# Patient Record
Sex: FEMALE | Race: WHITE | NOT HISPANIC OR LATINO | Employment: OTHER | URBAN - METROPOLITAN AREA
[De-identification: names, ages, dates, MRNs, and addresses within clinical notes are randomized per-mention and may not be internally consistent; named-entity substitution may affect disease eponyms.]

---

## 2017-01-11 ENCOUNTER — OFFICE VISIT (OUTPATIENT)
Dept: FAMILY MEDICINE CLINIC | Facility: CLINIC | Age: 67
End: 2017-01-11

## 2017-01-11 VITALS
BODY MASS INDEX: 27.58 KG/M2 | TEMPERATURE: 98.8 F | HEIGHT: 68 IN | RESPIRATION RATE: 13 BRPM | HEART RATE: 67 BPM | WEIGHT: 182 LBS | DIASTOLIC BLOOD PRESSURE: 76 MMHG | SYSTOLIC BLOOD PRESSURE: 118 MMHG | OXYGEN SATURATION: 97 %

## 2017-01-11 DIAGNOSIS — J01.40 ACUTE NON-RECURRENT PANSINUSITIS: Primary | ICD-10-CM

## 2017-01-11 PROCEDURE — 99213 OFFICE O/P EST LOW 20 MIN: CPT | Performed by: NURSE PRACTITIONER

## 2017-01-11 RX ORDER — AMOXICILLIN AND CLAVULANATE POTASSIUM 875; 125 MG/1; MG/1
1 TABLET, FILM COATED ORAL 2 TIMES DAILY
Qty: 20 TABLET | Refills: 0 | Status: SHIPPED | OUTPATIENT
Start: 2017-01-11 | End: 2017-04-03

## 2017-01-11 NOTE — PROGRESS NOTES
Subjective   Anastasiia Faria is a 67 y.o. female presents with sinus congestion and pain, taking otc sudafed for 5 weeks. Symptoms have been ongoing x 5 weeks. Sinus drainage, occasionally clear, varies to yellow/green. Ethmoidal, maxillary and frontal sinus pain reported. Initial rhinorrhea, now resolved. Improving sore throat, mild currently. Denies ear pain. Cough, improving, with some associated back pain. Dry, nonproductive, due to post nasal drainage. Shortness of breath and wheezing, intermittent, worsening over last few days. No known ill contacts. Increased fatigue. Appetite waxes and wanes.     URI    This is a new problem. The current episode started more than 1 month ago. There has been no fever. Associated symptoms include congestion (green/yellow), coughing, headaches, rhinorrhea (minimal), sinus pain and a sore throat (resolving). Pertinent negatives include no abdominal pain, diarrhea, dysuria, ear pain, nausea, neck pain, plugged ear sensation, rash, sneezing, swollen glands, vomiting or wheezing. She has tried antihistamine and decongestant for the symptoms. The treatment provided mild relief.        The following portions of the patient's history were reviewed and updated as appropriate: allergies, current medications, past family history, past medical history, past social history, past surgical history and problem list.    Review of Systems   Constitutional: Negative.    HENT: Positive for congestion (green/yellow), postnasal drip, rhinorrhea (minimal), sinus pressure and sore throat (resolving). Negative for ear pain and sneezing.    Eyes: Negative.    Respiratory: Positive for cough. Negative for wheezing.    Cardiovascular: Negative.    Gastrointestinal: Negative.  Negative for abdominal pain, diarrhea, nausea and vomiting.   Endocrine: Negative.    Genitourinary: Negative.  Negative for dysuria.   Musculoskeletal: Positive for back pain (mild back pain when coughing). Negative for neck pain.    Skin: Negative.  Negative for rash.   Allergic/Immunologic: Negative.    Neurological: Positive for headaches.   Hematological: Negative.    Psychiatric/Behavioral: Negative.        Objective   Physical Exam   Constitutional: She is oriented to person, place, and time. She appears well-developed and well-nourished.   HENT:   Head: Normocephalic and atraumatic.   Right Ear: Ear canal normal. Tympanic membrane is not retracted and not bulging.   Left Ear: Ear canal normal. Tympanic membrane is not retracted and not bulging.   Nose: Mucosal edema (pale) and sinus tenderness present. No rhinorrhea. Right sinus exhibits maxillary sinus tenderness and frontal sinus tenderness. Left sinus exhibits maxillary sinus tenderness and frontal sinus tenderness.   Mouth/Throat: Uvula is midline and mucous membranes are normal. Posterior oropharyngeal erythema present. No tonsillar exudate.   Eyes: EOM are normal. Pupils are equal, round, and reactive to light. Right eye exhibits no discharge. Left eye exhibits no discharge.   Neck: Normal range of motion. Neck supple.   Cardiovascular: Normal rate, regular rhythm and normal heart sounds.  Exam reveals no gallop and no friction rub.    No murmur heard.  Pulmonary/Chest: Effort normal and breath sounds normal. No respiratory distress. She has no wheezes. She has no rales. She exhibits no tenderness.   Lymphadenopathy:     She has no cervical adenopathy.   Neurological: She is alert and oriented to person, place, and time.   Skin: Skin is warm and dry.   Psychiatric: She has a normal mood and affect. Her behavior is normal. Judgment and thought content normal.   Vitals reviewed.      Assessment/Plan   Anastasiia was seen today for cough.    Diagnoses and all orders for this visit:    Acute non-recurrent pansinusitis    Other orders  -     amoxicillin-clavulanate (AUGMENTIN) 875-125 MG per tablet; Take 1 tablet by mouth 2 (Two) Times a Day.

## 2017-01-11 NOTE — MR AVS SNAPSHOT
Anastasiia Faria   1/11/2017 11:40 AM   Office Visit    Dept Phone:  [phone]   Encounter #:  [id number]    Provider:  ESTHER Hull   Department:  [facility]                Your Full Care Plan              Today's Medication Changes          These changes are accurate as of: 1/11/17 12:01 PM.  If you have any questions, ask your nurse or doctor.               New Medication(s)Ordered:     amoxicillin-clavulanate 875-125 MG per tablet   Commonly known as:  AUGMENTIN   Take 1 tablet by mouth 2 (Two) Times a Day.   Started by:  ESTHER Hull            Where to Get Your Medications      These medications were sent to [facility] [facility] [address] [facility] [phone] [facility] [phone]   [address] [facility], [facility] [address]     Phone:  [phone]     amoxicillin-clavulanate 875-125 MG per tablet                  Your Updated Medication List          This list is accurate as of: 1/11/17 12:01 PM.  Always use your most recent med list.                amoxicillin-clavulanate 875-125 MG per tablet   Commonly known as:  AUGMENTIN   Take 1 tablet by mouth 2 (Two) Times a Day.       atenolol 25 MG tablet   Commonly known as:  TENORMIN   Take 1 tablet by mouth daily.       CLARITIN 10 MG tablet   Generic drug:  loratadine       escitalopram 20 MG tablet   Commonly known as:  LEXAPRO   Take 1 tablet by mouth daily.       levothyroxine 50 MCG tablet   Commonly known as:  SYNTHROID, LEVOTHROID   Take 1 tablet by mouth daily.       NIFEdipine XL 30 MG 24 hr tablet   Commonly known as:  PROCARDIA XL   Take 1 tablet by mouth daily.               You Were Diagnosed With        Codes Comments    Acute non-recurrent pansinusitis    -  Primary ICD-10-CM: J01.40  ICD-9-CM: 461.8       Instructions    Sinusitis, Adult  Sinusitis is redness, soreness, and inflammation of  the paranasal sinuses. Paranasal sinuses are air pockets within the bones of your face. They are located beneath your eyes, in the middle of your forehead, and above your eyes. In healthy paranasal sinuses, mucus is able to drain out, and air is able to circulate through them by way of your nose. However, when your paranasal sinuses are inflamed, mucus and air can become trapped. This can allow bacteria and other germs to grow and cause infection.  Sinusitis can develop quickly and last only a short time (acute) or continue over a long period (chronic). Sinusitis that lasts for more than 12 weeks is considered chronic.  CAUSES  Causes of sinusitis include:  · Allergies.  · Structural abnormalities, such as displacement of the cartilage that separates your nostrils (deviated septum), which can decrease the air flow through your nose and sinuses and affect sinus drainage.  · Functional abnormalities, such as when the small hairs (cilia) that line your sinuses and help remove mucus do not work properly or are not present.  SIGNS AND SYMPTOMS  Symptoms of acute and chronic sinusitis are the same. The primary symptoms are pain and pressure around the affected sinuses. Other symptoms include:  · Upper toothache.  · Earache.  · Headache.  · Bad breath.  · Decreased sense of smell and taste.  · A cough, which worsens when you are lying flat.  · Fatigue.  · Fever.  · Thick drainage from your nose, which often is green and may contain pus (purulent).  · Swelling and warmth over the affected sinuses.  DIAGNOSIS  Your health care provider will perform a physical exam. During your exam, your health care provider may perform any of the following to help determine if you have acute sinusitis or chronic sinusitis:  · Look in your nose for signs of abnormal growths in your nostrils (nasal polyps).  · Tap over the affected sinus to check for signs of infection.  · View the inside of your sinuses using an imaging device that has a  light attached (endoscope).  If your health care provider suspects that you have chronic sinusitis, one or more of the following tests may be recommended:  · Allergy tests.  · Nasal culture. A sample of mucus is taken from your nose, sent to a lab, and screened for bacteria.  · Nasal cytology. A sample of mucus is taken from your nose and examined by your health care provider to determine if your sinusitis is related to an allergy.  TREATMENT  Most cases of acute sinusitis are related to a viral infection and will resolve on their own within 10 days. Sometimes, medicines are prescribed to help relieve symptoms of both acute and chronic sinusitis. These may include pain medicines, decongestants, nasal steroid sprays, or saline sprays.  However, for sinusitis related to a bacterial infection, your health care provider will prescribe antibiotic medicines. These are medicines that will help kill the bacteria causing the infection.  Rarely, sinusitis is caused by a fungal infection. In these cases, your health care provider will prescribe antifungal medicine.  For some cases of chronic sinusitis, surgery is needed. Generally, these are cases in which sinusitis recurs more than 3 times per year, despite other treatments.  HOME CARE INSTRUCTIONS  · Drink plenty of water. Water helps thin the mucus so your sinuses can drain more easily.  · Use a humidifier.  · Inhale steam 3-4 times a day (for example, sit in the bathroom with the shower running).  · Apply a warm, moist washcloth to your face 3-4 times a day, or as directed by your health care provider.  · Use saline nasal sprays to help moisten and clean your sinuses.  · Take medicines only as directed by your health care provider.  · If you were prescribed either an antibiotic or antifungal medicine, finish it all even if you start to feel better.  SEEK IMMEDIATE MEDICAL CARE IF:  · You have increasing pain or severe headaches.  · You have nausea, vomiting, or  "drowsiness.  · You have swelling around your face.  · You have vision problems.  · You have a stiff neck.  · You have difficulty breathing.     This information is not intended to replace advice given to you by your health care provider. Make sure you discuss any questions you have with your health care provider.     Document Released: 12/18/2006 Document Revised: 01/08/2016 Document Reviewed: 01/01/2013  [employer] Interactive Patient Education ©2016 [employer]       Patient Instructions History      Upcoming Appointments     Visit Type Date Time Department    SAME DAY 1/11/2017 11:40 AM YANIRA TAYLOR      [employer] Signup     Our records indicate that you have an active [employer] account.    You can view your After Visit Summary by going to [employer] and logging in with your [employer] username and password.  If you don't have a [employer] username and password but a parent or guardian has access to your record, the parent or guardian should login with their own [employer] username and password and access your record to view the After Visit Summary.    If you have questions, you can email [email or web address] or call [phone] to talk to our [employer] staff.  Remember, [employer] is NOT to be used for urgent needs.  For medical emergencies, dial 911.               Other Info from Your Visit           Allergies     No Known Allergies      Reason for Visit     Cough c/o persistent cough, sinus pressure, nasal drainage, nasal congestion, cough and sore throat are getting better, x 5 weeks, pt has been taking OTC sudafed       Vital Signs     Blood Pressure Pulse Temperature Respirations Height Weight    118/76 (BP Location: Right arm, Patient Position: Sitting, Cuff Size: Adult) 67 98.8 °F (37.1 °C) (Oral) 13 68\" (172.7 cm) 182 lb (82.6 kg)    Oxygen Saturation Body Mass Index Smoking Status             97% 27.67 kg/m2 Never Smoker         Problems and Diagnoses Noted     Acute " non-recurrent pansinusitis    -  Primary

## 2017-01-11 NOTE — PATIENT INSTRUCTIONS

## 2017-03-23 RX ORDER — LEVOTHYROXINE SODIUM 0.05 MG/1
50 TABLET ORAL DAILY
Qty: 90 TABLET | Refills: 0 | Status: SHIPPED | OUTPATIENT
Start: 2017-03-23 | End: 2017-04-03 | Stop reason: SDUPTHER

## 2017-03-23 RX ORDER — ESCITALOPRAM OXALATE 20 MG/1
20 TABLET ORAL DAILY
Qty: 90 TABLET | Refills: 0 | Status: SHIPPED | OUTPATIENT
Start: 2017-03-23 | End: 2017-04-03 | Stop reason: SDUPTHER

## 2017-03-23 RX ORDER — NIFEDIPINE 30 MG/1
30 TABLET, EXTENDED RELEASE ORAL DAILY
Qty: 90 TABLET | Refills: 0 | Status: SHIPPED | OUTPATIENT
Start: 2017-03-23 | End: 2017-04-03 | Stop reason: SDUPTHER

## 2017-03-23 RX ORDER — ATENOLOL 25 MG/1
25 TABLET ORAL DAILY
Qty: 90 TABLET | Refills: 0 | Status: SHIPPED | OUTPATIENT
Start: 2017-03-23 | End: 2017-04-03 | Stop reason: SDUPTHER

## 2017-04-03 ENCOUNTER — OFFICE VISIT (OUTPATIENT)
Dept: FAMILY MEDICINE CLINIC | Facility: CLINIC | Age: 67
End: 2017-04-03

## 2017-04-03 VITALS
BODY MASS INDEX: 27.86 KG/M2 | DIASTOLIC BLOOD PRESSURE: 78 MMHG | SYSTOLIC BLOOD PRESSURE: 125 MMHG | HEIGHT: 68 IN | HEART RATE: 73 BPM | WEIGHT: 183.8 LBS | TEMPERATURE: 98.7 F | OXYGEN SATURATION: 96 %

## 2017-04-03 DIAGNOSIS — E03.9 ACQUIRED HYPOTHYROIDISM: Primary | ICD-10-CM

## 2017-04-03 DIAGNOSIS — E78.01 FAMILIAL HYPERCHOLESTEROLEMIA: ICD-10-CM

## 2017-04-03 DIAGNOSIS — I10 ESSENTIAL HYPERTENSION: ICD-10-CM

## 2017-04-03 PROCEDURE — 90471 IMMUNIZATION ADMIN: CPT | Performed by: FAMILY MEDICINE

## 2017-04-03 PROCEDURE — 90715 TDAP VACCINE 7 YRS/> IM: CPT | Performed by: FAMILY MEDICINE

## 2017-04-03 PROCEDURE — 99213 OFFICE O/P EST LOW 20 MIN: CPT | Performed by: FAMILY MEDICINE

## 2017-04-03 RX ORDER — ESCITALOPRAM OXALATE 20 MG/1
20 TABLET ORAL DAILY
Qty: 90 TABLET | Refills: 0 | Status: SHIPPED | OUTPATIENT
Start: 2017-04-03 | End: 2017-09-20 | Stop reason: SDUPTHER

## 2017-04-03 RX ORDER — ATENOLOL 25 MG/1
25 TABLET ORAL DAILY
Qty: 90 TABLET | Refills: 0 | Status: SHIPPED | OUTPATIENT
Start: 2017-04-03 | End: 2017-09-20 | Stop reason: SDUPTHER

## 2017-04-03 RX ORDER — LEVOTHYROXINE SODIUM 0.05 MG/1
50 TABLET ORAL DAILY
Qty: 90 TABLET | Refills: 0 | Status: SHIPPED | OUTPATIENT
Start: 2017-04-03 | End: 2017-09-20 | Stop reason: SDUPTHER

## 2017-04-03 RX ORDER — NIFEDIPINE 30 MG/1
30 TABLET, EXTENDED RELEASE ORAL DAILY
Qty: 90 TABLET | Refills: 0 | Status: SHIPPED | OUTPATIENT
Start: 2017-04-03 | End: 2017-09-20 | Stop reason: SDUPTHER

## 2017-04-03 NOTE — PROGRESS NOTES
"  Chief Complaint   Patient presents with   • Hypertension     follow up pt is doing well would like to get dtap inmunizations       Subjective.../HPI  Patient present today with htn and hypothyroidism.     I have reviewed the patient's medical history in detail and updated the computerized patient record.    Family History   Problem Relation Age of Onset   • Breast cancer Mother    • Colon cancer Mother    • Stomach cancer Maternal Grandmother      or intestinal   • Cancer Maternal Grandmother      GI TRACT CANCER   • Colon cancer Maternal Aunt      colon ca 40   • Colon cancer Maternal Aunt      64 yo   • Colon cancer Maternal Aunt      81 yo   • Colon cancer Other    • Colon cancer Other    • Heart attack Maternal Grandfather    • Heart disease Paternal Grandmother        Social History     Social History   • Marital status:      Spouse name: N/A   • Number of children: N/A   • Years of education: N/A     Occupational History   • Not on file.     Social History Main Topics   • Smoking status: Never Smoker   • Smokeless tobacco: Never Used   • Alcohol use No   • Drug use: No   • Sexual activity: Not on file     Other Topics Concern   • Not on file     Social History Narrative       Review of Systems:   Review of Systems   HENT: Positive for sneezing.    Eyes: Negative.    Cardiovascular: Positive for leg swelling.   Endocrine: Negative.    Musculoskeletal: Positive for back pain.   Skin: Negative.    Allergic/Immunologic: Positive for environmental allergies.   Neurological: Positive for headaches.   Psychiatric/Behavioral: Positive for sleep disturbance.       Objective:  Vital Signs     Vitals:    04/03/17 1328 04/03/17 1344   BP: 116/62 125/78   BP Location: Left arm Left arm   Patient Position: Sitting Sitting   Cuff Size:  Adult   Pulse: 73    Temp: 98.7 °F (37.1 °C)    TempSrc: Oral    SpO2: 96%    Weight: 183 lb 12.8 oz (83.4 kg)    Height: 68\" (172.7 cm)      Physical Exam   Constitutional: She is " oriented to person, place, and time. She appears well-developed and well-nourished.   HENT:   Head: Normocephalic.   Eyes: Pupils are equal, round, and reactive to light.   Neck: Normal range of motion.   Cardiovascular: Normal rate, regular rhythm and normal heart sounds.    Pulmonary/Chest: Effort normal and breath sounds normal.   Abdominal: Soft.   Musculoskeletal: Normal range of motion.   Neurological: She is alert and oriented to person, place, and time.   Skin: Skin is warm and dry.        Results Review:      REVIEWED AND DISCUSSED LAB RESULTS WITH PATIENT and REVIEWED AND DISCUSSED CLINICAL RESULTS WITH PATIENT                          Current Outpatient Prescriptions:   •  atenolol (TENORMIN) 25 MG tablet, Take 1 tablet by mouth Daily., Disp: 90 tablet, Rfl: 0  •  escitalopram (LEXAPRO) 20 MG tablet, Take 1 tablet by mouth Daily., Disp: 90 tablet, Rfl: 0  •  levothyroxine (SYNTHROID, LEVOTHROID) 50 MCG tablet, Take 1 tablet by mouth Daily., Disp: 90 tablet, Rfl: 0  •  loratadine (CLARITIN) 10 MG tablet, Take 10 mg by mouth daily., Disp: , Rfl:   •  NIFEdipine XL (PROCARDIA XL) 30 MG 24 hr tablet, Take 1 tablet by mouth Daily., Disp: 90 tablet, Rfl: 0  •  Probiotic Product (SOLUBLE FIBER/PROBIOTICS PO), Take 1 capsule by mouth Daily., Disp: , Rfl:     Procedures    Assessment/Plan     Diagnoses and all orders for this visit:    Acquired hypothyroidism    Essential hypertension    Familial hypercholesterolemia  -     Lipid Panel With LDL / HDL Ratio    Other orders  -     Probiotic Product (SOLUBLE FIBER/PROBIOTICS PO); Take 1 capsule by mouth Daily.  -     atenolol (TENORMIN) 25 MG tablet; Take 1 tablet by mouth Daily.  -     escitalopram (LEXAPRO) 20 MG tablet; Take 1 tablet by mouth Daily.  -     levothyroxine (SYNTHROID, LEVOTHROID) 50 MCG tablet; Take 1 tablet by mouth Daily.  -     NIFEdipine XL (PROCARDIA XL) 30 MG 24 hr tablet; Take 1 tablet by mouth Daily.  -     Tdap Vaccine Greater Than or Equal  To 6yo IM    Pt wants to hold on statin meds and try diet exercise and wt loss     Jr Fraser Jr., MD  04/03/17  2:00 PM

## 2017-05-12 ENCOUNTER — PROCEDURE VISIT (OUTPATIENT)
Dept: OBSTETRICS AND GYNECOLOGY | Facility: CLINIC | Age: 67
End: 2017-05-12

## 2017-05-12 ENCOUNTER — OFFICE VISIT (OUTPATIENT)
Dept: OBSTETRICS AND GYNECOLOGY | Facility: CLINIC | Age: 67
End: 2017-05-12

## 2017-05-12 VITALS
SYSTOLIC BLOOD PRESSURE: 138 MMHG | HEIGHT: 67 IN | DIASTOLIC BLOOD PRESSURE: 70 MMHG | BODY MASS INDEX: 28.09 KG/M2 | WEIGHT: 179 LBS

## 2017-05-12 DIAGNOSIS — Z13.820 SCREENING FOR OSTEOPOROSIS: Primary | ICD-10-CM

## 2017-05-12 DIAGNOSIS — Z78.0 POSTMENOPAUSAL: ICD-10-CM

## 2017-05-12 DIAGNOSIS — Z01.419 WELL WOMAN EXAM WITH ROUTINE GYNECOLOGICAL EXAM: Primary | ICD-10-CM

## 2017-05-12 DIAGNOSIS — M85.80 OSTEOPENIA: ICD-10-CM

## 2017-05-12 PROCEDURE — 99397 PER PM REEVAL EST PAT 65+ YR: CPT | Performed by: OBSTETRICS & GYNECOLOGY

## 2017-05-12 PROCEDURE — 77080 DXA BONE DENSITY AXIAL: CPT | Performed by: OBSTETRICS & GYNECOLOGY

## 2017-05-22 ENCOUNTER — HOSPITAL ENCOUNTER (OUTPATIENT)
Dept: MAMMOGRAPHY | Facility: HOSPITAL | Age: 67
Discharge: HOME | End: 2017-05-22
Attending: OBSTETRICS & GYNECOLOGY | Admitting: OBSTETRICS & GYNECOLOGY

## 2017-05-22 ENCOUNTER — HOSPITAL ENCOUNTER (OUTPATIENT)
Dept: ULTRASOUND IMAGING | Facility: HOSPITAL | Age: 67
Discharge: HOME | End: 2017-05-22
Attending: OBSTETRICS & GYNECOLOGY

## 2017-05-22 DIAGNOSIS — N64.89 BREAST ASYMMETRY: ICD-10-CM

## 2017-05-22 PROCEDURE — 76642 ULTRASOUND BREAST LIMITED: CPT

## 2017-05-22 PROCEDURE — G0206 DX MAMMO INCL CAD UNI: HCPCS

## 2017-06-01 ENCOUNTER — CLINICAL SUPPORT (OUTPATIENT)
Dept: OBSTETRICS AND GYNECOLOGY | Facility: CLINIC | Age: 67
End: 2017-06-01

## 2017-06-01 DIAGNOSIS — M81.0 OSTEOPOROSIS: Primary | ICD-10-CM

## 2017-06-01 DIAGNOSIS — Z92.29 HX OF DRUG THERAPY: ICD-10-CM

## 2017-06-01 PROCEDURE — 96372 THER/PROPH/DIAG INJ SC/IM: CPT | Performed by: OBSTETRICS & GYNECOLOGY

## 2017-08-11 LAB
CHOLEST SERPL-MCNC: 228 MG/DL (ref 0–200)
HDLC SERPL-MCNC: 44 MG/DL (ref 40–60)
LDLC SERPL CALC-MCNC: 143 MG/DL (ref 0–100)
LDLC/HDLC SERPL: 3.25 {RATIO}
TRIGL SERPL-MCNC: 206 MG/DL (ref 0–150)
VLDLC SERPL CALC-MCNC: 41.2 MG/DL (ref 5–40)

## 2017-09-21 RX ORDER — NIFEDIPINE 30 MG/1
TABLET, EXTENDED RELEASE ORAL
Qty: 90 TABLET | Refills: 0 | Status: SHIPPED | OUTPATIENT
Start: 2017-09-21 | End: 2017-10-16 | Stop reason: SDUPTHER

## 2017-09-21 RX ORDER — ESCITALOPRAM OXALATE 20 MG/1
TABLET ORAL
Qty: 90 TABLET | Refills: 0 | Status: SHIPPED | OUTPATIENT
Start: 2017-09-21 | End: 2017-10-16 | Stop reason: SDUPTHER

## 2017-09-21 RX ORDER — ATENOLOL 25 MG/1
TABLET ORAL
Qty: 90 TABLET | Refills: 0 | Status: SHIPPED | OUTPATIENT
Start: 2017-09-21 | End: 2017-10-16 | Stop reason: SDUPTHER

## 2017-09-21 RX ORDER — LEVOTHYROXINE SODIUM 0.05 MG/1
TABLET ORAL
Qty: 90 TABLET | Refills: 0 | Status: SHIPPED | OUTPATIENT
Start: 2017-09-21 | End: 2017-10-16 | Stop reason: SDUPTHER

## 2017-10-12 ENCOUNTER — FLU SHOT (OUTPATIENT)
Dept: FAMILY MEDICINE CLINIC | Facility: CLINIC | Age: 67
End: 2017-10-12

## 2017-10-12 ENCOUNTER — LAB (OUTPATIENT)
Dept: FAMILY MEDICINE CLINIC | Facility: CLINIC | Age: 67
End: 2017-10-12

## 2017-10-12 DIAGNOSIS — J30.1 ALLERGIC RHINITIS DUE TO POLLEN, UNSPECIFIED CHRONICITY, UNSPECIFIED SEASONALITY: ICD-10-CM

## 2017-10-12 DIAGNOSIS — K63.5 HYPERPLASTIC COLONIC POLYP, UNSPECIFIED PART OF COLON: ICD-10-CM

## 2017-10-12 DIAGNOSIS — E03.9 ACQUIRED HYPOTHYROIDISM: ICD-10-CM

## 2017-10-12 DIAGNOSIS — Z80.0 FH: COLON CANCER IN RELATIVE <50 YEARS OLD: ICD-10-CM

## 2017-10-12 DIAGNOSIS — Z23 NEED FOR INFLUENZA VACCINATION: Primary | ICD-10-CM

## 2017-10-12 DIAGNOSIS — I10 ESSENTIAL HYPERTENSION: Primary | ICD-10-CM

## 2017-10-12 DIAGNOSIS — N18.9 CHRONIC KIDNEY DISEASE, UNSPECIFIED CKD STAGE: ICD-10-CM

## 2017-10-12 DIAGNOSIS — Z00.00 ROUTINE GENERAL MEDICAL EXAMINATION AT A HEALTH CARE FACILITY: ICD-10-CM

## 2017-10-12 DIAGNOSIS — Z79.899 POLYPHARMACY: ICD-10-CM

## 2017-10-12 DIAGNOSIS — E78.01 FAMILIAL HYPERCHOLESTEROLEMIA: ICD-10-CM

## 2017-10-12 PROCEDURE — G0008 ADMIN INFLUENZA VIRUS VAC: HCPCS | Performed by: FAMILY MEDICINE

## 2017-10-12 PROCEDURE — 90662 IIV NO PRSV INCREASED AG IM: CPT | Performed by: FAMILY MEDICINE

## 2017-10-13 LAB
ALBUMIN SERPL-MCNC: 4.3 G/DL (ref 3.5–5.2)
ALBUMIN/GLOB SERPL: 1.8 G/DL
ALP SERPL-CCNC: 69 U/L (ref 39–117)
ALT SERPL-CCNC: 18 U/L (ref 1–33)
AST SERPL-CCNC: 20 U/L (ref 1–32)
BASOPHILS # BLD AUTO: 0.04 10*3/MM3 (ref 0–0.2)
BASOPHILS NFR BLD AUTO: 0.6 % (ref 0–1.5)
BILIRUB SERPL-MCNC: 0.5 MG/DL (ref 0.1–1.2)
BUN SERPL-MCNC: 12 MG/DL (ref 8–23)
BUN/CREAT SERPL: 14.6 (ref 7–25)
CALCIUM SERPL-MCNC: 8.9 MG/DL (ref 8.6–10.5)
CHLORIDE SERPL-SCNC: 104 MMOL/L (ref 98–107)
CHOLEST SERPL-MCNC: 233 MG/DL (ref 0–200)
CO2 SERPL-SCNC: 24.1 MMOL/L (ref 22–29)
CREAT SERPL-MCNC: 0.82 MG/DL (ref 0.57–1)
EOSINOPHIL # BLD AUTO: 0.33 10*3/MM3 (ref 0–0.7)
EOSINOPHIL NFR BLD AUTO: 5.1 % (ref 0.3–6.2)
ERYTHROCYTE [DISTWIDTH] IN BLOOD BY AUTOMATED COUNT: 14.7 % (ref 11.7–13)
FT4I SERPL CALC-MCNC: 2.1 (ref 1.2–4.9)
GLOBULIN SER CALC-MCNC: 2.4 GM/DL
GLUCOSE SERPL-MCNC: 94 MG/DL (ref 65–99)
HBA1C MFR BLD: 5.6 % (ref 4.8–5.6)
HCT VFR BLD AUTO: 42.5 % (ref 35.6–45.5)
HDLC SERPL-MCNC: 41 MG/DL (ref 40–60)
HGB BLD-MCNC: 14.1 G/DL (ref 11.9–15.5)
IMM GRANULOCYTES # BLD: 0.02 10*3/MM3 (ref 0–0.03)
IMM GRANULOCYTES NFR BLD: 0.3 % (ref 0–0.5)
LDLC SERPL CALC-MCNC: 152 MG/DL (ref 0–100)
LDLC/HDLC SERPL: 3.72 {RATIO}
LYMPHOCYTES # BLD AUTO: 2.85 10*3/MM3 (ref 0.9–4.8)
LYMPHOCYTES NFR BLD AUTO: 43.8 % (ref 19.6–45.3)
MCH RBC QN AUTO: 31.1 PG (ref 26.9–32)
MCHC RBC AUTO-ENTMCNC: 33.2 G/DL (ref 32.4–36.3)
MCV RBC AUTO: 93.8 FL (ref 80.5–98.2)
MONOCYTES # BLD AUTO: 0.41 10*3/MM3 (ref 0.2–1.2)
MONOCYTES NFR BLD AUTO: 6.3 % (ref 5–12)
NEUTROPHILS # BLD AUTO: 2.85 10*3/MM3 (ref 1.9–8.1)
NEUTROPHILS NFR BLD AUTO: 43.9 % (ref 42.7–76)
PLATELET # BLD AUTO: 317 10*3/MM3 (ref 140–500)
POTASSIUM SERPL-SCNC: 4.2 MMOL/L (ref 3.5–5.2)
PROT SERPL-MCNC: 6.7 G/DL (ref 6–8.5)
RBC # BLD AUTO: 4.53 10*6/MM3 (ref 3.9–5.2)
SODIUM SERPL-SCNC: 143 MMOL/L (ref 136–145)
T3RU NFR SERPL: 27 % (ref 24–39)
T4 SERPL-MCNC: 7.7 UG/DL (ref 4.5–12)
TRIGL SERPL-MCNC: 198 MG/DL (ref 0–150)
TSH SERPL DL<=0.005 MIU/L-ACNC: 3.59 UIU/ML (ref 0.45–4.5)
VLDLC SERPL CALC-MCNC: 39.6 MG/DL (ref 5–40)
WBC # BLD AUTO: 6.5 10*3/MM3 (ref 4.5–10.7)

## 2017-10-16 ENCOUNTER — OFFICE VISIT (OUTPATIENT)
Dept: FAMILY MEDICINE CLINIC | Facility: CLINIC | Age: 67
End: 2017-10-16

## 2017-10-16 VITALS
BODY MASS INDEX: 28.25 KG/M2 | HEIGHT: 67 IN | WEIGHT: 180 LBS | OXYGEN SATURATION: 97 % | HEART RATE: 61 BPM | SYSTOLIC BLOOD PRESSURE: 170 MMHG | TEMPERATURE: 98.3 F | DIASTOLIC BLOOD PRESSURE: 85 MMHG

## 2017-10-16 DIAGNOSIS — E78.01 FAMILIAL HYPERCHOLESTEROLEMIA: ICD-10-CM

## 2017-10-16 DIAGNOSIS — I10 ESSENTIAL HYPERTENSION: ICD-10-CM

## 2017-10-16 DIAGNOSIS — Z00.00 PREVENTATIVE HEALTH CARE: Primary | ICD-10-CM

## 2017-10-16 DIAGNOSIS — E03.9 ACQUIRED HYPOTHYROIDISM: ICD-10-CM

## 2017-10-16 PROCEDURE — G0009 ADMIN PNEUMOCOCCAL VACCINE: HCPCS | Performed by: FAMILY MEDICINE

## 2017-10-16 PROCEDURE — 90670 PCV13 VACCINE IM: CPT | Performed by: FAMILY MEDICINE

## 2017-10-16 PROCEDURE — 99213 OFFICE O/P EST LOW 20 MIN: CPT | Performed by: FAMILY MEDICINE

## 2017-10-16 RX ORDER — NIFEDIPINE 30 MG/1
30 TABLET, EXTENDED RELEASE ORAL DAILY
Qty: 90 TABLET | Refills: 1 | Status: SHIPPED | OUTPATIENT
Start: 2017-10-16 | End: 2018-03-26 | Stop reason: SDUPTHER

## 2017-10-16 RX ORDER — ESCITALOPRAM OXALATE 20 MG/1
20 TABLET ORAL DAILY
Qty: 90 TABLET | Refills: 1 | Status: SHIPPED | OUTPATIENT
Start: 2017-10-16 | End: 2018-03-26 | Stop reason: SDUPTHER

## 2017-10-16 RX ORDER — ATENOLOL 25 MG/1
25 TABLET ORAL DAILY
Qty: 90 TABLET | Refills: 1 | Status: SHIPPED | OUTPATIENT
Start: 2017-10-16 | End: 2018-03-26 | Stop reason: SDUPTHER

## 2017-10-16 RX ORDER — LEVOTHYROXINE SODIUM 0.05 MG/1
50 TABLET ORAL DAILY
Qty: 90 TABLET | Refills: 1 | Status: SHIPPED | OUTPATIENT
Start: 2017-10-16 | End: 2018-03-26 | Stop reason: SDUPTHER

## 2017-10-16 NOTE — PROGRESS NOTES
"  Chief Complaint   Patient presents with   • Hypothyroidism     follow up pt is fine ,no complains       Subjective.../HPI  Patient present today withhtn and hypothyroidism. On sudafed x 2 weeks. ckd ans resolved.    I have reviewed the patient's medical history in detail and updated the computerized patient record.    Family History   Problem Relation Age of Onset   • Breast cancer Mother    • Colon cancer Mother    • Stomach cancer Maternal Grandmother      or intestinal   • Cancer Maternal Grandmother      GI TRACT CANCER   • Colon cancer Maternal Aunt      colon ca 40   • Colon cancer Maternal Aunt      64 yo   • Colon cancer Maternal Aunt      79 yo   • Colon cancer Other    • Colon cancer Other    • Heart attack Maternal Grandfather    • Heart disease Paternal Grandmother        Social History     Social History   • Marital status:      Spouse name: N/A   • Number of children: N/A   • Years of education: N/A     Occupational History   • Not on file.     Social History Main Topics   • Smoking status: Never Smoker   • Smokeless tobacco: Never Used   • Alcohol use No   • Drug use: No   • Sexual activity: Not on file     Other Topics Concern   • Not on file     Social History Narrative       Review of Systems:   Review of Systems   Constitutional: Negative.    HENT: Negative.    Eyes: Negative.    Respiratory: Negative.    Cardiovascular: Negative.    Gastrointestinal: Negative.    Endocrine: Negative.    Genitourinary: Negative.    Musculoskeletal: Negative.    Skin: Negative.    Allergic/Immunologic: Negative.    Neurological: Negative.    Hematological: Negative.    Psychiatric/Behavioral: Negative.        Objective:  Vital Signs     Vitals:    10/16/17 1604 10/16/17 1717   BP: 138/86 170/85   BP Location: Left arm Left arm   Patient Position: Sitting Sitting   Cuff Size:  Adult   Pulse: 61    Temp: 98.3 °F (36.8 °C)    TempSrc: Oral    SpO2: 97%    Weight: 180 lb (81.6 kg)    Height: 67\" (170.2 cm)  "     Physical Exam   Constitutional: She is oriented to person, place, and time. She appears well-developed and well-nourished.   HENT:   Head: Normocephalic.   Eyes: Pupils are equal, round, and reactive to light.   Neck: Normal range of motion.   Cardiovascular: Normal rate, regular rhythm and normal heart sounds.    Pulmonary/Chest: Effort normal.   Abdominal: Soft.   Musculoskeletal: Normal range of motion.   Neurological: She is alert and oriented to person, place, and time.   Skin: Skin is dry.        Results Review:      REVIEWED AND DISCUSSED LAB RESULTS WITH PATIENT and REVIEWED AND DISCUSSED CLINICAL RESULTS WITH PATIENT      Results from last 7 days  Lab Units 10/12/17  0936   WBC 10*3/mm3 6.50   HEMOGLOBIN g/dL 14.1   HEMATOCRIT % 42.5   PLATELETS 10*3/mm3 317       Results from last 7 days  Lab Units 10/12/17  0936   SODIUM mmol/L 143   POTASSIUM mmol/L 4.2   CHLORIDE mmol/L 104   TOTAL CO2, ARTERIAL mmol/L 24.1   BUN mg/dL 12   CREATININE mg/dL 0.82   CALCIUM mg/dL 8.9                   Current Outpatient Prescriptions:   •  atenolol (TENORMIN) 25 MG tablet, Take 1 tablet by mouth Daily., Disp: 90 tablet, Rfl: 1  •  Cholecalciferol (VITAMIN D3) 2000 UNITS capsule, , Disp: , Rfl:   •  escitalopram (LEXAPRO) 20 MG tablet, Take 1 tablet by mouth Daily., Disp: 90 tablet, Rfl: 1  •  levothyroxine (SYNTHROID, LEVOTHROID) 50 MCG tablet, Take 1 tablet by mouth Daily., Disp: 90 tablet, Rfl: 1  •  loratadine (CLARITIN) 10 MG tablet, Take 10 mg by mouth daily., Disp: , Rfl:   •  NIFEdipine XL (PROCARDIA XL) 30 MG 24 hr tablet, Take 1 tablet by mouth Daily., Disp: 90 tablet, Rfl: 1  •  Probiotic Product (SOLUBLE FIBER/PROBIOTICS PO), Take 1 capsule by mouth Daily., Disp: , Rfl:     Current Facility-Administered Medications:   •  pneumococcal polysaccharide 23-valent (PNEUMOVAX-23) vaccine 0.5 mL, 0.5 mL, Intramuscular, During Hospitalization, Jr Fraser Jr., MD    Procedures    Assessment/Plan     Diagnoses  and all orders for this visit:    Preventative health care  -     pneumococcal polysaccharide 23-valent (PNEUMOVAX-23) vaccine 0.5 mL; Inject 0.5 mL into the shoulder, thigh, or buttocks During Hospitalization for Immunization.    Acquired hypothyroidism    Familial hypercholesterolemia    Essential hypertension    Other orders  -     atenolol (TENORMIN) 25 MG tablet; Take 1 tablet by mouth Daily.  -     escitalopram (LEXAPRO) 20 MG tablet; Take 1 tablet by mouth Daily.  -     levothyroxine (SYNTHROID, LEVOTHROID) 50 MCG tablet; Take 1 tablet by mouth Daily.  -     NIFEdipine XL (PROCARDIA XL) 30 MG 24 hr tablet; Take 1 tablet by mouth Daily.       pt has been taking sudafed x 2 weeks with bp  Pt wants to hold on statins and try diet exercise ands wt loss    Jr Fraser Jr., MD  10/16/17  5:35 PM

## 2018-02-15 ENCOUNTER — PROCEDURE VISIT (OUTPATIENT)
Dept: OBSTETRICS AND GYNECOLOGY | Facility: CLINIC | Age: 68
End: 2018-02-15

## 2018-02-15 VITALS — DIASTOLIC BLOOD PRESSURE: 60 MMHG | SYSTOLIC BLOOD PRESSURE: 112 MMHG

## 2018-02-15 DIAGNOSIS — Z92.29 HX OF DRUG THERAPY: ICD-10-CM

## 2018-02-15 DIAGNOSIS — M85.88 OSTEOPENIA OF SPINE: Primary | ICD-10-CM

## 2018-02-15 PROCEDURE — 96372 THER/PROPH/DIAG INJ SC/IM: CPT | Performed by: OBSTETRICS & GYNECOLOGY

## 2018-02-15 NOTE — PROGRESS NOTES
Procedure   Procedures   Prolia Injection    60 mg subcutaneous injection of Prolia given in upper outer right arm.  Patient tolerated without difficulty.  She will return in 6 months for next injection and annual checkup.

## 2018-03-14 ENCOUNTER — OFFICE VISIT (OUTPATIENT)
Dept: FAMILY MEDICINE CLINIC | Facility: CLINIC | Age: 68
End: 2018-03-14

## 2018-03-14 ENCOUNTER — HOSPITAL ENCOUNTER (OUTPATIENT)
Dept: GENERAL RADIOLOGY | Facility: HOSPITAL | Age: 68
Discharge: HOME | End: 2018-03-14
Admitting: FAMILY MEDICINE

## 2018-03-14 VITALS
BODY MASS INDEX: 28.41 KG/M2 | WEIGHT: 181 LBS | HEART RATE: 75 BPM | OXYGEN SATURATION: 99 % | SYSTOLIC BLOOD PRESSURE: 128 MMHG | HEIGHT: 67 IN | DIASTOLIC BLOOD PRESSURE: 78 MMHG

## 2018-03-14 DIAGNOSIS — M21.829 SHOULDER HEIGHT DISCREPANCY: ICD-10-CM

## 2018-03-14 DIAGNOSIS — I10 ESSENTIAL HYPERTENSION: ICD-10-CM

## 2018-03-14 DIAGNOSIS — R53.82 CHRONIC FATIGUE: ICD-10-CM

## 2018-03-14 DIAGNOSIS — R06.02 SHORTNESS OF BREATH: Primary | ICD-10-CM

## 2018-03-14 DIAGNOSIS — Q74.0 ABNORMAL PROMINENCE OF SCAPULA: ICD-10-CM

## 2018-03-14 PROCEDURE — 99214 OFFICE O/P EST MOD 30 MIN: CPT | Performed by: FAMILY MEDICINE

## 2018-03-14 PROCEDURE — 72072 X-RAY EXAM THORAC SPINE 3VWS: CPT

## 2018-03-14 RX ORDER — VALACYCLOVIR HYDROCHLORIDE 1 G/1
TABLET, FILM COATED ORAL
COMMUNITY
Start: 2018-02-20 | End: 2018-03-30

## 2018-03-14 NOTE — PROGRESS NOTES
Subjective   Anastasiia Faria is a 68 y.o. female. Previously seen by Dr. Fraser. Pt is new to me, problems are new to me.      Chief Complaint   Patient presents with   • Establish Care   • Fatigue   • Shortness of Breath     going down 1 flight of stairs        History of Present Illness    SOB and fatigue  For a year but the SOB is worse. Happens with little activity like stairs. Goes to curves and SOB after, sometimes has to slow down. Slight cough but not too much has allergies, non-productive, hemoptysis. Not associated with sweating or getting really hot. Is associated with pain between shoulder blades, mostly when she is walking, never at rest. Feels like a tightness, like she needs to relax, but not tense. Not associated with neck pain or headaches. This has been going on for couple of years. Has had EKG but years ago. Never had an echo or stress test. Has a heart murmur. Has HTN but very well controlled. Changed some BP meds because of kidney and seems to be working well. Kidney improved. Some lightheadedness with standing but not passed out. Does not sleep well at all. Get enough hours. Can't get to sleep, reads a lot, goes back to bed. Sleeps a few hours at a time. Never been a smoker.     Hx of scoliosis  Central back pain. Feels like tightness. Has been ongoing for 2-3 years. Right in middle of back, right of center. Has hx of scoliosis, unsure if this is related to problem or not. She has noticeable shoulder height discrepancy with left being higher than right. Also with appearance of left lateral base of the neck swellings. Said this neck swelling has been evaluated with imaging and was normal. Pt notices the height difference of shoulders when looks in the mirror.    Thyroid  Lab abnormalities. Was started 2-3 years ago. Hs not had dose change.    The following portions of the patient's history were reviewed and updated as appropriate: allergies, current medications, past medical history, past social  "history, past surgical history and problem list.    Review of Systems   Constitutional: Negative for activity change, appetite change and unexpected weight change.   Respiratory: Negative for shortness of breath.    Cardiovascular: Negative for chest pain and leg swelling.   Musculoskeletal: Positive for arthralgias and myalgias. Negative for neck pain and neck stiffness.   Allergic/Immunologic: Positive for environmental allergies.   Neurological: Negative for headaches.   Psychiatric/Behavioral: Positive for sleep disturbance.       Objective   Blood pressure 128/78, pulse 75, height 170.2 cm (67\"), weight 82.1 kg (181 lb), SpO2 99 %.  Physical Exam   Constitutional: She is oriented to person, place, and time. She appears well-nourished. No distress.   HENT:   Right Ear: External ear normal.   Left Ear: External ear normal.   Nose: Nose normal.   Mouth/Throat: Oropharynx is clear and moist.   Eyes: Conjunctivae are normal. Right eye exhibits no discharge. Left eye exhibits no discharge. No scleral icterus.   Neck: Neck supple. No thyromegaly present.   Cardiovascular: Normal rate, regular rhythm, normal heart sounds and intact distal pulses.    No murmur heard.  Pulmonary/Chest: Effort normal and breath sounds normal. No respiratory distress. She has no wheezes.   Musculoskeletal: She exhibits no edema.   Visible asymmetry of the right shoulder and scapula. Sloping of right shoulder more dramatically downward than left. More pronounced right scapula. Mild muscle spasm palpated on the right back, trapezius and thoracic level paraspinals.   Lymphadenopathy:     She has no cervical adenopathy.   Neurological: She is alert and oriented to person, place, and time. She exhibits normal muscle tone.   Psychiatric: She has a normal mood and affect. Her behavior is normal.   Vitals reviewed.      Assessment/Plan   Anastasiia was seen today for establish care, fatigue and shortness of breath.    Diagnoses and all orders for this " visit:    Shortness of breath  -     Adult Transthoracic Echo Complete W/ Cont if Necessary Per Protocol; Future    Chronic fatigue  -     Adult Transthoracic Echo Complete W/ Cont if Necessary Per Protocol; Future    Essential hypertension  -     Adult Transthoracic Echo Complete W/ Cont if Necessary Per Protocol; Future    Abnormal prominence of scapula  -     Ambulatory Referral to Orthopedic Surgery  -     Cancel: XR Spine Thoracic 4+ View  -     XR spine thoracic 3 vw  -     XR Spine Thoracic 3 View    Shoulder height discrepancy  -     Ambulatory Referral to Orthopedic Surgery  -     Cancel: XR Spine Thoracic 4+ View  -     XR spine thoracic 3 vw  -     XR Spine Thoracic 3 View    Discussed her scoliosis and back discomfort. It is affecting the degree of activity as she likes to walk for exercise and it can bother the back. Unsure of best therapy whether bracing may help or PT, specific stretches and position/posture training to help vs even surgery? Will refer for further evaluation and discussion of treatment options.

## 2018-03-20 ENCOUNTER — HOSPITAL ENCOUNTER (OUTPATIENT)
Dept: CARDIOLOGY | Facility: HOSPITAL | Age: 68
Discharge: HOME | End: 2018-03-20
Admitting: FAMILY MEDICINE

## 2018-03-20 VITALS
WEIGHT: 191 LBS | BODY MASS INDEX: 29.98 KG/M2 | SYSTOLIC BLOOD PRESSURE: 130 MMHG | HEART RATE: 73 BPM | DIASTOLIC BLOOD PRESSURE: 70 MMHG | HEIGHT: 67 IN

## 2018-03-20 DIAGNOSIS — R06.02 SHORTNESS OF BREATH: ICD-10-CM

## 2018-03-20 DIAGNOSIS — R53.82 CHRONIC FATIGUE: ICD-10-CM

## 2018-03-20 DIAGNOSIS — I10 ESSENTIAL HYPERTENSION: ICD-10-CM

## 2018-03-20 LAB
ASCENDING AORTA: 2.6 CM
BH CV ECHO MEAS - ACS: 2 CM
BH CV ECHO MEAS - AO MAX PG (FULL): 3.7 MMHG
BH CV ECHO MEAS - AO MAX PG: 8 MMHG
BH CV ECHO MEAS - AO MEAN PG (FULL): 2.3 MMHG
BH CV ECHO MEAS - AO MEAN PG: 4.7 MMHG
BH CV ECHO MEAS - AO ROOT AREA (BSA CORRECTED): 1.5
BH CV ECHO MEAS - AO ROOT AREA: 6.6 CM^2
BH CV ECHO MEAS - AO ROOT DIAM: 2.9 CM
BH CV ECHO MEAS - AO V2 MAX: 141.6 CM/SEC
BH CV ECHO MEAS - AO V2 MEAN: 104.2 CM/SEC
BH CV ECHO MEAS - AO V2 VTI: 31.9 CM
BH CV ECHO MEAS - AVA(I,A): 2 CM^2
BH CV ECHO MEAS - AVA(I,D): 2 CM^2
BH CV ECHO MEAS - AVA(V,A): 2.2 CM^2
BH CV ECHO MEAS - AVA(V,D): 2.2 CM^2
BH CV ECHO MEAS - BSA(HAYCOCK): 2 M^2
BH CV ECHO MEAS - BSA: 1.9 M^2
BH CV ECHO MEAS - BZI_BMI: 28.3 KILOGRAMS/M^2
BH CV ECHO MEAS - BZI_METRIC_HEIGHT: 170.2 CM
BH CV ECHO MEAS - BZI_METRIC_WEIGHT: 82.1 KG
BH CV ECHO MEAS - CONTRAST EF (2CH): 62.9 ML/M^2
BH CV ECHO MEAS - CONTRAST EF 4CH: 66.3 ML/M^2
BH CV ECHO MEAS - EDV(MOD-SP2): 105 ML
BH CV ECHO MEAS - EDV(MOD-SP4): 101 ML
BH CV ECHO MEAS - EDV(TEICH): 107.3 ML
BH CV ECHO MEAS - EF(CUBED): 77.1 %
BH CV ECHO MEAS - EF(MOD-SP2): 62.9 %
BH CV ECHO MEAS - EF(MOD-SP4): 65 %
BH CV ECHO MEAS - EF(TEICH): 69.1 %
BH CV ECHO MEAS - ESV(MOD-SP2): 39 ML
BH CV ECHO MEAS - ESV(MOD-SP4): 34 ML
BH CV ECHO MEAS - ESV(TEICH): 33.2 ML
BH CV ECHO MEAS - FS: 38.8 %
BH CV ECHO MEAS - IVS/LVPW: 0.9
BH CV ECHO MEAS - IVSD: 0.68 CM
BH CV ECHO MEAS - LAT PEAK E' VEL: 6 CM/SEC
BH CV ECHO MEAS - LV DIASTOLIC VOL/BSA (35-75): 52.1 ML/M^2
BH CV ECHO MEAS - LV MASS(C)D: 109.8 GRAMS
BH CV ECHO MEAS - LV MASS(C)DI: 56.7 GRAMS/M^2
BH CV ECHO MEAS - LV MAX PG: 4.4 MMHG
BH CV ECHO MEAS - LV MEAN PG: 2.4 MMHG
BH CV ECHO MEAS - LV SYSTOLIC VOL/BSA (12-30): 17.5 ML/M^2
BH CV ECHO MEAS - LV V1 MAX: 104.3 CM/SEC
BH CV ECHO MEAS - LV V1 MEAN: 70.8 CM/SEC
BH CV ECHO MEAS - LV V1 VTI: 21.4 CM
BH CV ECHO MEAS - LVIDD: 4.8 CM
BH CV ECHO MEAS - LVIDS: 2.9 CM
BH CV ECHO MEAS - LVLD AP2: 8.3 CM
BH CV ECHO MEAS - LVLD AP4: 7.8 CM
BH CV ECHO MEAS - LVLS AP2: 7.4 CM
BH CV ECHO MEAS - LVLS AP4: 7.4 CM
BH CV ECHO MEAS - LVOT AREA (M): 3.1 CM^2
BH CV ECHO MEAS - LVOT AREA: 3 CM^2
BH CV ECHO MEAS - LVOT DIAM: 2 CM
BH CV ECHO MEAS - LVPWD: 0.75 CM
BH CV ECHO MEAS - MED PEAK E' VEL: 7 CM/SEC
BH CV ECHO MEAS - MR MAX PG: 40.5 MMHG
BH CV ECHO MEAS - MR MAX VEL: 318 CM/SEC
BH CV ECHO MEAS - MV A DUR: 0.13 SEC
BH CV ECHO MEAS - MV A MAX VEL: 82 CM/SEC
BH CV ECHO MEAS - MV DEC SLOPE: 218.4 CM/SEC^2
BH CV ECHO MEAS - MV DEC TIME: 0.24 SEC
BH CV ECHO MEAS - MV E MAX VEL: 55.8 CM/SEC
BH CV ECHO MEAS - MV E/A: 0.68
BH CV ECHO MEAS - MV MAX PG: 2.7 MMHG
BH CV ECHO MEAS - MV MEAN PG: 1.1 MMHG
BH CV ECHO MEAS - MV P1/2T MAX VEL: 53.8 CM/SEC
BH CV ECHO MEAS - MV P1/2T: 72.2 MSEC
BH CV ECHO MEAS - MV V2 MAX: 82.5 CM/SEC
BH CV ECHO MEAS - MV V2 MEAN: 47.6 CM/SEC
BH CV ECHO MEAS - MV V2 VTI: 22.6 CM
BH CV ECHO MEAS - MVA P1/2T LCG: 4.1 CM^2
BH CV ECHO MEAS - MVA(P1/2T): 3 CM^2
BH CV ECHO MEAS - MVA(VTI): 2.8 CM^2
BH CV ECHO MEAS - PA ACC TIME: 0.15 SEC
BH CV ECHO MEAS - PA MAX PG (FULL): 1.4 MMHG
BH CV ECHO MEAS - PA MAX PG: 3.3 MMHG
BH CV ECHO MEAS - PA PR(ACCEL): 9.3 MMHG
BH CV ECHO MEAS - PA V2 MAX: 90.3 CM/SEC
BH CV ECHO MEAS - PULM A REVS DUR: 0.13 SEC
BH CV ECHO MEAS - PULM A REVS VEL: 34 CM/SEC
BH CV ECHO MEAS - PULM DIAS VEL: 42.2 CM/SEC
BH CV ECHO MEAS - PULM S/D: 1.2
BH CV ECHO MEAS - PULM SYS VEL: 51.4 CM/SEC
BH CV ECHO MEAS - PVA(V,A): 2.3 CM^2
BH CV ECHO MEAS - PVA(V,D): 2.3 CM^2
BH CV ECHO MEAS - QP/QS: 0.88
BH CV ECHO MEAS - RAP SYSTOLE: 3 MMHG
BH CV ECHO MEAS - RV MAX PG: 1.8 MMHG
BH CV ECHO MEAS - RV MEAN PG: 1.2 MMHG
BH CV ECHO MEAS - RV V1 MAX: 67.4 CM/SEC
BH CV ECHO MEAS - RV V1 MEAN: 52.6 CM/SEC
BH CV ECHO MEAS - RV V1 VTI: 18.5 CM
BH CV ECHO MEAS - RVOT AREA: 3.1 CM^2
BH CV ECHO MEAS - RVOT DIAM: 2 CM
BH CV ECHO MEAS - RVSP: 27 MMHG
BH CV ECHO MEAS - SI(AO): 108.8 ML/M^2
BH CV ECHO MEAS - SI(CUBED): 43.9 ML/M^2
BH CV ECHO MEAS - SI(LVOT): 33 ML/M^2
BH CV ECHO MEAS - SI(MOD-SP2): 34.1 ML/M^2
BH CV ECHO MEAS - SI(MOD-SP4): 34.6 ML/M^2
BH CV ECHO MEAS - SI(TEICH): 38.3 ML/M^2
BH CV ECHO MEAS - SUP REN AO DIAM: 1.8 CM
BH CV ECHO MEAS - SV(AO): 210.9 ML
BH CV ECHO MEAS - SV(CUBED): 85.1 ML
BH CV ECHO MEAS - SV(LVOT): 64 ML
BH CV ECHO MEAS - SV(MOD-SP2): 66 ML
BH CV ECHO MEAS - SV(MOD-SP4): 67 ML
BH CV ECHO MEAS - SV(RVOT): 56.6 ML
BH CV ECHO MEAS - SV(TEICH): 74.2 ML
BH CV ECHO MEAS - TAPSE (>1.6): 2 CM2
BH CV ECHO MEAS - TR MAX VEL: 244.3 CM/SEC
BH CV XLRA - RV BASE: 3.3 CM
BH CV XLRA - TDI S': 12 CM/SEC
E/E' RATIO: 7
LEFT ATRIUM VOLUME INDEX: 22 ML/M2
LV EF 2D ECHO EST: 65 %
SINUS: 2.9 CM
STJ: 2.3 CM

## 2018-03-20 PROCEDURE — 93306 TTE W/DOPPLER COMPLETE: CPT | Performed by: INTERNAL MEDICINE

## 2018-03-20 PROCEDURE — 93306 TTE W/DOPPLER COMPLETE: CPT

## 2018-03-23 ENCOUNTER — TELEPHONE (OUTPATIENT)
Dept: FAMILY MEDICINE CLINIC | Facility: CLINIC | Age: 68
End: 2018-03-23

## 2018-03-23 NOTE — TELEPHONE ENCOUNTER
Left message for patient's cell phone voicemail.  Let her know that her x-ray of her back did show the curvature of the spine and she is already scheduled with Dr. Murillo with orthopedics to discuss potential for bracing or any therapies that might help alleviate some of the sequela of the curvature in the pain she is having.  Also she had the heart ultrasound which was reassuring overall normal results she should call if she has any questions.

## 2018-03-26 RX ORDER — NIFEDIPINE 30 MG/1
30 TABLET, EXTENDED RELEASE ORAL DAILY
Qty: 90 TABLET | Refills: 1 | Status: SHIPPED | OUTPATIENT
Start: 2018-03-26 | End: 2018-08-27 | Stop reason: SDUPTHER

## 2018-03-26 RX ORDER — ATENOLOL 25 MG/1
25 TABLET ORAL DAILY
Qty: 90 TABLET | Refills: 1 | Status: SHIPPED | OUTPATIENT
Start: 2018-03-26 | End: 2018-08-27 | Stop reason: SDUPTHER

## 2018-03-26 RX ORDER — ESCITALOPRAM OXALATE 20 MG/1
20 TABLET ORAL DAILY
Qty: 90 TABLET | Refills: 1 | Status: SHIPPED | OUTPATIENT
Start: 2018-03-26 | End: 2018-08-27 | Stop reason: SDUPTHER

## 2018-03-26 RX ORDER — LEVOTHYROXINE SODIUM 0.05 MG/1
50 TABLET ORAL DAILY
Qty: 90 TABLET | Refills: 1 | Status: SHIPPED | OUTPATIENT
Start: 2018-03-26 | End: 2018-08-27 | Stop reason: SDUPTHER

## 2018-03-30 ENCOUNTER — OFFICE VISIT (OUTPATIENT)
Dept: ORTHOPEDIC SURGERY | Facility: CLINIC | Age: 68
End: 2018-03-30

## 2018-03-30 VITALS — HEIGHT: 68 IN | TEMPERATURE: 98.2 F | BODY MASS INDEX: 26.98 KG/M2 | WEIGHT: 178 LBS

## 2018-03-30 DIAGNOSIS — M25.511 CHRONIC RIGHT SHOULDER PAIN: Primary | ICD-10-CM

## 2018-03-30 DIAGNOSIS — G89.29 CHRONIC RIGHT SHOULDER PAIN: Primary | ICD-10-CM

## 2018-03-30 DIAGNOSIS — M89.8X1 PERISCAPULAR PAIN: ICD-10-CM

## 2018-03-30 DIAGNOSIS — M41.24 OTHER IDIOPATHIC SCOLIOSIS, THORACIC REGION: ICD-10-CM

## 2018-03-30 PROCEDURE — 73030 X-RAY EXAM OF SHOULDER: CPT | Performed by: ORTHOPAEDIC SURGERY

## 2018-03-30 PROCEDURE — 99204 OFFICE O/P NEW MOD 45 MIN: CPT | Performed by: ORTHOPAEDIC SURGERY

## 2018-03-30 RX ORDER — PHENOL 1.4 %
1200 AEROSOL, SPRAY (ML) MUCOUS MEMBRANE DAILY
COMMUNITY

## 2018-03-30 NOTE — PROGRESS NOTES
New Right Shoulder      Patient: Anastasiia Faria        YOB: 1950    Medical Record Number: [id number]        Chief Complaints: right shoulder pain  Chief Complaint   Patient presents with   • Right Shoulder - Establish Care           History of Present Illness: This is a  68 y.o. female who presents with Complaints of right shoulder pain she states is been ongoing the last couple of years and it's more periscapular pain she saw her primary care for shortness of air with activity primary care do not feel like that shoulder was related I do agree.  She states if she is been walking for a period time she starts getting some burning in the P scapular area.  No dedicated neck pain she does have a known history of a thoracic scoliosis which is never been looked at.  She does have occasional night pain most of her pain is with activity began in October no history injury change in activity symptoms are moderate intermittent stabbing burning she is a retired teacher past medical history marked for osteopenia high blood pressure depression anxiety and thyroid disease          Allergies: No Known Allergies    Medications:   Home Medications:  Current Outpatient Prescriptions on File Prior to Visit   Medication Sig   • atenolol (TENORMIN) 25 MG tablet Take 1 tablet by mouth Daily.   • Cholecalciferol (VITAMIN D3) 2000 UNITS capsule    • escitalopram (LEXAPRO) 20 MG tablet Take 1 tablet by mouth Daily.   • levothyroxine (SYNTHROID, LEVOTHROID) 50 MCG tablet Take 1 tablet by mouth Daily.   • loratadine (CLARITIN) 10 MG tablet Take 10 mg by mouth daily.   • NIFEdipine XL (PROCARDIA XL) 30 MG 24 hr tablet Take 1 tablet by mouth Daily.   • Probiotic Product (SOLUBLE FIBER/PROBIOTICS PO) Take 1 capsule by mouth Daily.   • PROLIA 60 MG/ML solution syringe    • [DISCONTINUED] valACYclovir (VALTREX) 1000 MG tablet      No current facility-administered medications on file prior to visit.      Current Medications:  Scheduled  Meds:  Continuous Infusions:  No current facility-administered medications for this visit.   PRN Meds:.    Past Medical History:   Diagnosis Date   • Allergic    • Chronic kidney disease    • Encounter for annual health examination 03/07/2014    Annual Health Assessment   • Fibrocystic breast    • Herpes labialis without complication    • History of mammogram 12/20/2010   • Hyperlipidemia    • Hypertension    • Hypothyroidism    • Insomnia    • PONV (postoperative nausea and vomiting)         Past Surgical History:   Procedure Laterality Date   • BONE MARROW BIOPSY     • BREAST BIOPSY     • BREAST CYST ASPIRATION     • BREAST SURGERY Right     BIOPSY   • COLONOSCOPY N/A 10/27/2016    Procedure: COLONOSCOPY INTO CECUM;  Surgeon: Osvaldo Dorado MD;  Location: [facility];  Service:    • COLONOSCOPY  01/26/2011   • COLONOSCOPY  02/04/2005   • KNEE ARTHROSCOPY Left    • PAP SMEAR  12/20/2010        Social History     Occupational History   • Not on file.     Social History Main Topics   • Smoking status: Never Smoker   • Smokeless tobacco: Never Used   • Alcohol use No   • Drug use: No   • Sexual activity: Defer    Social History     Social History Narrative   • No narrative on file        Family History   Problem Relation Age of Onset   • Breast cancer Mother    • Colon cancer Mother    • Stomach cancer Maternal Grandmother      or intestinal   • Cancer Maternal Grandmother      GI TRACT CANCER   • Colon cancer Maternal Aunt      colon ca 40   • Colon cancer Maternal Aunt      66 yo   • Colon cancer Maternal Aunt      81 yo   • Colon cancer Other    • Colon cancer Other    • Heart attack Maternal Grandfather    • Heart disease Paternal Grandmother              Review of Systems: 14 point review of systems are remarkable for the pertinent positives listed in the chart by the patient the remainder are negative    Review of Systems      Physical Exam: 68 y.o. female  General Appearance:    Alert,  "cooperative, in no acute distress                 Vitals:    03/30/18 0914   Temp: 98.2 °F (36.8 °C)   TempSrc: Temporal Artery    Weight: 80.7 kg (178 lb)   Height: 172.7 cm (68\")      Patient is alert and read ×3 no acute distress appears her above-listed at height weight and age.  Affect is normal respiratory rate is normal unlabored. Heart rate regular rate rhythm, sclera, dentition and hearing are normal for the purpose of this exam.    Ortho Exam Physical exam of the right shoulder reveals no overlying skin changes no lymphedema no lymphadenopathy.  The patient can actively flex to 180, abduction is similar external rotation is to 50, internal rotation to the upper lumbar spine.  Rotator cuff strength is 4+ to 5 over 5 with isometric strength testing without symptoms.  The patient has good cervical range of motion full and asymptomatic no radicular symptoms and a normal elbow exam.  Patient has good distal pulses  She does have palpable tenderness in area the medial scapula one area that is point tender all of this seems to be more muscular in origin she does have slight decrease in scapular stabilization with repetitive for flexion activity and obvious scoliosis.  Procedures          Radiology:   AP, Scapular Y and Axillary Lateral of the right shoulder were ordered/reviewed to evauate shoulder pain.  I've no comparative films these show some acromioclavicular arthritis otherwise no acute bony pathology I do appreciate a thoracic scoliosis      Assessment/Plan: Right shoulder pain is periscapular in origin I think this is more related to a periscapular muscles and then probably due to her scoliosis as well.  I'm going to start her into some physical therapy working on those muscles I will give her some Pennsaid cream for local application and I will also have her see Dr. Nunez to evaluate her scoliosis she does have a history of kidney issues in the past and was taken off all anti-inflammatories therefore I " will not give her any oral anti-inflammatories at this time kidney function has returned to normal

## 2018-04-05 ENCOUNTER — HOSPITAL ENCOUNTER (OUTPATIENT)
Dept: PHYSICAL THERAPY | Facility: HOSPITAL | Age: 68
Setting detail: THERAPIES SERIES
Discharge: HOME | End: 2018-04-05
Attending: ORTHOPAEDIC SURGERY

## 2018-04-05 DIAGNOSIS — M25.511 CHRONIC RIGHT SHOULDER PAIN: Primary | ICD-10-CM

## 2018-04-05 DIAGNOSIS — G89.29 CHRONIC RIGHT SHOULDER PAIN: Primary | ICD-10-CM

## 2018-04-05 DIAGNOSIS — M75.41 IMPINGEMENT SYNDROME OF RIGHT SHOULDER: ICD-10-CM

## 2018-04-05 PROCEDURE — 97161 PT EVAL LOW COMPLEX 20 MIN: CPT | Performed by: PHYSICAL THERAPIST

## 2018-04-05 PROCEDURE — G8984 CARRY CURRENT STATUS: HCPCS | Performed by: PHYSICAL THERAPIST

## 2018-04-05 PROCEDURE — G8985 CARRY GOAL STATUS: HCPCS | Performed by: PHYSICAL THERAPIST

## 2018-04-05 PROCEDURE — 97110 THERAPEUTIC EXERCISES: CPT | Performed by: PHYSICAL THERAPIST

## 2018-04-05 NOTE — THERAPY EVALUATION
Outpatient Physical Therapy Ortho Initial Evaluation  [facility]     Patient Name: Anastasiia Faria  : 1950  MRN: [id number]  Today's Date: 2018      Visit Date: 2018    Patient Active Problem List   Diagnosis   • Colon polyp, hyperplastic   • Allergic rhinitis due to pollen   • Acquired hypothyroidism   • Essential hypertension   • FH: colon cancer in relative <50 years old   • Hyperlipidemia   • Chronic right shoulder pain   • Periscapular pain   • Other idiopathic scoliosis, thoracic region        Past Medical History:   Diagnosis Date   • Allergic    • Chronic kidney disease    • Encounter for annual health examination 2014    Annual Health Assessment   • Fibrocystic breast    • Herpes labialis without complication    • History of mammogram 2010   • Hyperlipidemia    • Hypertension    • Hypothyroidism    • Insomnia    • PONV (postoperative nausea and vomiting)         Past Surgical History:   Procedure Laterality Date   • BONE MARROW BIOPSY     • BREAST BIOPSY     • BREAST CYST ASPIRATION     • BREAST SURGERY Right     BIOPSY   • COLONOSCOPY N/A 10/27/2016    Procedure: COLONOSCOPY INTO CECUM;  Surgeon: Osvaldo Dorado MD;  Location: [facility] ENDOSCOPY;  Service:    • COLONOSCOPY  2011   • COLONOSCOPY  2005   • KNEE ARTHROSCOPY Left    • PAP SMEAR  2010       Visit Dx:     ICD-10-CM ICD-9-CM   1. Chronic right shoulder pain M25.511 719.41    G89.29 338.29   2. Impingement syndrome of right shoulder M75.41 726.2             Patient History     Row Name 18 0900             History    Chief Complaint Difficulty with daily activities;Muscle tenderness;Pain  -LH      Type of Pain Shoulder pain  -LH      Date Current Problem(s) Began 17  -      Brief Description of Current Complaint Pt started with R shoulder and scapular pain about a year ago. She first started noticing it when she was walking, having a sharp pain and a burning in the shoulder and  in between the shoulder blades. She was told by her primary care that she had a scoliosis and thought the shoulder was dysfunctional because of that. She had some shortness of breath that initially took her to the MD, she has had it for years  -      Patient/Caregiver Goals Relieve pain;Return to prior level of function;Know what to do to help the symptoms  -      Patient's Rating of General Health Good  -      Occupation/sports/leisure activities retired teacher  -         Pain     Pain Location Shoulder  -      Pain at Present 2  -      Pain at Worst 8  -      Pain Frequency Intermittent  -      Pain Description Aching;Burning;Stabbing;Tightness  -      What Performance Factors Make the Current Problem(s) WORSE? extended walking, house work (vacuum), lifting, carrying, sleeping sometimes  -      What Performance Factors Make the Current Problem(s) BETTER? heat  -         Fall Risk Assessment    Any falls in the past year: No  -         Services    Prior Rehab/Home Health Experiences No  -         Daily Activities    Teaching needs identified Home Exercise Program;Management of Condition  -      Patient is concerned about/has problems with Performing home management (household chores, shopping, care of dependents);Repetitive movements of the hand, arm, shoulder;Walking;Performing job responsibilities/community activities (work, school,  -      Pt Participated in POC and Goals Yes  -         Safety    Are you being hurt, hit, or frightened by anyone at home or in your life? No  -      Are you being neglected by a caregiver No  -        User Key  (r) = Recorded By, (t) = Taken By, (c) = Cosigned By    Initials Name Provider Type     Joi Potts PT Physical Therapist                PT Ortho     Row Name 04/05/18 0900       Posture/Observations    Rounded Shoulders Left:;Mild;Right:;Moderate  -LH    Scapular Elevation Right:;Mild  -LH    Scoliosis Moderate  -LH       Shoulder  Girdle Palpation    Subacromial Space Right:;Tender  -LH    Infraspinatus Right:;Tender  -LH    Upper Trap Right:;Tender;Guarded/taut  -LH    Levator Scapula Right:;Tender;Guarded/taut  -LH    Middle Trap Right:;Tender;Guarded/taut  -LH    Rhomboid Right:;Tender;Guarded/taut;Trigger point  -LH       Shoulder Impingement/Rotator Cuff Special Tests    Neer Impingement Test (RC Lesion vs. Bursitis) Right:;Positive  -LH    Empty Can Test (RC Lesion) Right:;Positive  -LH       Right Upper Ext    Rt Shoulder Abduction AROM 140  -LH    Rt Shoulder Abduction PROM 150  -LH    Rt Shoulder Flexion AROM 150  -LH    Rt Shoulder Flexion PROM 155  -LH    Rt Shoulder External Rotation AROM T3  -LH    Rt Shoulder External Rotation PROM 83  -LH    Rt Shoulder Internal Rotation AROM T11  -LH    Rt Shoulder Internal Rotation PROM 95  -LH       Left Upper Ext    Lt Shoulder Abduction AROM 140  -LH    Lt Shoulder Abduction PROM 160  -LH    Lt Shoulder Flexion AROM 153  -LH    Lt Shoulder Flexion PROM 162  -LH    Lt Shoulder External Rotation AROM T3  -LH    Lt Shoulder External Rotation PROM 80  -LH    Lt Shoulder Internal Rotation AROM T9  -LH    Lt Shoulder Internal Rotation PROM 100  -LH       Left Shoulder (Manual Muscle Testing)    MMT: Flexion, Left Shoulder flexion  -    MMT, Gross Movement: Left Shoulder Flexion other (see comments)   4+/5  -LH    MMT: ABduction, Left Shoulder abduction  -    MMT, Gross Movement: Left Shoulder ABduction other (see comments)   4+/5  -LH    MMT: Internal Rotation, Left Shoulder internal rotation  -    MMT, Gross Movement: Left Shoulder Internal Rotation other (see comments)   4+/5  -LH    MMT: External Rotation, Left Shoulder external rotation  -    MMT, Gross Movement: Left Shoulder External Rotation (4/5) good  -LH       Right Shoulder (Manual Muscle Testing)    MMT: Flexion, Right Shoulder flexion  -    MMT, Gross Movement: Right Shoulder Flexion (4/5) good  -LH    MMT: ABduction,  Right Shoulder abduction  -    MMT, Gross Movement: Right Shoulder ABduction (4/5) good  -    MMT: Internal Rotation, Right Shoulder internal rotation  -    MMT, Gross Movement: Right Shoulder Internal Rotation (4/5) good  -    MMT: External Rotation, Right Shoulder external rotation  -    MMT, Gross Movement: Right Shoulder External Rotation (4-/5) good minus  -       Sensation    Sensation WNL? WNL  -      User Key  (r) = Recorded By, (t) = Taken By, (c) = Cosigned By    Initials Name Provider Type     Joi Potts, ARIC Physical Therapist                      Therapy Education  Given: HEP, Symptoms/condition management, Posture/body mechanics, Pain management  Program: New  How Provided: Verbal, Demonstration, Written  Provided to: Patient  Level of Understanding: Teach back education performed, Verbalized, Demonstrated           PT OP Goals     Row Name 04/05/18 0900          PT Short Term Goals    STG 1 Patient will be independent with education for symptom management, joint protection and strategies to minimize stress on affected tissues  -     STG 1 Progress New  [facility]     STG 2 Pt to have full R shoulder PROM without increased pain  -     STG 2 Progress [facility]        Long Term Goals    LTG 1 Pt to improve shoulder strength to 4 to 4+/5  -     LTG 1 Progress New  [facility]     LTG 2 Pt to improve DASH score by % for overall functional improvement  -     LTG 2 Progress New  [facility]     LTG 3 Patient will be independent and compliant with advanced home exercise program to facilitate self-management of symptoms.  -     LTG 3 Progress [facility]     LTG 4 Pt to improve shoulder AROM in flex=155 deg, abd=145 deg, ER=T4 and IR=T9  -     LTG 4 Progress New  -LH        Time Calculation    PT Goal Re-Cert Due Date 07/05/18  -       User Key  (r) = Recorded By, (t) = Taken By, (c) = Cosigned By    Initials Name Provider Type     Joi Potts PT Physical Therapist                PT  Assessment/Plan     Row Name 04/05/18 1236          PT Assessment    Functional Limitations Limitation in home management;Limitations in community activities;Performance in leisure activities;Limitations in functional capacity and performance  -     Impairments Range of motion;Muscle strength;Pain;Posture;Joint mobility  -     Assessment Comments Pt presents to therapy with complaints of R shoulder pain that has increased over the past year. She has no KELLY. She works out several times a week at [employer] and has for some time. She does have a moderate scoliosis  witha R rib hump. Pt has mild ROM deficits in flex=150/153 seg, abd=140/150 deg, ER=T3/83 deg, and IR=T11/95 deg, decreased strrength with MMT, and tenderness over the subacromial area, infraspinatus tendon, and tightness/tender over the UT, levator and periscapular muscles. Pt is (+) for impingement tests. She scored a 17.5% on the DASH, with 0% being no disabiltiy. Pt would benefit from therapy to address these issues to help her improve function.   -     Please refer to paper survey for additional self-reported information Yes  -     Rehab Potential Good  -     Patient/caregiver participated in establishment of treatment plan and goals Yes  -     Patient would benefit from skilled therapy intervention Yes  -        PT Plan    PT Frequency 2x/week  -     Predicted Duration of Therapy Intervention (OT Eval) 4 weeks  -     Planned CPT's? PT EVAL LOW COMPLEXITY: 22698;PT ULTRASOUND EA 15 MIN: 79677;PT MANUAL THERAPY EA 15 MIN: 75284;PT THER PROC EA 15 MIN: 47704;PT HOT OR COLD PACK TREAT MCARE  -     Physical Therapy Interventions (Optional Details) joint mobilization;postural re-education;ROM (Range of Motion);manual therapy techniques;strengthening;modalities;stretching;home exercise program;patient/family education  -     PT Plan Comments plan to see pt 2x week for ROM, strength, and pain control  -       User Key  (r) = Recorded By,  (t) = Taken By, (c) = Cosigned By    Initials Name Provider Type     Joi Potts PT Physical Therapist                  Exercises     Row Name 04/05/18 1000             Exercise 1    Exercise Name 1 scap ret and shoulder ext  -LH      Sets 1 1  -LH      Reps 1 10  -LH      Additional Comments red  -LH         Exercise 2    Exercise Name 2 SL ER  -LH      Sets 2 1  -LH      Reps 2 10  -LH      Additional Comments 1#  -LH         Exercise 3    Exercise Name 3 supine press ups  -LH      Sets 3 1  -LH      Reps 3 10  -LH      Additional Comments 2#  -LH         Exercise 4    Exercise Name 4 supine horiz abd  -LH      Sets 4 1  -LH      Reps 4 10  -LH      Additional Comments red  -LH         Exercise 5    Exercise Name 5 rhomboid stretch  -LH      Sets 5 1  -LH      Reps 5 3  -LH      Time 5 20 sec  -LH        User Key  (r) = Recorded By, (t) = Taken By, (c) = Cosigned By    Initials Name Provider Type     Joi Potts PT Physical Therapist                        Outcome Measure Options: Disabilities of the Arm, Shoulder, and Hand (DASH)  DASH  Open a tight or new jar.: Mild Difficulty  Write: No Difficulty  Turn a key: No Difficulty  Prepare a meal: No Difficulty  Push open a heavy door: Mild Difficulty  Place an object on a shelf above your head: No Difficulty  Do heavy household chores (e.g., wash walls, wash floors): Mild Difficulty  Garden or do yard work: Mild Difficulty  Make a bed: No Difficulty  Carry a shopping bag or briefcase: Mild Difficulty  Carry a heavy object (over 10 lbs): Mild Difficulty  Change a lightbulb overhead: No Difficulty  Wash or blow dry your hair: No Difficulty  Wash your back: Mild Difficulty  Put on a pullover sweater: No Difficulty  Use a knife to cut food: No Difficulty  Recreational activities in which require little effort (e.g., cardplaying, knitting, etc.): No Difficulty  Recreational activities in which you take some force or impact through your arm, should or hand  (e.g. golf, hammering, tennis, etc.): Mild Difficulty  Recreational Activities in which you move your arm freely (e.g., frisbee, badminton, etc.): No Difficulty  Manage transportation needs (getting from one place to another): No Difficulty  Sexual Activities: No Difficulty  During the past week, to what extent has your arm, shoulder, or hand problem interfered with your normal social activites with family, friends, neighbors or groups?: Slightly  During the past week, were you limited in your work or other regular daily activities as a result of your arm, shoulder or hand problem?: Slightly Limited  Arm, Shoulder, or hand pain: Moderate  Arm, shoulder or hand pain when you performed any specific activity: Moderate  Tingling (pins and needles) in your arm, shoulder, or hand: Moderate  Weakness in your arm, shoulder or hand: None  Stiffness in your arm, shoulder or hand: Moderate  During the past week, how much difficulty have you had sleeping because of the pain in your arm, shoulder or hand?: Mild Difficulty  I feel less capable, less confident or less useful because of my arm, shoulder or hand problem: Neither Agree nor Disagree  DASH Sum : 51  Number of Questions Answered: 30  DASH Score: 17.5         Time Calculation:   Start Time: 0920  Stop Time: 1010  Time Calculation (min): 50 min     Therapy Charges for Today     Code Description Service Date Service Provider Modifiers Qty    11557012356 HC PT CARRY MOV HAND OBJ CURRENT 4/5/2018 Joi Potts, PT GP, CJ 1    04700708247 HC PT CARRY MOV HAND OBJ PROJECTED 4/5/2018 Joi Potts, PT GP, CI 1    89176839256 HC PT EVAL LOW COMPLEXITY 2 4/5/2018 Joi Potts, PT GP 1    97008025575 HC PT THER PROC EA 15 MIN 4/5/2018 Joi Potts, PT GP 1          PT G-Codes  Outcome Measure Options: Disabilities of the Arm, Shoulder, and Hand (DASH)  Functional Limitation: Carrying, moving and handling objects  Carrying, Moving and Handling Objects Current Status  (): At least 20 percent but less than 40 percent impaired, limited or restricted  Carrying, Moving and Handling Objects Goal Status (): At least 1 percent but less than 20 percent impaired, limited or restricted         Joi Potts, PT  4/5/2018

## 2018-04-11 ENCOUNTER — HOSPITAL ENCOUNTER (OUTPATIENT)
Dept: PHYSICAL THERAPY | Facility: HOSPITAL | Age: 68
Setting detail: THERAPIES SERIES
Discharge: HOME | End: 2018-04-11
Attending: ORTHOPAEDIC SURGERY

## 2018-04-11 DIAGNOSIS — M25.511 CHRONIC RIGHT SHOULDER PAIN: Primary | ICD-10-CM

## 2018-04-11 DIAGNOSIS — G89.29 CHRONIC RIGHT SHOULDER PAIN: Primary | ICD-10-CM

## 2018-04-11 DIAGNOSIS — M75.41 IMPINGEMENT SYNDROME OF RIGHT SHOULDER: ICD-10-CM

## 2018-04-11 PROCEDURE — 97140 MANUAL THERAPY 1/> REGIONS: CPT

## 2018-04-11 PROCEDURE — 97110 THERAPEUTIC EXERCISES: CPT

## 2018-04-11 NOTE — THERAPY TREATMENT NOTE
Outpatient Physical Therapy Ortho Treatment Note  [facility]     Patient Name: Anastasiia Faria  : 1950  MRN: [id number]  Today's Date: 2018      Visit Date: 2018    Visit Dx:    ICD-10-CM ICD-9-CM   1. Chronic right shoulder pain M25.511 719.41    G89.29 338.29   2. Impingement syndrome of right shoulder M75.41 726.2       Patient Active Problem List   Diagnosis   • Colon polyp, hyperplastic   • Allergic rhinitis due to pollen   • Acquired hypothyroidism   • Essential hypertension   • FH: colon cancer in relative <50 years old   • Hyperlipidemia   • Chronic right shoulder pain   • Periscapular pain   • Other idiopathic scoliosis, thoracic region        Past Medical History:   Diagnosis Date   • Allergic    • Chronic kidney disease    • Encounter for annual health examination 2014    Annual Health Assessment   • Fibrocystic breast    • Herpes labialis without complication    • History of mammogram 2010   • Hyperlipidemia    • Hypertension    • Hypothyroidism    • Insomnia    • PONV (postoperative nausea and vomiting)         Past Surgical History:   Procedure Laterality Date   • BONE MARROW BIOPSY     • BREAST BIOPSY     • BREAST CYST ASPIRATION     • BREAST SURGERY Right     BIOPSY   • COLONOSCOPY N/A 10/27/2016    Procedure: COLONOSCOPY INTO CECUM;  Surgeon: Osvaldo Dorado MD;  Location: [facility] ENDOSCOPY;  Service:    • COLONOSCOPY  2011   • COLONOSCOPY  2005   • KNEE ARTHROSCOPY Left    • PAP SMEAR  2010             PT Ortho     Row Name 18 1500       Subjective Comments    Subjective Comments Pt states she goes to [employer] 3 times a week and has for years..  Pain central to right Sh blade back pain if carry something heavy in right arm, and with prolonged walking.  -SI       Posture/Observations    Scoliosis Moderate  -SI      User Key  (r) = Recorded By, (t) = Taken By, (c) = Cosigned By    Initials Name Provider Type    ANA Vazquez PTA  "Physical Therapy Assistant                            PT Assessment/Plan     Row Name 04/11/18 2180          PT Assessment    Assessment Comments Ex given first visit all needed re-instruction.  Discussed posture for ex at \"[employer]\" and to bring in brochure with pictures of the machines.  Told her to hold on \"kettle ball\" type exwith impingement type sx.  -SI       User Key  (r) = Recorded By, (t) = Taken By, (c) = Cosigned By    Initials Name Provider Type    ANA Vazquez PTA Physical Therapy Assistant                Modalities     Row Name 04/11/18 1500             Ice    Patient reports will apply ice at home to involved area Yes   discussed use of ice PRN as needed  -SI        User Key  (r) = Recorded By, (t) = Taken By, (c) = Cosigned By    Initials Name Provider Type    ANA Vazquez PTA Physical Therapy Assistant                Exercises     Row Name 04/11/18 1500             Subjective Comments    Subjective Comments Pt states she goes to [employer] 3 times a week and has for years..  Pain central to right Sh blade back pain if carry something heavy in right arm, and with prolonged walking.  -SI         Exercise 1    Exercise Name 1 scap ret and shoulder ext  -SI      Sets 1 2  -SI      Reps 1 10  -SI      Additional Comments red  -SI         Exercise 2    Exercise Name 2 SL ER  -SI      Sets 2 2  -SI      Reps 2 10  -SI      Additional Comments 2 lbs  -SI         Exercise 3    Exercise Name 3 supine press ups  -SI      Sets 3 2  -SI      Reps 3 10  -SI      Additional Comments 2lbs  -SI         Exercise 4    Exercise Name 4 supine horiz abd  -SI      Sets 4 2  -SI      Reps 4 10  -SI      Additional Comments red  -SI         Exercise 5    Exercise Name 5 rhomboid stretch  -SI      Sets 5 1  -SI      Reps 5 3  -SI      Time 5 20 sec  -SI        User Key  (r) = Recorded By, (t) = Taken By, (c) = Cosigned By    Initials Name Provider Type    ANA Vazquez PTA Physical Therapy Assistant    "                     Manual Rx (last 36 hours)      Manual Treatments     Row Name 04/11/18 1500             Manual Rx 1    Manual Rx 1 Location GH joint oscillations, PROM as tolerated  -SI      Manual Rx 1 Duration 15  -SI        User Key  (r) = Recorded By, (t) = Taken By, (c) = Cosigned By    Initials Name Provider Type    ANA Vazquez PTA Physical Therapy Assistant              Therapy Education  Given: HEP, Symptoms/condition management, Posture/body mechanics (Long discussion on posture for sitting, lying, and with ex.  )  Program: Reinforced  How Provided: Verbal, Demonstration, Written  Provided to: Patient  Level of Understanding: Teach back education performed              Time Calculation:   Start Time: 1100  Stop Time: 1145  Time Calculation (min): 45 min    Therapy Charges for Today     Code Description Service Date Service Provider Modifiers Qty    72285065691 HC PT THER PROC EA 15 MIN 4/11/2018 Liliana Vazquez PTA GP 2    11596393815 HC PT MANUAL THERAPY EA 15 MIN 4/11/2018 Liliana Vazquez PTA GP 1                    Liliana Vazquez PTA  4/11/2018

## 2018-04-13 ENCOUNTER — CONSULT (OUTPATIENT)
Dept: ORTHOPEDIC SURGERY | Facility: CLINIC | Age: 68
End: 2018-04-13

## 2018-04-13 ENCOUNTER — HOSPITAL ENCOUNTER (OUTPATIENT)
Dept: PHYSICAL THERAPY | Facility: HOSPITAL | Age: 68
Setting detail: THERAPIES SERIES
Discharge: HOME | End: 2018-04-13
Attending: ORTHOPAEDIC SURGERY

## 2018-04-13 VITALS — TEMPERATURE: 98.9 F | HEIGHT: 68 IN | BODY MASS INDEX: 27.74 KG/M2 | WEIGHT: 183 LBS

## 2018-04-13 DIAGNOSIS — M54.6 CHRONIC THORACIC BACK PAIN, UNSPECIFIED BACK PAIN LATERALITY: Primary | ICD-10-CM

## 2018-04-13 DIAGNOSIS — G89.29 CHRONIC THORACIC BACK PAIN, UNSPECIFIED BACK PAIN LATERALITY: Primary | ICD-10-CM

## 2018-04-13 DIAGNOSIS — M25.511 CHRONIC RIGHT SHOULDER PAIN: Primary | ICD-10-CM

## 2018-04-13 DIAGNOSIS — M75.41 IMPINGEMENT SYNDROME OF RIGHT SHOULDER: ICD-10-CM

## 2018-04-13 DIAGNOSIS — G89.29 CHRONIC RIGHT SHOULDER PAIN: Primary | ICD-10-CM

## 2018-04-13 PROCEDURE — 97110 THERAPEUTIC EXERCISES: CPT

## 2018-04-13 PROCEDURE — 99213 OFFICE O/P EST LOW 20 MIN: CPT | Performed by: ORTHOPAEDIC SURGERY

## 2018-04-13 NOTE — PROGRESS NOTES
New patient or new problem visit    Chief Complaint   Patient presents with   • Thoracic Spine - Pain       HPI: He complains of mid thoracic back pain which is moderate intermittent burning ongoing for a year.  She just started physical therapy.  Anti-inflammatory agents have helped somewhat in the past.  No numbness tingling weakness lower extremity bowel or bladder complaints.    PFSH: See chart- reviewed    Review of Systems   Constitutional: Negative for fever.   HENT: Positive for hearing loss.    Respiratory: Positive for shortness of breath.    Cardiovascular: Negative for chest pain.   Gastrointestinal: Negative for abdominal pain.   Genitourinary: Negative for dysuria and pelvic pain.   Musculoskeletal: Positive for back pain and joint swelling.   Neurological: Positive for headaches. Negative for weakness and numbness.       PE: Constitutional: Vital signs above-noted.  Awake, alert and oriented    Psychiatric: Affect and insight do not appear grossly disturbed.    Pulmonary: Breathing is unlabored, color is good.    Skin: Warm, dry and normal turgor    Cardiac:  pedal pulses intact.  No edema.    Eyesight and hearing appear adequate for examination purposes      Musculoskeletal:  There is mild tenderness to percussion and palpation of the midthoracic into the right thereof. Motion appears undisturbed.  Posture is unremarkable to coronal and sagittal inspection.    The skin about the area is intact.  There is no palpable or visible deformity.  There is no local spasm.       Neurologic:   Reflexes are absent in the patellae and achilles.   Motor function is undisturbed in quadriceps, EHL, and gastrocnemius on the right and perhaps very slight EHL weakness on the left   Sensation appears symmetrically intact to light touch   .  In the bilateral lower extremities there is no evidence of atrophy.   Clonus is absent..  Gait appears undisturbed.  SLR test negative      MEDICAL DECISION MAKING    XRAY: Plain  film x-rays of the thoracic spine show slight well-balanced thoracic scoliosis and mild degenerative changes and otherwise unremarkable sagittal posture.  No comparison views are available.    Other: n/a    Impression: Thoracic spondylosis    Plan: Recommend physical therapy and return to see me if she fails to improve  Answers for HPI/ROS submitted by the patient on 4/6/2018   Back pain  Chronicity: chronic  Onset: more than 1 year ago  Frequency: daily  Progression since onset: gradually worsening  Pain location: thoracic spine  Pain quality: burning, stabbing  Pain - numeric: 4/10  Pain is: worse during the day  Aggravated by: standing  bladder incontinence: No  bowel incontinence: No  leg pain: No  paresis: No  paresthesias: No  perianal numbness: No  tingling: No  weight loss: No  Risk factors: poor posture

## 2018-04-13 NOTE — THERAPY TREATMENT NOTE
Outpatient Physical Therapy Ortho Treatment Note  [facility]     Patient Name: Anastasiia Faria  : 1950  MRN: [id number]  Today's Date: 2018      Visit Date: 2018    Visit Dx:    ICD-10-CM ICD-9-CM   1. Chronic right shoulder pain M25.511 719.41    G89.29 338.29   2. Impingement syndrome of right shoulder M75.41 726.2       Patient Active Problem List   Diagnosis   • Colon polyp, hyperplastic   • Allergic rhinitis due to pollen   • Acquired hypothyroidism   • Essential hypertension   • FH: colon cancer in relative <50 years old   • Hyperlipidemia   • Chronic right shoulder pain   • Periscapular pain   • Other idiopathic scoliosis, thoracic region        Past Medical History:   Diagnosis Date   • Allergic    • Chronic kidney disease    • Encounter for annual health examination 2014    Annual Health Assessment   • Fibrocystic breast    • Herpes labialis without complication    • History of mammogram 2010   • Hyperlipidemia    • Hypertension    • Hypothyroidism    • Insomnia    • PONV (postoperative nausea and vomiting)         Past Surgical History:   Procedure Laterality Date   • BONE MARROW BIOPSY     • BREAST BIOPSY     • BREAST CYST ASPIRATION     • BREAST SURGERY Right     BIOPSY   • COLONOSCOPY N/A 10/27/2016    Procedure: COLONOSCOPY INTO CECUM;  Surgeon: Osvaldo Dorado MD;  Location: [facility] ENDOSCOPY;  Service:    • COLONOSCOPY  2011   • COLONOSCOPY  2005   • KNEE ARTHROSCOPY Left    • PAP SMEAR  2010             PT Ortho     Row Name 18 1500       Subjective Comments    Subjective Comments Pt sattes she wants to start walking but gets the pain between Sh blades going short distances.  Dr. Stanford earlier today and said good that going to PT.  -SI    Row Name 18 1500       Subjective Comments    Subjective Comments Pt states she goes to [employer] 3 times a week and has for years..  Pain central to right Sh blade back pain if carry something  "heavy in right arm, and with prolonged walking.  -SI       Posture/Observations    Scoliosis Moderate  -SI      User Key  (r) = Recorded By, (t) = Taken By, (c) = Cosigned By    Initials Name Provider Type    ANA STALEY SHIV Vazquez Physical Therapy Assistant                            PT Assessment/Plan     Row Name 04/13/18 1547          PT Assessment    Assessment Comments passive IR and ER at shoulder feel hypermobile, and strengthening in progress.  Posture with her ex at curves is critical factor in not straining spot at thoracic spine.  Recomend she hold on couple of the machines once discussed  -SI       User Key  (r) = Recorded By, (t) = Taken By, (c) = Cosigned By    Initials Name Provider Type    ANA STALEY SHIV Vazquez Physical Therapy Assistant                    Exercises     Row Name 04/13/18 1500             Subjective Comments    Subjective Comments Pt sattes she wants to start walking but gets the pain between Sh blades going short distances.  Dr. Stanford earlier today and said good that going to PT.  -SI         Exercise 1    Exercise Name 1 scap ret and shoulder ext  -SI      Sets 1 2  -SI      Reps 1 10  -SI      Additional Comments green  -SI         Exercise 2    Exercise Name 2 SL ER  -SI      Sets 2 2  -SI      Reps 2 10  -SI      Additional Comments 2lbs  -SI         Exercise 3    Exercise Name 3 supine press ups  -SI      Sets 3 2  -SI      Reps 3 10  -SI      Additional Comments 2 lbs and DC on HEP  -SI         Exercise 4    Exercise Name 4 supine horiz abd  -SI      Sets 4 2  -SI      Reps 4 10  -SI      Additional Comments red  -SI         Exercise 5    Exercise Name 5 rhomboid stretch  -SI      Sets 5 1  -SI      Reps 5 3  -SI      Time 5 20 sec  -SI         Exercise 6    Exercise Name 6 Supine static pec stretch with towel at spine, arms at sides  -SI      Time 6 4 minutes  -SI      Additional Comments \"feels great\" per pt  -SI        User Key  (r) = Recorded By, (t) = Taken By, (c) = " Cosigned By    Initials Name Provider Type    SI Liliana Vazquez PTA Physical Therapy Assistant                             Therapy Education  Given: HEP, Symptoms/condition management  Program: Progressed  How Provided: Verbal, Demonstration, Written  Provided to: Patient  Level of Understanding: Teach back education performed              Time Calculation:   Start Time: 1420  Stop Time: 1505  Time Calculation (min): 45 min    Therapy Charges for Today     Code Description Service Date Service Provider Modifiers Qty    55195411262 HC PT THER PROC EA 15 MIN 4/13/2018 Liliana Vazquez PTA GP 3                    Liliana Vazquez PTA  4/13/2018

## 2018-04-18 ENCOUNTER — HOSPITAL ENCOUNTER (OUTPATIENT)
Dept: PHYSICAL THERAPY | Facility: HOSPITAL | Age: 68
Setting detail: THERAPIES SERIES
Discharge: HOME | End: 2018-04-18
Attending: ORTHOPAEDIC SURGERY

## 2018-04-18 DIAGNOSIS — M25.511 CHRONIC RIGHT SHOULDER PAIN: Primary | ICD-10-CM

## 2018-04-18 DIAGNOSIS — M75.41 IMPINGEMENT SYNDROME OF RIGHT SHOULDER: ICD-10-CM

## 2018-04-18 DIAGNOSIS — G89.29 CHRONIC RIGHT SHOULDER PAIN: Primary | ICD-10-CM

## 2018-04-20 ENCOUNTER — HOSPITAL ENCOUNTER (OUTPATIENT)
Dept: PHYSICAL THERAPY | Facility: HOSPITAL | Age: 68
Setting detail: THERAPIES SERIES
Discharge: HOME | End: 2018-04-20
Attending: ORTHOPAEDIC SURGERY

## 2018-04-20 DIAGNOSIS — G89.29 CHRONIC RIGHT SHOULDER PAIN: Primary | ICD-10-CM

## 2018-04-20 DIAGNOSIS — M75.41 IMPINGEMENT SYNDROME OF RIGHT SHOULDER: ICD-10-CM

## 2018-04-20 DIAGNOSIS — M25.511 CHRONIC RIGHT SHOULDER PAIN: Primary | ICD-10-CM

## 2018-04-20 PROCEDURE — 97110 THERAPEUTIC EXERCISES: CPT

## 2018-04-20 PROCEDURE — 97140 MANUAL THERAPY 1/> REGIONS: CPT

## 2018-04-20 NOTE — THERAPY TREATMENT NOTE
Outpatient Physical Therapy Ortho Treatment Note  [facility]     Patient Name: Anastasiia Faria  : 1950  MRN: [id number]  Today's Date: 2018      Visit Date: 2018    Visit Dx:    ICD-10-CM ICD-9-CM   1. Chronic right shoulder pain M25.511 719.41    G89.29 338.29   2. Impingement syndrome of right shoulder M75.41 726.2       Patient Active Problem List   Diagnosis   • Colon polyp, hyperplastic   • Allergic rhinitis due to pollen   • Acquired hypothyroidism   • Essential hypertension   • FH: colon cancer in relative <50 years old   • Hyperlipidemia   • Chronic right shoulder pain   • Periscapular pain   • Other idiopathic scoliosis, thoracic region        Past Medical History:   Diagnosis Date   • Allergic    • Chronic kidney disease    • Encounter for annual health examination 2014    Annual Health Assessment   • Fibrocystic breast    • Herpes labialis without complication    • History of mammogram 2010   • Hyperlipidemia    • Hypertension    • Hypothyroidism    • Insomnia    • PONV (postoperative nausea and vomiting)         Past Surgical History:   Procedure Laterality Date   • BONE MARROW BIOPSY     • BREAST BIOPSY     • BREAST CYST ASPIRATION     • BREAST SURGERY Right     BIOPSY   • COLONOSCOPY N/A 10/27/2016    Procedure: COLONOSCOPY INTO CECUM;  Surgeon: Osvaldo Dorado MD;  Location: [facility] ENDOSCOPY;  Service:    • COLONOSCOPY  2011   • COLONOSCOPY  2005   • KNEE ARTHROSCOPY Left    • PAP SMEAR  2010             PT Ortho     Row Name 18 1300       Subjective Comments    Subjective Comments States she used the vacuum some yesterday and some soreness after.  Hasn't tried distanse walking yet.  -SI       Subjective Pain    Able to rate subjective pain? yes  -SI    Pre-Treatment Pain Level 0  -SI    Post-Treatment Pain Level 0  -SI    Subjective Pain Comment intermittently gets pain isolated to spot at mid right thoraci spine.  -SI       Right  Shoulder (Manual Muscle Testing)    MMT: Flexion, Right Shoulder flexion  -SI    MMT, Gross Movement: Right Shoulder Flexion (4/5) good  -SI    MMT: ABduction, Right Shoulder abduction  -SI    MMT, Gross Movement: Right Shoulder ABduction (4/5) good  -SI    MMT: Internal Rotation, Right Shoulder internal rotation  -SI    MMT, Gross Movement: Right Shoulder Internal Rotation (4+/5) good plus  -SI    MMT: External Rotation, Right Shoulder external rotation  -SI    MMT, Gross Movement: Right Shoulder External Rotation (4/5) good  -SI      User Key  (r) = Recorded By, (t) = Taken By, (c) = Cosigned By    Initials Name Provider Type    ANA STALEY YoditdanielSHIV Physical Therapy Assistant                            PT Assessment/Plan     Row Name 04/20/18 1319          PT Assessment    Assessment Comments Prayer stretch towards left feels good, tennis ball static pressure on painful spot feel good, Pt still needs verbal cueing to do some of the ex correct but HEP complete.  Sx all better not gone.  Pt much more aware of her posture with activity  -SI       User Key  (r) = Recorded By, (t) = Taken By, (c) = Cosigned By    Initials Name Provider Type    ANA STALEY SHIV Vazquez Physical Therapy Assistant                    Exercises     Row Name 04/20/18 1300             Subjective Comments    Subjective Comments States she used the vacuum some yesterday and some soreness after.  Hasn't tried distanse walking yet.  -SI         Subjective Pain    Able to rate subjective pain? yes  -SI      Pre-Treatment Pain Level 0  -SI      Post-Treatment Pain Level 0  -SI      Subjective Pain Comment intermittently gets pain isolated to spot at mid right thoraci spine.  -SI         Exercise 1    Exercise Name 1 scap ret and shoulder ext  -SI      Sets 1 2  -SI      Reps 1 10  -SI      Additional Comments green  -SI         Exercise 2    Exercise Name 2 SL ER  -SI      Sets 2 2  -SI      Reps 2 10  -SI      Additional Comments 2lbs  -SI          "Exercise 3    Exercise Name 3 cat and camel   -SI      Reps 3 5  -SI         Exercise 4    Exercise Name 4 supine horiz abd  -SI      Sets 4 2  -SI      Reps 4 10  -SI      Additional Comments red  -SI         Exercise 5    Exercise Name 5 rhomboid stretch  -SI      Sets 5 1  -SI      Reps 5 3  -SI      Time 5 20 sec  -SI         Exercise 6    Exercise Name 6 Supine static pec stretch with towel at spine, arms at sides  -SI      Time 6 4 minutes  -SI      Additional Comments tennis ball on painful spot today  -SI         Exercise 7    Exercise Name 7 \"prayer\" stretch  -SI      Reps 7 2  -SI        User Key  (r) = Recorded By, (t) = Taken By, (c) = Cosigned By    Initials Name Provider Type    ANA Vazquez PTA Physical Therapy Assistant                        Manual Rx (last 36 hours)      Manual Treatments     Row Name 04/20/18 1300             Manual Rx 1    Manual Rx 1 Location GH joint oscillations, PROM as tolerated  -SI      Manual Rx 1 Duration 15  -SI        User Key  (r) = Recorded By, (t) = Taken By, (c) = Cosigned By    Initials Name Provider Type    ANA Vazquez PTA Physical Therapy Assistant              Therapy Education  Given: HEP, Symptoms/condition management  Program: Reinforced  How Provided: Verbal, Demonstration, Written  Provided to: Patient  Level of Understanding: Teach back education performed              Time Calculation:   Start Time: 1145  Stop Time: 1230  Time Calculation (min): 45 min    Therapy Charges for Today     Code Description Service Date Service Provider Modifiers Qty    70981566945 HC PT THER PROC EA 15 MIN 4/20/2018 Liliana Vazquez PTA GP 2    05617525597 HC PT MANUAL THERAPY EA 15 MIN 4/20/2018 Liliana Vazquez PTA GP 1                    Liliana Vazquez PTA  4/20/2018     "

## 2018-04-24 ENCOUNTER — HOSPITAL ENCOUNTER (OUTPATIENT)
Dept: PHYSICAL THERAPY | Facility: HOSPITAL | Age: 68
Setting detail: THERAPIES SERIES
Discharge: HOME | End: 2018-04-24
Attending: ORTHOPAEDIC SURGERY

## 2018-04-24 DIAGNOSIS — M75.41 IMPINGEMENT SYNDROME OF RIGHT SHOULDER: ICD-10-CM

## 2018-04-24 DIAGNOSIS — M25.511 CHRONIC RIGHT SHOULDER PAIN: Primary | ICD-10-CM

## 2018-04-24 DIAGNOSIS — G89.29 CHRONIC RIGHT SHOULDER PAIN: Primary | ICD-10-CM

## 2018-04-24 PROCEDURE — 97110 THERAPEUTIC EXERCISES: CPT

## 2018-04-24 NOTE — THERAPY TREATMENT NOTE
Outpatient Physical Therapy Ortho Treatment Note  [facility]     Patient Name: Anastasiia Faria  : 1950  MRN: [id number]  Today's Date: 2018      Visit Date: 2018    Visit Dx:    ICD-10-CM ICD-9-CM   1. Chronic right shoulder pain M25.511 719.41    G89.29 338.29   2. Impingement syndrome of right shoulder M75.41 726.2       Patient Active Problem List   Diagnosis   • Colon polyp, hyperplastic   • Allergic rhinitis due to pollen   • Acquired hypothyroidism   • Essential hypertension   • FH: colon cancer in relative <50 years old   • Hyperlipidemia   • Chronic right shoulder pain   • Periscapular pain   • Other idiopathic scoliosis, thoracic region        Past Medical History:   Diagnosis Date   • Allergic    • Chronic kidney disease    • Encounter for annual health examination 2014    Annual Health Assessment   • Fibrocystic breast    • Herpes labialis without complication    • History of mammogram 2010   • Hyperlipidemia    • Hypertension    • Hypothyroidism    • Insomnia    • PONV (postoperative nausea and vomiting)         Past Surgical History:   Procedure Laterality Date   • BONE MARROW BIOPSY     • BREAST BIOPSY     • BREAST CYST ASPIRATION     • BREAST SURGERY Right     BIOPSY   • COLONOSCOPY N/A 10/27/2016    Procedure: COLONOSCOPY INTO CECUM;  Surgeon: Osvaldo Dorado MD;  Location: [facility] ENDOSCOPY;  Service:    • COLONOSCOPY  2011   • COLONOSCOPY  2005   • KNEE ARTHROSCOPY Left    • PAP SMEAR  2010             PT Ortho     Row Name 18 1200       Subjective Comments    Subjective Comments Walk about 6 minutes and spot at right thoracic spine hurts a little and gets little worse as continue to walk  -SI       Subjective Pain    Able to rate subjective pain? yes  -SI    Pre-Treatment Pain Level 0  -SI    Post-Treatment Pain Level 0  -SI    Subjective Pain Comment 2-3 with 6 min wal and ADL's, 4 with long walk  -SI       Right Upper Ext    Rt  Shoulder Abduction AROM 160  -SI    Rt Shoulder Abduction PROM 155  -SI    Rt Shoulder Flexion AROM 155 end range discomfort  -SI    Rt Shoulder Flexion PROM 155  -SI    Rt Shoulder External Rotation AROM T3  -SI    Rt Shoulder External Rotation PROM 88  -SI    Rt Shoulder Internal Rotation AROM T11  -SI    Rt Shoulder Internal Rotation PROM 90  -SI       Right Shoulder (Manual Muscle Testing)    MMT: Flexion, Right Shoulder flexion  -SI    MMT, Gross Movement: Right Shoulder Flexion (4+/5) good plus  -SI    MMT: ABduction, Right Shoulder abduction  -SI    MMT, Gross Movement: Right Shoulder ABduction (4/5) good   4+/4  -SI    MMT: Internal Rotation, Right Shoulder internal rotation  -SI    MMT, Gross Movement: Right Shoulder Internal Rotation (4+/5) good plus  -SI    MMT: External Rotation, Right Shoulder external rotation  -SI    MMT, Gross Movement: Right Shoulder External Rotation (4+/5) good plus  -SI      User Key  (r) = Recorded By, (t) = Taken By, (c) = Cosigned By    Initials Name Provider Type    ANA Vazquez PTA Physical Therapy Assistant                            PT Assessment/Plan     Row Name 04/24/18 1240          PT Assessment    Assessment Comments Sx better not gone.  One more session and will DC on HEP.  -SI       User Key  (r) = Recorded By, (t) = Taken By, (c) = Cosigned By    Initials Name Provider Type    ANA Vazquez PTA Physical Therapy Assistant                    Exercises     Row Name 04/24/18 1200             Subjective Comments    Subjective Comments Walk about 6 minutes and spot at right thoracic spine hurts a little and gets little worse as continue to walk  -SI         Subjective Pain    Able to rate subjective pain? yes  -SI      Pre-Treatment Pain Level 0  -SI      Post-Treatment Pain Level 0  -SI      Subjective Pain Comment 2-3 with 6 min wal and ADL's, 4 with long walk  -SI        User Key  (r) = Recorded By, (t) = Taken By, (c) = Cosigned By    Initials Name  Provider Type    ANA Vazquez PTA Physical Therapy Assistant                               PT OP Goals     Row Name 04/24/18 1200          PT Short Term Goals    STG 1 Patient will be independent with education for symptom management, joint protection and strategies to minimize stress on affected tissues  -SI     STG 1 Progress Met  -SI     STG 2 Pt to have full R shoulder PROM without increased pain  -SI     STG 2 Progress Partially Met   sub-acute end range disomfort with flexion  -SI        Long Term Goals    LTG 1 Pt to improve shoulder strength to 4 to 4+/5  -SI     LTG 1 Progress Met  -SI     LTG 3 Patient will be independent and compliant with advanced home exercise program to facilitate self-management of symptoms.  -SI     LTG 3 Progress Ongoing  -SI     LTG 4 Pt to improve shoulder AROM in flex=155 deg, abd=145 deg, ER=T4 and IR=T9  -SI     LTG 4 Progress Partially Met;Met   equal to her left  -SI       User Key  (r) = Recorded By, (t) = Taken By, (c) = Cosigned By    Initials Name Provider Type    ANA Vazquez PTA Physical Therapy Assistant          Therapy Education  Given: HEP, Symptoms/condition management  Program: Reinforced  How Provided: Verbal, Demonstration, Written  Provided to: Patient  Level of Understanding: Teach back education performed              Time Calculation:   Start Time: 1145  Stop Time: 1230  Time Calculation (min): 45 min    Therapy Charges for Today     Code Description Service Date Service Provider Modifiers Qty    45759688605 HC PT THER PROC EA 15 MIN 4/24/2018 Liliana Vazquez PTA GP 3                    Liliana Vazquez PTA  4/24/2018

## 2018-04-26 ENCOUNTER — HOSPITAL ENCOUNTER (OUTPATIENT)
Dept: PHYSICAL THERAPY | Facility: HOSPITAL | Age: 68
Setting detail: THERAPIES SERIES
Discharge: HOME | End: 2018-04-26
Attending: ORTHOPAEDIC SURGERY

## 2018-04-26 DIAGNOSIS — G89.29 CHRONIC RIGHT SHOULDER PAIN: Primary | ICD-10-CM

## 2018-04-26 DIAGNOSIS — M75.41 IMPINGEMENT SYNDROME OF RIGHT SHOULDER: ICD-10-CM

## 2018-04-26 DIAGNOSIS — M25.511 CHRONIC RIGHT SHOULDER PAIN: Primary | ICD-10-CM

## 2018-04-26 PROCEDURE — G8986 CARRY D/C STATUS: HCPCS | Performed by: PHYSICAL THERAPIST

## 2018-04-26 PROCEDURE — G8985 CARRY GOAL STATUS: HCPCS | Performed by: PHYSICAL THERAPIST

## 2018-04-26 PROCEDURE — 97110 THERAPEUTIC EXERCISES: CPT

## 2018-04-26 PROCEDURE — 97140 MANUAL THERAPY 1/> REGIONS: CPT

## 2018-04-26 NOTE — THERAPY DISCHARGE NOTE
"     Outpatient Physical Therapy Ortho Treatment Note/Discharge Summary   [place]     Patient Name: Anastasiia Faria  : 1950  MRN: [id number]  Today's Date: 2018      Visit Date: 2018    Visit Dx:    ICD-10-CM ICD-9-CM   1. Chronic right shoulder pain M25.511 719.41    G89.29 338.29   2. Impingement syndrome of right shoulder M75.41 726.2       Patient Active Problem List   Diagnosis   • Colon polyp, hyperplastic   • Allergic rhinitis due to pollen   • Acquired hypothyroidism   • Essential hypertension   • FH: colon cancer in relative <50 years old   • Hyperlipidemia   • Chronic right shoulder pain   • Periscapular pain   • Other idiopathic scoliosis, thoracic region        Past Medical History:   Diagnosis Date   • Allergic    • Chronic kidney disease    • Encounter for annual health examination 2014    Annual Health Assessment   • Fibrocystic breast    • Herpes labialis without complication    • History of mammogram 2010   • Hyperlipidemia    • Hypertension    • Hypothyroidism    • Insomnia    • PONV (postoperative nausea and vomiting)         Past Surgical History:   Procedure Laterality Date   • BONE MARROW BIOPSY     • BREAST BIOPSY     • BREAST CYST ASPIRATION     • BREAST SURGERY Right     BIOPSY   • COLONOSCOPY N/A 10/27/2016    Procedure: COLONOSCOPY INTO CECUM;  Surgeon: Osvaldo Dorado MD;  Location: [facility] ENDOSCOPY;  Service:    • COLONOSCOPY  2011   • COLONOSCOPY  2005   • KNEE ARTHROSCOPY Left    • PAP SMEAR  2010             PT Ortho     Row Name 18 1300       Subjective Comments    Subjective Comments States she walked a greater distance before the \"spot\" at right thoracic spine stared hurting.  Walked 11 minutes.  States lying down in position taught helps pain not to linger as it used to do.  -SI       Subjective Pain    Able to rate subjective pain? yes  -SI    Pre-Treatment Pain Level 0  -SI    Post-Treatment Pain Level 0  -SI    " Subjective Pain Comment 2-3 with 11 min walk at right thoracic spine, none at GH joint, slight actoss posterior sh to spine  -SI       Shoulder Impingement/Rotator Cuff Special Tests    Empty Can Test (RC Lesion) Right:;Negative  -SI    Row Name 04/24/18 1200       Subjective Comments    Subjective Comments Walk about 6 minutes and spot at right thoracic spine hurts a little and gets little worse as continue to walk  -SI       Subjective Pain    Able to rate subjective pain? yes  -SI    Pre-Treatment Pain Level 0  -SI    Post-Treatment Pain Level 0  -SI    Subjective Pain Comment 2-3 with 6 min wal and ADL's, 4 with long walk  -SI       Right Upper Ext    Rt Shoulder Abduction AROM 160  -SI    Rt Shoulder Abduction PROM 155  -SI    Rt Shoulder Flexion AROM 155 end range discomfort  -SI    Rt Shoulder Flexion PROM 155  -SI    Rt Shoulder External Rotation AROM T3  -SI    Rt Shoulder External Rotation PROM 88  -SI    Rt Shoulder Internal Rotation AROM T11  -SI    Rt Shoulder Internal Rotation PROM 90  -SI       Right Shoulder (Manual Muscle Testing)    MMT: Flexion, Right Shoulder flexion  -SI    MMT, Gross Movement: Right Shoulder Flexion (4+/5) good plus  -SI    MMT: ABduction, Right Shoulder abduction  -SI    MMT, Gross Movement: Right Shoulder ABduction (4/5) good   4+/4  -SI    MMT: Internal Rotation, Right Shoulder internal rotation  -SI    MMT, Gross Movement: Right Shoulder Internal Rotation (4+/5) good plus  -SI    MMT: External Rotation, Right Shoulder external rotation  -SI    MMT, Gross Movement: Right Shoulder External Rotation (4+/5) good plus  -SI      User Key  (r) = Recorded By, (t) = Taken By, (c) = Cosigned By    Initials Name Provider Type    ANA Vazquez PTA Physical Therapy Assistant                            PT Assessment/Plan     Row Name 04/26/18 1328          PT Assessment    Assessment Comments pt is ind with HEP and much more knowledgeable about posture and body mechanics with ADL's  "and her ex at [employer]".  She shows no signs of GH joint impingement.  She has a \"pinch\" just right of thoracic spine mid to lower scapula region.  That is not gone but more manageable  -SI       User Key  (r) = Recorded By, (t) = Taken By, (c) = Cosigned By    Initials Name Provider Type    ANA VazquezSHIV Physical Therapy Assistant                Modalities     Row Name 04/26/18 1300             Ice    Patient reports will apply ice at home to involved area Yes  -SI        User Key  (r) = Recorded By, (t) = Taken By, (c) = Cosigned By    Initials Name Provider Type    ANA VazquezSHIV Physical Therapy Assistant                Exercises     Row Name 04/26/18 1300             Subjective Comments    Subjective Comments States she walked a greater distance before the \"spot\" at right thoracic spine stared hurting.  Walked 11 minutes.  States lying down in position taught helps pain not to linger as it used to do.  -SI         Subjective Pain    Able to rate subjective pain? yes  -SI      Pre-Treatment Pain Level 0  -SI      Post-Treatment Pain Level 0  -SI      Subjective Pain Comment 2-3 with 11 min walk at right thoracic spine, none at GH joint, slight actoss posterior sh to spine  -SI         Exercise 1    Exercise Name 1 scap ret and shoulder ext  -SI      Sets 1 2  -SI      Reps 1 10  -SI      Additional Comments green  -SI         Exercise 2    Exercise Name 2 SL ER  -SI      Sets 2 2  -SI      Reps 2 10  -SI      Additional Comments 2lbs  -SI         Exercise 3    Exercise Name 3 cat and camel   -SI      Reps 3 5  -SI         Exercise 4    Exercise Name 4 supine horiz abd  -SI      Sets 4 2  -SI      Reps 4 10  -SI      Additional Comments red  -SI         Exercise 5    Exercise Name 5 rhomboid stretch  -SI      Sets 5 1  -SI      Reps 5 3  -SI      Time 5 20 sec  -SI         Exercise 6    Exercise Name 6 Supine static pec stretch with towel at spine, arms at sides  -SI      Time 6 4 minutes  -SI   " "      Exercise 7    Exercise Name 7 \"prayer\" stretch  -SI      Reps 7 2  -SI         Exercise 8    Exercise Name 8 IR and ER with TB  -SI      Sets 8 2  -SI      Reps 8 10  -SI      Additional Comments green  -SI        User Key  (r) = Recorded By, (t) = Taken By, (c) = Cosigned By    Initials Name Provider Type    ANA Liliana LUÍS Vazquez PTA Physical Therapy Assistant                        Manual Rx (last 36 hours)      Manual Treatments     Row Name 04/26/18 1300             Manual Rx 1    Manual Rx 1 Location GH joint oscillations, PROM as tolerated  -SI      Manual Rx 1 Duration 10  -SI        User Key  (r) = Recorded By, (t) = Taken By, (c) = Cosigned By    Initials Name Provider Type    ANA Lilianacheri Vazquez PTA Physical Therapy Assistant                PT OP Goals     Row Name 04/26/18 1300          PT Short Term Goals    STG 1 Patient will be independent with education for symptom management, joint protection and strategies to minimize stress on affected tissues  -SI     STG 1 Progress Met  -SI     STG 2 Pt to have full R shoulder PROM without increased pain  -SI     STG 2 Progress Partially Met   sub-acute end range disomfort with flexion  -SI        Long Term Goals    LTG 1 Pt to improve shoulder strength to 4 to 4+/5  -SI     LTG 1 Progress Met  -SI     LTG 2 Pt to improve DASH score by % for overall functional improvement  -SI     LTG 2 Progress Met   improved ;5.5%  -SI     LTG 3 Patient will be independent and compliant with advanced home exercise program to facilitate self-management of symptoms.  -SI     LTG 3 Progress Ongoing;Met  -SI     LTG 4 Pt to improve shoulder AROM in flex=155 deg, abd=145 deg, ER=T4 and IR=T9  -SI     LTG 4 Progress Partially Met;Met   equal to left  -SI       User Key  (r) = Recorded By, (t) = Taken By, (c) = Cosigned By    Initials Name Provider Type    ANA Vazquez PTA Physical Therapy Assistant          Therapy Education  Given: HEP, Symptoms/condition " "management  Program: Reinforced  How Provided: Verbal, Demonstration, Written  Provided to: Patient  Level of Understanding: Teach back education performed    Outcome Measure Options: Disabilities of the Arm, Shoulder, and Hand (DASH) (11%)         Time Calculation:   Start Time: 1145  Stop Time: 1230  Time Calculation (min): 45 min    Therapy Charges for Today     Code Description Service Date Service Provider Modifiers Qty    39866936779 HC PT THER PROC EA 15 MIN 4/26/2018 Liliana Vazquez PTA GP 2    11305520504 HC PT MANUAL THERAPY EA 15 MIN 4/26/2018 Liliana Vazquez PTA GP 1          PT G-Codes  Outcome Measure Options: Disabilities of the Arm, Shoulder, and Hand (DASH) (11%)     OP PT Discharge Summary  Date of Discharge: 04/26/18  Reason for Discharge: Independent  Outcomes Achieved: Patient able to partially acheive established goals  Discharge Destination: Home with home program  Discharge Instructions/Additional Comments: HEP 3-4 days a week from PT.  Continue ex at [employer] avoiding machines agreed upon, and with good posture.      Liliana Vazquez PTA  4/26/2018       "

## 2018-07-23 ENCOUNTER — TRANSCRIBE ORDERS (OUTPATIENT)
Dept: ADMINISTRATIVE | Facility: HOSPITAL | Age: 68
End: 2018-07-23

## 2018-07-23 DIAGNOSIS — Z12.31 VISIT FOR SCREENING MAMMOGRAM: Primary | ICD-10-CM

## 2018-07-31 ENCOUNTER — HOSPITAL ENCOUNTER (OUTPATIENT)
Dept: MAMMOGRAPHY | Facility: HOSPITAL | Age: 68
Discharge: HOME | End: 2018-07-31
Attending: OBSTETRICS & GYNECOLOGY | Admitting: OBSTETRICS & GYNECOLOGY

## 2018-07-31 DIAGNOSIS — Z12.31 VISIT FOR SCREENING MAMMOGRAM: ICD-10-CM

## 2018-07-31 PROCEDURE — 77063 BREAST TOMOSYNTHESIS BI: CPT

## 2018-07-31 PROCEDURE — 77067 SCR MAMMO BI INCL CAD: CPT

## 2018-08-21 ENCOUNTER — PROCEDURE VISIT (OUTPATIENT)
Dept: OBSTETRICS AND GYNECOLOGY | Facility: CLINIC | Age: 68
End: 2018-08-21

## 2018-08-21 DIAGNOSIS — Z92.29 HISTORY OF DRUG THERAPY: ICD-10-CM

## 2018-08-21 DIAGNOSIS — M81.0 OSTEOPOROSIS, UNSPECIFIED OSTEOPOROSIS TYPE, UNSPECIFIED PATHOLOGICAL FRACTURE PRESENCE: ICD-10-CM

## 2018-08-21 DIAGNOSIS — M85.80 OSTEOPENIA DETERMINED BY X-RAY: Primary | ICD-10-CM

## 2018-08-21 PROCEDURE — 99211 OFF/OP EST MAY X REQ PHY/QHP: CPT | Performed by: OBSTETRICS & GYNECOLOGY

## 2018-08-21 NOTE — PROGRESS NOTES
Procedure   Procedures-Prolia    Prolia injection given in the subcutaneous of the upper outer right arm without difficulty.  Patient tolerated the procedure well.  Patient due for DEXA scan in May.

## 2018-08-21 NOTE — PATIENT INSTRUCTIONS
Next injection due in 6 months and DEXA scan due in May.Tolerated Prolia injection without a reaction, ndc [id number] lot [id number] exp 11/20

## 2018-08-28 RX ORDER — LEVOTHYROXINE SODIUM 0.05 MG/1
TABLET ORAL
Qty: 90 TABLET | Refills: 1 | Status: SHIPPED | OUTPATIENT
Start: 2018-08-28 | End: 2019-02-28 | Stop reason: SDUPTHER

## 2018-08-28 RX ORDER — ESCITALOPRAM OXALATE 20 MG/1
TABLET ORAL
Qty: 90 TABLET | Refills: 1 | Status: SHIPPED | OUTPATIENT
Start: 2018-08-28 | End: 2019-02-28 | Stop reason: SDUPTHER

## 2018-08-28 RX ORDER — ATENOLOL 25 MG/1
TABLET ORAL
Qty: 90 TABLET | Refills: 1 | Status: SHIPPED | OUTPATIENT
Start: 2018-08-28 | End: 2019-02-28 | Stop reason: SDUPTHER

## 2018-08-28 RX ORDER — NIFEDIPINE 30 MG/1
TABLET, EXTENDED RELEASE ORAL
Qty: 90 TABLET | Refills: 1 | Status: SHIPPED | OUTPATIENT
Start: 2018-08-28 | End: 2019-02-28 | Stop reason: SDUPTHER

## 2018-10-15 ENCOUNTER — OFFICE VISIT (OUTPATIENT)
Dept: FAMILY MEDICINE CLINIC | Facility: CLINIC | Age: 68
End: 2018-10-15

## 2018-10-15 VITALS
SYSTOLIC BLOOD PRESSURE: 128 MMHG | WEIGHT: 188 LBS | HEART RATE: 78 BPM | HEIGHT: 68 IN | OXYGEN SATURATION: 99 % | DIASTOLIC BLOOD PRESSURE: 78 MMHG | BODY MASS INDEX: 28.49 KG/M2

## 2018-10-15 DIAGNOSIS — Z00.00 MEDICARE ANNUAL WELLNESS VISIT, SUBSEQUENT: Primary | ICD-10-CM

## 2018-10-15 DIAGNOSIS — R73.03 PREDIABETES: ICD-10-CM

## 2018-10-15 DIAGNOSIS — E78.01 FAMILIAL HYPERCHOLESTEROLEMIA: ICD-10-CM

## 2018-10-15 DIAGNOSIS — Z23 NEED FOR IMMUNIZATION AGAINST INFLUENZA: ICD-10-CM

## 2018-10-15 DIAGNOSIS — I10 ESSENTIAL HYPERTENSION: ICD-10-CM

## 2018-10-15 DIAGNOSIS — E03.9 ACQUIRED HYPOTHYROIDISM: ICD-10-CM

## 2018-10-15 PROCEDURE — G0008 ADMIN INFLUENZA VIRUS VAC: HCPCS | Performed by: FAMILY MEDICINE

## 2018-10-15 PROCEDURE — G0439 PPPS, SUBSEQ VISIT: HCPCS | Performed by: FAMILY MEDICINE

## 2018-10-15 PROCEDURE — 90662 IIV NO PRSV INCREASED AG IM: CPT | Performed by: FAMILY MEDICINE

## 2018-10-15 RX ORDER — VALACYCLOVIR HYDROCHLORIDE 1 G/1
1000 TABLET, FILM COATED ORAL AS NEEDED
COMMUNITY
Start: 2018-10-11

## 2018-10-16 LAB
ALBUMIN SERPL-MCNC: 4.5 G/DL (ref 3.5–5.2)
ALBUMIN/GLOB SERPL: 1.7 G/DL
ALP SERPL-CCNC: 61 U/L (ref 39–117)
ALT SERPL-CCNC: 9 U/L (ref 1–33)
AST SERPL-CCNC: 11 U/L (ref 1–32)
BILIRUB SERPL-MCNC: 0.5 MG/DL (ref 0.1–1.2)
BUN SERPL-MCNC: 18 MG/DL (ref 8–23)
BUN/CREAT SERPL: 18.9 (ref 7–25)
CALCIUM SERPL-MCNC: 9.8 MG/DL (ref 8.6–10.5)
CHLORIDE SERPL-SCNC: 102 MMOL/L (ref 98–107)
CHOLEST SERPL-MCNC: 243 MG/DL (ref 0–200)
CO2 SERPL-SCNC: 23.8 MMOL/L (ref 22–29)
CREAT SERPL-MCNC: 0.95 MG/DL (ref 0.57–1)
GLOBULIN SER CALC-MCNC: 2.7 GM/DL
GLUCOSE SERPL-MCNC: 102 MG/DL (ref 65–99)
HBA1C MFR BLD: 5.9 % (ref 4.8–5.6)
HDLC SERPL-MCNC: 44 MG/DL (ref 40–60)
LDLC SERPL CALC-MCNC: 145 MG/DL (ref 0–100)
POTASSIUM SERPL-SCNC: 4.4 MMOL/L (ref 3.5–5.2)
PROT SERPL-MCNC: 7.2 G/DL (ref 6–8.5)
SODIUM SERPL-SCNC: 139 MMOL/L (ref 136–145)
TRIGL SERPL-MCNC: 270 MG/DL (ref 0–150)
TSH SERPL DL<=0.005 MIU/L-ACNC: 2.72 MIU/ML (ref 0.27–4.2)
VLDLC SERPL CALC-MCNC: 54 MG/DL (ref 5–40)

## 2018-10-17 LAB
HCV AB S/CO SERPL IA: <0.1 S/CO RATIO (ref 0–0.9)
Lab: NORMAL
WRITTEN AUTHORIZATION: NORMAL

## 2018-12-13 DIAGNOSIS — M81.0 AGE-RELATED OSTEOPOROSIS WITHOUT CURRENT PATHOLOGICAL FRACTURE: Primary | ICD-10-CM

## 2018-12-18 ENCOUNTER — RESULTS ENCOUNTER (OUTPATIENT)
Dept: OBSTETRICS AND GYNECOLOGY | Facility: CLINIC | Age: 68
End: 2018-12-18

## 2018-12-18 DIAGNOSIS — M81.0 AGE-RELATED OSTEOPOROSIS WITHOUT CURRENT PATHOLOGICAL FRACTURE: ICD-10-CM

## 2019-01-24 DIAGNOSIS — M81.0 AGE-RELATED OSTEOPOROSIS WITHOUT CURRENT PATHOLOGICAL FRACTURE: Primary | ICD-10-CM

## 2019-01-29 ENCOUNTER — RESULTS ENCOUNTER (OUTPATIENT)
Dept: OBSTETRICS AND GYNECOLOGY | Facility: CLINIC | Age: 69
End: 2019-01-29

## 2019-01-29 DIAGNOSIS — M81.0 AGE-RELATED OSTEOPOROSIS WITHOUT CURRENT PATHOLOGICAL FRACTURE: ICD-10-CM

## 2019-02-05 LAB
25(OH)D3+25(OH)D2 SERPL-MCNC: 49.7 NG/ML (ref 30–100)
ALBUMIN SERPL-MCNC: 4.3 G/DL (ref 3.5–5.2)
ALBUMIN/GLOB SERPL: 2.5 G/DL
ALP SERPL-CCNC: 65 U/L (ref 39–117)
ALT SERPL-CCNC: 11 U/L (ref 1–33)
AST SERPL-CCNC: 12 U/L (ref 1–32)
BILIRUB SERPL-MCNC: 0.6 MG/DL (ref 0.1–1.2)
BUN SERPL-MCNC: 15 MG/DL (ref 8–23)
BUN/CREAT SERPL: 12.2 (ref 7–25)
CALCIUM SERPL-MCNC: 9.9 MG/DL (ref 8.6–10.5)
CHLORIDE SERPL-SCNC: 104 MMOL/L (ref 98–107)
CO2 SERPL-SCNC: 27.4 MMOL/L (ref 22–29)
CREAT SERPL-MCNC: 1.23 MG/DL (ref 0.57–1)
GLOBULIN SER CALC-MCNC: 1.7 GM/DL
GLUCOSE SERPL-MCNC: 141 MG/DL (ref 65–99)
POTASSIUM SERPL-SCNC: 5.2 MMOL/L (ref 3.5–5.2)
PROT SERPL-MCNC: 6 G/DL (ref 6–8.5)
SODIUM SERPL-SCNC: 144 MMOL/L (ref 136–145)

## 2019-02-25 ENCOUNTER — INFUSION (OUTPATIENT)
Dept: ONCOLOGY | Facility: HOSPITAL | Age: 69
End: 2019-02-25

## 2019-02-25 VITALS
DIASTOLIC BLOOD PRESSURE: 75 MMHG | OXYGEN SATURATION: 92 % | TEMPERATURE: 97.9 F | HEART RATE: 59 BPM | SYSTOLIC BLOOD PRESSURE: 136 MMHG | BODY MASS INDEX: 27.83 KG/M2 | WEIGHT: 183 LBS

## 2019-02-25 DIAGNOSIS — M81.0 AGE-RELATED OSTEOPOROSIS WITHOUT CURRENT PATHOLOGICAL FRACTURE: Primary | ICD-10-CM

## 2019-02-25 PROCEDURE — 96372 THER/PROPH/DIAG INJ SC/IM: CPT | Performed by: NURSE PRACTITIONER

## 2019-02-25 PROCEDURE — 25010000002 DENOSUMAB 60 MG/ML SOLUTION: Performed by: NURSE PRACTITIONER

## 2019-02-25 RX ADMIN — DENOSUMAB 60 MG: 60 INJECTION SUBCUTANEOUS at 15:19

## 2019-02-25 NOTE — PROGRESS NOTES
Arrived ambulatory for prolia injection. Indication and side effects reviewed. Denies recent dental work. Labs and medications verified. Prolia administered in right  arm without incidence. Instructed to call prescribing MD for any concerns or questions and instructed on how to schedule future appts.  Pt vu and discharged ambulatory.

## 2019-03-01 RX ORDER — NIFEDIPINE 30 MG/1
TABLET, EXTENDED RELEASE ORAL
Qty: 90 TABLET | Refills: 1 | Status: SHIPPED | OUTPATIENT
Start: 2019-03-01 | End: 2019-05-29 | Stop reason: SDUPTHER

## 2019-03-01 RX ORDER — LEVOTHYROXINE SODIUM 0.05 MG/1
TABLET ORAL
Qty: 90 TABLET | Refills: 1 | Status: SHIPPED | OUTPATIENT
Start: 2019-03-01 | End: 2019-05-29 | Stop reason: SDUPTHER

## 2019-03-01 RX ORDER — ATENOLOL 25 MG/1
TABLET ORAL
Qty: 90 TABLET | Refills: 1 | Status: SHIPPED | OUTPATIENT
Start: 2019-03-01 | End: 2019-05-29 | Stop reason: SDUPTHER

## 2019-03-01 RX ORDER — ESCITALOPRAM OXALATE 20 MG/1
TABLET ORAL
Qty: 90 TABLET | Refills: 1 | Status: SHIPPED | OUTPATIENT
Start: 2019-03-01 | End: 2019-05-29 | Stop reason: SDUPTHER

## 2019-05-29 ENCOUNTER — OFFICE VISIT (OUTPATIENT)
Dept: FAMILY MEDICINE CLINIC | Facility: CLINIC | Age: 69
End: 2019-05-29

## 2019-05-29 VITALS
HEIGHT: 68 IN | BODY MASS INDEX: 27.16 KG/M2 | OXYGEN SATURATION: 95 % | SYSTOLIC BLOOD PRESSURE: 128 MMHG | DIASTOLIC BLOOD PRESSURE: 80 MMHG | RESPIRATION RATE: 18 BRPM | TEMPERATURE: 98.5 F | HEART RATE: 62 BPM | WEIGHT: 179.2 LBS

## 2019-05-29 DIAGNOSIS — R73.03 PREDIABETES: ICD-10-CM

## 2019-05-29 DIAGNOSIS — I10 ESSENTIAL HYPERTENSION: Primary | ICD-10-CM

## 2019-05-29 DIAGNOSIS — M79.89 SWELLING OF LIMB, LEFT: ICD-10-CM

## 2019-05-29 PROCEDURE — 99214 OFFICE O/P EST MOD 30 MIN: CPT | Performed by: FAMILY MEDICINE

## 2019-05-29 RX ORDER — LEVOTHYROXINE SODIUM 0.05 MG/1
50 TABLET ORAL DAILY
Qty: 90 TABLET | Refills: 1 | Status: SHIPPED | OUTPATIENT
Start: 2019-05-29 | End: 2019-11-05 | Stop reason: SDUPTHER

## 2019-05-29 RX ORDER — NIFEDIPINE 30 MG/1
30 TABLET, EXTENDED RELEASE ORAL DAILY
Qty: 90 TABLET | Refills: 1 | Status: SHIPPED | OUTPATIENT
Start: 2019-05-29 | End: 2019-11-05 | Stop reason: SDUPTHER

## 2019-05-29 RX ORDER — ESCITALOPRAM OXALATE 20 MG/1
20 TABLET ORAL DAILY
Qty: 90 TABLET | Refills: 1 | Status: SHIPPED | OUTPATIENT
Start: 2019-05-29 | End: 2019-11-05 | Stop reason: SDUPTHER

## 2019-05-29 RX ORDER — ATENOLOL 25 MG/1
25 TABLET ORAL DAILY
Qty: 90 TABLET | Refills: 1 | Status: SHIPPED | OUTPATIENT
Start: 2019-05-29 | End: 2019-11-05 | Stop reason: SDUPTHER

## 2019-05-29 NOTE — PROGRESS NOTES
Chief Complaint   Patient presents with   • Hypertension     6 mth follow    • Med Refill       Subjective   Anastasiia Faria is a 69 y.o. female.     History of Present Illness   HTN  Had elevated Cr on last CMP in 2/2019 which was new for her. This was with her gynecologist Dr. Suazo. He retired. She got a prolia treatment at that time. She will continue her care with Dr. Munoz there.   History of prediabetes with A1c of 5.9% in 10/2018. She is working on weight loss. Reducing sugar intake, watching her portion, doing exercise. Doing [employer] on line through insurance. Online classes. Makes her more conscious of what she is eating and tracking food.     Talking today about the swelling in her neck from about 15 years ago. Really had thorough evaluation at that time. Sometimes the swelling goes down and sometimes it is larger. When it swells she has swelling in her legs.  New with this is some pain and burning sensation of the neck from the swelling over the clavicle up into the neck. This has been occurring daily but different degrees of intensity, never excruciating. The episodes of pain last 1-2 hours, more constant when it hurts. May be worse from her activity but does not keep her from her activity.   She says it does not feel muscular. The burning pain is happening over the last 2-3 weeks.     The following portions of the patient's history were reviewed and updated as appropriate: allergies, current medications, past medical history, past social history and problem list.    Review of Systems   Respiratory: Positive for shortness of breath.    Cardiovascular: Negative for chest pain and palpitations.   Musculoskeletal: Positive for neck pain.   Neurological: Positive for headaches.       Vitals:    05/29/19 1339   BP: 128/80   BP Location: Left arm   Patient Position: Sitting   Cuff Size: Adult   Pulse: 62   Resp: 18   Temp: 98.5 °F (36.9 °C)   TempSrc: Oral   SpO2: 95%   Weight: 81.3 kg (179 lb 3.2 oz)  "  Height: 172.7 cm (68\")       Objective   Physical Exam   Constitutional: She is oriented to person, place, and time. She appears well-nourished. No distress.   Eyes: Conjunctivae are normal. Right eye exhibits no discharge. Left eye exhibits no discharge. No scleral icterus.   Neck: Neck supple. No thyromegaly present.   Cardiovascular: Normal rate, regular rhythm, normal heart sounds and intact distal pulses. Exam reveals no gallop and no friction rub.   No murmur heard.  Pulmonary/Chest: Effort normal and breath sounds normal. No respiratory distress. She has no wheezes.   Musculoskeletal: She exhibits edema. She exhibits no tenderness or deformity.   Lymphadenopathy:     She has no cervical adenopathy.   Neurological: She is alert and oriented to person, place, and time.   Psychiatric: She has a normal mood and affect. Her behavior is normal.   Vitals reviewed.      Assessment/Plan   Anastasiia was seen today for hypertension and med refill.    Diagnoses and all orders for this visit:    Essential hypertension  -     Basic Metabolic Panel  -     Hemoglobin A1c    Prediabetes  -     Hemoglobin A1c    Swelling of limb, left    Other orders  -     atenolol (TENORMIN) 25 MG tablet; Take 1 tablet by mouth Daily.  -     escitalopram (LEXAPRO) 20 MG tablet; Take 1 tablet by mouth Daily.  -     levothyroxine (SYNTHROID, LEVOTHROID) 50 MCG tablet; Take 1 tablet by mouth Daily.  -     NIFEdipine XL (PROCARDIA XL) 30 MG 24 hr tablet; Take 1 tablet by mouth Daily.      For the swelling we will monitor these sympotoms. Almost sound neuropathic given the burning sensation but interestingly goes from the clavicle up to the neck.  She denies any burning or tingling.  She is not having any weakness or changes in mobility of the upper extremity on the left side.  If she is having any new symptoms or worsening of her current symptoms that they are more extensive, lasting longer, affecting her activity she should call and we will get " imaging.  At this time unsure if we should start with ultrasound of that soft tissue, MRI of the area which would be an MRI of the neck?,  Shoulder?,  Or chest?  Will look into this.    Her blood pressure is well controlled we will continue current medications.  We will check in on her creatinine as it was elevated before she had her Prolia with gynecology.  We will also check in on her A1c since is been 6 months and it was 5.9% at last check.  Her glucose was not fasting with a gynecologist but was elevated to 141.  She is really made a lot of diet and exercise changes so hopefully this will be improved.

## 2019-05-30 LAB
BUN SERPL-MCNC: 16 MG/DL (ref 8–23)
BUN/CREAT SERPL: 17.8 (ref 7–25)
CALCIUM SERPL-MCNC: 10.2 MG/DL (ref 8.6–10.5)
CHLORIDE SERPL-SCNC: 103 MMOL/L (ref 98–107)
CO2 SERPL-SCNC: 26.4 MMOL/L (ref 22–29)
CREAT SERPL-MCNC: 0.9 MG/DL (ref 0.57–1)
GLUCOSE SERPL-MCNC: 93 MG/DL (ref 65–99)
HBA1C MFR BLD: 5.6 % (ref 4.8–5.6)
POTASSIUM SERPL-SCNC: 4.6 MMOL/L (ref 3.5–5.2)
SODIUM SERPL-SCNC: 139 MMOL/L (ref 136–145)

## 2019-08-01 DIAGNOSIS — M81.0 AGE-RELATED OSTEOPOROSIS WITHOUT CURRENT PATHOLOGICAL FRACTURE: Primary | ICD-10-CM

## 2019-08-06 ENCOUNTER — RESULTS ENCOUNTER (OUTPATIENT)
Dept: OBSTETRICS AND GYNECOLOGY | Facility: CLINIC | Age: 69
End: 2019-08-06

## 2019-08-06 DIAGNOSIS — M81.0 AGE-RELATED OSTEOPOROSIS WITHOUT CURRENT PATHOLOGICAL FRACTURE: ICD-10-CM

## 2019-08-27 ENCOUNTER — INFUSION (OUTPATIENT)
Dept: ONCOLOGY | Facility: HOSPITAL | Age: 69
End: 2019-08-27

## 2019-08-27 ENCOUNTER — APPOINTMENT (OUTPATIENT)
Dept: OTHER | Facility: HOSPITAL | Age: 69
End: 2019-08-27

## 2019-08-27 VITALS
OXYGEN SATURATION: 96 % | DIASTOLIC BLOOD PRESSURE: 71 MMHG | HEART RATE: 54 BPM | SYSTOLIC BLOOD PRESSURE: 120 MMHG | TEMPERATURE: 98.1 F

## 2019-08-27 DIAGNOSIS — M81.0 AGE-RELATED OSTEOPOROSIS WITHOUT CURRENT PATHOLOGICAL FRACTURE: Primary | ICD-10-CM

## 2019-08-27 LAB
ALBUMIN SERPL-MCNC: 4.2 G/DL (ref 3.5–5.2)
ALBUMIN/GLOB SERPL: 1.6 G/DL
ALP SERPL-CCNC: 72 U/L (ref 39–117)
ALT SERPL W P-5'-P-CCNC: 11 U/L (ref 1–33)
ANION GAP SERPL CALCULATED.3IONS-SCNC: 11.1 MMOL/L (ref 5–15)
AST SERPL-CCNC: 15 U/L (ref 1–32)
BILIRUB SERPL-MCNC: 0.5 MG/DL (ref 0.1–1.2)
BUN BLD-MCNC: 19 MG/DL (ref 8–23)
BUN/CREAT SERPL: 16.8 (ref 7–25)
CALCIUM SPEC-SCNC: 9.8 MG/DL (ref 8.6–10.5)
CHLORIDE SERPL-SCNC: 102 MMOL/L (ref 98–107)
CO2 SERPL-SCNC: 25.9 MMOL/L (ref 22–29)
CREAT BLD-MCNC: 1.13 MG/DL (ref 0.57–1)
GFR SERPL CREATININE-BSD FRML MDRD: 48 ML/MIN/1.73
GLOBULIN UR ELPH-MCNC: 2.7 GM/DL
GLUCOSE BLD-MCNC: 96 MG/DL (ref 65–99)
POTASSIUM BLD-SCNC: 4.2 MMOL/L (ref 3.5–5.2)
PROT SERPL-MCNC: 6.9 G/DL (ref 6–8.5)
SODIUM BLD-SCNC: 139 MMOL/L (ref 136–145)

## 2019-08-27 PROCEDURE — 80053 COMPREHEN METABOLIC PANEL: CPT | Performed by: OBSTETRICS & GYNECOLOGY

## 2019-08-27 PROCEDURE — 96372 THER/PROPH/DIAG INJ SC/IM: CPT | Performed by: NURSE PRACTITIONER

## 2019-08-27 PROCEDURE — 25010000002 DENOSUMAB 60 MG/ML SOLUTION PREFILLED SYRINGE: Performed by: NURSE PRACTITIONER

## 2019-08-27 PROCEDURE — 82306 VITAMIN D 25 HYDROXY: CPT | Performed by: OBSTETRICS & GYNECOLOGY

## 2019-08-27 PROCEDURE — 36415 COLL VENOUS BLD VENIPUNCTURE: CPT | Performed by: OBSTETRICS & GYNECOLOGY

## 2019-08-27 RX ADMIN — DENOSUMAB 60 MG: 60 INJECTION SUBCUTANEOUS at 15:47

## 2019-08-27 NOTE — PROGRESS NOTES
Arrived ambulatory for prolia injection. Indication and side effects reviewed. Denies recent dental work. Labs and medications verified. Prolia administered in right arm without incidence. Instructed to call prescribing MD for any concerns or questions and instructed on how to schedule future appts.  Pt vu and discharged ambulatory.    Lab Results   Component Value Date    GLUCOSE 96 08/27/2019    BUN 19 08/27/2019    CREATININE 1.13 (H) 08/27/2019    EGFRIFNONA 48 (L) 08/27/2019    EGFRIFAFRI 75 05/29/2019    BCR 16.8 08/27/2019    K 4.2 08/27/2019    CO2 25.9 08/27/2019    CALCIUM 9.8 08/27/2019    PROTENTOTREF 6.0 02/05/2019    ALBUMIN 4.20 08/27/2019    LABIL2 2.5 02/05/2019    AST 15 08/27/2019    ALT 11 08/27/2019

## 2019-08-29 LAB — 25(OH)D3 SERPL-MCNC: 44.6 NG/ML (ref 30–100)

## 2019-09-16 ENCOUNTER — PROCEDURE VISIT (OUTPATIENT)
Dept: OBSTETRICS AND GYNECOLOGY | Facility: CLINIC | Age: 69
End: 2019-09-16

## 2019-09-16 ENCOUNTER — OFFICE VISIT (OUTPATIENT)
Dept: OBSTETRICS AND GYNECOLOGY | Facility: CLINIC | Age: 69
End: 2019-09-16

## 2019-09-16 VITALS
BODY MASS INDEX: 26.98 KG/M2 | HEIGHT: 68 IN | DIASTOLIC BLOOD PRESSURE: 74 MMHG | WEIGHT: 178 LBS | SYSTOLIC BLOOD PRESSURE: 127 MMHG

## 2019-09-16 DIAGNOSIS — Z12.39 SCREENING FOR BREAST CANCER: ICD-10-CM

## 2019-09-16 DIAGNOSIS — Z87.39 HISTORY OF OSTEOPOROSIS: Primary | ICD-10-CM

## 2019-09-16 DIAGNOSIS — Z78.0 POST-MENOPAUSAL: ICD-10-CM

## 2019-09-16 DIAGNOSIS — M85.80 OSTEOPENIA, UNSPECIFIED LOCATION: ICD-10-CM

## 2019-09-16 DIAGNOSIS — Z79.899 HIGH RISK MEDICATIONS (NOT ANTICOAGULANTS) LONG-TERM USE: ICD-10-CM

## 2019-09-16 DIAGNOSIS — Z01.419 ENCOUNTER FOR GYNECOLOGICAL EXAMINATION WITHOUT ABNORMAL FINDING: Primary | ICD-10-CM

## 2019-09-16 DIAGNOSIS — M81.0 AGE-RELATED OSTEOPOROSIS WITHOUT CURRENT PATHOLOGICAL FRACTURE: ICD-10-CM

## 2019-09-16 LAB
DEVELOPER EXPIRATION DATE: NORMAL
DEVELOPER LOT NUMBER: NORMAL
EXPIRATION DATE: NORMAL
FECAL OCCULT BLOOD SCREEN, POC: NEGATIVE
Lab: NORMAL
NEGATIVE CONTROL: NEGATIVE
POSITIVE CONTROL: POSITIVE

## 2019-09-16 PROCEDURE — 99397 PER PM REEVAL EST PAT 65+ YR: CPT | Performed by: OBSTETRICS & GYNECOLOGY

## 2019-09-16 PROCEDURE — 82274 ASSAY TEST FOR BLOOD FECAL: CPT | Performed by: OBSTETRICS & GYNECOLOGY

## 2019-09-16 PROCEDURE — 77080 DXA BONE DENSITY AXIAL: CPT | Performed by: OBSTETRICS & GYNECOLOGY

## 2019-09-16 NOTE — PROGRESS NOTES
Subjective    Chief Complaint   Patient presents with   • Gynecologic Exam     AE      History of Present Illness    Anastasiia Faria is a 69 y.o. female who presents for annual exam.  Non-smoker.  Mammogram being scheduled next month.  DEXA scan today showed much improvement on Prolia with only mild osteopenia of the left hip.  Colonoscopy performed in  and due to family history due in  again.  No bleeding or problems.    Obstetric History:  OB History      Para Term  AB Living        0          SAB TAB Ectopic Molar Multiple Live Births                        Menstrual History:     No LMP recorded. Patient is postmenopausal.       Past Medical History:   Diagnosis Date   • Allergic    • Chronic kidney disease    • Encounter for annual health examination 2014    Annual Health Assessment   • Fibrocystic breast    • Herpes labialis without complication    • History of mammogram 2010   • Hyperlipidemia    • Hypertension    • Hypothyroidism    • Insomnia    • PONV (postoperative nausea and vomiting)      Family History   Problem Relation Age of Onset   • Breast cancer Mother    • Colon cancer Mother    • Stomach cancer Maternal Grandmother         or intestinal   • Cancer Maternal Grandmother         GI TRACT CANCER   • Colon cancer Maternal Aunt         colon ca 40   • Colon cancer Maternal Aunt         66 yo   • Colon cancer Maternal Aunt         79 yo   • Colon cancer Other    • Colon cancer Other    • Heart attack Maternal Grandfather    • Heart disease Paternal Grandmother      Social History     Tobacco Use   Smoking Status Never Smoker   Smokeless Tobacco Never Used     [unfilled]    The following portions of the patient's history were reviewed and updated as appropriate: allergies, current medications, past family history, past medical history, past social history, past surgical history and problem list.    Review of Systems   Constitutional: Negative.  Negative for fever and  "unexpected weight change.   HENT: Negative.    Respiratory: Negative for shortness of breath and wheezing.    Cardiovascular: Negative for chest pain, palpitations and leg swelling.   Gastrointestinal: Negative for abdominal pain, anal bleeding and blood in stool.   Genitourinary: Negative for dysuria, pelvic pain, urgency, vaginal bleeding, vaginal discharge and vaginal pain.   Skin: Negative.    Neurological: Negative.    Hematological: Negative.  Negative for adenopathy.   Psychiatric/Behavioral: Negative.  Negative for dysphoric mood. The patient is not nervous/anxious.             Objective   Physical Exam   Constitutional: She is oriented to person, place, and time. Vital signs are normal. She appears well-developed and well-nourished.   HENT:   Head: Normocephalic.   Neck: Trachea normal. No tracheal deviation present. No thyromegaly present.   Cardiovascular: Normal rate, regular rhythm and normal heart sounds.   No murmur heard.  Pulmonary/Chest: Effort normal and breath sounds normal.   Breasts without masses, tenderness or nipple discharge   Abdominal: Soft. Normal appearance. She exhibits no mass. There is no hepatosplenomegaly. There is no tenderness. No hernia.   Genitourinary: Rectum normal, vagina normal and uterus normal. Rectal exam shows guaiac negative stool. Uterus is not enlarged and not tender. Cervix exhibits no motion tenderness. Right adnexum displays no mass and no tenderness. Left adnexum displays no mass and no tenderness. No vaginal discharge found.   Genitourinary Comments: External genitalia normal    Lymphadenopathy:     She has no cervical adenopathy.     She has no axillary adenopathy.   Neurological: She is alert and oriented to person, place, and time.   Skin: Skin is warm and dry. No rash noted.   Psychiatric: She has a normal mood and affect. Her behavior is normal. Cognition and memory are normal.       /74   Ht 172.7 cm (68\")   Wt 80.7 kg (178 lb)   BMI 27.06 kg/m² "     Assessment/Plan   Anastasiia was seen today for gynecologic exam.    Diagnoses and all orders for this visit:    Encounter for gynecological examination without abnormal finding    Screening for breast cancer    Osteopenia, unspecified location        Mammogram. RTO 1 year     Counseled about continuing calcium with vitamin D.  Also discussed continuing Prolia.

## 2019-09-18 LAB
CONV .: NORMAL
CYTOLOGIST CVX/VAG CYTO: NORMAL
CYTOLOGY CVX/VAG DOC CYTO: NORMAL
CYTOLOGY CVX/VAG DOC THIN PREP: NORMAL
DX ICD CODE: NORMAL
HIV 1 & 2 AB SER-IMP: NORMAL
OTHER STN SPEC: NORMAL
STAT OF ADQ CVX/VAG CYTO-IMP: NORMAL

## 2019-10-21 ENCOUNTER — PROCEDURE VISIT (OUTPATIENT)
Dept: OBSTETRICS AND GYNECOLOGY | Facility: CLINIC | Age: 69
End: 2019-10-21

## 2019-10-21 ENCOUNTER — APPOINTMENT (OUTPATIENT)
Dept: WOMENS IMAGING | Facility: HOSPITAL | Age: 69
End: 2019-10-21

## 2019-10-21 DIAGNOSIS — Z12.31 VISIT FOR SCREENING MAMMOGRAM: Primary | ICD-10-CM

## 2019-10-21 PROCEDURE — 77067 SCR MAMMO BI INCL CAD: CPT | Performed by: RADIOLOGY

## 2019-10-21 PROCEDURE — 77063 BREAST TOMOSYNTHESIS BI: CPT | Performed by: RADIOLOGY

## 2019-10-21 PROCEDURE — 77063 BREAST TOMOSYNTHESIS BI: CPT | Performed by: OBSTETRICS & GYNECOLOGY

## 2019-10-21 PROCEDURE — 77067 SCR MAMMO BI INCL CAD: CPT | Performed by: OBSTETRICS & GYNECOLOGY

## 2019-11-01 ENCOUNTER — LAB (OUTPATIENT)
Dept: FAMILY MEDICINE CLINIC | Facility: CLINIC | Age: 69
End: 2019-11-01

## 2019-11-01 DIAGNOSIS — E03.9 ACQUIRED HYPOTHYROIDISM: ICD-10-CM

## 2019-11-01 DIAGNOSIS — I10 ESSENTIAL HYPERTENSION: Primary | ICD-10-CM

## 2019-11-01 DIAGNOSIS — E78.01 FAMILIAL HYPERCHOLESTEROLEMIA: ICD-10-CM

## 2019-11-02 LAB
ALBUMIN SERPL-MCNC: 4.4 G/DL (ref 3.5–5.2)
ALBUMIN/GLOB SERPL: 2.2 G/DL
ALP SERPL-CCNC: 63 U/L (ref 39–117)
ALT SERPL-CCNC: 11 U/L (ref 1–33)
AST SERPL-CCNC: 16 U/L (ref 1–32)
BILIRUB SERPL-MCNC: 0.4 MG/DL (ref 0.2–1.2)
BUN SERPL-MCNC: 15 MG/DL (ref 8–23)
BUN/CREAT SERPL: 15 (ref 7–25)
CALCIUM SERPL-MCNC: 9.3 MG/DL (ref 8.6–10.5)
CHLORIDE SERPL-SCNC: 102 MMOL/L (ref 98–107)
CHOLEST SERPL-MCNC: 233 MG/DL (ref 0–200)
CO2 SERPL-SCNC: 23.7 MMOL/L (ref 22–29)
CREAT SERPL-MCNC: 1 MG/DL (ref 0.57–1)
ERYTHROCYTE [DISTWIDTH] IN BLOOD BY AUTOMATED COUNT: 12.9 % (ref 12.3–15.4)
GLOBULIN SER CALC-MCNC: 2 GM/DL
GLUCOSE SERPL-MCNC: 105 MG/DL (ref 65–99)
HCT VFR BLD AUTO: 43.7 % (ref 34–46.6)
HDLC SERPL-MCNC: 44 MG/DL (ref 40–60)
HGB BLD-MCNC: 14.7 G/DL (ref 12–15.9)
LDLC SERPL CALC-MCNC: 153 MG/DL (ref 0–100)
MCH RBC QN AUTO: 31.1 PG (ref 26.6–33)
MCHC RBC AUTO-ENTMCNC: 33.6 G/DL (ref 31.5–35.7)
MCV RBC AUTO: 92.6 FL (ref 79–97)
PLATELET # BLD AUTO: 404 10*3/MM3 (ref 140–450)
POTASSIUM SERPL-SCNC: 4.2 MMOL/L (ref 3.5–5.2)
PROT SERPL-MCNC: 6.4 G/DL (ref 6–8.5)
RBC # BLD AUTO: 4.72 10*6/MM3 (ref 3.77–5.28)
SODIUM SERPL-SCNC: 141 MMOL/L (ref 136–145)
T4 SERPL-MCNC: 8.77 MCG/DL (ref 4.5–11.7)
TRIGL SERPL-MCNC: 181 MG/DL (ref 0–150)
TSH SERPL DL<=0.005 MIU/L-ACNC: 5.32 UIU/ML (ref 0.27–4.2)
VLDLC SERPL CALC-MCNC: 36.2 MG/DL
WBC # BLD AUTO: 7.75 10*3/MM3 (ref 3.4–10.8)

## 2019-11-05 ENCOUNTER — OFFICE VISIT (OUTPATIENT)
Dept: FAMILY MEDICINE CLINIC | Facility: CLINIC | Age: 69
End: 2019-11-05

## 2019-11-05 VITALS
OXYGEN SATURATION: 92 % | DIASTOLIC BLOOD PRESSURE: 82 MMHG | WEIGHT: 181 LBS | TEMPERATURE: 97.6 F | HEIGHT: 68 IN | HEART RATE: 66 BPM | BODY MASS INDEX: 27.43 KG/M2 | RESPIRATION RATE: 13 BRPM | SYSTOLIC BLOOD PRESSURE: 138 MMHG

## 2019-11-05 DIAGNOSIS — E78.01 FAMILIAL HYPERCHOLESTEROLEMIA: ICD-10-CM

## 2019-11-05 DIAGNOSIS — Z00.00 MEDICARE ANNUAL WELLNESS VISIT, SUBSEQUENT: Primary | ICD-10-CM

## 2019-11-05 DIAGNOSIS — E03.9 ACQUIRED HYPOTHYROIDISM: ICD-10-CM

## 2019-11-05 PROCEDURE — G0008 ADMIN INFLUENZA VIRUS VAC: HCPCS | Performed by: FAMILY MEDICINE

## 2019-11-05 PROCEDURE — G0439 PPPS, SUBSEQ VISIT: HCPCS | Performed by: FAMILY MEDICINE

## 2019-11-05 PROCEDURE — 90653 IIV ADJUVANT VACCINE IM: CPT | Performed by: FAMILY MEDICINE

## 2019-11-05 RX ORDER — ESCITALOPRAM OXALATE 20 MG/1
20 TABLET ORAL DAILY
Qty: 90 TABLET | Refills: 1 | Status: SHIPPED | OUTPATIENT
Start: 2019-11-05 | End: 2020-06-04

## 2019-11-05 RX ORDER — ATENOLOL 25 MG/1
25 TABLET ORAL DAILY
Qty: 90 TABLET | Refills: 1 | Status: SHIPPED | OUTPATIENT
Start: 2019-11-05 | End: 2020-06-04

## 2019-11-05 RX ORDER — LEVOTHYROXINE SODIUM 0.05 MG/1
50 TABLET ORAL DAILY
Qty: 90 TABLET | Refills: 1 | Status: SHIPPED | OUTPATIENT
Start: 2019-11-05 | End: 2020-06-04

## 2019-11-05 RX ORDER — NIFEDIPINE 30 MG/1
30 TABLET, EXTENDED RELEASE ORAL DAILY
Qty: 90 TABLET | Refills: 1 | Status: SHIPPED | OUTPATIENT
Start: 2019-11-05 | End: 2020-06-04

## 2019-11-05 NOTE — PROGRESS NOTES
The ABCs of the Annual Wellness Visit  Subsequent Medicare Wellness Visit    Chief Complaint   Patient presents with   • Medicare Wellness-subsequent   • Flu Vaccine       Subjective   History of Present Illness:  Anastasiia Faria is a 69 y.o. female who presents for a Subsequent Medicare Wellness Visit.    HEALTH RISK ASSESSMENT    Recent Hospitalizations:  No hospitalization(s) within the last year.    Current Medical Providers:  Patient Care Team:  Audrey Krishna MD as PCP - General (Family Medicine)    Smoking Status:  Social History     Tobacco Use   Smoking Status Never Smoker   Smokeless Tobacco Never Used       Alcohol Consumption:  Social History     Substance and Sexual Activity   Alcohol Use No       Depression Screen:   PHQ-2/PHQ-9 Depression Screening 11/5/2019   Little interest or pleasure in doing things 0   Feeling down, depressed, or hopeless 1   Trouble falling or staying asleep, or sleeping too much -   Feeling tired or having little energy -   Poor appetite or overeating -   Feeling bad about yourself - or that you are a failure or have let yourself or your family down -   Trouble concentrating on things, such as reading the newspaper or watching television -   Moving or speaking so slowly that other people could have noticed. Or the opposite - being so fidgety or restless that you have been moving around a lot more than usual -   Thoughts that you would be better off dead, or of hurting yourself in some way -   Total Score 1   If you checked off any problems, how difficult have these problems made it for you to do your work, take care of things at home, or get along with other people? -       Fall Risk Screen:  MASOUD Fall Risk Assessment was completed, and patient is at HIGH risk for falls. Assessment completed on:11/5/2019    Health Habits and Functional and Cognitive Screening:  Functional & Cognitive Status 11/5/2019   Do you have difficulty preparing food and eating? No   Do you have difficulty  bathing yourself, getting dressed or grooming yourself? No   Do you have difficulty using the toilet? No   Do you have difficulty moving around from place to place? No   Do you have trouble with steps or getting out of a bed or a chair? No   Current Diet Well Balanced Diet   Dental Exam Up to date   Eye Exam Up to date   Exercise (times per week) 5 times per week   Current Exercise Activities Include Walking   Do you need help using the phone?  No   Are you deaf or do you have serious difficulty hearing?  (No Data)   Do you need help with transportation? No   Do you need help shopping? No   Do you need help preparing meals?  No   Do you need help with housework?  No   Do you need help with laundry? No   Do you need help taking your medications? No   Do you need help managing money? No   Do you ever drive or ride in a car without wearing a seat belt? No   Have you felt unusual stress, anger or loneliness in the last month? No   Who do you live with? Spouse   If you need help, do you have trouble finding someone available to you? No   Have you been bothered in the last four weeks by sexual problems? No   Do you have difficulty concentrating, remembering or making decisions? No         Does the patient have evidence of cognitive impairment? No    Asprin use counseling:Does not need ASA but is currently taking (advised patient that ASA is not indicated and patient chooses to stop it)    Age-appropriate Screening Schedule:  Refer to the list below for future screening recommendations based on patient's age, sex and/or medical conditions. Orders for these recommended tests are listed in the plan section. The patient has been provided with a written plan.    Health Maintenance   Topic Date Due   • ZOSTER VACCINE (3 of 3) 11/17/2019   • LIPID PANEL  11/01/2020   • DXA SCAN  09/16/2021   • MAMMOGRAM  10/21/2021   • COLONOSCOPY  10/27/2026   • TDAP/TD VACCINES (2 - Td) 04/03/2027   • INFLUENZA VACCINE  Completed   •  PNEUMOCOCCAL VACCINES (65+ LOW/MEDIUM RISK)  Completed          The following portions of the patient's history were reviewed and updated as appropriate: allergies, current medications, past family history, past medical history, past social history, past surgical history and problem list.    Outpatient Medications Prior to Visit   Medication Sig Dispense Refill   • DEEDEE ASPIRIN EC LOW DOSE PO Take 81 mg by mouth Daily.     • calcium carbonate (OS-NIKKIE) 600 MG tablet Take 600 mg by mouth Daily.     • Cholecalciferol (VITAMIN D3) 2000 UNITS capsule      • loratadine (CLARITIN) 10 MG tablet Take 10 mg by mouth daily.     • Probiotic Product (SOLUBLE FIBER/PROBIOTICS PO) Take 1 capsule by mouth Daily.     • PROLIA 60 MG/ML solution syringe INJECT 60 MG UNDER THE SKIN EVERY SIX MONTHS 1 mL 1   • Psyllium (METAMUCIL FIBER PO) Take  by mouth.     • valACYclovir (VALTREX) 1000 MG tablet 1,000 mg As Needed.     • atenolol (TENORMIN) 25 MG tablet Take 1 tablet by mouth Daily. 90 tablet 1   • escitalopram (LEXAPRO) 20 MG tablet Take 1 tablet by mouth Daily. 90 tablet 1   • levothyroxine (SYNTHROID, LEVOTHROID) 50 MCG tablet Take 1 tablet by mouth Daily. 90 tablet 1   • NIFEdipine XL (PROCARDIA XL) 30 MG 24 hr tablet Take 1 tablet by mouth Daily. 90 tablet 1     No facility-administered medications prior to visit.        Patient Active Problem List   Diagnosis   • Colon polyp, hyperplastic   • Allergic rhinitis due to pollen   • Acquired hypothyroidism   • Essential hypertension   • FH: colon cancer in relative <50 years old   • Hyperlipidemia   • Chronic right shoulder pain   • Periscapular pain   • Other idiopathic scoliosis, thoracic region   • Prediabetes   • Age-related osteoporosis without current pathological fracture       Advanced Care Planning:  Patient has an advance directive - a copy has been provided and is visible in patient header    Review of Systems   Respiratory: Positive for shortness of breath.         SOA-  "with moderate activity   Cardiovascular: Negative.    Neurological: Positive for headaches.       Compared to one year ago, the patient feels her physical health is better.  Compared to one year ago, the patient feels her mental health is the same.  She is going to the gym for 1 hour to 1.5 hours about 5 days per week. She has been less the last month because of two trips. She feels stronger, more flexible, and more stamina.   She does a class and then does walking or weights after that.     Reviewed chart for potential of high risk medication in the elderly: yes  Reviewed chart for potential of harmful drug interactions in the elderly:yes    Objective         Vitals:    11/05/19 1433   BP: 138/82   BP Location: Left arm   Patient Position: Sitting   Cuff Size: Adult   Pulse: 66   Resp: 13   Temp: 97.6 °F (36.4 °C)   TempSrc: Oral   SpO2: 92%   Weight: 82.1 kg (181 lb)   Height: 172.7 cm (68\")       Body mass index is 27.52 kg/m².  Discussed the patient's BMI with her. The BMI is above average; BMI management plan is completed.    Physical Exam   Constitutional: She is oriented to person, place, and time. She appears well-nourished. No distress.   HENT:   Right Ear: External ear normal.   Left Ear: External ear normal.   Nose: Nose normal.   Mouth/Throat: Oropharynx is clear and moist. No oropharyngeal exudate.   TMs are clear, canals are clear/minimal cerumen.    Eyes: Conjunctivae and EOM are normal. Right eye exhibits no discharge. Left eye exhibits no discharge. No scleral icterus.   Neck: Neck supple. No thyromegaly present.   Cardiovascular: Normal rate, regular rhythm, normal heart sounds and intact distal pulses. Exam reveals no gallop and no friction rub.   No murmur heard.  Negative for carotid bruit bilaterally.    Pulmonary/Chest: Effort normal and breath sounds normal. No respiratory distress. She has no wheezes. She has no rales. She exhibits no tenderness.   Abdominal: Soft. Bowel sounds are normal. " She exhibits no distension and no mass. There is no tenderness. There is no guarding.   Musculoskeletal: She exhibits no edema or deformity.   Lymphadenopathy:     She has no cervical adenopathy.   Neurological: She is alert and oriented to person, place, and time. She exhibits normal muscle tone. Coordination normal.   Skin: Skin is warm and dry.   Psychiatric: She has a normal mood and affect. Her behavior is normal.   Vitals reviewed.      Lab Results   Component Value Date     (H) 11/01/2019    CHLPL 233 (H) 11/01/2019    TRIG 181 (H) 11/01/2019    HDL 44 11/01/2019     (H) 11/01/2019    VLDL 36.2 11/01/2019        Assessment/Plan   Medicare Risks and Personalized Health Plan  CMS Preventative Services Quick Reference  Breast Cancer/Mammogram Screening  Chronic Pain   Depression/Dysphoria  Immunizations Discussed/Encouraged (specific immunizations; Influenza )  Obesity/Overweight     The above risks/problems have been discussed with the patient.  Pertinent information has been shared with the patient in the After Visit Summary.  Follow up plans and orders are seen below in the Assessment/Plan Section.    Diagnoses and all orders for this visit:    1. Medicare annual wellness visit, subsequent (Primary)    2. Acquired hypothyroidism  -     TSH; Future  -     T4; Future    3. Familial hypercholesterolemia  -     Lipid Panel; Future    Other orders  -     atenolol (TENORMIN) 25 MG tablet; Take 1 tablet by mouth Daily.  Dispense: 90 tablet; Refill: 1  -     escitalopram (LEXAPRO) 20 MG tablet; Take 1 tablet by mouth Daily.  Dispense: 90 tablet; Refill: 1  -     levothyroxine (SYNTHROID, LEVOTHROID) 50 MCG tablet; Take 1 tablet by mouth Daily.  Dispense: 90 tablet; Refill: 1  -     NIFEdipine XL (PROCARDIA XL) 30 MG 24 hr tablet; Take 1 tablet by mouth Daily.  Dispense: 90 tablet; Refill: 1  -     Fluad Quad >65 years      Follow Up:  No Follow-up on file.     An After Visit Summary and PPPS were given  to the patient.

## 2020-02-03 ENCOUNTER — LAB (OUTPATIENT)
Dept: FAMILY MEDICINE CLINIC | Facility: CLINIC | Age: 70
End: 2020-02-03

## 2020-02-03 DIAGNOSIS — E03.9 ACQUIRED HYPOTHYROIDISM: ICD-10-CM

## 2020-02-03 DIAGNOSIS — E78.01 FAMILIAL HYPERCHOLESTEROLEMIA: ICD-10-CM

## 2020-02-03 LAB
CHOLEST SERPL-MCNC: 237 MG/DL (ref 0–200)
HDLC SERPL-MCNC: 42 MG/DL (ref 40–60)
LDLC SERPL CALC-MCNC: 155 MG/DL (ref 0–100)
T4 SERPL-MCNC: 7.33 MCG/DL (ref 4.5–11.7)
TRIGL SERPL-MCNC: 199 MG/DL (ref 0–150)
TSH SERPL DL<=0.005 MIU/L-ACNC: 2.35 UIU/ML (ref 0.27–4.2)
VLDLC SERPL CALC-MCNC: 39.8 MG/DL

## 2020-02-04 DIAGNOSIS — M81.0 AGE-RELATED OSTEOPOROSIS WITHOUT CURRENT PATHOLOGICAL FRACTURE: Primary | ICD-10-CM

## 2020-02-06 ENCOUNTER — OFFICE VISIT (OUTPATIENT)
Dept: FAMILY MEDICINE CLINIC | Facility: CLINIC | Age: 70
End: 2020-02-06

## 2020-02-06 VITALS
TEMPERATURE: 98.7 F | DIASTOLIC BLOOD PRESSURE: 66 MMHG | HEIGHT: 68 IN | BODY MASS INDEX: 27.37 KG/M2 | WEIGHT: 180.6 LBS | OXYGEN SATURATION: 98 % | RESPIRATION RATE: 13 BRPM | HEART RATE: 68 BPM | SYSTOLIC BLOOD PRESSURE: 126 MMHG

## 2020-02-06 DIAGNOSIS — E03.9 ACQUIRED HYPOTHYROIDISM: ICD-10-CM

## 2020-02-06 DIAGNOSIS — E78.01 FAMILIAL HYPERCHOLESTEROLEMIA: Primary | ICD-10-CM

## 2020-02-06 DIAGNOSIS — I10 ESSENTIAL HYPERTENSION: ICD-10-CM

## 2020-02-06 PROCEDURE — 99213 OFFICE O/P EST LOW 20 MIN: CPT | Performed by: FAMILY MEDICINE

## 2020-02-06 NOTE — PROGRESS NOTES
"Chief Complaint   Patient presents with   • Hypertension     3 mth follow    • Hypothyroidism       Subjective   Anastasiia Faria is a 70 y.o. female.     History of Present Illness   She has been doing well in general.  She works out 4 days per week for about 1.5 hours. She does a 50 minute silver sneakers class and then goes and works out what they didn't do. She does bike, rower, weights depending on what she did not do in class.   She has good control of her BPon nifedipine.   Her thyroid function improved since last check.   Her cholesterol is stable. She says she has not changed her diet and she was not good at the holidays as far as her food choices. Says she has cut back on the red meat and a little on the fried food but she eats a lot of fried food.     The following portions of the patient's history were reviewed and updated as appropriate: allergies, current medications, past medical history, past social history and problem list.    Review of Systems   Respiratory: Negative.    Cardiovascular: Negative.    Neurological: Negative.        /66 (BP Location: Left arm, Patient Position: Sitting, Cuff Size: Adult)   Pulse 68   Temp 98.7 °F (37.1 °C) (Oral)   Resp 13   Ht 172.7 cm (68\")   Wt 81.9 kg (180 lb 9.6 oz)   LMP  (LMP Unknown)   SpO2 98%   Breastfeeding No   BMI 27.46 kg/m²       Objective   Physical Exam   Constitutional: She is oriented to person, place, and time. She appears well-nourished. No distress.   Eyes: Conjunctivae are normal. Right eye exhibits no discharge. Left eye exhibits no discharge. No scleral icterus.   Neck: Neck supple. No thyromegaly present.   Cardiovascular: Normal rate, regular rhythm, normal heart sounds and intact distal pulses.   No murmur heard.  Pulmonary/Chest: Effort normal and breath sounds normal. No respiratory distress. She has no wheezes.   Musculoskeletal: She exhibits no edema.   Lymphadenopathy:     She has no cervical adenopathy.   Neurological: She is " alert and oriented to person, place, and time. She exhibits normal muscle tone.   Psychiatric: She has a normal mood and affect. Her behavior is normal.   Vitals reviewed.      Assessment/Plan   Anastasiia was seen today for hypertension and hypothyroidism.    Diagnoses and all orders for this visit:    Familial hypercholesterolemia    Essential hypertension    Acquired hypothyroidism      She had labs prior to visit. We reviewed.   We calculated her 10 year CV risk and is at 7.8%, she does fall under recs for statin therapy but she would like to wait at this time. Does not want to put herself at great risk but also does not want to take more medication at this point. Agree. We will recheck labs in 6 months. Add A1c at that time related to hx of prediabetes, last A1c was normal in 5/2019.          Answers for HPI/ROS submitted by the patient on 2/4/2020   What is the primary reason for your visit?: Other  Please describe your symptoms.: Follow up.  Blood tests.  Thyroid and cholesterol.  Have you had these symptoms before?: No  How long have you been having these symptoms?: Other  Please list any medications you are currently taking for this condition.: L thyroxine  Please describe any probable cause for these symptoms. : NA

## 2020-02-25 ENCOUNTER — TELEPHONE (OUTPATIENT)
Dept: GENERAL RADIOLOGY | Facility: HOSPITAL | Age: 70
End: 2020-02-25

## 2020-02-25 NOTE — TELEPHONE ENCOUNTER
----- Message from Willa Reina RN sent at 2/25/2020  3:38 PM EST -----  Please make referred lab appointment for 3/2/20 at 12:30.    Patient is aware.    Thank you,  Willa

## 2020-03-02 ENCOUNTER — LAB (OUTPATIENT)
Dept: OTHER | Facility: HOSPITAL | Age: 70
End: 2020-03-02

## 2020-03-02 ENCOUNTER — INFUSION (OUTPATIENT)
Dept: ONCOLOGY | Facility: HOSPITAL | Age: 70
End: 2020-03-02

## 2020-03-02 VITALS
OXYGEN SATURATION: 95 % | RESPIRATION RATE: 16 BRPM | SYSTOLIC BLOOD PRESSURE: 132 MMHG | BODY MASS INDEX: 27.31 KG/M2 | TEMPERATURE: 97.9 F | HEART RATE: 61 BPM | WEIGHT: 179.6 LBS | DIASTOLIC BLOOD PRESSURE: 74 MMHG

## 2020-03-02 DIAGNOSIS — M81.0 AGE-RELATED OSTEOPOROSIS WITHOUT CURRENT PATHOLOGICAL FRACTURE: Primary | ICD-10-CM

## 2020-03-02 DIAGNOSIS — M81.0 AGE-RELATED OSTEOPOROSIS WITHOUT CURRENT PATHOLOGICAL FRACTURE: ICD-10-CM

## 2020-03-02 LAB
25(OH)D3 SERPL-MCNC: 47.7 NG/ML (ref 30–100)
ALBUMIN SERPL-MCNC: 4 G/DL (ref 3.5–5.2)
ALBUMIN/GLOB SERPL: 1.5 G/DL
ALP SERPL-CCNC: 67 U/L (ref 39–117)
ALT SERPL W P-5'-P-CCNC: 10 U/L (ref 1–33)
ANION GAP SERPL CALCULATED.3IONS-SCNC: 11.7 MMOL/L (ref 5–15)
AST SERPL-CCNC: 15 U/L (ref 1–32)
BILIRUB SERPL-MCNC: 0.5 MG/DL (ref 0.1–1.2)
BUN BLD-MCNC: 18 MG/DL (ref 8–23)
BUN/CREAT SERPL: 16.4 (ref 7–25)
CALCIUM SPEC-SCNC: 9.8 MG/DL (ref 8.6–10.5)
CHLORIDE SERPL-SCNC: 102 MMOL/L (ref 98–107)
CO2 SERPL-SCNC: 27.3 MMOL/L (ref 22–29)
CREAT BLD-MCNC: 1.1 MG/DL (ref 0.57–1)
GFR SERPL CREATININE-BSD FRML MDRD: 49 ML/MIN/1.73
GLOBULIN UR ELPH-MCNC: 2.6 GM/DL
GLUCOSE BLD-MCNC: 104 MG/DL (ref 65–99)
POTASSIUM BLD-SCNC: 4.5 MMOL/L (ref 3.5–5.2)
PROT SERPL-MCNC: 6.6 G/DL (ref 6–8.5)
SODIUM BLD-SCNC: 141 MMOL/L (ref 136–145)

## 2020-03-02 PROCEDURE — 25010000002 DENOSUMAB 60 MG/ML SOLUTION PREFILLED SYRINGE: Performed by: OBSTETRICS & GYNECOLOGY

## 2020-03-02 PROCEDURE — 82306 VITAMIN D 25 HYDROXY: CPT | Performed by: OBSTETRICS & GYNECOLOGY

## 2020-03-02 PROCEDURE — 96372 THER/PROPH/DIAG INJ SC/IM: CPT

## 2020-03-02 PROCEDURE — 36415 COLL VENOUS BLD VENIPUNCTURE: CPT

## 2020-03-02 PROCEDURE — 80053 COMPREHEN METABOLIC PANEL: CPT | Performed by: OBSTETRICS & GYNECOLOGY

## 2020-03-02 RX ADMIN — DENOSUMAB 60 MG: 60 INJECTION SUBCUTANEOUS at 13:53

## 2020-03-02 NOTE — NURSING NOTE
Arrived  ambulatory for prolia injection. Indication and side effects reviewed. Denies recent dental work. Labs and medications verified. Prolia administered in  left  arm without incidence. Instructed to call prescribing MD for any concerns or questions and instructed on how to schedule future appts.  Pt vu and discharged ambulatory.

## 2020-06-04 RX ORDER — ESCITALOPRAM OXALATE 20 MG/1
TABLET ORAL
Qty: 90 TABLET | Refills: 3 | Status: SHIPPED | OUTPATIENT
Start: 2020-06-04 | End: 2021-06-18 | Stop reason: SDUPTHER

## 2020-06-04 RX ORDER — NIFEDIPINE 30 MG/1
TABLET, EXTENDED RELEASE ORAL
Qty: 90 TABLET | Refills: 3 | Status: SHIPPED | OUTPATIENT
Start: 2020-06-04 | End: 2021-06-18 | Stop reason: SDUPTHER

## 2020-06-04 RX ORDER — ATENOLOL 25 MG/1
TABLET ORAL
Qty: 90 TABLET | Refills: 3 | Status: SHIPPED | OUTPATIENT
Start: 2020-06-04 | End: 2021-06-18 | Stop reason: SDUPTHER

## 2020-06-04 RX ORDER — LEVOTHYROXINE SODIUM 0.05 MG/1
TABLET ORAL
Qty: 90 TABLET | Refills: 3 | Status: SHIPPED | OUTPATIENT
Start: 2020-06-04 | End: 2021-06-18 | Stop reason: SDUPTHER

## 2020-08-03 DIAGNOSIS — I10 ESSENTIAL HYPERTENSION: Primary | ICD-10-CM

## 2020-08-03 DIAGNOSIS — R73.03 PREDIABETES: ICD-10-CM

## 2020-08-05 LAB
BASOPHILS # BLD AUTO: 0.14 10*3/MM3 (ref 0–0.2)
BASOPHILS NFR BLD AUTO: 1.4 % (ref 0–1.5)
EOSINOPHIL # BLD AUTO: 0.29 10*3/MM3 (ref 0–0.4)
EOSINOPHIL NFR BLD AUTO: 2.9 % (ref 0.3–6.2)
ERYTHROCYTE [DISTWIDTH] IN BLOOD BY AUTOMATED COUNT: 13.1 % (ref 12.3–15.4)
HBA1C MFR BLD: 5.7 % (ref 4.8–5.6)
HCT VFR BLD AUTO: 42.5 % (ref 34–46.6)
HGB BLD-MCNC: 14.6 G/DL (ref 12–15.9)
IMM GRANULOCYTES # BLD AUTO: 0.02 10*3/MM3 (ref 0–0.05)
IMM GRANULOCYTES NFR BLD AUTO: 0.2 % (ref 0–0.5)
LYMPHOCYTES # BLD AUTO: 3.58 10*3/MM3 (ref 0.7–3.1)
LYMPHOCYTES NFR BLD AUTO: 35.6 % (ref 19.6–45.3)
MCH RBC QN AUTO: 31.8 PG (ref 26.6–33)
MCHC RBC AUTO-ENTMCNC: 34.4 G/DL (ref 31.5–35.7)
MCV RBC AUTO: 92.6 FL (ref 79–97)
MONOCYTES # BLD AUTO: 0.86 10*3/MM3 (ref 0.1–0.9)
MONOCYTES NFR BLD AUTO: 8.5 % (ref 5–12)
NEUTROPHILS # BLD AUTO: 5.17 10*3/MM3 (ref 1.7–7)
NEUTROPHILS NFR BLD AUTO: 51.4 % (ref 42.7–76)
NRBC BLD AUTO-RTO: 0 /100 WBC (ref 0–0.2)
PLATELET # BLD AUTO: 381 10*3/MM3 (ref 140–450)
RBC # BLD AUTO: 4.59 10*6/MM3 (ref 3.77–5.28)
WBC # BLD AUTO: 10.06 10*3/MM3 (ref 3.4–10.8)

## 2020-08-06 ENCOUNTER — OFFICE VISIT (OUTPATIENT)
Dept: FAMILY MEDICINE CLINIC | Facility: CLINIC | Age: 70
End: 2020-08-06

## 2020-08-06 VITALS
BODY MASS INDEX: 27.29 KG/M2 | TEMPERATURE: 97.3 F | HEIGHT: 68 IN | OXYGEN SATURATION: 98 % | SYSTOLIC BLOOD PRESSURE: 130 MMHG | WEIGHT: 180.1 LBS | RESPIRATION RATE: 16 BRPM | HEART RATE: 69 BPM | DIASTOLIC BLOOD PRESSURE: 80 MMHG

## 2020-08-06 DIAGNOSIS — R73.03 PREDIABETES: ICD-10-CM

## 2020-08-06 DIAGNOSIS — I10 ESSENTIAL HYPERTENSION: Primary | ICD-10-CM

## 2020-08-06 DIAGNOSIS — F33.0 MILD EPISODE OF RECURRENT MAJOR DEPRESSIVE DISORDER (HCC): ICD-10-CM

## 2020-08-06 LAB
SPECIMEN STATUS: NORMAL
WRITTEN AUTHORIZATION: NORMAL

## 2020-08-06 PROCEDURE — 99214 OFFICE O/P EST MOD 30 MIN: CPT | Performed by: FAMILY MEDICINE

## 2020-08-06 NOTE — PROGRESS NOTES
"Subjective   Anastasiia Faria is a 70 y.o. female.     Chief Complaint   Patient presents with   • Hyperlipidemia     6mo FU   • Hypertension        History of Present Illness  Prediabetes, HTN  Had labs prior to coming.   She is mostly staying in.   Stopped going to the gym  She is drinking sodas again. She stopped exercising.   She reads late and sleeps in late, when she is up at night she is snacking and has gained back weight.   Says she is bad about hydrating with water.     Depression   She does feel like she has some depression, not motivated for projects. She is on lexapro. Feels it is still working.   She does not feel isolated. She has awareness and wants to work on getting active again.     The following portions of the patient's history were reviewed and updated as appropriate: allergies, current medications, past medical history, past social history and problem list.    Review of Systems   Constitutional: Negative for activity change and fatigue.   Eyes: Negative for visual disturbance.   Respiratory: Negative for shortness of breath.    Cardiovascular: Negative for chest pain, palpitations and leg swelling.   Neurological: Negative for light-headedness and headaches.       Objective   /80   Pulse 69   Temp 97.3 °F (36.3 °C)   Resp 16   Ht 172.7 cm (68\")   Wt 81.7 kg (180 lb 1.6 oz)   LMP  (LMP Unknown)   SpO2 98%   BMI 27.38 kg/m²   Physical Exam   Constitutional: She is oriented to person, place, and time. She appears well-nourished. No distress.   Eyes: Conjunctivae are normal. Right eye exhibits no discharge. Left eye exhibits no discharge. No scleral icterus.   Cardiovascular: Normal rate, regular rhythm, normal heart sounds and intact distal pulses. Exam reveals no gallop and no friction rub.   No murmur heard.  Pulmonary/Chest: Effort normal and breath sounds normal. No respiratory distress. She has no wheezes.   Musculoskeletal: She exhibits no edema.   Neurological: She is alert and " oriented to person, place, and time.   Psychiatric: She has a normal mood and affect. Her behavior is normal.   Vitals reviewed.      Assessment/Plan   Anastasiia was seen today for hyperlipidemia and hypertension.    Diagnoses and all orders for this visit:    Essential hypertension  -     Basic Metabolic Panel    Prediabetes    Mild episode of recurrent major depressive disorder (CMS/HCC)    reviewed her labs.   We discussed the importance of regular physical activity.  Cardiovascular activity for the equivalent of 30 minutes 5 days per week.  We also discussed the importance of healthy diet to avoid weight gain and sugar intolerance.  I recommend predominantly filling the diet with vegetables and lean meats followed by fruits and whole grains.  I also recommend overall avoiding processed foods.  Talked about changing one habit at a time. Working out 10-15 min and working her way back up.   I think this will help her sleep, mood, weight, energy, and LE swelling which is mild and improves overnight.   Also hydrate with more water and cut back again on sodas.   Return in 3 months for her annual wellness.

## 2020-08-07 ENCOUNTER — RESULTS ENCOUNTER (OUTPATIENT)
Dept: FAMILY MEDICINE CLINIC | Facility: CLINIC | Age: 70
End: 2020-08-07

## 2020-08-07 DIAGNOSIS — I10 ESSENTIAL HYPERTENSION: ICD-10-CM

## 2020-08-07 DIAGNOSIS — R73.03 PREDIABETES: ICD-10-CM

## 2020-09-02 DIAGNOSIS — M81.0 AGE-RELATED OSTEOPOROSIS WITHOUT CURRENT PATHOLOGICAL FRACTURE: Primary | ICD-10-CM

## 2020-09-18 ENCOUNTER — INFUSION (OUTPATIENT)
Dept: ONCOLOGY | Facility: HOSPITAL | Age: 70
End: 2020-09-18

## 2020-09-18 ENCOUNTER — LAB (OUTPATIENT)
Dept: OTHER | Facility: HOSPITAL | Age: 70
End: 2020-09-18

## 2020-09-18 VITALS — TEMPERATURE: 98 F

## 2020-09-18 DIAGNOSIS — M81.0 AGE-RELATED OSTEOPOROSIS WITHOUT CURRENT PATHOLOGICAL FRACTURE: Primary | ICD-10-CM

## 2020-09-18 DIAGNOSIS — M81.0 AGE-RELATED OSTEOPOROSIS WITHOUT CURRENT PATHOLOGICAL FRACTURE: ICD-10-CM

## 2020-09-18 LAB
25(OH)D3 SERPL-MCNC: 56.8 NG/ML (ref 30–100)
ALBUMIN SERPL-MCNC: 4 G/DL (ref 3.5–5.2)
ALBUMIN/GLOB SERPL: 1.5 G/DL
ALP SERPL-CCNC: 72 U/L (ref 39–117)
ALT SERPL W P-5'-P-CCNC: 8 U/L (ref 1–33)
ANION GAP SERPL CALCULATED.3IONS-SCNC: 10 MMOL/L (ref 5–15)
AST SERPL-CCNC: 13 U/L (ref 1–32)
BILIRUB SERPL-MCNC: 0.5 MG/DL (ref 0–1.2)
BUN SERPL-MCNC: 20 MG/DL (ref 8–23)
BUN/CREAT SERPL: 16.8 (ref 7–25)
CALCIUM SPEC-SCNC: 9.9 MG/DL (ref 8.6–10.5)
CHLORIDE SERPL-SCNC: 103 MMOL/L (ref 98–107)
CO2 SERPL-SCNC: 27 MMOL/L (ref 22–29)
CREAT SERPL-MCNC: 1.19 MG/DL (ref 0.57–1)
GFR SERPL CREATININE-BSD FRML MDRD: 45 ML/MIN/1.73
GLOBULIN UR ELPH-MCNC: 2.7 GM/DL
GLUCOSE SERPL-MCNC: 102 MG/DL (ref 65–99)
POTASSIUM SERPL-SCNC: 4.4 MMOL/L (ref 3.5–5.2)
PROT SERPL-MCNC: 6.7 G/DL (ref 6–8.5)
SODIUM SERPL-SCNC: 140 MMOL/L (ref 136–145)

## 2020-09-18 PROCEDURE — 25010000002 DENOSUMAB 60 MG/ML SOLUTION PREFILLED SYRINGE: Performed by: OBSTETRICS & GYNECOLOGY

## 2020-09-18 PROCEDURE — 36415 COLL VENOUS BLD VENIPUNCTURE: CPT

## 2020-09-18 PROCEDURE — 80053 COMPREHEN METABOLIC PANEL: CPT | Performed by: OBSTETRICS & GYNECOLOGY

## 2020-09-18 PROCEDURE — 82306 VITAMIN D 25 HYDROXY: CPT | Performed by: OBSTETRICS & GYNECOLOGY

## 2020-09-18 PROCEDURE — 96372 THER/PROPH/DIAG INJ SC/IM: CPT

## 2020-09-18 RX ADMIN — DENOSUMAB 60 MG: 60 INJECTION SUBCUTANEOUS at 13:19

## 2020-10-22 ENCOUNTER — PROCEDURE VISIT (OUTPATIENT)
Dept: OBSTETRICS AND GYNECOLOGY | Facility: CLINIC | Age: 70
End: 2020-10-22

## 2020-10-22 ENCOUNTER — APPOINTMENT (OUTPATIENT)
Dept: WOMENS IMAGING | Facility: HOSPITAL | Age: 70
End: 2020-10-22

## 2020-10-22 DIAGNOSIS — Z12.31 VISIT FOR SCREENING MAMMOGRAM: Primary | ICD-10-CM

## 2020-10-22 PROCEDURE — 77067 SCR MAMMO BI INCL CAD: CPT | Performed by: RADIOLOGY

## 2020-10-22 PROCEDURE — 77063 BREAST TOMOSYNTHESIS BI: CPT | Performed by: RADIOLOGY

## 2020-10-22 PROCEDURE — 77067 SCR MAMMO BI INCL CAD: CPT | Performed by: OBSTETRICS & GYNECOLOGY

## 2020-10-22 PROCEDURE — 77063 BREAST TOMOSYNTHESIS BI: CPT | Performed by: OBSTETRICS & GYNECOLOGY

## 2020-11-09 ENCOUNTER — OFFICE VISIT (OUTPATIENT)
Dept: FAMILY MEDICINE CLINIC | Facility: CLINIC | Age: 70
End: 2020-11-09

## 2020-11-09 VITALS
SYSTOLIC BLOOD PRESSURE: 120 MMHG | RESPIRATION RATE: 16 BRPM | HEIGHT: 68 IN | BODY MASS INDEX: 27.13 KG/M2 | OXYGEN SATURATION: 94 % | HEART RATE: 83 BPM | WEIGHT: 179 LBS | DIASTOLIC BLOOD PRESSURE: 68 MMHG | TEMPERATURE: 96.9 F

## 2020-11-09 DIAGNOSIS — E03.9 ACQUIRED HYPOTHYROIDISM: ICD-10-CM

## 2020-11-09 DIAGNOSIS — Z00.00 MEDICARE ANNUAL WELLNESS VISIT, SUBSEQUENT: Primary | ICD-10-CM

## 2020-11-09 DIAGNOSIS — I10 ESSENTIAL HYPERTENSION: ICD-10-CM

## 2020-11-09 DIAGNOSIS — R73.03 PREDIABETES: ICD-10-CM

## 2020-11-09 DIAGNOSIS — E78.01 FAMILIAL HYPERCHOLESTEROLEMIA: ICD-10-CM

## 2020-11-09 LAB
CHOLEST SERPL-MCNC: 255 MG/DL (ref 0–200)
HBA1C MFR BLD: 5.7 % (ref 4.8–5.6)
HDLC SERPL-MCNC: 44 MG/DL (ref 40–60)
LDLC SERPL CALC-MCNC: 148 MG/DL (ref 0–100)
T4 SERPL-MCNC: 7.75 MCG/DL (ref 4.5–11.7)
TRIGL SERPL-MCNC: 341 MG/DL (ref 0–150)
TSH SERPL DL<=0.005 MIU/L-ACNC: 3.62 UIU/ML (ref 0.27–4.2)
VLDLC SERPL CALC-MCNC: 63 MG/DL (ref 5–40)

## 2020-11-09 PROCEDURE — 90694 VACC AIIV4 NO PRSRV 0.5ML IM: CPT | Performed by: FAMILY MEDICINE

## 2020-11-09 PROCEDURE — G0439 PPPS, SUBSEQ VISIT: HCPCS | Performed by: FAMILY MEDICINE

## 2020-11-09 PROCEDURE — G0008 ADMIN INFLUENZA VIRUS VAC: HCPCS | Performed by: FAMILY MEDICINE

## 2020-11-09 PROCEDURE — 1170F FXNL STATUS ASSESSED: CPT | Performed by: FAMILY MEDICINE

## 2020-11-09 PROCEDURE — 1125F AMNT PAIN NOTED PAIN PRSNT: CPT | Performed by: FAMILY MEDICINE

## 2020-11-09 NOTE — PROGRESS NOTES
The ABCs of the Annual Wellness Visit  Subsequent Medicare Wellness Visit    Chief Complaint   Patient presents with   • Medicare Wellness-subsequent       Subjective   History of Present Illness:  Anastasiia Faria is a 70 y.o. female who presents for a Subsequent Medicare Wellness Visit.    HEALTH RISK ASSESSMENT    Recent Hospitalizations:  No hospitalization(s) within the last year.    Current Medical Providers:  Patient Care Team:  Audrey Krishna MD as PCP - General (Family Medicine)  Jaiden Munoz MD as Referring Physician (Obstetrics and Gynecology)    Smoking Status:  Social History     Tobacco Use   Smoking Status Never Smoker   Smokeless Tobacco Never Used       Alcohol Consumption:  Social History     Substance and Sexual Activity   Alcohol Use No       Depression Screen:   PHQ-2/PHQ-9 Depression Screening 11/9/2020   Little interest or pleasure in doing things 0   Feeling down, depressed, or hopeless 0   Trouble falling or staying asleep, or sleeping too much -   Feeling tired or having little energy -   Poor appetite or overeating -   Feeling bad about yourself - or that you are a failure or have let yourself or your family down -   Trouble concentrating on things, such as reading the newspaper or watching television -   Moving or speaking so slowly that other people could have noticed. Or the opposite - being so fidgety or restless that you have been moving around a lot more than usual -   Thoughts that you would be better off dead, or of hurting yourself in some way -   Total Score 0   If you checked off any problems, how difficult have these problems made it for you to do your work, take care of things at home, or get along with other people? -       Fall Risk Screen:  STEADI Fall Risk Assessment was completed, and patient is at LOW risk for falls.Assessment completed on:11/9/2020    Health Habits and Functional and Cognitive Screening:  Functional & Cognitive Status 11/9/2020   Do you have difficulty  preparing food and eating? No   Do you have difficulty bathing yourself, getting dressed or grooming yourself? No   Do you have difficulty using the toilet? No   Do you have difficulty moving around from place to place? No   Do you have trouble with steps or getting out of a bed or a chair? No   Current Diet Well Balanced Diet   Dental Exam Up to date   Eye Exam Up to date   Exercise (times per week) 3 times per week   Current Exercise Activities Include Aerobics   Do you need help using the phone?  No   Are you deaf or do you have serious difficulty hearing?  Yes   Do you need help with transportation? No   Do you need help shopping? No   Do you need help preparing meals?  No   Do you need help with housework?  No   Do you need help with laundry? No   Do you need help taking your medications? No   Do you need help managing money? No   Do you ever drive or ride in a car without wearing a seat belt? No   Have you felt unusual stress, anger or loneliness in the last month? Yes   Who do you live with? Spouse   If you need help, do you have trouble finding someone available to you? No   Have you been bothered in the last four weeks by sexual problems? No   Do you have difficulty concentrating, remembering or making decisions? No         Does the patient have evidence of cognitive impairment? No    Asprin use counseling:Does not need ASA (and currently is not on it)    Age-appropriate Screening Schedule:  Refer to the list below for future screening recommendations based on patient's age, sex and/or medical conditions. Orders for these recommended tests are listed in the plan section. The patient has been provided with a written plan.    Health Maintenance   Topic Date Due   • INFLUENZA VACCINE  08/01/2020   • LIPID PANEL  02/03/2021   • DXA SCAN  09/16/2021   • MAMMOGRAM  10/22/2022   • COLONOSCOPY  10/27/2026   • TDAP/TD VACCINES (2 - Td) 04/03/2027   • ZOSTER VACCINE  Discontinued          The following portions of  the patient's history were reviewed and updated as appropriate: allergies, current medications, past family history, past medical history, past social history, past surgical history and problem list.    Outpatient Medications Prior to Visit   Medication Sig Dispense Refill   • atenolol (TENORMIN) 25 MG tablet TAKE 1 TABLET DAILY 90 tablet 3   • calcium carbonate (OS-NIKKIE) 600 MG tablet Take 600 mg by mouth Daily.     • Cholecalciferol (VITAMIN D3) 2000 UNITS capsule      • escitalopram (LEXAPRO) 20 MG tablet TAKE 1 TABLET DAILY 90 tablet 3   • levothyroxine (SYNTHROID, LEVOTHROID) 50 MCG tablet TAKE 1 TABLET DAILY 90 tablet 3   • loratadine (CLARITIN) 10 MG tablet Take 10 mg by mouth daily.     • NIFEdipine XL (PROCARDIA XL) 30 MG 24 hr tablet TAKE 1 TABLET DAILY 90 tablet 3   • Probiotic Product (SOLUBLE FIBER/PROBIOTICS PO) Take 1 capsule by mouth Daily.     • PROLIA 60 MG/ML solution syringe INJECT 60 MG UNDER THE SKIN EVERY SIX MONTHS 1 mL 1   • Psyllium (METAMUCIL FIBER PO) Take  by mouth.     • valACYclovir (VALTREX) 1000 MG tablet 1,000 mg As Needed.       No facility-administered medications prior to visit.        Patient Active Problem List   Diagnosis   • Colon polyp, hyperplastic   • Allergic rhinitis due to pollen   • Acquired hypothyroidism   • Essential hypertension   • FH: colon cancer in relative <50 years old   • Hyperlipidemia   • Chronic right shoulder pain   • Periscapular pain   • Other idiopathic scoliosis, thoracic region   • Prediabetes   • Age-related osteoporosis without current pathological fracture   • Mild episode of recurrent major depressive disorder (CMS/HCC)       Advanced Care Planning:  ACP discussion was held with the patient during this visit. Patient has an advance directive in EMR which is still valid.     Review of Systems   Respiratory: Negative.    Cardiovascular: Negative.    Neurological: Negative.        Compared to one year ago, the patient feels her physical health is  "worse.  Compared to one year ago, the patient feels her mental health is worse.    Reviewed chart for potential of high risk medication in the elderly: yes  Reviewed chart for potential of harmful drug interactions in the elderly:yes    Objective         Vitals:    11/09/20 1026   BP: 120/68   BP Location: Left arm   Patient Position: Sitting   Cuff Size: Adult   Pulse: 83   Resp: 16   Temp: 96.9 °F (36.1 °C)   TempSrc: Temporal   SpO2: 94%   Weight: 81.2 kg (179 lb)   Height: 172.7 cm (68\")   PainSc: 0-No pain       Body mass index is 27.22 kg/m².  Discussed the patient's BMI with her. The BMI is above average; BMI management plan is completed.    Physical Exam  Vitals signs reviewed.   Constitutional:       General: She is not in acute distress.  HENT:      Right Ear: Tympanic membrane, ear canal and external ear normal.      Left Ear: Tympanic membrane, ear canal and external ear normal.      Nose: Nose normal.      Mouth/Throat:      Mouth: Mucous membranes are moist.      Pharynx: Oropharynx is clear. No oropharyngeal exudate or posterior oropharyngeal erythema.   Eyes:      General: No scleral icterus.        Right eye: No discharge.         Left eye: No discharge.      Conjunctiva/sclera: Conjunctivae normal.   Neck:      Musculoskeletal: Neck supple.      Thyroid: No thyromegaly.      Vascular: No carotid bruit.   Cardiovascular:      Rate and Rhythm: Normal rate and regular rhythm.      Heart sounds: Normal heart sounds. No murmur. No friction rub. No gallop.    Pulmonary:      Effort: Pulmonary effort is normal. No respiratory distress.      Breath sounds: Normal breath sounds. No wheezing or rales.   Chest:      Chest wall: No tenderness.   Abdominal:      General: Bowel sounds are normal. There is no distension.      Palpations: Abdomen is soft. There is no mass.      Tenderness: There is no abdominal tenderness. There is no guarding.   Musculoskeletal:         General: No deformity.      Right lower " leg: No edema.      Left lower leg: No edema.   Lymphadenopathy:      Cervical: No cervical adenopathy.   Skin:     General: Skin is warm and dry.   Neurological:      Mental Status: She is alert and oriented to person, place, and time.      Motor: No abnormal muscle tone.      Coordination: Coordination normal.   Psychiatric:         Mood and Affect: Mood normal.         Behavior: Behavior normal.               Assessment/Plan   Medicare Risks and Personalized Health Plan  CMS Preventative Services Quick Reference  Advance Directive Discussion  Breast Cancer/Mammogram Screening  Cardiovascular risk  Colon Cancer Screening    The above risks/problems have been discussed with the patient.  Pertinent information has been shared with the patient in the After Visit Summary.  Follow up plans and orders are seen below in the Assessment/Plan Section.    Diagnoses and all orders for this visit:    1. Medicare annual wellness visit, subsequent (Primary)    2. Acquired hypothyroidism  -     TSH  -     T4    3. Prediabetes  -     Hemoglobin A1c    4. Essential hypertension    5. Familial hypercholesterolemia  -     Lipid Panel    Other orders  -     Fluad Quad >65 years      Follow Up:  No follow-ups on file.     An After Visit Summary and PPPS were given to the patient.

## 2021-02-24 DIAGNOSIS — M81.0 AGE-RELATED OSTEOPOROSIS WITHOUT CURRENT PATHOLOGICAL FRACTURE: Primary | ICD-10-CM

## 2021-02-24 DIAGNOSIS — Z91.89 AT RISK OF FRACTURE DUE TO OSTEOPOROSIS: ICD-10-CM

## 2021-02-24 DIAGNOSIS — M81.0 AT RISK OF FRACTURE DUE TO OSTEOPOROSIS: ICD-10-CM

## 2021-03-09 DIAGNOSIS — Z23 IMMUNIZATION DUE: ICD-10-CM

## 2021-03-19 ENCOUNTER — INFUSION (OUTPATIENT)
Dept: ONCOLOGY | Facility: HOSPITAL | Age: 71
End: 2021-03-19

## 2021-03-19 ENCOUNTER — LAB (OUTPATIENT)
Dept: OTHER | Facility: HOSPITAL | Age: 71
End: 2021-03-19

## 2021-03-19 VITALS — HEIGHT: 68 IN | BODY MASS INDEX: 27.22 KG/M2 | TEMPERATURE: 97.5 F | RESPIRATION RATE: 18 BRPM

## 2021-03-19 DIAGNOSIS — M81.0 AGE-RELATED OSTEOPOROSIS WITHOUT CURRENT PATHOLOGICAL FRACTURE: Primary | ICD-10-CM

## 2021-03-19 PROCEDURE — 80053 COMPREHEN METABOLIC PANEL: CPT | Performed by: OBSTETRICS & GYNECOLOGY

## 2021-03-19 PROCEDURE — 25010000002 DENOSUMAB 60 MG/ML SOLUTION PREFILLED SYRINGE: Performed by: OBSTETRICS & GYNECOLOGY

## 2021-03-19 PROCEDURE — 96372 THER/PROPH/DIAG INJ SC/IM: CPT

## 2021-03-19 PROCEDURE — 84100 ASSAY OF PHOSPHORUS: CPT | Performed by: OBSTETRICS & GYNECOLOGY

## 2021-03-19 PROCEDURE — 82306 VITAMIN D 25 HYDROXY: CPT | Performed by: OBSTETRICS & GYNECOLOGY

## 2021-03-19 PROCEDURE — 83735 ASSAY OF MAGNESIUM: CPT | Performed by: OBSTETRICS & GYNECOLOGY

## 2021-03-19 PROCEDURE — 85025 COMPLETE CBC W/AUTO DIFF WBC: CPT | Performed by: OBSTETRICS & GYNECOLOGY

## 2021-03-19 RX ADMIN — DENOSUMAB 60 MG: 60 INJECTION SUBCUTANEOUS at 15:20

## 2021-03-19 NOTE — NURSING NOTE
Arrived  for prolia injection. Indication and side effects reviewed. Denies recent dental work. Labs and medications verified. Prolia administered in right arm without incident. Instructed the pt  to call prescribing MD for any concerns or questions and instructed on how to schedule future appts.  Pt vu and discharged ambulatory.

## 2021-05-04 DIAGNOSIS — E03.9 ACQUIRED HYPOTHYROIDISM: Primary | ICD-10-CM

## 2021-05-04 DIAGNOSIS — R73.03 PREDIABETES: ICD-10-CM

## 2021-05-05 DIAGNOSIS — R73.03 PREDIABETES: ICD-10-CM

## 2021-05-05 DIAGNOSIS — E03.9 ACQUIRED HYPOTHYROIDISM: ICD-10-CM

## 2021-05-06 LAB
HBA1C MFR BLD: 5.7 % (ref 4.8–5.6)
T4 SERPL-MCNC: 7.9 MCG/DL (ref 4.5–11.7)
TSH SERPL DL<=0.005 MIU/L-ACNC: 3.8 UIU/ML (ref 0.27–4.2)

## 2021-05-10 ENCOUNTER — OFFICE VISIT (OUTPATIENT)
Dept: FAMILY MEDICINE CLINIC | Facility: CLINIC | Age: 71
End: 2021-05-10

## 2021-05-10 ENCOUNTER — HOSPITAL ENCOUNTER (OUTPATIENT)
Dept: GENERAL RADIOLOGY | Facility: HOSPITAL | Age: 71
Discharge: HOME | End: 2021-05-10
Admitting: FAMILY MEDICINE

## 2021-05-10 VITALS
DIASTOLIC BLOOD PRESSURE: 62 MMHG | OXYGEN SATURATION: 98 % | SYSTOLIC BLOOD PRESSURE: 128 MMHG | HEART RATE: 68 BPM | HEIGHT: 68 IN | BODY MASS INDEX: 28.39 KG/M2 | WEIGHT: 187.3 LBS | TEMPERATURE: 97.1 F | RESPIRATION RATE: 16 BRPM

## 2021-05-10 DIAGNOSIS — R61 NIGHT SWEATS: ICD-10-CM

## 2021-05-10 DIAGNOSIS — R06.02 SHORTNESS OF BREATH: ICD-10-CM

## 2021-05-10 DIAGNOSIS — R00.2 PALPITATIONS: Primary | ICD-10-CM

## 2021-05-10 PROBLEM — H25.13 AGE-RELATED NUCLEAR CATARACT OF BOTH EYES: Status: ACTIVE | Noted: 2021-03-26

## 2021-05-10 PROBLEM — D31.00: Status: ACTIVE | Noted: 2021-03-26

## 2021-05-10 PROCEDURE — 93000 ELECTROCARDIOGRAM COMPLETE: CPT | Performed by: FAMILY MEDICINE

## 2021-05-10 PROCEDURE — 99214 OFFICE O/P EST MOD 30 MIN: CPT | Performed by: FAMILY MEDICINE

## 2021-05-10 PROCEDURE — 71046 X-RAY EXAM CHEST 2 VIEWS: CPT

## 2021-05-10 NOTE — PROGRESS NOTES
Chief Complaint  Hypothyroidism (6 MFU) and Excessive Sweating (states she is having sweating, increased HR several times week )    Subjective          Anastasiia Faria presents to [facility]  History of Present Illness  Said she is getting sweating episodes  Her heart is beating fast and she is breathing shallow.   She had most three times in one week.   Also had episode of dizziness and nausea when she bent forward  Dizzy and headache lingered for a while. She said the next day she was ok but felt fragile. Said she felt like she could not move fast that day.   This happened on 4/26 and since then only episodes of the night sweats, one bad and couple of light one. With this episode she had pain between her breast bones, felt like reflux but she had not had anything to eat yet that day, later in the afternoon after a night sweat, sleeping in late and then up to clean up.   Her hair and face are wet, she has sweating on her chest and legs.   Only mild dizziness, not like the one described for shower.   Checked her BP for the week following and never went high.   She is not exercising like she used to.   Has some LE edema, mild but consistent, not new or worse.     Sweats occasionally the last 5 months but that was infrequent night sweats. She says having more often from when it started.   Says a lot of times she wakes up after a bad dream.   She does have coughing, allergy type, not bad.   Not done any traveling. No visitors.  She had labs in 3/2021 for her prolia shot and then had her TSH checked.   Gets up and changes clothes. Doesn't last long no back to back symptoms. Says she improves and feels better within 30 min.     Says that she has had tenderness in bilateral axilla and lateral breast since her COVID vaccine. She had both her COVID shots in her left arm.   Objective   Vital Signs:   /62 (BP Location: Right arm, Patient Position: Sitting, Cuff Size: Adult)   Pulse 68   Temp  "97.1 °F (36.2 °C) (Infrared)   Resp 16   Ht 172.7 cm (68\")   Wt 85 kg (187 lb 4.8 oz)   SpO2 98%   BMI 28.48 kg/m²     Physical Exam  Vitals reviewed.   Constitutional:       General: She is not in acute distress.  Eyes:      General: No scleral icterus.        Right eye: No discharge.         Left eye: No discharge.      Conjunctiva/sclera: Conjunctivae normal.   Cardiovascular:      Rate and Rhythm: Normal rate and regular rhythm.      Heart sounds: Normal heart sounds. No murmur heard.     Pulmonary:      Effort: Pulmonary effort is normal. No respiratory distress.      Breath sounds: Normal breath sounds. No wheezing.   Musculoskeletal:      Cervical back: Neck supple. No muscular tenderness.      Right lower leg: No edema.      Left lower leg: No edema.   Lymphadenopathy:      Cervical: No cervical adenopathy.      Right cervical: No superficial or deep cervical adenopathy.     Left cervical: No superficial or deep cervical adenopathy.      Upper Body:      Right upper body: No supraclavicular or axillary adenopathy.      Left upper body: No supraclavicular or axillary adenopathy.   Neurological:      Mental Status: She is oriented to person, place, and time.      Motor: No abnormal muscle tone.   Psychiatric:         Behavior: Behavior normal.        Result Review :            ECG 12 Lead    Date/Time: 5/10/2021 10:37 AM  Performed by: Audrey Krishna MD  Authorized by: Audrey Krishna MD   Previous ECG: no previous ECG available  Rhythm: sinus rhythm  Rate: normal  BPM: 62  Conduction: conduction normal  ST Segments: ST segments normal  T Waves: T waves normal  QRS axis: normal    Clinical impression: normal ECG              Assessment and Plan    Diagnoses and all orders for this visit:    1. Palpitations (Primary)  -     ECG 12 Lead  -     Ambulatory Referral to Cardiology    2. Shortness of breath  -     ECG 12 Lead  -     XR Chest PA & Lateral; Future  -     Ambulatory Referral to Cardiology    3. " Night sweats  -     XR Chest PA & Lateral; Future        Follow Up   No follow-ups on file.  Patient was given instructions and counseling regarding her condition or for health maintenance advice. Please see specific information pulled into the AVS if appropriate.     She is having some increased symptoms with night time waking, sweating sometimes she is totally wet and has to change clothes and sometimes not as extreme but wakes her. When she wakes she has racing heart, palpitations, and feels like she has shallow breathing. Going on since about Frank with these but have gone from being one time per month to being one time per week. She did have one last week but she said it was mild.   She also had a separate episode after showering where after she grabbed a towel and bend to wrap her hair and stand back up she became dizzy and nauseated. She said some SOB and pain between the breast bones like reflux with this. She had to recover for some period in the bathroom because of her nauseousness and dizziness but then went to the bed to lie down, said she was so hot and took long time to cool back down, she did have some sweating.  She just had labs mid March CMP, CBC, mag, phos look good and then last week had thyroid and A1c. Looks good, stable.   Labs are reassuring and don't really guide management further here.   Discussed recommendations for cardiac evaluation given her palpitations and SOB. Had some chest pain with one episode. EKG today and chest x ray. EKG is normal. No others noted on file. She will have her chest x ray on her way out today. No additional labs at this time.   Recommend further evaluation with cards.   She is agreeable.   Discussed sometimes we see the return of night sweats in postmenopausal women but want to make sure not associated with anything else.

## 2021-05-14 DIAGNOSIS — R06.09 DYSPNEA ON EXERTION: Primary | ICD-10-CM

## 2021-06-01 ENCOUNTER — HOSPITAL ENCOUNTER (OUTPATIENT)
Dept: CARDIOLOGY | Facility: HOSPITAL | Age: 71
Discharge: HOME | End: 2021-06-01
Admitting: FAMILY MEDICINE

## 2021-06-01 VITALS
HEIGHT: 68 IN | DIASTOLIC BLOOD PRESSURE: 90 MMHG | WEIGHT: 187 LBS | HEART RATE: 64 BPM | SYSTOLIC BLOOD PRESSURE: 130 MMHG | BODY MASS INDEX: 28.34 KG/M2

## 2021-06-01 DIAGNOSIS — R06.09 DYSPNEA ON EXERTION: ICD-10-CM

## 2021-06-01 PROCEDURE — 93306 TTE W/DOPPLER COMPLETE: CPT | Performed by: INTERNAL MEDICINE

## 2021-06-01 PROCEDURE — 93306 TTE W/DOPPLER COMPLETE: CPT

## 2021-06-02 LAB
AORTIC ARCH: 2.6 CM
ASCENDING AORTA: 2.4 CM
BH CV ECHO MEAS - ACS: 2.1 CM
BH CV ECHO MEAS - AO MAX PG (FULL): 1.7 MMHG
BH CV ECHO MEAS - AO MAX PG: 5 MMHG
BH CV ECHO MEAS - AO MEAN PG (FULL): 1 MMHG
BH CV ECHO MEAS - AO MEAN PG: 3 MMHG
BH CV ECHO MEAS - AO ROOT AREA (BSA CORRECTED): 1.6
BH CV ECHO MEAS - AO ROOT AREA: 7.5 CM^2
BH CV ECHO MEAS - AO ROOT DIAM: 3.1 CM
BH CV ECHO MEAS - AO V2 MAX: 112 CM/SEC
BH CV ECHO MEAS - AO V2 MEAN: 78.2 CM/SEC
BH CV ECHO MEAS - AO V2 VTI: 23.1 CM
BH CV ECHO MEAS - ASC AORTA: 2.4 CM
BH CV ECHO MEAS - AVA(I,A): 2.2 CM^2
BH CV ECHO MEAS - AVA(I,D): 2.2 CM^2
BH CV ECHO MEAS - AVA(V,A): 2.3 CM^2
BH CV ECHO MEAS - AVA(V,D): 2.3 CM^2
BH CV ECHO MEAS - BSA(HAYCOCK): 2 M^2
BH CV ECHO MEAS - BSA: 2 M^2
BH CV ECHO MEAS - BZI_BMI: 28.4 KILOGRAMS/M^2
BH CV ECHO MEAS - BZI_METRIC_HEIGHT: 172.7 CM
BH CV ECHO MEAS - BZI_METRIC_WEIGHT: 84.8 KG
BH CV ECHO MEAS - EDV(MOD-SP2): 45 ML
BH CV ECHO MEAS - EDV(MOD-SP4): 88 ML
BH CV ECHO MEAS - EDV(TEICH): 70 ML
BH CV ECHO MEAS - EF(CUBED): 69.2 %
BH CV ECHO MEAS - EF(MOD-BP): 64 %
BH CV ECHO MEAS - EF(MOD-SP2): 60 %
BH CV ECHO MEAS - EF(MOD-SP4): 65.9 %
BH CV ECHO MEAS - EF(TEICH): 61.4 %
BH CV ECHO MEAS - ESV(MOD-SP2): 18 ML
BH CV ECHO MEAS - ESV(MOD-SP4): 30 ML
BH CV ECHO MEAS - ESV(TEICH): 27 ML
BH CV ECHO MEAS - FS: 32.5 %
BH CV ECHO MEAS - IVS/LVPW: 0.91
BH CV ECHO MEAS - IVSD: 1 CM
BH CV ECHO MEAS - LAT PEAK E' VEL: 6.1 CM/SEC
BH CV ECHO MEAS - LV DIASTOLIC VOL/BSA (35-75): 44.3 ML/M^2
BH CV ECHO MEAS - LV MASS(C)D: 136.2 GRAMS
BH CV ECHO MEAS - LV MASS(C)DI: 68.6 GRAMS/M^2
BH CV ECHO MEAS - LV MAX PG: 3.3 MMHG
BH CV ECHO MEAS - LV MEAN PG: 2 MMHG
BH CV ECHO MEAS - LV SYSTOLIC VOL/BSA (12-30): 15.1 ML/M^2
BH CV ECHO MEAS - LV V1 MAX: 90.7 CM/SEC
BH CV ECHO MEAS - LV V1 MEAN: 63.8 CM/SEC
BH CV ECHO MEAS - LV V1 VTI: 17.9 CM
BH CV ECHO MEAS - LVIDD: 4 CM
BH CV ECHO MEAS - LVIDS: 2.7 CM
BH CV ECHO MEAS - LVLD AP2: 6.8 CM
BH CV ECHO MEAS - LVLD AP4: 7.8 CM
BH CV ECHO MEAS - LVLS AP2: 6.8 CM
BH CV ECHO MEAS - LVLS AP4: 7.3 CM
BH CV ECHO MEAS - LVOT AREA (M): 2.8 CM^2
BH CV ECHO MEAS - LVOT AREA: 2.8 CM^2
BH CV ECHO MEAS - LVOT DIAM: 1.9 CM
BH CV ECHO MEAS - LVPWD: 1.1 CM
BH CV ECHO MEAS - MED PEAK E' VEL: 7.3 CM/SEC
BH CV ECHO MEAS - MR MAX PG: 66.3 MMHG
BH CV ECHO MEAS - MR MAX VEL: 407 CM/SEC
BH CV ECHO MEAS - MV A DUR: 0.12 SEC
BH CV ECHO MEAS - MV A MAX VEL: 85.7 CM/SEC
BH CV ECHO MEAS - MV DEC SLOPE: 316 CM/SEC^2
BH CV ECHO MEAS - MV DEC TIME: 0.21 SEC
BH CV ECHO MEAS - MV E MAX VEL: 52.3 CM/SEC
BH CV ECHO MEAS - MV E/A: 0.61
BH CV ECHO MEAS - MV MAX PG: 2.8 MMHG
BH CV ECHO MEAS - MV MEAN PG: 1 MMHG
BH CV ECHO MEAS - MV P1/2T MAX VEL: 73.3 CM/SEC
BH CV ECHO MEAS - MV P1/2T: 67.9 MSEC
BH CV ECHO MEAS - MV V2 MAX: 83.2 CM/SEC
BH CV ECHO MEAS - MV V2 MEAN: 51.9 CM/SEC
BH CV ECHO MEAS - MV V2 VTI: 24 CM
BH CV ECHO MEAS - MVA P1/2T LCG: 3 CM^2
BH CV ECHO MEAS - MVA(P1/2T): 3.2 CM^2
BH CV ECHO MEAS - MVA(VTI): 2.1 CM^2
BH CV ECHO MEAS - PA ACC TIME: 0.11 SEC
BH CV ECHO MEAS - PA MAX PG (FULL): 1.4 MMHG
BH CV ECHO MEAS - PA MAX PG: 2.8 MMHG
BH CV ECHO MEAS - PA PR(ACCEL): 30.4 MMHG
BH CV ECHO MEAS - PA V2 MAX: 84.2 CM/SEC
BH CV ECHO MEAS - PULM A REVS DUR: 0.1 SEC
BH CV ECHO MEAS - PULM A REVS VEL: 25.7 CM/SEC
BH CV ECHO MEAS - PULM DIAS VEL: 31.3 CM/SEC
BH CV ECHO MEAS - PULM S/D: 1.4
BH CV ECHO MEAS - PULM SYS VEL: 43.7 CM/SEC
BH CV ECHO MEAS - PVA(V,A): 2 CM^2
BH CV ECHO MEAS - PVA(V,D): 2 CM^2
BH CV ECHO MEAS - QP/QS: 0.82
BH CV ECHO MEAS - RAP SYSTOLE: 3 MMHG
BH CV ECHO MEAS - RV MAX PG: 1.4 MMHG
BH CV ECHO MEAS - RV MEAN PG: 1 MMHG
BH CV ECHO MEAS - RV V1 MAX: 60.1 CM/SEC
BH CV ECHO MEAS - RV V1 MEAN: 45.7 CM/SEC
BH CV ECHO MEAS - RV V1 VTI: 14.6 CM
BH CV ECHO MEAS - RVOT AREA: 2.8 CM^2
BH CV ECHO MEAS - RVOT DIAM: 1.9 CM
BH CV ECHO MEAS - RVSP: 24 MMHG
BH CV ECHO MEAS - SI(AO): 87.8 ML/M^2
BH CV ECHO MEAS - SI(CUBED): 22.3 ML/M^2
BH CV ECHO MEAS - SI(LVOT): 25.6 ML/M^2
BH CV ECHO MEAS - SI(MOD-SP2): 13.6 ML/M^2
BH CV ECHO MEAS - SI(MOD-SP4): 29.2 ML/M^2
BH CV ECHO MEAS - SI(TEICH): 21.6 ML/M^2
BH CV ECHO MEAS - SUP REN AO DIAM: 2.2 CM
BH CV ECHO MEAS - SV(AO): 174.4 ML
BH CV ECHO MEAS - SV(CUBED): 44.3 ML
BH CV ECHO MEAS - SV(LVOT): 50.8 ML
BH CV ECHO MEAS - SV(MOD-SP2): 27 ML
BH CV ECHO MEAS - SV(MOD-SP4): 58 ML
BH CV ECHO MEAS - SV(RVOT): 41.4 ML
BH CV ECHO MEAS - SV(TEICH): 43 ML
BH CV ECHO MEAS - TAPSE (>1.6): 2 CM
BH CV ECHO MEAS - TR MAX VEL: 227 CM/SEC
BH CV ECHO MEASUREMENTS AVERAGE E/E' RATIO: 7.81
BH CV XLRA - RV BASE: 3 CM
BH CV XLRA - RV LENGTH: 6.4 CM
BH CV XLRA - RV MID: 3.2 CM
BH CV XLRA - TDI S': 16.9 CM/SEC
LEFT ATRIUM VOLUME INDEX: 15 ML/M2
LV EF 2D ECHO EST: 64 %
MAXIMAL PREDICTED HEART RATE: 149 BPM
SINUS: 2.6 CM
STJ: 2.3 CM
STRESS TARGET HR: 127 BPM

## 2021-06-08 ENCOUNTER — OFFICE VISIT (OUTPATIENT)
Dept: CARDIOLOGY | Facility: CLINIC | Age: 71
End: 2021-06-08

## 2021-06-08 VITALS
HEIGHT: 68 IN | SYSTOLIC BLOOD PRESSURE: 128 MMHG | BODY MASS INDEX: 27.58 KG/M2 | OXYGEN SATURATION: 99 % | DIASTOLIC BLOOD PRESSURE: 86 MMHG | TEMPERATURE: 97 F | HEART RATE: 63 BPM | WEIGHT: 182 LBS

## 2021-06-08 DIAGNOSIS — I10 ESSENTIAL HYPERTENSION: ICD-10-CM

## 2021-06-08 DIAGNOSIS — R00.2 PALPITATIONS: ICD-10-CM

## 2021-06-08 DIAGNOSIS — E78.2 MIXED HYPERLIPIDEMIA: ICD-10-CM

## 2021-06-08 DIAGNOSIS — R06.09 DOE (DYSPNEA ON EXERTION): Primary | ICD-10-CM

## 2021-06-08 PROCEDURE — 93000 ELECTROCARDIOGRAM COMPLETE: CPT | Performed by: INTERNAL MEDICINE

## 2021-06-08 PROCEDURE — 99204 OFFICE O/P NEW MOD 45 MIN: CPT | Performed by: INTERNAL MEDICINE

## 2021-06-08 NOTE — PROGRESS NOTES
"      CARDIOLOGY    Kimberley Looney MD    ENCOUNTER DATE:  06/08/2021    Anastasiia Faria / 71 y.o. / female        CHIEF COMPLAINT / REASON FOR OFFICE VISIT     Palpitations (new patient) and Shortness of Breath      HISTORY OF PRESENT ILLNESS       HPI    Anastasiia Faria is a 71 y.o. female     This is a nice lady who comes in today.  She has several complaints.  She says she flex her breathing is shallow.  She is short of breath with exertion such as going up the stairs from the basement.  She has been waking up a couple nights a week with night sweats and feels like her heart is pounding.  1 day she was getting out of the shower when she got dizzy and nauseous and sweaty and had to sit down for a while and did not really feel good until the next day where she still felt \"fragile.\"  She has some intermittent edema.  She is not sure if her breathing is worse when she notices the swelling.    · Calculated left ventricular EF = 64% Estimated left ventricular EF = 64% Estimated left ventricular EF was in agreement with the calculated left ventricular EF. Left ventricular systolic function is normal. Normal left ventricular cavity size and wall thickness noted. All left ventricular wall segments contract normally. Left ventricular diastolic function is consistent with (grade I) impaired relaxation.  · Trace to mild mitral valve regurgitation is present.  · Trace tricuspid valve regurgitation is present. Estimated right ventricular systolic pressure from tricuspid regurgitation is normal (<35 mmHg). Calculated right ventricular systolic pressure from tricuspid regurgitation is 24 mmHg.  · Compared to prior study 3/20/2018 there is no significant change  ·   REVIEW OF SYSTEMS     Review of Systems   Constitutional: Positive for weight gain. Negative for chills, fever and weight loss.   Cardiovascular: Positive for leg swelling.   Respiratory: Negative for cough, snoring and wheezing.    Hematologic/Lymphatic: Negative for " "bleeding problem. Does not bruise/bleed easily.   Skin: Negative for color change.   Musculoskeletal: Positive for joint pain. Negative for falls and myalgias.   Gastrointestinal: Negative for melena.   Genitourinary: Negative for hematuria.   Neurological: Negative for excessive daytime sleepiness.   Psychiatric/Behavioral: Positive for depression. The patient is nervous/anxious.          VITAL SIGNS     Visit Vitals  /86 (BP Location: Left arm)   Pulse 63   Temp 97 °F (36.1 °C)   Ht 172.7 cm (68\")   Wt 82.6 kg (182 lb)   LMP  (LMP Unknown)   SpO2 99%   BMI 27.67 kg/m²         Wt Readings from Last 3 Encounters:   06/08/21 82.6 kg (182 lb)   06/01/21 84.8 kg (187 lb)   05/10/21 85 kg (187 lb 4.8 oz)     Body mass index is 27.67 kg/m².      PHYSICAL EXAMINATION     Constitutional:       General: Not in acute distress.  Neck:      Vascular: No carotid bruit or JVD.   Pulmonary:      Effort: Pulmonary effort is normal.      Breath sounds: Normal breath sounds.   Cardiovascular:      Normal rate. Regular rhythm.      Murmurs: There is no murmur.   Psychiatric:         Mood and Affect: Mood and affect normal.           REVIEWED DATA       ECG 12 Lead    Date/Time: 6/8/2021 2:11 PM  Performed by: Kimberley Looney MD  Authorized by: Kimbelrey Looney MD   Comparison: compared with previous ECG from 5/10/2021  Similar to previous ECG  Rhythm: sinus rhythm  BPM: 59  Conduction: conduction normal  ST Segments: ST segments normal  T Waves: T waves normal    Clinical impression: normal ECG              Lipid Panel    Lipid Panel 11/9/20   Total Cholesterol 255 (A)   Triglycerides 341 (A)   HDL Cholesterol 44   VLDL Cholesterol 63 (A)   LDL Cholesterol  148 (A)   (A) Abnormal value              Lab Results   Component Value Date    GLUCOSE 107 (H) 03/19/2021    BUN 23 03/19/2021    CREATININE 1.01 (H) 03/19/2021    EGFRIFNONA 54 (L) 03/19/2021    EGFRIFAFRI 67 11/01/2019    BCR 22.8 03/19/2021    K 3.8 03/19/2021    CO2 " 22.4 03/19/2021    CALCIUM 9.3 03/19/2021    PROTENTOTREF 6.4 11/01/2019    ALBUMIN 3.90 03/19/2021    LABIL2 2.2 11/01/2019    AST 13 03/19/2021    ALT 9 03/19/2021       ASSESSMENT & PLAN      Diagnosis Plan   1. MULLER (dyspnea on exertion)  Adult Stress Echo W/ Cont or Stress Agent if Necessary Per Protocol    Holter Monitor - 72 Hour Up To 15 Days   2. Essential hypertension  Adult Stress Echo W/ Cont or Stress Agent if Necessary Per Protocol    Holter Monitor - 72 Hour Up To 15 Days   3. Mixed hyperlipidemia  Adult Stress Echo W/ Cont or Stress Agent if Necessary Per Protocol    Holter Monitor - 72 Hour Up To 15 Days   4. Palpitations  Adult Stress Echo W/ Cont or Stress Agent if Necessary Per Protocol    Holter Monitor - 72 Hour Up To 15 Days       1. Palpitations. Zio patch after her cataract surgery.  2. MULLER.  Her echo was pretty benign except that she did have a little bit of grade 1 diastolic dysfunction and her chest x-ray suggested some pulmonary vascular congestion.  I want a rule out ischemia first but if she does not have ischemia, consider adding a low-dose of a diuretic.  I really wish we had a BNP in the chart.  3.  Hypertension.  Controlled  4.  Hyperlipidemia.  I reviewed her lipids and medications.  5.  Night sweats  6.  Occasional ankle edema.  Currently there is no pitting edema just some mild arthritic type edema    She will get a stress test before her cataract surgery and a Zio patch patch placed after her surgery.  I will plan follow-up once I have the results back from those tests.    Orders Placed This Encounter   Procedures   • Holter Monitor - 72 Hour Up To 15 Days     Standing Status:   Future     Standing Expiration Date:   6/8/2022     Scheduling Instructions:      ARACELI. Schedule after next wednesday     Order Specific Question:   Reason for exam?     Answer:   Palpitations     Order Specific Question:   Reason for exam?     Answer:   Dizziness   • Adult Stress Echo W/ Cont or Stress  Agent if Necessary Per Protocol     Standing Status:   Future     Standing Expiration Date:   6/8/2022     Scheduling Instructions:      Quads only use contrast. Needs to be before next wednesday     Order Specific Question:   What stress agent will be used?     Answer:   Exercise with Possible [employer]     Order Specific Question:   Reason for exam?     Answer:   Dyspnea     Order Specific Question:   Reason for exam?     Answer:   Syncope           MEDICATIONS         Discharge Medications          Accurate as of June 8, 2021  2:09 PM. If you have any questions, ask your nurse or doctor.            Continue These Medications      Instructions Start Date   atenolol 25 MG tablet  Commonly known as: TENORMIN   TAKE 1 TABLET DAILY      calcium carbonate 600 MG tablet  Commonly known as: OS-NIKKIE   600 mg, Oral, Daily      Claritin 10 MG tablet  Generic drug: loratadine   10 mg, Oral, Daily      escitalopram 20 MG tablet  Commonly known as: LEXAPRO   TAKE 1 TABLET DAILY      levothyroxine 50 MCG tablet  Commonly known as: SYNTHROID, LEVOTHROID   TAKE 1 TABLET DAILY      METAMUCIL FIBER PO   Oral      NIFEdipine XL 30 MG 24 hr tablet  Commonly known as: PROCARDIA XL   TAKE 1 TABLET DAILY      Prolia 60 MG/ML solution syringe  Generic drug: denosumab   INJECT 60 MG UNDER THE SKIN EVERY SIX MONTHS      SOLUBLE FIBER/PROBIOTICS PO   1 capsule, Oral, Daily      valACYclovir 1000 MG tablet  Commonly known as: VALTREX   1,000 mg, As Needed      Vitamin D3 50 MCG (2000 UT) capsule   2,000 Units, Oral, Daily               Kimberley Looney MD  06/08/21  14:09 EDT    **Dragon Disclaimer:   Much of this encounter note is an electronic transcription/translation of spoken language to printed text. The electronic translation of spoken language may permit erroneous, or at times, nonsensical words or phrases to be inadvertently transcribed. Although I have reviewed the note for such errors, some may still exist.

## 2021-06-18 RX ORDER — ESCITALOPRAM OXALATE 20 MG/1
20 TABLET ORAL DAILY
Qty: 90 TABLET | Refills: 3 | Status: SHIPPED | OUTPATIENT
Start: 2021-06-18 | End: 2022-05-27

## 2021-06-18 RX ORDER — NIFEDIPINE 30 MG/1
30 TABLET, EXTENDED RELEASE ORAL DAILY
Qty: 90 TABLET | Refills: 3 | Status: SHIPPED | OUTPATIENT
Start: 2021-06-18 | End: 2022-05-27

## 2021-06-18 RX ORDER — LEVOTHYROXINE SODIUM 0.05 MG/1
50 TABLET ORAL DAILY
Qty: 90 TABLET | Refills: 3 | Status: SHIPPED | OUTPATIENT
Start: 2021-06-18 | End: 2022-05-27

## 2021-06-18 RX ORDER — ATENOLOL 25 MG/1
25 TABLET ORAL DAILY
Qty: 90 TABLET | Refills: 3 | Status: SHIPPED | OUTPATIENT
Start: 2021-06-18 | End: 2022-05-27

## 2021-07-21 ENCOUNTER — HOSPITAL ENCOUNTER (OUTPATIENT)
Dept: CARDIOLOGY | Facility: HOSPITAL | Age: 71
Discharge: HOME | End: 2021-07-21
Admitting: INTERNAL MEDICINE

## 2021-07-21 VITALS
BODY MASS INDEX: 27.58 KG/M2 | WEIGHT: 182 LBS | HEART RATE: 92 BPM | HEIGHT: 68 IN | DIASTOLIC BLOOD PRESSURE: 60 MMHG | SYSTOLIC BLOOD PRESSURE: 90 MMHG

## 2021-07-21 DIAGNOSIS — R06.09 DOE (DYSPNEA ON EXERTION): ICD-10-CM

## 2021-07-21 DIAGNOSIS — I10 ESSENTIAL HYPERTENSION: ICD-10-CM

## 2021-07-21 DIAGNOSIS — E78.2 MIXED HYPERLIPIDEMIA: ICD-10-CM

## 2021-07-21 DIAGNOSIS — R00.2 PALPITATIONS: ICD-10-CM

## 2021-07-21 PROCEDURE — 93350 STRESS TTE ONLY: CPT

## 2021-07-21 PROCEDURE — 93017 CV STRESS TEST TRACING ONLY: CPT

## 2021-07-21 PROCEDURE — 93350 STRESS TTE ONLY: CPT | Performed by: INTERNAL MEDICINE

## 2021-07-21 PROCEDURE — 93016 CV STRESS TEST SUPVJ ONLY: CPT | Performed by: INTERNAL MEDICINE

## 2021-07-21 PROCEDURE — 93352 ADMIN ECG CONTRAST AGENT: CPT | Performed by: INTERNAL MEDICINE

## 2021-07-21 PROCEDURE — 93018 CV STRESS TEST I&R ONLY: CPT | Performed by: INTERNAL MEDICINE

## 2021-07-21 PROCEDURE — 25010000002 PERFLUTREN (DEFINITY) 8.476 MG IN SODIUM CHLORIDE (PF) 0.9 % 10 ML INJECTION: Performed by: INTERNAL MEDICINE

## 2021-07-21 RX ADMIN — PERFLUTREN 3 ML: 6.52 INJECTION, SUSPENSION INTRAVENOUS at 09:36

## 2021-08-05 LAB
BH CV ECHO MEAS - BSA(HAYCOCK): 2 M^2
BH CV ECHO MEAS - BSA: 2 M^2
BH CV ECHO MEAS - BZI_BMI: 27.7 KILOGRAMS/M^2
BH CV ECHO MEAS - BZI_METRIC_HEIGHT: 172.7 CM
BH CV ECHO MEAS - BZI_METRIC_WEIGHT: 82.6 KG
BH CV ECHO MEAS - CONTRAST EF (2CH): 62 CM2
BH CV ECHO MEAS - CONTRAST EF 4CH: 65 CM2
BH CV ECHO MEAS - EDV(MOD-SP2): 65 ML
BH CV ECHO MEAS - EDV(MOD-SP4): 70 ML
BH CV ECHO MEAS - EF(MOD-BP): 64 %
BH CV ECHO MEAS - EF(MOD-SP2): 61.5 %
BH CV ECHO MEAS - EF(MOD-SP4): 81.4 %
BH CV ECHO MEAS - ESV(MOD-SP2): 25 ML
BH CV ECHO MEAS - ESV(MOD-SP4): 13 ML
BH CV ECHO MEAS - LV DIASTOLIC VOL/BSA (35-75): 35.6 ML/M^2
BH CV ECHO MEAS - LV SYSTOLIC VOL/BSA (12-30): 6.6 ML/M^2
BH CV ECHO MEAS - LVLD AP2: 7.6 CM
BH CV ECHO MEAS - LVLD AP4: 7 CM
BH CV ECHO MEAS - LVLS AP2: 6.2 CM
BH CV ECHO MEAS - LVLS AP4: 5.6 CM
BH CV ECHO MEAS - SI(MOD-SP2): 20.4 ML/M^2
BH CV ECHO MEAS - SI(MOD-SP4): 29 ML/M^2
BH CV ECHO MEAS - SV(MOD-SP2): 40 ML
BH CV ECHO MEAS - SV(MOD-SP4): 57 ML
BH CV STRESS BP STAGE 1: NORMAL
BH CV STRESS BP STAGE 2: NORMAL
BH CV STRESS DURATION MIN STAGE 1: 3
BH CV STRESS DURATION MIN STAGE 2: 3
BH CV STRESS DURATION SEC STAGE 1: 0
BH CV STRESS DURATION SEC STAGE 2: 0
BH CV STRESS ECHO POST STRESS EJECTION FRACTION EF: 80 %
BH CV STRESS GRADE STAGE 1: 10
BH CV STRESS GRADE STAGE 2: 12
BH CV STRESS HR STAGE 1: 126
BH CV STRESS HR STAGE 2: 143
BH CV STRESS METS STAGE 1: 5
BH CV STRESS METS STAGE 2: 7.5
BH CV STRESS PROTOCOL 1: NORMAL
BH CV STRESS RECOVERY HR: 98 BPM
BH CV STRESS SPEED STAGE 1: 1.7
BH CV STRESS SPEED STAGE 2: 2.5
BH CV STRESS STAGE 1: 1
BH CV STRESS STAGE 2: 2
MAXIMAL PREDICTED HEART RATE: 149 BPM
PERCENT MAX PREDICTED HR: 95.97 %
STRESS BASELINE BP: NORMAL MMHG
STRESS BASELINE HR: 92 BPM
STRESS PERCENT HR: 113 %
STRESS POST ESTIMATED WORKLOAD: 7.5 METS
STRESS POST EXERCISE DUR MIN: 6 MIN
STRESS POST EXERCISE DUR SEC: 0 SEC
STRESS POST PEAK BP: NORMAL MMHG
STRESS POST PEAK HR: 143 BPM
STRESS TARGET HR: 127 BPM

## 2021-08-12 DIAGNOSIS — Z78.0 POST-MENOPAUSAL: Primary | ICD-10-CM

## 2021-08-23 ENCOUNTER — TELEPHONE (OUTPATIENT)
Dept: CARDIOLOGY | Facility: CLINIC | Age: 71
End: 2021-08-23

## 2021-08-23 NOTE — TELEPHONE ENCOUNTER
Patient notified of results and recommendations and verbalized understanding  Joi Cho RN  Triage nurse

## 2021-08-23 NOTE — TELEPHONE ENCOUNTER
Let her know that her monitor came back and it did not show any abnormal heart rhythms.  There was one time where it looks like she pressed the button for symptom and there was no associated rhythm issues at that time.  Her stress echo was also normal.  No further heart testing at this time.  Come back if symptoms worsen.

## 2021-08-27 ENCOUNTER — TELEPHONE (OUTPATIENT)
Dept: CARDIOLOGY | Facility: CLINIC | Age: 71
End: 2021-08-27

## 2021-08-30 NOTE — TELEPHONE ENCOUNTER
Patient notified of results and verbalized understanding    She said she is feeling better, but still having some SOA.  She has started exercising again and has worked up to a 45min work out 5 days a week.. she reports that she is tolerating well.  She has had at least 1 episode since she was seen, when she was awaken in the middle of the night sweating and her heart racing.  She is still having some night sweats.  Joi Cho RN  Triage nurse

## 2021-08-30 NOTE — TELEPHONE ENCOUNTER
I spoke with the patient and informed her that from Dr Looney standpoint everything seems to be ok and that she needs to follow up with her PCP for other issues, She states that she has an appointment with her PCP in October and will address those other issues then

## 2021-08-30 NOTE — TELEPHONE ENCOUNTER
From my standpoint everything seems to be okay.  I would recommend she follow-up with her primary provider to discuss her other issues.

## 2021-09-22 ENCOUNTER — LAB (OUTPATIENT)
Dept: OTHER | Facility: HOSPITAL | Age: 71
End: 2021-09-22

## 2021-09-22 ENCOUNTER — INFUSION (OUTPATIENT)
Dept: ONCOLOGY | Facility: HOSPITAL | Age: 71
End: 2021-09-22

## 2021-09-22 VITALS — RESPIRATION RATE: 18 BRPM | HEIGHT: 68 IN | TEMPERATURE: 96.8 F | BODY MASS INDEX: 27.67 KG/M2

## 2021-09-22 DIAGNOSIS — M81.0 AGE-RELATED OSTEOPOROSIS WITHOUT CURRENT PATHOLOGICAL FRACTURE: Primary | ICD-10-CM

## 2021-09-22 PROCEDURE — 80053 COMPREHEN METABOLIC PANEL: CPT | Performed by: OBSTETRICS & GYNECOLOGY

## 2021-09-22 PROCEDURE — 84100 ASSAY OF PHOSPHORUS: CPT | Performed by: OBSTETRICS & GYNECOLOGY

## 2021-09-22 PROCEDURE — 85025 COMPLETE CBC W/AUTO DIFF WBC: CPT | Performed by: OBSTETRICS & GYNECOLOGY

## 2021-09-22 PROCEDURE — 96372 THER/PROPH/DIAG INJ SC/IM: CPT

## 2021-09-22 PROCEDURE — 25010000002 DENOSUMAB 60 MG/ML SOLUTION PREFILLED SYRINGE: Performed by: OBSTETRICS & GYNECOLOGY

## 2021-09-22 PROCEDURE — 83735 ASSAY OF MAGNESIUM: CPT | Performed by: OBSTETRICS & GYNECOLOGY

## 2021-09-22 RX ADMIN — DENOSUMAB 60 MG: 60 INJECTION SUBCUTANEOUS at 15:16

## 2021-09-22 NOTE — NURSING NOTE
Spoke with Padmaja at Dr. Munoz's office with regard to pt's phosphorus level of 2.4 and given okay to proceed with Prolia injection.    Arrived for prolia injection. Indication and side effects reviewed. Denies recent dental work. Labs and medications verified; see above. Prolia administered in R arm without incidence. Instructed to call prescribing MD for any concerns or questions and instructed on how to schedule future appts.  Pt vu and discharged ambulatory.

## 2021-10-12 ENCOUNTER — OFFICE VISIT (OUTPATIENT)
Dept: FAMILY MEDICINE CLINIC | Facility: CLINIC | Age: 71
End: 2021-10-12

## 2021-10-12 VITALS
HEART RATE: 62 BPM | WEIGHT: 183 LBS | SYSTOLIC BLOOD PRESSURE: 124 MMHG | BODY MASS INDEX: 27.74 KG/M2 | TEMPERATURE: 96.9 F | HEIGHT: 68 IN | OXYGEN SATURATION: 95 % | RESPIRATION RATE: 16 BRPM | DIASTOLIC BLOOD PRESSURE: 64 MMHG

## 2021-10-12 DIAGNOSIS — I10 ESSENTIAL HYPERTENSION: ICD-10-CM

## 2021-10-12 DIAGNOSIS — R61 NIGHT SWEATS: Primary | ICD-10-CM

## 2021-10-12 DIAGNOSIS — Z12.11 SCREENING FOR COLON CANCER: ICD-10-CM

## 2021-10-12 PROBLEM — H04.123 DRY EYE SYNDROME OF BILATERAL LACRIMAL GLANDS: Status: ACTIVE | Noted: 2021-07-16

## 2021-10-12 PROBLEM — H91.90 HEARING LOSS: Status: ACTIVE | Noted: 2021-07-16

## 2021-10-12 PROBLEM — Z98.42 CATARACT EXTRACTION STATUS OF LEFT EYE: Status: ACTIVE | Noted: 2021-07-08

## 2021-10-12 PROBLEM — H00.021 HORDEOLUM INTERNUM RIGHT UPPER EYELID: Status: ACTIVE | Noted: 2021-07-16

## 2021-10-12 PROBLEM — Z98.41 CATARACT EXTRACTION STATUS OF RIGHT EYE: Status: ACTIVE | Noted: 2021-06-17

## 2021-10-12 PROBLEM — N20.0 RENAL STONE: Status: ACTIVE | Noted: 2021-07-16

## 2021-10-12 PROBLEM — M81.0 OSTEOPOROSIS: Status: ACTIVE | Noted: 2021-07-16

## 2021-10-12 PROBLEM — H52.7 DISORDER OF REFRACTION: Status: ACTIVE | Noted: 2021-07-16

## 2021-10-12 PROBLEM — F41.9 ANXIETY: Status: ACTIVE | Noted: 2021-07-16

## 2021-10-12 PROBLEM — H91.90 HEARING LOSS: Status: ACTIVE | Noted: 2021-10-12

## 2021-10-12 PROBLEM — H11.442 CONJUNCTIVAL CYST OF LEFT EYE: Status: ACTIVE | Noted: 2021-07-16

## 2021-10-12 PROBLEM — J30.2 SEASONAL ALLERGIES: Status: ACTIVE | Noted: 2021-10-12

## 2021-10-12 PROBLEM — H26.9 CATARACT: Status: ACTIVE | Noted: 2021-10-12

## 2021-10-12 PROBLEM — F32.A DEPRESSIVE DISORDER: Status: ACTIVE | Noted: 2021-07-16

## 2021-10-12 PROBLEM — G43.909 MIGRAINE: Status: ACTIVE | Noted: 2021-07-16

## 2021-10-12 PROCEDURE — G0008 ADMIN INFLUENZA VIRUS VAC: HCPCS | Performed by: FAMILY MEDICINE

## 2021-10-12 PROCEDURE — 90662 IIV NO PRSV INCREASED AG IM: CPT | Performed by: FAMILY MEDICINE

## 2021-10-12 PROCEDURE — 99213 OFFICE O/P EST LOW 20 MIN: CPT | Performed by: FAMILY MEDICINE

## 2021-10-12 RX ORDER — NAPROXEN SODIUM 220 MG
440 TABLET ORAL
COMMUNITY
Start: 2021-06-16 | End: 2022-12-30

## 2021-10-12 NOTE — PROGRESS NOTES
"Chief Complaint  Night Sweats (3 MFU) and Flu Vaccine (HD)    Subjective          Anastasiia Faria presents to [facility]  History of Present Illness  She is back to the gym, not classes.   She has been back since end of July. She is going for 30 min working her way back up to 90 min.   Feels good.     Having night sweats but not as severe and not as frequent. Said last time she was here she was having 2-3 times per week. She was getting rapid heart rate and SOB with it.   Less sweating, now once every 2-3 weeks.   She still gets some SOB with it. Also 10 min to recover with the night sweats. Wakes her up at night.   Usually SOB after exertion takes about 10 min to recover.   She says it takes a while but she does go back to sleep.   She is still up late and working on that getting better. Still some napping after breakfast.     Going to have her mammogram and then her booster.    Objective   Vital Signs:   /64 (BP Location: Right arm, Patient Position: Sitting, Cuff Size: Adult)   Pulse 62   Temp 96.9 °F (36.1 °C) (Infrared)   Resp 16   Ht 172.7 cm (68\")   Wt 83 kg (183 lb)   SpO2 95%   BMI 27.83 kg/m²     Physical Exam  Vitals reviewed.   Constitutional:       General: She is not in acute distress.  Eyes:      General: No scleral icterus.        Right eye: No discharge.         Left eye: No discharge.      Conjunctiva/sclera: Conjunctivae normal.   Cardiovascular:      Rate and Rhythm: Normal rate and regular rhythm.      Heart sounds: Normal heart sounds. No murmur heard.      Pulmonary:      Effort: Pulmonary effort is normal. No respiratory distress.      Breath sounds: Normal breath sounds. No wheezing.   Musculoskeletal:      Cervical back: Neck supple. No muscular tenderness.      Right lower leg: No edema.      Left lower leg: No edema.   Lymphadenopathy:      Cervical: No cervical adenopathy.   Neurological:      Mental Status: She is oriented to person, place, and " time.      Motor: No abnormal muscle tone.   Psychiatric:         Behavior: Behavior normal.        Result Review :{Labs  Result Review  Imaging  Med Tab  Media  Procedures :23}                 Assessment and Plan    Diagnoses and all orders for this visit:    1. Night sweats (Primary)    2. Essential hypertension    3. Screening for colon cancer  -     Ambulatory Referral For Screening Colonoscopy    Other orders  -     Fluzone High-Dose 65+yrs ([id number])        Follow Up   No follow-ups on file.  Patient was given instructions and counseling regarding her condition or for health maintenance advice. Please see specific information pulled into the AVS if appropriate.     BP looks good.   She is feeling better. Doing well with getting exercise again. She is working her way up very smart.   She had evaluation with cardiology and this looked good.   Her night sweats have improved in frequency and severity.   Flu shot today.   She is due for colonoscopy this month. Will put in her referral she is on every 5 years schedule.

## 2021-10-25 ENCOUNTER — PROCEDURE VISIT (OUTPATIENT)
Dept: OBSTETRICS AND GYNECOLOGY | Facility: CLINIC | Age: 71
End: 2021-10-25

## 2021-10-25 ENCOUNTER — APPOINTMENT (OUTPATIENT)
Dept: WOMENS IMAGING | Facility: HOSPITAL | Age: 71
End: 2021-10-25

## 2021-10-25 DIAGNOSIS — Z12.31 VISIT FOR SCREENING MAMMOGRAM: Primary | ICD-10-CM

## 2021-10-25 PROCEDURE — 77063 BREAST TOMOSYNTHESIS BI: CPT | Performed by: OBSTETRICS & GYNECOLOGY

## 2021-10-25 PROCEDURE — 77063 BREAST TOMOSYNTHESIS BI: CPT | Performed by: RADIOLOGY

## 2021-10-25 PROCEDURE — 77067 SCR MAMMO BI INCL CAD: CPT | Performed by: RADIOLOGY

## 2021-10-25 PROCEDURE — 77067 SCR MAMMO BI INCL CAD: CPT | Performed by: OBSTETRICS & GYNECOLOGY

## 2021-11-02 ENCOUNTER — PREP FOR SURGERY (OUTPATIENT)
Dept: OTHER | Facility: HOSPITAL | Age: 71
End: 2021-11-02

## 2021-11-02 DIAGNOSIS — Z12.11 SCREEN FOR COLON CANCER: Primary | ICD-10-CM

## 2021-11-03 PROBLEM — Z12.11 SCREEN FOR COLON CANCER: Status: ACTIVE | Noted: 2021-11-03

## 2021-12-02 ENCOUNTER — HOSPITAL ENCOUNTER (OUTPATIENT)
Facility: HOSPITAL | Age: 71
Setting detail: HOSPITAL OUTPATIENT SURGERY
Discharge: HOME | End: 2021-12-02
Attending: SURGERY | Admitting: SURGERY

## 2021-12-02 ENCOUNTER — ANESTHESIA (OUTPATIENT)
Dept: GASTROENTEROLOGY | Facility: HOSPITAL | Age: 71
End: 2021-12-02

## 2021-12-02 ENCOUNTER — ANESTHESIA EVENT (OUTPATIENT)
Dept: GASTROENTEROLOGY | Facility: HOSPITAL | Age: 71
End: 2021-12-02

## 2021-12-02 VITALS
HEART RATE: 67 BPM | SYSTOLIC BLOOD PRESSURE: 112 MMHG | HEIGHT: 68 IN | OXYGEN SATURATION: 96 % | RESPIRATION RATE: 16 BRPM | BODY MASS INDEX: 27.28 KG/M2 | WEIGHT: 180 LBS | DIASTOLIC BLOOD PRESSURE: 67 MMHG | TEMPERATURE: 97.9 F

## 2021-12-02 DIAGNOSIS — Z12.11 SCREEN FOR COLON CANCER: ICD-10-CM

## 2021-12-02 DIAGNOSIS — K63.5 HYPERPLASTIC COLONIC POLYP, UNSPECIFIED PART OF COLON: ICD-10-CM

## 2021-12-02 DIAGNOSIS — K64.8 INTERNAL HEMORRHOIDS: Primary | ICD-10-CM

## 2021-12-02 DIAGNOSIS — K57.90 DIVERTICULOSIS: ICD-10-CM

## 2021-12-02 PROCEDURE — 88305 TISSUE EXAM BY PATHOLOGIST: CPT | Performed by: SURGERY

## 2021-12-02 PROCEDURE — 25010000002 PROPOFOL 10 MG/ML EMULSION: Performed by: ANESTHESIOLOGY

## 2021-12-02 PROCEDURE — 25010000002 ONDANSETRON PER 1 MG: Performed by: ANESTHESIOLOGY

## 2021-12-02 PROCEDURE — 45380 COLONOSCOPY AND BIOPSY: CPT | Performed by: SURGERY

## 2021-12-02 PROCEDURE — S0260 H&P FOR SURGERY: HCPCS | Performed by: SURGERY

## 2021-12-02 PROCEDURE — 45385 COLONOSCOPY W/LESION REMOVAL: CPT | Performed by: SURGERY

## 2021-12-02 RX ORDER — SODIUM CHLORIDE, SODIUM LACTATE, POTASSIUM CHLORIDE, CALCIUM CHLORIDE 600; 310; 30; 20 MG/100ML; MG/100ML; MG/100ML; MG/100ML
30 INJECTION, SOLUTION INTRAVENOUS CONTINUOUS PRN
Status: DISCONTINUED | OUTPATIENT
Start: 2021-12-02 | End: 2021-12-02

## 2021-12-02 RX ORDER — PROPOFOL 10 MG/ML
VIAL (ML) INTRAVENOUS AS NEEDED
Status: DISCONTINUED | OUTPATIENT
Start: 2021-12-02 | End: 2021-12-02 | Stop reason: SURG

## 2021-12-02 RX ORDER — ONDANSETRON 2 MG/ML
INJECTION INTRAMUSCULAR; INTRAVENOUS AS NEEDED
Status: DISCONTINUED | OUTPATIENT
Start: 2021-12-02 | End: 2021-12-02 | Stop reason: SURG

## 2021-12-02 RX ORDER — SODIUM CHLORIDE 9 MG/ML
30 INJECTION, SOLUTION INTRAVENOUS CONTINUOUS PRN
Status: DISCONTINUED | OUTPATIENT
Start: 2021-12-02 | End: 2021-12-02 | Stop reason: HOSPADM

## 2021-12-02 RX ORDER — ACETAMINOPHEN, ASPIRIN AND CAFFEINE 250; 250; 65 MG/1; MG/1; MG/1
1 TABLET, FILM COATED ORAL EVERY 6 HOURS PRN
COMMUNITY

## 2021-12-02 RX ORDER — LIDOCAINE HYDROCHLORIDE 20 MG/ML
INJECTION, SOLUTION INFILTRATION; PERINEURAL AS NEEDED
Status: DISCONTINUED | OUTPATIENT
Start: 2021-12-02 | End: 2021-12-02 | Stop reason: SURG

## 2021-12-02 RX ORDER — PROPOFOL 10 MG/ML
VIAL (ML) INTRAVENOUS CONTINUOUS PRN
Status: DISCONTINUED | OUTPATIENT
Start: 2021-12-02 | End: 2021-12-02 | Stop reason: SURG

## 2021-12-02 RX ADMIN — Medication 200 MCG/KG/MIN: at 09:54

## 2021-12-02 RX ADMIN — LIDOCAINE HYDROCHLORIDE 60 MG: 20 INJECTION, SOLUTION INFILTRATION; PERINEURAL at 09:54

## 2021-12-02 RX ADMIN — ONDANSETRON 4 MG: 2 INJECTION INTRAMUSCULAR; INTRAVENOUS at 09:50

## 2021-12-02 RX ADMIN — PROPOFOL 100 MG: 10 INJECTION, EMULSION INTRAVENOUS at 09:54

## 2021-12-02 RX ADMIN — SODIUM CHLORIDE 30 ML/HR: 9 INJECTION, SOLUTION INTRAVENOUS at 09:36

## 2021-12-02 NOTE — DISCHARGE INSTRUCTIONS
For the next 24 hours patient needs to be with a responsible adult.    For 24 hours DO NOT drive, operate machinery, appliances, drink alcohol, make important decisions or sign legal documents.    Start with a light or bland diet if you are feeling sick to your stomach otherwise advance to regular diet as tolerated.    Follow recommendations on procedure report if provided by your doctor.    Call Dr Dorado for problems [phone]    Problems may include but not limited to: large amounts of bleeding, trouble breathing, repeated vomiting, severe unrelieved pain, fever or chills.

## 2021-12-02 NOTE — ANESTHESIA PREPROCEDURE EVALUATION
Anesthesia Evaluation     Patient summary reviewed and Nursing notes reviewed   history of anesthetic complications: PONV  NPO Solid Status: > 8 hours  NPO Liquid Status: > 2 hours           Airway   Mallampati: II  Dental      Pulmonary - negative pulmonary ROS and normal exam   Cardiovascular - normal exam    (+) hypertension 2 medications or greater, valvular problems/murmurs murmur, hyperlipidemia,       Neuro/Psych  (+) headaches, psychiatric history Anxiety and Depression,     GI/Hepatic/Renal/Endo    (+)  GERD,  renal disease CRI and stones,     Musculoskeletal     Abdominal    Substance History      OB/GYN          Other                        Anesthesia Plan    ASA 2     MAC     intravenous induction     Anesthetic plan, all risks, benefits, and alternatives have been provided, discussed and informed consent has been obtained with: patient.

## 2021-12-02 NOTE — H&P
Reason for Visit: Surveillance colonoscopy    HPI:  Patient presents for evaluation for colon cancer surveillance.  There is a family history of colon cancer in her mom as well as 3 aunts, one  at 40 years old.  cancer.  Patient denies changes in bowel habits, diarrhea, constipation, abdominal pain, decreased caliber of stool, melena, brbpr and weight loss.  There is no personal or family history of bleeding disorder or untoward reaction to anesthesia.  Patient has had a colonoscopy 5 year(s) ago.      Past Medical History:   Diagnosis Date   • Allergic    • Anxiety    • Chronic kidney disease    • Colon polyps    • Depression    • Encounter for annual health examination 2014    Annual Health Assessment   • Family history of colon cancer    • Fibrocystic breast    • GERD (gastroesophageal reflux disease)    • Heart murmur    • Herpes labialis without complication    • Hip pain     right   • History of mammogram 2010   • Hyperlipidemia    • Hypertension    • Hypothyroidism    • Insomnia    • Kidney stones    • Migraines    • PONV (postoperative nausea and vomiting)        Past Surgical History:   Procedure Laterality Date   • BONE MARROW BIOPSY     • BREAST BIOPSY     • BREAST CYST ASPIRATION     • BREAST SURGERY Right     BIOPSY   • CATARACT EXTRACTION, BILATERAL     • COLONOSCOPY N/A 10/27/2016    Procedure: COLONOSCOPY INTO CECUM;  Surgeon: Osvaldo Dorado MD;  [facility] ENDOSCOPY;  Service:    • COLONOSCOPY  2011   • COLONOSCOPY  2005   • KNEE ARTHROSCOPY Left    • PAP SMEAR  2010         Current Facility-Administered Medications:   •  sodium chloride 0.9 % infusion, 30 mL/hr, Intravenous, Continuous PRN, Osvaldo Dorado MD, Last Rate: 30 mL/hr at 21 0949, Currently Infusing at 21 0949    Facility-Administered Medications Ordered in Other Encounters:   •  ondansetron (ZOFRAN) injection, , Intravenous, PRN, Ezequiel Mason MD, 4 mg at  "12/02/21 0950    No Known Allergies    Family History   Problem Relation Age of Onset   • Breast cancer Mother    • Colon cancer Mother    • Hypertension Mother    • Stomach cancer Maternal Grandmother         or intestinal   • Cancer Maternal Grandmother         GI TRACT CANCER   • Breast cancer Maternal Grandmother    • Colon cancer Maternal Aunt         colon ca 40   • Colon cancer Maternal Aunt         64 yo   • Colon cancer Maternal Aunt         79 yo   • Colon cancer Other    • Colon cancer Other    • Heart attack Maternal Grandfather    • Breast cancer Maternal Grandfather    • Heart disease Paternal Grandmother    • Hypertension Sister    • Diabetes type II Brother        Social History     Socioeconomic History   • Marital status:    Tobacco Use   • Smoking status: Never Smoker   • Smokeless tobacco: Never Used   • Tobacco comment: daily caffine   Vaping Use   • Vaping Use: Never used   Substance and Sexual Activity   • Alcohol use: No   • Drug use: Defer   • Sexual activity: Defer        Review Of Systems:  A comprehensive review of systems was negative.    Objective:   Physical exam:  /72 (BP Location: Left arm, Patient Position: Lying)   Pulse 53   Temp 97.9 °F (36.6 °C) (Oral)   Resp 13   Ht 172.7 cm (68\")   Wt 81.6 kg (180 lb)   LMP  (LMP Unknown)   SpO2 93%   BMI 27.37 kg/m²     General Appearance:  awake, alert, oriented, in no acute distress  Lungs:  Breathing Pattern: regular, no distress  Heart:  Heart sounds are normal.  Regular rate and rhythm without murmur, gallop or rub.  Abdomen:  Soft, non-tender, normal bowel sounds; no bruits, organomegaly or masses.    Assessment:    Anastasiia Faria is a 71 y.o. female who presents for evaluation for colon cancer surveillance.    Patient has increased risk factors or warning signs or symptoms.  I reviewed current ACG guidelines for colon cancer screening:    A)  Flex sig q 5yrs with yearly fecal occult blood testing  B)  Virtual " colonoscopy  C)  Colonoscopy with possible biopsy/polypectomy with IV sedation at appropriate       screening intervals    The patient has a family history of colon cancer.  She  has a personal history of colon polyp who presents for colon cancer surveillance evaluation.   I would advise a colonscopy.     The rationale for each option as well as all risks and benefits of each alternative were discussed with the patient in detail.  The patient understands and does wish to proceed with Colonoscopy, Diagnostic        The rationale for colonoscopy with possible biopsy, possible polypectomy, with IV sedation as well as all risks, benefits, and alternatives were discussed with the patient in detail. Risks including but not limited to perforation, bleeding,need for blood transfusion or emergent surgery ,and missed neoplasm were reviewed in detail with the patient The patient demonstrates full understanding and wishes to proceed with the colonoscopy.

## 2021-12-02 NOTE — ANESTHESIA POSTPROCEDURE EVALUATION
"Patient: Anastasiia Faria    Procedure Summary     Date: 12/02/21 Room / Location:  [facility] /  [facility]    Anesthesia Start: 0949 Anesthesia Stop: 1016    Procedure: COLONOSCOPY INTO CECUM WITH COLD BIOPSY POLYPECTOMY AND HOT SNARE POLYPECTOMY (N/A ) Diagnosis:       Screen for colon cancer      (Screen for colon cancer [Z12.11])    Surgeons: Osvaldo Dorado MD Provider: Ezequiel Mason MD    Anesthesia Type: MAC ASA Status: 2          Anesthesia Type: MAC    Vitals  Vitals Value Taken Time   /67 12/02/21 1026   Temp     Pulse 67 12/02/21 1026   Resp 16 12/02/21 1026   SpO2 96 % 12/02/21 1026           Post Anesthesia Care and Evaluation    Patient location during evaluation: bedside  Patient participation: complete - patient participated  Level of consciousness: awake and alert  Pain management: adequate  Airway patency: patent  Anesthetic complications: No anesthetic complications    Cardiovascular status: acceptable  Respiratory status: acceptable  Hydration status: acceptable    Comments: /67   Pulse 67   Temp 36.6 °C (97.9 °F) (Oral)   Resp 16   Ht 172.7 cm (68\")   Wt 81.6 kg (180 lb)   LMP  (LMP Unknown)   SpO2 96%   BMI 27.37 kg/m²       "

## 2021-12-03 ENCOUNTER — TELEPHONE (OUTPATIENT)
Dept: SURGERY | Facility: CLINIC | Age: 71
End: 2021-12-03

## 2021-12-03 LAB
LAB AP CASE REPORT: NORMAL
PATH REPORT.FINAL DX SPEC: NORMAL
PATH REPORT.GROSS SPEC: NORMAL

## 2021-12-03 NOTE — TELEPHONE ENCOUNTER
I called and spoke with Anastasiia Faria regarding her colonoscopy results.     Pathology:   Final Diagnosis   1. Colon, Ascending, Biopsy:                A. Tubular adenoma.     2. Colon, Sigmoid, Biopsy:               A. Inflammatory polyp.     I recommend that she undergoes colonoscopy in 3 years for surveillance.     She verbalized understanding and agreed    Osvaldo Dorado MD  General, Minimally Invasive and Endoscopic Surgery  [facility]    [address], [address]  [place], [place], [address]  P: [phone]  F: [phone]

## 2021-12-06 ENCOUNTER — HOSPITAL ENCOUNTER (OUTPATIENT)
Dept: BONE DENSITY | Facility: HOSPITAL | Age: 71
Discharge: HOME | End: 2021-12-06
Admitting: OBSTETRICS & GYNECOLOGY

## 2021-12-06 DIAGNOSIS — Z78.0 POST-MENOPAUSAL: ICD-10-CM

## 2021-12-06 PROCEDURE — 77080 DXA BONE DENSITY AXIAL: CPT

## 2021-12-07 ENCOUNTER — TELEPHONE (OUTPATIENT)
Dept: OBSTETRICS AND GYNECOLOGY | Facility: CLINIC | Age: 71
End: 2021-12-07

## 2021-12-07 NOTE — PROGRESS NOTES
Please tell patient that her DEXA scan still shows just a little osteopenia of her left hip but normal right hip and spine which is really very good.  FLOR

## 2021-12-07 NOTE — TELEPHONE ENCOUNTER
----- Message from Jaiden Munoz MD sent at 12/7/2021  2:40 PM EST -----  Please tell patient that her DEXA scan still shows just a little osteopenia of her left hip but normal right hip and spine which is really very good.  FLOR

## 2022-01-03 ENCOUNTER — OFFICE VISIT (OUTPATIENT)
Dept: OBSTETRICS AND GYNECOLOGY | Facility: CLINIC | Age: 72
End: 2022-01-03

## 2022-01-03 VITALS
SYSTOLIC BLOOD PRESSURE: 145 MMHG | BODY MASS INDEX: 28.04 KG/M2 | WEIGHT: 185 LBS | HEIGHT: 68 IN | DIASTOLIC BLOOD PRESSURE: 84 MMHG

## 2022-01-03 DIAGNOSIS — Z01.419 ENCOUNTER FOR GYNECOLOGICAL EXAMINATION WITHOUT ABNORMAL FINDING: Primary | ICD-10-CM

## 2022-01-03 DIAGNOSIS — M85.80 OSTEOPENIA, UNSPECIFIED LOCATION: ICD-10-CM

## 2022-01-03 DIAGNOSIS — Z12.39 ENCOUNTER FOR BREAST CANCER SCREENING USING NON-MAMMOGRAM MODALITY: ICD-10-CM

## 2022-01-03 PROCEDURE — G0101 CA SCREEN;PELVIC/BREAST EXAM: HCPCS | Performed by: OBSTETRICS & GYNECOLOGY

## 2022-01-03 PROCEDURE — 3015F CERV CANCER SCREEN DOCD: CPT | Performed by: OBSTETRICS & GYNECOLOGY

## 2022-01-07 LAB
CONV .: ABNORMAL
CYTOLOGIST CVX/VAG CYTO: ABNORMAL
CYTOLOGY CVX/VAG DOC CYTO: ABNORMAL
CYTOLOGY CVX/VAG DOC THIN PREP: ABNORMAL
DX ICD CODE: ABNORMAL
DX ICD CODE: ABNORMAL
HIV 1 & 2 AB SER-IMP: ABNORMAL
HPV I/H RISK 4 DNA CVX QL PROBE+SIG AMP: NEGATIVE
OTHER STN SPEC: ABNORMAL
PATHOLOGIST CVX/VAG CYTO: ABNORMAL
RECOM F/U CVX/VAG CYTO: ABNORMAL
STAT OF ADQ CVX/VAG CYTO-IMP: ABNORMAL

## 2022-01-12 ENCOUNTER — CLINICAL SUPPORT (OUTPATIENT)
Dept: OTHER | Facility: HOSPITAL | Age: 72
End: 2022-01-12

## 2022-01-12 DIAGNOSIS — Z13.79 GENETIC TESTING: Primary | ICD-10-CM

## 2022-01-12 DIAGNOSIS — Z80.3 FAMILY HISTORY OF BREAST CANCER: ICD-10-CM

## 2022-01-12 DIAGNOSIS — Z86.010 PERSONAL HISTORY OF COLONIC POLYPS: ICD-10-CM

## 2022-01-12 DIAGNOSIS — Z80.0 FAMILY HISTORY OF COLON CANCER: ICD-10-CM

## 2022-01-12 PROCEDURE — 96040: CPT | Performed by: GENETIC COUNSELOR, MS

## 2022-01-12 NOTE — PROGRESS NOTES
Anastasiia Faria, a 72-year-old female, was seen for genetic counseling due to a family history of cancer, as well as a personal history of polyps. Ms. Faria does not have a personal history of cancer. Ms. Faria’s most recent colonoscopy was in December 2021 and two adenomatous polyps were identified. She was 14 years old at menarche and she is nulliparous. She retains her uterus and ovaries and believes she went through menopause at age 52. Her most recent mammogram was in September 2021 and this was normal. Ms. Faria was interested in discussing her risk for a hereditary cancer syndrome and genetic testing options. Ms. Faria opted to pursue testing via the Common Hereditary Cancer panel which analyzes 47 genes associated with increased cancer risk. Results are expected in 2-3 weeks.     FAMILY HISTORY (see attached pedigree):    Mother:    Breast cancer, 60s      Colon cancer, 90s  MGM:    Stomach cancer  Maternal aunt x3: Colon cancer    Medical records regarding his family history were not available for review.     RISK ASSESSMENT: Ms. Faria’s family history led to concern regarding a hereditary cancer syndrome. Based on Ms. Faria’s extensive maternal family history of colon cancer in four relatives, she would clearly meet criteria for genetic testing for Soliz syndrome. Based on Ms. Faria’s family history of breast cancer and other cancers, we discussed multigene panel testing which evaluates multiple cancer susceptibility genes simultaneously. Ms. Faria was interested in pursuing testing via the Common Hereditary Cancers panel through [employer]. This assessment is based on the family history information provided at the time of the appointment and could change in the future should new information be obtained.    GENETIC COUNSELING (30 minutes):  We reviewed the family history information in detail. Cases of cancer follow three general patterns: sporadic, familial, and hereditary.  While most cancer is  sporadic, some cases appear to occur in family clusters.  These cases are said to be familial and account for 10-20% of cancer cases.  Familial cases may be due to a combination of shared genes and environmental factors among family members.  In even fewer families, the cancer is said to be inherited, and the genes responsible for the cancer are known.      Family histories typical of hereditary cancer syndromes usually include multiple first- and second-degree relatives diagnosed with cancer types that define a syndrome. These cases tend to be diagnosed at younger-than-expected ages and can be bilateral or multifocal. The cancer in these families follows an autosomal dominant inheritance pattern, which indicates the likely presence of a mutation in a cancer susceptibility gene. Children and siblings of an individual believed to carry this mutation have a 50% chance of inheriting that mutation, thereby inheriting the increased risk to develop cancer. These mutations can be passed down from the maternal or the paternal lineage.    We reviewed Soliz syndrome given Ms. Faria’s family history of colon cancer and personal history of adenomatous polyps. We discussed that the most common form of hereditary colon cancer is Soliz syndrome.  It is caused by mutations in the mismatch repair genes. The lifetime risk for colon cancer for individuals with Soliz syndrome is as high as 80% if there is no intervention (i.e. removal of polyps detected on colonoscopy).  Other risks include endometrial cancer (up to 60%), as well as other cancers (ovarian, upper GI, brain, stomach, pancreatic). There are national management guidelines that have been established for individuals known to have Soliz syndrome.     There are other genes that are known to be associated with an increased risk for colon cancer and other cancers. We discussed the availability of multigene panel testing to evaluate multiple genes simultaneously. Ms. Faria opted  to pursue testing via a multi-gene panel.      GENETIC TESTING:  The risks, benefits and limitations of genetic testing and implications for clinical management following testing were reviewed. DNA test results can influence decisions regarding screening and prevention.  Genetic testing can have significant psychological implications for both individuals and families. Also discussed was the possibility of employment and insurance discrimination based on genetic test results and the federal and states laws that are in place to prevent this, as well as the limitations of these laws (ZAKIYA).         We discussed panel testing, which would involve testing multiple genes associated with increased cancer risk. The benefits and limitations of genetic testing were discussed. The implications of a positive or negative test result were discussed. We also discussed the importance of testing on an affected relative. If genetic testing returns negative, it could mean that there is a genetic mutation in the family that Ms. Faria did not inherit. We discussed the possibility that, in some cases, genetic test results may be ambiguous due to the identification of a genetic variant. These variants may or may not be associated with an increased cancer risk. With multigene panel testing, it is not uncommon for a variant of uncertain significance (VUS) to be identified.  If a VUS is identified, testing family members is not recommended and screening recommendations are made based on the family history. The laboratories that perform genetic testing work to reclassify the VUS and send out an amended report if and when a VUS is reclassified.  The majority of variant findings are ultimately reclassified to a negative result. Given her family history, a negative test result does not eliminate all cancer risk, although the risk would not be as high as it would with positive genetic testing.     PLAN:  Genetic testing via the [facility] [employer]  Cancers panel through [employer]. Results are expected in 2-3 weeks, and MsLiss Faria will be contacted at that time.     Carlee Ornelas MS, [facility] [place]  Licensed Certified Genetic Counselor

## 2022-01-18 ENCOUNTER — TELEPHONE (OUTPATIENT)
Dept: OTHER | Facility: CLINIC | Age: 72
End: 2022-01-18

## 2022-01-18 NOTE — TELEPHONE ENCOUNTER
I attempted to contact Ms. Faria with the results of her genetic testing for hereditary cancer. I was unable to reach her and left a voicemail with my contact information. If I don't hear back from her in the next couple of days, I will give her another call at the end of this week.    Carlee Ornelas MS, [facility] [place]  Licensed Certified Genetic Counselor

## 2022-01-20 ENCOUNTER — DOCUMENTATION (OUTPATIENT)
Dept: OTHER | Facility: HOSPITAL | Age: 72
End: 2022-01-20

## 2022-01-20 NOTE — PROGRESS NOTES
Anastasiia Faria, a 72-year-old female, was seen for genetic counseling due to a family history of cancer, as well as a personal history of polyps. Ms. Faria does not have a personal history of cancer. Ms. Faria’s most recent colonoscopy was in December 2021 and two adenomatous polyps were identified. She was 14 years old at menarche and she is nulliparous. She retains her uterus and ovaries and believes she went through menopause at age 52. Her most recent mammogram was in September 2021 and this was normal. Ms. Faria was interested in discussing her risk for a hereditary cancer syndrome and genetic testing options. Ms. Faria opted to pursue testing via the Common Hereditary Cancer panel which analyzes 47 genes associated with increased cancer risk. Genetic testing was negative for any pathogenic/deleterious mutations in the genes on this panel. Results were discussed with the patient via telephone on 01/20/2022.     FAMILY HISTORY (see attached pedigree):    Mother:    Breast cancer, 60s      Colon cancer, 90s  MGM:    Stomach cancer  Maternal auntx3: Colon cancer    Medical records regarding his family history were not available for review.     RISK ASSESSMENT: Ms. Faria’s family history led to concern regarding a hereditary cancer syndrome. Based on Ms. Faria’s extensive maternal family history of colon cancer in four relatives, she would clearly meet criteria for genetic testing for Soliz syndrome. Based on Ms. Faria’s family history of breast cancer and other cancers, we discussed multigene panel testing which evaluates multiple cancer susceptibility genes simultaneously. Ms. Faria was interested in pursuing testing via the Common Hereditary Cancers panel through [facility]. This assessment is based on the family history information provided at the time of the appointment and could change in the future should new information be obtained.    At this time, Ms. Faria’s lifetime risk of developing breast cancer  should be assessed using family history risk assessment models. Using the Tyrer-Azrazick, v8 risk model Ms. Faria’s lifetime risk for breast cancer was estimated to be 6.4%, above the average 72-year-old woman’s risk of 3.8%.  This risk should be recalculated if there are changes to the patient’s family history.  Per NCCN guidelines, a lifetime risk greater than 20% is considered high risk and warrants increased screening; Ms. Faria does not fall into this category. These risk assessments are based on the family history information provided at the time of the appointment.  The assessments could change in the future should new information be obtained.      GENETIC COUNSELING:  We reviewed the family history information in detail. Cases of cancer follow three general patterns: sporadic, familial, and hereditary.  While most cancer is sporadic, some cases appear to occur in family clusters.  These cases are said to be familial and account for 10-20% of cancer cases.  Familial cases may be due to a combination of shared genes and environmental factors among family members.  In even fewer families, the cancer is said to be inherited, and the genes responsible for the cancer are known.      Family histories typical of hereditary cancer syndromes usually include multiple first- and second-degree relatives diagnosed with cancer types that define a syndrome. These cases tend to be diagnosed at younger-than-expected ages and can be bilateral or multifocal. The cancer in these families follows an autosomal dominant inheritance pattern, which indicates the likely presence of a mutation in a cancer susceptibility gene. Children and siblings of an individual believed to carry this mutation have a 50% chance of inheriting that mutation, thereby inheriting the increased risk to develop cancer. These mutations can be passed down from the maternal or the paternal lineage.    We reviewed Soliz syndrome given Ms. Faria’s family history  of colon cancer and personal history of adenomatous polyps. We discussed that the most common form of hereditary colon cancer is Soliz syndrome.  It is caused by mutations in the mismatch repair genes. The lifetime risk for colon cancer for individuals with Soliz syndrome is as high as 80% if there is no intervention (i.e. removal of polyps detected on colonoscopy).  Other risks include endometrial cancer (up to 60%), as well as other cancers (ovarian, upper GI, brain, stomach, pancreatic). There are national management guidelines that have been established for individuals known to have Soliz syndrome.     There are other genes that are known to be associated with an increased risk for colon cancer and other cancers. We discussed the availability of multigene panel testing to evaluate multiple genes simultaneously. Ms. Faria opted to pursue testing via a multi-gene panel.      GENETIC TESTING:  The risks, benefits and limitations of genetic testing and implications for clinical management following testing were reviewed. DNA test results can influence decisions regarding screening and prevention.  Genetic testing can have significant psychological implications for both individuals and families. Also discussed was the possibility of employment and insurance discrimination based on genetic test results and the federal and states laws that are in place to prevent this, as well as the limitations of these laws (ZAKIYA).         We discussed panel testing, which would involve testing multiple genes associated with increased cancer risk. The benefits and limitations of genetic testing were discussed. The implications of a positive or negative test result were discussed. We also discussed the importance of testing on an affected relative. If genetic testing returns negative, it could mean that there is a genetic mutation in the family that Ms. Faria did not inherit. We discussed the possibility that, in some cases, genetic  test results may be ambiguous due to the identification of a genetic variant. These variants may or may not be associated with an increased cancer risk. With multigene panel testing, it is not uncommon for a variant of uncertain significance (VUS) to be identified.  If a VUS is identified, testing family members is not recommended and screening recommendations are made based on the family history. The laboratories that perform genetic testing work to reclassify the VUS and send out an amended report if and when a VUS is reclassified.  The majority of variant findings are ultimately reclassified to a negative result. Given her family history, a negative test result does not eliminate all cancer risk, although the risk would not be as high as it would with positive genetic testing.     TEST RESULTS: Genetic testing was negative for known deleterious mutations by sequencing and rearrangement testing of the 47 genes on the Common Hereditary Cancers panel.  This negative result greatly lowers the risk of a hereditary cancer syndrome for Ms. Faria. Other family members could still consider genetic testing due to the possibility that the cancers in her family could be due to a genetic mutation that she did not inherit.    CANCER PREVENTION: Ms. Faria’s management and colonoscopy surveillance should be determined by her gastroenterologist based on her personal and family history. Per current NCCN guidelines, first-degree relatives of an individual with history of a colon cancer should begin screening colonoscopy at age 40, or earlier based on the ages of diagnosis, and repeat at least every 5 years or more often based on findings. Ms. Faria had her most recent colonoscopy in December 2021 and based on findings was recommended to come back in three years.    Options available to individuals with an elevated lifetime risk for breast cancer were discussed. Based on computer modeling, Ms. Faria’s lifetime risk for breast cancer  would not be considered “high risk”. General population screening guidelines include self-breast exams, annual clinical breast exam, and annual mammography starting at age 40. This assessment is based on the information provided at the time of the consultation.     PLAN:  Genetic counseling remains available to Ms. Faria. If she has any questions or concerns in the future she is welcome to call me at [phone].       Carlee Ornelas, MS, [facility], [facility]  Licensed Certified Genetic Counselor    Cc: Anastasiia Dorado

## 2022-01-24 ENCOUNTER — TELEPHONE (OUTPATIENT)
Dept: OBSTETRICS AND GYNECOLOGY | Facility: CLINIC | Age: 72
End: 2022-01-24

## 2022-01-24 NOTE — TELEPHONE ENCOUNTER
Patient returned call. Gave her results. Since she is on Medicare wants to know if should still come yearly. She is definitely willing to come.

## 2022-01-24 NOTE — TELEPHONE ENCOUNTER
----- Message from Jaiden Munoz MD sent at 1/11/2022  4:31 PM EST -----  Pap ASCUS negative HPV.  Patient just needs repeat Pap in 1 year.  FLOR

## 2022-01-24 NOTE — TELEPHONE ENCOUNTER
Yes, she should still come back we just won't list it as an annual. So you can put her as a gyn f/u a year from her last pap. Thanks!

## 2022-02-28 DIAGNOSIS — M81.0 AGE-RELATED OSTEOPOROSIS WITHOUT CURRENT PATHOLOGICAL FRACTURE: Primary | ICD-10-CM

## 2022-03-24 ENCOUNTER — INFUSION (OUTPATIENT)
Dept: ONCOLOGY | Facility: HOSPITAL | Age: 72
End: 2022-03-24

## 2022-03-24 ENCOUNTER — LAB (OUTPATIENT)
Dept: OTHER | Facility: HOSPITAL | Age: 72
End: 2022-03-24

## 2022-03-24 VITALS — WEIGHT: 181.4 LBS | BODY MASS INDEX: 27.49 KG/M2 | HEIGHT: 68 IN | TEMPERATURE: 97.6 F

## 2022-03-24 DIAGNOSIS — M81.0 AGE-RELATED OSTEOPOROSIS WITHOUT CURRENT PATHOLOGICAL FRACTURE: Primary | ICD-10-CM

## 2022-03-24 DIAGNOSIS — M81.0 AGE-RELATED OSTEOPOROSIS WITHOUT CURRENT PATHOLOGICAL FRACTURE: ICD-10-CM

## 2022-03-24 LAB
ALBUMIN SERPL-MCNC: 4 G/DL (ref 3.5–5.2)
ALBUMIN/GLOB SERPL: 1.7 G/DL
ALP SERPL-CCNC: 73 U/L (ref 39–117)
ALT SERPL W P-5'-P-CCNC: 7 U/L (ref 1–33)
ANION GAP SERPL CALCULATED.3IONS-SCNC: 9.9 MMOL/L (ref 5–15)
AST SERPL-CCNC: 12 U/L (ref 1–32)
BILIRUB SERPL-MCNC: 0.4 MG/DL (ref 0–1.2)
BUN SERPL-MCNC: 16 MG/DL (ref 8–23)
BUN/CREAT SERPL: 14.8 (ref 7–25)
CALCIUM SPEC-SCNC: 9.8 MG/DL (ref 8.6–10.5)
CHLORIDE SERPL-SCNC: 106 MMOL/L (ref 98–107)
CO2 SERPL-SCNC: 27.1 MMOL/L (ref 22–29)
CREAT SERPL-MCNC: 1.08 MG/DL (ref 0.57–1)
EGFRCR SERPLBLD CKD-EPI 2021: 54.7 ML/MIN/1.73
GLOBULIN UR ELPH-MCNC: 2.4 GM/DL
GLUCOSE SERPL-MCNC: 108 MG/DL (ref 65–99)
POTASSIUM SERPL-SCNC: 4.5 MMOL/L (ref 3.5–5.2)
PROT SERPL-MCNC: 6.4 G/DL (ref 6–8.5)
SODIUM SERPL-SCNC: 143 MMOL/L (ref 136–145)

## 2022-03-24 PROCEDURE — 96372 THER/PROPH/DIAG INJ SC/IM: CPT

## 2022-03-24 PROCEDURE — 25010000002 DENOSUMAB 60 MG/ML SOLUTION PREFILLED SYRINGE: Performed by: OBSTETRICS & GYNECOLOGY

## 2022-03-24 PROCEDURE — 36415 COLL VENOUS BLD VENIPUNCTURE: CPT

## 2022-03-24 PROCEDURE — 80053 COMPREHEN METABOLIC PANEL: CPT | Performed by: OBSTETRICS & GYNECOLOGY

## 2022-03-24 RX ADMIN — DENOSUMAB 60 MG: 60 INJECTION SUBCUTANEOUS at 14:19

## 2022-04-12 ENCOUNTER — OFFICE VISIT (OUTPATIENT)
Dept: FAMILY MEDICINE CLINIC | Facility: CLINIC | Age: 72
End: 2022-04-12

## 2022-04-12 VITALS
DIASTOLIC BLOOD PRESSURE: 66 MMHG | RESPIRATION RATE: 13 BRPM | WEIGHT: 185.5 LBS | TEMPERATURE: 98.6 F | OXYGEN SATURATION: 96 % | SYSTOLIC BLOOD PRESSURE: 138 MMHG | BODY MASS INDEX: 28.11 KG/M2 | HEART RATE: 83 BPM | HEIGHT: 68 IN

## 2022-04-12 DIAGNOSIS — E78.2 MIXED HYPERLIPIDEMIA: ICD-10-CM

## 2022-04-12 DIAGNOSIS — Z00.00 MEDICARE ANNUAL WELLNESS VISIT, SUBSEQUENT: Primary | ICD-10-CM

## 2022-04-12 PROCEDURE — 1170F FXNL STATUS ASSESSED: CPT | Performed by: FAMILY MEDICINE

## 2022-04-12 PROCEDURE — G0439 PPPS, SUBSEQ VISIT: HCPCS | Performed by: FAMILY MEDICINE

## 2022-04-12 PROCEDURE — 1160F RVW MEDS BY RX/DR IN RCRD: CPT | Performed by: FAMILY MEDICINE

## 2022-04-12 NOTE — PROGRESS NOTES
The ABCs of the Annual Wellness Visit  Subsequent Medicare Wellness Visit    Chief Complaint   Patient presents with   • Medicare Wellness-subsequent      Subjective    History of Present Illness:  Anastasiia Faria is a 72 y.o. female who presents for a Subsequent Medicare Wellness Visit.    The following portions of the patient's history were reviewed and   updated as appropriate: allergies, current medications, past family history, past medical history, past social history, past surgical history and problem list.    Compared to one year ago, the patient feels her physical   health is the same.    Compared to one year ago, the patient feels her mental   health is the same.    Recent Hospitalizations:  She was not admitted to the hospital during the last year.       Current Medical Providers:  Patient Care Team:  Audrey Krishna MD as PCP - General (Family Medicine)  Jaiden Munoz MD as Referring Physician (Obstetrics and Gynecology)    Outpatient Medications Prior to Visit   Medication Sig Dispense Refill   • aspirin-acetaminophen-caffeine (EXCEDRIN MIGRAINE) 250-250-65 MG per tablet Take 1 tablet by mouth Every 6 (Six) Hours As Needed for Headache.     • atenolol (TENORMIN) 25 MG tablet Take 1 tablet by mouth Daily. 90 tablet 3   • calcium carbonate (OS-NIKKIE) 600 MG tablet Take 1,200 mg by mouth Daily.     • Cholecalciferol (VITAMIN D3) 2000 UNITS capsule Take 2,000 Units by mouth Daily.     • escitalopram (LEXAPRO) 20 MG tablet Take 1 tablet by mouth Daily. 90 tablet 3   • levothyroxine (SYNTHROID, LEVOTHROID) 50 MCG tablet Take 1 tablet by mouth Daily. 90 tablet 3   • loratadine (CLARITIN) 10 MG tablet Take 10 mg by mouth daily.     • naproxen sodium (ALEVE) 220 MG tablet 440 mg.     • NIFEdipine XL (PROCARDIA XL) 30 MG 24 hr tablet Take 1 tablet by mouth Daily. 90 tablet 3   • Probiotic Product (SOLUBLE FIBER/PROBIOTICS PO) Take 1 capsule by mouth Daily.     • PROLIA 60 MG/ML solution syringe INJECT 60 MG UNDER THE  SKIN EVERY SIX MONTHS 1 mL 1   • Psyllium (METAMUCIL FIBER PO) Take 1 Scoop by mouth.     • valACYclovir (VALTREX) 1000 MG tablet 1,000 mg As Needed.       No facility-administered medications prior to visit.       No opioid medication identified on active medication list. I have reviewed chart for other potential  high risk medication/s and harmful drug interactions in the elderly.          Aspirin is not on active medication list.  Aspirin use is not indicated based on review of current medical condition/s. Risk of harm outweighs potential benefits.  .    Patient Active Problem List   Diagnosis   • Colon polyp, hyperplastic   • Allergic rhinitis due to pollen   • Acquired hypothyroidism   • Essential hypertension   • FH: colon cancer in relative <50 years old   • Mixed hyperlipidemia   • Chronic right shoulder pain   • Periscapular pain   • Other idiopathic scoliosis, thoracic region   • Prediabetes   • Age-related osteoporosis without current pathological fracture   • Mild episode of recurrent major depressive disorder (HCC)   • Age-related nuclear cataract of both eyes   • Cystoid nevus of conjunctiva   • Anxiety   • Cataract   • Cataract extraction status of left eye   • Cataract extraction status of right eye   • Conjunctival cyst of left eye   • Depressive disorder   • Disorder of refraction   • Dry eye syndrome of bilateral lacrimal glands   • Hearing loss   • Hearing loss   • Hordeolum internum right upper eyelid   • Migraine   • Osteopenia   • Osteoporosis   • Renal stone   • Seasonal allergies   • Diverticulosis   • Internal hemorrhoids     Advance Care Planning  Advance Directive is on file.  ACP discussion was held with the patient during this visit. Patient has an advance directive in EMR which is still valid.           Objective    Vitals:    04/12/22 1009   BP: 138/66   BP Location: Right arm   Patient Position: Sitting   Cuff Size: Adult   Pulse: 83   Resp: 13   Temp: 98.6 °F (37 °C)   TempSrc:  "Temporal   SpO2: 96%   Weight: 84.1 kg (185 lb 8 oz)   Height: 172.7 cm (68\")   PainSc: 0-No pain     BMI Readings from Last 1 Encounters:   04/12/22 28.21 kg/m²   BMI is above normal parameters. Recommendations include: exercise counseling    Does the patient have evidence of cognitive impairment? No    Physical Exam  Vitals reviewed.   Constitutional:       General: She is not in acute distress.     Appearance: Normal appearance. She is not ill-appearing or toxic-appearing.   HENT:      Head: Normocephalic and atraumatic.      Right Ear: Tympanic membrane, ear canal and external ear normal. There is no impacted cerumen.      Left Ear: Tympanic membrane, ear canal and external ear normal. There is no impacted cerumen.      Nose: Nose normal.      Mouth/Throat:      Mouth: Mucous membranes are moist.      Pharynx: Oropharynx is clear. No oropharyngeal exudate or posterior oropharyngeal erythema.   Eyes:      General: No scleral icterus.        Right eye: No discharge.         Left eye: No discharge.      Conjunctiva/sclera: Conjunctivae normal.   Neck:      Thyroid: No thyromegaly.      Vascular: No carotid bruit.   Cardiovascular:      Rate and Rhythm: Normal rate and regular rhythm.      Heart sounds: Normal heart sounds. No murmur heard.    No friction rub. No gallop.   Pulmonary:      Effort: Pulmonary effort is normal. No respiratory distress.      Breath sounds: Normal breath sounds. No wheezing or rales.   Chest:      Chest wall: No tenderness.   Abdominal:      General: Bowel sounds are normal. There is no distension.      Palpations: Abdomen is soft. There is no mass.      Tenderness: There is no abdominal tenderness. There is no guarding.   Musculoskeletal:         General: No deformity.      Cervical back: Neck supple.      Right lower leg: No edema.      Left lower leg: No edema.   Lymphadenopathy:      Cervical: No cervical adenopathy.   Skin:     Coloration: Skin is not jaundiced or pale. "   Neurological:      General: No focal deficit present.      Mental Status: She is alert and oriented to person, place, and time.      Motor: No abnormal muscle tone.      Coordination: Coordination normal.   Psychiatric:         Mood and Affect: Mood normal.         Behavior: Behavior normal.       Lab Results   Component Value Date    CHLPL 221 (H) 04/07/2022    TRIG 256 (H) 04/07/2022    HDL 37 (L) 04/07/2022     (H) 04/07/2022    VLDL 46 (H) 04/07/2022    HGBA1C 5.8 (H) 04/07/2022            HEALTH RISK ASSESSMENT    Smoking Status:  Social History     Tobacco Use   Smoking Status Never Smoker   Smokeless Tobacco Never Used   Tobacco Comment    daily caffine     Alcohol Consumption:  Social History     Substance and Sexual Activity   Alcohol Use No     Fall Risk Screen:    MASOUD Fall Risk Assessment was completed, and patient is at LOW risk for falls.Assessment completed on:4/12/2022    Depression Screening:  PHQ-2/PHQ-9 Depression Screening 4/12/2022   Retired PHQ-9 Total Score -   Retired Total Score -   Little Interest or Pleasure in Doing Things 0-->not at all   Feeling Down, Depressed or Hopeless 1-->several days   PHQ-9: Brief Depression Severity Measure Score 1       Health Habits and Functional and Cognitive Screening:  Functional & Cognitive Status 4/12/2022   Do you have difficulty preparing food and eating? No   Do you have difficulty bathing yourself, getting dressed or grooming yourself? No   Do you have difficulty using the toilet? No   Do you have difficulty moving around from place to place? No   Do you have trouble with steps or getting out of a bed or a chair? No   Current Diet Well Balanced Diet   Dental Exam Up to date   Eye Exam Up to date   Exercise (times per week) 5 times per week   Current Exercises Include Walking        Exercise Comment gym   Current Exercise Activities Include -   Do you need help using the phone?  No   Are you deaf or do you have serious difficulty hearing?   Yes   Do you need help with transportation? No   Do you need help shopping? No   Do you need help preparing meals?  No   Do you need help with housework?  No   Do you need help with laundry? No   Do you need help taking your medications? No   Do you need help managing money? No   Do you ever drive or ride in a car without wearing a seat belt? No   Have you felt unusual stress, anger or loneliness in the last month? No   Who do you live with? Spouse   If you need help, do you have trouble finding someone available to you? No   Have you been bothered in the last four weeks by sexual problems? No   Do you have difficulty concentrating, remembering or making decisions? No       Age-appropriate Screening Schedule:  Refer to the list below for future screening recommendations based on patient's age, sex and/or medical conditions. Orders for these recommended tests are listed in the plan section. The patient has been provided with a written plan.    Health Maintenance   Topic Date Due   • INFLUENZA VACCINE  08/01/2022   • LIPID PANEL  04/07/2023   • MAMMOGRAM  10/25/2023   • DXA SCAN  12/06/2023   • TDAP/TD VACCINES (2 - Td or Tdap) 04/03/2027   • ZOSTER VACCINE  Discontinued              Assessment/Plan   CMS Preventative Services Quick Reference  Risk Factors Identified During Encounter  Cardiovascular Disease  Depression/Dysphoria  Obesity/Overweight   The above risks/problems have been discussed with the patient.  Follow up actions/plans if indicated are seen below in the Assessment/Plan Section.  Pertinent information has been shared with the patient in the After Visit Summary.    Diagnoses and all orders for this visit:    1. Medicare annual wellness visit, subsequent (Primary)    2. Mixed hyperlipidemia        Follow Up:   No follow-ups on file.     An After Visit Summary and PPPS were made available to the patient.               Thinking about going back to the classes at the gym. They are longer than her usual  work out.   Good cardio.   Wants to try for 3 months.   If not improved she is willing to try the statin. She has hesitated for so long but does want the benefits if her exercise plan does not yield improvements.   10 yr CV 11.5%.     We talked about the second booster.   She did well with prior three injections and did not have COVID. She would like to get the booster here today if she can.

## 2022-05-26 NOTE — TELEPHONE ENCOUNTER
Rx Refill Note  Requested Prescriptions     Pending Prescriptions Disp Refills   • levothyroxine (SYNTHROID, LEVOTHROID) 50 MCG tablet [Pharmacy Med Name: L-THYROXINE (SYNTHROID) TABS 50MCG] 90 tablet 3     Sig: TAKE 1 TABLET DAILY   • escitalopram (LEXAPRO) 20 MG tablet [Pharmacy Med Name: ESCITALOPRAM TABS 20MG] 90 tablet 3     Sig: TAKE 1 TABLET DAILY   • atenolol (TENORMIN) 25 MG tablet [Pharmacy Med Name: ATENOLOL TABS 25MG] 90 tablet 3     Sig: TAKE 1 TABLET DAILY   • NIFEdipine XL (PROCARDIA XL) 30 MG 24 hr tablet [Pharmacy Med Name: NIFEDIPINE ER (XL)TABS 30MG] 90 tablet 3     Sig: TAKE 1 TABLET DAILY      Last office visit with prescribing clinician: 4/22      Next office visit with prescribing clinician: 10/22           Vane Song LPN  05/26/22, 14:42 EDT

## 2022-05-27 RX ORDER — NIFEDIPINE 30 MG/1
TABLET, EXTENDED RELEASE ORAL
Qty: 90 TABLET | Refills: 3 | Status: SHIPPED | OUTPATIENT
Start: 2022-05-27

## 2022-05-27 RX ORDER — ESCITALOPRAM OXALATE 20 MG/1
TABLET ORAL
Qty: 90 TABLET | Refills: 3 | Status: SHIPPED | OUTPATIENT
Start: 2022-05-27

## 2022-05-27 RX ORDER — ATENOLOL 25 MG/1
TABLET ORAL
Qty: 90 TABLET | Refills: 3 | Status: SHIPPED | OUTPATIENT
Start: 2022-05-27

## 2022-05-27 RX ORDER — LEVOTHYROXINE SODIUM 0.05 MG/1
TABLET ORAL
Qty: 90 TABLET | Refills: 3 | Status: SHIPPED | OUTPATIENT
Start: 2022-05-27

## 2022-07-12 DIAGNOSIS — E78.2 MIXED HYPERLIPIDEMIA: ICD-10-CM

## 2022-07-13 LAB
CHOLEST SERPL-MCNC: 235 MG/DL (ref 100–199)
HDLC SERPL-MCNC: 40 MG/DL
LDLC SERPL CALC-MCNC: 157 MG/DL (ref 0–99)
TRIGL SERPL-MCNC: 209 MG/DL (ref 0–149)
VLDLC SERPL CALC-MCNC: 38 MG/DL (ref 5–40)

## 2022-07-16 RX ORDER — ROSUVASTATIN CALCIUM 10 MG/1
10 TABLET, COATED ORAL NIGHTLY
Qty: 90 TABLET | Refills: 1 | Status: SHIPPED | OUTPATIENT
Start: 2022-07-16 | End: 2022-12-23

## 2022-09-27 ENCOUNTER — LAB (OUTPATIENT)
Dept: OTHER | Facility: HOSPITAL | Age: 72
End: 2022-09-27

## 2022-09-27 ENCOUNTER — INFUSION (OUTPATIENT)
Dept: ONCOLOGY | Facility: HOSPITAL | Age: 72
End: 2022-09-27

## 2022-09-27 VITALS — TEMPERATURE: 98.1 F | RESPIRATION RATE: 18 BRPM | BODY MASS INDEX: 28.21 KG/M2 | HEIGHT: 68 IN

## 2022-09-27 DIAGNOSIS — M81.0 AGE-RELATED OSTEOPOROSIS WITHOUT CURRENT PATHOLOGICAL FRACTURE: ICD-10-CM

## 2022-09-27 DIAGNOSIS — M81.0 AGE-RELATED OSTEOPOROSIS WITHOUT CURRENT PATHOLOGICAL FRACTURE: Primary | ICD-10-CM

## 2022-09-27 LAB
ALBUMIN SERPL-MCNC: 4.2 G/DL (ref 3.5–5.2)
ALBUMIN/GLOB SERPL: 1.6 G/DL
ALP SERPL-CCNC: 76 U/L (ref 39–117)
ALT SERPL W P-5'-P-CCNC: 11 U/L (ref 1–33)
ANION GAP SERPL CALCULATED.3IONS-SCNC: 7.5 MMOL/L (ref 5–15)
AST SERPL-CCNC: 14 U/L (ref 1–32)
BILIRUB SERPL-MCNC: 0.6 MG/DL (ref 0–1.2)
BUN SERPL-MCNC: 18 MG/DL (ref 8–23)
BUN/CREAT SERPL: 15.9 (ref 7–25)
CALCIUM SPEC-SCNC: 10.1 MG/DL (ref 8.6–10.5)
CHLORIDE SERPL-SCNC: 103 MMOL/L (ref 98–107)
CO2 SERPL-SCNC: 30.5 MMOL/L (ref 22–29)
CREAT SERPL-MCNC: 1.13 MG/DL (ref 0.57–1)
EGFRCR SERPLBLD CKD-EPI 2021: 51.8 ML/MIN/1.73
GLOBULIN UR ELPH-MCNC: 2.6 GM/DL
GLUCOSE SERPL-MCNC: 106 MG/DL (ref 65–99)
POTASSIUM SERPL-SCNC: 4.7 MMOL/L (ref 3.5–5.2)
PROT SERPL-MCNC: 6.8 G/DL (ref 6–8.5)
SODIUM SERPL-SCNC: 141 MMOL/L (ref 136–145)

## 2022-09-27 PROCEDURE — 25010000002 DENOSUMAB 60 MG/ML SOLUTION PREFILLED SYRINGE: Performed by: OBSTETRICS & GYNECOLOGY

## 2022-09-27 PROCEDURE — 80053 COMPREHEN METABOLIC PANEL: CPT | Performed by: OBSTETRICS & GYNECOLOGY

## 2022-09-27 PROCEDURE — 96372 THER/PROPH/DIAG INJ SC/IM: CPT

## 2022-09-27 RX ADMIN — DENOSUMAB 60 MG: 60 INJECTION SUBCUTANEOUS at 14:40

## 2022-10-13 ENCOUNTER — OFFICE VISIT (OUTPATIENT)
Dept: FAMILY MEDICINE CLINIC | Facility: CLINIC | Age: 72
End: 2022-10-13

## 2022-10-13 VITALS
TEMPERATURE: 97 F | RESPIRATION RATE: 18 BRPM | BODY MASS INDEX: 27.43 KG/M2 | WEIGHT: 181 LBS | DIASTOLIC BLOOD PRESSURE: 72 MMHG | OXYGEN SATURATION: 95 % | HEART RATE: 72 BPM | HEIGHT: 68 IN | SYSTOLIC BLOOD PRESSURE: 130 MMHG

## 2022-10-13 DIAGNOSIS — E78.2 MIXED HYPERLIPIDEMIA: Primary | ICD-10-CM

## 2022-10-13 DIAGNOSIS — R73.03 PREDIABETES: ICD-10-CM

## 2022-10-13 PROBLEM — H43.813 POSTERIOR VITREOUS DETACHMENT OF BOTH EYES: Status: ACTIVE | Noted: 2022-08-17

## 2022-10-13 PROCEDURE — 90662 IIV NO PRSV INCREASED AG IM: CPT | Performed by: FAMILY MEDICINE

## 2022-10-13 PROCEDURE — G0008 ADMIN INFLUENZA VIRUS VAC: HCPCS | Performed by: FAMILY MEDICINE

## 2022-10-13 PROCEDURE — 99213 OFFICE O/P EST LOW 20 MIN: CPT | Performed by: FAMILY MEDICINE

## 2022-10-13 NOTE — PROGRESS NOTES
"Chief Complaint  Hyperlipidemia and Hypothyroidism (Follow up)    Subjective        Anastasiia Faria presents to [facility]  History of Present Illness  She is on the crestor.   Said she had some cramping and swelling in the lower legs but this is better.   Says she has lost some weight, 176 lbs at home.   Doing 9 classes per week.   More active.   Says a little help with mood.   Saw her brother and sister in law for the first time in a couple years and that was great.     Prediabetes  Exercising more.   She does go alone.   She is not walking the track or doing the machines because she does about 7 hours of classes at the gym.   Trying on changes to her diet.   She cooks for the house and her  has diabetes.   She does drink soda and have a few sweet snacks.   Has about 6 ounces of coke per day.     Stamina is better. Sleeping better.   Did try to take the melatonin earlier and taking 3 mg to avoid grogginess.   She is trying to go to bed earlier.     Objective   Vital Signs:  /72 (BP Location: Right arm, Patient Position: Sitting, Cuff Size: Adult)   Pulse 72   Temp 97 °F (36.1 °C) (Temporal)   Resp 18   Ht 172.7 cm (67.99\")   Wt 82.1 kg (181 lb)   SpO2 95%   BMI 27.53 kg/m²   Estimated body mass index is 27.53 kg/m² as calculated from the following:    Height as of this encounter: 172.7 cm (67.99\").    Weight as of this encounter: 82.1 kg (181 lb).    BMI is >= 25 and <30. (Overweight) The following options were offered after discussion;: exercise counseling/recommendations and nutrition counseling/recommendations      Physical Exam  Vitals reviewed.   Constitutional:       General: She is not in acute distress.     Appearance: Normal appearance. She is not ill-appearing or toxic-appearing.   Eyes:      General: No scleral icterus.        Right eye: No discharge.         Left eye: No discharge.      Conjunctiva/sclera: Conjunctivae normal.   Neck:      Vascular: No " carotid bruit.   Cardiovascular:      Rate and Rhythm: Normal rate and regular rhythm.      Heart sounds: Normal heart sounds. No murmur heard.  Pulmonary:      Effort: Pulmonary effort is normal. No respiratory distress.      Breath sounds: Normal breath sounds. No wheezing.   Musculoskeletal:      Cervical back: Neck supple. No muscular tenderness.      Right lower leg: No edema.      Left lower leg: No edema.   Lymphadenopathy:      Cervical: No cervical adenopathy.   Neurological:      Mental Status: She is alert and oriented to person, place, and time.      Motor: No abnormal muscle tone.   Psychiatric:         Behavior: Behavior normal.        Result Review :                Assessment and Plan   Diagnoses and all orders for this visit:    1. Mixed hyperlipidemia (Primary)  -     Lipid Panel    2. Prediabetes  -     Hemoglobin A1c    Other orders  -     Fluzone High-Dose 65+yrs (6834-1988)             Follow Up   No follow-ups on file.  Patient was given instructions and counseling regarding her condition or for health maintenance advice. Please see specific information pulled into the AVS if appropriate.     She is doing well. She has made some really great habit changes she has been talking about making for months. She is going to bed earlier, going to the gym and doing classes, eating more of a diabetic diet with her . Has been consistent. Still has small amount of coke and some treats.   She has gotten her COVID bivalent booster  Back on the rosuvastatin for a few months now. Initially after starting she had some leg cramps and even some swelling but those resolved after a couple of months.   She needs a check in on the lipid panel and the A1c. I think should be better.   Flu shot today.

## 2022-10-14 LAB
CHOLEST SERPL-MCNC: 146 MG/DL (ref 0–200)
HBA1C MFR BLD: 5.9 % (ref 4.8–5.6)
HDLC SERPL-MCNC: 48 MG/DL (ref 40–60)
LDLC SERPL CALC-MCNC: 68 MG/DL (ref 0–100)
TRIGL SERPL-MCNC: 179 MG/DL (ref 0–150)
VLDLC SERPL CALC-MCNC: 30 MG/DL (ref 5–40)

## 2022-11-01 ENCOUNTER — APPOINTMENT (OUTPATIENT)
Dept: WOMENS IMAGING | Facility: HOSPITAL | Age: 72
End: 2022-11-01

## 2022-11-01 ENCOUNTER — PROCEDURE VISIT (OUTPATIENT)
Dept: OBSTETRICS AND GYNECOLOGY | Facility: CLINIC | Age: 72
End: 2022-11-01

## 2022-11-01 DIAGNOSIS — Z12.31 VISIT FOR SCREENING MAMMOGRAM: Primary | ICD-10-CM

## 2022-11-01 PROCEDURE — 77063 BREAST TOMOSYNTHESIS BI: CPT | Performed by: OBSTETRICS & GYNECOLOGY

## 2022-11-01 PROCEDURE — 77067 SCR MAMMO BI INCL CAD: CPT | Performed by: RADIOLOGY

## 2022-11-01 PROCEDURE — 77067 SCR MAMMO BI INCL CAD: CPT | Performed by: OBSTETRICS & GYNECOLOGY

## 2022-11-01 PROCEDURE — 77063 BREAST TOMOSYNTHESIS BI: CPT | Performed by: RADIOLOGY

## 2022-11-07 ENCOUNTER — TELEPHONE (OUTPATIENT)
Dept: OBSTETRICS AND GYNECOLOGY | Facility: CLINIC | Age: 72
End: 2022-11-07

## 2022-11-07 DIAGNOSIS — R92.8 ABNORMALITY OF LEFT BREAST ON SCREENING MAMMOGRAM: Primary | ICD-10-CM

## 2022-11-07 DIAGNOSIS — N63.20 MASS OF LEFT BREAST, UNSPECIFIED QUADRANT: ICD-10-CM

## 2022-11-07 NOTE — TELEPHONE ENCOUNTER
Spoke with patient and she is aware of abnormal findings on screening mammogram and is scheduled for additional images -Left Limited US - at [facility] on 11/15/22 @ 4pm.  SR

## 2022-11-15 ENCOUNTER — APPOINTMENT (OUTPATIENT)
Dept: WOMENS IMAGING | Facility: HOSPITAL | Age: 72
End: 2022-11-15

## 2022-11-15 PROCEDURE — 76642 ULTRASOUND BREAST LIMITED: CPT | Performed by: RADIOLOGY

## 2022-11-17 DIAGNOSIS — R92.8 ABNORMAL ULTRASOUND OF BREAST: Primary | ICD-10-CM

## 2022-11-17 DIAGNOSIS — N63.20 MASS OF LEFT BREAST, UNSPECIFIED QUADRANT: ICD-10-CM

## 2022-11-17 DIAGNOSIS — R92.8 ABNORMALITY OF LEFT BREAST ON SCREENING MAMMOGRAM: ICD-10-CM

## 2022-12-02 ENCOUNTER — APPOINTMENT (OUTPATIENT)
Dept: WOMENS IMAGING | Facility: HOSPITAL | Age: 72
End: 2022-12-02

## 2022-12-02 PROCEDURE — 88305 TISSUE EXAM BY PATHOLOGIST: CPT

## 2022-12-02 PROCEDURE — A4648 IMPLANTABLE TISSUE MARKER: HCPCS | Performed by: RADIOLOGY

## 2022-12-02 PROCEDURE — 19083 BX BREAST 1ST LESION US IMAG: CPT | Performed by: RADIOLOGY

## 2022-12-07 DIAGNOSIS — N63.20 MASS OF LEFT BREAST, UNSPECIFIED QUADRANT: ICD-10-CM

## 2022-12-07 DIAGNOSIS — R92.8 ABNORMAL ULTRASOUND OF BREAST: ICD-10-CM

## 2022-12-23 RX ORDER — ROSUVASTATIN CALCIUM 10 MG/1
TABLET, COATED ORAL
Qty: 90 TABLET | Refills: 3 | Status: SHIPPED | OUTPATIENT
Start: 2022-12-23

## 2022-12-30 ENCOUNTER — OFFICE VISIT (OUTPATIENT)
Dept: FAMILY MEDICINE CLINIC | Facility: CLINIC | Age: 72
End: 2022-12-30
Payer: MEDICARE

## 2022-12-30 VITALS
HEIGHT: 68 IN | HEART RATE: 69 BPM | OXYGEN SATURATION: 96 % | BODY MASS INDEX: 27.38 KG/M2 | WEIGHT: 180.7 LBS | DIASTOLIC BLOOD PRESSURE: 60 MMHG | TEMPERATURE: 97.5 F | RESPIRATION RATE: 12 BRPM | SYSTOLIC BLOOD PRESSURE: 118 MMHG

## 2022-12-30 DIAGNOSIS — M54.50 LOW BACK PAIN RADIATING TO RIGHT LOWER EXTREMITY: ICD-10-CM

## 2022-12-30 DIAGNOSIS — M25.551 LATERAL PAIN OF RIGHT HIP: Primary | ICD-10-CM

## 2022-12-30 DIAGNOSIS — M79.604 LOW BACK PAIN RADIATING TO RIGHT LOWER EXTREMITY: ICD-10-CM

## 2022-12-30 DIAGNOSIS — R10.31 RIGHT GROIN PAIN: ICD-10-CM

## 2022-12-30 PROCEDURE — 99214 OFFICE O/P EST MOD 30 MIN: CPT | Performed by: FAMILY MEDICINE

## 2023-01-01 NOTE — PROGRESS NOTES
Chief Complaint  Hip Pain (Pt states right hip pain )    Subjective        Marty Faria presents to [facility]  History of Present Illness  Marty Faria is a 72-year-old female who presents for an acute visit today. She is well known to me. She is complaining of right hip pain.    Right hip pain  She has been experiencing right hip pain for several years.   She is still active at the gym.   She has to modify her exercises.   She can do a squat, but she can not do a jumping bayron.   Range of motion is not the same on both sides.   She can take her leg up.   Has limited external rotation on the right.   Pain in the lateral aspect of her hip and down in the right groin area.  If she has been on her feet a whole lot, she will have pain in the back as well.   Has pain in the front and side of her thigh.   Her hip will lock up and she can not pull her leg up.  When she walks, her leg gives inward at the lateral hip.   Has been taking Aleve, ibuprofen, and Tylenol Arthritis.   She has been taking it around the clock.   Waits until it is really bothering her before taking medication.  She felt as though she was just taking more and more medication.  It does bother her when she is sleeping at night.   She sleeps with a pillow between her knees helps.   If she goes very far in the car, she is stiff and she limps.   She has scoliosis.  She has a bursa on her lateral hip.  In the past she felt as though her pain might be bursitis.  Used to have a lot of problems with her knee from a hospital injury.   15 to 20 years ago, she had it cleaned out.   It is not as strong as it used to be, but it hardly bothers her.   Has been doing physical therapy on her left foot.   She has 2 sessions left.  Has a torn capsule on her toe and metatarsal.  Has taken diclofenac before.  Diclofenac seemed to be effective.  Had to sit down several times while walking at the mall.    Objective   Vital Signs:  /60   Pulse  immune "69   Temp 97.5 °F (36.4 °C) (Infrared)   Resp 12   Ht 172.7 cm (67.99\")   Wt 82 kg (180 lb 11.2 oz)   SpO2 96%   BMI 27.48 kg/m²     BMI is >= 25 and <30. (Overweight) The following options were offered after discussion;: exercise counseling/recommendations and nutrition counseling/recommendations      Physical Exam  Vitals reviewed.   Constitutional:       General: She is not in acute distress.  HENT:      Right Ear: External ear normal.      Left Ear: External ear normal.      Nose: Nose normal.      Mouth/Throat:      Pharynx: No oropharyngeal exudate.   Eyes:      General: No scleral icterus.        Right eye: No discharge.         Left eye: No discharge.      Conjunctiva/sclera: Conjunctivae normal.   Neck:      Thyroid: No thyromegaly.   Cardiovascular:      Rate and Rhythm: Normal rate and regular rhythm.      Heart sounds: Normal heart sounds. No murmur heard.    No friction rub. No gallop.   Pulmonary:      Effort: Pulmonary effort is normal. No respiratory distress.      Breath sounds: Normal breath sounds. No wheezing or rales.   Chest:      Chest wall: No tenderness.   Abdominal:      General: Bowel sounds are normal. There is no distension.      Palpations: Abdomen is soft. There is no mass.      Tenderness: There is no abdominal tenderness. There is no guarding.   Musculoskeletal:         General: No deformity.      Cervical back: Neck supple.      Comments: Negative straight leg raise.  Negative ENMA and FADIR.  Good range of motion.  Lateral hip pain with hip flexion, forward flexion.  Pain with FADIR.  With the left side ENMA, she is about parallel to the table and she is a good 45 degree angle from the table on the right side.  Pain with FADIR and ENMA.  No palpable tenderness over the lateral right lateral hip.  Palpable spasm on the paraspinals in the right lumbar region.   Lymphadenopathy:      Cervical: No cervical adenopathy.   Skin:     General: Skin is warm and dry.   Neurological: "      Mental Status: She is alert and oriented to person, place, and time.      Motor: No abnormal muscle tone.      Coordination: Coordination normal.   Psychiatric:         Behavior: Behavior normal.          Result Review :                    Assessment and Plan   Diagnoses and all orders for this visit:    1. Lateral pain of right hip (Primary)  -     Ambulatory Referral to Physical Therapy Evaluate and treat    2. Right groin pain  -     Ambulatory Referral to Physical Therapy Evaluate and treat    3. Low back pain radiating to right lower extremity  -     Ambulatory Referral to Physical Therapy Evaluate and treat    Other orders  -     diclofenac (VOLTAREN) 50 MG EC tablet; Take 1 tablet by mouth 2 (Two) Times a Day.  Dispense: 10 tablet; Refill: 0      Right hip pain:  -The patient has been experiencing right hip pain for several years.   -She has tried Aleve, ibuprofen, and Tylenol arthritis without relief.   -She has tried physical therapy in the past without relief.  - I have prescribed diclofenac twice daily for 5 to 7 days.   -I have advised the patient to stay hydrated.   -I have also advised the patient to apply ice anteriorly and heat to the area.   -I have referred the patient to physical therapy.           Follow Up   No follow-ups on file.  Patient was given instructions and counseling regarding her condition or for health maintenance advice. Please see specific information pulled into the AVS if appropriate.       Transcribed from ambient dictation for Audrey Krishna MD by Debbie Charles.  12/30/22   13:27 EST    Patient or patient representative verbalized consent to the visit recording.  I have personally performed the services described in this document as transcribed by the above individual, and it is both accurate and complete.

## 2023-01-04 ENCOUNTER — OFFICE VISIT (OUTPATIENT)
Dept: OBSTETRICS AND GYNECOLOGY | Facility: CLINIC | Age: 73
End: 2023-01-04
Payer: MEDICARE

## 2023-01-04 VITALS
SYSTOLIC BLOOD PRESSURE: 116 MMHG | BODY MASS INDEX: 26.67 KG/M2 | HEIGHT: 68 IN | WEIGHT: 176 LBS | DIASTOLIC BLOOD PRESSURE: 69 MMHG

## 2023-01-04 DIAGNOSIS — R87.610 ATYPICAL SQUAMOUS CELLS OF UNDETERMINED SIGNIFICANCE ON CYTOLOGIC SMEAR OF CERVIX (ASC-US): Primary | ICD-10-CM

## 2023-01-04 DIAGNOSIS — Z12.4 ENCOUNTER FOR SCREENING FOR MALIGNANT NEOPLASM OF CERVIX: ICD-10-CM

## 2023-01-04 PROCEDURE — 1160F RVW MEDS BY RX/DR IN RCRD: CPT | Performed by: OBSTETRICS & GYNECOLOGY

## 2023-01-04 PROCEDURE — 1159F MED LIST DOCD IN RCRD: CPT | Performed by: OBSTETRICS & GYNECOLOGY

## 2023-01-04 PROCEDURE — 3074F SYST BP LT 130 MM HG: CPT | Performed by: OBSTETRICS & GYNECOLOGY

## 2023-01-04 PROCEDURE — 99212 OFFICE O/P EST SF 10 MIN: CPT | Performed by: OBSTETRICS & GYNECOLOGY

## 2023-01-04 PROCEDURE — 3078F DIAST BP <80 MM HG: CPT | Performed by: OBSTETRICS & GYNECOLOGY

## 2023-01-04 NOTE — PROGRESS NOTES
Subjective    Chief Complaint   Patient presents with   • Follow-up     Repeat pap       History of Present Illness    Anastasiia Faria is a 72 y.o. female who presents for repeat Pap smear.  Pap smear 1 year ago ASCUS but negative HPV.  No bleeding and no problems.    Obstetric History:  OB History             Para        Term   0            AB        Living           SAB        IAB        Ectopic        Molar        Multiple        Live Births                   Menstrual History:     No LMP recorded (lmp unknown). Patient is postmenopausal.       Past Medical History:   Diagnosis Date   • Allergic    • Anxiety    • Cataract     Cataract surgery on right eye 2021 and left eye 2021. (Dr. Nanette Bateman)   • Chronic kidney disease    • Colon polyps    • Depression    • Diverticulosis    • Encounter for annual health examination 2014    Annual Health Assessment   • Family history of colon cancer    • Fibrocystic breast    • GERD (gastroesophageal reflux disease)    • Heart murmur    • Herpes labialis without complication    • Hip pain     right   • History of mammogram 2010   • HL (hearing loss) 2014    Hearing Aids   • Hyperlipidemia    • Hypertension    • Hypothyroidism    • Insomnia    • Kidney stones    • Migraines    • Osteopenia     Prolia -last 2021,3/2022   • PONV (postoperative nausea and vomiting)    • Scoliosis     Dr. Stanford 2018   • Visual impairment     Wear Glasses     Family History   Problem Relation Age of Onset   • Breast cancer Mother    • Colon cancer Mother    • Hypertension Mother    • Cancer Mother         Breat cancer, skin cancer, colon cancer   • Hyperlipidemia Mother    • Stomach cancer Maternal Grandmother         or intestinal   • Cancer Maternal Grandmother         GI TRACT CANCER   • Breast cancer Maternal Grandmother    • Heart disease Maternal Grandmother    • Colon cancer Maternal Aunt         colon ca 40   • Cancer Maternal Aunt         Colon  cancer cause of death early 40s   • Colon cancer Maternal Aunt         66 yo   • Heart disease Maternal Aunt    • Colon cancer Maternal Aunt         81 yo   • Cancer Maternal Aunt         Colon cancer   • Colon cancer Other    • Colon cancer Other    • Heart attack Maternal Grandfather    • Breast cancer Maternal Grandfather    • Heart disease Maternal Grandfather    • Heart disease Paternal Grandmother    • Hypertension Sister    • Diabetes type II Brother    • Cancer Brother         Skin cancer   • Hypertension Sister    • Hyperlipidemia Sister    • Cancer Father         Skin cancer   • Cancer Maternal Aunt         Colon cancer   • Cancer Maternal Uncle         Thyroid cancer   • Heart disease Maternal Uncle        The following portions of the patient's history were reviewed and updated as appropriate: allergies, current medications, past medical history, past surgical history and problem list.    Review of Systems  Negative       Objective   Physical Exam  Vagina clear.  Cervix without lesion.  Pap smear performed.  Bimanual and rectovaginal exams negative.  /69   Ht 172.7 cm (67.99\")   Wt 79.8 kg (176 lb)   LMP  (LMP Unknown)   BMI 26.77 kg/m²     Assessment & Plan   Diagnoses and all orders for this visit:    1. Atypical squamous cells of undetermined significance on cytologic smear of cervix (ASC-US) (Primary)  -     IGP,rfx Aptima HPV All Pth    2. Encounter for screening for malignant neoplasm of cervix  -     IGP,rfx Aptima HPV All Pth        Impression and plan.  Patient understands that if Pap is negative we will just repeat it again next year at her annual exam.  If it is atypical regardless of HPV status we will perform colposcopy.

## 2023-01-27 ENCOUNTER — HOSPITAL ENCOUNTER (OUTPATIENT)
Dept: PHYSICAL THERAPY | Facility: HOSPITAL | Age: 73
Setting detail: THERAPIES SERIES
Discharge: HOME | End: 2023-01-27
Payer: MEDICARE

## 2023-01-27 DIAGNOSIS — M21.70 LEG LENGTH DISCREPANCY: ICD-10-CM

## 2023-01-27 DIAGNOSIS — R26.9 GAIT ABNORMALITY: ICD-10-CM

## 2023-01-27 DIAGNOSIS — M25.551 CHRONIC RIGHT HIP PAIN: Primary | ICD-10-CM

## 2023-01-27 DIAGNOSIS — R29.898 WEAKNESS OF BOTH LOWER EXTREMITIES: ICD-10-CM

## 2023-01-27 DIAGNOSIS — G89.29 CHRONIC RIGHT HIP PAIN: Primary | ICD-10-CM

## 2023-01-27 PROCEDURE — 97161 PT EVAL LOW COMPLEX 20 MIN: CPT

## 2023-01-27 PROCEDURE — 97110 THERAPEUTIC EXERCISES: CPT

## 2023-01-27 NOTE — THERAPY EVALUATION
Outpatient Physical Therapy Ortho Initial Evaluation  [facility]     Patient Name: Anastasiia Faria  : 1950  MRN: [id number]  Today's Date: 2023      Visit Date: 2023    Patient Active Problem List   Diagnosis   • Colon polyp, hyperplastic   • Allergic rhinitis due to pollen   • Acquired hypothyroidism   • Essential hypertension   • FH: colon cancer in relative <50 years old   • Mixed hyperlipidemia   • Chronic right shoulder pain   • Periscapular pain   • Other idiopathic scoliosis, thoracic region   • Prediabetes   • Age-related osteoporosis without current pathological fracture   • Mild episode of recurrent major depressive disorder (HCC)   • Age-related nuclear cataract of both eyes   • Cystoid nevus of conjunctiva   • Anxiety   • Cataract   • Cataract extraction status of left eye   • Cataract extraction status of right eye   • Conjunctival cyst of left eye   • Depressive disorder   • Disorder of refraction   • Dry eye syndrome of bilateral lacrimal glands   • Hearing loss   • Hearing loss   • Hordeolum internum right upper eyelid   • Migraine   • Osteopenia   • Osteoporosis   • Renal stone   • Seasonal allergies   • Diverticulosis   • Internal hemorrhoids   • Posterior vitreous detachment of both eyes        Past Medical History:   Diagnosis Date   • Allergic    • Anxiety    • Cataract     Cataract surgery on right eye 2021 and left eye 2021. (Dr. Nanette Bateman)   • Chronic kidney disease    • Colon polyps    • Depression    • Diverticulosis    • Encounter for annual health examination 2014    Annual Health Assessment   • Family history of colon cancer    • Fibrocystic breast    • GERD (gastroesophageal reflux disease)    • Heart murmur    • Herpes labialis without complication    • Hip pain     right   • History of mammogram 2010   • HL (hearing loss) 2014    Hearing Aids   • Hyperlipidemia    • Hypertension    • Hypothyroidism    • Insomnia    • Kidney stones    •  Migraines    • Osteopenia     Prolia -last 9/2021,3/2022   • PONV (postoperative nausea and vomiting)    • Scoliosis     Dr. Stanford 5/2018   • Visual impairment     Wear Glasses        Past Surgical History:   Procedure Laterality Date   • BONE MARROW BIOPSY     • BREAST BIOPSY     • BREAST CYST ASPIRATION     • BREAST SURGERY Right     BIOPSY   • CATARACT EXTRACTION, BILATERAL     • COLONOSCOPY N/A 10/27/2016    Procedure: COLONOSCOPY INTO CECUM;  Surgeon: Osvaldo Dorado MD;  Location: [facility] ENDOSCOPY;  Service:    • COLONOSCOPY  01/26/2011   • COLONOSCOPY  02/04/2005   • COLONOSCOPY N/A 12/2/2021    Procedure: COLONOSCOPY INTO CECUM WITH COLD BIOPSY POLYPECTOMY AND HOT SNARE POLYPECTOMY;  Surgeon: Osvaldo Dorado MD;  Location: [facility] ENDOSCOPY;  Service: General;  Laterality: N/A;  PRE-FAMILY HISTORY OF COLON CANCER, PERSONAL HISTORY OF COLON CANCER  POST- POLYPS, DIVERTICULOSIS, HEMORRHOIDS   • KNEE ARTHROSCOPY Left    • PAP SMEAR  12/20/2010       Visit Dx:     ICD-10-CM ICD-9-CM   1. Chronic right hip pain  M25.551 719.45    G89.29 338.29   2. Leg length discrepancy  M21.70 736.81   3. Weakness of both lower extremities  R29.898 729.89   4. Gait abnormality  R26.9 781.2          Patient History     Row Name 01/27/23 0800             History    Chief Complaint Pain  -FLOR      Type of Pain Hip pain  -FLOR      Brief Description of Current Complaint Anastasiia Faria is a 73 y.o. female who presents to physical therapy today with primary compliant of R lateral hip pain that has been ongoing for several years. She experienced most recent flare up nearly one year ago that has progressively worsened. She exercises 5x/week and has began modifying her gym workouts but continues to feel pain along lateral aspect of her hip down into the R groin. She saw MD Dec 2022 and was prescribed diclofenac and advised to take a break from gym routine for 1 day. She has occasional locking up in the groin when picking  foot up to transition from gas to break when driving and when walking. She also noticed her hip gives away laterally (trendelenburg sign) and feels her L leg may be longer than her R. She has custom orthoics (She has a history of R low back pain but feels her back pain is secondary to her hip pain. She denies any x-rays to date. She recently completed PT for L foot pain due to torn capsule on her toe and metatarsal and now feels that has nearly returned to normal. Anastasiia Faria reports difficulty/increased pain with after riding in a car for long periods of time (causes her to limp), sleeping (2-3 disturbances per night), standing > 15 minutes, walking > 10 minutes, when performing household chores and when navigating stairs under load (ascending > descending). Pain relieving factors include medication, rest, modifying exercise routine, and heat. PMH includes history of chronic L knee pain with history of clean out (15-20 years ago), HTN, and hypothyroid. Anastasiia Faria primary goal for attending skilled physical therapy is to improve mobility, stability and tolerance with workout routines.  -FLOR      Previous treatment for THIS PROBLEM Medication  -FLOR      Patient/Caregiver Goals Relieve pain;Return to prior level of function;Know what to do to help the symptoms;Improve mobility;Improve strength  -FLOR      Occupation/sports/leisure activities exercises 5x/wk, retired teacher  -FLOR      Are you or can you be pregnant No  -FLOR         Pain     Pain Location Hip  -FLOR      Pain at Present 1  -FLOR      Pain at Best 1  -FLOR      Pain at Worst 8  -FLOR      Pain Frequency Constant/continuous  -FLOR      Pain Description Aching;Sharp  -FLOR      Tolerance Time- Sitting 20 minutes  -FLOR      Tolerance Time- Walking 10 minutes  -FLOR      Is your sleep disturbed? Yes  -FLOR         Fall Risk Assessment    Any falls in the past year: No  -FLOR         Services    Prior Rehab/Home Health Experiences No  -FLOR      Are you currently receiving Home Health  services No  -FLOR      Do you plan to receive Home Health services in the near future No  -FLOR         Daily Activities    Primary Language English  -FLOR      Pt Participated in POC and Goals Yes  -FLOR         Safety    Are you being hurt, hit, or frightened by anyone at home or in your life? No  -FLOR      Are you being neglected by a caregiver No  -FLOR      Have you had any of the following issues with N/A  -FLOR            User Key  (r) = Recorded By, (t) = Taken By, (c) = Cosigned By    Initials Name Provider Type    Leila Hassan, PT Physical Therapist                 PT Ortho     Row Name 01/27/23 0800       Posture/Observations    Alignment Options Forward head;Scoliosis;Genu valgus;Foot pronation  -FLOR    Forward Head Increased  -FLOR    Scoliosis Moderate  -FLOR    Genu valgus Bilateral:;Moderate  L greater than R  -FLOR    Foot pronation Bilateral:;Mild;Moderate  -FLOR       Quarter Clearing    Quarter Clearing Lower Quarter Clearing  -FLOR       Neural Tension Signs- Lower Quarter Clearing    SLR Negative  -FLOR       Lumbar ROM Screen- Lower Quarter Clearing    Lumbar Flexion Impaired  25% dec  -FLOR    Lumbar Extension Impaired  75% dec (pain noted on R)  -FLOR    Lumbar Lateral Flexion Impaired  25% dec B (R pain noted with R lateral flexion)  -FLOR    Lumbar Rotation Impaired  B 50% dec  -FLOR       SI/Hip Screen- Lower Quarter Clearing    ASIS compression Negative  -FLOR    ASIS distraction Negative  -FLOR    Monroe's/Brenden's test Right:;Positive  L also limited but no pain  -FLOR    Posterior thigh sheer Right:;Positive  -FLOR       Special Tests/Palpation    Special Tests/Palpation Hip;Lumbar/SI  -FLOR       Lumbosacral Accessory Motions    Lumbosacral Accessory Motions Tested? Yes  -FLOR    PA Glide- L1 Right:;Left:;Hypomobile  -FLOR    PA Glide- L2 Right:;Hypomobile  -FLOR    PA Glide- L3 WNL  -FLOR    PA Glide- L4 Right:;Left:;Hypomobile;Right pain  -FLOR    PA Glide- L5 Right:;Left:;Hypomobile;Right pain  -FLOR       Lumbosacral  Palpation    Lumbosacral Palpation? Yes  -FLOR    Piriformis Bilateral:;Tender;Guarded/taut;Trigger point  -FLOR    Iliopsoas Bilateral:;Tender  -FLOR       Hip/Thigh Palpation    Hip/Thigh Palpation? Yes  -FLOR    Gluteus Medius Bilateral:;Tender;Guarded/taut;Trigger point  -FLOR    ITB Right:;Tender;Guarded/taut  -FLOR       Hip Accessory Motions    Hip Accessory Motions Tested? Yes  -FLOR    Lateral distraction Right:;Hypomobile  -FLOR    Compression Right pain  -FLOR    Posterior glide Right:;Hypomobile  -FLOR       Hip Special Tests    Trendelenberg sign (gluteus medius weakness) Right:;Positive  -FLOR    De test (tightness of ITB) Bilateral:;Positive  -FLOR    Jack’s test (tightness of ITB) Right:;Unable to test  -FLOR    Ely’s test (rectus femoris tightness) Bilateral:;Positive  -FLOR    Hip scour test (labral vs hip pathology) Right:;Positive  -FLOR       Leg Length Test    True Unequal  R 36.5 in, L 37.3 in  -FLOR       General ROM    RT Lower Ext Rt Hip Flexion;Rt Hip Internal Rotation;Rt Hip External Rotation  -FLOR    LT Lower Ext Lt Hip Flexion;Lt Hip External Rotation;Lt Hip Internal Rotation  -FLOR       Right Lower Ext    Rt Hip Flexion AROM 90  lateral hip pain  -FLOR    Rt Hip External Rotation AROM 38  -FLOR    Rt Hip Internal Rotation AROM 5  -FLOR       Left Lower Ext    Lt Hip Flexion AROM 95  -FLOR    Lt Hip External Rotation AROM 50  -FLOR    Lt Hip Internal Rotation AROM 30  -FLOR       MMT (Manual Muscle Testing)    Rt Lower Ext Rt Hip Flexion;Rt Hip Extension;Rt Hip ABduction;Rt Hip Internal (Medial) Rotation;Rt Hip External (Lateral) Rotation;Rt Knee Extension;Rt Knee Flexion;Rt Ankle Dorsiflexion  -FLOR    Lt Lower Ext Lt Hip Flexion;Lt Hip ABduction;Lt Hip Extension;Lt Hip Internal (Medial) Rotation;Lt Hip External (Lateral) Rotation;Lt Knee Extension;Lt Knee Flexion;Lt Ankle Dorsiflexion  -FLOR       MMT Right Lower Ext    Rt Hip Flexion MMT, Gross Movement (5/5) normal  -FLOR    Rt Hip Extension MMT, Gross Movement (3+/5) fair plus   -FLOR    Rt Hip ABduction MMT, Gross Movement (4-/5) good minus  -FLOR    Rt Hip Internal (Medial) Rotation MMT, Gross Movement (4-/5) good minus  -FLOR    Rt Hip External (Lateral) Rotation MMT, Gross Movement (4/5) good  -FLOR    Rt Knee Extension MMT, Gross Movement (5/5) normal  -FLOR    Rt Knee Flexion MMT, Gross Movement (4+/5) good plus  -FLOR    Rt Ankle Dorsiflexion MMT, Gross Movement (5/5) normal  -FLOR       MMT Left Lower Ext    Lt Hip Flexion MMT, Gross Movement (4+/5) good plus  -FLOR    Lt Hip Extension MMT, Gross Movement (3/5) fair  -FLOR    Lt Hip ABduction MMT, Gross Movement (3+/5) fair plus  -FLOR    Lt Hip Internal (Medial) Rotation MMT, Gross Movement (3+/5) fair plus  -FLOR    Lt Hip External (Lateral) Rotation MMT, Gross Movement (4-/5) good minus  -FLOR    Lt Knee Extension MMT, Gross Movement (4+/5) good plus  -FLOR    Lt Knee Flexion MMT, Gross Movement (4/5) good  -FLOR    Lt Ankle Dorsiflexion MMT, Gross Movement (4+/5) good plus  -FLOR       Sensation    Sensation WNL? WNL  -FLOR       Flexibility    Flexibility Tested? Lower Extremity  -FLOR       Balance Skills Training    SLS L 5 sec, R unable to do  -FLOR       Gait/Stairs (Locomotion)    Comment, (Gait/Stairs) L lateral trunk lean, forward flexed posture, minimal arm swing, + trendelenburg, R hip adducted, B genu valgus and slight B foot pronation  -FLOR          User Key  (r) = Recorded By, (t) = Taken By, (c) = Cosigned By    Initials Name Provider Type    Leila Hassan, PT Physical Therapist                            Therapy Education  Education Details: Educated on anatomy/pathology, evaluation findings, therapy expectations, potential benefit of orthotic/built up shoe, relationship between scoliosis and hip anatomy/genu valgus causing leg length discrepancy, POC and HEP  Given: HEP, Symptoms/condition management, Pain management, Posture/body mechanics  Program: New  How Provided: Verbal, Demonstration, Written  Provided to: Patient  Level of  Understanding: Verbalized, Demonstrated  54450 - PT Self Care/Mgmt Minutes: 5      PT OP Goals     Row Name 01/27/23 0800          PT Short Term Goals    STG Date to Achieve 02/26/23  -     STG 1 Pt will be independent with initial HEP to improve strength/ROM and decrease pain.  -     STG 1 Progress New  -     STG 2 Patient will improve ability to sleep through the night with </= 1-2 sleep disturbances related to back/hip pain to improve overall sleep quality and quality of life  -     STG 2 Progress New  -     STG 3 Pt will improve walking tolerance from 10 min to at least 25 minutes with </= 2/10 R hip pain to demonstrate improved activity tolerance while walking on TM for gym routine.  -     STG 3 Progress New  [facility]        Long Term Goals    LTG Date to Achieve 03/28/23  -     LTG 1 Pt will be independent with advanced HEP to maintain strength/ROM and decrease pain.  -     LTG 1 Progress New  -     LTG 2 Pt will improve score on LEFS to at least >/= 65% function for improved quality of life.  -     LTG 2 Progress New  -     LTG 3 Pt will improve B hip strength to at least 4/5.  -     LTG 3 Progress New  -     LTG 4 Pt will improve R hip IR AROM to at least 10-15 deg and ER to 45 deg for improved mobility with functional and recreational tasks.  -     LTG 4 Progress New  -     LTG 5 Patient will report returning to gym routine at 50-75% from PLOF, 3-5x/week with </= 3/10 R hip pain to demonstrate greater ease with recreational gym routine and return to PLOF.  -     LTG 5 Progress New  [facility]        Time Calculation    PT Goal Re-Cert Due Date 04/27/23  -           User Key  (r) = Recorded By, (t) = Taken By, (c) = Cosigned By    Initials Name Provider Type    Leila Hassan, PT Physical Therapist                 PT Assessment/Plan     Row Name 01/27/23 0800          PT Assessment    Functional Limitations Limitations in community activities;Performance in leisure  activities;Limitations in functional capacity and performance;Impaired gait;Limitation in home management  -FLOR     Impairments Balance;Endurance;Gait;Impaired flexibility;Impaired postural alignment;Impaired muscle length;Impaired muscle power;Joint integrity;Joint mobility;Muscle strength;Pain;Poor body mechanics;Posture;Range of motion  -FLOR     Assessment Comments Anastasiia Faria is a 73 y.o. female referred to physical therapy for R lateral hip and groin pain with occasional LBP. She presents with a stable clinical presentation, along with  comorbidities of history of chronic L knee pain with history of clean out (15-20 years ago), HTN, and hypothyroid and personal factors of chronicity of pain and urgency to return to gym routine that may impact her progress in the plan of care. Patient denies imagining to date. Pt presents today with altered gait pattern (+ trendelenburg and significant B genu valgus (L>R)), limited SLS time, decreased R hip/lumbar ROM, decreased B LE strength (L hip > R hip weakness), and hypomobile L4-S1 spinal segments. She also presents with R sided + FABERs, posterior thigh sheer, hip scour test, Ely's test and + true leg length discrepancy (0.8 inch difference - L longer than R).  Her signs and symptoms are consistent with referring diagnosis. The previous impairments limit her ability to sleep without disturbance, perform household chores or maintain static position for extended periods of time or participate and workout routine without pain/limitation. Patients LEFS outcome measure indicates 45% ability where 100% represents no perceived disability. Pt will benefit from skilled PT to address the previous impairments and return to PLOF.  -FLOR     Please refer to paper survey for additional self-reported information Yes  -FLOR     Rehab Potential Good  -FLOR     Patient/caregiver participated in establishment of treatment plan and goals Yes  -FLOR     Patient would benefit from skilled therapy  intervention Yes  -FLOR        PT Plan    PT Frequency 2x/week  -FLOR     Predicted Duration of Therapy Intervention (PT) 8-10 visits  -FLOR     Planned CPT's? PT EVAL LOW COMPLEXITY: 40885;PT RE-EVAL: 11868;PT THER PROC EA 15 MIN: 22015;PT THER ACT EA 15 MIN: 50738;PT MANUAL THERAPY EA 15 MIN: 91367;PT NEUROMUSC RE-EDUCATION EA 15 MIN: 43641;PT GAIT TRAINING EA 15 MIN: 75551;PT SELF CARE/HOME MGMT/TRAIN EA 15: 80225;PT HOT OR COLD PACK TREAT MCARE  -FLOR     PT Plan Comments response to eval, consider nustep as warm up, potentially add black shoe lift with standing tasks to determine response prior to requesting shoe lift order/, potentially add bridge, SL clam, reverse clam, standing hip abd/ext, step up + knee  holding light weight, mini squats, prone HF/quad stretch, STM R lateral hip as needed  -FLOR           User Key  (r) = Recorded By, (t) = Taken By, (c) = Cosigned By    Initials Name Provider Type    Leila Hassan, PT Physical Therapist                   OP Exercises     Row Name 01/27/23 0800             Total Minutes    76838 - PT Therapeutic Exercise Minutes 8  -FLOR         Exercise 1    Exercise Name 1 Nustep  -FLOR      Additional Comments next visit  -FLOR         Exercise 2    Exercise Name 2 fig 4 stretch  -FLOR      Cueing 2 Verbal;Tactile  -FLOR      Reps 2 3  -FLOR      Time 2 20s  -FLOR         Exercise 3    Exercise Name 3 seated HS stretch  -FLOR      Cueing 3 Verbal;Demo  -FLOR      Reps 3 3  -FLOR      Time 3 20s  -FLOR            User Key  (r) = Recorded By, (t) = Taken By, (c) = Cosigned By    Initials Name Provider Type    Leila Hassan, PT Physical Therapist                              Outcome Measure Options: Lower Extremity Functional Scale (LEFS)  Lower Extremity Functional Index  Any of your usual work, housework or school activities: Moderate difficulty  Your usual hobbies, recreational or sporting activities: Moderate difficulty  Getting into or out of the bath: A little  bit of difficulty  Walking between rooms: A little bit of difficulty  Putting on your shoes or socks: Moderate difficulty  Squatting: Quite a bit of difficulty  Lifting an object, like a bag of groceries from the floor: Moderate difficulty  Performing light activities around your home: Moderate difficulty  Performing heavy activities around your home: Quite a bit of difficulty  Getting into or out of a car: A little bit of difficulty  Walking 2 blocks: A little bit of difficulty  Walking a mile: Quite a bit of difficulty  Going up or down 10 stairs (about 1 flight of stairs): Moderate difficulty  Standing for 1 hour: Quite a bit of difficulty  Sitting for 1 hour: A little bit of difficulty  Running on even ground: Quite a bit of difficulty  Running on uneven ground: Quite a bit of difficulty  Making sharp turns while running fast: Extreme difficulty or unable to perform activity  Hopping: Extreme difficulty or unable to perform activity  Rolling over in bed: A little bit of difficulty  Total: 36      Time Calculation:     Start Time: 0830  Stop Time: 0918  Time Calculation (min): 48 min  Timed Charges  66218 - PT Therapeutic Exercise Minutes: 8  70076 - PT Self Care/Mgmt Minutes: 5  Untimed Charges  PT Eval/Re-eval Minutes: 35  Total Minutes  Timed Charges Total Minutes: 13  Untimed Charges Total Minutes: 35   Total Minutes: 48     Therapy Charges for Today     Code Description Service Date Service Provider Modifiers Qty    13230015164 HC PT THER PROC EA 15 MIN 1/27/2023 Leila Villagomez, PT GP 1    07895547265 HC PT EVAL LOW COMPLEXITY 2 1/27/2023 Leila Villagomez, PT GP 1          PT G-Codes  Outcome Measure Options: Lower Extremity Functional Scale (LEFS)  Total: 36         Leila Villagomez PT  1/27/2023

## 2023-01-30 ENCOUNTER — HOSPITAL ENCOUNTER (OUTPATIENT)
Dept: PHYSICAL THERAPY | Facility: HOSPITAL | Age: 73
Setting detail: THERAPIES SERIES
Discharge: HOME | End: 2023-01-30
Payer: MEDICARE

## 2023-01-30 DIAGNOSIS — G89.29 CHRONIC RIGHT HIP PAIN: Primary | ICD-10-CM

## 2023-01-30 DIAGNOSIS — R29.898 WEAKNESS OF BOTH LOWER EXTREMITIES: ICD-10-CM

## 2023-01-30 DIAGNOSIS — R26.9 GAIT ABNORMALITY: ICD-10-CM

## 2023-01-30 DIAGNOSIS — M21.70 LEG LENGTH DISCREPANCY: ICD-10-CM

## 2023-01-30 DIAGNOSIS — M25.551 CHRONIC RIGHT HIP PAIN: Primary | ICD-10-CM

## 2023-01-30 PROCEDURE — 97140 MANUAL THERAPY 1/> REGIONS: CPT

## 2023-01-30 PROCEDURE — 97110 THERAPEUTIC EXERCISES: CPT

## 2023-01-30 NOTE — THERAPY TREATMENT NOTE
Outpatient Physical Therapy Ortho Treatment Note  [facility]     Patient Name: Anastasiia Faria  : 1950  MRN: [id number]  Today's Date: 2023      Visit Date: 2023    Visit Dx:    ICD-10-CM ICD-9-CM   1. Chronic right hip pain  M25.551 719.45    G89.29 338.29   2. Leg length discrepancy  M21.70 736.81   3. Weakness of both lower extremities  R29.898 729.89   4. Gait abnormality  R26.9 781.2       Patient Active Problem List   Diagnosis   • Colon polyp, hyperplastic   • Allergic rhinitis due to pollen   • Acquired hypothyroidism   • Essential hypertension   • FH: colon cancer in relative <50 years old   • Mixed hyperlipidemia   • Chronic right shoulder pain   • Periscapular pain   • Other idiopathic scoliosis, thoracic region   • Prediabetes   • Age-related osteoporosis without current pathological fracture   • Mild episode of recurrent major depressive disorder (HCC)   • Age-related nuclear cataract of both eyes   • Cystoid nevus of conjunctiva   • Anxiety   • Cataract   • Cataract extraction status of left eye   • Cataract extraction status of right eye   • Conjunctival cyst of left eye   • Depressive disorder   • Disorder of refraction   • Dry eye syndrome of bilateral lacrimal glands   • Hearing loss   • Hearing loss   • Hordeolum internum right upper eyelid   • Migraine   • Osteopenia   • Osteoporosis   • Renal stone   • Seasonal allergies   • Diverticulosis   • Internal hemorrhoids   • Posterior vitreous detachment of both eyes        Past Medical History:   Diagnosis Date   • Allergic    • Anxiety    • Cataract     Cataract surgery on right eye 2021 and left eye 2021. (Dr. Nanette Bateman)   • Chronic kidney disease    • Colon polyps    • Depression    • Diverticulosis    • Encounter for annual health examination 2014    Annual Health Assessment   • Family history of colon cancer    • Fibrocystic breast    • GERD (gastroesophageal reflux disease)    • Heart murmur    •  Herpes labialis without complication    • Hip pain     right   • History of mammogram 12/20/2010   • HL (hearing loss) 2014    Hearing Aids   • Hyperlipidemia    • Hypertension    • Hypothyroidism    • Insomnia    • Kidney stones    • Migraines    • Osteopenia     Prolia -last 9/2021,3/2022   • PONV (postoperative nausea and vomiting)    • Scoliosis     Dr. Stanford 5/2018   • Visual impairment     Wear Glasses        Past Surgical History:   Procedure Laterality Date   • BONE MARROW BIOPSY     • BREAST BIOPSY     • BREAST CYST ASPIRATION     • BREAST SURGERY Right     BIOPSY   • CATARACT EXTRACTION, BILATERAL     • COLONOSCOPY N/A 10/27/2016    Procedure: COLONOSCOPY INTO CECUM;  Surgeon: Osvaldo Dorado MD;  Location: [facility] ENDOSCOPY;  Service:    • COLONOSCOPY  01/26/2011   • COLONOSCOPY  02/04/2005   • COLONOSCOPY N/A 12/2/2021    Procedure: COLONOSCOPY INTO CECUM WITH COLD BIOPSY POLYPECTOMY AND HOT SNARE POLYPECTOMY;  Surgeon: Osvaldo Dorado MD;  Location: [facility] ENDOSCOPY;  Service: General;  Laterality: N/A;  PRE-FAMILY HISTORY OF COLON CANCER, PERSONAL HISTORY OF COLON CANCER  POST- POLYPS, DIVERTICULOSIS, HEMORRHOIDS   • KNEE ARTHROSCOPY Left    • PAP SMEAR  12/20/2010                        PT Assessment/Plan     Row Name 01/30/23 1100          PT Assessment    Assessment Comments Pt returns for first f/u visit, reports good HEP compliance. Continued with LE/low back mobility and progressed core/hip strengthening. Initiated manual therapy interventions to R hip girdle for pain relief. Discussed gradual return to exercise classes and modification if any activities during class provoke pain. Reports decreased pain at end of session.  -CC        PT Plan    PT Plan Comments Next visit: manual if beneficial, prone HF/quad str, rev clam, AR Press, standing hip abd/ext. Shoe lift?  -CC           User Key  (r) = Recorded By, (t) = Taken By, (c) = Cosigned By    Initials Name Provider Type    CC  Angie Ortiz, PT Physical Therapist                   OP Exercises     Row Name 01/30/23 1100             Subjective Comments    Subjective Comments Pain is all on right side  -CC         Subjective Pain    Able to rate subjective pain? yes  -CC      Pre-Treatment Pain Level 2  -CC         Total Minutes    60806 - PT Therapeutic Exercise Minutes 30  -CC      99998 -  PT Neuromuscular Reeducation Minutes 5  -CC      02769 - PT Manual Therapy Minutes 10  -CC         Exercise 1    Exercise Name 1 Nustep  -CC      Time 1 5 min, lvl 5  -CC         Exercise 2    Exercise Name 2 fig 4 stretch  -CC      Cueing 2 Verbal;Tactile  -CC      Reps 2 3  -CC      Time 2 20s  -CC         Exercise 3    Exercise Name 3 seated HS stretch  -CC      Cueing 3 Verbal;Demo  -CC      Reps 3 3  -CC      Time 3 20s  -CC         Exercise 4    Exercise Name 4 LTR  -CC      Cueing 4 Verbal  -CC      Reps 4 10 jay  -CC      Time 4 3-5 sec  -CC         Exercise 5    Exercise Name 5 bridge with TrA  -CC      Cueing 5 Verbal  -CC      Sets 5 2  -CC      Reps 5 10  -CC         Exercise 6    Exercise Name 6 TrA Brace  -CC      Cueing 6 Verbal;Tactile;Demo  -CC      Reps 6 5  -CC      Additional Comments tactile and verbal cues to find TA prior to bridge  -CC         Exercise 7    Exercise Name 7 s/l clam  -CC      Cueing 7 Verbal;Tactile  -CC      Sets 7 2 jay  -CC      Reps 7 10  -CC      Time 7 RTB  -CC      Additional Comments cues to avoid lumbar rotation  -CC            User Key  (r) = Recorded By, (t) = Taken By, (c) = Cosigned By    Initials Name Provider Type    CC Angie Ortiz, PT Physical Therapist                         Manual Rx (last 36 hours)     Manual Treatments     Row Name 01/30/23 1100             Total Minutes    90640 - PT Manual Therapy Minutes 10  -CC         Manual Rx 1    Manual Rx 1 Location R lateral hip  -CC      Manual Rx 1 Type STM with foam roll in L s/l with pillow btwn knees  -CC            User Key   (r) = Recorded By, (t) = Taken By, (c) = Cosigned By    Initials Name Provider Type    Angie Del Valle, PT Physical Therapist                 PT OP Goals     Row Name 01/30/23 1100          PT Short Term Goals    STG Date to Achieve 02/26/23  -     STG 1 Pt will be independent with initial HEP to improve strength/ROM and decrease pain.  -CC     STG 1 Progress Ongoing  -     STG 2 Patient will improve ability to sleep through the night with </= 1-2 sleep disturbances related to back/hip pain to improve overall sleep quality and quality of life  -CC     STG 2 Progress Ongoing  -     STG 3 Pt will improve walking tolerance from 10 min to at least 25 minutes with </= 2/10 R hip pain to demonstrate improved activity tolerance while walking on TM for gym routine.  -     STG 3 Progress Ongoing  -        Long Term Goals    LTG Date to Achieve 03/28/23  -     LTG 1 Pt will be independent with advanced HEP to maintain strength/ROM and decrease pain.  -     LTG 1 Progress Ongoing  -     LTG 2 Pt will improve score on LEFS to at least >/= 65% function for improved quality of life.  -     LTG 2 Progress Ongoing  -     LTG 3 Pt will improve B hip strength to at least 4/5.  -     LTG 3 Progress Ongoing  -     LTG 4 Pt will improve R hip IR AROM to at least 10-15 deg and ER to 45 deg for improved mobility with functional and recreational tasks.  -     LTG 4 Progress Ongoing  -     LTG 5 Patient will report returning to gym routine at 50-75% from PLOF, 3-5x/week with </= 3/10 R hip pain to demonstrate greater ease with recreational gym routine and return to PLOF.  -     LTG 5 Progress Ongoing  -           User Key  (r) = Recorded By, (t) = Taken By, (c) = Cosigned By    Initials Name Provider Type    Angie Del Valle PT Physical Therapist                Therapy Education  Education Details: updated HEP; issued RTB  Given: HEP  Program: Reinforced, Progressed  How Provided: Verbal,  Demonstration, Written  Provided to: Patient  Level of Understanding: Verbalized, Demonstrated              Time Calculation:   Start Time: 1144  Stop Time: 1229  Time Calculation (min): 45 min  Total Timed Code Minutes- PT: 45 minute(s)  Timed Charges  51866 - PT Therapeutic Exercise Minutes: 30  37371 -  PT Neuromuscular Reeducation Minutes: 5  41351 - PT Manual Therapy Minutes: 10  Total Minutes  Timed Charges Total Minutes: 45   Total Minutes: 45  Therapy Charges for Today     Code Description Service Date Service Provider Modifiers Qty    70536857485  PT THER PROC EA 15 MIN 1/30/2023 Angie Ortiz, PT GP 2    [id number]  PT MANUAL THERAPY EA 15 MIN 1/30/2023 Angie Ortiz, PT GP 1                    Angie Ortiz PT  1/30/2023

## 2023-02-03 ENCOUNTER — HOSPITAL ENCOUNTER (OUTPATIENT)
Dept: PHYSICAL THERAPY | Facility: HOSPITAL | Age: 73
Setting detail: THERAPIES SERIES
Discharge: HOME | End: 2023-02-03
Payer: MEDICARE

## 2023-02-03 DIAGNOSIS — M25.551 CHRONIC RIGHT HIP PAIN: Primary | ICD-10-CM

## 2023-02-03 DIAGNOSIS — G89.29 CHRONIC RIGHT HIP PAIN: Primary | ICD-10-CM

## 2023-02-03 DIAGNOSIS — R26.9 GAIT ABNORMALITY: ICD-10-CM

## 2023-02-03 DIAGNOSIS — M21.70 LEG LENGTH DISCREPANCY: ICD-10-CM

## 2023-02-03 DIAGNOSIS — R29.898 WEAKNESS OF BOTH LOWER EXTREMITIES: ICD-10-CM

## 2023-02-03 PROCEDURE — 97140 MANUAL THERAPY 1/> REGIONS: CPT | Performed by: PHYSICAL THERAPIST

## 2023-02-03 PROCEDURE — 97110 THERAPEUTIC EXERCISES: CPT | Performed by: PHYSICAL THERAPIST

## 2023-02-03 PROCEDURE — 97530 THERAPEUTIC ACTIVITIES: CPT | Performed by: PHYSICAL THERAPIST

## 2023-02-03 NOTE — THERAPY TREATMENT NOTE
Outpatient Physical Therapy Ortho Treatment Note  [facility]     Patient Name: Anastasiia Faria  : 1950  MRN: [id number]  Today's Date: 2/3/2023      Visit Date: 2023    Visit Dx:    ICD-10-CM ICD-9-CM   1. Chronic right hip pain  M25.551 719.45    G89.29 338.29   2. Leg length discrepancy  M21.70 736.81   3. Weakness of both lower extremities  R29.898 729.89   4. Gait abnormality  R26.9 781.2       Patient Active Problem List   Diagnosis   • Colon polyp, hyperplastic   • Allergic rhinitis due to pollen   • Acquired hypothyroidism   • Essential hypertension   • FH: colon cancer in relative <50 years old   • Mixed hyperlipidemia   • Chronic right shoulder pain   • Periscapular pain   • Other idiopathic scoliosis, thoracic region   • Prediabetes   • Age-related osteoporosis without current pathological fracture   • Mild episode of recurrent major depressive disorder (HCC)   • Age-related nuclear cataract of both eyes   • Cystoid nevus of conjunctiva   • Anxiety   • Cataract   • Cataract extraction status of left eye   • Cataract extraction status of right eye   • Conjunctival cyst of left eye   • Depressive disorder   • Disorder of refraction   • Dry eye syndrome of bilateral lacrimal glands   • Hearing loss   • Hearing loss   • Hordeolum internum right upper eyelid   • Migraine   • Osteopenia   • Osteoporosis   • Renal stone   • Seasonal allergies   • Diverticulosis   • Internal hemorrhoids   • Posterior vitreous detachment of both eyes        Past Medical History:   Diagnosis Date   • Allergic    • Anxiety    • Cataract     Cataract surgery on right eye 2021 and left eye 2021. (Dr. Nanette Bateman)   • Chronic kidney disease    • Colon polyps    • Depression    • Diverticulosis    • Encounter for annual health examination 2014    Annual Health Assessment   • Family history of colon cancer    • Fibrocystic breast    • GERD (gastroesophageal reflux disease)    • Heart murmur    • Herpes  labialis without complication    • Hip pain     right   • History of mammogram 12/20/2010   • HL (hearing loss) 2014    Hearing Aids   • Hyperlipidemia    • Hypertension    • Hypothyroidism    • Insomnia    • Kidney stones    • Migraines    • Osteopenia     Prolia -last 9/2021,3/2022   • PONV (postoperative nausea and vomiting)    • Scoliosis     Dr. Stanford 5/2018   • Visual impairment     Wear Glasses        Past Surgical History:   Procedure Laterality Date   • BONE MARROW BIOPSY     • BREAST BIOPSY     • BREAST CYST ASPIRATION     • BREAST SURGERY Right     BIOPSY   • CATARACT EXTRACTION, BILATERAL     • COLONOSCOPY N/A 10/27/2016    Procedure: COLONOSCOPY INTO CECUM;  Surgeon: Osvaldo Dorado MD;  Location: [facility] ENDOSCOPY;  Service:    • COLONOSCOPY  01/26/2011   • COLONOSCOPY  02/04/2005   • COLONOSCOPY N/A 12/2/2021    Procedure: COLONOSCOPY INTO CECUM WITH COLD BIOPSY POLYPECTOMY AND HOT SNARE POLYPECTOMY;  Surgeon: Osvaldo Dorado MD;  Location: [facility] ENDOSCOPY;  Service: General;  Laterality: N/A;  PRE-FAMILY HISTORY OF COLON CANCER, PERSONAL HISTORY OF COLON CANCER  POST- POLYPS, DIVERTICULOSIS, HEMORRHOIDS   • KNEE ARTHROSCOPY Left    • PAP SMEAR  12/20/2010                        PT Assessment/Plan     Row Name 02/03/23 0751          PT Assessment    Assessment Comments Ms. Faria returns to the clinic, reporting increased R posterior/lateral hip/groin pain. She reports the pain seemed to increase last night. She denies N/T BLE. She demosntrates (+) scolisis, L lateral shift, Lgenu valgus.  We repeated trigger point/STM to R posterior/lateral hip girdle tissue. We did discuss possible benefits of DDN and reviewed risks/benefits.  She is interested and we may consider this modality in the future.  Assessed for and fitted for heel lift for R shoe. She will wear over the weekend and report back as to her response.  Encouraged Ms. Faria to hold on gym activities for now to minimize  number of variables.  Ms. Faria continues to be a good candidate for skilled physical therapy.  -GJ        PT Plan    PT Plan Comments assess reponse to STM of R posterior/lateral hip girdle tissue and to heel lift for R shoe.  If heel lift beneficial education re: leather lift. May consider DDN R posterior lateral hip girdle tissue.  Continue to work on hip girdle/core strength and flexibility.  -GJ           User Key  (r) = Recorded By, (t) = Taken By, (c) = Cosigned By    Initials Name Provider Type    GJ Chidi Kimball, PT Physical Therapist                   OP Exercises     Row Name 02/03/23 0746 02/03/23 0700          Subjective Comments    Subjective Comments -- not doing well today, I've been having pain since last night  -GJ        Total Minutes    37794 - PT Therapeutic Exercise Minutes 13  -GJ --     80171 - PT Therapeutic Activity Minutes 10  -GJ --     17832 - PT Manual Therapy Minutes 20  -GJ --        Exercise 1    Exercise Name 1 -- Nustep  -GJ     Time 1 -- 5 min, lvl 5  -GJ        Exercise 2    Exercise Name 2 -- fig 4 stretch  -GJ     Cueing 2 -- Verbal;Tactile  -GJ     Reps 2 -- 3  -GJ     Time 2 -- 20s  -GJ     Additional Comments -- push/pull and demosntrated seated piriformis stretch  -GJ        Exercise 3    Exercise Name 3 -- seated HS stretch  -GJ     Cueing 3 -- Verbal;Demo  -GJ     Reps 3 -- 3  -GJ     Time 3 -- 20s  -GJ        Exercise 4    Exercise Name 4 -- LTR  -GJ     Cueing 4 -- Verbal  -GJ     Reps 4 -- 10 jay  -GJ     Time 4 -- 3-5 sec  -GJ        Exercise 7    Exercise Name 7 -- s/l clam  -GJ     Additional Comments -- next session  -GJ        Exercise 8    Exercise Name 8 -- lateral prayer stretch, B  -GJ     Cueing 8 -- Verbal;Demo  -GJ     Reps 8 -- 3  -GJ     Time 8 -- 20s  -GJ        Exercise 9    Exercise Name 9 -- assess for heel lift/ issued lift for R shoe  -GJ     Time 9 -- 10 min  -GJ           User Key  (r) = Recorded By, (t) = Taken By, (c) = Cosigned By     Initials Name Provider Type    RITESH Rehana Chidi W, PT Physical Therapist                         Manual Rx (last 36 hours)     Manual Treatments     Row Name 02/03/23 0746 02/03/23 0700          Total Minutes    24713 - PT Manual Therapy Minutes 20  -GJ --        Manual Rx 1    Manual Rx 1 Location -- R lateral hip  -GJ     Manual Rx 1 Type -- STM R posterior/lateral hip girdle tissue  -GJ     Manual Rx 1 Grade -- pt in L SL position  -GJ        Manual Rx 2    Manual Rx 2 Location -- prone hip flexor/quad stretch, B  -GJ           User Key  (r) = Recorded By, (t) = Taken By, (c) = Cosigned By    Initials Name Provider Type    Chidi Thompson W, PT Physical Therapist                 PT OP Goals     Row Name 02/03/23 0700          PT Short Term Goals    STG Date to Achieve 02/26/23  -GJ     STG 1 Pt will be independent with initial HEP to improve strength/ROM and decrease pain.  -GJ     STG 1 Progress Ongoing;Partially Met  -GJ     STG 1 Progress Comments revviewed  -GJ     STG 2 Patient will improve ability to sleep through the night with </= 1-2 sleep disturbances related to back/hip pain to improve overall sleep quality and quality of life  -GJ     STG 2 Progress Ongoing  -GJ     STG 2 Progress Comments pt reports difficult night of sleep last night  -GJ     STG 3 Pt will improve walking tolerance from 10 min to at least 25 minutes with </= 2/10 R hip pain to demonstrate improved activity tolerance while walking on TM for gym routine.  -GJ     STG 3 Progress Ongoing  -GJ        Long Term Goals    LTG Date to Achieve 03/28/23  -GJ     LTG 1 Pt will be independent with advanced HEP to maintain strength/ROM and decrease pain.  -GJ     LTG 1 Progress Ongoing  -GJ     LTG 2 Pt will improve score on LEFS to at least >/= 65% function for improved quality of life.  -GJ     LTG 2 Progress Ongoing  -GJ     LTG 3 Pt will improve B hip strength to at least 4/5.  -GJ     LTG 3 Progress Ongoing  -GJ     LTG 4 Pt will improve R  hip IR AROM to at least 10-15 deg and ER to 45 deg for improved mobility with functional and recreational tasks.  -GJ     LTG 4 Progress Ongoing  -GJ     LTG 5 Patient will report returning to gym routine at 50-75% from PLOF, 3-5x/week with </= 3/10 R hip pain to demonstrate greater ease with recreational gym routine and return to PLOF.  -GJ     LTG 5 Progress Ongoing  -GJ           User Key  (r) = Recorded By, (t) = Taken By, (c) = Cosigned By    Initials Name Provider Type    Chidi Thompson, PT Physical Therapist                Therapy Education  Education Details: instructed in self trigger point massage to posterior hip girdle with tennis ball, disscussed trial if heel lift (if it is aggravating to DC heel lift, if beneficial we will discuss leather lift which will last longer). Discussed activity modification and to pull back somewhat on her gym activities to allow for optimal environment to heal.Reviewed risks/benefits of DDN  Given: HEP, Symptoms/condition management, Pain management, Posture/body mechanics, Mobility training, Edema management  Program: Reinforced, New, Modified  How Provided: Verbal, Demonstration, Written  Provided to: Patient  Level of Understanding: Teach back education performed, Verbalized, Demonstrated              Time Calculation:   Start Time: 0746  Stop Time: 0830  Time Calculation (min): 44 min  Timed Charges  35112 - PT Therapeutic Exercise Minutes: 13  21412 - PT Manual Therapy Minutes: 20  81351 - PT Therapeutic Activity Minutes: 10  Total Minutes  Timed Charges Total Minutes: 43   Total Minutes: 43  Therapy Charges for Today     Code Description Service Date Service Provider Modifiers Qty    59778870759 HC PT THER PROC EA 15 MIN 2/3/2023 Chidi Kimball, PT GP 1    30352520892 HC PT THERAPEUTIC ACT EA 15 MIN 2/3/2023 Chidi Kimball, PT GP 1    [id number] HC PT MANUAL THERAPY EA 15 MIN 2/3/2023 Chidi Kimball, PT GP 1                    Chidi Kimball PT  2/3/2023

## 2023-02-14 DIAGNOSIS — M81.0 AGE-RELATED OSTEOPOROSIS WITHOUT CURRENT PATHOLOGICAL FRACTURE: Primary | ICD-10-CM

## 2023-02-15 ENCOUNTER — HOSPITAL ENCOUNTER (OUTPATIENT)
Dept: PHYSICAL THERAPY | Facility: HOSPITAL | Age: 73
Setting detail: THERAPIES SERIES
Discharge: HOME | End: 2023-02-15

## 2023-02-15 DIAGNOSIS — M25.551 CHRONIC RIGHT HIP PAIN: Primary | ICD-10-CM

## 2023-02-15 DIAGNOSIS — R26.9 GAIT ABNORMALITY: ICD-10-CM

## 2023-02-15 DIAGNOSIS — G89.29 CHRONIC RIGHT HIP PAIN: Primary | ICD-10-CM

## 2023-02-15 DIAGNOSIS — R29.898 WEAKNESS OF BOTH LOWER EXTREMITIES: ICD-10-CM

## 2023-02-15 DIAGNOSIS — M21.70 LEG LENGTH DISCREPANCY: ICD-10-CM

## 2023-02-15 NOTE — THERAPY TREATMENT NOTE
Outpatient Physical Therapy Ortho Treatment Note  [facility]     Patient Name: Anastasiia Faria  : 1950  MRN: [id number]  Today's Date: 2/15/2023      Visit Date: 02/15/2023    Visit Dx:    ICD-10-CM ICD-9-CM   1. Chronic right hip pain  M25.551 719.45    G89.29 338.29   2. Leg length discrepancy  M21.70 736.81   3. Weakness of both lower extremities  R29.898 729.89   4. Gait abnormality  R26.9 781.2       Patient Active Problem List   Diagnosis   • Colon polyp, hyperplastic   • Allergic rhinitis due to pollen   • Acquired hypothyroidism   • Essential hypertension   • FH: colon cancer in relative <50 years old   • Mixed hyperlipidemia   • Chronic right shoulder pain   • Periscapular pain   • Other idiopathic scoliosis, thoracic region   • Prediabetes   • Age-related osteoporosis without current pathological fracture   • Mild episode of recurrent major depressive disorder (HCC)   • Age-related nuclear cataract of both eyes   • Cystoid nevus of conjunctiva   • Anxiety   • Cataract   • Cataract extraction status of left eye   • Cataract extraction status of right eye   • Conjunctival cyst of left eye   • Depressive disorder   • Disorder of refraction   • Dry eye syndrome of bilateral lacrimal glands   • Hearing loss   • Hearing loss   • Hordeolum internum right upper eyelid   • Migraine   • Osteopenia   • Osteoporosis   • Renal stone   • Seasonal allergies   • Diverticulosis   • Internal hemorrhoids   • Posterior vitreous detachment of both eyes        Past Medical History:   Diagnosis Date   • Allergic    • Anxiety    • Cataract     Cataract surgery on right eye 2021 and left eye 2021. (Dr. Nanette Bateman)   • Chronic kidney disease    • Colon polyps    • Depression    • Diverticulosis    • Encounter for annual health examination 2014    Annual Health Assessment   • Family history of colon cancer    • Fibrocystic breast    • GERD (gastroesophageal reflux disease)    • Heart murmur    •  Herpes labialis without complication    • Hip pain     right   • History of mammogram 12/20/2010   • HL (hearing loss) 2014    Hearing Aids   • Hyperlipidemia    • Hypertension    • Hypothyroidism    • Insomnia    • Kidney stones    • Migraines    • Osteopenia     Prolia -last 9/2021,3/2022   • PONV (postoperative nausea and vomiting)    • Scoliosis     Dr. Stanford 5/2018   • Visual impairment     Wear Glasses        Past Surgical History:   Procedure Laterality Date   • BONE MARROW BIOPSY     • BREAST BIOPSY     • BREAST CYST ASPIRATION     • BREAST SURGERY Right     BIOPSY   • CATARACT EXTRACTION, BILATERAL     • COLONOSCOPY N/A 10/27/2016    Procedure: COLONOSCOPY INTO CECUM;  Surgeon: Osvaldo Dorado MD;  Location: [facility] ENDOSCOPY;  Service:    • COLONOSCOPY  01/26/2011   • COLONOSCOPY  02/04/2005   • COLONOSCOPY N/A 12/2/2021    Procedure: COLONOSCOPY INTO CECUM WITH COLD BIOPSY POLYPECTOMY AND HOT SNARE POLYPECTOMY;  Surgeon: Osvaldo Dorado MD;  Location: [facility] ENDOSCOPY;  Service: General;  Laterality: N/A;  PRE-FAMILY HISTORY OF COLON CANCER, PERSONAL HISTORY OF COLON CANCER  POST- POLYPS, DIVERTICULOSIS, HEMORRHOIDS   • KNEE ARTHROSCOPY Left    • PAP SMEAR  12/20/2010                        PT Assessment/Plan     Row Name 02/15/23 0918          PT Assessment    Assessment Comments Ms. Faria returns, reporting some improvement following manual trigger point release. She expresses interest in trial of DDN to R posterior hip girdle tissue. Following education and written/verbal consent, we proceeded with DDN of the R posterior hip girdle tissue. She demosntrated several moderate LTR's to this tissue.  She also reports that her heel lift seems to help (in R shoe) but maybe it could be bigger. I did issue additional heel lift for tiral purposes, advising her to remove if aggravating.  We also discussed the notion that secondary to her scoliosis, she has had an apparrent leg length  discrepency for some time, so altering her biomechanics may initially be benefical, but could become aggravating.  Encouraged her to continue to assess.  Ms. Faria continues to be a candidate for skilled physical therapy.  -GJ        PT Plan    PT Plan Comments assess response to DDN to R posterior hip girdle tissue.  assess additional heel lift (R shoe).  likely resume hip girdle strengthening  -GJ           User Key  (r) = Recorded By, (t) = Taken By, (c) = Cosigned By    Initials Name Provider Type    Chidi Thompson, PT Physical Therapist                   OP Exercises     Row Name 02/15/23 0800             Subjective Comments    Subjective Comments i feel like the lift was good, but maybe I need a higher one. I have a few questions about the exercises. I was sore after the massage last time but felt like it helped.  -RITESH         Exercise 1    Exercise Name 1 Nustep  -GJ      Time 1 5 min, lvl 5  -GJ            User Key  (r) = Recorded By, (t) = Taken By, (c) = Cosigned By    Initials Name Provider Type    Chidi Thompson, PT Physical Therapist                         Manual Rx (last 36 hours)     Manual Treatments     Row Name 02/15/23 0700             Manual Rx 3    Manual Rx 3 Location intramuscular manual therapy  - Assessed pt. for Dry Needling and intramuscular manual therapy. Discussed risks/benefits of Dry Needling with pt, including but not limited to muscle soreness, bruising, vasovagal response, pneumothorax, nerve injury. Patient signed written consent to proceed with treatment.    Patient position during treatment: L SL with pillow between knees  Muscles treated: R posterior/lateral hip girdle tissue  Response to treatment: several moderate LTR's. No immediate adverse response.     palpation used before, during, and after to facilitate assessment Clean needle technique observed at all times, precautions for lung fields, neurovascular structures observed.    DDN performed by Chidi Kimball  II, PT, DPT     Manual Rx 3 Type R posterior/lateral hip girdle  -GJ            User Key  (r) = Recorded By, (t) = Taken By, (c) = Cosigned By    Initials Name Provider Type    Chidi Thompson, PT Physical Therapist                 PT OP Goals     Row Name 02/15/23 0800          PT Short Term Goals    STG Date to Achieve 02/26/23  -GJ     STG 1 Pt will be independent with initial HEP to improve strength/ROM and decrease pain.  -GJ     STG 1 Progress Ongoing;Partially Met  -GJ     STG 2 Patient will improve ability to sleep through the night with </= 1-2 sleep disturbances related to back/hip pain to improve overall sleep quality and quality of life  -GJ     STG 2 Progress Ongoing  -GJ     STG 3 Pt will improve walking tolerance from 10 min to at least 25 minutes with </= 2/10 R hip pain to demonstrate improved activity tolerance while walking on TM for gym routine.  -GJ     STG 3 Progress Ongoing  -GJ        Long Term Goals    LTG Date to Achieve 03/28/23  -GJ     LTG 1 Pt will be independent with advanced HEP to maintain strength/ROM and decrease pain.  -GJ     LTG 1 Progress Ongoing  -GJ     LTG 2 Pt will improve score on LEFS to at least >/= 65% function for improved quality of life.  -GJ     LTG 2 Progress Ongoing  -GJ     LTG 3 Pt will improve B hip strength to at least 4/5.  -GJ     LTG 3 Progress Ongoing  -GJ     LTG 4 Pt will improve R hip IR AROM to at least 10-15 deg and ER to 45 deg for improved mobility with functional and recreational tasks.  -GJ     LTG 4 Progress Ongoing  -GJ     LTG 5 Patient will report returning to gym routine at 50-75% from PLOF, 3-5x/week with </= 3/10 R hip pain to demonstrate greater ease with recreational gym routine and return to PLOF.  -GJ     LTG 5 Progress Ongoing  -GJ           User Key  (r) = Recorded By, (t) = Taken By, (c) = Cosigned By    Initials Name Provider Type    Chidi Thompson, PT Physical Therapist                Therapy Education  Education Details:  Discussed risks/benefits of DDN and it's role in therapy as an adjunct therapy and not a stand alone treatment. Discussed role of strength, postural awareness, activity modifications as integral components of the plan of care. Answered questions.  Answered questions about pirifomis stretch and child's pose stretch.  Issued additional temporary heel lift to add to what she has to assess her response.  She was advised to remove if this worsens her symptoms.  Given: HEP, Symptoms/condition management, Pain management, Posture/body mechanics, Mobility training  Program: Reinforced  How Provided: Verbal  Provided to: Patient  Level of Understanding: Verbalized              Time Calculation:   Start Time: 0830  Stop Time: 0910  Time Calculation (min): 40 min  Therapy Charges for Today     Code Description Service Date Service Provider Modifiers Qty    34775770793 HC PT SELF PAY DRY NEEDLING SESSION 2/15/2023 Chidi Kimball, PT  1                    Chidi Kimball, PT  2/15/2023

## 2023-02-23 ENCOUNTER — HOSPITAL ENCOUNTER (OUTPATIENT)
Dept: PHYSICAL THERAPY | Facility: HOSPITAL | Age: 73
Setting detail: THERAPIES SERIES
Discharge: HOME | End: 2023-02-23
Payer: MEDICARE

## 2023-02-23 DIAGNOSIS — R29.898 WEAKNESS OF BOTH LOWER EXTREMITIES: ICD-10-CM

## 2023-02-23 DIAGNOSIS — M21.70 LEG LENGTH DISCREPANCY: ICD-10-CM

## 2023-02-23 DIAGNOSIS — R26.9 GAIT ABNORMALITY: ICD-10-CM

## 2023-02-23 DIAGNOSIS — M25.551 CHRONIC RIGHT HIP PAIN: Primary | ICD-10-CM

## 2023-02-23 DIAGNOSIS — G89.29 CHRONIC RIGHT HIP PAIN: Primary | ICD-10-CM

## 2023-02-23 PROCEDURE — 97110 THERAPEUTIC EXERCISES: CPT

## 2023-02-23 PROCEDURE — 97530 THERAPEUTIC ACTIVITIES: CPT

## 2023-02-23 NOTE — THERAPY PROGRESS REPORT/RE-CERT
Outpatient Physical Therapy Ortho Progress Note  [facility]     Patient Name: Anastasiia Faria  : 1950  MRN: [id number]  Today's Date: 2023      Visit Date: 2023    Visit Dx:    ICD-10-CM ICD-9-CM   1. Chronic right hip pain  M25.551 719.45    G89.29 338.29   2. Leg length discrepancy  M21.70 736.81   3. Weakness of both lower extremities  R29.898 729.89   4. Gait abnormality  R26.9 781.2       Patient Active Problem List   Diagnosis   • Colon polyp, hyperplastic   • Allergic rhinitis due to pollen   • Acquired hypothyroidism   • Essential hypertension   • FH: colon cancer in relative <50 years old   • Mixed hyperlipidemia   • Chronic right shoulder pain   • Periscapular pain   • Other idiopathic scoliosis, thoracic region   • Prediabetes   • Age-related osteoporosis without current pathological fracture   • Mild episode of recurrent major depressive disorder (HCC)   • Age-related nuclear cataract of both eyes   • Cystoid nevus of conjunctiva   • Anxiety   • Cataract   • Cataract extraction status of left eye   • Cataract extraction status of right eye   • Conjunctival cyst of left eye   • Depressive disorder   • Disorder of refraction   • Dry eye syndrome of bilateral lacrimal glands   • Hearing loss   • Hearing loss   • Hordeolum internum right upper eyelid   • Migraine   • Osteopenia   • Osteoporosis   • Renal stone   • Seasonal allergies   • Diverticulosis   • Internal hemorrhoids   • Posterior vitreous detachment of both eyes        Past Medical History:   Diagnosis Date   • Allergic    • Anxiety    • Cataract     Cataract surgery on right eye 2021 and left eye 2021. (Dr. Nanette Bateman)   • Chronic kidney disease    • Colon polyps    • Depression    • Diverticulosis    • Encounter for annual health examination 2014    Annual Health Assessment   • Family history of colon cancer    • Fibrocystic breast    • GERD (gastroesophageal reflux disease)    • Heart murmur    • Herpes  labialis without complication    • Hip pain     right   • History of mammogram 12/20/2010   • HL (hearing loss) 2014    Hearing Aids   • Hyperlipidemia    • Hypertension    • Hypothyroidism    • Insomnia    • Kidney stones    • Migraines    • Osteopenia     Prolia -last 9/2021,3/2022   • PONV (postoperative nausea and vomiting)    • Scoliosis     Dr. Stanford 5/2018   • Visual impairment     Wear Glasses        Past Surgical History:   Procedure Laterality Date   • BONE MARROW BIOPSY     • BREAST BIOPSY     • BREAST CYST ASPIRATION     • BREAST SURGERY Right     BIOPSY   • CATARACT EXTRACTION, BILATERAL     • COLONOSCOPY N/A 10/27/2016    Procedure: COLONOSCOPY INTO CECUM;  Surgeon: Osvaldo Dorado MD;  Location: [facility] ENDOSCOPY;  Service:    • COLONOSCOPY  01/26/2011   • COLONOSCOPY  02/04/2005   • COLONOSCOPY N/A 12/2/2021    Procedure: COLONOSCOPY INTO CECUM WITH COLD BIOPSY POLYPECTOMY AND HOT SNARE POLYPECTOMY;  Surgeon: Osvaldo Dorado MD;  Location: [facility] ENDOSCOPY;  Service: General;  Laterality: N/A;  PRE-FAMILY HISTORY OF COLON CANCER, PERSONAL HISTORY OF COLON CANCER  POST- POLYPS, DIVERTICULOSIS, HEMORRHOIDS   • KNEE ARTHROSCOPY Left    • PAP SMEAR  12/20/2010                        PT Assessment/Plan     Row Name 02/23/23 1000          PT Assessment    Functional Limitations Limitations in community activities;Performance in leisure activities;Limitations in functional capacity and performance;Impaired gait;Limitation in home management  -FLOR     Impairments Balance;Endurance;Gait;Impaired flexibility;Impaired postural alignment;Impaired muscle length;Impaired muscle power;Joint integrity;Joint mobility;Muscle strength;Pain;Poor body mechanics;Posture;Range of motion  -FLOR     Assessment Comments Anastasiia Faria has been seen for 5 physical therapy sessions for R lateral hip and groin pain with occasional LBP. Patient returns today after having dry-needling last session and reports lingering  soreness but feels the tension in her hip has really improved. She now reports overall 50% improvement since the start of PT and feels this is directly related to DDN and shoe lift. She no longer has constant pain and has reduced her need for medication from 2-3x/day to 1-2x/day. Throughout her POC, treatment has included therapeutic exercise, manual therapy, therapeutic activity, neuro-muscular retraining , patient education with home exercise program  and dry needling . Progress to physical therapy goals is good. Pt has met 2/3 STG and 0/5 LTG and progressing toward remaining goals. Now that her pain is more intermittent, she continues to have pain/discomfort when walking > 10 minutes remains unchanged from initial evaluation, after sitting for longer periods of time and mild discomfort in low back/lateral hip and groin shorting after completing exercises. She reports it typically improves within 30 minutes - 1 hour. Her sleep quality has also improved to 1-2 disturbances per night, allowing her to meet STG. She continues to demo B hip weakness and ROM limitations. Continued focus on lower lumbar and hip mobility in attempt to relieve pain and hip strength ad stability. Reviewed previously performed exercises with addition of AR press and standing hip abd/ext on foam pad. She will benefit from continued skilled physical therapy to address remaining impairments and functional limitations.  -FLOR     Please refer to paper survey for additional self-reported information Yes  -FLOR     Rehab Potential Good  -FLOR     Patient/caregiver participated in establishment of treatment plan and goals Yes  -FLOR     Patient would benefit from skilled therapy intervention Yes  -FLOR        PT Plan    PT Frequency 2x/week  -FLOR     Predicted Duration of Therapy Intervention (PT) 6-8 visits  -FLOR     Planned CPT's? PT RE-EVAL: 27676;PT THER PROC EA 15 MIN: 01159;PT THER ACT EA 15 MIN: 76370;PT MANUAL THERAPY EA 15 MIN: 20826;PT NEUROMUSC  RE-EDUCATION EA 15 MIN: 56763;PT GAIT TRAINING EA 15 MIN: 78628;PT SELF CARE/HOME MGMT/TRAIN EA 15: 03147;PT HOT OR COLD PACK TREAT MCARE  -FLOR     Physical Therapy Interventions (Optional Details) orthotic fitting/training;dry needling  -FLOR     PT Plan Comments resume manual/DDN as needed, continue to monitor respone to heel lift, consider SLS + shoulder ext/row for TA  -FLOR           User Key  (r) = Recorded By, (t) = Taken By, (c) = Cosigned By    Initials Name Provider Type    Leila Hassan, PT Physical Therapist                   OP Exercises     Row Name 02/23/23 1000 02/23/23 0800          Subjective Comments    Subjective Comments I feel like I am 50% better. The needling really seemed to help and the shoe lift made my knee hurt worse at first and now its better. I am only taking medication 1-2x/day and sleep is also improved  -FLOR --        Total Minutes    34296 - PT Therapeutic Exercise Minutes 34  -FLOR --     92443 - PT Therapeutic Activity Minutes 10  -FLOR --        Exercise 1    Exercise Name 1 Nustep  -FLOR --     Time 1 5 min, lvl 5  -FLOR --        Exercise 2    Exercise Name 2 fig 4 stretch  -FLOR --  -FLOR     Cueing 2 Verbal;Tactile  -FLOR --  -FLOR     Reps 2 2  -FLOR --  -FLOR     Time 2 20s  -FLOR --  -FLOR     Additional Comments push/pull  -FLOR --  -FLOR        Exercise 3    Exercise Name 3 seated HS stretch  -FLOR --     Cueing 3 Verbal;Demo  -FLOR --     Reps 3 3  -FLOR --     Time 3 20s  -FLOR --        Exercise 5    Exercise Name 5 bridge with TrA  -FLOR --  -FLOR     Cueing 5 Verbal  -FLOR --  -FLOR     Sets 5 2  -FLOR --  -FLOR     Reps 5 10  -FLOR --  -FLOR        Exercise 6    Exercise Name 6 reverse clam shell  -FLOR --     Cueing 6 Verbal;Tactile  -FLOR --     Sets 6 2  -FLOR --     Reps 6 10b  -FLOR --     Time 6 YTB  -FLOR --        Exercise 7    Exercise Name 7 s/l clam  -FLOR --     Cueing 7 Verbal;Tactile  -FLOR --     Sets 7 2 jay  -FLOR --     Reps 7 10  -FLOR --     Time 7 RTB  -FLOR --        Exercise 8    Exercise Name 8 lateral  adam stretch, B  -FLOR --     Cueing 8 Verbal;Demo  -FLOR --     Reps 8 3  -FLOR --     Time 8 20s  -FLOR --        Exercise 9    Exercise Name 9 Time spent filling out survey, discussing POC/goals  -FLOR --     Time 9 10 mins  -FLOR --        Exercise 10    Exercise Name 10 standing hip abd/ext  -FLOR --     Cueing 10 Verbal;Demo  -FLOR --     Sets 10 2  -FLOR --     Reps 10 10e  -FLOR --     Time 10 standing on foam  -FLOR --     Additional Comments RTB, heavy cues for posture  -FLOR --        Exercise 11    Exercise Name 11 AR press  -FLOR --     Cueing 11 Verbal;Demo  -FLOR --     Sets 11 1  -FLOR --     Reps 11 15e  -FLOR --     Time 11 RTB  -FLOR --     Additional Comments cues for postural alignment  -FLOR --           User Key  (r) = Recorded By, (t) = Taken By, (c) = Cosigned By    Initials Name Provider Type    Leila Hassan, PT Physical Therapist                              PT OP Goals     Row Name 02/23/23 0900          PT Short Term Goals    STG Date to Achieve 02/26/23  -     STG 1 Pt will be independent with initial HEP to improve strength/ROM and decrease pain.  -     STG 1 Progress Met  -     STG 2 Patient will improve ability to sleep through the night with </= 1-2 sleep disturbances related to back/hip pain to improve overall sleep quality and quality of life  -     STG 2 Progress Met  -     STG 3 Pt will improve walking tolerance from 10 min to at least 25 minutes with </= 2/10 R hip pain to demonstrate improved activity tolerance while walking on TM for gym routine.  -     STG 3 Progress Ongoing  -     STG 3 Progress Comments 10 mins  -FLOR        Long Term Goals    LTG Date to Achieve 03/28/23  -     LTG 1 Pt will be independent with advanced HEP to maintain strength/ROM and decrease pain.  -     LTG 1 Progress Ongoing  -     LTG 2 Pt will improve score on LEFS to at least >/= 65% function for improved quality of life.  -     LTG 2 Progress Ongoing;Progressing  -     LTG 2 Progress Comments  52.5%  -FLOR     LTG 3 Pt will improve B hip strength to at least 4/5.  -FLOR     LTG 3 Progress Ongoing  -FLOR     LTG 4 Pt will improve R hip IR AROM to at least 10-15 deg and ER to 45 deg for improved mobility with functional and recreational tasks.  -FLOR     LTG 4 Progress Ongoing  -FLOR     LTG 5 Patient will report returning to gym routine at 50-75% from PLOF, 3-5x/week with </= 3/10 R hip pain to demonstrate greater ease with recreational gym routine and return to PLOF.  -FLOR     LTG 5 Progress Ongoing  -FLOR     LTG 5 Progress Comments going to the gym 3x/wk, completing one exercise class and continues to modify to reduce pain  -FLOR           User Key  (r) = Recorded By, (t) = Taken By, (c) = Cosigned By    Initials Name Provider Type    Leila Hsasan, PT Physical Therapist                Therapy Education  Given: HEP  Program: Reinforced  How Provided: Verbal, Demonstration  Provided to: Patient  Level of Understanding: Verbalized, Demonstrated    Outcome Measure Options: Lower Extremity Functional Scale (LEFS)  Lower Extremity Functional Index  Any of your usual work, housework or school activities: Moderate difficulty  Your usual hobbies, recreational or sporting activities: Moderate difficulty  Getting into or out of the bath: No difficulty  Walking between rooms: A little bit of difficulty  Putting on your shoes or socks: A little bit of difficulty  Squatting: Quite a bit of difficulty  Lifting an object, like a bag of groceries from the floor: No difficulty  Performing light activities around your home: A little bit of difficulty  Performing heavy activities around your home: Moderate difficulty  Getting into or out of a car: A little bit of difficulty  Walking 2 blocks: A little bit of difficulty  Walking a mile: Quite a bit of difficulty  Going up or down 10 stairs (about 1 flight of stairs): Moderate difficulty  Standing for 1 hour: Moderate difficulty  Sitting for 1 hour: Moderate  difficulty  Running on even ground: Quite a bit of difficulty  Running on uneven ground: Extreme difficulty or unable to perform activity  Making sharp turns while running fast: Quite a bit of difficulty  Hopping: Quite a bit of difficulty  Rolling over in bed: Moderate difficulty  Total: 42      Time Calculation:   Start Time: 1001  Stop Time: 1045  Time Calculation (min): 44 min  Timed Charges  65452 - PT Therapeutic Exercise Minutes: 34  64386 - PT Therapeutic Activity Minutes: 10  Total Minutes  Timed Charges Total Minutes: 44   Total Minutes: 44  Therapy Charges for Today     Code Description Service Date Service Provider Modifiers Qty    62866011131  PT THER PROC EA 15 MIN 2/23/2023 Leila Villagomez, PT GP 2    [id number]  PT THERAPEUTIC ACT EA 15 MIN 2/23/2023 Leila Villagomez, PT GP 1          PT G-Codes  Outcome Measure Options: Lower Extremity Functional Scale (LEFS)  Total: 42         Leila Villagomez, PT  2/23/2023

## 2023-02-28 ENCOUNTER — HOSPITAL ENCOUNTER (OUTPATIENT)
Dept: PHYSICAL THERAPY | Facility: HOSPITAL | Age: 73
Setting detail: THERAPIES SERIES
Discharge: HOME | End: 2023-02-28
Payer: MEDICARE

## 2023-02-28 DIAGNOSIS — M25.551 CHRONIC RIGHT HIP PAIN: Primary | ICD-10-CM

## 2023-02-28 DIAGNOSIS — M21.70 LEG LENGTH DISCREPANCY: ICD-10-CM

## 2023-02-28 DIAGNOSIS — R29.898 WEAKNESS OF BOTH LOWER EXTREMITIES: ICD-10-CM

## 2023-02-28 DIAGNOSIS — R26.9 GAIT ABNORMALITY: ICD-10-CM

## 2023-02-28 DIAGNOSIS — G89.29 CHRONIC RIGHT HIP PAIN: Primary | ICD-10-CM

## 2023-02-28 PROCEDURE — 97140 MANUAL THERAPY 1/> REGIONS: CPT

## 2023-02-28 PROCEDURE — 97110 THERAPEUTIC EXERCISES: CPT

## 2023-03-02 ENCOUNTER — HOSPITAL ENCOUNTER (OUTPATIENT)
Dept: PHYSICAL THERAPY | Facility: HOSPITAL | Age: 73
Setting detail: THERAPIES SERIES
Discharge: HOME | End: 2023-03-02
Payer: MEDICARE

## 2023-03-02 DIAGNOSIS — M21.70 LEG LENGTH DISCREPANCY: ICD-10-CM

## 2023-03-02 DIAGNOSIS — M25.551 CHRONIC RIGHT HIP PAIN: Primary | ICD-10-CM

## 2023-03-02 DIAGNOSIS — R26.9 GAIT ABNORMALITY: ICD-10-CM

## 2023-03-02 DIAGNOSIS — G89.29 CHRONIC RIGHT HIP PAIN: Primary | ICD-10-CM

## 2023-03-02 DIAGNOSIS — R29.898 WEAKNESS OF BOTH LOWER EXTREMITIES: ICD-10-CM

## 2023-03-02 PROCEDURE — 97140 MANUAL THERAPY 1/> REGIONS: CPT

## 2023-03-02 PROCEDURE — 97110 THERAPEUTIC EXERCISES: CPT

## 2023-03-02 NOTE — THERAPY TREATMENT NOTE
Outpatient Physical Therapy Ortho Treatment Note  [facility]     Patient Name: Anastasiia Faria  : 1950  MRN: [id number]  Today's Date: 3/2/2023      Visit Date: 2023    Visit Dx:    ICD-10-CM ICD-9-CM   1. Chronic right hip pain  M25.551 719.45    G89.29 338.29   2. Leg length discrepancy  M21.70 736.81   3. Weakness of both lower extremities  R29.898 729.89   4. Gait abnormality  R26.9 781.2       Patient Active Problem List   Diagnosis   • Colon polyp, hyperplastic   • Allergic rhinitis due to pollen   • Acquired hypothyroidism   • Essential hypertension   • FH: colon cancer in relative <50 years old   • Mixed hyperlipidemia   • Chronic right shoulder pain   • Periscapular pain   • Other idiopathic scoliosis, thoracic region   • Prediabetes   • Age-related osteoporosis without current pathological fracture   • Mild episode of recurrent major depressive disorder (HCC)   • Age-related nuclear cataract of both eyes   • Cystoid nevus of conjunctiva   • Anxiety   • Cataract   • Cataract extraction status of left eye   • Cataract extraction status of right eye   • Conjunctival cyst of left eye   • Depressive disorder   • Disorder of refraction   • Dry eye syndrome of bilateral lacrimal glands   • Hearing loss   • Hearing loss   • Hordeolum internum right upper eyelid   • Migraine   • Osteopenia   • Osteoporosis   • Renal stone   • Seasonal allergies   • Diverticulosis   • Internal hemorrhoids   • Posterior vitreous detachment of both eyes        Past Medical History:   Diagnosis Date   • Allergic    • Anxiety    • Cataract     Cataract surgery on right eye 2021 and left eye 2021. (Dr. Nanette Bateman)   • Chronic kidney disease    • Colon polyps    • Depression    • Diverticulosis    • Encounter for annual health examination 2014    Annual Health Assessment   • Family history of colon cancer    • Fibrocystic breast    • GERD (gastroesophageal reflux disease)    • Heart murmur    • Herpes  labialis without complication    • Hip pain     right   • History of mammogram 12/20/2010   • HL (hearing loss) 2014    Hearing Aids   • Hyperlipidemia    • Hypertension    • Hypothyroidism    • Insomnia    • Kidney stones    • Migraines    • Osteopenia     Prolia -last 9/2021,3/2022   • PONV (postoperative nausea and vomiting)    • Scoliosis     Dr. Stanford 5/2018   • Visual impairment     Wear Glasses        Past Surgical History:   Procedure Laterality Date   • BONE MARROW BIOPSY     • BREAST BIOPSY     • BREAST CYST ASPIRATION     • BREAST SURGERY Right     BIOPSY   • CATARACT EXTRACTION, BILATERAL     • COLONOSCOPY N/A 10/27/2016    Procedure: COLONOSCOPY INTO CECUM;  Surgeon: Osvaldo Dorado MD;  Location: [facility] ENDOSCOPY;  Service:    • COLONOSCOPY  01/26/2011   • COLONOSCOPY  02/04/2005   • COLONOSCOPY N/A 12/2/2021    Procedure: COLONOSCOPY INTO CECUM WITH COLD BIOPSY POLYPECTOMY AND HOT SNARE POLYPECTOMY;  Surgeon: Osvaldo Dorado MD;  Location: [facility] ENDOSCOPY;  Service: General;  Laterality: N/A;  PRE-FAMILY HISTORY OF COLON CANCER, PERSONAL HISTORY OF COLON CANCER  POST- POLYPS, DIVERTICULOSIS, HEMORRHOIDS   • KNEE ARTHROSCOPY Left    • PAP SMEAR  12/20/2010                        PT Assessment/Plan     Row Name 03/02/23 0900          PT Assessment    Assessment Comments Pt returns today reporting good response to manual interventions at anterior hip during previous session. Continued with core/hip girdle stregthening with progression of challenges, and cont with manual interventions as well to address STM. Remains appropriate for skilled PT.  -CC        PT Plan    PT Plan Comments F/u regarding heel lift? Cont manual prn.  -CC           User Key  (r) = Recorded By, (t) = Taken By, (c) = Cosigned By    Initials Name Provider Type    Angie Del Valle, PT Physical Therapist                   OP Exercises     Row Name 03/02/23 0900             Subjective Comments    Subjective  Comments Pain improved following manual last visit.  -CC         Subjective Pain    Able to rate subjective pain? yes  -CC      Pre-Treatment Pain Level 1  -CC         Total Minutes    55314 - PT Therapeutic Exercise Minutes 30  -CC      83430 - PT Manual Therapy Minutes 9  -CC         Exercise 1    Exercise Name 1 Nustep  -CC      Time 1 5 min, lvl 5  -CC         Exercise 3    Exercise Name 3 seated HS stretch  -CC      Cueing 3 Verbal;Demo  -CC      Reps 3 3  -CC      Time 3 20s  -CC         Exercise 4    Exercise Name 4 standing HF str at step  -CC      Cueing 4 Verbal  -CC      Reps 4 3 jay  -CC      Time 4 20 sec  -CC         Exercise 5    Exercise Name 5 fig 4 bridge  -CC      Cueing 5 Verbal  -CC      Sets 5 1 set ea leg  -CC      Reps 5 10  -CC         Exercise 8    Exercise Name 8 3 way SB roll out  -CC      Cueing 8 Verbal  -CC      Reps 8 10 ea way  -CC      Time 8 5 sec  -CC         Exercise 9    Exercise Name 9 hip flex standing (unilateral march)  -CC      Cueing 9 Verbal  -CC      Sets 9 2 ea leg  -CC      Reps 9 10  -CC      Time 9 RTB around balls of feet  -CC         Exercise 12    Exercise Name 12 side steps  -CC      Cueing 12 Verbal;Demo  -CC      Reps 12 3 laps  -CC      Time 12 RTB mid calf  -CC      Additional Comments cues for upright posture and tension on band  -CC         Exercise 13    Exercise Name 13 retro monster walk  -CC      Cueing 13 Verbal;Demo  -CC      Reps 13 3 laps  -CC      Time 13 RTB mid calf  -CC            User Key  (r) = Recorded By, (t) = Taken By, (c) = Cosigned By    Initials Name Provider Type    CC Angie Ortiz, PT Physical Therapist                         Manual Rx (last 36 hours)     Manual Treatments     Row Name 03/02/23 0900             Total Minutes    74648 - PT Manual Therapy Minutes 9  -CC         Manual Rx 4    Manual Rx 4 Location R HF/quad foam rolling  -CC      Manual Rx 4 Type De test position on low mat  -CC            User Key  (r) =  Recorded By, (t) = Taken By, (c) = Cosigned By    Initials Name Provider Type    CC Angie Ortiz, PT Physical Therapist                                   Time Calculation:   Start Time: 0910  Stop Time: 0949  Time Calculation (min): 39 min  Total Timed Code Minutes- PT: 39 minute(s)  Timed Charges  44212 - PT Therapeutic Exercise Minutes: 30  66980 - PT Manual Therapy Minutes: 9  Total Minutes  Timed Charges Total Minutes: 39   Total Minutes: 39  Therapy Charges for Today     Code Description Service Date Service Provider Modifiers Qty    03068015967 HC PT THER PROC EA 15 MIN 3/2/2023 Angie Ortiz, PT GP 2    00498906486 HC PT MANUAL THERAPY EA 15 MIN 3/2/2023 Angie Ortiz, PT GP 1                    Angie Ortiz PT  3/2/2023

## 2023-03-07 ENCOUNTER — HOSPITAL ENCOUNTER (OUTPATIENT)
Dept: PHYSICAL THERAPY | Facility: HOSPITAL | Age: 73
Setting detail: THERAPIES SERIES
Discharge: HOME | End: 2023-03-07
Payer: MEDICARE

## 2023-03-07 DIAGNOSIS — M25.551 CHRONIC RIGHT HIP PAIN: Primary | ICD-10-CM

## 2023-03-07 DIAGNOSIS — R26.9 GAIT ABNORMALITY: ICD-10-CM

## 2023-03-07 DIAGNOSIS — R29.898 WEAKNESS OF BOTH LOWER EXTREMITIES: ICD-10-CM

## 2023-03-07 DIAGNOSIS — G89.29 CHRONIC RIGHT HIP PAIN: Primary | ICD-10-CM

## 2023-03-07 DIAGNOSIS — M21.70 LEG LENGTH DISCREPANCY: ICD-10-CM

## 2023-03-07 PROCEDURE — 97110 THERAPEUTIC EXERCISES: CPT | Performed by: PHYSICAL THERAPIST

## 2023-03-07 PROCEDURE — 97140 MANUAL THERAPY 1/> REGIONS: CPT | Performed by: PHYSICAL THERAPIST

## 2023-03-07 NOTE — THERAPY TREATMENT NOTE
Outpatient Physical Therapy Ortho Treatment Note  [facility]     Patient Name: Anastasiia aFria  : 1950  MRN: [id number]  Today's Date: 3/7/2023      Visit Date: 2023    Visit Dx:    ICD-10-CM ICD-9-CM   1. Chronic right hip pain  M25.551 719.45    G89.29 338.29   2. Leg length discrepancy  M21.70 736.81   3. Weakness of both lower extremities  R29.898 729.89   4. Gait abnormality  R26.9 781.2       Patient Active Problem List   Diagnosis   • Colon polyp, hyperplastic   • Allergic rhinitis due to pollen   • Acquired hypothyroidism   • Essential hypertension   • FH: colon cancer in relative <50 years old   • Mixed hyperlipidemia   • Chronic right shoulder pain   • Periscapular pain   • Other idiopathic scoliosis, thoracic region   • Prediabetes   • Age-related osteoporosis without current pathological fracture   • Mild episode of recurrent major depressive disorder (HCC)   • Age-related nuclear cataract of both eyes   • Cystoid nevus of conjunctiva   • Anxiety   • Cataract   • Cataract extraction status of left eye   • Cataract extraction status of right eye   • Conjunctival cyst of left eye   • Depressive disorder   • Disorder of refraction   • Dry eye syndrome of bilateral lacrimal glands   • Hearing loss   • Hearing loss   • Hordeolum internum right upper eyelid   • Migraine   • Osteopenia   • Osteoporosis   • Renal stone   • Seasonal allergies   • Diverticulosis   • Internal hemorrhoids   • Posterior vitreous detachment of both eyes        Past Medical History:   Diagnosis Date   • Allergic    • Anxiety    • Cataract     Cataract surgery on right eye 2021 and left eye 2021. (Dr. Nanette Bateman)   • Chronic kidney disease    • Colon polyps    • Depression    • Diverticulosis    • Encounter for annual health examination 2014    Annual Health Assessment   • Family history of colon cancer    • Fibrocystic breast    • GERD (gastroesophageal reflux disease)    • Heart murmur    • Herpes  labialis without complication    • Hip pain     right   • History of mammogram 12/20/2010   • HL (hearing loss) 2014    Hearing Aids   • Hyperlipidemia    • Hypertension    • Hypothyroidism    • Insomnia    • Kidney stones    • Migraines    • Osteopenia     Prolia -last 9/2021,3/2022   • PONV (postoperative nausea and vomiting)    • Scoliosis     Dr. Stanford 5/2018   • Visual impairment     Wear Glasses        Past Surgical History:   Procedure Laterality Date   • BONE MARROW BIOPSY     • BREAST BIOPSY     • BREAST CYST ASPIRATION     • BREAST SURGERY Right     BIOPSY   • CATARACT EXTRACTION, BILATERAL     • COLONOSCOPY N/A 10/27/2016    Procedure: COLONOSCOPY INTO CECUM;  Surgeon: Osvaldo Dorado MD;  Location: [facility] ENDOSCOPY;  Service:    • COLONOSCOPY  01/26/2011   • COLONOSCOPY  02/04/2005   • COLONOSCOPY N/A 12/2/2021    Procedure: COLONOSCOPY INTO CECUM WITH COLD BIOPSY POLYPECTOMY AND HOT SNARE POLYPECTOMY;  Surgeon: Osvaldo Dorado MD;  Location: [facility] ENDOSCOPY;  Service: General;  Laterality: N/A;  PRE-FAMILY HISTORY OF COLON CANCER, PERSONAL HISTORY OF COLON CANCER  POST- POLYPS, DIVERTICULOSIS, HEMORRHOIDS   • KNEE ARTHROSCOPY Left    • PAP SMEAR  12/20/2010                        PT Assessment/Plan     Row Name 03/07/23 0931          PT Assessment    Assessment Comments Ms. Faria returns, reporting overall improvement in her condition, reporting pain of 1/10. However she reports feeling hip back/hip this morning getting out of the car and walking into the clinic this morning. She continus to demosntrate gait impairments,L genu valgus/scoliosis. Secondary to pt reporting benefits with heel lift, I issued a leather heel lift for the R.  We will go ahead and extend her therapy to once every other week x 3 additional sessions.  We continued to work on hip girdle/core strengthening and continued stretching/performing manual foam rolling of her R hip flexor tissues (in De position  with R knee flexed).   Ms. Faria continues to be a good candidate for skilled physical therapy.  -GJ        PT Plan    PT Plan Comments will transition to once every other week after next week. Continue to strengthen bilateral hip girdle tissues.  -GJ           User Key  (r) = Recorded By, (t) = Taken By, (c) = Cosigned By    Initials Name Provider Type     Chidi Kimball, PT Physical Therapist                   OP Exercises     Row Name 03/07/23 0919 03/07/23 0900          Subjective Comments    Subjective Comments -- doing ok, i think the heel lift helps  -GJ        Total Minutes    19503 - PT Therapeutic Exercise Minutes 28  -GJ --     19526 - PT Manual Therapy Minutes 13  -GJ --        Exercise 1    Exercise Name 1 -- Nustep  -GJ     Time 1 -- 5 min, lvl 5  -GJ        Exercise 3    Exercise Name 3 -- seated HS stretch  -GJ     Cueing 3 -- Verbal;Demo  -GJ     Reps 3 -- 3  -GJ     Time 3 -- 20s  -GJ        Exercise 4    Exercise Name 4 -- standing HF str at step  -GJ     Cueing 4 -- Verbal  -GJ     Reps 4 -- 3 jay  -GJ     Time 4 -- 20 sec  -GJ        Exercise 6    Exercise Name 6 -- reverse clam shell  -GJ     Cueing 6 -- Verbal;Tactile  -GJ     Sets 6 -- 2  -GJ     Reps 6 -- 10b  -GJ     Time 6 -- YTB  -GJ        Exercise 7    Exercise Name 7 -- s/l clam  -GJ     Cueing 7 -- Verbal;Tactile  -GJ     Sets 7 -- 2 jay  -GJ     Reps 7 -- 10  -GJ     Time 7 -- RTB  -GJ        Exercise 9    Exercise Name 9 -- hip flex standing (unilateral march)  -GJ     Cueing 9 -- Verbal  -GJ     Sets 9 -- 2 ea leg  -GJ     Reps 9 -- 10  -GJ     Time 9 -- RTB around balls of feet  -GJ        Exercise 12    Exercise Name 12 -- side steps  -GJ     Cueing 12 -- Verbal;Demo  -GJ     Reps 12 -- 3 laps  -GJ     Time 12 -- RTB mid calf  -GJ     Additional Comments -- cues for upright posture and tension on band  -GJ        Exercise 13    Exercise Name 13 -- retro monster walk  -GJ     Cueing 13 -- Verbal;Demo  -GJ     Reps 13 -- 3 laps   -GJ     Time 13 -- RTB mid calf  -GJ           User Key  (r) = Recorded By, (t) = Taken By, (c) = Cosigned By    Initials Name Provider Type    Chidi Thompson, PT Physical Therapist                         Manual Rx (last 36 hours)     Manual Treatments     Row Name 03/07/23 0919 03/07/23 0900          Total Minutes    38592 - PT Manual Therapy Minutes 13  -GJ --        Manual Rx 4    Manual Rx 4 Location -- R HF/quad foam rolling  -GJ     Manual Rx 4 Type -- De test position R knee flexed  -GJ           User Key  (r) = Recorded By, (t) = Taken By, (c) = Cosigned By    Initials Name Provider Type    Chidi Thompson, PT Physical Therapist                 PT OP Goals     Row Name 03/07/23 0900          PT Short Term Goals    STG Date to Achieve 02/26/23  -GJ     STG 1 Pt will be independent with initial HEP to improve strength/ROM and decrease pain.  -GJ     STG 1 Progress Met  -GJ     STG 2 Patient will improve ability to sleep through the night with </= 1-2 sleep disturbances related to back/hip pain to improve overall sleep quality and quality of life  -GJ     STG 2 Progress Met  -GJ     STG 3 Pt will improve walking tolerance from 10 min to at least 25 minutes with </= 2/10 R hip pain to demonstrate improved activity tolerance while walking on TM for gym routine.  -GJ     STG 3 Progress Ongoing  -GJ        Long Term Goals    LTG Date to Achieve 03/28/23  -GJ     LTG 1 Pt will be independent with advanced HEP to maintain strength/ROM and decrease pain.  -GJ     LTG 1 Progress Ongoing  -GJ     LTG 2 Pt will improve score on LEFS to at least >/= 65% function for improved quality of life.  -GJ     LTG 2 Progress Ongoing;Progressing  -GJ     LTG 3 Pt will improve B hip strength to at least 4/5.  -GJ     LTG 3 Progress Ongoing  -GJ     LTG 4 Pt will improve R hip IR AROM to at least 10-15 deg and ER to 45 deg for improved mobility with functional and recreational tasks.  -GJ     LTG 4 Progress Ongoing  -GJ      LTG 5 Patient will report returning to gym routine at 50-75% from PLOF, 3-5x/week with </= 3/10 R hip pain to demonstrate greater ease with recreational gym routine and return to PLOF.  -GJ     LTG 5 Progress Ongoing  -GJ           User Key  (r) = Recorded By, (t) = Taken By, (c) = Cosigned By    Initials Name Provider Type    Chidi Thompson, PT Physical Therapist                Therapy Education  Given: HEP, Symptoms/condition management, Pain management, Posture/body mechanics, Mobility training  Program: Reinforced  How Provided: Verbal, Demonstration  Provided to: Patient  Level of Understanding: Teach back education performed, Verbalized, Demonstrated              Time Calculation:   Start Time: 0919  Stop Time: 1000  Time Calculation (min): 41 min  Timed Charges  63663 - PT Therapeutic Exercise Minutes: 28  00290 - PT Manual Therapy Minutes: 13  Total Minutes  Timed Charges Total Minutes: 41   Total Minutes: 41  Therapy Charges for Today     Code Description Service Date Service Provider Modifiers Qty    19337638658  PT THER PROC EA 15 MIN 3/7/2023 Chidi Kimball, PT GP 2    [id number]  PT MANUAL THERAPY EA 15 MIN 3/7/2023 Chidi Kimball, PT GP 1                    Chidi Kimball, PT  3/7/2023

## 2023-03-09 ENCOUNTER — HOSPITAL ENCOUNTER (OUTPATIENT)
Dept: PHYSICAL THERAPY | Facility: HOSPITAL | Age: 73
Setting detail: THERAPIES SERIES
Discharge: HOME | End: 2023-03-09
Payer: MEDICARE

## 2023-03-09 DIAGNOSIS — R29.898 WEAKNESS OF BOTH LOWER EXTREMITIES: ICD-10-CM

## 2023-03-09 DIAGNOSIS — G89.29 CHRONIC RIGHT HIP PAIN: Primary | ICD-10-CM

## 2023-03-09 DIAGNOSIS — M21.70 LEG LENGTH DISCREPANCY: ICD-10-CM

## 2023-03-09 DIAGNOSIS — R26.9 GAIT ABNORMALITY: ICD-10-CM

## 2023-03-09 DIAGNOSIS — M25.551 CHRONIC RIGHT HIP PAIN: Primary | ICD-10-CM

## 2023-03-09 PROCEDURE — 97110 THERAPEUTIC EXERCISES: CPT

## 2023-03-09 NOTE — THERAPY TREATMENT NOTE
Outpatient Physical Therapy Ortho Treatment Note  [facility]     Patient Name: Anastasiia Faria  : 1950  MRN: [id number]  Today's Date: 3/9/2023      Visit Date: 2023    Visit Dx:    ICD-10-CM ICD-9-CM   1. Chronic right hip pain  M25.551 719.45    G89.29 338.29   2. Leg length discrepancy  M21.70 736.81   3. Weakness of both lower extremities  R29.898 729.89   4. Gait abnormality  R26.9 781.2       Patient Active Problem List   Diagnosis   • Colon polyp, hyperplastic   • Allergic rhinitis due to pollen   • Acquired hypothyroidism   • Essential hypertension   • FH: colon cancer in relative <50 years old   • Mixed hyperlipidemia   • Chronic right shoulder pain   • Periscapular pain   • Other idiopathic scoliosis, thoracic region   • Prediabetes   • Age-related osteoporosis without current pathological fracture   • Mild episode of recurrent major depressive disorder (HCC)   • Age-related nuclear cataract of both eyes   • Cystoid nevus of conjunctiva   • Anxiety   • Cataract   • Cataract extraction status of left eye   • Cataract extraction status of right eye   • Conjunctival cyst of left eye   • Depressive disorder   • Disorder of refraction   • Dry eye syndrome of bilateral lacrimal glands   • Hearing loss   • Hearing loss   • Hordeolum internum right upper eyelid   • Migraine   • Osteopenia   • Osteoporosis   • Renal stone   • Seasonal allergies   • Diverticulosis   • Internal hemorrhoids   • Posterior vitreous detachment of both eyes        Past Medical History:   Diagnosis Date   • Allergic    • Anxiety    • Cataract     Cataract surgery on right eye 2021 and left eye 2021. (Dr. Nanette Bateman)   • Chronic kidney disease    • Colon polyps    • Depression    • Diverticulosis    • Encounter for annual health examination 2014    Annual Health Assessment   • Family history of colon cancer    • Fibrocystic breast    • GERD (gastroesophageal reflux disease)    • Heart murmur    • Herpes  labialis without complication    • Hip pain     right   • History of mammogram 12/20/2010   • HL (hearing loss) 2014    Hearing Aids   • Hyperlipidemia    • Hypertension    • Hypothyroidism    • Insomnia    • Kidney stones    • Migraines    • Osteopenia     Prolia -last 9/2021,3/2022   • PONV (postoperative nausea and vomiting)    • Scoliosis     Dr. Stanford 5/2018   • Visual impairment     Wear Glasses        Past Surgical History:   Procedure Laterality Date   • BONE MARROW BIOPSY     • BREAST BIOPSY     • BREAST CYST ASPIRATION     • BREAST SURGERY Right     BIOPSY   • CATARACT EXTRACTION, BILATERAL     • COLONOSCOPY N/A 10/27/2016    Procedure: COLONOSCOPY INTO CECUM;  Surgeon: Osvaldo Dorado MD;  Location: [facility] ENDOSCOPY;  Service:    • COLONOSCOPY  01/26/2011   • COLONOSCOPY  02/04/2005   • COLONOSCOPY N/A 12/2/2021    Procedure: COLONOSCOPY INTO CECUM WITH COLD BIOPSY POLYPECTOMY AND HOT SNARE POLYPECTOMY;  Surgeon: Osvaldo Dorado MD;  Location: [facility] ENDOSCOPY;  Service: General;  Laterality: N/A;  PRE-FAMILY HISTORY OF COLON CANCER, PERSONAL HISTORY OF COLON CANCER  POST- POLYPS, DIVERTICULOSIS, HEMORRHOIDS   • KNEE ARTHROSCOPY Left    • PAP SMEAR  12/20/2010                        PT Assessment/Plan     Row Name 03/09/23 1000          PT Assessment    Assessment Comments Ms. Faria returns to PT reporting low pain levels and expressing consistent overall improvement since the start of PT. She now reports 3-4/10 pain at worst towards the end of the day that improves some when she completes her HEP. Discussed with patient returning to previously perform gym workout routine followed by completing HEP in preparation for pt to return to back to back workout classes. Patient verbalized understanding. Elected to hold on manual therapy this date as patient reports significant reduction in tension. Focused on hip strength/stability this date with addition of lateral lunge and lateral step up +  knee . Order placed for potential orthotic heel lift. Ms. Faria remains a candidate for skilled PT.  -FLOR        PT Plan    PT Plan Comments continue to focus on hip stability, heel lift placed, how did workout class + HEP?  -FLOR           User Key  (r) = Recorded By, (t) = Taken By, (c) = Cosigned By    Initials Name Provider Type    Leila Hassan, PT Physical Therapist                   OP Exercises     Row Name 03/09/23 0900             Subjective Comments    Subjective Comments I am having low pain overall. I do have some discomfort at the end of the day but other than that I am feeling good  -FLOR         Subjective Pain    Able to rate subjective pain? yes  -FLOR      Pre-Treatment Pain Level 1  -FLOR         Total Minutes    76212 - PT Therapeutic Exercise Minutes 43  -FLOR         Exercise 1    Exercise Name 1 Nustep  -FLOR      Time 1 5 min, lvl 5  -FLOR         Exercise 3    Exercise Name 3 seated HS stretch  -FLOR      Cueing 3 Verbal;Demo  -FLOR      Reps 3 3  -FLOR      Time 3 20s  -FLOR         Exercise 4    Exercise Name 4 standing HF str at step  -FLOR      Cueing 4 Verbal  -FLOR      Reps 4 3 jay  -FLOR      Time 4 20 sec  -FLOR         Exercise 6    Exercise Name 6 reverse clam shell  -FLOR      Cueing 6 Verbal;Tactile  -FLOR      Sets 6 2  -FLOR      Reps 6 10b  -FLOR      Time 6 YTB  -FLOR         Exercise 7    Exercise Name 7 s/l clam  -FLOR      Cueing 7 Verbal;Tactile  -FLOR      Sets 7 2 jay  -FLOR      Reps 7 10  -FLOR      Time 7 RTB  -FLOR         Exercise 9    Exercise Name 9 hip flex standing (unilateral march)  -FLOR      Cueing 9 Verbal  -FLOR      Sets 9 2 ea leg  -FLOR      Reps 9 10  -FLOR      Time 9 RTB around balls of feet  -FLOR         Exercise 12    Exercise Name 12 side steps  -FLOR      Cueing 12 Verbal;Demo  -FLOR      Reps 12 4 laps  -FLOR      Time 12 RTB mid calf  -FLOR      Additional Comments cues for upright posture and tension on band  -FLOR         Exercise 13    Exercise Name 13 retro monster walk  -FLOR      Cueing  13 Verbal;Demo  -FLOR      Reps 13 4 laps  -FLOR      Time 13 RTB mid calf  -FLOR         Exercise 14    Exercise Name 14 lateral step up + knee   -FLOR      Cueing 14 Verbal;Demo  -FLOR      Sets 14 2  -FLOR      Reps 14 10  -FLOR      Time 14 single HR support  -FLOR         Exercise 15    Exercise Name 15 lateral lunge  -FLOR      Cueing 15 Verbal;Demo  -FLOR      Sets 15 2  -FLOR      Reps 15 10  -FLOR      Time 15 onto foam  -FLOR            User Key  (r) = Recorded By, (t) = Taken By, (c) = Cosigned By    Initials Name Provider Type    Leila Hassan, PT Physical Therapist                              PT OP Goals     Row Name 03/09/23 0900          PT Short Term Goals    STG Date to Achieve 02/26/23  -     STG 1 Pt will be independent with initial HEP to improve strength/ROM and decrease pain.  -     STG 1 Progress Met  -     STG 2 Patient will improve ability to sleep through the night with </= 1-2 sleep disturbances related to back/hip pain to improve overall sleep quality and quality of life  -     STG 2 Progress Met  -     STG 3 Pt will improve walking tolerance from 10 min to at least 25 minutes with </= 2/10 R hip pain to demonstrate improved activity tolerance while walking on TM for gym routine.  -     STG 3 Progress Ongoing  -        Long Term Goals    LTG Date to Achieve 03/28/23  -     LTG 1 Pt will be independent with advanced HEP to maintain strength/ROM and decrease pain.  -     LTG 1 Progress Ongoing  -     LTG 2 Pt will improve score on LEFS to at least >/= 65% function for improved quality of life.  -     LTG 2 Progress Ongoing;Progressing  -     LTG 3 Pt will improve B hip strength to at least 4/5.  -     LTG 3 Progress Ongoing  -     LTG 4 Pt will improve R hip IR AROM to at least 10-15 deg and ER to 45 deg for improved mobility with functional and recreational tasks.  -     LTG 4 Progress Ongoing  -     LTG 5 Patient will report returning to gym routine at 50-75%  from PLOF, 3-5x/week with </= 3/10 R hip pain to demonstrate greater ease with recreational gym routine and return to PLOF.  -FLOR     LTG 5 Progress Ongoing  -FLOR           User Key  (r) = Recorded By, (t) = Taken By, (c) = Cosigned By    Initials Name Provider Type    Leila Hassan, PT Physical Therapist                               Time Calculation:   Start Time: 0917  Stop Time: 1000  Time Calculation (min): 43 min  Timed Charges  85120 - PT Therapeutic Exercise Minutes: 43  Total Minutes  Timed Charges Total Minutes: 43   Total Minutes: 43  Therapy Charges for Today     Code Description Service Date Service Provider Modifiers Qty    12641942127 HC PT THER PROC EA 15 MIN 3/9/2023 Leila Villagomez, PT GP 3                    Leila Villagomez, PT  3/9/2023

## 2023-03-14 ENCOUNTER — HOSPITAL ENCOUNTER (OUTPATIENT)
Dept: PHYSICAL THERAPY | Facility: HOSPITAL | Age: 73
Setting detail: THERAPIES SERIES
Discharge: HOME | End: 2023-03-14
Payer: MEDICARE

## 2023-03-14 DIAGNOSIS — R29.898 WEAKNESS OF BOTH LOWER EXTREMITIES: ICD-10-CM

## 2023-03-14 DIAGNOSIS — R26.9 GAIT ABNORMALITY: ICD-10-CM

## 2023-03-14 DIAGNOSIS — M21.70 LEG LENGTH DISCREPANCY: ICD-10-CM

## 2023-03-14 DIAGNOSIS — M25.551 CHRONIC RIGHT HIP PAIN: Primary | ICD-10-CM

## 2023-03-14 DIAGNOSIS — G89.29 CHRONIC RIGHT HIP PAIN: Primary | ICD-10-CM

## 2023-03-14 PROCEDURE — 97110 THERAPEUTIC EXERCISES: CPT

## 2023-03-14 NOTE — THERAPY TREATMENT NOTE
Outpatient Physical Therapy Ortho Treatment Note  [facility]     Patient Name: Anastasiia Faria  : 1950  MRN: [id number]  Today's Date: 3/14/2023      Visit Date: 2023    Visit Dx:    ICD-10-CM ICD-9-CM   1. Chronic right hip pain  M25.551 719.45    G89.29 338.29   2. Leg length discrepancy  M21.70 736.81   3. Weakness of both lower extremities  R29.898 729.89   4. Gait abnormality  R26.9 781.2       Patient Active Problem List   Diagnosis   • Colon polyp, hyperplastic   • Allergic rhinitis due to pollen   • Acquired hypothyroidism   • Essential hypertension   • FH: colon cancer in relative <50 years old   • Mixed hyperlipidemia   • Chronic right shoulder pain   • Periscapular pain   • Other idiopathic scoliosis, thoracic region   • Prediabetes   • Age-related osteoporosis without current pathological fracture   • Mild episode of recurrent major depressive disorder (HCC)   • Age-related nuclear cataract of both eyes   • Cystoid nevus of conjunctiva   • Anxiety   • Cataract   • Cataract extraction status of left eye   • Cataract extraction status of right eye   • Conjunctival cyst of left eye   • Depressive disorder   • Disorder of refraction   • Dry eye syndrome of bilateral lacrimal glands   • Hearing loss   • Hearing loss   • Hordeolum internum right upper eyelid   • Migraine   • Osteopenia   • Osteoporosis   • Renal stone   • Seasonal allergies   • Diverticulosis   • Internal hemorrhoids   • Posterior vitreous detachment of both eyes        Past Medical History:   Diagnosis Date   • Allergic    • Anxiety    • Cataract     Cataract surgery on right eye 2021 and left eye 2021. (Dr. Nanette Bateman)   • Chronic kidney disease    • Colon polyps    • Depression    • Diverticulosis    • Encounter for annual health examination 2014    Annual Health Assessment   • Family history of colon cancer    • Fibrocystic breast    • GERD (gastroesophageal reflux disease)    • Heart murmur    •  Herpes labialis without complication    • Hip pain     right   • History of mammogram 12/20/2010   • HL (hearing loss) 2014    Hearing Aids   • Hyperlipidemia    • Hypertension    • Hypothyroidism    • Insomnia    • Kidney stones    • Migraines    • Osteopenia     Prolia -last 9/2021,3/2022   • PONV (postoperative nausea and vomiting)    • Scoliosis     Dr. Stanford 5/2018   • Visual impairment     Wear Glasses        Past Surgical History:   Procedure Laterality Date   • BONE MARROW BIOPSY     • BREAST BIOPSY     • BREAST CYST ASPIRATION     • BREAST SURGERY Right     BIOPSY   • CATARACT EXTRACTION, BILATERAL     • COLONOSCOPY N/A 10/27/2016    Procedure: COLONOSCOPY INTO CECUM;  Surgeon: Osvaldo Dorado MD;  Location: [facility] ENDOSCOPY;  Service:    • COLONOSCOPY  01/26/2011   • COLONOSCOPY  02/04/2005   • COLONOSCOPY N/A 12/2/2021    Procedure: COLONOSCOPY INTO CECUM WITH COLD BIOPSY POLYPECTOMY AND HOT SNARE POLYPECTOMY;  Surgeon: Osvaldo Dorado MD;  Location: [facility] ENDOSCOPY;  Service: General;  Laterality: N/A;  PRE-FAMILY HISTORY OF COLON CANCER, PERSONAL HISTORY OF COLON CANCER  POST- POLYPS, DIVERTICULOSIS, HEMORRHOIDS   • KNEE ARTHROSCOPY Left    • PAP SMEAR  12/20/2010                        PT Assessment/Plan     Row Name 03/14/23 0900          PT Assessment    Assessment Comments Pt returns reporting mild incr soreness when returning to exercise class and walking around mall after. Progress hip girdle strengthening with addition of standing hip adduction and progression of resistance. Remains appropriate for skilled PT.  -CC        PT Plan    PT Plan Comments F/u on orthotic/heel lift.  -CC           User Key  (r) = Recorded By, (t) = Taken By, (c) = Cosigned By    Initials Name Provider Type    Angie Del Valle, PT Physical Therapist                   OP Exercises     Row Name 03/14/23 0900             Subjective Comments    Subjective Comments Did a workout class yesterday  then walked at the mall for a little while, so a little more sore today.  -CC         Subjective Pain    Able to rate subjective pain? yes  -CC      Pre-Treatment Pain Level 2  -CC         Total Minutes    62586 - PT Therapeutic Exercise Minutes 35  -CC      17751 - PT Therapeutic Activity Minutes 5  -CC         Exercise 1    Exercise Name 1 Nustep  -CC      Time 1 5 min, lvl 5  -CC         Exercise 2    Exercise Name 2 discussed custom orthotic/heel lift  -CC      Cueing 2 Verbal  -CC      Time 2 5 min  -CC         Exercise 3    Exercise Name 3 seated HS stretch  -CC      Cueing 3 Verbal;Demo  -CC      Reps 3 3  -CC      Time 3 20s  -CC         Exercise 4    Exercise Name 4 standing HF str at step  -CC      Cueing 4 Verbal  -CC      Reps 4 3 jay  -CC      Time 4 20 sec  -CC         Exercise 9    Exercise Name 9 hip flex standing (unilateral march)  -CC      Cueing 9 Verbal  -CC      Sets 9 2 ea leg  -CC      Reps 9 10  -CC      Time 9 RTB around balls of feet  -CC         Exercise 10    Exercise Name 10 hip ADDuction, standing  -CC      Cueing 10 Verbal  -CC      Sets 10 2 ea  -CC      Reps 10 10  -CC      Time 10 RTB, tied around stairs  -CC         Exercise 12    Exercise Name 12 side steps  -CC      Cueing 12 Verbal;Demo  -CC      Reps 12 3 laps  -CC      Time 12 gTB mid calf  -CC         Exercise 13    Exercise Name 13 retro monster walk  -CC      Cueing 13 Verbal;Demo  -CC      Reps 13 3 laps  -CC      Time 13 gTB mid calf  -CC         Exercise 14    Exercise Name 14 lateral step up + knee   -CC      Cueing 14 Verbal;Demo  -CC      Sets 14 2  -CC      Reps 14 10  -CC      Time 14 single HR support  -CC         Exercise 15    Exercise Name 15 lateral lunge  -CC      Cueing 15 Verbal;Demo  -CC      Sets 15 2  -CC      Reps 15 10  -CC      Time 15 onto foam  -CC            User Key  (r) = Recorded By, (t) = Taken By, (c) = Cosigned By    Initials Name Provider Type    CC Angie Ortiz, PT Physical  Therapist                                                Time Calculation:   Start Time: 0917  Stop Time: 0957  Time Calculation (min): 40 min  Total Timed Code Minutes- PT: 40 minute(s)  Timed Charges  33179 - PT Therapeutic Exercise Minutes: 35  54679 - PT Therapeutic Activity Minutes: 5  Total Minutes  Timed Charges Total Minutes: 40   Total Minutes: 40  Therapy Charges for Today     Code Description Service Date Service Provider Modifiers Qty    70628790913 HC PT THER PROC EA 15 MIN 3/14/2023 Angie Ortiz, PT GP 3                    Angie Ortiz, PT  3/14/2023

## 2023-03-16 ENCOUNTER — HOSPITAL ENCOUNTER (OUTPATIENT)
Dept: PHYSICAL THERAPY | Facility: HOSPITAL | Age: 73
Setting detail: THERAPIES SERIES
Discharge: HOME | End: 2023-03-16
Payer: MEDICARE

## 2023-03-16 DIAGNOSIS — R29.898 WEAKNESS OF BOTH LOWER EXTREMITIES: ICD-10-CM

## 2023-03-16 DIAGNOSIS — M21.70 LEG LENGTH DISCREPANCY: ICD-10-CM

## 2023-03-16 DIAGNOSIS — M25.551 CHRONIC RIGHT HIP PAIN: Primary | ICD-10-CM

## 2023-03-16 DIAGNOSIS — G89.29 CHRONIC RIGHT HIP PAIN: Primary | ICD-10-CM

## 2023-03-16 DIAGNOSIS — R26.9 GAIT ABNORMALITY: ICD-10-CM

## 2023-03-16 PROCEDURE — 97110 THERAPEUTIC EXERCISES: CPT

## 2023-03-16 NOTE — THERAPY TREATMENT NOTE
Outpatient Physical Therapy Ortho Treatment Note  [facility]     Patient Name: Anastasiia Faria  : 1950  MRN: [id number]  Today's Date: 3/16/2023      Visit Date: 2023    Visit Dx:    ICD-10-CM ICD-9-CM   1. Chronic right hip pain  M25.551 719.45    G89.29 338.29   2. Leg length discrepancy  M21.70 736.81   3. Weakness of both lower extremities  R29.898 729.89   4. Gait abnormality  R26.9 781.2       Patient Active Problem List   Diagnosis   • Colon polyp, hyperplastic   • Allergic rhinitis due to pollen   • Acquired hypothyroidism   • Essential hypertension   • FH: colon cancer in relative <50 years old   • Mixed hyperlipidemia   • Chronic right shoulder pain   • Periscapular pain   • Other idiopathic scoliosis, thoracic region   • Prediabetes   • Age-related osteoporosis without current pathological fracture   • Mild episode of recurrent major depressive disorder (HCC)   • Age-related nuclear cataract of both eyes   • Cystoid nevus of conjunctiva   • Anxiety   • Cataract   • Cataract extraction status of left eye   • Cataract extraction status of right eye   • Conjunctival cyst of left eye   • Depressive disorder   • Disorder of refraction   • Dry eye syndrome of bilateral lacrimal glands   • Hearing loss   • Hearing loss   • Hordeolum internum right upper eyelid   • Migraine   • Osteopenia   • Osteoporosis   • Renal stone   • Seasonal allergies   • Diverticulosis   • Internal hemorrhoids   • Posterior vitreous detachment of both eyes        Past Medical History:   Diagnosis Date   • Allergic    • Anxiety    • Cataract     Cataract surgery on right eye 2021 and left eye 2021. (Dr. Nanette Bateman)   • Chronic kidney disease    • Colon polyps    • Depression    • Diverticulosis    • Encounter for annual health examination 2014    Annual Health Assessment   • Family history of colon cancer    • Fibrocystic breast    • GERD (gastroesophageal reflux disease)    • Heart murmur    •  Herpes labialis without complication    • Hip pain     right   • History of mammogram 12/20/2010   • HL (hearing loss) 2014    Hearing Aids   • Hyperlipidemia    • Hypertension    • Hypothyroidism    • Insomnia    • Kidney stones    • Migraines    • Osteopenia     Prolia -last 9/2021,3/2022   • PONV (postoperative nausea and vomiting)    • Scoliosis     Dr. Stanford 5/2018   • Visual impairment     Wear Glasses        Past Surgical History:   Procedure Laterality Date   • BONE MARROW BIOPSY     • BREAST BIOPSY     • BREAST CYST ASPIRATION     • BREAST SURGERY Right     BIOPSY   • CATARACT EXTRACTION, BILATERAL     • COLONOSCOPY N/A 10/27/2016    Procedure: COLONOSCOPY INTO CECUM;  Surgeon: Osvaldo Dorado MD;  Location: [facility] ENDOSCOPY;  Service:    • COLONOSCOPY  01/26/2011   • COLONOSCOPY  02/04/2005   • COLONOSCOPY N/A 12/2/2021    Procedure: COLONOSCOPY INTO CECUM WITH COLD BIOPSY POLYPECTOMY AND HOT SNARE POLYPECTOMY;  Surgeon: Osvaldo Dorado MD;  Location: [facility] ENDOSCOPY;  Service: General;  Laterality: N/A;  PRE-FAMILY HISTORY OF COLON CANCER, PERSONAL HISTORY OF COLON CANCER  POST- POLYPS, DIVERTICULOSIS, HEMORRHOIDS   • KNEE ARTHROSCOPY Left    • PAP SMEAR  12/20/2010                        PT Assessment/Plan     Row Name 03/16/23 0900          PT Assessment    Assessment Comments Ms. Faria returns to PT reporting having appointment for orthotic fitting. She has elected to continue with temporary heel inserts while she continues to heal her hip before purchasing a custom orthotic. Educated patient on potential benefit of having custom orthotic to speed up process of reducing hip pain and improving overall activity tolerance. Patient verbalized understanding. Continued with current program with addition of retro lunge and AR hold with fwd/bwd toe taps. Ms. Faria remains a candidate for skilled PT and would benefit from transitioning to independent management in 1-3 sessions.  -FLOR         PT Plan    PT Plan Comments pt to follow up in two weeks. continue to finalize HEP and address any exercises patient needs to review within her workout classes  -FLOR           User Key  (r) = Recorded By, (t) = Taken By, (c) = Cosigned By    Initials Name Provider Type    Leila Hassan, PT Physical Therapist                   OP Exercises     Row Name 03/16/23 0900             Subjective Comments    Subjective Comments I had my orthotic meeting. I have elected to continue with temporary heel lifts to assess my response and then get a custom orthoic in future if my hips continue to improve  -FLOR         Subjective Pain    Able to rate subjective pain? yes  -FLOR      Pre-Treatment Pain Level 1  -FLOR         Total Minutes    93982 - PT Therapeutic Exercise Minutes 40  -FLOR         Exercise 1    Exercise Name 1 Nustep  -FLOR      Time 1 5 min, lvl 5  -FLOR         Exercise 3    Exercise Name 3 seated HS stretch  -FLOR      Cueing 3 Verbal;Demo  -FLOR      Reps 3 3  -FLOR      Time 3 20s  -FLOR         Exercise 4    Exercise Name 4 standing HF str at step  -FLOR      Cueing 4 Verbal  -FLOR      Reps 4 3 jay  -FLOR      Time 4 20 sec  -FLOR         Exercise 5    Exercise Name 5 AR press hold with fwd/bwd toe taps  -FLOR      Cueing 5 Verbal;Demo  -FLOR      Sets 5 1  -FLOR      Reps 5 15e  -FLOR      Time 5 RTB  -FLOR         Exercise 10    Exercise Name 10 hip ADDuction, standing  -FLOR      Cueing 10 Verbal  -FLOR      Sets 10 2 ea  -FLOR      Reps 10 10  -FLOR      Time 10 RTB, tied around stairs  -FLOR         Exercise 12    Exercise Name 12 side steps  -FLOR      Cueing 12 Verbal;Demo  -FLOR      Reps 12 3 laps  -FLOR      Time 12 gTB mid calf  -FLOR         Exercise 13    Exercise Name 13 retro monster walk  -FLOR      Cueing 13 Verbal;Demo  -FLOR      Reps 13 3 laps  -FLOR      Time 13 gTB mid calf  -FLOR         Exercise 14    Exercise Name 14 lateral step up + knee   -FLOR      Cueing 14 Verbal;Demo  -FLOR      Sets 14 2  -FLOR      Reps 14 10  -FLOR      Time  14 single HR support  -      Additional Comments 8in  -FLOR         Exercise 15    Exercise Name 15 lateral lunge  -FLOR      Cueing 15 Verbal;Demo  -FLOR      Sets 15 2  -FLOR      Reps 15 10  -FLOR      Time 15 onto foam  -FLOR         Exercise 16    Exercise Name 16 retro lunge onto foam  -FLOR      Cueing 16 Verbal;Demo  -FLOR      Sets 16 1  -FLOR      Reps 16 15e  -FLOR            User Key  (r) = Recorded By, (t) = Taken By, (c) = Cosigned By    Initials Name Provider Type    Leila Hassan, PT Physical Therapist                              PT OP Goals     Row Name 03/16/23 0900          PT Short Term Goals    STG Date to Achieve 02/26/23  -     STG 1 Pt will be independent with initial HEP to improve strength/ROM and decrease pain.  -     STG 1 Progress Met  -     STG 2 Patient will improve ability to sleep through the night with </= 1-2 sleep disturbances related to back/hip pain to improve overall sleep quality and quality of life  -     STG 2 Progress Met  -     STG 3 Pt will improve walking tolerance from 10 min to at least 25 minutes with </= 2/10 R hip pain to demonstrate improved activity tolerance while walking on TM for gym routine.  -     STG 3 Progress Ongoing  -        Long Term Goals    LTG Date to Achieve 03/28/23  -     LTG 1 Pt will be independent with advanced HEP to maintain strength/ROM and decrease pain.  -     LTG 1 Progress Ongoing  -     LTG 2 Pt will improve score on LEFS to at least >/= 65% function for improved quality of life.  -     LTG 2 Progress Ongoing;Progressing  -     LTG 3 Pt will improve B hip strength to at least 4/5.  -     LTG 3 Progress Ongoing  -     LTG 4 Pt will improve R hip IR AROM to at least 10-15 deg and ER to 45 deg for improved mobility with functional and recreational tasks.  -     LTG 4 Progress Ongoing  -     LTG 5 Patient will report returning to gym routine at 50-75% from PLOF, 3-5x/week with </= 3/10 R hip pain to demonstrate  greater ease with recreational gym routine and return to PLOF.  -FLOR     LTG 5 Progress Ongoing  -FLOR           User Key  (r) = Recorded By, (t) = Taken By, (c) = Cosigned By    Initials Name Provider Type    Leila Hassan, PT Physical Therapist                               Time Calculation:   Timed Charges  12937 - PT Therapeutic Exercise Minutes: 40  Total Minutes  Timed Charges Total Minutes: 40   Total Minutes: 40  Therapy Charges for Today     Code Description Service Date Service Provider Modifiers Qty    [id number]  PT THER PROC EA 15 MIN 3/16/2023 Leila Villagomez, PT GP 3                    Leila Villagomez, PT  3/16/2023

## 2023-03-29 ENCOUNTER — LAB (OUTPATIENT)
Dept: OTHER | Facility: HOSPITAL | Age: 73
End: 2023-03-29
Payer: MEDICARE

## 2023-03-29 ENCOUNTER — INFUSION (OUTPATIENT)
Dept: ONCOLOGY | Facility: HOSPITAL | Age: 73
End: 2023-03-29
Payer: MEDICARE

## 2023-03-29 VITALS — HEIGHT: 68 IN | TEMPERATURE: 97.6 F | RESPIRATION RATE: 18 BRPM | BODY MASS INDEX: 26.76 KG/M2

## 2023-03-29 DIAGNOSIS — M81.0 AGE-RELATED OSTEOPOROSIS WITHOUT CURRENT PATHOLOGICAL FRACTURE: Primary | ICD-10-CM

## 2023-03-29 DIAGNOSIS — M81.0 AGE-RELATED OSTEOPOROSIS WITHOUT CURRENT PATHOLOGICAL FRACTURE: ICD-10-CM

## 2023-03-29 LAB
25(OH)D3 SERPL-MCNC: 38.2 NG/ML (ref 30–100)
ALBUMIN SERPL-MCNC: 4.1 G/DL (ref 3.5–5.2)
ALBUMIN/GLOB SERPL: 1.6 G/DL
ALP SERPL-CCNC: 72 U/L (ref 39–117)
ALT SERPL W P-5'-P-CCNC: 10 U/L (ref 1–33)
ANION GAP SERPL CALCULATED.3IONS-SCNC: 9 MMOL/L (ref 5–15)
AST SERPL-CCNC: 15 U/L (ref 1–32)
BILIRUB SERPL-MCNC: 0.5 MG/DL (ref 0–1.2)
BUN SERPL-MCNC: 21 MG/DL (ref 8–23)
BUN/CREAT SERPL: 19.6 (ref 7–25)
CALCIUM SPEC-SCNC: 9.7 MG/DL (ref 8.6–10.5)
CHLORIDE SERPL-SCNC: 104 MMOL/L (ref 98–107)
CO2 SERPL-SCNC: 27 MMOL/L (ref 22–29)
CREAT SERPL-MCNC: 1.07 MG/DL (ref 0.57–1)
EGFRCR SERPLBLD CKD-EPI 2021: 55 ML/MIN/1.73
GLOBULIN UR ELPH-MCNC: 2.5 GM/DL
GLUCOSE SERPL-MCNC: 102 MG/DL (ref 65–99)
MAGNESIUM SERPL-MCNC: 1.8 MG/DL (ref 1.6–2.4)
PHOSPHATE SERPL-MCNC: 3.2 MG/DL (ref 2.5–4.5)
POTASSIUM SERPL-SCNC: 4 MMOL/L (ref 3.5–5.2)
PROT SERPL-MCNC: 6.6 G/DL (ref 6–8.5)
SODIUM SERPL-SCNC: 140 MMOL/L (ref 136–145)

## 2023-03-29 PROCEDURE — 96372 THER/PROPH/DIAG INJ SC/IM: CPT

## 2023-03-29 PROCEDURE — 83735 ASSAY OF MAGNESIUM: CPT | Performed by: OBSTETRICS & GYNECOLOGY

## 2023-03-29 PROCEDURE — 84100 ASSAY OF PHOSPHORUS: CPT | Performed by: OBSTETRICS & GYNECOLOGY

## 2023-03-29 PROCEDURE — 25010000002 DENOSUMAB 60 MG/ML SOLUTION PREFILLED SYRINGE: Performed by: OBSTETRICS & GYNECOLOGY

## 2023-03-29 PROCEDURE — 82306 VITAMIN D 25 HYDROXY: CPT | Performed by: OBSTETRICS & GYNECOLOGY

## 2023-03-29 PROCEDURE — 80053 COMPREHEN METABOLIC PANEL: CPT | Performed by: OBSTETRICS & GYNECOLOGY

## 2023-03-29 RX ADMIN — DENOSUMAB 60 MG: 60 INJECTION SUBCUTANEOUS at 14:38

## 2023-03-29 NOTE — NURSING NOTE
Arrived  for prolia injection. Indication and side effects reviewed. Denies recent dental work. Labs and medications verified. Prolia administered in Right arm without incidence. Instructed to call prescribing MD for any concerns or questions and instructed on how to schedule future appts.  Pt vu and discharged in stable condition.

## 2023-04-03 ENCOUNTER — HOSPITAL ENCOUNTER (OUTPATIENT)
Dept: PHYSICAL THERAPY | Facility: HOSPITAL | Age: 73
Setting detail: THERAPIES SERIES
Discharge: HOME | End: 2023-04-03
Payer: MEDICARE

## 2023-04-03 DIAGNOSIS — G89.29 CHRONIC RIGHT HIP PAIN: Primary | ICD-10-CM

## 2023-04-03 DIAGNOSIS — M25.551 CHRONIC RIGHT HIP PAIN: Primary | ICD-10-CM

## 2023-04-03 DIAGNOSIS — R26.9 GAIT ABNORMALITY: ICD-10-CM

## 2023-04-03 DIAGNOSIS — R29.898 WEAKNESS OF BOTH LOWER EXTREMITIES: ICD-10-CM

## 2023-04-03 DIAGNOSIS — M21.70 LEG LENGTH DISCREPANCY: ICD-10-CM

## 2023-04-03 PROCEDURE — 97110 THERAPEUTIC EXERCISES: CPT

## 2023-04-03 NOTE — THERAPY DISCHARGE NOTE
Outpatient Physical Therapy Ortho Treatment Note/Discharge Summary  [facility]     Patient Name: Anastasiia Faria  : 1950  MRN: [id number]  Today's Date: 4/3/2023      Visit Date: 2023    Visit Dx:    ICD-10-CM ICD-9-CM   1. Chronic right hip pain  M25.551 719.45    G89.29 338.29   2. Leg length discrepancy  M21.70 736.81   3. Weakness of both lower extremities  R29.898 729.89   4. Gait abnormality  R26.9 781.2       Patient Active Problem List   Diagnosis   • Colon polyp, hyperplastic   • Allergic rhinitis due to pollen   • Acquired hypothyroidism   • Essential hypertension   • FH: colon cancer in relative <50 years old   • Mixed hyperlipidemia   • Chronic right shoulder pain   • Periscapular pain   • Other idiopathic scoliosis, thoracic region   • Prediabetes   • Age-related osteoporosis without current pathological fracture   • Mild episode of recurrent major depressive disorder   • Age-related nuclear cataract of both eyes   • Cystoid nevus of conjunctiva   • Anxiety   • Cataract   • Cataract extraction status of left eye   • Cataract extraction status of right eye   • Conjunctival cyst of left eye   • Depressive disorder   • Disorder of refraction   • Dry eye syndrome of bilateral lacrimal glands   • Hearing loss   • Hearing loss   • Hordeolum internum right upper eyelid   • Migraine   • Osteopenia   • Osteoporosis   • Renal stone   • Seasonal allergies   • Diverticulosis   • Internal hemorrhoids   • Posterior vitreous detachment of both eyes        Past Medical History:   Diagnosis Date   • Allergic    • Anxiety    • Cataract     Cataract surgery on right eye 2021 and left eye 2021. (Dr. Nanette Bateman)   • Chronic kidney disease    • Colon polyps    • Depression    • Diverticulosis    • Encounter for annual health examination 2014    Annual Health Assessment   • Family history of colon cancer    • Fibrocystic breast    • GERD (gastroesophageal reflux disease)    • Heart  murmur    • Herpes labialis without complication    • Hip pain     right   • History of mammogram 12/20/2010   • HL (hearing loss) 2014    Hearing Aids   • Hyperlipidemia    • Hypertension    • Hypothyroidism    • Insomnia    • Kidney stones    • Migraines    • Osteopenia     Prolia -last 9/2021,3/2022   • PONV (postoperative nausea and vomiting)    • Scoliosis     Dr. Stanford 5/2018   • Visual impairment     Wear Glasses        Past Surgical History:   Procedure Laterality Date   • BONE MARROW BIOPSY     • BREAST BIOPSY     • BREAST CYST ASPIRATION     • BREAST SURGERY Right     BIOPSY   • CATARACT EXTRACTION, BILATERAL     • COLONOSCOPY N/A 10/27/2016    Procedure: COLONOSCOPY INTO CECUM;  Surgeon: Osvaldo Dorado MD;  Location: [facility] ENDOSCOPY;  Service:    • COLONOSCOPY  01/26/2011   • COLONOSCOPY  02/04/2005   • COLONOSCOPY N/A 12/2/2021    Procedure: COLONOSCOPY INTO CECUM WITH COLD BIOPSY POLYPECTOMY AND HOT SNARE POLYPECTOMY;  Surgeon: Osvaldo Dorado MD;  Location: [facility] ENDOSCOPY;  Service: General;  Laterality: N/A;  PRE-FAMILY HISTORY OF COLON CANCER, PERSONAL HISTORY OF COLON CANCER  POST- POLYPS, DIVERTICULOSIS, HEMORRHOIDS   • KNEE ARTHROSCOPY Left    • PAP SMEAR  12/20/2010                        PT Assessment/Plan     Row Name 04/03/23 1200          PT Assessment    Assessment Comments Anastasiia Faria was seen for 13 physical therapy sessions for R hip/LBP.  Treatment included therapeutic exercise, manual therapy and patient education with home exercise program . Progress to physical therapy goals was excellent. Pt met all STG and most LTG, although 2 were not re-tested today. She is pleased with her progress to date and feels indep with advanced/comprehensive HEP. She was given instruction on management moving forward including proceeding with obtaining custom heel lift, and continuing HEP balanced with exercises classes. She was discharged to an independent HEP and provided patient  education to self-manage condition.  -CC        PT Plan    PT Plan Comments discharged  -CC           User Key  (r) = Recorded By, (t) = Taken By, (c) = Cosigned By    Initials Name Provider Type    Angie Del Valle PT Physical Therapist                     OP Exercises     Row Name 04/03/23 1100             Subjective Comments    Subjective Comments Ready for d/c. SHould be getting heel lift in next couple of weeks  -CC         Subjective Pain    Able to rate subjective pain? yes  -CC      Pre-Treatment Pain Level 1  -CC         Total Minutes    67102 - PT Therapeutic Exercise Minutes 39  -CC         Exercise 1    Exercise Name 1 Nustep  -CC      Time 1 5 min, lvl 5  -CC         Exercise 3    Exercise Name 3 seated HS stretch  -CC      Cueing 3 Verbal;Demo  -CC      Reps 3 3  -CC      Time 3 20s  -CC         Exercise 4    Exercise Name 4 standing HF str at step  -CC      Cueing 4 Verbal  -CC      Reps 4 3 jay  -CC      Time 4 20 sec  -CC         Exercise 9    Exercise Name 9 hip flex standing (unilateral march)  -CC      Cueing 9 Verbal  -CC      Sets 9 2 ea leg  -CC      Reps 9 10  -CC      Time 9 RTB around balls of feet  -CC         Exercise 10    Exercise Name 10 hip ADDuction, standing  -CC      Cueing 10 Verbal  -CC      Sets 10 2 ea  -CC      Reps 10 10  -CC      Time 10 RTB, tied around stairs  -CC         Exercise 12    Exercise Name 12 side steps  -CC      Cueing 12 Verbal;Demo  -CC      Reps 12 3 laps  -CC      Time 12 gTB mid calf  -CC         Exercise 13    Exercise Name 13 retro monster walk  -CC      Cueing 13 Verbal;Demo  -CC      Reps 13 3 laps  -CC      Time 13 gTB mid calf  -CC            User Key  (r) = Recorded By, (t) = Taken By, (c) = Cosigned By    Initials Name Provider Type    Angie Del Valle PT Physical Therapist                                PT OP Goals     Row Name 04/03/23 1200          PT Short Term Goals    STG Date to Achieve 02/26/23  -CC     STG 1 Pt will be  independent with initial HEP to improve strength/ROM and decrease pain.  -CC     STG 1 Progress Met  -CC     STG 2 Patient will improve ability to sleep through the night with </= 1-2 sleep disturbances related to back/hip pain to improve overall sleep quality and quality of life  -CC     STG 2 Progress Met  -CC     STG 3 Pt will improve walking tolerance from 10 min to at least 25 minutes with </= 2/10 R hip pain to demonstrate improved activity tolerance while walking on TM for gym routine.  -CC     STG 3 Progress Met  -     STG 3 Progress Comments 2/10 after 30 min  -        Long Term Goals    LTG Date to Achieve 03/28/23  -CC     LTG 1 Pt will be independent with advanced HEP to maintain strength/ROM and decrease pain.  -CC     LTG 1 Progress Met  -     LTG 2 Pt will improve score on LEFS to at least >/= 65% function for improved quality of life.  -CC     LTG 2 Progress Not Met  -     LTG 2 Progress Comments not tested  -     LTG 3 Pt will improve B hip strength to at least 4/5.  -     LTG 3 Progress Met  -CC     LTG 4 Pt will improve R hip IR AROM to at least 10-15 deg and ER to 45 deg for improved mobility with functional and recreational tasks.  -CC     LTG 4 Progress Ongoing  -     LTG 5 Patient will report returning to gym routine at 50-75% from PLOF, 3-5x/week with </= 3/10 R hip pain to demonstrate greater ease with recreational gym routine and return to PLOF.  -     LTG 5 Progress Met  -           User Key  (r) = Recorded By, (t) = Taken By, (c) = Cosigned By    Initials Name Provider Type    Angie Del Valle, PT Physical Therapist                               Time Calculation:   Start Time: 1138  Stop Time: 1217  Time Calculation (min): 39 min  Total Timed Code Minutes- PT: 39 minute(s)  Timed Charges  93654 - PT Therapeutic Exercise Minutes: 39  Total Minutes  Timed Charges Total Minutes: 39   Total Minutes: 39  Therapy Charges for Today     Code Description Service Date  Service Provider Modifiers Qty    [id number] HC PT THER PROC EA 15 MIN 4/3/2023 Angie Ortiz, PT GP 3                OP PT Discharge Summary  Date of Discharge: 04/03/23  Reason for Discharge: Independent, Patient/Caregiver request  Outcomes Achieved: Refer to plan of care for updates on goals achieved  Discharge Destination: Home with home program      Angie Ortiz, PT  4/3/2023

## 2023-04-04 ENCOUNTER — TELEPHONE (OUTPATIENT)
Dept: FAMILY MEDICINE CLINIC | Facility: CLINIC | Age: 73
End: 2023-04-04
Payer: MEDICARE

## 2023-04-04 DIAGNOSIS — E78.2 MIXED HYPERLIPIDEMIA: Primary | ICD-10-CM

## 2023-04-04 DIAGNOSIS — R73.03 PREDIABETES: ICD-10-CM

## 2023-04-04 DIAGNOSIS — E03.9 ACQUIRED HYPOTHYROIDISM: ICD-10-CM

## 2023-04-14 ENCOUNTER — OFFICE VISIT (OUTPATIENT)
Dept: FAMILY MEDICINE CLINIC | Facility: CLINIC | Age: 73
End: 2023-04-14
Payer: MEDICARE

## 2023-04-14 VITALS
WEIGHT: 181.65 LBS | BODY MASS INDEX: 27.53 KG/M2 | OXYGEN SATURATION: 94 % | SYSTOLIC BLOOD PRESSURE: 110 MMHG | DIASTOLIC BLOOD PRESSURE: 64 MMHG | HEART RATE: 80 BPM | HEIGHT: 68 IN | RESPIRATION RATE: 17 BRPM | TEMPERATURE: 98 F

## 2023-04-14 DIAGNOSIS — Z00.00 MEDICARE ANNUAL WELLNESS VISIT, SUBSEQUENT: Primary | ICD-10-CM

## 2023-04-14 DIAGNOSIS — I10 ESSENTIAL HYPERTENSION: ICD-10-CM

## 2023-04-14 DIAGNOSIS — R73.03 PREDIABETES: ICD-10-CM

## 2023-04-14 DIAGNOSIS — E78.2 MIXED HYPERLIPIDEMIA: ICD-10-CM

## 2023-04-14 PROCEDURE — 3074F SYST BP LT 130 MM HG: CPT | Performed by: FAMILY MEDICINE

## 2023-04-14 PROCEDURE — 3078F DIAST BP <80 MM HG: CPT | Performed by: FAMILY MEDICINE

## 2023-04-14 PROCEDURE — G0439 PPPS, SUBSEQ VISIT: HCPCS | Performed by: FAMILY MEDICINE

## 2023-04-14 RX ORDER — NIFEDIPINE 30 MG/1
30 TABLET, EXTENDED RELEASE ORAL DAILY
Qty: 90 TABLET | Refills: 3 | Status: SHIPPED | OUTPATIENT
Start: 2023-04-14

## 2023-04-14 RX ORDER — ESCITALOPRAM OXALATE 20 MG/1
20 TABLET ORAL DAILY
Qty: 90 TABLET | Refills: 3 | Status: SHIPPED | OUTPATIENT
Start: 2023-04-14

## 2023-04-14 RX ORDER — ATENOLOL 25 MG/1
25 TABLET ORAL DAILY
Qty: 90 TABLET | Refills: 3 | Status: SHIPPED | OUTPATIENT
Start: 2023-04-14

## 2023-04-14 RX ORDER — LEVOTHYROXINE SODIUM 0.05 MG/1
50 TABLET ORAL DAILY
Qty: 90 TABLET | Refills: 3 | Status: SHIPPED | OUTPATIENT
Start: 2023-04-14

## 2023-04-14 NOTE — PROGRESS NOTES
The ABCs of the Annual Wellness Visit  Subsequent Medicare Wellness Visit    Subjective      Anastasiia Faria is a 73 y.o. female who presents for a Subsequent Medicare Wellness Visit.    The following portions of the patient's history were reviewed and   updated as appropriate: allergies, current medications, past family history, past medical history, past social history, past surgical history and problem list.    Compared to one year ago, the patient feels her physical   health is better.    Compared to one year ago, the patient feels her mental   health is the same.    Recent Hospitalizations:  She was not admitted to the hospital during the last year.       Current Medical Providers:  Patient Care Team:  Audrey Krishna MD as PCP - General (Family Medicine)  Jaiden Munoz MD as Referring Physician (Obstetrics and Gynecology)    Outpatient Medications Prior to Visit   Medication Sig Dispense Refill   • aspirin-acetaminophen-caffeine (EXCEDRIN MIGRAINE) 250-250-65 MG per tablet Take 1 tablet by mouth Every 6 (Six) Hours As Needed for Headache.     • calcium carbonate (OS-NIKKIE) 600 MG tablet Take 2 tablets by mouth Daily.     • Cholecalciferol (VITAMIN D3) 2000 UNITS capsule Take 1 capsule by mouth Daily.     • loratadine (CLARITIN) 10 MG tablet Take 1 tablet by mouth Daily.     • Probiotic Product (SOLUBLE FIBER/PROBIOTICS PO) Take 1 capsule by mouth Daily.     • PROLIA 60 MG/ML solution syringe INJECT 60 MG UNDER THE SKIN EVERY SIX MONTHS 1 mL 1   • Psyllium (METAMUCIL FIBER PO) Take 1 Scoop by mouth.     • rosuvastatin (CRESTOR) 10 MG tablet TAKE 1 TABLET EVERY NIGHT 90 tablet 3   • valACYclovir (VALTREX) 1000 MG tablet 1 tablet As Needed.     • atenolol (TENORMIN) 25 MG tablet TAKE 1 TABLET DAILY 90 tablet 3   • diclofenac (VOLTAREN) 50 MG EC tablet Take 1 tablet by mouth 2 (Two) Times a Day. 10 tablet 0   • escitalopram (LEXAPRO) 20 MG tablet TAKE 1 TABLET DAILY 90 tablet 3   • levothyroxine (SYNTHROID, LEVOTHROID)  50 MCG tablet TAKE 1 TABLET DAILY 90 tablet 3   • NIFEdipine XL (PROCARDIA XL) 30 MG 24 hr tablet TAKE 1 TABLET DAILY 90 tablet 3     No facility-administered medications prior to visit.       No opioid medication identified on active medication list. I have reviewed chart for other potential  high risk medication/s and harmful drug interactions in the elderly.          Aspirin is not on active medication list.  Aspirin use is not indicated based on review of current medical condition/s. Risk of harm outweighs potential benefits.  .    Patient Active Problem List   Diagnosis   • Colon polyp, hyperplastic   • Allergic rhinitis due to pollen   • Acquired hypothyroidism   • Essential hypertension   • FH: colon cancer in relative <50 years old   • Mixed hyperlipidemia   • Chronic right shoulder pain   • Periscapular pain   • Other idiopathic scoliosis, thoracic region   • Prediabetes   • Age-related osteoporosis without current pathological fracture   • Mild episode of recurrent major depressive disorder   • Age-related nuclear cataract of both eyes   • Cystoid nevus of conjunctiva   • Anxiety   • Cataract   • Cataract extraction status of left eye   • Cataract extraction status of right eye   • Conjunctival cyst of left eye   • Depressive disorder   • Disorder of refraction   • Dry eye syndrome of bilateral lacrimal glands   • Hearing loss   • Hearing loss   • Hordeolum internum right upper eyelid   • Migraine   • Osteopenia   • Osteoporosis   • Renal stone   • Seasonal allergies   • Diverticulosis   • Internal hemorrhoids   • Posterior vitreous detachment of both eyes     Advance Care Planning   Advance Care Planning     Advance Directive is on file.  ACP discussion was held with the patient during this visit. Patient has an advance directive in EMR which is still valid.      Objective    Vitals:    04/14/23 1015   BP: 110/64   BP Location: Right arm   Patient Position: Sitting   Cuff Size: Adult   Pulse: 80   Resp:  "17   Temp: 98 °F (36.7 °C)   TempSrc: Infrared   SpO2: 94%   Weight: 82.4 kg (181 lb 10.4 oz)   Height: 172.7 cm (68\")   PainSc: 0-No pain     Estimated body mass index is 27.62 kg/m² as calculated from the following:    Height as of this encounter: 172.7 cm (68\").    Weight as of this encounter: 82.4 kg (181 lb 10.4 oz).    BMI is >= 25 and <30. (Overweight) The following options were offered after discussion;: exercise counseling/recommendations and nutrition counseling/recommendations    Physical Exam  Vitals reviewed.   Constitutional:       General: She is not in acute distress.     Appearance: Normal appearance. She is not ill-appearing or toxic-appearing.   HENT:      Head: Normocephalic and atraumatic.      Right Ear: Tympanic membrane, ear canal and external ear normal. There is no impacted cerumen.      Left Ear: Tympanic membrane, ear canal and external ear normal. There is no impacted cerumen.      Nose: Nose normal.      Mouth/Throat:      Mouth: Mucous membranes are moist.      Pharynx: Oropharynx is clear. No oropharyngeal exudate or posterior oropharyngeal erythema.   Eyes:      General: No scleral icterus.        Right eye: No discharge.         Left eye: No discharge.      Conjunctiva/sclera: Conjunctivae normal.   Neck:      Thyroid: No thyromegaly.      Vascular: No carotid bruit.   Cardiovascular:      Rate and Rhythm: Normal rate and regular rhythm.      Heart sounds: Normal heart sounds. No murmur heard.    No friction rub. No gallop.   Pulmonary:      Effort: Pulmonary effort is normal. No respiratory distress.      Breath sounds: Normal breath sounds. No wheezing or rales.   Chest:      Chest wall: No tenderness.   Abdominal:      General: Bowel sounds are normal. There is no distension.      Palpations: Abdomen is soft. There is no mass.      Tenderness: There is no abdominal tenderness. There is no guarding.   Musculoskeletal:         General: No deformity.      Cervical back: Neck supple. "      Right lower leg: No edema.      Left lower leg: No edema.   Lymphadenopathy:      Cervical: No cervical adenopathy.   Skin:     Coloration: Skin is not jaundiced or pale.   Neurological:      General: No focal deficit present.      Mental Status: She is alert and oriented to person, place, and time.      Motor: No abnormal muscle tone.      Coordination: Coordination normal.   Psychiatric:         Mood and Affect: Mood normal.         Behavior: Behavior normal.         Does the patient have evidence of cognitive impairment?   No    Lab Results   Component Value Date    CHLPL 138 04/10/2023    TRIG 152 (H) 04/10/2023    HDL 42 04/10/2023    LDL 70 04/10/2023    VLDL 26 04/10/2023    HGBA1C 5.90 (H) 04/10/2023          HEALTH RISK ASSESSMENT    Smoking Status:  Social History     Tobacco Use   Smoking Status Never   Smokeless Tobacco Never   Tobacco Comments    daily caffine     Alcohol Consumption:  Social History     Substance and Sexual Activity   Alcohol Use Never     Fall Risk Screen:    STEADI Fall Risk Assessment was completed, and patient is at LOW risk for falls.Assessment completed on:2023    Depression Screenin/14/2023    10:18 AM   PHQ-2/PHQ-9 Depression Screening   Little Interest or Pleasure in Doing Things 0-->not at all   Feeling Down, Depressed or Hopeless 1-->several days   PHQ-9: Brief Depression Severity Measure Score 1       Health Habits and Functional and Cognitive Screenin/14/2023    10:19 AM   Functional & Cognitive Status   Do you have difficulty preparing food and eating? No   Do you have difficulty bathing yourself, getting dressed or grooming yourself? No   Do you have difficulty using the toilet? No   Do you have difficulty moving around from place to place? No   Do you have trouble with steps or getting out of a bed or a chair? No   Current Diet Well Balanced Diet   Dental Exam Up to date   Eye Exam Up to date   Exercise (times per week) 5 times per week    Current Exercises Include Cardiovascular Workout;Other        Exercise Comment PT exercises   Do you need help using the phone?  No   Are you deaf or do you have serious difficulty hearing?  Yes   Do you need help with transportation? No   Do you need help shopping? No   Do you need help preparing meals?  No   Do you need help with housework?  No   Do you need help with laundry? No   Do you need help taking your medications? No   Do you need help managing money? No   Do you ever drive or ride in a car without wearing a seat belt? No   Have you felt unusual stress, anger or loneliness in the last month? No   Who do you live with? Spouse   If you need help, do you have trouble finding someone available to you? No   Have you been bothered in the last four weeks by sexual problems? No   Do you have difficulty concentrating, remembering or making decisions? No       Age-appropriate Screening Schedule:  Refer to the list below for future screening recommendations based on patient's age, sex and/or medical conditions. Orders for these recommended tests are listed in the plan section. The patient has been provided with a written plan.    Health Maintenance   Topic Date Due   • ANNUAL WELLNESS VISIT  04/12/2023   • INFLUENZA VACCINE  08/01/2023   • DXA SCAN  12/06/2023   • LIPID PANEL  04/10/2024   • MAMMOGRAM  11/01/2024   • COLORECTAL CANCER SCREENING  12/02/2024   • TDAP/TD VACCINES (2 - Td or Tdap) 04/03/2027   • HEPATITIS C SCREENING  Completed   • COVID-19 Vaccine  Completed   • Pneumococcal Vaccine 65+  Completed   • ZOSTER VACCINE  Discontinued                  CMS Preventative Services Quick Reference  Risk Factors Identified During Encounter:    None Identified    The above risks/problems have been discussed with the patient.  Pertinent information has been shared with the patient in the After Visit Summary.    Diagnoses and all orders for this visit:    1. Medicare annual wellness visit, subsequent  (Primary)    2. Prediabetes    3. Essential hypertension    4. Mixed hyperlipidemia    Other orders  -     atenolol (TENORMIN) 25 MG tablet; Take 1 tablet by mouth Daily.  Dispense: 90 tablet; Refill: 3  -     escitalopram (LEXAPRO) 20 MG tablet; Take 1 tablet by mouth Daily.  Dispense: 90 tablet; Refill: 3  -     levothyroxine (SYNTHROID, LEVOTHROID) 50 MCG tablet; Take 1 tablet by mouth Daily.  Dispense: 90 tablet; Refill: 3  -     NIFEdipine XL (PROCARDIA XL) 30 MG 24 hr tablet; Take 1 tablet by mouth Daily.  Dispense: 90 tablet; Refill: 3  -     diclofenac (VOLTAREN) 50 MG EC tablet; Take 1 tablet by mouth 2 (Two) Times a Day.  Dispense: 30 tablet; Refill: 0        Follow Up:   Next Medicare Wellness visit to be scheduled in 1 year.      An After Visit Summary and PPPS were made available to the patient.

## 2023-04-18 ENCOUNTER — APPOINTMENT (OUTPATIENT)
Dept: PHYSICAL THERAPY | Facility: HOSPITAL | Age: 73
End: 2023-04-18
Payer: MEDICARE

## 2023-09-28 ENCOUNTER — TELEPHONE (OUTPATIENT)
Dept: OBSTETRICS AND GYNECOLOGY | Facility: CLINIC | Age: 73
End: 2023-09-28
Payer: MEDICARE

## 2023-09-29 DIAGNOSIS — Z78.0 POST-MENOPAUSAL: Primary | ICD-10-CM

## 2023-10-04 ENCOUNTER — LAB (OUTPATIENT)
Dept: OTHER | Facility: HOSPITAL | Age: 73
End: 2023-10-04
Payer: MEDICARE

## 2023-10-04 ENCOUNTER — INFUSION (OUTPATIENT)
Dept: ONCOLOGY | Facility: HOSPITAL | Age: 73
End: 2023-10-04
Payer: MEDICARE

## 2023-10-04 VITALS
SYSTOLIC BLOOD PRESSURE: 149 MMHG | RESPIRATION RATE: 15 BRPM | DIASTOLIC BLOOD PRESSURE: 66 MMHG | TEMPERATURE: 98.4 F | BODY MASS INDEX: 27.22 KG/M2 | HEIGHT: 68 IN | HEART RATE: 57 BPM | OXYGEN SATURATION: 95 %

## 2023-10-04 DIAGNOSIS — M81.0 AGE-RELATED OSTEOPOROSIS WITHOUT CURRENT PATHOLOGICAL FRACTURE: ICD-10-CM

## 2023-10-04 DIAGNOSIS — M81.0 AGE-RELATED OSTEOPOROSIS WITHOUT CURRENT PATHOLOGICAL FRACTURE: Primary | ICD-10-CM

## 2023-10-04 LAB
25(OH)D3 SERPL-MCNC: 44.6 NG/ML (ref 30–100)
ALBUMIN SERPL-MCNC: 4.1 G/DL (ref 3.5–5.2)
ALBUMIN/GLOB SERPL: 1.9 G/DL
ALP SERPL-CCNC: 73 U/L (ref 39–117)
ALT SERPL W P-5'-P-CCNC: 11 U/L (ref 1–33)
ANION GAP SERPL CALCULATED.3IONS-SCNC: 7.7 MMOL/L (ref 5–15)
AST SERPL-CCNC: 15 U/L (ref 1–32)
BILIRUB SERPL-MCNC: 0.6 MG/DL (ref 0–1.2)
BUN SERPL-MCNC: 21 MG/DL (ref 8–23)
BUN/CREAT SERPL: 16.7 (ref 7–25)
CALCIUM SPEC-SCNC: 9.9 MG/DL (ref 8.6–10.5)
CHLORIDE SERPL-SCNC: 104 MMOL/L (ref 98–107)
CO2 SERPL-SCNC: 27.3 MMOL/L (ref 22–29)
CREAT SERPL-MCNC: 1.26 MG/DL (ref 0.57–1)
EGFRCR SERPLBLD CKD-EPI 2021: 45.2 ML/MIN/1.73
GLOBULIN UR ELPH-MCNC: 2.2 GM/DL
GLUCOSE SERPL-MCNC: 108 MG/DL (ref 65–99)
MAGNESIUM SERPL-MCNC: 1.9 MG/DL (ref 1.6–2.4)
PHOSPHATE SERPL-MCNC: 3.2 MG/DL (ref 2.5–4.5)
POTASSIUM SERPL-SCNC: 4.5 MMOL/L (ref 3.5–5.2)
PROT SERPL-MCNC: 6.3 G/DL (ref 6–8.5)
SODIUM SERPL-SCNC: 139 MMOL/L (ref 136–145)

## 2023-10-04 PROCEDURE — 84100 ASSAY OF PHOSPHORUS: CPT | Performed by: OBSTETRICS & GYNECOLOGY

## 2023-10-04 PROCEDURE — 96372 THER/PROPH/DIAG INJ SC/IM: CPT

## 2023-10-04 PROCEDURE — 25010000002 DENOSUMAB 60 MG/ML SOLUTION PREFILLED SYRINGE: Performed by: OBSTETRICS & GYNECOLOGY

## 2023-10-04 PROCEDURE — 82306 VITAMIN D 25 HYDROXY: CPT | Performed by: OBSTETRICS & GYNECOLOGY

## 2023-10-04 PROCEDURE — 80053 COMPREHEN METABOLIC PANEL: CPT | Performed by: OBSTETRICS & GYNECOLOGY

## 2023-10-04 PROCEDURE — 83735 ASSAY OF MAGNESIUM: CPT | Performed by: OBSTETRICS & GYNECOLOGY

## 2023-10-04 RX ADMIN — DENOSUMAB 60 MG: 60 INJECTION SUBCUTANEOUS at 15:16

## 2023-10-04 NOTE — NURSING NOTE
Patient arrived for prolia injection. While walking to infusion chair patient states she felt dizzy. Patient seated in chair. Patient states she has been fasting since last night for her labs today and she went to an exercise class this morning. Patient provided with cream cheese crackers & water. Patient states she feels much better after snack/drink.     Prolia Indication and side effects reviewed. Denies recent dental work/jaw bone pain. Labs and medications verified. Prolia administered in right arm without incidence. Instructed to call prescribing MD for any concerns or questions and instructed on how to schedule future appts.  Pt vu and discharged in stable condition.

## 2023-10-05 NOTE — PROGRESS NOTES
Liliana could you call this patient and let her know that she consistently has an elevated creatinine which means some chronic renal disease.  This last one she did this week was 1.26 which was more elevated than any of her previous ones over the last 3 years.  She really does need to follow-up with her PCP concerning this.  Thank you.  FLOR

## 2023-10-06 ENCOUNTER — TELEPHONE (OUTPATIENT)
Dept: OBSTETRICS AND GYNECOLOGY | Facility: CLINIC | Age: 73
End: 2023-10-06
Payer: MEDICARE

## 2023-10-06 NOTE — TELEPHONE ENCOUNTER
----- Message from Jaiden Munoz MD sent at 10/5/2023  5:39 PM EDT -----  Liliana could you call this patient and let her know that she consistently has an elevated creatinine which means some chronic renal disease.  This last one she did this week was 1.26 which was more elevated than any of her previous ones over the last 3 years.  She really does need to follow-up with her PCP concerning this.  Thank you.  FLOR

## 2023-10-20 ENCOUNTER — HOSPITAL ENCOUNTER (OUTPATIENT)
Facility: HOSPITAL | Age: 73
Discharge: HOME | End: 2023-10-20
Payer: MEDICARE

## 2023-10-20 ENCOUNTER — OFFICE VISIT (OUTPATIENT)
Dept: FAMILY MEDICINE CLINIC | Facility: CLINIC | Age: 73
End: 2023-10-20
Payer: MEDICARE

## 2023-10-20 ENCOUNTER — HOSPITAL ENCOUNTER (OUTPATIENT)
Dept: GENERAL RADIOLOGY | Facility: HOSPITAL | Age: 73
Discharge: HOME | End: 2023-10-20
Payer: MEDICARE

## 2023-10-20 VITALS
DIASTOLIC BLOOD PRESSURE: 80 MMHG | HEIGHT: 69 IN | HEART RATE: 63 BPM | SYSTOLIC BLOOD PRESSURE: 130 MMHG | RESPIRATION RATE: 18 BRPM | BODY MASS INDEX: 27.7 KG/M2 | OXYGEN SATURATION: 96 % | WEIGHT: 187 LBS | TEMPERATURE: 97.3 F

## 2023-10-20 DIAGNOSIS — M16.11 PRIMARY OSTEOARTHRITIS OF RIGHT HIP: ICD-10-CM

## 2023-10-20 DIAGNOSIS — M16.10 PRIMARY OSTEOARTHRITIS OF HIP, UNSPECIFIED LATERALITY: Primary | ICD-10-CM

## 2023-10-20 DIAGNOSIS — R93.6 ABNORMAL X-RAY OF FEMUR: ICD-10-CM

## 2023-10-20 DIAGNOSIS — Z00.00 MEDICARE ANNUAL WELLNESS VISIT, SUBSEQUENT: Primary | ICD-10-CM

## 2023-10-20 DIAGNOSIS — Z23 NEED FOR IMMUNIZATION AGAINST INFLUENZA: ICD-10-CM

## 2023-10-20 DIAGNOSIS — M25.551 RIGHT HIP PAIN: ICD-10-CM

## 2023-10-20 DIAGNOSIS — M25.551 RIGHT HIP PAIN: Primary | ICD-10-CM

## 2023-10-20 PROCEDURE — 90662 IIV NO PRSV INCREASED AG IM: CPT | Performed by: FAMILY MEDICINE

## 2023-10-20 PROCEDURE — 72192 CT PELVIS W/O DYE: CPT

## 2023-10-20 PROCEDURE — 3075F SYST BP GE 130 - 139MM HG: CPT | Performed by: FAMILY MEDICINE

## 2023-10-20 PROCEDURE — G0008 ADMIN INFLUENZA VIRUS VAC: HCPCS | Performed by: FAMILY MEDICINE

## 2023-10-20 PROCEDURE — 73502 X-RAY EXAM HIP UNI 2-3 VIEWS: CPT

## 2023-10-20 PROCEDURE — 3079F DIAST BP 80-89 MM HG: CPT | Performed by: FAMILY MEDICINE

## 2023-10-20 PROCEDURE — 99214 OFFICE O/P EST MOD 30 MIN: CPT | Performed by: FAMILY MEDICINE

## 2023-10-20 NOTE — PROGRESS NOTES
"Chief Complaint  Chief Complaint   Patient presents with    [facility]     New Pt tranfer from [employer] [facility]     Pt her to talk about labs and kidney function        Subjective    History of Present Illness        Anastasiia Faria presents to [facility] for   Hip Pain   The incident occurred more than 1 week ago (december 2022 was the initial pain). There was no injury mechanism. The pain is present in the right hip. The quality of the pain is described as aching. The pain is mild. Associated symptoms comments: Pain is worse when she walks  .        Objective   Vital Signs:   Visit Vitals  /80   Pulse 63   Temp 97.3 °F (36.3 °C)   Resp 18   Ht 175.3 cm (69\")   Wt 84.8 kg (187 lb)   LMP  (LMP Unknown)   SpO2 96%   BMI 27.62 kg/m²             Physical Exam  Vitals reviewed.   Constitutional:       Appearance: She is well-developed.   HENT:      Head: Normocephalic.      Right Ear: External ear normal.      Left Ear: External ear normal.      Nose: Nose normal.   Eyes:      Conjunctiva/sclera: Conjunctivae normal.   Cardiovascular:      Rate and Rhythm: Normal rate and regular rhythm.   Pulmonary:      Effort: Pulmonary effort is normal.      Breath sounds: Normal breath sounds.   Musculoskeletal:      Cervical back: Normal range of motion and neck supple.      Right hip: Tenderness present. No deformity. Decreased range of motion.   Skin:     General: Skin is warm and dry.      Capillary Refill: Capillary refill takes less than 2 seconds.   Neurological:      Mental Status: She is alert and oriented to person, place, and time.            Result Review :                    Assessment and Plan      Diagnoses and all orders for this visit:    1. Right hip pain (Primary)  Assessment & Plan:  Due to patient's right hip pain and x-ray of her hip was ordered.  Patient was advised to use heat and Tylenol on her hip.    Orders:  -     XR Hip With or Without Pelvis 2 - 3 View Right    2. " Need for immunization against influenza  -     Fluzone High-Dose 65+yrs             Follow Up   No follow-ups on file.  Patient was given instructions and counseling regarding her condition or for health maintenance advice. Please see specific information pulled into the AVS if appropriate.

## 2023-10-31 PROBLEM — M25.551 RIGHT HIP PAIN: Status: ACTIVE | Noted: 2023-10-31

## 2023-11-01 NOTE — ASSESSMENT & PLAN NOTE
Due to patient's right hip pain and x-ray of her hip was ordered.  Patient was advised to use heat and Tylenol on her hip.

## 2023-11-22 RX ORDER — ROSUVASTATIN CALCIUM 10 MG/1
10 TABLET, COATED ORAL NIGHTLY
Qty: 90 TABLET | Refills: 3 | Status: SHIPPED | OUTPATIENT
Start: 2023-11-22

## 2023-11-22 NOTE — TELEPHONE ENCOUNTER
Caller: Anastasiia Faria    Relationship: Self    Best call back number: [phone]     Requested Prescriptions:   Requested Prescriptions     Pending Prescriptions Disp Refills    rosuvastatin (CRESTOR) 10 MG tablet 90 tablet 3     Sig: Take 1 tablet by mouth Every Night.        Pharmacy where request should be sent: [facility] - [address] [phone] [facility] [phone] FX     Last office visit with prescribing clinician: 10/20/2023   Last telemedicine visit with prescribing clinician: Visit date not found   Next office visit with prescribing clinician: Visit date not found     Additional details provided by patient: PRESCRIPTION WAS ORIGINALLY WRITTEN BY DR. JAMIL AND MAY NEED TO BE RE-WRITTEN.    Federico Holland Rep   11/22/23 11:42 EST

## 2023-12-07 ENCOUNTER — HOSPITAL ENCOUNTER (OUTPATIENT)
Dept: BONE DENSITY | Facility: HOSPITAL | Age: 73
Discharge: HOME | End: 2023-12-07
Admitting: OBSTETRICS & GYNECOLOGY
Payer: MEDICARE

## 2023-12-07 DIAGNOSIS — Z78.0 POST-MENOPAUSAL: ICD-10-CM

## 2023-12-07 PROCEDURE — 77080 DXA BONE DENSITY AXIAL: CPT

## 2023-12-12 ENCOUNTER — TELEPHONE (OUTPATIENT)
Dept: OBSTETRICS AND GYNECOLOGY | Facility: CLINIC | Age: 73
End: 2023-12-12
Payer: MEDICARE

## 2023-12-12 ENCOUNTER — OFFICE VISIT (OUTPATIENT)
Dept: ORTHOPEDIC SURGERY | Facility: CLINIC | Age: 73
End: 2023-12-12
Payer: MEDICARE

## 2023-12-12 VITALS — WEIGHT: 187.9 LBS | BODY MASS INDEX: 28.48 KG/M2 | HEIGHT: 68 IN | TEMPERATURE: 97.1 F

## 2023-12-12 DIAGNOSIS — M16.11 PRIMARY OSTEOARTHRITIS OF RIGHT HIP: Primary | ICD-10-CM

## 2023-12-12 PROBLEM — M16.9 OA (OSTEOARTHRITIS) OF HIP: Status: ACTIVE | Noted: 2023-12-12

## 2023-12-12 PROCEDURE — 99204 OFFICE O/P NEW MOD 45 MIN: CPT | Performed by: ORTHOPAEDIC SURGERY

## 2023-12-12 RX ORDER — PREGABALIN 150 MG/1
150 CAPSULE ORAL ONCE
OUTPATIENT
Start: 2023-12-12 | End: 2023-12-12

## 2023-12-12 RX ORDER — CHLORHEXIDINE GLUCONATE 500 MG/1
CLOTH TOPICAL 2 TIMES DAILY
OUTPATIENT
Start: 2023-12-12

## 2023-12-12 RX ORDER — MELOXICAM 7.5 MG/1
15 TABLET ORAL ONCE
OUTPATIENT
Start: 2023-12-12 | End: 2023-12-12

## 2023-12-12 NOTE — TELEPHONE ENCOUNTER
----- Message from Jaiden Munoz MD sent at 12/12/2023  3:42 PM EST -----  Please tell patient that her DEXA scan was essentially unchanged from 2 years ago.  Her spine and right hip are normal and she just has a little osteopenia of her left hip.  She should repeat this again in 2 years.  Thank you

## 2023-12-12 NOTE — PROGRESS NOTES
Patient: Anastasiia Faria  YOB: 1950 73 y.o. female  Medical Record Number: [id number]    Chief Complaints:   Chief Complaint   Patient presents with    Right Hip - Initial Evaluation       History of Present Illness:Anastasiia Faria is a 73 y.o. female who presents with  c/o right hip pain , catching, locking -  markedly progressive over the last year although noted sx starting 7-8 years ago. Did PT with some sx relief but pain has not improved. Limits adls and walking tolerance. Takes Aleve, IBU, Tylenol, Diclofenac with limited relief.    Allergies: No Known Allergies    Medications:   Current Outpatient Medications   Medication Sig Dispense Refill    aspirin-acetaminophen-caffeine (EXCEDRIN MIGRAINE) 250-250-65 MG per tablet Take 1 tablet by mouth Every 6 (Six) Hours As Needed for Headache.      atenolol (TENORMIN) 25 MG tablet Take 1 tablet by mouth Daily. 90 tablet 3    calcium carbonate (OS-NIKKIE) 600 MG tablet Take 2 tablets by mouth Daily.      Cholecalciferol (VITAMIN D3) 2000 UNITS capsule Take 1 capsule by mouth Daily.      diclofenac (VOLTAREN) 50 MG EC tablet Take 1 tablet by mouth 2 (Two) Times a Day. 30 tablet 0    escitalopram (LEXAPRO) 20 MG tablet Take 1 tablet by mouth Daily. 90 tablet 3    levothyroxine (SYNTHROID, LEVOTHROID) 50 MCG tablet Take 1 tablet by mouth Daily. 90 tablet 3    loratadine (CLARITIN) 10 MG tablet Take 1 tablet by mouth Daily.      NIFEdipine XL (PROCARDIA XL) 30 MG 24 hr tablet Take 1 tablet by mouth Daily. 90 tablet 3    Probiotic Product (SOLUBLE FIBER/PROBIOTICS PO) Take 1 capsule by mouth Daily.      PROLIA 60 MG/ML solution syringe INJECT 60 MG UNDER THE SKIN EVERY SIX MONTHS 1 mL 1    Psyllium (METAMUCIL FIBER PO) Take 1 Scoop by mouth.      rosuvastatin (CRESTOR) 10 MG tablet Take 1 tablet by mouth Every Night. 90 tablet 3    valACYclovir (VALTREX) 1000 MG tablet 1 tablet As Needed.       No current facility-administered medications for this visit.         The  "following portions of the patient's history were reviewed and updated as appropriate: allergies, current medications, past family history, past medical history, past social history, past surgical history and problem list.    Review of Systems:   Pertinent positives/negative listed in HPI above    Physical Exam:   Vitals:    12/12/23 0856   Temp: 97.1 °F (36.2 °C)   Weight: 85.2 kg (187 lb 14.4 oz)   Height: 172.7 cm (68\")   PainSc:   3   PainLoc: Hip       General: A and O x 3, ASA, NAD      Hip:  right    LEG ALIGNMENT:     Neutral        LEG LENGTH DISCREPANCY   :    none although significant pelvic obliquity - feels almost equal with 1/4 heel lift    GAIT:     Antalgic    SKIN:     No abnormality    RANGE OF MOTION:     Limited by joint irritability    STRENGTH:     Limited by joint irratibility    DISTAL PULSES:    Paplable    DISTAL SENSATION :   Intact    LYMPHATICS:     No   lymphadenopathy    OTHER:          +   Stinchfeld test      -    log roll      -   Tenderness to palpation trochanteric bursa         Radiology:  Xrays 2views right hip  (AP bilateral hips, and lateral of the hip) taken at the hospital were reviewed  demonstrating  advanced, end-satge osteoarthritis with bone on bone articluation, periarticular osteophytes, and subchondral cysts    Assessment/Plan: right hip advanced endstage OA.  Continuation of conservative management vs. GEOVANNA discussed.  The patient wishes to proceed with total hip replacement.  At this point the patient has failed the full gamut of conservative treatment and stating complete understanding of the risks/benefits/ anternatives wishes to proceed with surgical treatment.    Risk and benefits of surgery were reviewed.  Including, but not limited to, blood clots, anesthesia risk, infection, leg length discrepancy, fracture, skin/leg numbness, failure of the implant, need for future surgeries, continued pain, hematoma, need for transfusion, and death, among others.  The " patient understands and wishes to proceed.     The spectrum of treatment options were discussed with the patient in detail including both the nonoperative and operative treatment modalities and their respective risks and benefits.  After thorough discussion, the patient has elected to undergo surgical treatment.  The details of the surgical procedure were explained including the location of probable incisions and a description of the likely implants to be used.  Models and diagrams were used as educational resources. The patient understands the likely convalescence after surgery, as well as the rehabilitation required.  We thoroughly discussed the risks, benefits, and alternatives to surgery.  The risks include but are not limited to the risk of infection, joint stiffness, blood clots (including DVT and/or pulmonary embolus along with the risk of death), neurologic and/or vascular injury, fracture, dislocation, nonunion, malunion, need for further surgery including hardware failure requiring revision, and continued pain.  It was explained that if tissue has been repaired or reconstructed, there is also a chance of failure which may require further management.  Following the completion of the discussion, the patient expressed understanding of this planned course of care, all their questions were answered and consent will be obtained preoperatively.    Operative Plan: Anterior approach Total Hip Replacement - Outpatient     There are no diagnoses linked to this encounter.    Vasu King MD  12/12/2023

## 2024-01-25 ENCOUNTER — OFFICE VISIT (OUTPATIENT)
Dept: OBSTETRICS AND GYNECOLOGY | Facility: CLINIC | Age: 74
End: 2024-01-25
Payer: MEDICARE

## 2024-01-25 VITALS
DIASTOLIC BLOOD PRESSURE: 80 MMHG | BODY MASS INDEX: 28.4 KG/M2 | WEIGHT: 187.4 LBS | SYSTOLIC BLOOD PRESSURE: 128 MMHG | HEIGHT: 68 IN

## 2024-01-25 DIAGNOSIS — N39.3 SUI (STRESS URINARY INCONTINENCE, FEMALE): ICD-10-CM

## 2024-01-25 DIAGNOSIS — Z01.419 ENCOUNTER FOR ROUTINE GYNECOLOGIC EXAMINATION IN MEDICARE PATIENT: Primary | ICD-10-CM

## 2024-01-25 NOTE — PROGRESS NOTES
GYN Annual Exam     CC- Here for annual exam.  (Patient is here today for an annual exam- former melly pt. Mammo- 2022, DEXA 10/03/2023, last pap 2023- dr munoz recommended another this year because of abn pap in . )     Anastasiia Faria is a 74 y.o. female who presents for annual well woman exam. She is menopausal.  She is experiencing and/or reports no new or bothersome menopausal symptoms.  She denies postmenopausal vaginal bleeding.  She denies abdominal pain, diarrhea, and cough.  Today, she wishes to specifically address just routine follow-up Pap screen..  I explained to her that current ACOG guidelines state that screening Pap smears are no longer recommended after the age of 65, nor after hysterectomy for benign reasons.  She is a former patient of Dr. Munoz.  She had been getting yearly Pap smears and had an ASCUS Pap 2 years ago followed by a normal Pap 1 year ago.    OB History               Para        Term   0            AB        Living             SAB        IAB        Ectopic        Molar        Multiple        Live Births                    Current contraception: post menopausal status  History of abnormal Pap smear: yes - ASCUS with negative HPV  Family history of uterine, colon or ovarian cancer: yes - multiple family members including mother with colon cancer  History of abnormal mammogram: yes - history of breast biopsy  Family history of breast cancer: yes - mother  Last Pap :   Last mammogram: 2022  Last colonoscopy:  and due again this year due to presence of polyps.  Last DEXA: 2023 with osteopenia in the left hip with 3% risk of hip fracture in the next 10 years on FRAX model on Prolia.      Past Medical History:   Diagnosis Date    Allergic     Anxiety     Cataract     Cataract surgery on right eye 2021 and left eye 2021. (Dr. Nanette Bateman)    Chronic kidney disease     Colon polyps     Depression     Diverticulosis     Encounter for  annual health examination 03/07/2014    Annual Health Assessment    Family history of colon cancer     Fibrocystic breast     GERD (gastroesophageal reflux disease)     Heart murmur     Herpes labialis without complication     Hip pain     right    History of mammogram 12/20/2010    HL (hearing loss) 2014    Hearing Aids    Hyperlipidemia     Hypertension     Hypothyroidism     Insomnia     Kidney stones     Migraines     Osteopenia     Prolia -last 9/2021,3/2022    PONV (postoperative nausea and vomiting)     Scoliosis     Dr. Stanford 5/2018    Visual impairment     Wear Glasses       Past Surgical History:   Procedure Laterality Date    BONE MARROW BIOPSY      BREAST BIOPSY      BREAST CYST ASPIRATION      BREAST SURGERY Right     BIOPSY    CATARACT EXTRACTION, BILATERAL      COLONOSCOPY N/A 10/27/2016    Procedure: COLONOSCOPY INTO CECUM;  Surgeon: Osvaldo Dorado MD;  Location: [facility] ENDOSCOPY;  Service:     COLONOSCOPY  01/26/2011    COLONOSCOPY  02/04/2005    COLONOSCOPY N/A 12/2/2021    Procedure: COLONOSCOPY INTO CECUM WITH COLD BIOPSY POLYPECTOMY AND HOT SNARE POLYPECTOMY;  Surgeon: Osvaldo Dorado MD;  Location: [facility] ENDOSCOPY;  Service: General;  Laterality: N/A;  PRE-FAMILY HISTORY OF COLON CANCER, PERSONAL HISTORY OF COLON CANCER  POST- POLYPS, DIVERTICULOSIS, HEMORRHOIDS    KNEE ARTHROSCOPY Left     PAP SMEAR  12/20/2010         Current Outpatient Medications:     aspirin-acetaminophen-caffeine (EXCEDRIN MIGRAINE) 250-250-65 MG per tablet, Take 1 tablet by mouth Every 6 (Six) Hours As Needed for Headache., Disp: , Rfl:     atenolol (TENORMIN) 25 MG tablet, Take 1 tablet by mouth Daily., Disp: 90 tablet, Rfl: 3    calcium carbonate (OS-NIKKIE) 600 MG tablet, Take 2 tablets by mouth Daily., Disp: , Rfl:     Cholecalciferol (VITAMIN D3) 2000 UNITS capsule, Take 1 capsule by mouth Daily., Disp: , Rfl:     diclofenac (VOLTAREN) 50 MG EC tablet, Take 1 tablet by mouth 2 (Two) Times a Day.,  Disp: 30 tablet, Rfl: 0    escitalopram (LEXAPRO) 20 MG tablet, Take 1 tablet by mouth Daily., Disp: 90 tablet, Rfl: 3    levothyroxine (SYNTHROID, LEVOTHROID) 50 MCG tablet, Take 1 tablet by mouth Daily., Disp: 90 tablet, Rfl: 3    loratadine (CLARITIN) 10 MG tablet, Take 1 tablet by mouth Daily., Disp: , Rfl:     NIFEdipine XL (PROCARDIA XL) 30 MG 24 hr tablet, Take 1 tablet by mouth Daily., Disp: 90 tablet, Rfl: 3    Probiotic Product (SOLUBLE FIBER/PROBIOTICS PO), Take 1 capsule by mouth Daily., Disp: , Rfl:     PROLIA 60 MG/ML solution syringe, INJECT 60 MG UNDER THE SKIN EVERY SIX MONTHS, Disp: 1 mL, Rfl: 1    Psyllium (METAMUCIL FIBER PO), Take 1 Scoop by mouth., Disp: , Rfl:     rosuvastatin (CRESTOR) 10 MG tablet, Take 1 tablet by mouth Every Night., Disp: 90 tablet, Rfl: 3    valACYclovir (VALTREX) 1000 MG tablet, 1 tablet As Needed., Disp: , Rfl:     No Known Allergies    Social History     Tobacco Use    Smoking status: Never    Smokeless tobacco: Never    Tobacco comments:     daily caffine   Vaping Use    Vaping Use: Never used   Substance Use Topics    Alcohol use: Never    Drug use: Never       Family History   Problem Relation Age of Onset    Breast cancer Mother     Colon cancer Mother     Hypertension Mother     Cancer Mother         Breat cancer, skin cancer, colon cancer    Hyperlipidemia Mother     Stomach cancer Maternal Grandmother         or intestinal    Cancer Maternal Grandmother         GI TRACT CANCER    Breast cancer Maternal Grandmother     Heart disease Maternal Grandmother     Colon cancer Maternal Aunt         colon ca 40    Cancer Maternal Aunt         Colon cancer cause of death early 40s    Colon cancer Maternal Aunt         64 yo    Heart disease Maternal Aunt     Colon cancer Maternal Aunt         79 yo    Cancer Maternal Aunt         Colon cancer    Colon cancer Other     Colon cancer Other     Heart attack Maternal Grandfather     Breast cancer Maternal Grandfather     Heart  "disease Maternal Grandfather     Heart disease Paternal Grandmother     Hypertension Sister     Diabetes type II Brother     Cancer Brother         Skin cancer    Hypertension Sister     Hyperlipidemia Sister     Thyroid disease Sister     Cancer Father         Skin cancer    Cancer Maternal Aunt         Colon cancer    Cancer Maternal Uncle         Thyroid cancer    Heart disease Maternal Uncle        Review of Systems   Constitutional:  Negative for fatigue and fever.   Respiratory:  Negative for cough.    Genitourinary:  Positive for urinary incontinence. Negative for pelvic pain and vaginal bleeding.       /80   Ht 172.7 cm (68\")   Wt 85 kg (187 lb 6.4 oz)   LMP  (LMP Unknown)   BMI 28.49 kg/m²     Physical Exam  Constitutional:       Appearance: She is normal weight.   Genitourinary:      Bladder and urethral meatus normal.      No lesions in the vagina.      Right Labia: No lesions.     Left Labia: No lesions.     No vaginal discharge, tenderness or bleeding.      No vaginal prolapse present.     Moderate vaginal atrophy present.       Right Adnexa: not tender, not full and no mass present.     Left Adnexa: not tender, not full and no mass present.     No cervical motion tenderness, discharge, friability or lesion.      Uterus is not enlarged, fixed or tender.      No uterine mass detected.     Uterus is midaxial.   Breasts:     Right: No mass, nipple discharge, skin change or tenderness.      Left: No mass, nipple discharge, skin change or tenderness.   Neck:      Thyroid: No thyroid mass or thyromegaly.   Abdominal:      General: Abdomen is flat.      Palpations: Abdomen is soft. There is no mass.      Tenderness: There is no abdominal tenderness.   Neurological:      Mental Status: She is alert.   Vitals reviewed.             Assessment     1) GYN annual well woman exam.   2) mild stress urinary incontinence that is not bothersome to the patient.  3) osteopenia on Prolia every 6 months.  She is " scheduled for hip replacement and May 2024.  I told her she should probably delay her next Prolia until July or August and also told her to ask her orthopedist for any other instructions.     Plan     1) Breast Health - Clinical breast exam & mammogram reviewed specifically [employer] recommendations for screening specific to her, and Self breast awareness monthly  2) Pap -done today due to history of ASCUS Pap 2 years ago.  I told her that if this is normal, a Pap in 2 years would be adequate if she wants to continue doing Pap screens.  3) Smoking status-negative  4) Colon health - screening colonoscopy recommended if not up to date  5) Bone health - Weight bearing exercise, dietary calcium recommendations and vitamin D reviewed.   6) Encouraged to be wary of information obtained via social media and internet based on source and search.   7) Follow up prn and one year      Dhiraj Gallegos MD   1/25/2024  09:22 EST

## 2024-01-29 LAB
CYTOLOGIST CVX/VAG CYTO: NORMAL
CYTOLOGY CVX/VAG DOC CYTO: NORMAL
CYTOLOGY CVX/VAG DOC THIN PREP: NORMAL
DX ICD CODE: NORMAL
HIV 1 & 2 AB SER-IMP: NORMAL
HPV I/H RISK 4 DNA CVX QL PROBE+SIG AMP: NEGATIVE
OTHER STN SPEC: NORMAL
STAT OF ADQ CVX/VAG CYTO-IMP: NORMAL

## 2024-01-31 ENCOUNTER — PROCEDURE VISIT (OUTPATIENT)
Dept: OBSTETRICS AND GYNECOLOGY | Facility: CLINIC | Age: 74
End: 2024-01-31
Payer: MEDICARE

## 2024-01-31 DIAGNOSIS — Z12.31 VISIT FOR SCREENING MAMMOGRAM: Primary | ICD-10-CM

## 2024-03-21 ENCOUNTER — ANESTHESIA EVENT (OUTPATIENT)
Dept: PERIOP | Facility: HOSPITAL | Age: 74
End: 2024-03-21
Payer: MEDICARE

## 2024-03-21 ENCOUNTER — HOSPITAL ENCOUNTER (INPATIENT)
Facility: HOSPITAL | Age: 74
LOS: 3 days | Discharge: HOME | DRG: 853 | End: 2024-03-25
Attending: EMERGENCY MEDICINE | Admitting: INTERNAL MEDICINE
Payer: MEDICARE

## 2024-03-21 ENCOUNTER — ANESTHESIA (OUTPATIENT)
Dept: PERIOP | Facility: HOSPITAL | Age: 74
End: 2024-03-21
Payer: MEDICARE

## 2024-03-21 ENCOUNTER — APPOINTMENT (OUTPATIENT)
Dept: GENERAL RADIOLOGY | Facility: HOSPITAL | Age: 74
DRG: 853 | End: 2024-03-21
Payer: MEDICARE

## 2024-03-21 ENCOUNTER — APPOINTMENT (OUTPATIENT)
Dept: CT IMAGING | Facility: HOSPITAL | Age: 74
DRG: 853 | End: 2024-03-21
Payer: MEDICARE

## 2024-03-21 DIAGNOSIS — N10 ACUTE PYELONEPHRITIS: ICD-10-CM

## 2024-03-21 DIAGNOSIS — A41.9 SEPSIS, DUE TO UNSPECIFIED ORGANISM, UNSPECIFIED WHETHER ACUTE ORGAN DYSFUNCTION PRESENT: ICD-10-CM

## 2024-03-21 DIAGNOSIS — R79.89 ELEVATED SERUM CREATININE: ICD-10-CM

## 2024-03-21 DIAGNOSIS — N20.1 URETERAL CALCULUS: Primary | ICD-10-CM

## 2024-03-21 DIAGNOSIS — R73.9 HYPERGLYCEMIA: ICD-10-CM

## 2024-03-21 DIAGNOSIS — N20.1 LEFT URETERAL STONE: ICD-10-CM

## 2024-03-21 DIAGNOSIS — R50.9 FEVER, UNSPECIFIED FEVER CAUSE: ICD-10-CM

## 2024-03-21 LAB
ALBUMIN SERPL-MCNC: 4.1 G/DL (ref 3.5–5.2)
ALBUMIN/GLOB SERPL: 1.8 G/DL
ALP SERPL-CCNC: 74 U/L (ref 39–117)
ALT SERPL W P-5'-P-CCNC: 14 U/L (ref 1–33)
ANION GAP SERPL CALCULATED.3IONS-SCNC: 14.3 MMOL/L (ref 5–15)
AST SERPL-CCNC: 16 U/L (ref 1–32)
BACTERIA BLD CULT: ABNORMAL
BACTERIA UR QL AUTO: ABNORMAL /HPF
BASOPHILS # BLD AUTO: 0.07 10*3/MM3 (ref 0–0.2)
BASOPHILS NFR BLD AUTO: 0.4 % (ref 0–1.5)
BILIRUB SERPL-MCNC: 0.8 MG/DL (ref 0–1.2)
BILIRUB UR QL STRIP: NEGATIVE
BOTTLE TYPE: ABNORMAL
BUN SERPL-MCNC: 17 MG/DL (ref 8–23)
BUN/CREAT SERPL: 15.7 (ref 7–25)
CALCIUM SPEC-SCNC: 9.7 MG/DL (ref 8.6–10.5)
CHLORIDE SERPL-SCNC: 106 MMOL/L (ref 98–107)
CLARITY UR: ABNORMAL
CO2 SERPL-SCNC: 20.7 MMOL/L (ref 22–29)
COLOR UR: ABNORMAL
CREAT SERPL-MCNC: 1.08 MG/DL (ref 0.57–1)
D-LACTATE SERPL-SCNC: 2.1 MMOL/L (ref 0.5–2)
D-LACTATE SERPL-SCNC: 2.5 MMOL/L (ref 0.5–2)
D-LACTATE SERPL-SCNC: 4.2 MMOL/L (ref 0.5–2)
DEPRECATED RDW RBC AUTO: 43.4 FL (ref 37–54)
EGFRCR SERPLBLD CKD-EPI 2021: 54 ML/MIN/1.73
EOSINOPHIL # BLD AUTO: 0.2 10*3/MM3 (ref 0–0.4)
EOSINOPHIL NFR BLD AUTO: 1.2 % (ref 0.3–6.2)
ERYTHROCYTE [DISTWIDTH] IN BLOOD BY AUTOMATED COUNT: 13.1 % (ref 12.3–15.4)
GLOBULIN UR ELPH-MCNC: 2.3 GM/DL
GLUCOSE BLDC GLUCOMTR-MCNC: 180 MG/DL (ref 70–130)
GLUCOSE SERPL-MCNC: 170 MG/DL (ref 65–99)
GLUCOSE UR STRIP-MCNC: NEGATIVE MG/DL
HCT VFR BLD AUTO: 42.9 % (ref 34–46.6)
HGB BLD-MCNC: 13.9 G/DL (ref 12–15.9)
HGB UR QL STRIP.AUTO: ABNORMAL
HYALINE CASTS UR QL AUTO: ABNORMAL /LPF
IMM GRANULOCYTES # BLD AUTO: 0.06 10*3/MM3 (ref 0–0.05)
IMM GRANULOCYTES NFR BLD AUTO: 0.4 % (ref 0–0.5)
KETONES UR QL STRIP: ABNORMAL
LEUKOCYTE ESTERASE UR QL STRIP.AUTO: ABNORMAL
LIPASE SERPL-CCNC: 22 U/L (ref 13–60)
LYMPHOCYTES # BLD AUTO: 1.51 10*3/MM3 (ref 0.7–3.1)
LYMPHOCYTES NFR BLD AUTO: 9 % (ref 19.6–45.3)
MCH RBC QN AUTO: 29.4 PG (ref 26.6–33)
MCHC RBC AUTO-ENTMCNC: 32.4 G/DL (ref 31.5–35.7)
MCV RBC AUTO: 90.7 FL (ref 79–97)
MONOCYTES # BLD AUTO: 0.97 10*3/MM3 (ref 0.1–0.9)
MONOCYTES NFR BLD AUTO: 5.8 % (ref 5–12)
NEUTROPHILS NFR BLD AUTO: 13.98 10*3/MM3 (ref 1.7–7)
NEUTROPHILS NFR BLD AUTO: 83.2 % (ref 42.7–76)
NITRITE UR QL STRIP: POSITIVE
NRBC BLD AUTO-RTO: 0 /100 WBC (ref 0–0.2)
PH UR STRIP.AUTO: 5.5 [PH] (ref 5–8)
PLATELET # BLD AUTO: 327 10*3/MM3 (ref 140–450)
PMV BLD AUTO: 9.2 FL (ref 6–12)
POTASSIUM SERPL-SCNC: 3.8 MMOL/L (ref 3.5–5.2)
PROCALCITONIN SERPL-MCNC: 0.16 NG/ML (ref 0–0.25)
PROT SERPL-MCNC: 6.4 G/DL (ref 6–8.5)
PROT UR QL STRIP: ABNORMAL
RBC # BLD AUTO: 4.73 10*6/MM3 (ref 3.77–5.28)
RBC # UR STRIP: ABNORMAL /HPF
REF LAB TEST METHOD: ABNORMAL
SODIUM SERPL-SCNC: 141 MMOL/L (ref 136–145)
SP GR UR STRIP: 1.02 (ref 1–1.03)
SQUAMOUS #/AREA URNS HPF: ABNORMAL /HPF
UROBILINOGEN UR QL STRIP: ABNORMAL
WBC # UR STRIP: ABNORMAL /HPF
WBC NRBC COR # BLD AUTO: 16.79 10*3/MM3 (ref 3.4–10.8)

## 2024-03-21 PROCEDURE — G0378 HOSPITAL OBSERVATION PER HR: HCPCS

## 2024-03-21 PROCEDURE — 25010000002 ONDANSETRON PER 1 MG: Performed by: NURSE ANESTHETIST, CERTIFIED REGISTERED

## 2024-03-21 PROCEDURE — 25010000002 MORPHINE PER 10 MG: Performed by: EMERGENCY MEDICINE

## 2024-03-21 PROCEDURE — 83605 ASSAY OF LACTIC ACID: CPT | Performed by: EMERGENCY MEDICINE

## 2024-03-21 PROCEDURE — 82948 REAGENT STRIP/BLOOD GLUCOSE: CPT

## 2024-03-21 PROCEDURE — 25010000002 FENTANYL CITRATE (PF) 50 MCG/ML SOLUTION: Performed by: NURSE ANESTHETIST, CERTIFIED REGISTERED

## 2024-03-21 PROCEDURE — 25010000002 DEXAMETHASONE SODIUM PHOSPHATE 20 MG/5ML SOLUTION: Performed by: NURSE ANESTHETIST, CERTIFIED REGISTERED

## 2024-03-21 PROCEDURE — BT1F1ZZ FLUOROSCOPY OF LEFT KIDNEY, URETER AND BLADDER USING LOW OSMOLAR CONTRAST: ICD-10-PCS | Performed by: UROLOGY

## 2024-03-21 PROCEDURE — 87077 CULTURE AEROBIC IDENTIFY: CPT | Performed by: EMERGENCY MEDICINE

## 2024-03-21 PROCEDURE — 84145 PROCALCITONIN (PCT): CPT | Performed by: INTERNAL MEDICINE

## 2024-03-21 PROCEDURE — 25010000002 ONDANSETRON PER 1 MG: Performed by: EMERGENCY MEDICINE

## 2024-03-21 PROCEDURE — 85025 COMPLETE CBC W/AUTO DIFF WBC: CPT | Performed by: EMERGENCY MEDICINE

## 2024-03-21 PROCEDURE — 25810000003 SODIUM CHLORIDE 0.9% - IBW FOR BMI > 30 0.9 % SOLUTION: Performed by: EMERGENCY MEDICINE

## 2024-03-21 PROCEDURE — 80053 COMPREHEN METABOLIC PANEL: CPT | Performed by: EMERGENCY MEDICINE

## 2024-03-21 PROCEDURE — 25010000002 PROPOFOL 10 MG/ML EMULSION: Performed by: NURSE ANESTHETIST, CERTIFIED REGISTERED

## 2024-03-21 PROCEDURE — 25010000002 DROPERIDOL PER 5 MG: Performed by: EMERGENCY MEDICINE

## 2024-03-21 PROCEDURE — 99291 CRITICAL CARE FIRST HOUR: CPT

## 2024-03-21 PROCEDURE — 71045 X-RAY EXAM CHEST 1 VIEW: CPT

## 2024-03-21 PROCEDURE — 0T778DZ DILATION OF LEFT URETER WITH INTRALUMINAL DEVICE, VIA NATURAL OR ARTIFICIAL OPENING ENDOSCOPIC: ICD-10-PCS | Performed by: UROLOGY

## 2024-03-21 PROCEDURE — C1769 GUIDE WIRE: HCPCS | Performed by: UROLOGY

## 2024-03-21 PROCEDURE — 25010000002 DROPERIDOL PER 5 MG: Performed by: NURSE ANESTHETIST, CERTIFIED REGISTERED

## 2024-03-21 PROCEDURE — 74176 CT ABD & PELVIS W/O CONTRAST: CPT

## 2024-03-21 PROCEDURE — 87040 BLOOD CULTURE FOR BACTERIA: CPT | Performed by: EMERGENCY MEDICINE

## 2024-03-21 PROCEDURE — 87186 SC STD MICRODIL/AGAR DIL: CPT | Performed by: EMERGENCY MEDICINE

## 2024-03-21 PROCEDURE — 87086 URINE CULTURE/COLONY COUNT: CPT | Performed by: EMERGENCY MEDICINE

## 2024-03-21 PROCEDURE — 25810000003 LACTATED RINGERS PER 1000 ML: Performed by: INTERNAL MEDICINE

## 2024-03-21 PROCEDURE — 87154 CUL TYP ID BLD PTHGN 6+ TRGT: CPT | Performed by: EMERGENCY MEDICINE

## 2024-03-21 PROCEDURE — 83690 ASSAY OF LIPASE: CPT | Performed by: EMERGENCY MEDICINE

## 2024-03-21 PROCEDURE — 81001 URINALYSIS AUTO W/SCOPE: CPT | Performed by: EMERGENCY MEDICINE

## 2024-03-21 PROCEDURE — 25810000003 SODIUM CHLORIDE 0.9 % SOLUTION: Performed by: EMERGENCY MEDICINE

## 2024-03-21 PROCEDURE — 25010000002 MIDAZOLAM PER 1 MG: Performed by: ANESTHESIOLOGY

## 2024-03-21 PROCEDURE — 25010000002 CEFTRIAXONE PER 250 MG: Performed by: UROLOGY

## 2024-03-21 PROCEDURE — 0 IOTHALAMATE 60 % SOLUTION: Performed by: UROLOGY

## 2024-03-21 PROCEDURE — C2617 STENT, NON-COR, TEM W/O DEL: HCPCS | Performed by: UROLOGY

## 2024-03-21 PROCEDURE — 36415 COLL VENOUS BLD VENIPUNCTURE: CPT

## 2024-03-21 PROCEDURE — 74420 UROGRAPHY RTRGR +-KUB: CPT

## 2024-03-21 PROCEDURE — 25010000002 PROPOFOL 200 MG/20ML EMULSION: Performed by: NURSE ANESTHETIST, CERTIFIED REGISTERED

## 2024-03-21 PROCEDURE — 25010000002 CEFTRIAXONE PER 250 MG: Performed by: EMERGENCY MEDICINE

## 2024-03-21 PROCEDURE — 25810000003 LACTATED RINGERS PER 1000 ML: Performed by: UROLOGY

## 2024-03-21 PROCEDURE — C1758 CATHETER, URETERAL: HCPCS | Performed by: UROLOGY

## 2024-03-21 DEVICE — URETERAL STENT
Type: IMPLANTABLE DEVICE | Site: URETER | Status: FUNCTIONAL
Brand: CONTOUR™

## 2024-03-21 RX ORDER — PROCHLORPERAZINE EDISYLATE 5 MG/ML
10 INJECTION INTRAMUSCULAR; INTRAVENOUS EVERY 6 HOURS PRN
Status: DISCONTINUED | OUTPATIENT
Start: 2024-03-21 | End: 2024-03-25 | Stop reason: HOSPADM

## 2024-03-21 RX ORDER — SODIUM CHLORIDE 0.9 % (FLUSH) 0.9 %
10 SYRINGE (ML) INJECTION AS NEEDED
Status: DISCONTINUED | OUTPATIENT
Start: 2024-03-21 | End: 2024-03-25 | Stop reason: HOSPADM

## 2024-03-21 RX ORDER — NIFEDIPINE 30 MG/1
30 TABLET, EXTENDED RELEASE ORAL DAILY
Status: DISCONTINUED | OUTPATIENT
Start: 2024-03-21 | End: 2024-03-25 | Stop reason: HOSPADM

## 2024-03-21 RX ORDER — DROPERIDOL 2.5 MG/ML
0.62 INJECTION, SOLUTION INTRAMUSCULAR; INTRAVENOUS
Status: DISCONTINUED | OUTPATIENT
Start: 2024-03-21 | End: 2024-03-21 | Stop reason: HOSPADM

## 2024-03-21 RX ORDER — OXYCODONE AND ACETAMINOPHEN 7.5; 325 MG/1; MG/1
1 TABLET ORAL EVERY 4 HOURS PRN
Status: DISCONTINUED | OUTPATIENT
Start: 2024-03-21 | End: 2024-03-25 | Stop reason: HOSPADM

## 2024-03-21 RX ORDER — ACETAMINOPHEN 325 MG/1
650 TABLET ORAL EVERY 4 HOURS PRN
Status: DISCONTINUED | OUTPATIENT
Start: 2024-03-21 | End: 2024-03-25 | Stop reason: HOSPADM

## 2024-03-21 RX ORDER — NALOXONE HCL 0.4 MG/ML
0.4 VIAL (ML) INJECTION
Status: DISCONTINUED | OUTPATIENT
Start: 2024-03-21 | End: 2024-03-25 | Stop reason: HOSPADM

## 2024-03-21 RX ORDER — FAMOTIDINE 10 MG/ML
20 INJECTION, SOLUTION INTRAVENOUS ONCE
Status: COMPLETED | OUTPATIENT
Start: 2024-03-21 | End: 2024-03-21

## 2024-03-21 RX ORDER — SODIUM CHLORIDE, SODIUM LACTATE, POTASSIUM CHLORIDE, CALCIUM CHLORIDE 600; 310; 30; 20 MG/100ML; MG/100ML; MG/100ML; MG/100ML
50 INJECTION, SOLUTION INTRAVENOUS CONTINUOUS
Status: DISCONTINUED | OUTPATIENT
Start: 2024-03-21 | End: 2024-03-23

## 2024-03-21 RX ORDER — DIPHENHYDRAMINE HYDROCHLORIDE 50 MG/ML
12.5 INJECTION INTRAMUSCULAR; INTRAVENOUS
Status: DISCONTINUED | OUTPATIENT
Start: 2024-03-21 | End: 2024-03-21 | Stop reason: HOSPADM

## 2024-03-21 RX ORDER — HYDROCODONE BITARTRATE AND ACETAMINOPHEN 7.5; 325 MG/1; MG/1
1 TABLET ORAL EVERY 4 HOURS PRN
Status: DISCONTINUED | OUTPATIENT
Start: 2024-03-21 | End: 2024-03-21 | Stop reason: HOSPADM

## 2024-03-21 RX ORDER — SODIUM CHLORIDE 9 MG/ML
40 INJECTION, SOLUTION INTRAVENOUS AS NEEDED
Status: DISCONTINUED | OUTPATIENT
Start: 2024-03-21 | End: 2024-03-25 | Stop reason: HOSPADM

## 2024-03-21 RX ORDER — MAGNESIUM HYDROXIDE 1200 MG/15ML
LIQUID ORAL AS NEEDED
Status: DISCONTINUED | OUTPATIENT
Start: 2024-03-21 | End: 2024-03-21 | Stop reason: HOSPADM

## 2024-03-21 RX ORDER — DROPERIDOL 2.5 MG/ML
2.5 INJECTION, SOLUTION INTRAMUSCULAR; INTRAVENOUS ONCE
Status: COMPLETED | OUTPATIENT
Start: 2024-03-21 | End: 2024-03-21

## 2024-03-21 RX ORDER — IPRATROPIUM BROMIDE AND ALBUTEROL SULFATE 2.5; .5 MG/3ML; MG/3ML
3 SOLUTION RESPIRATORY (INHALATION) ONCE AS NEEDED
Status: COMPLETED | OUTPATIENT
Start: 2024-03-21 | End: 2024-03-21

## 2024-03-21 RX ORDER — ROSUVASTATIN CALCIUM 10 MG/1
10 TABLET, COATED ORAL NIGHTLY
Status: DISCONTINUED | OUTPATIENT
Start: 2024-03-21 | End: 2024-03-25 | Stop reason: HOSPADM

## 2024-03-21 RX ORDER — MORPHINE SULFATE 2 MG/ML
4 INJECTION, SOLUTION INTRAMUSCULAR; INTRAVENOUS
Status: DISCONTINUED | OUTPATIENT
Start: 2024-03-21 | End: 2024-03-25 | Stop reason: HOSPADM

## 2024-03-21 RX ORDER — SODIUM CHLORIDE, SODIUM LACTATE, POTASSIUM CHLORIDE, CALCIUM CHLORIDE 600; 310; 30; 20 MG/100ML; MG/100ML; MG/100ML; MG/100ML
9 INJECTION, SOLUTION INTRAVENOUS CONTINUOUS
Status: DISCONTINUED | OUTPATIENT
Start: 2024-03-21 | End: 2024-03-22

## 2024-03-21 RX ORDER — EPHEDRINE SULFATE 50 MG/ML
5 INJECTION, SOLUTION INTRAVENOUS ONCE AS NEEDED
Status: DISCONTINUED | OUTPATIENT
Start: 2024-03-21 | End: 2024-03-21 | Stop reason: HOSPADM

## 2024-03-21 RX ORDER — LIDOCAINE HYDROCHLORIDE 10 MG/ML
0.5 INJECTION, SOLUTION INFILTRATION; PERINEURAL ONCE AS NEEDED
Status: DISCONTINUED | OUTPATIENT
Start: 2024-03-21 | End: 2024-03-21 | Stop reason: HOSPADM

## 2024-03-21 RX ORDER — DROPERIDOL 2.5 MG/ML
INJECTION, SOLUTION INTRAMUSCULAR; INTRAVENOUS AS NEEDED
Status: DISCONTINUED | OUTPATIENT
Start: 2024-03-21 | End: 2024-03-21 | Stop reason: SURG

## 2024-03-21 RX ORDER — MORPHINE SULFATE 2 MG/ML
4 INJECTION, SOLUTION INTRAMUSCULAR; INTRAVENOUS ONCE AS NEEDED
Status: COMPLETED | OUTPATIENT
Start: 2024-03-21 | End: 2024-03-21

## 2024-03-21 RX ORDER — FENTANYL CITRATE 50 UG/ML
INJECTION, SOLUTION INTRAMUSCULAR; INTRAVENOUS AS NEEDED
Status: DISCONTINUED | OUTPATIENT
Start: 2024-03-21 | End: 2024-03-21 | Stop reason: SURG

## 2024-03-21 RX ORDER — MORPHINE SULFATE 2 MG/ML
2 INJECTION, SOLUTION INTRAMUSCULAR; INTRAVENOUS ONCE AS NEEDED
Status: COMPLETED | OUTPATIENT
Start: 2024-03-21 | End: 2024-03-21

## 2024-03-21 RX ORDER — SCOLOPAMINE TRANSDERMAL SYSTEM 1 MG/1
1 PATCH, EXTENDED RELEASE TRANSDERMAL ONCE
Status: DISCONTINUED | OUTPATIENT
Start: 2024-03-21 | End: 2024-03-21

## 2024-03-21 RX ORDER — FENTANYL CITRATE 50 UG/ML
25 INJECTION, SOLUTION INTRAMUSCULAR; INTRAVENOUS
Status: DISCONTINUED | OUTPATIENT
Start: 2024-03-21 | End: 2024-03-21 | Stop reason: HOSPADM

## 2024-03-21 RX ORDER — ONDANSETRON 4 MG/1
4 TABLET, ORALLY DISINTEGRATING ORAL EVERY 6 HOURS PRN
Status: DISCONTINUED | OUTPATIENT
Start: 2024-03-21 | End: 2024-03-25 | Stop reason: HOSPADM

## 2024-03-21 RX ORDER — LIDOCAINE HYDROCHLORIDE 20 MG/ML
INJECTION, SOLUTION INFILTRATION; PERINEURAL AS NEEDED
Status: DISCONTINUED | OUTPATIENT
Start: 2024-03-21 | End: 2024-03-21 | Stop reason: SURG

## 2024-03-21 RX ORDER — AMOXICILLIN 250 MG
2 CAPSULE ORAL 2 TIMES DAILY PRN
Status: DISCONTINUED | OUTPATIENT
Start: 2024-03-21 | End: 2024-03-25 | Stop reason: HOSPADM

## 2024-03-21 RX ORDER — PROPOFOL 10 MG/ML
INJECTION, EMULSION INTRAVENOUS AS NEEDED
Status: DISCONTINUED | OUTPATIENT
Start: 2024-03-21 | End: 2024-03-21 | Stop reason: SURG

## 2024-03-21 RX ORDER — HYDRALAZINE HYDROCHLORIDE 20 MG/ML
5 INJECTION INTRAMUSCULAR; INTRAVENOUS
Status: DISCONTINUED | OUTPATIENT
Start: 2024-03-21 | End: 2024-03-21 | Stop reason: HOSPADM

## 2024-03-21 RX ORDER — BISACODYL 10 MG
10 SUPPOSITORY, RECTAL RECTAL DAILY PRN
Status: DISCONTINUED | OUTPATIENT
Start: 2024-03-21 | End: 2024-03-25 | Stop reason: HOSPADM

## 2024-03-21 RX ORDER — DEXAMETHASONE SODIUM PHOSPHATE 4 MG/ML
INJECTION, SOLUTION INTRA-ARTICULAR; INTRALESIONAL; INTRAMUSCULAR; INTRAVENOUS; SOFT TISSUE AS NEEDED
Status: DISCONTINUED | OUTPATIENT
Start: 2024-03-21 | End: 2024-03-21 | Stop reason: SURG

## 2024-03-21 RX ORDER — FLUMAZENIL 0.1 MG/ML
0.2 INJECTION INTRAVENOUS AS NEEDED
Status: DISCONTINUED | OUTPATIENT
Start: 2024-03-21 | End: 2024-03-21 | Stop reason: HOSPADM

## 2024-03-21 RX ORDER — LABETALOL HYDROCHLORIDE 5 MG/ML
5 INJECTION, SOLUTION INTRAVENOUS
Status: DISCONTINUED | OUTPATIENT
Start: 2024-03-21 | End: 2024-03-21 | Stop reason: HOSPADM

## 2024-03-21 RX ORDER — BISACODYL 5 MG/1
5 TABLET, DELAYED RELEASE ORAL DAILY PRN
Status: DISCONTINUED | OUTPATIENT
Start: 2024-03-21 | End: 2024-03-25 | Stop reason: HOSPADM

## 2024-03-21 RX ORDER — ACETAMINOPHEN 160 MG/5ML
650 SOLUTION ORAL EVERY 4 HOURS PRN
Status: DISCONTINUED | OUTPATIENT
Start: 2024-03-21 | End: 2024-03-25 | Stop reason: HOSPADM

## 2024-03-21 RX ORDER — ESCITALOPRAM OXALATE 20 MG/1
20 TABLET ORAL DAILY
Status: DISCONTINUED | OUTPATIENT
Start: 2024-03-21 | End: 2024-03-25 | Stop reason: HOSPADM

## 2024-03-21 RX ORDER — PROMETHAZINE HYDROCHLORIDE 25 MG/1
25 TABLET ORAL ONCE AS NEEDED
Status: DISCONTINUED | OUTPATIENT
Start: 2024-03-21 | End: 2024-03-21 | Stop reason: HOSPADM

## 2024-03-21 RX ORDER — HYDROMORPHONE HYDROCHLORIDE 1 MG/ML
0.25 INJECTION, SOLUTION INTRAMUSCULAR; INTRAVENOUS; SUBCUTANEOUS
Status: DISCONTINUED | OUTPATIENT
Start: 2024-03-21 | End: 2024-03-21 | Stop reason: HOSPADM

## 2024-03-21 RX ORDER — ATENOLOL 25 MG/1
25 TABLET ORAL DAILY
Status: DISCONTINUED | OUTPATIENT
Start: 2024-03-21 | End: 2024-03-25 | Stop reason: HOSPADM

## 2024-03-21 RX ORDER — NITROGLYCERIN 0.4 MG/1
0.4 TABLET SUBLINGUAL
Status: DISCONTINUED | OUTPATIENT
Start: 2024-03-21 | End: 2024-03-25 | Stop reason: HOSPADM

## 2024-03-21 RX ORDER — ONDANSETRON 2 MG/ML
4 INJECTION INTRAMUSCULAR; INTRAVENOUS ONCE AS NEEDED
Status: DISCONTINUED | OUTPATIENT
Start: 2024-03-21 | End: 2024-03-21 | Stop reason: HOSPADM

## 2024-03-21 RX ORDER — FENTANYL CITRATE 50 UG/ML
50 INJECTION, SOLUTION INTRAMUSCULAR; INTRAVENOUS ONCE AS NEEDED
Status: DISCONTINUED | OUTPATIENT
Start: 2024-03-21 | End: 2024-03-21 | Stop reason: HOSPADM

## 2024-03-21 RX ORDER — HYDROCODONE BITARTRATE AND ACETAMINOPHEN 5; 325 MG/1; MG/1
1 TABLET ORAL ONCE AS NEEDED
Status: DISCONTINUED | OUTPATIENT
Start: 2024-03-21 | End: 2024-03-21 | Stop reason: HOSPADM

## 2024-03-21 RX ORDER — ONDANSETRON 2 MG/ML
8 INJECTION INTRAMUSCULAR; INTRAVENOUS ONCE
Status: COMPLETED | OUTPATIENT
Start: 2024-03-21 | End: 2024-03-21

## 2024-03-21 RX ORDER — PHENYLEPHRINE HCL IN 0.9% NACL 1 MG/10 ML
SYRINGE (ML) INTRAVENOUS AS NEEDED
Status: DISCONTINUED | OUTPATIENT
Start: 2024-03-21 | End: 2024-03-21 | Stop reason: SURG

## 2024-03-21 RX ORDER — NALOXONE HCL 0.4 MG/ML
0.2 VIAL (ML) INJECTION AS NEEDED
Status: DISCONTINUED | OUTPATIENT
Start: 2024-03-21 | End: 2024-03-21 | Stop reason: HOSPADM

## 2024-03-21 RX ORDER — ACETAMINOPHEN 650 MG/1
650 SUPPOSITORY RECTAL EVERY 4 HOURS PRN
Status: DISCONTINUED | OUTPATIENT
Start: 2024-03-21 | End: 2024-03-25 | Stop reason: HOSPADM

## 2024-03-21 RX ORDER — POLYETHYLENE GLYCOL 3350 17 G/17G
17 POWDER, FOR SOLUTION ORAL DAILY PRN
Status: DISCONTINUED | OUTPATIENT
Start: 2024-03-21 | End: 2024-03-25 | Stop reason: HOSPADM

## 2024-03-21 RX ORDER — PROMETHAZINE HYDROCHLORIDE 25 MG/1
25 SUPPOSITORY RECTAL ONCE AS NEEDED
Status: DISCONTINUED | OUTPATIENT
Start: 2024-03-21 | End: 2024-03-21 | Stop reason: HOSPADM

## 2024-03-21 RX ORDER — MIDAZOLAM HYDROCHLORIDE 1 MG/ML
0.5 INJECTION INTRAMUSCULAR; INTRAVENOUS
Status: DISCONTINUED | OUTPATIENT
Start: 2024-03-21 | End: 2024-03-21 | Stop reason: HOSPADM

## 2024-03-21 RX ORDER — SODIUM CHLORIDE 0.9 % (FLUSH) 0.9 %
10 SYRINGE (ML) INJECTION EVERY 12 HOURS SCHEDULED
Status: DISCONTINUED | OUTPATIENT
Start: 2024-03-21 | End: 2024-03-25 | Stop reason: HOSPADM

## 2024-03-21 RX ORDER — LEVOTHYROXINE SODIUM 0.05 MG/1
50 TABLET ORAL
Status: DISCONTINUED | OUTPATIENT
Start: 2024-03-22 | End: 2024-03-25 | Stop reason: HOSPADM

## 2024-03-21 RX ORDER — SODIUM CHLORIDE 0.9 % (FLUSH) 0.9 %
3-10 SYRINGE (ML) INJECTION AS NEEDED
Status: DISCONTINUED | OUTPATIENT
Start: 2024-03-21 | End: 2024-03-21 | Stop reason: HOSPADM

## 2024-03-21 RX ORDER — SODIUM CHLORIDE 0.9 % (FLUSH) 0.9 %
3 SYRINGE (ML) INJECTION EVERY 12 HOURS SCHEDULED
Status: DISCONTINUED | OUTPATIENT
Start: 2024-03-21 | End: 2024-03-21 | Stop reason: HOSPADM

## 2024-03-21 RX ORDER — ONDANSETRON 2 MG/ML
INJECTION INTRAMUSCULAR; INTRAVENOUS AS NEEDED
Status: DISCONTINUED | OUTPATIENT
Start: 2024-03-21 | End: 2024-03-21 | Stop reason: SURG

## 2024-03-21 RX ORDER — ONDANSETRON 2 MG/ML
4 INJECTION INTRAMUSCULAR; INTRAVENOUS EVERY 6 HOURS PRN
Status: DISCONTINUED | OUTPATIENT
Start: 2024-03-21 | End: 2024-03-25 | Stop reason: HOSPADM

## 2024-03-21 RX ADMIN — FAMOTIDINE 20 MG: 10 INJECTION INTRAVENOUS at 13:19

## 2024-03-21 RX ADMIN — DROPERIDOL 2.5 MG: 2.5 INJECTION, SOLUTION INTRAMUSCULAR; INTRAVENOUS at 09:54

## 2024-03-21 RX ADMIN — DROPERIDOL 0.62 MG: 2.5 INJECTION, SOLUTION INTRAMUSCULAR; INTRAVENOUS at 14:15

## 2024-03-21 RX ADMIN — LIDOCAINE HYDROCHLORIDE 100 MG: 20 INJECTION, SOLUTION INFILTRATION; PERINEURAL at 14:12

## 2024-03-21 RX ADMIN — CEFTRIAXONE 1000 MG: 1 INJECTION, POWDER, FOR SOLUTION INTRAMUSCULAR; INTRAVENOUS at 14:30

## 2024-03-21 RX ADMIN — MORPHINE SULFATE 2 MG: 2 INJECTION, SOLUTION INTRAMUSCULAR; INTRAVENOUS at 09:54

## 2024-03-21 RX ADMIN — ONDANSETRON 8 MG: 2 INJECTION INTRAMUSCULAR; INTRAVENOUS at 08:34

## 2024-03-21 RX ADMIN — Medication 200 MCG: at 14:25

## 2024-03-21 RX ADMIN — ROSUVASTATIN CALCIUM 10 MG: 10 TABLET, FILM COATED ORAL at 20:11

## 2024-03-21 RX ADMIN — ATENOLOL 25 MG: 25 TABLET ORAL at 20:11

## 2024-03-21 RX ADMIN — SODIUM CHLORIDE, POTASSIUM CHLORIDE, SODIUM LACTATE AND CALCIUM CHLORIDE 150 ML/HR: 600; 310; 30; 20 INJECTION, SOLUTION INTRAVENOUS at 19:25

## 2024-03-21 RX ADMIN — SODIUM CHLORIDE, POTASSIUM CHLORIDE, SODIUM LACTATE AND CALCIUM CHLORIDE: 600; 310; 30; 20 INJECTION, SOLUTION INTRAVENOUS at 14:03

## 2024-03-21 RX ADMIN — FENTANYL CITRATE 25 MCG: 50 INJECTION, SOLUTION INTRAMUSCULAR; INTRAVENOUS at 14:12

## 2024-03-21 RX ADMIN — Medication 400 MCG: at 14:35

## 2024-03-21 RX ADMIN — MIDAZOLAM 0.5 MG: 1 INJECTION INTRAMUSCULAR; INTRAVENOUS at 13:19

## 2024-03-21 RX ADMIN — DEXAMETHASONE SODIUM PHOSPHATE 10 MG: 4 INJECTION, SOLUTION INTRAMUSCULAR; INTRAVENOUS at 14:20

## 2024-03-21 RX ADMIN — Medication 400 MCG: at 14:31

## 2024-03-21 RX ADMIN — PROPOFOL 125 MCG/KG/MIN: 10 INJECTION, EMULSION INTRAVENOUS at 14:13

## 2024-03-21 RX ADMIN — PROPOFOL 150 MG: 10 INJECTION, EMULSION INTRAVENOUS at 14:12

## 2024-03-21 RX ADMIN — Medication 400 MCG: at 14:23

## 2024-03-21 RX ADMIN — MORPHINE SULFATE 4 MG: 2 INJECTION, SOLUTION INTRAMUSCULAR; INTRAVENOUS at 08:34

## 2024-03-21 RX ADMIN — CEFTRIAXONE 2000 MG: 2 INJECTION, POWDER, FOR SOLUTION INTRAMUSCULAR; INTRAVENOUS at 10:04

## 2024-03-21 RX ADMIN — SCOPALAMINE 1 PATCH: 1 PATCH, EXTENDED RELEASE TRANSDERMAL at 13:18

## 2024-03-21 RX ADMIN — ESCITALOPRAM 20 MG: 20 TABLET, FILM COATED ORAL at 20:11

## 2024-03-21 RX ADMIN — Medication 200 MCG: at 14:18

## 2024-03-21 RX ADMIN — SODIUM CHLORIDE 1917 ML: 9 INJECTION, SOLUTION INTRAVENOUS at 11:43

## 2024-03-21 RX ADMIN — ONDANSETRON 4 MG: 2 INJECTION INTRAMUSCULAR; INTRAVENOUS at 14:35

## 2024-03-21 RX ADMIN — SODIUM CHLORIDE 1000 ML: 9 INJECTION, SOLUTION INTRAVENOUS at 08:32

## 2024-03-21 RX ADMIN — NIFEDIPINE 30 MG: 30 TABLET, FILM COATED, EXTENDED RELEASE ORAL at 20:11

## 2024-03-21 RX ADMIN — Medication 200 MCG: at 14:20

## 2024-03-21 RX ADMIN — Medication 10 ML: at 20:12

## 2024-03-21 RX ADMIN — IPRATROPIUM BROMIDE AND ALBUTEROL SULFATE 3 ML: .5; 3 SOLUTION RESPIRATORY (INHALATION) at 14:58

## 2024-03-21 NOTE — H&P
Patient Name:  Anastasiia Faria  YOB: 1950  MRN:  [id number]  Admit Date:  3/21/2024  Patient Care Team:  Mirza Scott Sr., MD as PCP - General (Family Medicine)  Jaiden Munoz MD (Inactive) as Referring Physician (Obstetrics and Gynecology)      Subjective   History Present Illness     Chief Complaint   Patient presents with    Flank Pain       Ms. Faria is a 74 y.o. with a history of HTN, who presents with flank pain. Symptoms started last night. Associated with fever, n/v. Came to [facility] ER. CT in ER showing evidence for kidney stone and pyelonephritis. Given IVFs, IV ABX, zofran, morphine in ER. Partial improvement in symptoms.     History of Present Illness  Review of Systems   Constitutional:  Positive for chills and fever.   HENT: Negative.     Eyes: Negative.    Respiratory: Negative.     Cardiovascular:  Negative for chest pain, palpitations and leg swelling.   Gastrointestinal:  Positive for abdominal pain, nausea and vomiting.   Endocrine: Negative.    Genitourinary:  Positive for dysuria and flank pain.   Skin: Negative.    Allergic/Immunologic: Negative.    Neurological: Negative.    Hematological: Negative.    Psychiatric/Behavioral: Negative.          Personal History     Past Medical History:   Diagnosis Date    Allergic     Anxiety     Cataract     Cataract surgery on right eye June 2021 and left eye July 2021. (Dr. Nanette Bateman)    Chronic kidney disease     Colon polyps     Depression     Diverticulosis 2011    Encounter for annual health examination 03/07/2014    Annual Health Assessment    Family history of colon cancer     Fibrocystic breast     GERD (gastroesophageal reflux disease)     Heart murmur     Herpes labialis without complication     Hip pain     right    History of mammogram 12/20/2010    HL (hearing loss) 2014    Hearing Aids    Hyperlipidemia     Hypertension     Hypothyroidism     Insomnia     Kidney stones     Migraines     Osteopenia     Prolia -last 9/2021,3/2022     PONV (postoperative nausea and vomiting)     Scoliosis     Dr. Stanford 5/2018    Visual impairment     Wear Glasses     Past Surgical History:   Procedure Laterality Date    BONE MARROW BIOPSY      BREAST BIOPSY      BREAST CYST ASPIRATION      BREAST SURGERY Right     BIOPSY    CATARACT EXTRACTION, BILATERAL      COLONOSCOPY N/A 10/27/2016    Procedure: COLONOSCOPY INTO CECUM;  Surgeon: Osvaldo Dorado MD;  Location: [facility] ENDOSCOPY;  Service:     COLONOSCOPY  01/26/2011    COLONOSCOPY  02/04/2005    COLONOSCOPY N/A 12/2/2021    Procedure: COLONOSCOPY INTO CECUM WITH COLD BIOPSY POLYPECTOMY AND HOT SNARE POLYPECTOMY;  Surgeon: Osvaldo Dorado MD;  Location: [facility] ENDOSCOPY;  Service: General;  Laterality: N/A;  PRE-FAMILY HISTORY OF COLON CANCER, PERSONAL HISTORY OF COLON CANCER  POST- POLYPS, DIVERTICULOSIS, HEMORRHOIDS    KNEE ARTHROSCOPY Left     PAP SMEAR  12/20/2010     Family History   Problem Relation Age of Onset    Breast cancer Mother     Colon cancer Mother     Hypertension Mother     Cancer Mother         Breat cancer, skin cancer, colon cancer    Hyperlipidemia Mother     Stomach cancer Maternal Grandmother         or intestinal    Cancer Maternal Grandmother         GI TRACT CANCER    Breast cancer Maternal Grandmother     Heart disease Maternal Grandmother     Colon cancer Maternal Aunt         colon ca 40    Cancer Maternal Aunt         Colon cancer cause of death early 40s    Colon cancer Maternal Aunt         66 yo    Heart disease Maternal Aunt     Colon cancer Maternal Aunt         79 yo    Cancer Maternal Aunt         Colon cancer    Colon cancer Other     Colon cancer Other     Heart attack Maternal Grandfather     Breast cancer Maternal Grandfather     Heart disease Maternal Grandfather     Heart disease Paternal Grandmother     Hypertension Sister     Diabetes type II Brother     Cancer Brother         Skin cancer    Hypertension Sister     Hyperlipidemia Sister      Thyroid disease Sister     Cancer Father         Skin cancer    Cancer Maternal Aunt         Colon cancer    Cancer Maternal Uncle         Thyroid cancer    Heart disease Maternal Uncle      Social History     Tobacco Use    Smoking status: Never    Smokeless tobacco: Never    Tobacco comments:     daily caffine   Vaping Use    Vaping status: Never Used   Substance Use Topics    Alcohol use: Never    Drug use: Never     (Not in a hospital admission)    Allergies:  No Known Allergies    Objective    Objective     Vital Signs  Temp:  [100.4 °F (38 °C)] 100.4 °F (38 °C)  Heart Rate:  [] 109  Resp:  [16] 16  BP: (147-157)/(70-86) 157/86  SpO2:  [92 %-99 %] 92 %  on  Flow (L/min):  [3] 3;   Device (Oxygen Therapy): nasal cannula  There is no height or weight on file to calculate BMI.    Physical Exam  Vitals and nursing note reviewed.   Constitutional:       General: She is in acute distress.      Appearance: She is well-developed. She is ill-appearing.   HENT:      Head: Normocephalic and atraumatic.   Eyes:      General: No scleral icterus.     Pupils: Pupils are equal, round, and reactive to light.   Cardiovascular:      Rate and Rhythm: Normal rate and regular rhythm. Tachycardia present.      Heart sounds: Normal heart sounds. No murmur heard.  Pulmonary:      Effort: Pulmonary effort is normal.      Breath sounds: Normal breath sounds.   Abdominal:      General: Bowel sounds are normal. There is no distension.      Palpations: Abdomen is soft.      Tenderness: There is abdominal tenderness. There is no guarding.   Musculoskeletal:      Cervical back: Normal range of motion and neck supple.   Skin:     General: Skin is warm and dry.      Findings: No rash.   Neurological:      Mental Status: She is alert and oriented to person, place, and time.      Cranial Nerves: No cranial nerve deficit.   Psychiatric:         Behavior: Behavior normal.         Thought Content: Thought content normal.         Results  Review:  I reviewed the patient's new clinical results.  Discussed with ED provider.    Lab Results (last 24 hours)       Procedure Component Value Units Date/Time    CBC & Differential [[id number]]  (Abnormal) Collected: 03/21/24 0820    Specimen: Blood Updated: 03/21/24 0834    Narrative:      The following orders were created for panel order CBC & Differential.  Procedure                               Abnormality         Status                     ---------                               -----------         ------                     CBC Auto Differential[[id number]]        Abnormal            Final result                 Please view results for these tests on the individual orders.    Comprehensive Metabolic Panel [[id number]]  (Abnormal) Collected: 03/21/24 0820    Specimen: Blood Updated: 03/21/24 0852     Glucose 170 mg/dL      BUN 17 mg/dL      Creatinine 1.08 mg/dL      Sodium 141 mmol/L      Potassium 3.8 mmol/L      Chloride 106 mmol/L      CO2 20.7 mmol/L      Calcium 9.7 mg/dL      Total Protein 6.4 g/dL      Albumin 4.1 g/dL      ALT (SGPT) 14 U/L      AST (SGOT) 16 U/L      Alkaline Phosphatase 74 U/L      Total Bilirubin 0.8 mg/dL      Globulin 2.3 gm/dL      A/G Ratio 1.8 g/dL      BUN/Creatinine Ratio 15.7     Anion Gap 14.3 mmol/L      eGFR 54.0 mL/min/1.73     Narrative:      GFR Normal >60  Chronic Kidney Disease <60  Kidney Failure <15    The GFR formula is only valid for adults with stable renal function between ages 18 and 70.    Lipase [[id number]]  (Normal) Collected: 03/21/24 0820    Specimen: Blood Updated: 03/21/24 0852     Lipase 22 U/L     CBC Auto Differential [[id number]]  (Abnormal) Collected: 03/21/24 0820    Specimen: Blood Updated: 03/21/24 0834     WBC 16.79 10*3/mm3      RBC 4.73 10*6/mm3      Hemoglobin 13.9 g/dL      Hematocrit 42.9 %      MCV 90.7 fL      MCH 29.4 pg      MCHC 32.4 g/dL      RDW 13.1 %      RDW-SD 43.4 fl      MPV 9.2 fL      Platelets 327 10*3/mm3      Neutrophil  % 83.2 %      Lymphocyte % 9.0 %      Monocyte % 5.8 %      Eosinophil % 1.2 %      Basophil % 0.4 %      Immature Grans % 0.4 %      Neutrophils, Absolute 13.98 10*3/mm3      Lymphocytes, Absolute 1.51 10*3/mm3      Monocytes, Absolute 0.97 10*3/mm3      Eosinophils, Absolute 0.20 10*3/mm3      Basophils, Absolute 0.07 10*3/mm3      Immature Grans, Absolute 0.06 10*3/mm3      nRBC 0.0 /100 WBC     Urinalysis With Microscopic If Indicated (No Culture) - Urine, Clean Catch [[id number]]  (Abnormal) Collected: 03/21/24 0857    Specimen: Urine, Clean Catch Updated: 03/21/24 0906     Color, UA Dark Yellow     Appearance, UA Cloudy     pH, UA 5.5     Specific Gravity, UA 1.025     Glucose, UA Negative     Ketones, UA Trace     Bilirubin, UA Negative     Blood, UA Large (3+)     Protein, UA Trace     Leuk Esterase, UA Moderate (2+)     Nitrite, UA Positive     Urobilinogen, UA 1.0 E.U./dL    Urinalysis, Microscopic Only - Urine, Clean Catch [[id number]]  (Abnormal) Collected: 03/21/24 0857    Specimen: Urine, Clean Catch Updated: 03/21/24 0908     RBC, UA Too Numerous to Count /HPF      WBC, UA Too Numerous to Count /HPF      Bacteria, UA 4+ /HPF      Squamous Epithelial Cells, UA 3-6 /HPF      Hyaline Casts, UA 3-6 /LPF      Methodology Automated Microscopy    Urine Culture - Urine, Urine, Clean Catch [[id number]] Collected: 03/21/24 0857    Specimen: Urine, Clean Catch Updated: 03/21/24 0934    Blood Culture - Blood, Hand, Right [[id number]] Collected: 03/21/24 0959    Specimen: Blood from Hand, Right Updated: 03/21/24 1003    Blood Culture - Blood, Arm, Left [[id number]] Collected: 03/21/24 0959    Specimen: Blood from Arm, Left Updated: 03/21/24 1003            Imaging Results (Last 24 Hours)       Procedure Component Value Units Date/Time    CT Abdomen Pelvis Stone Protocol [[id number]] Collected: 03/21/24 0951     Updated: 03/21/24 1001    Narrative:      ABDOMEN AND PELVIS CT WITHOUT CONTRAST     HISTORY: Left flank  pain.     TECHNIQUE: Noncontrast abdomen and pelvis CT is provided and correlated  with pelvis CT 10/20/2023.     FINDINGS: Left perinephric stranding and hydronephrosis. Proximal left  ureteral calculus measures about 7 mm greatest diameter as measured on  the coronal reformatted images. The calculus lies at about the L4  superior endplate level. There is a 3 mm left upper pole calyceal  calculus and 4 right renal calculi measuring up to 6 mm greatest  diameter. No right hydronephrosis or ureteral calculus. The urinary  bladder is decompressed and appears normal. The renal parenchyma has a  normal noncontrast CT appearance.     Visualized lung bases are clear. Several fluid density  well-circumscribed hepatic lesions are most likely cysts but not  definitively characterized. Small sliding hiatal hernia.  Normal-appearing spleen, pancreas, and adrenal glands.     Extensive diverticulosis throughout the colon. No evidence of  diverticulitis. The retrocecal appendix, small bowel, and stomach appear  normal.     Retroaortic left renal vein as a normal anatomic variant. Scattered  atheromatous vascular calcifications are observed with normal caliber  abdominal aorta.     Uterus and ovaries have a normal small, postmenopausal appearance. No  adenopathy or free fluid.     Degenerative change in the spine and at the hips.       Impression:      Bilateral nephrolithiasis with a 7 mm proximal left ureteral  calculus associated with hydronephrosis and perinephric stranding which  may represent pyelonephritis or pyelocalyceal rupture.     I called the findings to Dr. Hood in the emergency department around  9:30 a.m.     Radiation dose reduction techniques were utilized, including automated  exposure control and exposure modulation based on body size.        This report was finalized on 3/21/2024 9:58 AM by Dr. Jaiden Muller M.D on Workstation: [id number]               Results for orders placed during the hospital  encounter of 07/21/21    Adult Stress Echo W/ Cont or Stress Agent if Necessary Per Protocol    Interpretation Summary  · Normal stress echo with no significant echocardiographic evidence for myocardial ischemia consistent with a low risk study for myocardial ischemia.  · Calculated left ventricular EF = 64%  · Normal right ventricular cavity size and systolic function noted.      No orders to display        Assessment/Plan     Active Hospital Problems    Diagnosis  POA    **Acute pyelonephritis [N10]  Yes    Sepsis [A41.9]  Yes    Ureteral calculus [N20.1]  Yes    Acquired hypothyroidism [E03.9]  Yes    Essential hypertension [I10]  Yes         IVFs, IV ABX, follow cultures  Urology consult and likely OR today  PRN agents for pain and nausea  I discussed the patients findings and my recommendations with patient, family, and nursing staff.    VTE Prophylaxis - SCDs.    Husam Diego MD  [facility]  03/21/24  10:19 EDT

## 2024-03-21 NOTE — ED NOTES
..Nursing report ED to floor  Anastasiia Faria  74 y.o.  female    HPI :  HPI (Adult)  Stated Reason for Visit: flank pain  History Obtained From: patient    Chief Complaint  Chief Complaint   Patient presents with    Flank Pain       Admitting doctor:   Husam Diego MD    Admitting diagnosis:   The primary encounter diagnosis was Ureteral calculus. Diagnoses of Left ureteral stone, Sepsis, due to unspecified organism, unspecified whether acute organ dysfunction present, Fever, unspecified fever cause, Elevated serum creatinine, Hyperglycemia, and Acute pyelonephritis were also pertinent to this visit.    Code status:   Current Code Status       Date Active Code Status Order ID Comments User Context       3/21/2024 1025 CPR (Attempt to Resuscitate) [id number]  Husam Diego MD ED        Question Answer    Code Status (Patient has no pulse and is not breathing) CPR (Attempt to Resuscitate)    Medical Interventions (Patient has pulse or is breathing) Full Support                    Allergies:   Patient has no known allergies.    Isolation:   No active isolations    Intake and Output    Intake/Output Summary (Last 24 hours) at 3/21/2024 1131  Last data filed at 3/21/2024 1040  Gross per 24 hour   Intake 1000 ml   Output --   Net 1000 ml       Weight:   There were no vitals filed for this visit.    Most recent vitals:   Vitals:    03/21/24 0901 03/21/24 0931 03/21/24 0957 03/21/24 1031   BP: 152/70 157/86  117/69   Pulse: 76 86 109 87   Resp:       Temp:       TempSrc:       SpO2: 93% 94% 92% 94%       Active LDAs/IV Access:   Lines, Drains & Airways       Active LDAs       Name Placement date Placement time Site Days    Peripheral IV 07/21/21 0926 07/21/21  0926  --  974    Peripheral IV 03/21/24 0820 Right Antecubital 03/21/24  0820  Antecubital  less than 1                    Labs (abnormal labs have a star):   Labs Reviewed   COMPREHENSIVE METABOLIC PANEL - Abnormal; Notable for the following components:        Result Value    Glucose 170 (*)     Creatinine 1.08 (*)     CO2 20.7 (*)     eGFR 54.0 (*)     All other components within normal limits    Narrative:     GFR Normal >60  Chronic Kidney Disease <60  Kidney Failure <15    The GFR formula is only valid for adults with stable renal function between ages 18 and 70.   URINALYSIS W/ MICROSCOPIC IF INDICATED (NO CULTURE) - Abnormal; Notable for the following components:    Color, UA Dark Yellow (*)     Appearance, UA Cloudy (*)     Ketones, UA Trace (*)     Blood, UA Large (3+) (*)     Protein, UA Trace (*)     Leuk Esterase, UA Moderate (2+) (*)     Nitrite, UA Positive (*)     All other components within normal limits   CBC WITH AUTO DIFFERENTIAL - Abnormal; Notable for the following components:    WBC 16.79 (*)     Neutrophil % 83.2 (*)     Lymphocyte % 9.0 (*)     Neutrophils, Absolute 13.98 (*)     Monocytes, Absolute 0.97 (*)     Immature Grans, Absolute 0.06 (*)     All other components within normal limits   URINALYSIS, MICROSCOPIC ONLY - Abnormal; Notable for the following components:    RBC, UA Too Numerous to Count (*)     WBC, UA Too Numerous to Count (*)     Bacteria, UA 4+ (*)     Squamous Epithelial Cells, UA 3-6 (*)     All other components within normal limits   LACTIC ACID, PLASMA - Abnormal; Notable for the following components:    Lactate 4.2 (*)     All other components within normal limits   LIPASE - Normal   PROCALCITONIN - Normal    Narrative:     As a Marker for Sepsis (Non-Neonates):    1. <0.5 ng/mL represents a low risk of severe sepsis and/or septic shock.  2. >2 ng/mL represents a high risk of severe sepsis and/or septic shock.    As a Marker for Lower Respiratory Tract Infections that require antibiotic therapy:    PCT on Admission    Antibiotic Therapy       6-12 Hrs later    >0.5                Strongly Recommended  >0.25 - <0.5        Recommended   0.1 - 0.25          Discouraged              Remeasure/reassess PCT  <0.1                 "Strongly Discouraged     Remeasure/reassess PCT    As 28 day mortality risk marker: \"Change in Procalcitonin Result\" (>80% or <=80%) if Day 0 (or Day 1) and Day 4 values are available. Refer to http://www.[facility]-pct-calculator.com    Change in PCT <=80%  A decrease of PCT levels below or equal to 80% defines a positive change in PCT test result representing a higher risk for 28-day all-cause mortality of patients diagnosed with severe sepsis for septic shock.    Change in PCT >80%  A decrease of PCT levels of more than 80% defines a negative change in PCT result representing a lower risk for 28-day all-cause mortality of patients diagnosed with severe sepsis or septic shock.      BLOOD CULTURE   BLOOD CULTURE   URINE CULTURE   LACTIC ACID, REFLEX   CBC AND DIFFERENTIAL    Narrative:     The following orders were created for panel order CBC & Differential.  Procedure                               Abnormality         Status                     ---------                               -----------         ------                     CBC Auto Differential[[id number]]        Abnormal            Final result                 Please view results for these tests on the individual orders.       EKG:   No orders to display       Meds given in ED:   Medications   lactated ringers infusion (has no administration in time range)   cefTRIAXone (ROCEPHIN) 2,000 mg in sodium chloride 0.9 % 100 mL MBP (has no administration in time range)   sodium chloride 0.9 % flush 10 mL (has no administration in time range)   sodium chloride 0.9 % flush 10 mL (has no administration in time range)   sodium chloride 0.9 % infusion 40 mL (has no administration in time range)   ondansetron ODT (ZOFRAN-ODT) disintegrating tablet 4 mg (has no administration in time range)     Or   ondansetron (ZOFRAN) injection 4 mg (has no administration in time range)   nitroglycerin (NITROSTAT) SL tablet 0.4 mg (has no administration in time range)   acetaminophen " (TYLENOL) tablet 650 mg (has no administration in time range)     Or   acetaminophen (TYLENOL) 160 MG/5ML oral solution 650 mg (has no administration in time range)     Or   acetaminophen (TYLENOL) suppository 650 mg (has no administration in time range)   oxyCODONE-acetaminophen (PERCOCET) 7.5-325 MG per tablet 1 tablet (has no administration in time range)   morphine injection 4 mg (has no administration in time range)     And   naloxone (NARCAN) injection 0.4 mg (has no administration in time range)   sennosides-docusate (PERICOLACE) 8.6-50 MG per tablet 2 tablet (has no administration in time range)     And   polyethylene glycol (MIRALAX) packet 17 g (has no administration in time range)     And   bisacodyl (DULCOLAX) EC tablet 5 mg (has no administration in time range)     And   bisacodyl (DULCOLAX) suppository 10 mg (has no administration in time range)   prochlorperazine (COMPAZINE) injection 10 mg (has no administration in time range)   sodium chloride 0.9% - IBW for BMI > 30 bolus 30 mL/kg (Ideal) (has no administration in time range)   ondansetron (ZOFRAN) injection 8 mg (8 mg Intravenous Given 3/21/24 0834)   morphine injection 4 mg (4 mg Intravenous Given 3/21/24 0834)   sodium chloride 0.9 % bolus 1,000 mL (0 mL Intravenous Stopped 3/21/24 1040)   cefTRIAXone (ROCEPHIN) 2,000 mg in sodium chloride 0.9 % 100 mL MBP (0 mg Intravenous Stopped 3/21/24 1040)   droperidol (INAPSINE) injection 2.5 mg (2.5 mg Intravenous Given 3/21/24 0954)   morphine injection 2 mg (2 mg Intravenous Given 3/21/24 0954)       Imaging results:  CT Abdomen Pelvis Stone Protocol    Result Date: 3/21/2024  Bilateral nephrolithiasis with a 7 mm proximal left ureteral calculus associated with hydronephrosis and perinephric stranding which may represent pyelonephritis or pyelocalyceal rupture.  I called the findings to Dr. Hood in the emergency department around 9:30 a.m.  Radiation dose reduction techniques were utilized,  including automated exposure control and exposure modulation based on body size.   This report was finalized on 3/21/2024 9:58 AM by Dr. Jaiden Muller M.D on Workstation: [id number]       Ambulatory status:   - bed rest     Social issues:   Social History     Socioeconomic History    Marital status:    Tobacco Use    Smoking status: Never    Smokeless tobacco: Never    Tobacco comments:     daily caffine   Vaping Use    Vaping status: Never Used   Substance and Sexual Activity    Alcohol use: Never    Drug use: Never    Sexual activity: Not Currently     Partners: Male     Birth control/protection: Post-menopausal     Comment: Took birth control pills approximately 30 years.       Peripheral Neurovascular  Peripheral Neurovascular (Adult)  Peripheral Neurovascular WDL: WDL    Neuro Cognitive  Neuro Cognitive (Adult)  Cognitive/Neuro/Behavioral WDL: WDL  Orientation: oriented x 4    Learning  Learning Assessment (Adult)  Learning Readiness and Ability: no barriers identified    Respiratory  Respiratory WDL  Respiratory WDL: WDL    Abdominal Pain       Pain Assessments  Pain (Adult)  (0-10) Pain Rating: Rest: 9  (0-10) Pain Rating: Activity: 9  Pain Location: abdomen, back, head  Pain Side/Orientation: lower  Pain Description: sharp  Pain Radiation to: abdomen  Response to Pain Interventions: interventions effective per patient    NIH Stroke Scale       Kristina Montes RN  03/21/24 11:31 EDT

## 2024-03-21 NOTE — PROGRESS NOTES
S/p cystoscopy, left ureteral stent placement.    Plan:  - admit to A  - continue IV ceftriaxone while awaiting culture data.  - she will ultimately require definitive stone management once infection clears  - will follow remotely. Call with questions or concerns.

## 2024-03-21 NOTE — OP NOTE
Operative Report     [facility] OR    Patient: Anastasiia Faria  Age:      74 y.o.  :     1950  Sex:      female    Medical Record:  [id number]    Date of Operation/Procedure:  3/21/2024    Pre-operative Diagnosis:  Left ureteral stone, UTI, sepsis    Post-operative Diagnosis:  Same    Surgeon:  Che    Name of Operation/Procedure:    Cystoscopy  Left retrograde pyelogram  Left ureteral stent placement    Findings/Complications:  No complications.    Left retrograde pyelogram interpretation: mild left hydronephrosis. Decompressed left ureter. No filling defects or extravasation.     Description of procedure: The patient was taken to the OR and placed under GA in lithotomy position.  Prepped and draped in sterile fashion.  The 21 Fr cystoscope was introduced and pancystoscopy was performed.  No tumors or stones were seen.  A Sensor guidewire was passed through the left ureteral orifice into the kidney without difficulty. A Pollack catheter was inserted, and a retrograde pyelogram was performed with findings as above. A 7 Fr x 26 cm JJ stent was passed into the kidney into the proper anatomic position.    Estimated Blood Loss: minimal    Specimens: * No orders in the log *    Fluids/Drains: left ureteral stent    Jaiden Bolton Jr., MD  3/21/2024  14:40 EDT

## 2024-03-21 NOTE — CONSULTS
FIRST [facility]OLOG[facility] CONSULT      Patient Identification:  NAME:  Anastasiia Faria  Age:  74 y.o.   Sex:  female   :  1950   MRN:  [id number]     Chief complaint: Left flank pain     History of present illness:      Pt is a 74 y.o. female with a history of kidney stones who presented to the ED on 3/21/24 c/o left flank pain. Patient reports that pain started late yesterday evening with associated nausea and vomiting. Urology consulted for evaluation and treatment of left ureteral stone with concurrent urinary tract infection. Pt reports renal colic that waxes and wanes in severity, left flank pain, nausea, vomiting, and urinary frequency.  Pt denies dysuria, fever, or chills. Patient with low grade fever on admission. Patient reports a history of kidney stones roughly 20 years ago.     In hospital:  -Temp 100.4, hypertensive  -WBC - 16.79  -Creat - 1.08  -UA - Moderate leukocytes, positive nitrites; TNTC RBC, TNTC WBC, 4+ bacteria  -UCx - Pending  -Blood culture- Pending  -Lactate - 4.2    -CT - Independently reviewed - Bilateral nephrolithiasis, 7 mm proximal left ureteral  stone with mild hydronephrosis      Past medical history:  Past Medical History:   Diagnosis Date    Allergic     Anxiety     Cataract     Cataract surgery on right eye 2021 and left eye 2021. (Dr. Nanette Bateman)    Chronic kidney disease     Colon polyps     Depression     Diverticulosis     Encounter for annual health examination 2014    Annual Health Assessment    Family history of colon cancer     Fibrocystic breast     GERD (gastroesophageal reflux disease)     Heart murmur     Herpes labialis without complication     Hip pain     right    History of mammogram 2010    HL (hearing loss) 2014    Hearing Aids    Hyperlipidemia     Hypertension     Hypothyroidism     Insomnia     Kidney stones     Migraines     Osteopenia     Prolia -last 2021,3/2022    PONV (postoperative nausea and vomiting)     Scoliosis       Abdoulaye 5/2018    Visual impairment     Wear Glasses       Past surgical history:  Past Surgical History:   Procedure Laterality Date    BONE MARROW BIOPSY      BREAST BIOPSY      BREAST CYST ASPIRATION      BREAST SURGERY Right     BIOPSY    CATARACT EXTRACTION, BILATERAL      COLONOSCOPY N/A 10/27/2016    Procedure: COLONOSCOPY INTO CECUM;  Surgeon: Osvaldo Dorado MD;  Location: [facility] ENDOSCOPY;  Service:     COLONOSCOPY  01/26/2011    COLONOSCOPY  02/04/2005    COLONOSCOPY N/A 12/2/2021    Procedure: COLONOSCOPY INTO CECUM WITH COLD BIOPSY POLYPECTOMY AND HOT SNARE POLYPECTOMY;  Surgeon: Osvaldo Dorado MD;  Location: [facility] ENDOSCOPY;  Service: General;  Laterality: N/A;  PRE-FAMILY HISTORY OF COLON CANCER, PERSONAL HISTORY OF COLON CANCER  POST- POLYPS, DIVERTICULOSIS, HEMORRHOIDS    KNEE ARTHROSCOPY Left     PAP SMEAR  12/20/2010       Allergies:  Patient has no known allergies.    Home medications:  (Not in a hospital admission)       Hospital medications:  [START ON 3/22/2024] cefTRIAXone, 2,000 mg, Intravenous, Q24H  sodium chloride, 10 mL, Intravenous, Q12H  sodium chloride 0.9% - IBW for BMI > 30, 30 mL/kg (Ideal), Intravenous, Once      lactated ringers, 150 mL/hr        acetaminophen **OR** acetaminophen **OR** acetaminophen    senna-docusate sodium **AND** polyethylene glycol **AND** bisacodyl **AND** bisacodyl    Morphine **AND** naloxone    nitroglycerin    ondansetron ODT **OR** ondansetron    oxyCODONE-acetaminophen    prochlorperazine    sodium chloride    sodium chloride    Family history:  Family History   Problem Relation Age of Onset    Breast cancer Mother     Colon cancer Mother     Hypertension Mother     Cancer Mother         Breat cancer, skin cancer, colon cancer    Hyperlipidemia Mother     Stomach cancer Maternal Grandmother         or intestinal    Cancer Maternal Grandmother         GI TRACT CANCER    Breast cancer Maternal Grandmother     Heart disease Maternal  Grandmother     Colon cancer Maternal Aunt         colon ca 40    Cancer Maternal Aunt         Colon cancer cause of death early 40s    Colon cancer Maternal Aunt         64 yo    Heart disease Maternal Aunt     Colon cancer Maternal Aunt         81 yo    Cancer Maternal Aunt         Colon cancer    Colon cancer Other     Colon cancer Other     Heart attack Maternal Grandfather     Breast cancer Maternal Grandfather     Heart disease Maternal Grandfather     Heart disease Paternal Grandmother     Hypertension Sister     Diabetes type II Brother     Cancer Brother         Skin cancer    Hypertension Sister     Hyperlipidemia Sister     Thyroid disease Sister     Cancer Father         Skin cancer    Cancer Maternal Aunt         Colon cancer    Cancer Maternal Uncle         Thyroid cancer    Heart disease Maternal Uncle        Social history:  Social History     Tobacco Use    Smoking status: Never    Smokeless tobacco: Never    Tobacco comments:     daily caffine   Vaping Use    Vaping status: Never Used   Substance Use Topics    Alcohol use: Never    Drug use: Never       Review of systems:      12 point negative except as in HPI    Objective:  TMax 24 hours:   Temp (24hrs), Av.4 °F (38 °C), Min:100.4 °F (38 °C), Max:100.4 °F (38 °C)      Vitals Ranges:   Temp:  [100.4 °F (38 °C)] 100.4 °F (38 °C)  Heart Rate:  [] 87  Resp:  [16] 16  BP: (117-157)/(69-86) 117/69    Intake/Output Last 3 shifts:  No intake/output data recorded.     Physical Exam:    General Appearance:    Alert, ill-appearing, mild distress   Back:     Left CVA tenderness   Lungs:     Respirations unlabored, no wheezing   Abdomen:     Soft, ND, tender, no masses, no guarding   :    Deferred   Neuro/Psych:   Orientation intact, mood/affect pleasant       Results review:   I reviewed the patient's new clinical results.    Data review:  Lab Results (last 24 hours)       Procedure Component Value Units Date/Time    Lactic Acid, Plasma  [[id number]]  (Abnormal) Collected: 03/21/24 1002    Specimen: Blood Updated: 03/21/24 1036     Lactate 4.2 mmol/L     Procalcitonin [[id number]] Collected: 03/21/24 0820    Specimen: Blood Updated: 03/21/24 1034    Blood Culture - Blood, Arm, Left [[id number]] Collected: 03/21/24 0959    Specimen: Blood from Arm, Left Updated: 03/21/24 1003    Blood Culture - Blood, Hand, Right [[id number]] Collected: 03/21/24 0959    Specimen: Blood from Hand, Right Updated: 03/21/24 1003    Urine Culture - Urine, Urine, Clean Catch [[id number]] Collected: 03/21/24 0857    Specimen: Urine, Clean Catch Updated: 03/21/24 0934    Urinalysis, Microscopic Only - Urine, Clean Catch [[id number]]  (Abnormal) Collected: 03/21/24 0857    Specimen: Urine, Clean Catch Updated: 03/21/24 0908     RBC, UA Too Numerous to Count /HPF      WBC, UA Too Numerous to Count /HPF      Bacteria, UA 4+ /HPF      Squamous Epithelial Cells, UA 3-6 /HPF      Hyaline Casts, UA 3-6 /LPF      Methodology Automated Microscopy    Urinalysis With Microscopic If Indicated (No Culture) - Urine, Clean Catch [[id number]]  (Abnormal) Collected: 03/21/24 0857    Specimen: Urine, Clean Catch Updated: 03/21/24 0906     Color, UA Dark Yellow     Appearance, UA Cloudy     pH, UA 5.5     Specific Gravity, UA 1.025     Glucose, UA Negative     Ketones, UA Trace     Bilirubin, UA Negative     Blood, UA Large (3+)     Protein, UA Trace     Leuk Esterase, UA Moderate (2+)     Nitrite, UA Positive     Urobilinogen, UA 1.0 E.U./dL    Comprehensive Metabolic Panel [[id number]]  (Abnormal) Collected: 03/21/24 0820    Specimen: Blood Updated: 03/21/24 0852     Glucose 170 mg/dL      BUN 17 mg/dL      Creatinine 1.08 mg/dL      Sodium 141 mmol/L      Potassium 3.8 mmol/L      Chloride 106 mmol/L      CO2 20.7 mmol/L      Calcium 9.7 mg/dL      Total Protein 6.4 g/dL      Albumin 4.1 g/dL      ALT (SGPT) 14 U/L      AST (SGOT) 16 U/L      Alkaline Phosphatase 74 U/L      Total Bilirubin 0.8  mg/dL      Globulin 2.3 gm/dL      A/G Ratio 1.8 g/dL      BUN/Creatinine Ratio 15.7     Anion Gap 14.3 mmol/L      eGFR 54.0 mL/min/1.73     Narrative:      GFR Normal >60  Chronic Kidney Disease <60  Kidney Failure <15    The GFR formula is only valid for adults with stable renal function between ages 18 and 70.    Lipase [[id number]]  (Normal) Collected: 03/21/24 0820    Specimen: Blood Updated: 03/21/24 0852     Lipase 22 U/L     CBC & Differential [[id number]]  (Abnormal) Collected: 03/21/24 0820    Specimen: Blood Updated: 03/21/24 0834    Narrative:      The following orders were created for panel order CBC & Differential.  Procedure                               Abnormality         Status                     ---------                               -----------         ------                     CBC Auto Differential[[id number]]        Abnormal            Final result                 Please view results for these tests on the individual orders.    CBC Auto Differential [[id number]]  (Abnormal) Collected: 03/21/24 0820    Specimen: Blood Updated: 03/21/24 0834     WBC 16.79 10*3/mm3      RBC 4.73 10*6/mm3      Hemoglobin 13.9 g/dL      Hematocrit 42.9 %      MCV 90.7 fL      MCH 29.4 pg      MCHC 32.4 g/dL      RDW 13.1 %      RDW-SD 43.4 fl      MPV 9.2 fL      Platelets 327 10*3/mm3      Neutrophil % 83.2 %      Lymphocyte % 9.0 %      Monocyte % 5.8 %      Eosinophil % 1.2 %      Basophil % 0.4 %      Immature Grans % 0.4 %      Neutrophils, Absolute 13.98 10*3/mm3      Lymphocytes, Absolute 1.51 10*3/mm3      Monocytes, Absolute 0.97 10*3/mm3      Eosinophils, Absolute 0.20 10*3/mm3      Basophils, Absolute 0.07 10*3/mm3      Immature Grans, Absolute 0.06 10*3/mm3      nRBC 0.0 /100 WBC              Imaging:  Imaging Results (Last 24 Hours)       Procedure Component Value Units Date/Time    CT Abdomen Pelvis Stone Protocol [[id number]] Collected: 03/21/24 0951     Updated: 03/21/24 1001    Narrative:       ABDOMEN AND PELVIS CT WITHOUT CONTRAST     HISTORY: Left flank pain.     TECHNIQUE: Noncontrast abdomen and pelvis CT is provided and correlated  with pelvis CT 10/20/2023.     FINDINGS: Left perinephric stranding and hydronephrosis. Proximal left  ureteral calculus measures about 7 mm greatest diameter as measured on  the coronal reformatted images. The calculus lies at about the L4  superior endplate level. There is a 3 mm left upper pole calyceal  calculus and 4 right renal calculi measuring up to 6 mm greatest  diameter. No right hydronephrosis or ureteral calculus. The urinary  bladder is decompressed and appears normal. The renal parenchyma has a  normal noncontrast CT appearance.     Visualized lung bases are clear. Several fluid density  well-circumscribed hepatic lesions are most likely cysts but not  definitively characterized. Small sliding hiatal hernia.  Normal-appearing spleen, pancreas, and adrenal glands.     Extensive diverticulosis throughout the colon. No evidence of  diverticulitis. The retrocecal appendix, small bowel, and stomach appear  normal.     Retroaortic left renal vein as a normal anatomic variant. Scattered  atheromatous vascular calcifications are observed with normal caliber  abdominal aorta.     Uterus and ovaries have a normal small, postmenopausal appearance. No  adenopathy or free fluid.     Degenerative change in the spine and at the hips.       Impression:      Bilateral nephrolithiasis with a 7 mm proximal left ureteral  calculus associated with hydronephrosis and perinephric stranding which  may represent pyelonephritis or pyelocalyceal rupture.     I called the findings to Dr. Hood in the emergency department around  9:30 a.m.     Radiation dose reduction techniques were utilized, including automated  exposure control and exposure modulation based on body size.        This report was finalized on 3/21/2024 9:58 AM by Dr. Jaiden Muller M.D on Workstation: [id number]                 Assessment     Left proximal ureteral calculus  2.   Mild left hydronephrosis  3.   Urinary tract infection  4.   Sepsis  5.   Bilateral nephrolithiasis      Plan:     - NPO  - Consent  - Add on for Cystoscopy, retrograde pyelogram, left ureteral stent placement today    ESTHER Mishra  03/21/24  11:04 EDT

## 2024-03-21 NOTE — ANESTHESIA PROCEDURE NOTES
Airway  Urgency: elective    Date/Time: 3/21/2024 2:13 PM    General Information and Staff    Patient location during procedure: OR  Anesthesiologist: Yahir Viera MD  CRNA/CAA: Bianca Higuera CRNA    Indications and Patient Condition    Preoxygenated: yes  Mask difficulty assessment: 0 - not attempted    Final Airway Details  Final airway type: supraglottic airway      Successful airway: unique  Size 4     Number of attempts at approach: 1  Assessment: lips, teeth, and gum same as pre-op and atraumatic intubation    Additional Comments  Atraumatic, adeq seal, secured, MV/AV until SV

## 2024-03-21 NOTE — ED PROVIDER NOTES
EMERGENCY DEPARTMENT ENCOUNTER  Room Number:  40/40  Date of encounter:  3/21/2024  PCP: Mirza Scott Sr., MD  Patient Care Team:  Mirza Scott Sr., MD as PCP - General (Family Medicine)  Jaiden Munoz MD (Inactive) as Referring Physician (Obstetrics and Gynecology)     HPI:  Context: Anastasiia Faria is a 74 y.o. female who presents to the ED c/o chief complaint of left flank pain.  Patient reports that she again having pain last night while sitting in bed.  Patient reports that initially she thought it might have been because of the position went that she was and but when she tried moving it did not affect.  Patient reports pain began radiating to her left lower quadrant today.  Pain is sharp, waxing and waning.  Patient reports nausea with 4 episodes of emesis, emesis nonbloody nonbilious.  Patient denies any hematuria or dysuria, does endorse some increased frequency.  No fever shakes chills or night sweats.  Patient denies any fall or trauma, no strain or side event, no pain radiating to her legs, no lower extremity weakness or numbness.  Patient reports history of kidney stones in the past, single episode, did not require surgery.  Patient denies any prior surgeries on her abdomen or lower back.    MEDICAL HISTORY REVIEW  Reviewed in EPIC    PAST MEDICAL HISTORY  Active Ambulatory Problems     Diagnosis Date Noted    Colon polyp, hyperplastic 09/21/2016    Allergic rhinitis due to pollen 09/21/2016    Acquired hypothyroidism 09/21/2016    Essential hypertension 09/21/2016    FH: colon cancer in relative <50 years old 09/21/2016    Mixed hyperlipidemia 09/21/2016    Chronic right shoulder pain 03/30/2018    Periscapular pain 03/30/2018    Other idiopathic scoliosis, thoracic region 03/30/2018    Prediabetes 10/15/2018    Age-related osteoporosis without current pathological fracture 01/24/2019    Mild episode of recurrent major depressive disorder 08/06/2020    Age-related nuclear cataract of both eyes 03/26/2021     Cystoid nevus of conjunctiva 03/26/2021    Anxiety 07/16/2021    Cataract 10/12/2021    Cataract extraction status of left eye 07/08/2021    Cataract extraction status of right eye 06/17/2021    Conjunctival cyst of left eye 07/16/2021    Depressive disorder 07/16/2021    Disorder of refraction 07/16/2021    Dry eye syndrome of bilateral lacrimal glands 07/16/2021    Hearing loss 07/16/2021    Hearing loss 10/12/2021    Hordeolum internum right upper eyelid 07/16/2021    Migraine 07/16/2021    Osteopenia 03/31/2015    Osteoporosis 07/16/2021    Renal stone 07/16/2021    Seasonal allergies 10/12/2021    Diverticulosis 12/02/2021    Internal hemorrhoids 12/02/2021    Posterior vitreous detachment of both eyes 08/17/2022    Right hip pain 10/31/2023    OA (osteoarthritis) of hip 12/12/2023     Resolved Ambulatory Problems     Diagnosis Date Noted    CKD (chronic kidney disease) 09/21/2016    Screen for colon cancer 11/03/2021     Past Medical History:   Diagnosis Date    Allergic     Chronic kidney disease     Colon polyps     Depression     Encounter for annual health examination 03/07/2014    Family history of colon cancer     Fibrocystic breast     GERD (gastroesophageal reflux disease)     Heart murmur     Herpes labialis without complication     Hip pain     History of mammogram 12/20/2010    HL (hearing loss) 2014    Hyperlipidemia     Hypertension     Hypothyroidism     Insomnia     Kidney stones     Migraines     PONV (postoperative nausea and vomiting)     Scoliosis     Visual impairment        PAST SURGICAL HISTORY  Past Surgical History:   Procedure Laterality Date    BONE MARROW BIOPSY      BREAST BIOPSY      BREAST CYST ASPIRATION      BREAST SURGERY Right     BIOPSY    CATARACT EXTRACTION, BILATERAL      COLONOSCOPY N/A 10/27/2016    Procedure: COLONOSCOPY INTO CECUM;  Surgeon: Osvaldo Dorado MD;  Location: [facility] ENDOSCOPY;  Service:     COLONOSCOPY  01/26/2011    COLONOSCOPY  02/04/2005     COLONOSCOPY N/A 12/2/2021    Procedure: COLONOSCOPY INTO CECUM WITH COLD BIOPSY POLYPECTOMY AND HOT SNARE POLYPECTOMY;  Surgeon: Osvaldo Dorado MD;  Location: [facility] ENDOSCOPY;  Service: General;  Laterality: N/A;  PRE-FAMILY HISTORY OF COLON CANCER, PERSONAL HISTORY OF COLON CANCER  POST- POLYPS, DIVERTICULOSIS, HEMORRHOIDS    KNEE ARTHROSCOPY Left     PAP SMEAR  12/20/2010       FAMILY HISTORY  Family History   Problem Relation Age of Onset    Breast cancer Mother     Colon cancer Mother     Hypertension Mother     Cancer Mother         Breat cancer, skin cancer, colon cancer    Hyperlipidemia Mother     Stomach cancer Maternal Grandmother         or intestinal    Cancer Maternal Grandmother         GI TRACT CANCER    Breast cancer Maternal Grandmother     Heart disease Maternal Grandmother     Colon cancer Maternal Aunt         colon ca 40    Cancer Maternal Aunt         Colon cancer cause of death early 40s    Colon cancer Maternal Aunt         64 yo    Heart disease Maternal Aunt     Colon cancer Maternal Aunt         81 yo    Cancer Maternal Aunt         Colon cancer    Colon cancer Other     Colon cancer Other     Heart attack Maternal Grandfather     Breast cancer Maternal Grandfather     Heart disease Maternal Grandfather     Heart disease Paternal Grandmother     Hypertension Sister     Diabetes type II Brother     Cancer Brother         Skin cancer    Hypertension Sister     Hyperlipidemia Sister     Thyroid disease Sister     Cancer Father         Skin cancer    Cancer Maternal Aunt         Colon cancer    Cancer Maternal Uncle         Thyroid cancer    Heart disease Maternal Uncle        SOCIAL HISTORY  Social History     Socioeconomic History    Marital status:    Tobacco Use    Smoking status: Never    Smokeless tobacco: Never    Tobacco comments:     daily caffine   Vaping Use    Vaping status: Never Used   Substance and Sexual Activity    Alcohol use: Never    Drug use: Never     Sexual activity: Not Currently     Partners: Male     Birth control/protection: Post-menopausal     Comment: Took birth control pills approximately 30 years.       ALLERGIES  Patient has no known allergies.    The patient's allergies have been reviewed    REVIEW OF SYSTEMS  All systems reviewed and negative except for those discussed in HPI.     PHYSICAL EXAM  I have reviewed the triage vital signs and nursing notes.  ED Triage Vitals [03/21/24 0812]   Temp Heart Rate Resp BP SpO2   100.4 °F (38 °C) 90 16 -- 99 %      Temp src Heart Rate Source Patient Position BP Location FiO2 (%)   Tympanic Monitor -- -- --       General: No acute distress.  HENT: NCAT, PERRL, Nares patent.  Eyes: no scleral icterus.  Neck: trachea midline, no ROM limitations.  CV: regular rhythm, regular rate.  Respiratory: normal effort, CTAB.  Abdomen: soft, nondistended, NTTP, no rebound tenderness, no guarding or rigidity.  No CVA tenderness bilaterally.  Musculoskeletal: no deformity.  Neuro: alert, moves all extremities, follows commands.  Skin: warm, dry.    LAB RESULTS  Recent Results (from the past 24 hour(s))   Comprehensive Metabolic Panel    Collection Time: 03/21/24  8:20 AM    Specimen: Blood   Result Value Ref Range    Glucose 170 (H) 65 - 99 mg/dL    BUN 17 8 - 23 mg/dL    Creatinine 1.08 (H) 0.57 - 1.00 mg/dL    Sodium 141 136 - 145 mmol/L    Potassium 3.8 3.5 - 5.2 mmol/L    Chloride 106 98 - 107 mmol/L    CO2 20.7 (L) 22.0 - 29.0 mmol/L    Calcium 9.7 8.6 - 10.5 mg/dL    Total Protein 6.4 6.0 - 8.5 g/dL    Albumin 4.1 3.5 - 5.2 g/dL    ALT (SGPT) 14 1 - 33 U/L    AST (SGOT) 16 1 - 32 U/L    Alkaline Phosphatase 74 39 - 117 U/L    Total Bilirubin 0.8 0.0 - 1.2 mg/dL    Globulin 2.3 gm/dL    A/G Ratio 1.8 g/dL    BUN/Creatinine Ratio 15.7 7.0 - 25.0    Anion Gap 14.3 5.0 - 15.0 mmol/L    eGFR 54.0 (L) >60.0 mL/min/1.73   Lipase    Collection Time: 03/21/24  8:20 AM    Specimen: Blood   Result Value Ref Range    Lipase 22 13 - 60  U/L   CBC Auto Differential    Collection Time: 03/21/24  8:20 AM    Specimen: Blood   Result Value Ref Range    WBC 16.79 (H) 3.40 - 10.80 10*3/mm3    RBC 4.73 3.77 - 5.28 10*6/mm3    Hemoglobin 13.9 12.0 - 15.9 g/dL    Hematocrit 42.9 34.0 - 46.6 %    MCV 90.7 79.0 - 97.0 fL    MCH 29.4 26.6 - 33.0 pg    MCHC 32.4 31.5 - 35.7 g/dL    RDW 13.1 12.3 - 15.4 %    RDW-SD 43.4 37.0 - 54.0 fl    MPV 9.2 6.0 - 12.0 fL    Platelets 327 140 - 450 10*3/mm3    Neutrophil % 83.2 (H) 42.7 - 76.0 %    Lymphocyte % 9.0 (L) 19.6 - 45.3 %    Monocyte % 5.8 5.0 - 12.0 %    Eosinophil % 1.2 0.3 - 6.2 %    Basophil % 0.4 0.0 - 1.5 %    Immature Grans % 0.4 0.0 - 0.5 %    Neutrophils, Absolute 13.98 (H) 1.70 - 7.00 10*3/mm3    Lymphocytes, Absolute 1.51 0.70 - 3.10 10*3/mm3    Monocytes, Absolute 0.97 (H) 0.10 - 0.90 10*3/mm3    Eosinophils, Absolute 0.20 0.00 - 0.40 10*3/mm3    Basophils, Absolute 0.07 0.00 - 0.20 10*3/mm3    Immature Grans, Absolute 0.06 (H) 0.00 - 0.05 10*3/mm3    nRBC 0.0 0.0 - 0.2 /100 WBC   Urinalysis With Microscopic If Indicated (No Culture) - Urine, Clean Catch    Collection Time: 03/21/24  8:57 AM    Specimen: Urine, Clean Catch   Result Value Ref Range    Color, UA Dark Yellow (A) Yellow, Straw    Appearance, UA Cloudy (A) Clear    pH, UA 5.5 5.0 - 8.0    Specific Gravity, UA 1.025 1.005 - 1.030    Glucose, UA Negative Negative    Ketones, UA Trace (A) Negative    Bilirubin, UA Negative Negative    Blood, UA Large (3+) (A) Negative    Protein, UA Trace (A) Negative    Leuk Esterase, UA Moderate (2+) (A) Negative    Nitrite, UA Positive (A) Negative    Urobilinogen, UA 1.0 E.U./dL 0.2 - 1.0 E.U./dL   Urinalysis, Microscopic Only - Urine, Clean Catch    Collection Time: 03/21/24  8:57 AM    Specimen: Urine, Clean Catch   Result Value Ref Range    RBC, UA Too Numerous to Count (A) None Seen, 0-2 /HPF    WBC, UA Too Numerous to Count (A) None Seen, 0-2 /HPF    Bacteria, UA 4+ (A) None Seen /HPF    Squamous  Epithelial Cells, UA 3-6 (A) None Seen, 0-2 /HPF    Hyaline Casts, UA 3-6 None Seen /LPF    Methodology Automated Microscopy    Lactic Acid, Plasma    Collection Time: 03/21/24 10:02 AM    Specimen: Blood   Result Value Ref Range    Lactate 4.2 (C) 0.5 - 2.0 mmol/L       I ordered the above labs and reviewed the results.    RADIOLOGY  CT Abdomen Pelvis Stone Protocol    Result Date: 3/21/2024  ABDOMEN AND PELVIS CT WITHOUT CONTRAST  HISTORY: Left flank pain.  TECHNIQUE: Noncontrast abdomen and pelvis CT is provided and correlated with pelvis CT 10/20/2023.  FINDINGS: Left perinephric stranding and hydronephrosis. Proximal left ureteral calculus measures about 7 mm greatest diameter as measured on the coronal reformatted images. The calculus lies at about the L4 superior endplate level. There is a 3 mm left upper pole calyceal calculus and 4 right renal calculi measuring up to 6 mm greatest diameter. No right hydronephrosis or ureteral calculus. The urinary bladder is decompressed and appears normal. The renal parenchyma has a normal noncontrast CT appearance.  Visualized lung bases are clear. Several fluid density well-circumscribed hepatic lesions are most likely cysts but not definitively characterized. Small sliding hiatal hernia. Normal-appearing spleen, pancreas, and adrenal glands.  Extensive diverticulosis throughout the colon. No evidence of diverticulitis. The retrocecal appendix, small bowel, and stomach appear normal.  Retroaortic left renal vein as a normal anatomic variant. Scattered atheromatous vascular calcifications are observed with normal caliber abdominal aorta.  Uterus and ovaries have a normal small, postmenopausal appearance. No adenopathy or free fluid.  Degenerative change in the spine and at the hips.      Bilateral nephrolithiasis with a 7 mm proximal left ureteral calculus associated with hydronephrosis and perinephric stranding which may represent pyelonephritis or pyelocalyceal rupture.   I called the findings to Dr. Hood in the emergency department around 9:30 a.m.  Radiation dose reduction techniques were utilized, including automated exposure control and exposure modulation based on body size.   This report was finalized on 3/21/2024 9:58 AM by Dr. Jaiden Muller M.D on Workstation: [id number]       I ordered the above noted radiological studies. I reviewed the images and results. I agree with the radiologist interpretation.    PROCEDURES  Procedures    MEDICATIONS GIVEN IN ER  Medications   lactated ringers infusion (has no administration in time range)   cefTRIAXone (ROCEPHIN) 2,000 mg in sodium chloride 0.9 % 100 mL MBP (has no administration in time range)   sodium chloride 0.9 % flush 10 mL (has no administration in time range)   sodium chloride 0.9 % flush 10 mL (has no administration in time range)   sodium chloride 0.9 % infusion 40 mL (has no administration in time range)   ondansetron ODT (ZOFRAN-ODT) disintegrating tablet 4 mg (has no administration in time range)     Or   ondansetron (ZOFRAN) injection 4 mg (has no administration in time range)   nitroglycerin (NITROSTAT) SL tablet 0.4 mg (has no administration in time range)   acetaminophen (TYLENOL) tablet 650 mg (has no administration in time range)     Or   acetaminophen (TYLENOL) 160 MG/5ML oral solution 650 mg (has no administration in time range)     Or   acetaminophen (TYLENOL) suppository 650 mg (has no administration in time range)   oxyCODONE-acetaminophen (PERCOCET) 7.5-325 MG per tablet 1 tablet (has no administration in time range)   morphine injection 4 mg (has no administration in time range)     And   naloxone (NARCAN) injection 0.4 mg (has no administration in time range)   sennosides-docusate (PERICOLACE) 8.6-50 MG per tablet 2 tablet (has no administration in time range)     And   polyethylene glycol (MIRALAX) packet 17 g (has no administration in time range)     And   bisacodyl (DULCOLAX) EC tablet 5 mg (has  no administration in time range)     And   bisacodyl (DULCOLAX) suppository 10 mg (has no administration in time range)   prochlorperazine (COMPAZINE) injection 10 mg (has no administration in time range)   sodium chloride 0.9% - IBW for BMI > 30 bolus 30 mL/kg (Ideal) (has no administration in time range)   ondansetron (ZOFRAN) injection 8 mg (8 mg Intravenous Given 3/21/24 0834)   morphine injection 4 mg (4 mg Intravenous Given 3/21/24 0834)   sodium chloride 0.9 % bolus 1,000 mL (1,000 mL Intravenous New Bag 3/21/24 0832)   cefTRIAXone (ROCEPHIN) 2,000 mg in sodium chloride 0.9 % 100 mL MBP (2,000 mg Intravenous New Bag 3/21/24 1004)   droperidol (INAPSINE) injection 2.5 mg (2.5 mg Intravenous Given 3/21/24 0954)   morphine injection 2 mg (2 mg Intravenous Given 3/21/24 0954)       PROGRESS, DATA ANALYSIS, CONSULTS, AND MEDICAL DECISION MAKING  A complete history and physical exam have been performed.  All available laboratory and imaging results have been reviewed by myself prior to disposition.    MDM    After the initial H&P, I discussed pertinent information from history and physical exam with patient/family.  Discussed differential diagnosis.  Discussed plan for ED evaluation/workup/treatment.  All questions answered.  Patient/family is agreeable with plan.  ED Course as of 03/21/24 1044   Thu Mar 21, 2024   0813 My differential diagnosis for abdominal pain includes but is not limited to:  Gastritis, gastroenteritis, peptic ulcer disease, GERD, esophageal perforation, acute appendicitis, mesenteric adenitis, Meckel's diverticulum, epiploic appendagitis, diverticulitis, colon cancer, ulcerative colitis, Crohn's disease, intussusception, small bowel obstruction, adhesions, ischemic bowel, perforated viscus, ileus, obstipation, biliary colic, cholecystitis, cholelithiasis, Christopher-Alfonzo Cash, hepatitis, pancreatitis, common bile duct obstruction, cholangitis, bile leak, splenic trauma, splenic rupture, splenic  infarction, splenic abscess, abdominal abscess, ascites, spontaneous bacterial peritonitis, hernia, UTI, cystitis, prostatitis, ureterolithiasis, urinary obstruction, ovarian cyst, torsion, pregnancy, ectopic pregnancy, PID, pelvic abscess, mittelschmerz, endometriosis, AAA, myocardial infarction, pneumonia, cancer, porphyria, DKA, medications, sickle cell, viral syndrome, zoster   [JG]   0815 Review of prior external notes (non-ED) -and- Review of prior external test results outside of this encounter:   I reviewed patient's OB/GYN visit from January 25 this year, patient is menopausal, presented for annual well woman exam, denied any postmenopausal vaginal bleeding.  No longer undergoing Pap smears.  Patient's last CT pelvis without contrast is from 10/20/2023, advanced arthritic changes in the lumbar spine and at the hips with most extensive osseous deformity around the right hip.     [JG]   0929 Phone call with radiologist.  I had previously reviewed the images. I discussed the patient, imaging, and their interpretation.  See dictated report for final interpretation.     [JG]   0948 Patient with kidney stone, febrile with leukocytosis and left shift, urinalysis concerning for urinary tract infection.  Obtain blood cultures and lactic acid, treating with ceftriaxone, consulting urology for urgent intervention, consulting hospitalist for admission. [JG]   0958 Phone call with Dr. Diego [facility].  Discussed the patient, relevant history, exam, diagnostics, ED findings/progress, and concerns. They agree to admit the patient to telemetry observation. Care assumed by the admitting physician at this time.     [JG]   1000 Patient reassessed.  Discussed ED findings, differential diagnosis, and the need for admission for evaluation/treatment.  They are agreeable to admission and all questions were answered.     [JG]   1013 Phone call with Marilia, [facility].  Discussed the patient, relevant history, exam,  diagnostics, ED findings/progress, and concerns. They agree to consult, report Dr. Bolton is currently in surgery but they will proceed as surgery and inform him of the patient and will likely try to get them on for next.   [JG]   1043 Lactic acid 4.2, giving sepsis fluid bolus. [JG]   1044 I attest that I have performed a sepsis focused exam of tissue perfusion after initiation/completion of fluid resuscitation.      [JG]      ED Course User Index  [JG] Parviz Hood MD       AS OF 10:44 EDT VITALS:    BP - 157/86  HR - 109  TEMP - 100.4 °F (38 °C) (Tympanic)  O2 SATS - 92%    DIAGNOSIS  Final diagnoses:   Left ureteral stone   Sepsis, due to unspecified organism, unspecified whether acute organ dysfunction present   Fever, unspecified fever cause   Elevated serum creatinine   Hyperglycemia     Critical care:  Total critical care time of 50 minutes is exclusive of any other billable procedures and includes time spent with direct patient care and observation, retrospective chart review, management of acute condition, and consultation with other physicians.      DISPOSITION  ADMISSION    Discussed treatment plan and reason for admission with pt/family and admitting physician.  Pt/family voiced understanding of the plan for admission for further testing/treatment as needed.        Parviz Hood MD  03/21/24 1014       Parviz Hood MD  03/21/24 1044

## 2024-03-21 NOTE — NURSING NOTE
Pt currently on 6L with O2 sat approx 89-90%. Weaned from 15L nonrebreather on arrival to PACU. CXR performed, Duoneb given. IS performed. Dr. Johnathon griffin and vielka with admission to Telemetry.

## 2024-03-21 NOTE — ANESTHESIA PREPROCEDURE EVALUATION
Anesthesia Evaluation     Patient summary reviewed and Nursing notes reviewed   history of anesthetic complications:  PONV  NPO Solid Status: > 8 hours  NPO Liquid Status: > 2 hours           Airway   Mallampati: IV  TM distance: <3 FB  Neck ROM: full  Difficult intubation highly probable  Dental - normal exam     Pulmonary - normal exam    breath sounds clear to auscultation  Cardiovascular - normal exam    ECG reviewed  Rhythm: regular  Rate: normal    (+) hypertension, valvular problems/murmurs murmur, hyperlipidemia    ROS comment: EF 64%, trace-mild MR by ECHO 6/21/normal stress test 8/21    Neuro/Psych  (+) headaches, psychiatric history Anxiety and Depression    ROS Comment: Migraines  GI/Hepatic/Renal/Endo    (+) GERD, renal disease- stones and CRI, thyroid problem hypothyroidism    ROS Comment: Urosepsis    Musculoskeletal     Abdominal    Substance History      OB/GYN          Other   arthritis,                   Anesthesia Plan    ASA 3     general     (LMA/would definitely need CMAC for intubation if GETA)  intravenous induction     Anesthetic plan, risks, benefits, and alternatives have been provided, discussed and informed consent has been obtained with: patient.    CODE STATUS:    Code Status (Patient has no pulse and is not breathing): CPR (Attempt to Resuscitate)  Medical Interventions (Patient has pulse or is breathing): Full Support

## 2024-03-21 NOTE — PLAN OF CARE
Goal Outcome Evaluation:  Plan of Care Reviewed With: patient        Progress: no change  Outcome Evaluation: VSS; pt awake and talking and eating; mild abdomen pain; pt admitted from surgery and left ureter stent placement; IV fluids given; IS given; safety maintained

## 2024-03-21 NOTE — PROGRESS NOTES
Clinical Pharmacy Services: Medication History    Anastasiia Faria is a 74 y.o. female presenting to [facility] for   Chief Complaint   Patient presents with    Flank Pain       She  has a past medical history of Allergic, Anxiety, Cataract, Chronic kidney disease, Colon polyps, Depression, Diverticulosis (2011), Encounter for annual health examination (03/07/2014), Family history of colon cancer, Fibrocystic breast, GERD (gastroesophageal reflux disease), Heart murmur, Herpes labialis without complication, Hip pain, History of mammogram (12/20/2010), HL (hearing loss) (2014), Hyperlipidemia, Hypertension, Hypothyroidism, Insomnia, Kidney stones, Migraines, Osteopenia, PONV (postoperative nausea and vomiting), Scoliosis, and Visual impairment.    Allergies as of 03/21/2024    (No Known Allergies)       Medication information was obtained from: Pharmacy/Patient   Pharmacy and Phone Number:   [facility] - [place], [place] - [address] [place] [address] - [phone]  - [phone] [address] [address]  Phone: [phone] Fax: [phone]    [id number] [place], [place] - [address] [place], [place] [address] [facility] - [phone] [facility] [phone] [place] [address] [address]  [facility] [address]  Phone: [phone] Fax: [phone]        Prior to Admission Medications       Prescriptions Last Dose Informant Patient Reported? Taking?    aspirin-acetaminophen-caffeine (EXCEDRIN MIGRAINE) 250-250-65 MG per tablet  Self Yes Yes    Take 1 tablet by mouth Every 6 (Six) Hours As Needed for Headache.    atenolol (TENORMIN) 25 MG tablet  Pharmacy No Yes    Take 1 tablet by mouth Daily.    calcium carbonate (OS-NIKKIE) 600 MG tablet  Self Yes Yes    Take 2 tablets by mouth Daily.    Cholecalciferol (VITAMIN D3) 2000 UNITS capsule  Self Yes Yes    Take 1 capsule by mouth Daily.    escitalopram (LEXAPRO) 20 MG tablet  Pharmacy No Yes    Take 1 tablet by  mouth Daily.    levothyroxine (SYNTHROID, LEVOTHROID) 50 MCG tablet  Pharmacy No Yes    Take 1 tablet by mouth Daily.    loratadine (CLARITIN) 10 MG tablet  Self Yes Yes    Take 1 tablet by mouth Daily.    NIFEdipine XL (PROCARDIA XL) 30 MG 24 hr tablet  Pharmacy No Yes    Take 1 tablet by mouth Daily.    Probiotic Product (SOLUBLE FIBER/PROBIOTICS PO)  Self Yes Yes    Take 1 capsule by mouth Daily.    PROLIA 60 MG/ML solution syringe  Self No Yes    INJECT 60 MG UNDER THE SKIN EVERY SIX MONTHS    Patient taking differently:  Inject 1 mL under the skin into the appropriate area as directed Every 6 (Six) Months.    Psyllium (METAMUCIL FIBER PO)  Self Yes Yes    Take 1 Scoop by mouth Daily.    rosuvastatin (CRESTOR) 10 MG tablet  Pharmacy No Yes    Take 1 tablet by mouth Every Night.              Medication notes:     This medication list is complete to the best of my knowledge as of 3/21/2024    Please call if questions.    Valdo Alvarado  Medication History Technician  [phone]    3/21/2024 09:42 EDT

## 2024-03-21 NOTE — ANESTHESIA POSTPROCEDURE EVALUATION
Patient: Anastasiia Faria    Procedure Summary       Date: 03/21/24 Room / Location: [facility] OR 01 / [facility] MAIN OR    Anesthesia Start: 1403 Anesthesia Stop: 1453    Procedure: LEFT CYSTOSCOPY RETROGRADE PYLEOGRAM URETERAL STENT INSERTION (Left) Diagnosis:       Ureteral calculus      Acute pyelonephritis      (Ureteral calculus [N20.1])      (Acute pyelonephritis [N10])    Surgeons: Jaiden Bolton Jr., MD Provider: Yahir Viera MD    Anesthesia Type: general ASA Status: 3            Anesthesia Type: general    Vitals  Vitals Value Taken Time   /55 03/21/24 1645   Temp 37.3 °C (99.1 °F) 03/21/24 1446   Pulse 82 03/21/24 1657   Resp 16 03/21/24 1515   SpO2 88 % 03/21/24 1657   Vitals shown include unfiled device data.        Post Anesthesia Care and Evaluation    Patient location during evaluation: bedside  Patient participation: complete - patient participated  Level of consciousness: awake and alert  Pain management: adequate    Airway patency: patent  Anesthetic complications: No anesthetic complications    Cardiovascular status: acceptable  Respiratory status: acceptable  Hydration status: acceptable    Comments: /55   Pulse 88   Temp 37.3 °C (99.1 °F) (Oral)   Resp 16   Wt 83.9 kg (185 lb)   LMP  (LMP Unknown)   SpO2 90%   BMI 28.13 kg/m²

## 2024-03-22 PROBLEM — J96.01 ACUTE RESPIRATORY FAILURE WITH HYPOXIA: Status: ACTIVE | Noted: 2024-03-22

## 2024-03-22 PROBLEM — B96.20 E COLI BACTEREMIA: Status: ACTIVE | Noted: 2024-03-22

## 2024-03-22 PROBLEM — R78.81 E COLI BACTEREMIA: Status: ACTIVE | Noted: 2024-03-22

## 2024-03-22 LAB
ANION GAP SERPL CALCULATED.3IONS-SCNC: 12.5 MMOL/L (ref 5–15)
ANISOCYTOSIS BLD QL: ABNORMAL
BUN SERPL-MCNC: 18 MG/DL (ref 8–23)
BUN/CREAT SERPL: 18 (ref 7–25)
BURR CELLS BLD QL SMEAR: ABNORMAL
CALCIUM SPEC-SCNC: 8.3 MG/DL (ref 8.6–10.5)
CHLORIDE SERPL-SCNC: 109 MMOL/L (ref 98–107)
CO2 SERPL-SCNC: 19.5 MMOL/L (ref 22–29)
CREAT SERPL-MCNC: 1 MG/DL (ref 0.57–1)
D-LACTATE SERPL-SCNC: 2.2 MMOL/L (ref 0.5–2)
D-LACTATE SERPL-SCNC: 2.4 MMOL/L (ref 0.5–2)
D-LACTATE SERPL-SCNC: 3.8 MMOL/L (ref 0.5–2)
DEPRECATED RDW RBC AUTO: 40.6 FL (ref 37–54)
EGFRCR SERPLBLD CKD-EPI 2021: 59.2 ML/MIN/1.73
ERYTHROCYTE [DISTWIDTH] IN BLOOD BY AUTOMATED COUNT: 12.7 % (ref 12.3–15.4)
GLUCOSE SERPL-MCNC: 147 MG/DL (ref 65–99)
HCT VFR BLD AUTO: 32.2 % (ref 34–46.6)
HGB BLD-MCNC: 10.9 G/DL (ref 12–15.9)
HYPOCHROMIA BLD QL: ABNORMAL
LYMPHOCYTES # BLD MANUAL: 0.82 10*3/MM3 (ref 0.7–3.1)
LYMPHOCYTES NFR BLD MANUAL: 4 % (ref 5–12)
MCH RBC QN AUTO: 29.9 PG (ref 26.6–33)
MCHC RBC AUTO-ENTMCNC: 33.9 G/DL (ref 31.5–35.7)
MCV RBC AUTO: 88.2 FL (ref 79–97)
MICROCYTES BLD QL: ABNORMAL
MONOCYTES # BLD: 1.1 10*3/MM3 (ref 0.1–0.9)
NEUTROPHILS # BLD AUTO: 25.56 10*3/MM3 (ref 1.7–7)
NEUTROPHILS NFR BLD MANUAL: 93 % (ref 42.7–76)
NRBC BLD AUTO-RTO: 0 /100 WBC (ref 0–0.2)
PLAT MORPH BLD: NORMAL
PLATELET # BLD AUTO: 171 10*3/MM3 (ref 140–450)
PMV BLD AUTO: 9.9 FL (ref 6–12)
POLYCHROMASIA BLD QL SMEAR: ABNORMAL
POTASSIUM SERPL-SCNC: 4.1 MMOL/L (ref 3.5–5.2)
RBC # BLD AUTO: 3.65 10*6/MM3 (ref 3.77–5.28)
SODIUM SERPL-SCNC: 141 MMOL/L (ref 136–145)
VARIANT LYMPHS NFR BLD MANUAL: 3 % (ref 19.6–45.3)
WBC MORPH BLD: NORMAL
WBC NRBC COR # BLD AUTO: 27.48 10*3/MM3 (ref 3.4–10.8)

## 2024-03-22 PROCEDURE — 25810000003 LACTATED RINGERS PER 1000 ML: Performed by: UROLOGY

## 2024-03-22 PROCEDURE — 25010000002 CEFTRIAXONE PER 250 MG: Performed by: UROLOGY

## 2024-03-22 PROCEDURE — 97530 THERAPEUTIC ACTIVITIES: CPT

## 2024-03-22 PROCEDURE — 80048 BASIC METABOLIC PNL TOTAL CA: CPT | Performed by: UROLOGY

## 2024-03-22 PROCEDURE — 25810000003 LACTATED RINGERS PER 1000 ML: Performed by: INTERNAL MEDICINE

## 2024-03-22 PROCEDURE — 83605 ASSAY OF LACTIC ACID: CPT | Performed by: EMERGENCY MEDICINE

## 2024-03-22 PROCEDURE — 36415 COLL VENOUS BLD VENIPUNCTURE: CPT | Performed by: UROLOGY

## 2024-03-22 PROCEDURE — 85025 COMPLETE CBC W/AUTO DIFF WBC: CPT | Performed by: UROLOGY

## 2024-03-22 PROCEDURE — 97162 PT EVAL MOD COMPLEX 30 MIN: CPT

## 2024-03-22 PROCEDURE — 85007 BL SMEAR W/DIFF WBC COUNT: CPT | Performed by: UROLOGY

## 2024-03-22 RX ADMIN — ATENOLOL 25 MG: 25 TABLET ORAL at 08:37

## 2024-03-22 RX ADMIN — ROSUVASTATIN CALCIUM 10 MG: 10 TABLET, FILM COATED ORAL at 21:43

## 2024-03-22 RX ADMIN — Medication 10 ML: at 21:43

## 2024-03-22 RX ADMIN — SODIUM CHLORIDE, POTASSIUM CHLORIDE, SODIUM LACTATE AND CALCIUM CHLORIDE 100 ML/HR: 600; 310; 30; 20 INJECTION, SOLUTION INTRAVENOUS at 14:59

## 2024-03-22 RX ADMIN — SODIUM CHLORIDE, POTASSIUM CHLORIDE, SODIUM LACTATE AND CALCIUM CHLORIDE 150 ML/HR: 600; 310; 30; 20 INJECTION, SOLUTION INTRAVENOUS at 06:48

## 2024-03-22 RX ADMIN — CEFTRIAXONE 2000 MG: 2 INJECTION, POWDER, FOR SOLUTION INTRAMUSCULAR; INTRAVENOUS at 06:49

## 2024-03-22 RX ADMIN — Medication 10 ML: at 08:37

## 2024-03-22 RX ADMIN — ESCITALOPRAM 20 MG: 20 TABLET, FILM COATED ORAL at 08:37

## 2024-03-22 RX ADMIN — LEVOTHYROXINE SODIUM 50 MCG: 50 TABLET ORAL at 06:49

## 2024-03-22 RX ADMIN — OXYCODONE AND ACETAMINOPHEN 1 TABLET: 7.5; 325 TABLET ORAL at 15:38

## 2024-03-22 RX ADMIN — NIFEDIPINE 30 MG: 30 TABLET, FILM COATED, EXTENDED RELEASE ORAL at 08:37

## 2024-03-22 NOTE — PROGRESS NOTES
Name: Anastasiia Faria ADMIT: 3/21/2024   : 1950  PCP: Mirza Scott Sr., MD    MRN: [id number] LOS: 0 days   AGE/SEX: 74 y.o. female  ROOM: [facility]   Subjective   Chief Complaint   Patient presents with    Flank Pain     S/p OR yesterday  On IVFs  On IV ABX  On oxygen     ROS  No f/c  No n/v  No cp/palp  Mild  soa/-cough    Objective   Vital Signs  Temp:  [97.3 °F (36.3 °C)-99.9 °F (37.7 °C)] 97.3 °F (36.3 °C)  Heart Rate:  [] 78  Resp:  [14-20] 16  BP: ()/(41-80) 125/68  SpO2:  [86 %-95 %] 92 %  on  Flow (L/min):  [3-15] 4;   Device (Oxygen Therapy): nasal cannula  Body mass index is 31.04 kg/m².    Physical Exam  Constitutional:       General: She is not in acute distress.     Appearance: She is ill-appearing.   HENT:      Head: Normocephalic and atraumatic.   Eyes:      General: No scleral icterus.  Cardiovascular:      Rate and Rhythm: Regular rhythm.      Heart sounds: Normal heart sounds.   Pulmonary:      Effort: Pulmonary effort is normal. No respiratory distress (decreased bs at bases).   Abdominal:      General: There is no distension.      Palpations: Abdomen is soft.   Musculoskeletal:      Cervical back: Neck supple.   Neurological:      Mental Status: She is alert.   Psychiatric:         Behavior: Behavior normal.         Results Review:       I reviewed the patient's new clinical results.  Results from last 7 days   Lab Units 24  0629 24  0820   WBC 10*3/mm3 27.48* 16.79*   HEMOGLOBIN g/dL 10.9* 13.9   PLATELETS 10*3/mm3 171 327     Results from last 7 days   Lab Units 24  0629 24  0820   SODIUM mmol/L 141 141   POTASSIUM mmol/L 4.1 3.8   CHLORIDE mmol/L 109* 106   CO2 mmol/L 19.5* 20.7*   BUN mg/dL 18 17   CREATININE mg/dL 1.00 1.08*   GLUCOSE mg/dL 147* 170*   Estimated Creatinine Clearance: 58.7 mL/min (by C-G formula based on SCr of 1 mg/dL).  Results from last 7 days   Lab Units 24  0820   ALBUMIN g/dL 4.1   BILIRUBIN mg/dL 0.8   ALK PHOS U/L 74  "  AST (SGOT) U/L 16   ALT (SGPT) U/L 14     Results from last 7 days   Lab Units 03/22/24  0629 03/21/24  0820   CALCIUM mg/dL 8.3* 9.7   ALBUMIN g/dL  --  4.1     Results from last 7 days   Lab Units 03/22/24  0629 03/22/24  0041 03/21/24  1857 03/21/24  1533 03/21/24  1002 03/21/24  0820   PROCALCITONIN ng/mL  --   --   --   --   --  0.16   LACTATE mmol/L 2.4* 3.8* 2.1* 2.5*   < >  --     < > = values in this interval not displayed.       Coag     HbA1C   Lab Results   Component Value Date    HGBA1C 5.90 (H) 04/10/2023    HGBA1C 5.90 (H) 10/13/2022    HGBA1C 5.8 (H) 04/07/2022     Infection   Results from last 7 days   Lab Units 03/21/24  0959 03/21/24  0857 03/21/24  0820   BLOODCX  Escherichia coli*  Escherichia coli*  --   --    URINECX   --  >100,000 CFU/mL Gram Negative Bacilli*  --    BCIDPCR  Escherichia coli. Identification by BCID2 PCR.*  --   --    PROCALCITONIN ng/mL  --   --  0.16     Radiology(recent) XR Chest 1 View    Result Date: 3/21/2024  As described.    This report was finalized on 3/21/2024 4:56 PM by Dr. Chidi Perez M.D on Workstation: [id number]      FL Retrograde Pyelogram In OR    Result Date: 3/21/2024   As described.  This report was finalized on 3/21/2024 3:21 PM by Dr. Chidi Perez M.D on Workstation: [id number]      CT Abdomen Pelvis Stone Protocol    Result Date: 3/21/2024  Bilateral nephrolithiasis with a 7 mm proximal left ureteral calculus associated with hydronephrosis and perinephric stranding which may represent pyelonephritis or pyelocalyceal rupture.  I called the findings to Dr. Hood in the emergency department around 9:30 a.m.  Radiation dose reduction techniques were utilized, including automated exposure control and exposure modulation based on body size.   This report was finalized on 3/21/2024 9:58 AM by Dr. Jaiden Muller M.D on Workstation: [id number]     No results found for: \"TROPONINT\", \"TROPONINI\", \"BNP\"  No components found for: \"TSH;2\"    atenolol, " 25 mg, Oral, Daily  cefTRIAXone, 2,000 mg, Intravenous, Q24H  escitalopram, 20 mg, Oral, Daily  levothyroxine, 50 mcg, Oral, Q AM  NIFEdipine XL, 30 mg, Oral, Daily  rosuvastatin, 10 mg, Oral, Nightly  sodium chloride, 10 mL, Intravenous, Q12H      lactated ringers, 100 mL/hr, Last Rate: 150 mL/hr (03/22/24 0648)    Diet: Regular/House; Fluid Consistency: Thin (IDDSI 0)      Assessment & Plan      Active Hospital Problems    Diagnosis  POA    **Acute pyelonephritis [N10]  Yes    E coli bacteremia [R78.81, B96.20]  Unknown    Acute respiratory failure with hypoxia [J96.01]  Unknown    Sepsis [A41.9]  Yes    Ureteral calculus [N20.1]  Yes    Acquired hypothyroidism [E03.9]  Yes    Essential hypertension [I10]  Yes      Resolved Hospital Problems   No resolved problems to display.     Ms. Faria is a 74 y.o. with a history of HTN, who presents with flank pain. Symptoms started last night. Associated with fever, n/v. Came to [facility] ER. CT in ER showing evidence for kidney stone and pyelonephritis. She underwent OR with Dr. Bolton 3/21 with cysto and stenting.    IV ABX, follow culture  IVFs, decreased rate  IS, wean O2. Likely related to atelectasis  Continue home medications    Dispo- to home in 1-2 days pending culture and improvement in labs/symptoms.       MERLYN Diego MD  [facility]  03/22/24  11:22 EDT

## 2024-03-22 NOTE — THERAPY EVALUATION
Patient Name: Anastasiia Faria  : 1950    MRN: [id number]                              Today's Date: 3/22/2024       Admit Date: 3/21/2024    Visit Dx:     ICD-10-CM ICD-9-CM   1. Ureteral calculus  N20.1 592.1   2. Left ureteral stone  N20.1 592.1   3. Sepsis, due to unspecified organism, unspecified whether acute organ dysfunction present  A41.9 038.9     995.91   4. Fever, unspecified fever cause  R50.9 780.60   5. Elevated serum creatinine  R79.89 790.99   6. Hyperglycemia  R73.9 790.29   7. Acute pyelonephritis  N10 590.10     Patient Active Problem List   Diagnosis    Colon polyp, hyperplastic    Allergic rhinitis due to pollen    Acquired hypothyroidism    Essential hypertension    FH: colon cancer in relative <50 years old    Mixed hyperlipidemia    Chronic right shoulder pain    Periscapular pain    Other idiopathic scoliosis, thoracic region    Prediabetes    Age-related osteoporosis without current pathological fracture    Mild episode of recurrent major depressive disorder    Age-related nuclear cataract of both eyes    Cystoid nevus of conjunctiva    Anxiety    Cataract    Cataract extraction status of left eye    Cataract extraction status of right eye    Conjunctival cyst of left eye    Depressive disorder    Disorder of refraction    Dry eye syndrome of bilateral lacrimal glands    Hearing loss    Hearing loss    Hordeolum internum right upper eyelid    Migraine    Osteopenia    Osteoporosis    Renal stone    Seasonal allergies    Diverticulosis    Internal hemorrhoids    Posterior vitreous detachment of both eyes    Right hip pain    OA (osteoarthritis) of hip    Sepsis    Acute pyelonephritis    Ureteral calculus    E coli bacteremia    Acute respiratory failure with hypoxia     Past Medical History:   Diagnosis Date    Allergic     Anxiety     Cataract     Cataract surgery on right eye 2021 and left eye 2021. (Dr. Nanette Bateman)    Chronic kidney disease     Colon polyps     Depression      Diverticulosis 2011    Encounter for annual health examination 03/07/2014    Annual Health Assessment    Family history of colon cancer     Fibrocystic breast     GERD (gastroesophageal reflux disease)     Heart murmur     Herpes labialis without complication     Hip pain     right    History of mammogram 12/20/2010    HL (hearing loss) 2014    Hearing Aids    Hyperlipidemia     Hypertension     Hypothyroidism     Insomnia     Kidney stones     Migraines     Osteopenia     Prolia -last 9/2021,3/2022    PONV (postoperative nausea and vomiting)     Scoliosis     Dr. Stanford 5/2018    Visual impairment     Wear Glasses     Past Surgical History:   Procedure Laterality Date    BONE MARROW BIOPSY      BREAST BIOPSY      BREAST CYST ASPIRATION      BREAST SURGERY Right     BIOPSY    CATARACT EXTRACTION, BILATERAL      COLONOSCOPY N/A 10/27/2016    Procedure: COLONOSCOPY INTO CECUM;  Surgeon: Osvaldo Dorado MD;  Location: [facility];  Service:     COLONOSCOPY  01/26/2011    COLONOSCOPY  02/04/2005    COLONOSCOPY N/A 12/2/2021    Procedure: COLONOSCOPY INTO CECUM WITH COLD BIOPSY POLYPECTOMY AND HOT SNARE POLYPECTOMY;  Surgeon: Osvaldo Dorado MD;  Location: [facility];  Service: General;  Laterality: N/A;  PRE-FAMILY HISTORY OF COLON CANCER, PERSONAL HISTORY OF COLON CANCER  POST- POLYPS, DIVERTICULOSIS, HEMORRHOIDS    CYSTOSCOPY W/ URETERAL STENT PLACEMENT Left 3/21/2024    Procedure: LEFT CYSTOSCOPY RETROGRADE PYLEOGRAM URETERAL STENT INSERTION;  Surgeon: Jaiden Bolton Jr., MD;  Location: [facility] OR;  Service: Urology;  Laterality: Left;    KNEE ARTHROSCOPY Left     PAP SMEAR  12/20/2010      General Information       Row Name 03/22/24 1148          Physical Therapy Time and Intention    Document Type evaluation  -DB     Mode of Treatment individual therapy;physical therapy  -DB       Row Name 03/22/24 1144          General Information    Patient Profile Reviewed yes  -DB      Prior Level of Function independent:;ADL's  -DB     Existing Precautions/Restrictions fall;oxygen therapy device and L/min  -DB     Barriers to Rehab none identified  -DB       Row Name 03/22/24 1148          Living Environment    People in Home spouse  -DB       Row Name 03/22/24 1148          Home Main Entrance    Number of Stairs, Main Entrance four  -DB     Stair Railings, Main Entrance none  -DB       Row Name 03/22/24 1148          Cognition    Orientation Status (Cognition) oriented x 4  -DB       Row Name 03/22/24 1148          Safety Issues, Functional Mobility    Impairments Affecting Function (Mobility) balance;endurance/activity tolerance;strength  -DB               User Key  (r) = Recorded By, (t) = Taken By, (c) = Cosigned By      Initials Name Provider Type    DB Natasha Segundo, PT Physical Therapist                   Mobility       Row Name 03/22/24 1148          Bed Mobility    Bed Mobility supine-sit  -DB     Supine-Sit [place] (Bed Mobility) supervision  -DB       [facility] Name 03/22/24 1148          Sit-Stand Transfer    Sit-Stand [place] (Transfers) standby assist  -DB     Assistive Device (Sit-Stand Transfers) walker, front-wheeled  -DB       [facility] Name 03/22/24 1148          Gait/Stairs (Locomotion)    [place] Level (Gait) contact guard  -DB     Assistive Device (Gait) walker, front-wheeled  -DB     Distance in Feet (Gait) 15  -DB     Deviations/Abnormal Patterns (Gait) gait speed decreased  -DB     Bilateral Gait Deviations forward flexed posture  -DB               User Key  (r) = Recorded By, (t) = Taken By, (c) = Cosigned By      Initials Name Provider Type    DB Natasha Segundo, PT Physical Therapist                   Obj/Interventions       Row Name 03/22/24 1151          Range of Motion Comprehensive    General Range of Motion no range of motion deficits identified  -DB       [facility] Name 03/22/24 1151          Strength Comprehensive (MMT)    Comment, General Manual Muscle Testing  (MMT) Assessment generalized weakness  -DB       Row Name 03/22/24 1151          Motor Skills    Therapeutic Exercise other (see comments)  BLE ther ex x 15  -DB       Row Name 03/22/24 1151          Balance    Balance Assessment sitting static balance;sitting dynamic balance;standing static balance;standing dynamic balance  -DB     Static Sitting Balance supervision  -DB     Dynamic Sitting Balance supervision  -DB     Position, Sitting Balance unsupported;sitting edge of bed  -DB     Static Standing Balance contact guard  -DB     Dynamic Standing Balance contact guard  -DB     Position/Device Used, Standing Balance supported;walker, front-wheeled  -DB     Balance Interventions sitting;sit to stand;standing  -DB               User Key  (r) = Recorded By, (t) = Taken By, (c) = Cosigned By      Initials Name Provider Type    DB Natasha Segundo, PT Physical Therapist                   Goals/Plan       Row Name 03/22/24 1152          Bed Mobility Goal 1 (PT)    Activity/Assistive Device (Bed Mobility Goal 1, PT) bed mobility activities, all  -DB     [place] Level/Cues Needed (Bed Mobility Goal 1, PT) independent  -DB     Time Frame (Bed Mobility Goal 1, PT) 1 week  -DB       Row Name 03/22/24 1152          Transfer Goal 1 (PT)    Activity/Assistive Device (Transfer Goal 1, PT) transfers, all  -DB     [place] Level/Cues Needed (Transfer Goal 1, PT) independent  -DB     Time Frame (Transfer Goal 1, PT) 1 week  -DB       Row Name 03/22/24 1152          Gait Training Goal 1 (PT)    Activity/Assistive Device (Gait Training Goal 1, PT) gait (walking locomotion)  -DB     [place] Level (Gait Training Goal 1, PT) supervision required  -DB     Distance (Gait Training Goal 1, PT) 100  -DB     Time Frame (Gait Training Goal 1, PT) 1 week  -DB       Row Name 03/22/24 1152          Therapy Assessment/Plan (PT)    Planned Therapy Interventions (PT) balance training;bed mobility training;gait training;home exercise  program;neuromuscular re-education;patient/family education;strengthening;ROM (range of motion);stair training;postural re-education;transfer training  -DB               User Key  (r) = Recorded By, (t) = Taken By, (c) = Cosigned By      Initials Name Provider Type    DB Natasha Segundo, ARIC Physical Therapist                   Clinical Impression       Row Name 03/22/24 1151          Pain    Pain Intervention(s) Ambulation/increased activity;Repositioned  -DB       Row Name 03/22/24 1151          Plan of Care Review    Plan of Care Reviewed With patient  -DB     Outcome Evaluation Patient is a 74 y.o. female admitted to [facility] for acute pylonephritis on 3/21/2024. PMHx includes HLD, HTN, osteopenia. Patient is (I) at baseline and lives with her  in SS home with 4 VANESSA. Pt denies the use of AD at baseline. Today, patient performed bed mobility with SV, required SBA for transfers, and ambulated 15' with RW and CGA. Pt limited 2/2 O2, on 5L throughout and drops to mid/high 80s during ambulation. Pt seated UIC at end of the session to perform LE there ex. Strength and endurance deficits noted. Patient may benefit from skilled PT services acutely to address functional deficits as well as improve level of independence prior to discharge. Anticipate home with assist and HHPT upon DC. Encouraged pt to ambulate with staff and up to the BR over the weekend.  -DB       Row Name 03/22/24 1151          Therapy Assessment/Plan (PT)    Rehab Potential (PT) good, to achieve stated therapy goals  -DB     Criteria for Skilled Interventions Met (PT) yes  -DB     Therapy Frequency (PT) 5 times/wk  -DB       Row Name 03/22/24 1151          Vital Signs    Pre SpO2 (%) 90  -DB     O2 Delivery Pre Treatment supplemental O2  -DB     Intra SpO2 (%) 88  -DB     O2 Delivery Intra Treatment supplemental O2  -DB     Post SpO2 (%) 92  -DB     O2 Delivery Post Treatment supplemental O2  -DB     Pre Patient Position Supine  -DB     Intra  Patient Position Standing  -DB     Post Patient Position Sitting  -DB       Row Name 03/22/24 1151          Positioning and Restraints    Pre-Treatment Position in bed  -DB     Post Treatment Position chair  -DB     In Chair reclined;call light within reach;encouraged to call for assist;exit alarm on  -DB               User Key  (r) = Recorded By, (t) = Taken By, (c) = Cosigned By      Initials Name Provider Type    DB Natasha Segundo, PT Physical Therapist                   Outcome Measures       Row Name 03/22/24 1152 03/22/24 0800       How much help from another person do you currently need...    Turning from your back to your side while in flat bed without using bedrails? 4  -DB 4  -HS    Moving from lying on back to sitting on the side of a flat bed without bedrails? 4  -DB 4  -HS    Moving to and from a bed to a chair (including a wheelchair)? 3  -DB 3  -HS    Standing up from a chair using your arms (e.g., wheelchair, bedside chair)? 3  -DB 3  -HS    Climbing 3-5 steps with a railing? 3  -DB 3  -HS    To walk in hospital room? 3  -DB 3  -HS    AM-PAC 6 Clicks Score (PT) 20  -DB 20  -HS    Highest Level of Mobility Goal 6 --> Walk 10 steps or more  -DB 6 --> Walk 10 steps or more  -HS      Row Name 03/22/24 1152          Functional Assessment    Outcome Measure Options AM-PAC 6 Clicks Basic Mobility (PT)  -DB               User Key  (r) = Recorded By, (t) = Taken By, (c) = Cosigned By      Initials Name Provider Type    DB Natasha Segundo, ARIC Physical Therapist    HS Cassia Plunkett, RN Registered Nurse                                 Physical Therapy Education       Title: PT OT SLP Therapies (Done)       Topic: Physical Therapy (Done)       Point: Mobility training (Done)       Learning Progress Summary             Patient Acceptance, E, VU by DB at 3/22/2024 1153                         Point: Home exercise program (Done)       Learning Progress Summary             Patient Acceptance, E, VU by DB at  3/22/2024 1153                         Point: Body mechanics (Done)       Learning Progress Summary             Patient Acceptance, E, VU by DB at 3/22/2024 1153                         Point: Precautions (Done)       Learning Progress Summary             Patient Acceptance, E, VU by DB at 3/22/2024 1153                                         User Key       Initials Effective Dates Name Provider Type Discipline    DB 06/16/21 -  Natasha Segundo, PT Physical Therapist PT                  PT Recommendation and Plan  Planned Therapy Interventions (PT): balance training, bed mobility training, gait training, home exercise program, neuromuscular re-education, patient/family education, strengthening, ROM (range of motion), stair training, postural re-education, transfer training  Plan of Care Reviewed With: patient  Outcome Evaluation: Patient is a 74 y.o. female admitted to [facility] for acute pylonephritis on 3/21/2024. PMHx includes HLD, HTN, osteopenia. Patient is (I) at baseline and lives with her  in  home with 4 VANESSA. Pt denies the use of AD at baseline. Today, patient performed bed mobility with SV, required SBA for transfers, and ambulated 15' with RW and CGA. Pt limited 2/2 O2, on 5L throughout and drops to mid/high 80s during ambulation. Pt seated UIC at end of the session to perform LE there ex. Strength and endurance deficits noted. Patient may benefit from skilled PT services acutely to address functional deficits as well as improve level of independence prior to discharge. Anticipate home with assist and HHPT upon DC. Encouraged pt to ambulate with staff and up to the BR over the weekend.     Time Calculation:         PT Charges       Row Name 03/22/24 1157             Time Calculation    Start Time 0946  -DB      Stop Time 1001  -DB      Time Calculation (min) 15 min  -DB      PT Received On 03/22/24  -DB      PT - Next Appointment 03/25/24  -DB      PT Goal Re-Cert Due Date 03/29/24  -DB         Time  Calculation- PT    Total Timed Code Minutes- PT 8 minute(s)  -DB                User Key  (r) = Recorded By, (t) = Taken By, (c) = Cosigned By      Initials Name Provider Type    DB Natasha Segundo, PT Physical Therapist                  Therapy Charges for Today       Code Description Service Date Service Provider Modifiers Qty    [id number]  PT EVAL MOD COMPLEXITY 3 3/22/2024 Natasha Segundo, PT GP 1    [id number]  PT THERAPEUTIC ACT EA 15 MIN 3/22/2024 Natasha Segundo, PT GP 1            PT G-Codes  Outcome Measure Options: AM-PAC 6 Clicks Basic Mobility (PT)  AM-PAC 6 Clicks Score (PT): 20  PT Discharge Summary  Anticipated Discharge Disposition (PT): home with 24/7 care, home with home health    Natasha Segundo, PT  3/22/2024

## 2024-03-22 NOTE — CASE MANAGEMENT/SOCIAL WORK
Discharge Planning Assessment  [facility]     Patient Name: Anastasiia Faria  MRN: [id number]  Today's Date: 3/22/2024    Admit Date: 3/21/2024    Plan: Home, family to transport   Discharge Needs Assessment       Row Name 03/22/24 1257       Living Environment    People in Home spouse    Name(s) of People in Home Garth Faria [phone]    Current Living Arrangements home    Potentially Unsafe Housing Conditions none    In the past 12 months has the electric, gas, oil, or water company threatened to shut off services in your home? No    Primary Care Provided by self    Provides Primary Care For no one    Family Caregiver if Needed spouse    Family Caregiver Names Garth Faria    Quality of Family Relationships helpful;involved;supportive    Able to Return to Prior Arrangements yes       Resource/Environmental Concerns    Resource/Environmental Concerns none    Transportation Concerns none       Transportation Needs    In the past 12 months, has lack of transportation kept you from medical appointments or from getting medications? no    In the past 12 months, has lack of transportation kept you from meetings, work, or from getting things needed for daily living? No       Food Insecurity    Within the past 12 months, you worried that your food would run out before you got the money to buy more. Never true    Within the past 12 months, the food you bought just didn't last and you didn't have money to get more. Never true       Transition Planning    Patient/Family Anticipates Transition to home    Patient/Family Anticipated Services at Transition none    Transportation Anticipated family or friend will provide       Discharge Needs Assessment    Equipment Currently Used at Home none    Concerns to be Addressed no discharge needs identified;denies needs/concerns at this time    Anticipated Changes Related to Illness none    Equipment Needed After Discharge none                   Discharge Plan       Row Name 03/22/24 9099        Plan    Plan Home, family to transport    Plan Comments Met with pt at bedside.Permission obtained to speak to pt in front of spouse.  Introduced self and explained role of . Face sheet verified, PCP is Mirza Scott. Pt denies any difficulty paying for medications, and she obtains her medications from [facility]. Pt lives with her spouse, Garth, and stated that he could assist with any care needs that may arise. Pt is independent in ADL's and she does not use, nor require any assistive devices. Pt has never had home health, and she has been to outpt rehab in the past. Pt does not anticipate requring any of these services upon discharge. Pt stated that her spouse will transport home when discharged. Explained that CCP would follow to assess for discharge needs.                  Continued Care and Services - Admitted Since 3/21/2024    No active coordination exists for this encounter.       Expected Discharge Date and Time       Expected Discharge Date Expected Discharge Time    Mar 25, 2024            Demographic Summary    No documentation.                  Functional Status    No documentation.                  Psychosocial    No documentation.                  Abuse/Neglect    No documentation.                  Legal    No documentation.                  Substance Abuse    No documentation.                  Patient Forms    No documentation.                     Emilie Pereyra, RN

## 2024-03-22 NOTE — PLAN OF CARE
Goal Outcome Evaluation:              Outcome Evaluation: pt AOx4. vss. IVFs as ordered. Fair amt UOP noted. slowly weaning o2 throughout shift. cont to monitor

## 2024-03-22 NOTE — PLAN OF CARE
Goal Outcome Evaluation:  Plan of Care Reviewed With: patient         Patient is a 74 y.o. female admitted to [facility] for acute pylonephritis on 3/21/2024. PMHx includes HLD, HTN, osteopenia. Patient is (I) at baseline and lives with her  in SS home with 4 VANESSA. Pt denies the use of AD at baseline. Today, patient performed bed mobility with SV, required SBA for transfers, and ambulated 15' with RW and CGA. Pt limited 2/2 O2, on 5L throughout and drops to mid/high 80s during ambulation. Pt seated UIC at end of the session to perform LE there ex. Strength and endurance deficits noted. Patient may benefit from skilled PT services acutely to address functional deficits as well as improve level of independence prior to discharge. Anticipate home with assist and HHPT upon DC. Encouraged pt to ambulate with staff and up to the BR over the weekend.       Anticipated Discharge Disposition (PT): home with 24/7 care, home with home health

## 2024-03-23 ENCOUNTER — APPOINTMENT (OUTPATIENT)
Dept: GENERAL RADIOLOGY | Facility: HOSPITAL | Age: 74
DRG: 853 | End: 2024-03-23
Payer: MEDICARE

## 2024-03-23 LAB
ALBUMIN SERPL-MCNC: 2.8 G/DL (ref 3.5–5.2)
ALBUMIN/GLOB SERPL: 1.4 G/DL
ALP SERPL-CCNC: 87 U/L (ref 39–117)
ALT SERPL W P-5'-P-CCNC: 14 U/L (ref 1–33)
ANION GAP SERPL CALCULATED.3IONS-SCNC: 8 MMOL/L (ref 5–15)
AST SERPL-CCNC: 17 U/L (ref 1–32)
BACTERIA SPEC AEROBE CULT: ABNORMAL
BILIRUB SERPL-MCNC: 0.2 MG/DL (ref 0–1.2)
BUN SERPL-MCNC: 29 MG/DL (ref 8–23)
BUN/CREAT SERPL: 25 (ref 7–25)
CALCIUM SPEC-SCNC: 8.5 MG/DL (ref 8.6–10.5)
CHLORIDE SERPL-SCNC: 111 MMOL/L (ref 98–107)
CO2 SERPL-SCNC: 23 MMOL/L (ref 22–29)
CREAT SERPL-MCNC: 1.16 MG/DL (ref 0.57–1)
DEPRECATED RDW RBC AUTO: 43.5 FL (ref 37–54)
EGFRCR SERPLBLD CKD-EPI 2021: 49.6 ML/MIN/1.73
ERYTHROCYTE [DISTWIDTH] IN BLOOD BY AUTOMATED COUNT: 13 % (ref 12.3–15.4)
GLOBULIN UR ELPH-MCNC: 2 GM/DL
GLUCOSE SERPL-MCNC: 111 MG/DL (ref 65–99)
GRAM STN SPEC: ABNORMAL
HCT VFR BLD AUTO: 33.1 % (ref 34–46.6)
HGB BLD-MCNC: 11.1 G/DL (ref 12–15.9)
ISOLATED FROM: ABNORMAL
ISOLATED FROM: ABNORMAL
MCH RBC QN AUTO: 30.8 PG (ref 26.6–33)
MCHC RBC AUTO-ENTMCNC: 33.5 G/DL (ref 31.5–35.7)
MCV RBC AUTO: 91.9 FL (ref 79–97)
NT-PROBNP SERPL-MCNC: 5139 PG/ML (ref 0–900)
PLATELET # BLD AUTO: 173 10*3/MM3 (ref 140–450)
PMV BLD AUTO: 10.4 FL (ref 6–12)
POTASSIUM SERPL-SCNC: 4 MMOL/L (ref 3.5–5.2)
PROT SERPL-MCNC: 4.8 G/DL (ref 6–8.5)
RBC # BLD AUTO: 3.6 10*6/MM3 (ref 3.77–5.28)
SODIUM SERPL-SCNC: 142 MMOL/L (ref 136–145)
WBC NRBC COR # BLD AUTO: 26.5 10*3/MM3 (ref 3.4–10.8)

## 2024-03-23 PROCEDURE — 71046 X-RAY EXAM CHEST 2 VIEWS: CPT

## 2024-03-23 PROCEDURE — 85027 COMPLETE CBC AUTOMATED: CPT | Performed by: INTERNAL MEDICINE

## 2024-03-23 PROCEDURE — 25810000003 LACTATED RINGERS PER 1000 ML: Performed by: INTERNAL MEDICINE

## 2024-03-23 PROCEDURE — 25010000002 CEFTRIAXONE PER 250 MG: Performed by: UROLOGY

## 2024-03-23 PROCEDURE — 25010000002 FUROSEMIDE PER 20 MG: Performed by: INTERNAL MEDICINE

## 2024-03-23 PROCEDURE — 80053 COMPREHEN METABOLIC PANEL: CPT | Performed by: INTERNAL MEDICINE

## 2024-03-23 PROCEDURE — 83880 ASSAY OF NATRIURETIC PEPTIDE: CPT | Performed by: INTERNAL MEDICINE

## 2024-03-23 RX ORDER — FUROSEMIDE 10 MG/ML
40 INJECTION INTRAMUSCULAR; INTRAVENOUS EVERY 6 HOURS
Status: COMPLETED | OUTPATIENT
Start: 2024-03-23 | End: 2024-03-23

## 2024-03-23 RX ORDER — FUROSEMIDE 10 MG/ML
40 INJECTION INTRAMUSCULAR; INTRAVENOUS ONCE
Status: DISCONTINUED | OUTPATIENT
Start: 2024-03-23 | End: 2024-03-23

## 2024-03-23 RX ADMIN — FUROSEMIDE 40 MG: 10 INJECTION, SOLUTION INTRAMUSCULAR; INTRAVENOUS at 13:35

## 2024-03-23 RX ADMIN — SODIUM CHLORIDE, POTASSIUM CHLORIDE, SODIUM LACTATE AND CALCIUM CHLORIDE 100 ML/HR: 600; 310; 30; 20 INJECTION, SOLUTION INTRAVENOUS at 00:10

## 2024-03-23 RX ADMIN — Medication 10 ML: at 08:27

## 2024-03-23 RX ADMIN — ESCITALOPRAM 20 MG: 20 TABLET, FILM COATED ORAL at 08:27

## 2024-03-23 RX ADMIN — LEVOTHYROXINE SODIUM 50 MCG: 50 TABLET ORAL at 04:58

## 2024-03-23 RX ADMIN — FUROSEMIDE 40 MG: 10 INJECTION, SOLUTION INTRAMUSCULAR; INTRAVENOUS at 20:40

## 2024-03-23 RX ADMIN — NIFEDIPINE 30 MG: 30 TABLET, FILM COATED, EXTENDED RELEASE ORAL at 08:27

## 2024-03-23 RX ADMIN — Medication 10 ML: at 20:40

## 2024-03-23 RX ADMIN — ATENOLOL 25 MG: 25 TABLET ORAL at 08:27

## 2024-03-23 RX ADMIN — CEFTRIAXONE 2000 MG: 2 INJECTION, POWDER, FOR SOLUTION INTRAMUSCULAR; INTRAVENOUS at 06:47

## 2024-03-23 RX ADMIN — ROSUVASTATIN CALCIUM 10 MG: 10 TABLET, FILM COATED ORAL at 20:40

## 2024-03-23 NOTE — PROGRESS NOTES
Name: Anastasiia Faria ADMIT: 3/21/2024   : 1950  PCP: Mirza Scott Sr., MD    MRN: [id number] LOS: 1 days   AGE/SEX: 74 y.o. female  ROOM: [facility]   Subjective   Chief Complaint   Patient presents with    Flank Pain     S/p OR 2 days ago  On IVFs  On IV ABX  On oxygen - still requiring 5L  Using IS    ROS  No f/c  No n/v  No cp/palp  Mild  soa/-cough    Objective   Vital Signs  Temp:  [97.9 °F (36.6 °C)-99 °F (37.2 °C)] 98.2 °F (36.8 °C)  Heart Rate:  [68-75] 69  Resp:  [16-18] 18  BP: (103-121)/(58-87) 121/87  SpO2:  [89 %-97 %] 97 %  on  Flow (L/min):  [2-5] 5;   Device (Oxygen Therapy): nasal cannula  Body mass index is 31.04 kg/m².    Physical Exam  Constitutional:       General: She is not in acute distress.     Appearance: She is ill-appearing.   HENT:      Head: Normocephalic and atraumatic.   Eyes:      General: No scleral icterus.  Cardiovascular:      Rate and Rhythm: Regular rhythm.      Heart sounds: Normal heart sounds.   Pulmonary:      Effort: Pulmonary effort is normal. No respiratory distress (decreased bs at bases).      Breath sounds: Rales (bibasilar) present.   Abdominal:      General: There is no distension.      Palpations: Abdomen is soft.   Musculoskeletal:      Cervical back: Neck supple.   Neurological:      Mental Status: She is alert.   Psychiatric:         Behavior: Behavior normal.         Results Review:       I reviewed the patient's new clinical results.  Results from last 7 days   Lab Units 24  0255 24  0629 24  0820   WBC 10*3/mm3 26.50* 27.48* 16.79*   HEMOGLOBIN g/dL 11.1* 10.9* 13.9   PLATELETS 10*3/mm3 173 171 327     Results from last 7 days   Lab Units 24  0255 24  0629 24  0820   SODIUM mmol/L 142 141 141   POTASSIUM mmol/L 4.0 4.1 3.8   CHLORIDE mmol/L 111* 109* 106   CO2 mmol/L 23.0 19.5* 20.7*   BUN mg/dL 29* 18 17   CREATININE mg/dL 1.16* 1.00 1.08*   GLUCOSE mg/dL 111* 147* 170*   Estimated Creatinine Clearance: 50.6 mL/min  "(A) (by C-G formula based on SCr of 1.16 mg/dL (H)).  Results from last 7 days   Lab Units 03/23/24  0255 03/21/24  0820   ALBUMIN g/dL 2.8* 4.1   BILIRUBIN mg/dL 0.2 0.8   ALK PHOS U/L 87 74   AST (SGOT) U/L 17 16   ALT (SGPT) U/L 14 14     Results from last 7 days   Lab Units 03/23/24  0255 03/22/24  0629 03/21/24  0820   CALCIUM mg/dL 8.5* 8.3* 9.7   ALBUMIN g/dL 2.8*  --  4.1     Results from last 7 days   Lab Units 03/22/24  1229 03/22/24  0629 03/22/24  0041 03/21/24  1857 03/21/24  1002 03/21/24  0820   PROCALCITONIN ng/mL  --   --   --   --   --  0.16   LACTATE mmol/L 2.2* 2.4* 3.8* 2.1*   < >  --     < > = values in this interval not displayed.       Coag     HbA1C   Lab Results   Component Value Date    HGBA1C 5.90 (H) 04/10/2023    HGBA1C 5.90 (H) 10/13/2022    HGBA1C 5.8 (H) 04/07/2022     Infection   Results from last 7 days   Lab Units 03/21/24  0959 03/21/24  0857 03/21/24  0820   BLOODCX  Escherichia coli*  Escherichia coli*  --   --    URINECX   --  >100,000 CFU/mL Gram Negative Bacilli*  --    BCIDPCR  Escherichia coli. Identification by BCID2 PCR.*  --   --    PROCALCITONIN ng/mL  --   --  0.16     Radiology(recent) XR Chest 1 View    Result Date: 3/21/2024  As described.    This report was finalized on 3/21/2024 4:56 PM by Dr. Chidi Perez M.D on Workstation: [employer]      [place] Retrograde Pyelogram In OR    Result Date: 3/21/2024   As described.  This report was finalized on 3/21/2024 3:21 PM by Dr. Chidi Perez M.D on Workstation: [id number]     No results found for: \"TROPONINT\", \"TROPONINI\", \"BNP\"  No components found for: \"TSH;2\"    atenolol, 25 mg, Oral, Daily  cefTRIAXone, 2,000 mg, Intravenous, Q24H  escitalopram, 20 mg, Oral, Daily  levothyroxine, 50 mcg, Oral, Q AM  NIFEdipine XL, 30 mg, Oral, Daily  rosuvastatin, 10 mg, Oral, Nightly  sodium chloride, 10 mL, Intravenous, Q12H         Diet: Regular/House; Fluid Consistency: Thin (IDDSI 0)      Assessment & Plan      Active Hospital " Problems    Diagnosis  POA    **Acute pyelonephritis [N10]  Yes    E coli bacteremia [R78.81, B96.20]  Unknown    Acute respiratory failure with hypoxia [J96.01]  Unknown    Sepsis [A41.9]  Yes    Ureteral calculus [N20.1]  Yes    Acquired hypothyroidism [E03.9]  Yes    Essential hypertension [I10]  Yes      Resolved Hospital Problems   No resolved problems to display.     Ms. Faria is a 74 y.o. with a history of HTN, who presents with flank pain. Symptoms started last night. Associated with fever, n/v. Came to [facility] ER. CT in ER showing evidence for kidney stone and pyelonephritis. She underwent OR with Dr. Bolton 3/21 with cysto and stenting.    IV ABX, po levaquin at dc to complete 7 days  Stop IVFs, check CXR to see if need diuretics if pulmonary edema  IS, wean O2.   Continue home medications    Dispo- to home in 1-2 days pending culture and improvement in labs/symptoms.       MERLYN RN      Husam Diego MD  [facility]  03/23/24  11:22 EDT

## 2024-03-23 NOTE — PLAN OF CARE
Goal Outcome Evaluation:  Plan of Care Reviewed With: patient           Outcome Evaluation: Pt maintained on 5L N/C to keep sats >92%. Unable to wean 02. No c/o's of chest pain or pressure. No soa or dyspnea.  Occ cough, non prod. Telemetry SR 60's, IVF's and ABX as ordered. Temp max 99.0. Urine dark and concentrated. Safety maintained.

## 2024-03-24 ENCOUNTER — APPOINTMENT (OUTPATIENT)
Dept: CARDIOLOGY | Facility: HOSPITAL | Age: 74
DRG: 853 | End: 2024-03-24
Payer: MEDICARE

## 2024-03-24 LAB
ANION GAP SERPL CALCULATED.3IONS-SCNC: 9 MMOL/L (ref 5–15)
AORTIC DIMENSIONLESS INDEX: 0.8 (DI)
BH CV ECHO MEAS - AO MAX PG: 8.9 MMHG
BH CV ECHO MEAS - AO MEAN PG: 5 MMHG
BH CV ECHO MEAS - AO V2 MAX: 149 CM/SEC
BH CV ECHO MEAS - AO V2 VTI: 30.5 CM
BH CV ECHO MEAS - AVA(I,D): 2.7 CM2
BH CV ECHO MEAS - EDV(CUBED): 35.1 ML
BH CV ECHO MEAS - EDV(MOD-SP2): 69 ML
BH CV ECHO MEAS - EDV(MOD-SP4): 75 ML
BH CV ECHO MEAS - EF(MOD-BP): 67 %
BH CV ECHO MEAS - EF(MOD-SP2): 63.8 %
BH CV ECHO MEAS - EF(MOD-SP4): 68 %
BH CV ECHO MEAS - ESV(CUBED): 13 ML
BH CV ECHO MEAS - ESV(MOD-SP2): 25 ML
BH CV ECHO MEAS - ESV(MOD-SP4): 24 ML
BH CV ECHO MEAS - FS: 28.3 %
BH CV ECHO MEAS - IVS/LVPW: 0.98 CM
BH CV ECHO MEAS - IVSD: 0.78 CM
BH CV ECHO MEAS - LAT PEAK E' VEL: 7.2 CM/SEC
BH CV ECHO MEAS - LV DIASTOLIC VOL/BSA (35-75): 36.6 CM2
BH CV ECHO MEAS - LV MASS(C)D: 66 GRAMS
BH CV ECHO MEAS - LV MAX PG: 5.9 MMHG
BH CV ECHO MEAS - LV MEAN PG: 3 MMHG
BH CV ECHO MEAS - LV SYSTOLIC VOL/BSA (12-30): 11.7 CM2
BH CV ECHO MEAS - LV V1 MAX: 121 CM/SEC
BH CV ECHO MEAS - LV V1 VTI: 24 CM
BH CV ECHO MEAS - LVIDD: 3.3 CM
BH CV ECHO MEAS - LVIDS: 2.35 CM
BH CV ECHO MEAS - LVOT AREA: 3.5 CM2
BH CV ECHO MEAS - LVOT DIAM: 2.1 CM
BH CV ECHO MEAS - LVPWD: 0.79 CM
BH CV ECHO MEAS - MED PEAK E' VEL: 4.8 CM/SEC
BH CV ECHO MEAS - MV A MAX VEL: 84.4 CM/SEC
BH CV ECHO MEAS - MV DEC SLOPE: 530.3 CM/SEC2
BH CV ECHO MEAS - MV DEC TIME: 0.21 SEC
BH CV ECHO MEAS - MV E MAX VEL: 58.2 CM/SEC
BH CV ECHO MEAS - MV E/A: 0.69
BH CV ECHO MEAS - MV MAX PG: 3.3 MMHG
BH CV ECHO MEAS - MV MEAN PG: 1.02 MMHG
BH CV ECHO MEAS - MV P1/2T: 46.9 MSEC
BH CV ECHO MEAS - MV V2 VTI: 22.2 CM
BH CV ECHO MEAS - MVA(P1/2T): 4.7 CM2
BH CV ECHO MEAS - MVA(VTI): 3.7 CM2
BH CV ECHO MEAS - PA ACC TIME: 0.12 SEC
BH CV ECHO MEAS - PA V2 MAX: 88.9 CM/SEC
BH CV ECHO MEAS - RAP SYSTOLE: 3 MMHG
BH CV ECHO MEAS - RV MAX PG: 1.67 MMHG
BH CV ECHO MEAS - RV V1 MAX: 64.7 CM/SEC
BH CV ECHO MEAS - RV V1 VTI: 14.9 CM
BH CV ECHO MEAS - RVSP: 23.3 MMHG
BH CV ECHO MEAS - SI(MOD-SP2): 21.5 ML/M2
BH CV ECHO MEAS - SI(MOD-SP4): 24.9 ML/M2
BH CV ECHO MEAS - SV(LVOT): 83 ML
BH CV ECHO MEAS - SV(MOD-SP2): 44 ML
BH CV ECHO MEAS - SV(MOD-SP4): 51 ML
BH CV ECHO MEAS - TR MAX PG: 20.3 MMHG
BH CV ECHO MEAS - TR MAX VEL: 225.3 CM/SEC
BH CV ECHO MEASUREMENTS AVERAGE E/E' RATIO: 9.7
BH CV XLRA - RV BASE: 3.5 CM
BH CV XLRA - RV LENGTH: 7.1 CM
BH CV XLRA - RV MID: 3.1 CM
BH CV XLRA - TDI S': 14.8 CM/SEC
BUN SERPL-MCNC: 29 MG/DL (ref 8–23)
BUN/CREAT SERPL: 26.1 (ref 7–25)
CALCIUM SPEC-SCNC: 8.7 MG/DL (ref 8.6–10.5)
CHLORIDE SERPL-SCNC: 104 MMOL/L (ref 98–107)
CO2 SERPL-SCNC: 28 MMOL/L (ref 22–29)
CREAT SERPL-MCNC: 1.11 MG/DL (ref 0.57–1)
DEPRECATED RDW RBC AUTO: 44.9 FL (ref 37–54)
EGFRCR SERPLBLD CKD-EPI 2021: 52.3 ML/MIN/1.73
ERYTHROCYTE [DISTWIDTH] IN BLOOD BY AUTOMATED COUNT: 13.1 % (ref 12.3–15.4)
GLUCOSE SERPL-MCNC: 100 MG/DL (ref 65–99)
HCT VFR BLD AUTO: 37.1 % (ref 34–46.6)
HGB BLD-MCNC: 12.4 G/DL (ref 12–15.9)
LEFT ATRIUM VOLUME INDEX: 17.6 ML/M2
MAGNESIUM SERPL-MCNC: 1.7 MG/DL (ref 1.6–2.4)
MCH RBC QN AUTO: 30.8 PG (ref 26.6–33)
MCHC RBC AUTO-ENTMCNC: 33.4 G/DL (ref 31.5–35.7)
MCV RBC AUTO: 92.3 FL (ref 79–97)
PLATELET # BLD AUTO: 230 10*3/MM3 (ref 140–450)
PMV BLD AUTO: 10.4 FL (ref 6–12)
POTASSIUM SERPL-SCNC: 3.9 MMOL/L (ref 3.5–5.2)
RBC # BLD AUTO: 4.02 10*6/MM3 (ref 3.77–5.28)
SINUS: 3 CM
SODIUM SERPL-SCNC: 141 MMOL/L (ref 136–145)
WBC NRBC COR # BLD AUTO: 22.85 10*3/MM3 (ref 3.4–10.8)

## 2024-03-24 PROCEDURE — 25510000001 PERFLUTREN (DEFINITY) 8.476 MG IN SODIUM CHLORIDE (PF) 0.9 % 10 ML INJECTION: Performed by: INTERNAL MEDICINE

## 2024-03-24 PROCEDURE — 25010000002 FUROSEMIDE PER 20 MG: Performed by: INTERNAL MEDICINE

## 2024-03-24 PROCEDURE — 83735 ASSAY OF MAGNESIUM: CPT | Performed by: INTERNAL MEDICINE

## 2024-03-24 PROCEDURE — 85027 COMPLETE CBC AUTOMATED: CPT | Performed by: INTERNAL MEDICINE

## 2024-03-24 PROCEDURE — 80048 BASIC METABOLIC PNL TOTAL CA: CPT | Performed by: INTERNAL MEDICINE

## 2024-03-24 PROCEDURE — 93306 TTE W/DOPPLER COMPLETE: CPT

## 2024-03-24 PROCEDURE — 25010000002 CEFTRIAXONE PER 250 MG: Performed by: UROLOGY

## 2024-03-24 PROCEDURE — 93306 TTE W/DOPPLER COMPLETE: CPT | Performed by: INTERNAL MEDICINE

## 2024-03-24 RX ORDER — FUROSEMIDE 10 MG/ML
40 INJECTION INTRAMUSCULAR; INTRAVENOUS ONCE
Status: COMPLETED | OUTPATIENT
Start: 2024-03-24 | End: 2024-03-24

## 2024-03-24 RX ORDER — POTASSIUM CHLORIDE 1.5 G/1.58G
40 POWDER, FOR SOLUTION ORAL ONCE
Status: COMPLETED | OUTPATIENT
Start: 2024-03-24 | End: 2024-03-24

## 2024-03-24 RX ADMIN — LEVOTHYROXINE SODIUM 50 MCG: 50 TABLET ORAL at 06:03

## 2024-03-24 RX ADMIN — POTASSIUM CHLORIDE 40 MEQ: 1.5 POWDER, FOR SOLUTION ORAL at 11:02

## 2024-03-24 RX ADMIN — CEFTRIAXONE 2000 MG: 2 INJECTION, POWDER, FOR SOLUTION INTRAMUSCULAR; INTRAVENOUS at 06:03

## 2024-03-24 RX ADMIN — ESCITALOPRAM 20 MG: 20 TABLET, FILM COATED ORAL at 08:23

## 2024-03-24 RX ADMIN — Medication 10 ML: at 08:23

## 2024-03-24 RX ADMIN — FUROSEMIDE 40 MG: 10 INJECTION, SOLUTION INTRAMUSCULAR; INTRAVENOUS at 11:02

## 2024-03-24 RX ADMIN — ATENOLOL 25 MG: 25 TABLET ORAL at 08:23

## 2024-03-24 RX ADMIN — Medication 10 ML: at 21:00

## 2024-03-24 RX ADMIN — PERFLUTREN 2 ML: 6.52 INJECTION, SUSPENSION INTRAVENOUS at 15:35

## 2024-03-24 RX ADMIN — NIFEDIPINE 30 MG: 30 TABLET, FILM COATED, EXTENDED RELEASE ORAL at 08:22

## 2024-03-24 RX ADMIN — ROSUVASTATIN CALCIUM 10 MG: 10 TABLET, FILM COATED ORAL at 21:00

## 2024-03-24 NOTE — PLAN OF CARE
Goal Outcome Evaluation:   Plan of care reviewed with client and spouse.  Afebrile, VSS. IVFs dc'd.  Remains on IV antibiotics.  Chest xray pa and lateral done today.  IV lasix started, diuresing well. [place] external catheter to wall sxn in use.   Weaning oxygen, started at 5 L N/C beginning of shift, down to 2 L N/C at end of shift.  Denies pain or discomfort.  Up out of bed to ambulate to stretcher with standby assist when transport team here to take to xray.  Safety maintained.  Continue to monitor.

## 2024-03-24 NOTE — PROGRESS NOTES
Name: Anastasiia Faria ADMIT: 3/21/2024   : 1950  PCP: Mirza Scott Sr., MD    MRN: [id number] LOS: 2 days   AGE/SEX: 74 y.o. female  ROOM: [facility]   Subjective   Chief Complaint   Patient presents with    Flank Pain     S/p OR 3 days ago  On IV ABX  On oxygen - still requiring 2L  Using IS  S/p IV lasix yesterday     ROS  No f/c  No n/v  No cp/palp  Mild  soa/-cough    Objective   Vital Signs  Temp:  [98.6 °F (37 °C)-99.3 °F (37.4 °C)] 98.6 °F (37 °C)  Heart Rate:  [69-71] 71  Resp:  [18] 18  BP: (115-129)/(66-70) 115/66  SpO2:  [95 %-97 %] 97 %  on  Flow (L/min):  [2-5] 2;   Device (Oxygen Therapy): nasal cannula  Body mass index is 31.04 kg/m².    Physical Exam  Constitutional:       General: She is not in acute distress.     Appearance: She is ill-appearing.   HENT:      Head: Normocephalic and atraumatic.   Eyes:      General: No scleral icterus.  Cardiovascular:      Rate and Rhythm: Regular rhythm.      Heart sounds: Normal heart sounds.   Pulmonary:      Effort: Pulmonary effort is normal. No respiratory distress (decreased bs at bases).      Breath sounds: Rales (bibasilar) present.   Abdominal:      General: There is no distension.      Palpations: Abdomen is soft.   Musculoskeletal:      Cervical back: Neck supple.   Neurological:      Mental Status: She is alert.   Psychiatric:         Behavior: Behavior normal.     Similar exam to yesterday     Results Review:       I reviewed the patient's new clinical results.  Results from last 7 days   Lab Units 24  0357 24  0255 24  0629 24  0820   WBC 10*3/mm3 22.85* 26.50* 27.48* 16.79*   HEMOGLOBIN g/dL 12.4 11.1* 10.9* 13.9   PLATELETS 10*3/mm3 230 173 171 327     Results from last 7 days   Lab Units 24  0357 24  0255 24  0629 03/21/24  0820   SODIUM mmol/L 141 142 141 141   POTASSIUM mmol/L 3.9 4.0 4.1 3.8   CHLORIDE mmol/L 104 111* 109* 106   CO2 mmol/L 28.0 23.0 19.5* 20.7*   BUN mg/dL 29* 29* 18 17  "  CREATININE mg/dL 1.11* 1.16* 1.00 1.08*   GLUCOSE mg/dL 100* 111* 147* 170*   Estimated Creatinine Clearance: 52.9 mL/min (A) (by C-G formula based on SCr of 1.11 mg/dL (H)).  Results from last 7 days   Lab Units 03/23/24  0255 03/21/24  0820   ALBUMIN g/dL 2.8* 4.1   BILIRUBIN mg/dL 0.2 0.8   ALK PHOS U/L 87 74   AST (SGOT) U/L 17 16   ALT (SGPT) U/L 14 14     Results from last 7 days   Lab Units 03/24/24  0357 03/23/24  0255 03/22/24  0629 03/21/24  0820   CALCIUM mg/dL 8.7 8.5* 8.3* 9.7   ALBUMIN g/dL  --  2.8*  --  4.1   MAGNESIUM mg/dL 1.7  --   --   --      Results from last 7 days   Lab Units 03/22/24  1229 03/22/24  0629 03/22/24  0041 03/21/24  1857 03/21/24  1002 03/21/24  0820   PROCALCITONIN ng/mL  --   --   --   --   --  0.16   LACTATE mmol/L 2.2* 2.4* 3.8* 2.1*   < >  --     < > = values in this interval not displayed.       Coag     HbA1C   Lab Results   Component Value Date    HGBA1C 5.90 (H) 04/10/2023    HGBA1C 5.90 (H) 10/13/2022    HGBA1C 5.8 (H) 04/07/2022     Infection   Results from last 7 days   Lab Units 03/21/24  0959 03/21/24  0857 03/21/24  0820   BLOODCX  Escherichia coli*  Escherichia coli*  --   --    URINECX   --  >100,000 CFU/mL Escherichia coli*  --    BCIDPCR  Escherichia coli. Identification by BCID2 PCR.*  --   --    PROCALCITONIN ng/mL  --   --  0.16     Radiology(recent) No radiology results for the last day  No results found for: \"TROPONINT\", \"TROPONINI\", \"BNP\"  No components found for: \"TSH;2\"    atenolol, 25 mg, Oral, Daily  cefTRIAXone, 2,000 mg, Intravenous, Q24H  escitalopram, 20 mg, Oral, Daily  furosemide, 40 mg, Intravenous, Once  levothyroxine, 50 mcg, Oral, Q AM  NIFEdipine XL, 30 mg, Oral, Daily  potassium chloride, 40 mEq, Oral, Once  rosuvastatin, 10 mg, Oral, Nightly  sodium chloride, 10 mL, Intravenous, Q12H         Diet: Regular/House; Fluid Consistency: Thin (IDDSI 0)      Assessment & Plan      Active Hospital Problems    Diagnosis  POA    **Acute " pyelonephritis [N10]  Yes    E coli bacteremia [R78.81, B96.20]  Unknown    Acute respiratory failure with hypoxia [J96.01]  Unknown    Sepsis [A41.9]  Yes    Ureteral calculus [N20.1]  Yes    Acquired hypothyroidism [E03.9]  Yes    Essential hypertension [I10]  Yes      Resolved Hospital Problems   No resolved problems to display.     Ms. Faria is a 74 y.o. with a history of HTN, who presents with flank pain. Symptoms started last night. Associated with fever, n/v. Came to [facility] ER. CT in ER showing evidence for kidney stone and pyelonephritis. She underwent OR with Dr. Bolton 3/21 with cysto and stenting.    IV ABX, po levaquin at dc to complete 7 days. Still with leukocytosis but no abd pain. If not improving tomorrow consider repeat scan  CXR with pulmonary edema. Giving IV lasix. Check 2D ECHO  IS, wean O2.   Continue home medications    Dispo- to home in 1-2 days pending culture and improvement in labs/symptoms.       MERLYN RN      Husam Diego MD  [facility]  03/24/24  11:22 EDT

## 2024-03-24 NOTE — PLAN OF CARE
Goal Outcome Evaluation:  Plan of Care Reviewed With: patient        Progress: improving  Outcome Evaluation: Patient had echo completed, see results. One dose of IV lasix given. Maintaining oxygen saturations on R/A. Continues to use incentive spirometer. Up to the chair for meals.  at bedside throughout the shift. No complaints of pain. Patient expresses no other needs at this time. Call light within reach.    Discharge: 1-2 days.

## 2024-03-24 NOTE — PLAN OF CARE
Goal Outcome Evaluation:  Plan of Care Reviewed With: patient        Progress: improving  Outcome Evaluation: Pt was able to wean down 02 after IV Lasix initiated, maintained on 2L N/C duing this shift. . Diuresing well, inaccurate I/0 as pt has been incontinent at times. Urine clear yellow. IV ABX as ordered, temp max 99.3. Using IS on own [id number] with good effort. Up in chair this am, tolerated well. Safety maintained.

## 2024-03-25 ENCOUNTER — READMISSION MANAGEMENT (OUTPATIENT)
Dept: CALL CENTER | Facility: HOSPITAL | Age: 74
End: 2024-03-25
Payer: MEDICARE

## 2024-03-25 VITALS
DIASTOLIC BLOOD PRESSURE: 78 MMHG | OXYGEN SATURATION: 91 % | HEIGHT: 68 IN | SYSTOLIC BLOOD PRESSURE: 142 MMHG | BODY MASS INDEX: 30.74 KG/M2 | HEART RATE: 74 BPM | RESPIRATION RATE: 18 BRPM | TEMPERATURE: 98.1 F | WEIGHT: 202.82 LBS

## 2024-03-25 LAB
ANION GAP SERPL CALCULATED.3IONS-SCNC: 11.9 MMOL/L (ref 5–15)
BUN SERPL-MCNC: 24 MG/DL (ref 8–23)
BUN/CREAT SERPL: 26.1 (ref 7–25)
CALCIUM SPEC-SCNC: 9.5 MG/DL (ref 8.6–10.5)
CHLORIDE SERPL-SCNC: 103 MMOL/L (ref 98–107)
CO2 SERPL-SCNC: 26.1 MMOL/L (ref 22–29)
CREAT SERPL-MCNC: 0.92 MG/DL (ref 0.57–1)
DEPRECATED RDW RBC AUTO: 43.9 FL (ref 37–54)
EGFRCR SERPLBLD CKD-EPI 2021: 65.5 ML/MIN/1.73
ERYTHROCYTE [DISTWIDTH] IN BLOOD BY AUTOMATED COUNT: 13.1 % (ref 12.3–15.4)
GLUCOSE SERPL-MCNC: 116 MG/DL (ref 65–99)
HCT VFR BLD AUTO: 40.9 % (ref 34–46.6)
HGB BLD-MCNC: 13.6 G/DL (ref 12–15.9)
MCH RBC QN AUTO: 30.5 PG (ref 26.6–33)
MCHC RBC AUTO-ENTMCNC: 33.3 G/DL (ref 31.5–35.7)
MCV RBC AUTO: 91.7 FL (ref 79–97)
PLATELET # BLD AUTO: 271 10*3/MM3 (ref 140–450)
PMV BLD AUTO: 9.7 FL (ref 6–12)
POTASSIUM SERPL-SCNC: 4 MMOL/L (ref 3.5–5.2)
RBC # BLD AUTO: 4.46 10*6/MM3 (ref 3.77–5.28)
SODIUM SERPL-SCNC: 141 MMOL/L (ref 136–145)
WBC NRBC COR # BLD AUTO: 16.87 10*3/MM3 (ref 3.4–10.8)

## 2024-03-25 PROCEDURE — 80048 BASIC METABOLIC PNL TOTAL CA: CPT | Performed by: INTERNAL MEDICINE

## 2024-03-25 PROCEDURE — 85027 COMPLETE CBC AUTOMATED: CPT | Performed by: INTERNAL MEDICINE

## 2024-03-25 PROCEDURE — 25010000002 CEFTRIAXONE PER 250 MG: Performed by: UROLOGY

## 2024-03-25 PROCEDURE — 25010000002 ENOXAPARIN PER 10 MG: Performed by: INTERNAL MEDICINE

## 2024-03-25 RX ORDER — LEVOFLOXACIN 750 MG/1
750 TABLET, FILM COATED ORAL DAILY
Qty: 4 TABLET | Refills: 0 | Status: SHIPPED | OUTPATIENT
Start: 2024-03-25 | End: 2024-03-29

## 2024-03-25 RX ORDER — ENOXAPARIN SODIUM 100 MG/ML
40 INJECTION SUBCUTANEOUS EVERY 24 HOURS
Status: DISCONTINUED | OUTPATIENT
Start: 2024-03-25 | End: 2024-03-25 | Stop reason: HOSPADM

## 2024-03-25 RX ORDER — PROMETHAZINE HYDROCHLORIDE 12.5 MG/1
25 TABLET ORAL EVERY 6 HOURS PRN
Qty: 10 TABLET | Refills: 0 | Status: SHIPPED | OUTPATIENT
Start: 2024-03-25

## 2024-03-25 RX ADMIN — LEVOTHYROXINE SODIUM 50 MCG: 50 TABLET ORAL at 04:49

## 2024-03-25 RX ADMIN — ESCITALOPRAM 20 MG: 20 TABLET, FILM COATED ORAL at 08:48

## 2024-03-25 RX ADMIN — ATENOLOL 25 MG: 25 TABLET ORAL at 08:48

## 2024-03-25 RX ADMIN — ENOXAPARIN SODIUM 40 MG: 100 INJECTION SUBCUTANEOUS at 08:48

## 2024-03-25 RX ADMIN — CEFTRIAXONE 2000 MG: 2 INJECTION, POWDER, FOR SOLUTION INTRAMUSCULAR; INTRAVENOUS at 06:19

## 2024-03-25 RX ADMIN — Medication 10 ML: at 08:50

## 2024-03-25 RX ADMIN — NIFEDIPINE 30 MG: 30 TABLET, FILM COATED, EXTENDED RELEASE ORAL at 08:47

## 2024-03-25 NOTE — PLAN OF CARE
Goal Outcome Evaluation:  Plan of Care Reviewed With: patient        Progress: improving  Outcome Evaluation: Maintained on R/A, sats primarily >90% while sleeping. Pt reports feeling better. Temp max 99. BRP with assist. Safety maintained.

## 2024-03-25 NOTE — DISCHARGE SUMMARY
NAME: Anastasiia Faria ADMIT: 3/21/2024   : 1950  PCP: Mirza Scott Sr., MD    MRN: [id number] LOS: 3 days   AGE/SEX: 74 y.o. female  ROOM: [facility]/     Date of Admission:  3/21/2024  Date of Discharge:  3/25/2024    PCP: Mirza Scott Sr., MD    CHIEF COMPLAINT  Flank Pain      DISCHARGE DIAGNOSIS  Active Hospital Problems    Diagnosis  POA    **Acute pyelonephritis [N10]  Yes    E coli bacteremia [R78.81, B96.20]  Unknown    Acute respiratory failure with hypoxia [J96.01]  Unknown    Sepsis [A41.9]  Yes    Ureteral calculus [N20.1]  Yes    Acquired hypothyroidism [E03.9]  Yes    Essential hypertension [I10]  Yes      Resolved Hospital Problems   No resolved problems to display.       SECONDARY DIAGNOSES  Past Medical History:   Diagnosis Date    Allergic     Anxiety     Cataract     Cataract surgery on right eye 2021 and left eye 2021. (Dr. Nanette Bateman)    Chronic kidney disease     Colon polyps     Depression     Diverticulosis     Encounter for annual health examination 2014    Annual Health Assessment    Family history of colon cancer     Fibrocystic breast     GERD (gastroesophageal reflux disease)     Heart murmur     Herpes labialis without complication     Hip pain     right    History of mammogram 2010    HL (hearing loss) 2014    Hearing Aids    Hyperlipidemia     Hypertension     Hypothyroidism     Insomnia     Kidney stones     Migraines     Osteopenia     Prolia -last 2021,3/2022    PONV (postoperative nausea and vomiting)     Scoliosis     Dr. Stanford 2018    Visual impairment     Wear Glasses       CONSULTS   Urology    HOSPITAL COURSE  Ms. Faria is a 74 y.o. with a history of HTN, who presents with flank pain. Symptoms started last night. Associated with fever, n/v. Came to [facility] ER. CT in ER showing evidence for kidney stone and pyelonephritis. She underwent OR with Dr. Bolton 3/21 with cysto and stenting. She was given IV ABX, po levaquin at NC to complete 7  days. Leukocytosis improving. CXR with pulmonary edema. Gave IV lasix. 2D ECHO unremarkable. No breathing better on room air and ready for discharge with outpatient follow up with Urology and PCP    DIAGNOSTICS           03/25/2024 0801 03/25/2024 0845 Basic Metabolic Panel [[id number]]    (Abnormal)   Blood    Final result Component Value Units   Glucose 116 High  mg/dL   BUN 24 High  mg/dL   Creatinine 0.92 mg/dL   Sodium 141 mmol/L   Potassium 4.0 mmol/L   Chloride 103 mmol/L   CO2 26.1 mmol/L   Calcium 9.5 mg/dL   BUN/Creatinine Ratio 26.1 High     Anion Gap 11.9 mmol/L   eGFR 65.5 mL/min/1.73          03/25/2024 0801 03/25/2024 0830 CBC (No Diff) [[id number]]   (Abnormal)   Blood    Final result Component Value Units   WBC 16.87 High  10*3/mm3   RBC 4.46 10*6/mm3   Hemoglobin 13.6 g/dL   Hematocrit 40.9 %   MCV 91.7 fL   MCH 30.5 pg   MCHC 33.3 g/dL   RDW 13.1 %   RDW-SD 43.9 fl   MPV 9.7 fL   Platelets 271 10*3/mm3                 Collected Updated Procedure Result Status    03/21/2024 0959 03/23/2024 0638 Blood Culture - Blood, Hand, Right [[id number]]    (Abnormal)   Blood from Hand, Right    Final result Component Value   Blood Culture Escherichia coli Critical    Isolated from Aerobic and Anaerobic Bottles   Gram Stain Anaerobic Bottle Gram negative bacilli Critical     Aerobic Bottle Gram negative bacilli Critical        Susceptibility     Escherichia coli     JANICE     Amoxicillin + Clavulanate 8 Susceptible     Ampicillin 8 Susceptible     Ampicillin + Sulbactam 4 Susceptible     Cefepime <=1 Susceptible     Ceftazidime <=1 Susceptible     Ceftriaxone <=1 Susceptible     Gentamicin 2 Susceptible     Levofloxacin <=0.12 Susceptible     Piperacillin + Tazobactam <=4 Susceptible     Trimethoprim + Sulfamethoxazole <=20 Susceptible               Linear View     Susceptibility Comments    Escherichia coli   Cefazolin sensitivity will not be reported for Enterobacteriaceae in non-urine isolates. If cefazolin is  preferred, please call the microbiology lab to request an E-test.  With the exception of urinary-sourced infections, aminoglycosides should not be used as monotherapy.         03/21/2024 0959 03/23/2024 0638 Blood Culture - Blood, Arm, Left [[id number]]    (Abnormal)   Blood from Arm, Left    Final result Component Value   Blood Culture Escherichia coli Critical    Isolated from Aerobic and Anaerobic Bottles   Gram Stain Anaerobic Bottle Gram negative bacilli Critical     Aerobic Bottle Gram negative bacilli Critical              03/21/2024 0959 03/21/2024 1910 Blood Culture ID, PCR - Blood, Hand, Right [[id number]]    (Abnormal)   Blood from Hand, Right    Final result Component Value   BCID, PCR Escherichia coli. Identification by BCID2 PCR. Abnormal    BOTTLE TYPE Anaerobic Bottle             03/21/2024 0857 03/23/2024 1044 Urine Culture - Urine, Urine, Clean Catch [[id number]]     (Abnormal)   Urine, Clean Catch    Final result Component Value   Urine Culture >100,000 CFU/mL Escherichia coli Abnormal        Susceptibility     Escherichia coli     JANICE     Amoxicillin + Clavulanate 4 Susceptible     Ampicillin 8 Susceptible     Ampicillin + Sulbactam 4 Susceptible     Cefazolin <=4 Susceptible     Cefepime <=1 Susceptible     Ceftazidime <=1 Susceptible     Ceftriaxone <=1 Susceptible     Gentamicin <=1 Susceptible     Levofloxacin <=0.12 Susceptible     Nitrofurantoin <=16 Susceptible     Piperacillin + Tazobactam <=4 Susceptible     Trimethoprim + Sulfamethoxazole <=20 Susceptible               Linear View           Procedure Component Value Units Date/Time    XR Chest PA & Lateral [[id number]] Mirza as Reviewed   Order Status: Completed Collected: 03/23/24 1636    Updated: 03/23/24 1642   Narrative:      XR CHEST PA AND LATERAL-     HISTORY: Sepsis.     COMPARISON: Chest radiograph 3/21/2024     FINDINGS:    2 views of the chest were obtained.  The patient is rotated to the left.  The cardiac silhouette and  mediastinal and hilar contours are not  significantly changed. This is calcific aortic atherosclerosis. There is  a slightly improved small left pleural effusion with slightly improved  aeration of the left lung base, which could reflect atelectasis and/or  pneumonia. There is a trace right pleural effusion, slightly progressed.  There are Brittany B lines in the lower right lung, suggesting at least a  component of pulmonary edema, new from prior. There is thoracic  dextroscoliosis. There is a partially imaged stent in the upper abdomen  on the lateral view.     This report was finalized on 3/23/2024 4:39 PM by Dr. Janet Min M.D  on Workstation: [id number]       XR Chest 1 View [[id number]] Mirza as Reviewed   Order Status: Completed Collected: 03/21/24 1654    Updated: 03/21/24 1659   Narrative:     XR CHEST 1 VW-     HISTORY: Female who is 74 years-old, hypoxia     TECHNIQUE: Frontal views of the chest     COMPARISON: 5/10/2021     FINDINGS: The patient is rotated towards the left. The heart size  appears borderline. Pulmonary vasculature is unremarkable. Mild left  basilar atelectasis or infiltrate, follow-up advised. No large pleural  effusion, or pneumothorax. No acute osseous process.      Impression:     As described.           This report was finalized on 3/21/2024 4:56 PM by Dr. Chidi Perez M.D on Workstation: [id number]       FL Retrograde Pyelogram In OR [[id number]] Mirza as Reviewed   Order Status: Completed Collected: 03/21/24 1520    Updated: 03/21/24 1524   Narrative:     FL RETROGRADE PYELOGRAM IN OR-     INDICATIONS: Left cystoscopy, retrograde pyelography with ureteral stent  insertion     TECHNIQUE: FLUOROSCOPIC ASSISTANCE IN THE OPERATING ROOM.     FINDINGS:     4 intraoperative fluoroscopic spot views were obtained and recorded the  PACS for review, revealing left retrograde pyelography and left ureteral  stent placement. Please see operative report for full details.     Fluoroscopy time:  0.2 minutes     Radiation exposure: Dose area product: 89.8 uGy x m(2)         Impression:        As described.     This report was finalized on 3/21/2024 3:21 PM by Dr. Chidi Perez M.D on Workstation: [id number]       CT Abdomen Pelvis Stone Protocol [[id number]] Mirza as Reviewed   Order Status: Completed Collected: 03/21/24 0951    Updated: 03/21/24 1001   Narrative:     ABDOMEN AND PELVIS CT WITHOUT CONTRAST     HISTORY: Left flank pain.     TECHNIQUE: Noncontrast abdomen and pelvis CT is provided and correlated  with pelvis CT 10/20/2023.     FINDINGS: Left perinephric stranding and hydronephrosis. Proximal left  ureteral calculus measures about 7 mm greatest diameter as measured on  the coronal reformatted images. The calculus lies at about the L4  superior endplate level. There is a 3 mm left upper pole calyceal  calculus and 4 right renal calculi measuring up to 6 mm greatest  diameter. No right hydronephrosis or ureteral calculus. The urinary  bladder is decompressed and appears normal. The renal parenchyma has a  normal noncontrast CT appearance.     Visualized lung bases are clear. Several fluid density  well-circumscribed hepatic lesions are most likely cysts but not  definitively characterized. Small sliding hiatal hernia.  Normal-appearing spleen, pancreas, and adrenal glands.     Extensive diverticulosis throughout the colon. No evidence of  diverticulitis. The retrocecal appendix, small bowel, and stomach appear  normal.     Retroaortic left renal vein as a normal anatomic variant. Scattered  atheromatous vascular calcifications are observed with normal caliber  abdominal aorta.     Uterus and ovaries have a normal small, postmenopausal appearance. No  adenopathy or free fluid.     Degenerative change in the spine and at the hips.      Impression:     Bilateral nephrolithiasis with a 7 mm proximal left ureteral  calculus associated with hydronephrosis and perinephric stranding which  may represent  "pyelonephritis or pyelocalyceal rupture.     I called the findings to Dr. Hood in the emergency department around  9:30 a.m.        PHYSICAL EXAM  Objective:  Vital signs: (most recent): Blood pressure 142/78, pulse 74, temperature 98.1 °F (36.7 °C), temperature source Oral, resp. rate 18, height 172 cm (67.72\"), weight 92 kg (202 lb 13.2 oz), SpO2 91%, not currently breastfeeding.                Alert  nad  No resp distress  Soft, nt    CONDITION ON DISCHARGE  Stable.      DISCHARGE DISPOSITION   Home or Self Care      DISCHARGE MEDICATIONS       Your medication list        START taking these medications        Instructions Last Dose Given Next Dose Due   levoFLOXacin 750 MG tablet  Commonly known as: Levaquin      Take 1 tablet by mouth Daily for 4 days. Do not take with vitamins/calcium       promethazine 12.5 MG tablet  Commonly known as: PHENERGAN      Take 2 tablets by mouth Every 6 (Six) Hours As Needed for Nausea or Vomiting.              CHANGE how you take these medications        Instructions Last Dose Given Next Dose Due   Prolia 60 MG/ML solution syringe  Generic drug: denosumab  What changed: See the new instructions.      INJECT 60 MG UNDER THE SKIN EVERY SIX MONTHS              CONTINUE taking these medications        Instructions Last Dose Given Next Dose Due   aspirin-acetaminophen-caffeine 250-250-65 MG per tablet  Commonly known as: EXCEDRIN MIGRAINE      Take 1 tablet by mouth Every 6 (Six) Hours As Needed for Headache.       atenolol 25 MG tablet  Commonly known as: TENORMIN      Take 1 tablet by mouth Daily.       calcium carbonate 600 MG tablet  Commonly known as: OS-NIKKIE      Take 2 tablets by mouth Daily.       escitalopram 20 MG tablet  Commonly known as: LEXAPRO      Take 1 tablet by mouth Daily.       levothyroxine 50 MCG tablet  Commonly known as: SYNTHROID, LEVOTHROID      Take 1 tablet by mouth Daily.       loratadine 10 MG tablet  Commonly known as: CLARITIN      Take 1 tablet by " mouth Daily.       METAMUCIL FIBER PO      Take 1 Scoop by mouth Daily.       NIFEdipine XL 30 MG 24 hr tablet  Commonly known as: PROCARDIA XL      Take 1 tablet by mouth Daily.       rosuvastatin 10 MG tablet  Commonly known as: CRESTOR      Take 1 tablet by mouth Every Night.       SOLUBLE FIBER/PROBIOTICS PO      Take 1 capsule by mouth Daily.       Vitamin D3 50 MCG (2000 UT) capsule  Generic drug: Cholecalciferol      Take 1 capsule by mouth Daily.                 Where to Get Your Medications        These medications were sent to [id number] - [place], [place] - [address] AT [facility] - [phone]  - [phone]   [address] , [facility] [address]      Phone: [phone]   levoFLOXacin 750 MG tablet  promethazine 12.5 MG tablet       Diet Instructions    Regular; Carb-consistent diet          Activity Instructions    Activity as tolerated         Future Appointments   Date Time Provider Department Center   5/2/2024  1:20 PM Jacqueline Mayfield APRN [facility]   5/3/2024 11:00 AM [facility] 1 [facility]   5/28/2024 11:30 AM Vasu King MD [facility]   8/8/2024 11:00 AM REFERRED INJECTION CHAIR EP [facility]     Additional Instructions for the Follow-ups that You Need to Schedule       Discharge Follow-up with Specialty: Dr. Che Mcnally Urology this week, PCP in 1-2 weeks   As directed      Specialty: Dr. Che Mcnally Urology this week, PCP in 1-2 weeks               Follow-up Information       Mirza Scott Sr., MD .    Specialties: Family Medicine, Urgent Care  Contact information:  [address]  [address]  [facility] [address] [phone]                             TEST  RESULTS PENDING AT DISCHARGE         Husam Diego MD  [facility]  03/25/24  11:43 EDT      Time: greater than 32 minutes on discharge  It was a pleasure taking care of this patient while in the hospital.

## 2024-03-25 NOTE — OUTREACH NOTE
Prep Survey      Flowsheet Row Responses   [facility] patient discharged from? [place]   Is LACE score < 7 ? No   Eligibility [facility]   Date of Admission 03/21/24   Date of Discharge 03/25/24   Discharge Disposition Home or Self Care   Discharge diagnosis Acute pyelonephritis, sepsis   Does the patient have one of the following disease processes/diagnoses(primary or secondary)? Sepsis   Does the patient have Home health ordered? No   Is there a DME ordered? No   Prep survey completed? Yes            Thu KING - Registered Nurse

## 2024-03-25 NOTE — CASE MANAGEMENT/SOCIAL WORK
Case Management Discharge Note      Final Note: Home, family to transport         Selected Continued Care - Discharged on 3/25/2024 Admission date: 3/21/2024 - Discharge disposition: Home or Self Care      Destination    No services have been selected for the patient.                Durable Medical Equipment    No services have been selected for the patient.                Dialysis/Infusion    No services have been selected for the patient.                Home Medical Care    No services have been selected for the patient.                Therapy    No services have been selected for the patient.                Community Resources    No services have been selected for the patient.                Community & DME    No services have been selected for the patient.                    Transportation Services  Private: Car    Final Discharge Disposition Code: 01 - home or self-care

## 2024-03-26 ENCOUNTER — TRANSITIONAL CARE MANAGEMENT TELEPHONE ENCOUNTER (OUTPATIENT)
Dept: CALL CENTER | Facility: HOSPITAL | Age: 74
End: 2024-03-26
Payer: MEDICARE

## 2024-03-26 NOTE — OUTREACH NOTE
Call Center TCM Note      Flowsheet Row Responses   [facility] patient discharged from? [place]   Does the patient have one of the following disease processes/diagnoses(primary or secondary)? Sepsis   TCM attempt successful? No   Unsuccessful attempts Attempt 2            Karen Feng MA    3/26/2024, 15:57 EDT

## 2024-03-26 NOTE — OUTREACH NOTE
Call Center TCM Note      Flowsheet Row Responses   [facility] patient discharged from? [place]   Does the patient have one of the following disease processes/diagnoses(primary or secondary)? Sepsis   TCM attempt successful? No   Unsuccessful attempts Attempt 1            Karen Feng MA    3/26/2024, 13:05 EDT

## 2024-03-26 NOTE — PROGRESS NOTES
"Enter Query Response Below      Query Response:   Acute non-cardiogenic pulmonary edema   Electronically signed by Husam Diego MD, 24, 9:41 PM EDT.             If applicable, please update the problem list.   Patient: Anastasiia Faria        : 1950  Account: [id number]           Admit Date:         How to Respond to this query:       a. Click New Note     b. Answer query within the yellow box.                c. Update the Problem List, if applicable.      If you have any questions about this query contact me at: [email or web address]  [phone]      Dr. Diego:     74 y F admitted (3/21/2024) with Sepsis, Acute pyelonephritis, E. coli bacteremia treated with IV LR bolus, IV fluids, IV Rocephin, and \"po Levaquin at discharge to complete 7 days.\"  She also underwent cystoscopy, left retrograde pyelogram and left ureteral stent placement (3/21/2024). RN noted, \"Pt currently on 6L with O2 sat approx 89-90%. Weaned from 15L nonrebreather on arrival to PACU. CXR performed, Duoneb given. IS performed.\" Progress note (3/22) included, \"Acute respiratory failure with hypoxia - poa - unknown. IS wean O2 likely related to atelectasis.\" \"On oxygen - still requiring 5L. Rales (bibasilar) present. Stop IVFs, check CXR to see if need diuretics if pulmonary edema,\" noted on 3/23.  Hospital course at discharge (3/25/24) included, \"CXR with  pulmonary edema. Gave IV lasix. 2D ECHO unremarkable. No breathing better on room air and ready for discharge...\"    Please clarify if the patient was treated/monitored for:    Acute non-cardiogenic pulmonary edema  Chronic pulmonary edema  Other- specify______  Unable to determine    By submitting this query, we are merely seeking further clarification of documentation to accurately reflect all conditions that you are monitoring, evaluating, treating or that extend the hospitalization or utilize additional resources of care. Please utilize your independent clinical judgment " when addressing the question(s) above.     This query and your response, once completed, will be entered into the legal medical record.    Sincerely,    DELLA HannaN, RN, [facility]  Clinical Documentation Integrity Program

## 2024-03-26 NOTE — PROGRESS NOTES
Enter Query Response Below      Query Response:   CKD Stage 3a GFR 45-59   Electronically signed by Husam Diego MD, 24, 9:41 PM EDT.               If applicable, please update the problem list.   Patient: Anastasiia Faria        : 1950  Account: [id number]           Admit Date:         How to Respond to this query:       a. Click New Note     b. Answer query within the yellow box.                c. Update the Problem List, if applicable.      If you have any questions about this query contact me at: [email or web address]  [phone]      Dr. Diego:     74 y F with noted history of chronic kidney disease. eGFR laboratory findings during current encounter included:  3/21 54.0  3/22 59.2  3/23 49.6  3/24 52.3  3/25 65.5    Please clarify the stage of the patients CKD.     KDIGO CKD staging [email or web address]  CKD Stage 1        GFR > 90  CKD Stage 2        GFR 60-89  CKD Stage 3a      GFR 45-59  CKD Stage 3b      GFR 30-44  CKD Stage 4        GFR 15-29  CKD Stage 5       GFR <15    By submitting this query, we are merely seeking further clarification of documentation to accurately reflect all conditions that you are monitoring, evaluating, treating or that extend the hospitalization or utilize additional resources of care. Please utilize your independent clinical judgment when addressing the question(s) above.     This query and your response, once completed, will be entered into the legal medical record.    Sincerely,    RAINER Hanna, RN, CCDS  Clinical Documentation Integrity Program

## 2024-03-27 ENCOUNTER — TRANSITIONAL CARE MANAGEMENT TELEPHONE ENCOUNTER (OUTPATIENT)
Dept: CALL CENTER | Facility: HOSPITAL | Age: 74
End: 2024-03-27
Payer: MEDICARE

## 2024-03-27 NOTE — OUTREACH NOTE
Call Center TCM Note      Flowsheet Row Responses   [facility] patient discharged from? [place]   Does the patient have one of the following disease processes/diagnoses(primary or secondary)? Sepsis   TCM attempt successful? Yes   Call start time 0916   Call end time 0923   Discharge diagnosis Acute pyelonephritis, ureteral calculus, sepsis, Cysto and stenting   Person spoke with today (if not patient) and relationship Patient   Meds reviewed with patient/caregiver? Yes  [New: levaquin and phenergan]   Does the patient have all medications related to this admission filled (includes all antibiotics, inhalers, nebulizers,steroids,etc.) Yes   Is the patient taking all medications as directed (includes completed medication regime)? Yes   Comments PCP Dr Scott. Hospital follow up scheduled for 4/1/24  3pm   Does the patient have an appointment with their PCP within 7-14 days of discharge? No   Nursing Interventions Assisted patient with making appointment per protocol, Routed TCM call to PCP office   Has home health visited the patient within 72 hours of discharge? N/A   Psychosocial issues? No   Did the patient receive a copy of their discharge instructions? Yes   Nursing interventions Reviewed instructions with patient   What is the patient's perception of their health status since discharge? Improving   Nursing interventions Nurse provided patient education   Is the patient/caregiver able to teach back TIME? T emperature - higher or lower than normal, I nfection - may have signs and symptoms of an infection   Nursing interventions Nurse provided reassurance to patient, Nurse provided patient education   Is patient/caregiver able to teach back steps to recovery at home? Set small, achievable goals for return to baseline health, Rest and regain strength, Eat a balanced diet  [Drink plenty of water]   Is the patient/caregiver able to teach back signs and symptoms of worsening condition: Fever  [Patient denies  any fever today.]   If the patient is a current smoker, are they able to teach back resources for cessation? Not a smoker   Is the patient/caregiver able to teach back the hierarchy of who to call/visit for symptoms/problems? PCP, Specialist, Home health nurse, Urgent Care, ED, 911 Yes   TCM call completed? Yes   Call end time 0923   Would this patient benefit from a Referral to [facility] Social Work? No   Is the patient interested in additional calls from an ambulatory ? No            Karen Krause RN    3/27/2024, 09:25 EDT

## 2024-04-01 ENCOUNTER — OFFICE VISIT (OUTPATIENT)
Dept: FAMILY MEDICINE CLINIC | Facility: CLINIC | Age: 74
End: 2024-04-01
Payer: MEDICARE

## 2024-04-01 VITALS
DIASTOLIC BLOOD PRESSURE: 80 MMHG | SYSTOLIC BLOOD PRESSURE: 122 MMHG | BODY MASS INDEX: 30.62 KG/M2 | RESPIRATION RATE: 18 BRPM | TEMPERATURE: 97.8 F | HEIGHT: 68 IN | OXYGEN SATURATION: 97 % | HEART RATE: 78 BPM | WEIGHT: 202 LBS

## 2024-04-01 DIAGNOSIS — N20.0 RENAL STONE: ICD-10-CM

## 2024-04-01 DIAGNOSIS — R30.0 DYSURIA: Primary | ICD-10-CM

## 2024-04-01 LAB
BILIRUB BLD-MCNC: ABNORMAL MG/DL
CLARITY, POC: ABNORMAL
COLOR UR: ABNORMAL
EXPIRATION DATE: ABNORMAL
GLUCOSE UR STRIP-MCNC: ABNORMAL MG/DL
KETONES UR QL: ABNORMAL
LEUKOCYTE EST, POC: ABNORMAL
Lab: ABNORMAL
NITRITE UR-MCNC: POSITIVE MG/ML
PH UR: 5 [PH] (ref 5–8)
PROT UR STRIP-MCNC: ABNORMAL MG/DL
RBC # UR STRIP: ABNORMAL /UL
SP GR UR: 1.01 (ref 1–1.03)
UROBILINOGEN UR QL: ABNORMAL

## 2024-04-01 PROCEDURE — 3079F DIAST BP 80-89 MM HG: CPT | Performed by: FAMILY MEDICINE

## 2024-04-01 PROCEDURE — 1111F DSCHRG MED/CURRENT MED MERGE: CPT | Performed by: FAMILY MEDICINE

## 2024-04-01 PROCEDURE — 99495 TRANSJ CARE MGMT MOD F2F 14D: CPT | Performed by: FAMILY MEDICINE

## 2024-04-01 PROCEDURE — 3074F SYST BP LT 130 MM HG: CPT | Performed by: FAMILY MEDICINE

## 2024-04-01 PROCEDURE — 81003 URINALYSIS AUTO W/O SCOPE: CPT | Performed by: FAMILY MEDICINE

## 2024-04-01 PROCEDURE — 96372 THER/PROPH/DIAG INJ SC/IM: CPT | Performed by: FAMILY MEDICINE

## 2024-04-01 RX ORDER — SULFAMETHOXAZOLE AND TRIMETHOPRIM 800; 160 MG/1; MG/1
1 TABLET ORAL 2 TIMES DAILY
Qty: 14 TABLET | Refills: 0 | Status: SHIPPED | OUTPATIENT
Start: 2024-04-01 | End: 2024-04-08

## 2024-04-01 RX ORDER — CEFTRIAXONE SODIUM 250 MG/1
1000 INJECTION, POWDER, FOR SOLUTION INTRAMUSCULAR; INTRAVENOUS ONCE
Status: COMPLETED | OUTPATIENT
Start: 2024-04-01 | End: 2024-04-01

## 2024-04-01 RX ADMIN — CEFTRIAXONE SODIUM 1000 MG: 250 INJECTION, POWDER, FOR SOLUTION INTRAMUSCULAR; INTRAVENOUS at 15:49

## 2024-04-01 NOTE — PROGRESS NOTES
Chief Complaint  Chief Complaint   Patient presents with    Hospital Follow Up Visit     Pt here for f/u R hip pain     Urinary Tract Infection     Pt c/o urinary frequency, burning when urination, blood in urine x 3 days        Transitional Care Progress Note        Subjective    History of Present Illness        Anastasiia Faria is a 74 y.o. female who presents for a transitional care management visit.    Within 48 business hours after discharge our office contacted her via telephone to coordinate her care and needs.      I reviewed and discussed the details of that call along with the discharge summary, hospital problems, inpatient lab results, inpatient diagnostic studies, and consultation reports with Anastasiia.     Current outpatient and discharge medications have been reconciled for the patient.  Reviewed by: Mirza Scott Sr., MD          3/25/2024     4:41 PM   Date of TCM Phone Call   [facility]   Date of Admission 3/21/2024   Date of Discharge 3/25/2024   Discharge Disposition Home or Self Care     Risk for Readmission (LACE) No data recorded      Anastasiia Faria presents to [facility] for   History of Present Illness  Ms. Faria is a 74 y.o. with a history of HTN, who presents with flank pain. Symptoms started on 3/20/2024.  At the time of admission she had complaints of fever, n/v.  At that time she came to [facility] ER.  While in the ER a CT was ordered, showing evidence for kidney stone and pyelonephritis.   She went to the OR with Dr. Bolton on 3/21/2024 with cysto and stenting. Patient's blood cultures were positive and she was given IV ABX, and po levaquin at dc to complete 7 day course.  Her leukocytosis was improving.  Her CXR with pulmonary edema and she was given IV lasix.  A 2D ECHO was ordered and was unremarkable.  Her breathing was better on room air and she was ready for discharge with outpatient follow up with Urology and PCP.         Objective   Vital  "Signs:   Visit Vitals  /80   Pulse 78   Temp 97.8 °F (36.6 °C)   Resp 18   Ht 172 cm (67.72\")   Wt 91.6 kg (202 lb)   LMP  (LMP Unknown)   SpO2 97%   BMI 30.97 kg/m²             Physical Exam  Vitals reviewed.   Constitutional:       Appearance: She is well-developed.   HENT:      Head: Normocephalic.      Right Ear: External ear normal.      Left Ear: External ear normal.      Nose: Nose normal.   Eyes:      Conjunctiva/sclera: Conjunctivae normal.   Cardiovascular:      Rate and Rhythm: Normal rate and regular rhythm.   Pulmonary:      Effort: Pulmonary effort is normal.      Breath sounds: Normal breath sounds.   Musculoskeletal:         General: Normal range of motion.      Cervical back: Normal range of motion and neck supple.   Skin:     General: Skin is warm and dry.      Capillary Refill: Capillary refill takes less than 2 seconds.   Neurological:      Mental Status: She is alert and oriented to person, place, and time.              Result Review :                      Assessment and Plan      Diagnoses and all orders for this visit:    1. Dysuria (Primary)  Assessment & Plan:  Hospital records reviewed.  Patient appears to have pyelonephritis along with kidney stones.  Her symptoms are not improved.  Due to her continued symptoms she was given a Rocephin shot in the clinic and started on Bactrim DS.  A new urinalysis was ordered and results        Orders:  -     POC Urinalysis Dipstick, Automated  -     Urine Culture - Urine, Urine, Clean Catch; Future  -     Urine Culture - Urine, Urine, Clean Catch  -     cefTRIAXone (ROCEPHIN) injection 1,000 mg  -     sulfamethoxazole-trimethoprim (Bactrim DS) 800-160 MG per tablet; Take 1 tablet by mouth 2 (Two) Times a Day for 7 days.  Dispense: 14 tablet; Refill: 0    2. Renal stone  Assessment & Plan:  Hospital records reviewed.  Patient is being seen by urology to help with symptoms.  There is already a stent in place               Follow Up   No follow-ups " on file.  Patient was given instructions and counseling regarding her condition or for health maintenance advice. Please see specific information pulled into the AVS if appropriate.

## 2024-04-03 LAB
BACTERIA UR CULT: NO GROWTH
BACTERIA UR CULT: NORMAL

## 2024-04-15 NOTE — ASSESSMENT & PLAN NOTE
Hospital records reviewed.  Patient is being seen by urology to help with symptoms.  There is already a stent in place

## 2024-04-15 NOTE — ASSESSMENT & PLAN NOTE
Hospital records reviewed.  Patient appears to have pyelonephritis along with kidney stones.  Her symptoms are not improved.  Due to her continued symptoms she was given a Rocephin shot in the clinic and started on Bactrim DS.  A new urinalysis was ordered and results

## 2024-05-01 NOTE — TELEPHONE ENCOUNTER
Caller: Anastasiia Faria    Relationship: Self    Best call back number: [phone]    Requested Prescriptions:   Requested Prescriptions     Pending Prescriptions Disp Refills    atenolol (TENORMIN) 25 MG tablet 90 tablet 3     Sig: Take 1 tablet by mouth Daily.    NIFEdipine XL (PROCARDIA XL) 30 MG 24 hr tablet 90 tablet 3     Sig: Take 1 tablet by mouth Daily.    levothyroxine (SYNTHROID, LEVOTHROID) 50 MCG tablet 90 tablet 3     Sig: Take 1 tablet by mouth Daily.    escitalopram (LEXAPRO) 20 MG tablet 90 tablet 3     Sig: Take 1 tablet by mouth Daily.        Pharmacy where request should be sent: [facility] [address] [phone] [facility] [phone]      Last office visit with prescribing clinician: 4/1/2024   Last telemedicine visit with prescribing clinician: Visit date not found   Next office visit with prescribing clinician: Visit date not found     Additional details provided by patient:     Does the patient have less than a 3 day supply:  [] Yes  [] No    Would you like a call back once the refill request has been completed: [x] Yes [] No    If the office needs to give you a call back, can they leave a voicemail: [x] Yes [] No    Federico Nicholas Rep   05/01/24 14:27 EDT

## 2024-05-02 ENCOUNTER — OFFICE VISIT (OUTPATIENT)
Dept: ORTHOPEDIC SURGERY | Facility: CLINIC | Age: 74
End: 2024-05-02
Payer: MEDICARE

## 2024-05-02 VITALS
DIASTOLIC BLOOD PRESSURE: 78 MMHG | SYSTOLIC BLOOD PRESSURE: 122 MMHG | WEIGHT: 186.5 LBS | BODY MASS INDEX: 28.26 KG/M2 | TEMPERATURE: 98.4 F | HEIGHT: 68 IN

## 2024-05-02 DIAGNOSIS — R52 PAIN: Primary | ICD-10-CM

## 2024-05-02 NOTE — TELEPHONE ENCOUNTER
Rx Refill Note  Requested Prescriptions     Pending Prescriptions Disp Refills    atenolol (TENORMIN) 25 MG tablet 90 tablet 1     Sig: Take 1 tablet by mouth Daily.    NIFEdipine XL (PROCARDIA XL) 30 MG 24 hr tablet 90 tablet 1     Sig: Take 1 tablet by mouth Daily.    levothyroxine (SYNTHROID, LEVOTHROID) 50 MCG tablet 90 tablet 1     Sig: Take 1 tablet by mouth Daily.    escitalopram (LEXAPRO) 20 MG tablet 90 tablet 1     Sig: Take 1 tablet by mouth Daily.      Last office visit with prescribing clinician: 4/1/2024   Last telemedicine visit with prescribing clinician: Visit date not found   Next office visit with prescribing clinician: Visit date not found                         Would you like a call back once the refill request has been completed: [] Yes [] No    If the office needs to give you a call back, can they leave a voicemail: [] Yes [] No    Terri Mei  05/02/24, 14:16 EDT

## 2024-05-02 NOTE — H&P (VIEW-ONLY)
Patient: Anastasiia Faria    Date of Admission: 5/13/2024    YOB: 1950    Medical Record Number: [id number]    Admitting Physician: Dr. Vasu King    Reason for Admission: End Stage Right Hip OA    History of Present Illness: 74 y.o. female presents with severe end stage hip osteoarthritis which has not been responsive to the full compliment of conservative measures. Despite conservative attempts, there is still severe, constant activity limiting hip pain. Given the severity of the pain, the patient has elected to proceed with hip replacement.    Allergies: No Known Allergies      Current Medications:  Home Medications:    Current Outpatient Medications on File Prior to Visit   Medication Sig    aspirin-acetaminophen-caffeine (EXCEDRIN MIGRAINE) 250-250-65 MG per tablet Take 1 tablet by mouth Every 6 (Six) Hours As Needed for Headache.    atenolol (TENORMIN) 25 MG tablet Take 1 tablet by mouth Daily.    calcium carbonate (OS-NIKKIE) 600 MG tablet Take 2 tablets by mouth Daily.    Cholecalciferol (VITAMIN D3) 2000 UNITS capsule Take 1 capsule by mouth Daily.    escitalopram (LEXAPRO) 20 MG tablet Take 1 tablet by mouth Daily.    levothyroxine (SYNTHROID, LEVOTHROID) 50 MCG tablet Take 1 tablet by mouth Daily.    loratadine (CLARITIN) 10 MG tablet Take 1 tablet by mouth Daily.    NIFEdipine XL (PROCARDIA XL) 30 MG 24 hr tablet Take 1 tablet by mouth Daily.    Probiotic Product (SOLUBLE FIBER/PROBIOTICS PO) Take 1 capsule by mouth Daily.    PROLIA 60 MG/ML solution syringe INJECT 60 MG UNDER THE SKIN EVERY SIX MONTHS (Patient taking differently: Inject 1 mL under the skin into the appropriate area as directed Every 6 (Six) Months.)    promethazine (PHENERGAN) 12.5 MG tablet Take 2 tablets by mouth Every 6 (Six) Hours As Needed for Nausea or Vomiting.    Psyllium (METAMUCIL FIBER PO) Take 1 Scoop by mouth Daily.    rosuvastatin (CRESTOR) 10 MG tablet Take 1 tablet by mouth Every Night.     No current  facility-administered medications on file prior to visit.     PRN Meds:.    PMH:  Past Medical History:   Diagnosis Date    Allergic     Anxiety     Cataract     Cataract surgery on right eye June 2021 and left eye July 2021. (Dr. Nanette Bateman)    Chronic kidney disease     Colon polyps     Depression     Diverticulosis 2011    Encounter for annual health examination 03/07/2014    Annual Health Assessment    Family history of colon cancer     Fibrocystic breast     GERD (gastroesophageal reflux disease)     Heart murmur     Herpes labialis without complication     Hip pain     right    History of mammogram 12/20/2010    HL (hearing loss) 2014    Hearing Aids    Hyperlipidemia     Hypertension     Hypothyroidism     Insomnia     Kidney stones     Migraines     Osteopenia     Prolia -last 9/2021,3/2022    PONV (postoperative nausea and vomiting)     Scoliosis     Dr. Stanford 5/2018    Visual impairment     Wear Glasses        PSURGH:  Past Surgical History:   Procedure Laterality Date    BONE MARROW BIOPSY      BREAST BIOPSY      BREAST CYST ASPIRATION      BREAST SURGERY Right     BIOPSY    CATARACT EXTRACTION, BILATERAL      COLONOSCOPY N/A 10/27/2016    Procedure: COLONOSCOPY INTO CECUM;  Surgeon: Osvaldo Dorado MD;  Location: [facility];  Service:     COLONOSCOPY  01/26/2011    COLONOSCOPY  02/04/2005    COLONOSCOPY N/A 12/2/2021    Procedure: COLONOSCOPY INTO CECUM WITH COLD BIOPSY POLYPECTOMY AND HOT SNARE POLYPECTOMY;  Surgeon: Osvaldo Dorado MD;  Location: [facility] END[facility];  Service: General;  Laterality: N/A;  PRE-FAMILY HISTORY OF COLON CANCER, PERSONAL HISTORY OF COLON CANCER  POST- POLYPS, DIVERTICULOSIS, HEMORRHOIDS    CYSTOSCOPY W/ URETERAL STENT PLACEMENT Left 3/21/2024    Procedure: LEFT CYSTOSCOPY RETROGRADE PYLEOGRAM URETERAL STENT INSERTION;  Surgeon: Jaiden Bolton Jr., MD;  Location: [facility] OR;  Service: Urology;  Laterality: Left;    KNEE ARTHROSCOPY Left      PAP SMEAR  12/20/2010       SocialHx:  Social History     Occupational History    Not on file   Tobacco Use    Smoking status: Never    Smokeless tobacco: Never    Tobacco comments:     daily caffine   Vaping Use    Vaping status: Never Used   Substance and Sexual Activity    Alcohol use: Never    Drug use: Never    Sexual activity: Not Currently     Partners: Male     Birth control/protection: Post-menopausal     Comment: Took birth control pills approximately 30 years.      Social History     Social History Narrative    Not on file       FamHx:  Family History   Problem Relation Age of Onset    Breast cancer Mother     Colon cancer Mother     Hypertension Mother     Cancer Mother         Breat cancer, skin cancer, colon cancer    Hyperlipidemia Mother     Stomach cancer Maternal Grandmother         or intestinal    Cancer Maternal Grandmother         GI TRACT CANCER    Breast cancer Maternal Grandmother     Heart disease Maternal Grandmother     Colon cancer Maternal Aunt         colon ca 40    Cancer Maternal Aunt         Colon cancer cause of death early 40s    Colon cancer Maternal Aunt         66 yo    Heart disease Maternal Aunt     Colon cancer Maternal Aunt         81 yo    Cancer Maternal Aunt         Colon cancer    Colon cancer Other     Colon cancer Other     Heart attack Maternal Grandfather     Breast cancer Maternal Grandfather     Heart disease Maternal Grandfather     Heart disease Paternal Grandmother     Hypertension Sister     Diabetes type II Brother     Cancer Brother         Skin cancer    Hypertension Sister     Hyperlipidemia Sister     Thyroid disease Sister     Cancer Father         Skin cancer    Cancer Maternal Aunt         Colon cancer    Cancer Maternal Uncle         Thyroid cancer    Heart disease Maternal Uncle          Review of Systems:   A 14 point review of systems was performed, pertinent positives discussed above, all other systems are negative    Physical Exam: 74 y.o.  "female  Vital Signs :   Vitals:    05/02/24 1345   BP: 122/78   BP Location: Left arm   Patient Position: Sitting   Cuff Size: Large Adult   Temp: 98.4 °F (36.9 °C)   TempSrc: Temporal   Weight: 84.6 kg (186 lb 8 oz)   Height: 172 cm (67.72\")   PainSc:   5   PainLoc: Hip     General: Alert and Oriented x 3, No acute distress.  Psych: mood and affect appropriate; recent and remote memory intact  Eyes: conjunctivae clear; pupils equally round and reactive, sclerae antiicteric  CV: no peripheral edema  Resp: normal respiratory effort  Skin: no rashes or wounds; normal turgor    Labs:    No visits with results within 3 Week(s) from this visit.   Latest known visit with results is:   Office Visit on 04/01/2024   Component Date Value Ref Range Status    Color 04/01/2024 Orange (A)  Yellow, Straw, Dark Yellow, Rebecca Final    Clarity, UA 04/01/2024 Slightly Cloudy (A)  Clear Final    Specific Gravity  04/01/2024 1.015  1.005 - 1.030 Final    pH, Urine 04/01/2024 5.0  5.0 - 8.0 Final    Leukocytes 04/01/2024 Large (3+) (A)  Negative Final    Nitrite, UA 04/01/2024 Positive (A)  Negative Final    Protein, POC 04/01/2024 300 mg/dL (A)  Negative mg/dL Final    Glucose, UA 04/01/2024 250 mg/dL (A)  Negative mg/dL Final    Ketones, UA 04/01/2024 15 mg/dL (A)  Negative Final    Urobilinogen, UA 04/01/2024 8 E.U./dL (A)  Normal, 0.2 E.U./dL Final    Bilirubin 04/01/2024 Moderate (2+) (A)  Negative Final    Blood, UA 04/01/2024 Large (A)  Negative Final    Lot Number 04/01/2024 [id number]   Final    Expiration Date 04/01/2024 07/30/2024   Final    Urine Culture 04/01/2024 Final report   Final    Result 1 04/01/2024 No growth   Final       Xrays:  Xrays AP pelvis and a lateral of the Right hip were reviewed demonstrating  End stage hip OA with bone on bone articulation, subchondral cysts and periarticular osteophytes.    Assessment:  End-stage Right hip osteoarthritis. Conservative measures have failed.      Plan:  The plan is to " proceed with Right Total Hip Replacement. The patient voiced understanding of the risks, benefits, and alternative forms of treatment that were discussed with Dr King at the time of scheduling. 23 HH, had UTI well over a month ago which is treated, she is completely asymptomatic    Jacqueline Mayfield, APRN  5/2/2024

## 2024-05-02 NOTE — H&P
Patient: Anastasiia Faria    Date of Admission: 5/13/2024    YOB: 1950    Medical Record Number: [id number]    Admitting Physician: Dr. Vasu King    Reason for Admission: End Stage Right Hip OA    History of Present Illness: 74 y.o. female presents with severe end stage hip osteoarthritis which has not been responsive to the full compliment of conservative measures. Despite conservative attempts, there is still severe, constant activity limiting hip pain. Given the severity of the pain, the patient has elected to proceed with hip replacement.    Allergies: No Known Allergies      Current Medications:  Home Medications:    Current Outpatient Medications on File Prior to Visit   Medication Sig    aspirin-acetaminophen-caffeine (EXCEDRIN MIGRAINE) 250-250-65 MG per tablet Take 1 tablet by mouth Every 6 (Six) Hours As Needed for Headache.    atenolol (TENORMIN) 25 MG tablet Take 1 tablet by mouth Daily.    calcium carbonate (OS-NIKKIE) 600 MG tablet Take 2 tablets by mouth Daily.    Cholecalciferol (VITAMIN D3) 2000 UNITS capsule Take 1 capsule by mouth Daily.    escitalopram (LEXAPRO) 20 MG tablet Take 1 tablet by mouth Daily.    levothyroxine (SYNTHROID, LEVOTHROID) 50 MCG tablet Take 1 tablet by mouth Daily.    loratadine (CLARITIN) 10 MG tablet Take 1 tablet by mouth Daily.    NIFEdipine XL (PROCARDIA XL) 30 MG 24 hr tablet Take 1 tablet by mouth Daily.    Probiotic Product (SOLUBLE FIBER/PROBIOTICS PO) Take 1 capsule by mouth Daily.    PROLIA 60 MG/ML solution syringe INJECT 60 MG UNDER THE SKIN EVERY SIX MONTHS (Patient taking differently: Inject 1 mL under the skin into the appropriate area as directed Every 6 (Six) Months.)    promethazine (PHENERGAN) 12.5 MG tablet Take 2 tablets by mouth Every 6 (Six) Hours As Needed for Nausea or Vomiting.    Psyllium (METAMUCIL FIBER PO) Take 1 Scoop by mouth Daily.    rosuvastatin (CRESTOR) 10 MG tablet Take 1 tablet by mouth Every Night.     No current  facility-administered medications on file prior to visit.     PRN Meds:.    PMH:  Past Medical History:   Diagnosis Date    Allergic     Anxiety     Cataract     Cataract surgery on right eye June 2021 and left eye July 2021. (Dr. Nanette Bateman)    Chronic kidney disease     Colon polyps     Depression     Diverticulosis 2011    Encounter for annual health examination 03/07/2014    Annual Health Assessment    Family history of colon cancer     Fibrocystic breast     GERD (gastroesophageal reflux disease)     Heart murmur     Herpes labialis without complication     Hip pain     right    History of mammogram 12/20/2010    HL (hearing loss) 2014    Hearing Aids    Hyperlipidemia     Hypertension     Hypothyroidism     Insomnia     Kidney stones     Migraines     Osteopenia     Prolia -last 9/2021,3/2022    PONV (postoperative nausea and vomiting)     Scoliosis     Dr. Stanford 5/2018    Visual impairment     Wear Glasses        PSURGH:  Past Surgical History:   Procedure Laterality Date    BONE MARROW BIOPSY      BREAST BIOPSY      BREAST CYST ASPIRATION      BREAST SURGERY Right     BIOPSY    CATARACT EXTRACTION, BILATERAL      COLONOSCOPY N/A 10/27/2016    Procedure: COLONOSCOPY INTO CECUM;  Surgeon: Osvaldo Dorado MD;  Location: [facility] ENDOSCOPY;  Service:     COLONOSCOPY  01/26/2011    COLONOSCOPY  02/04/2005    COLONOSCOPY N/A 12/2/2021    Procedure: COLONOSCOPY INTO CECUM WITH COLD BIOPSY POLYPECTOMY AND HOT SNARE POLYPECTOMY;  Surgeon: Osvaldo Dorado MD;  Location: [facility] ENDOSCOPY;  Service: General;  Laterality: N/A;  PRE-FAMILY HISTORY OF COLON CANCER, PERSONAL HISTORY OF COLON CANCER  POST- POLYPS, DIVERTICULOSIS, HEMORRHOIDS    CYSTOSCOPY W/ URETERAL STENT PLACEMENT Left 3/21/2024    Procedure: LEFT CYSTOSCOPY RETROGRADE PYLEOGRAM URETERAL STENT INSERTION;  Surgeon: Jaiden Bolton Jr., MD;  Location: [facility] OR;  Service: Urology;  Laterality: Left;    KNEE ARTHROSCOPY Left      PAP SMEAR  12/20/2010       SocialHx:  Social History     Occupational History    Not on file   Tobacco Use    Smoking status: Never    Smokeless tobacco: Never    Tobacco comments:     daily caffine   Vaping Use    Vaping status: Never Used   Substance and Sexual Activity    Alcohol use: Never    Drug use: Never    Sexual activity: Not Currently     Partners: Male     Birth control/protection: Post-menopausal     Comment: Took birth control pills approximately 30 years.      Social History     Social History Narrative    Not on file       FamHx:  Family History   Problem Relation Age of Onset    Breast cancer Mother     Colon cancer Mother     Hypertension Mother     Cancer Mother         Breat cancer, skin cancer, colon cancer    Hyperlipidemia Mother     Stomach cancer Maternal Grandmother         or intestinal    Cancer Maternal Grandmother         GI TRACT CANCER    Breast cancer Maternal Grandmother     Heart disease Maternal Grandmother     Colon cancer Maternal Aunt         colon ca 40    Cancer Maternal Aunt         Colon cancer cause of death early 40s    Colon cancer Maternal Aunt         66 yo    Heart disease Maternal Aunt     Colon cancer Maternal Aunt         79 yo    Cancer Maternal Aunt         Colon cancer    Colon cancer Other     Colon cancer Other     Heart attack Maternal Grandfather     Breast cancer Maternal Grandfather     Heart disease Maternal Grandfather     Heart disease Paternal Grandmother     Hypertension Sister     Diabetes type II Brother     Cancer Brother         Skin cancer    Hypertension Sister     Hyperlipidemia Sister     Thyroid disease Sister     Cancer Father         Skin cancer    Cancer Maternal Aunt         Colon cancer    Cancer Maternal Uncle         Thyroid cancer    Heart disease Maternal Uncle          Review of Systems:   A 14 point review of systems was performed, pertinent positives discussed above, all other systems are negative    Physical Exam: 74 y.o.  "female  Vital Signs :   Vitals:    05/02/24 1345   BP: 122/78   BP Location: Left arm   Patient Position: Sitting   Cuff Size: Large Adult   Temp: 98.4 °F (36.9 °C)   TempSrc: Temporal   Weight: 84.6 kg (186 lb 8 oz)   Height: 172 cm (67.72\")   PainSc:   5   PainLoc: Hip     General: Alert and Oriented x 3, No acute distress.  Psych: mood and affect appropriate; recent and remote memory intact  Eyes: conjunctivae clear; pupils equally round and reactive, sclerae antiicteric  CV: no peripheral edema  Resp: normal respiratory effort  Skin: no rashes or wounds; normal turgor    Labs:    No visits with results within 3 Week(s) from this visit.   Latest known visit with results is:   Office Visit on 04/01/2024   Component Date Value Ref Range Status    Color 04/01/2024 Orange (A)  Yellow, Straw, Dark Yellow, Rebecca Final    Clarity, UA 04/01/2024 Slightly Cloudy (A)  Clear Final    Specific Gravity  04/01/2024 1.015  1.005 - 1.030 Final    pH, Urine 04/01/2024 5.0  5.0 - 8.0 Final    Leukocytes 04/01/2024 Large (3+) (A)  Negative Final    Nitrite, UA 04/01/2024 Positive (A)  Negative Final    Protein, POC 04/01/2024 300 mg/dL (A)  Negative mg/dL Final    Glucose, UA 04/01/2024 250 mg/dL (A)  Negative mg/dL Final    Ketones, UA 04/01/2024 15 mg/dL (A)  Negative Final    Urobilinogen, UA 04/01/2024 8 E.U./dL (A)  Normal, 0.2 E.U./dL Final    Bilirubin 04/01/2024 Moderate (2+) (A)  Negative Final    Blood, UA 04/01/2024 Large (A)  Negative Final    Lot Number 04/01/2024 [id number]   Final    Expiration Date 04/01/2024 07/30/2024   Final    Urine Culture 04/01/2024 Final report   Final    Result 1 04/01/2024 No growth   Final       Xrays:  Xrays AP pelvis and a lateral of the Right hip were reviewed demonstrating  End stage hip OA with bone on bone articulation, subchondral cysts and periarticular osteophytes.    Assessment:  End-stage Right hip osteoarthritis. Conservative measures have failed.      Plan:  The plan is to " proceed with Right Total Hip Replacement. The patient voiced understanding of the risks, benefits, and alternative forms of treatment that were discussed with Dr King at the time of scheduling. 23 HH, had UTI well over a month ago which is treated, she is completely asymptomatic    Jacqueline Mayfield, APRN  5/2/2024

## 2024-05-03 ENCOUNTER — PRE-ADMISSION TESTING (OUTPATIENT)
Dept: PREADMISSION TESTING | Facility: HOSPITAL | Age: 74
End: 2024-05-03
Payer: MEDICARE

## 2024-05-03 VITALS
TEMPERATURE: 97.8 F | BODY MASS INDEX: 28.23 KG/M2 | DIASTOLIC BLOOD PRESSURE: 72 MMHG | SYSTOLIC BLOOD PRESSURE: 116 MMHG | HEIGHT: 68 IN | WEIGHT: 186.3 LBS | RESPIRATION RATE: 16 BRPM | OXYGEN SATURATION: 96 % | HEART RATE: 80 BPM

## 2024-05-03 LAB
ABO GROUP BLD: NORMAL
ANION GAP SERPL CALCULATED.3IONS-SCNC: 10.9 MMOL/L (ref 5–15)
BLD GP AB SCN SERPL QL: NEGATIVE
BUN SERPL-MCNC: 18 MG/DL (ref 8–23)
BUN/CREAT SERPL: 18.6 (ref 7–25)
CALCIUM SPEC-SCNC: 9.4 MG/DL (ref 8.6–10.5)
CHLORIDE SERPL-SCNC: 103 MMOL/L (ref 98–107)
CO2 SERPL-SCNC: 25.1 MMOL/L (ref 22–29)
CREAT SERPL-MCNC: 0.97 MG/DL (ref 0.57–1)
DEPRECATED RDW RBC AUTO: 46.7 FL (ref 37–54)
EGFRCR SERPLBLD CKD-EPI 2021: 61.4 ML/MIN/1.73
ERYTHROCYTE [DISTWIDTH] IN BLOOD BY AUTOMATED COUNT: 13.5 % (ref 12.3–15.4)
GLUCOSE SERPL-MCNC: 105 MG/DL (ref 65–99)
HCT VFR BLD AUTO: 39.7 % (ref 34–46.6)
HGB BLD-MCNC: 13 G/DL (ref 12–15.9)
MCH RBC QN AUTO: 30.8 PG (ref 26.6–33)
MCHC RBC AUTO-ENTMCNC: 32.7 G/DL (ref 31.5–35.7)
MCV RBC AUTO: 94.1 FL (ref 79–97)
PLATELET # BLD AUTO: 316 10*3/MM3 (ref 140–450)
PMV BLD AUTO: 9.4 FL (ref 6–12)
POTASSIUM SERPL-SCNC: 3.8 MMOL/L (ref 3.5–5.2)
QT INTERVAL: 440 MS
QTC INTERVAL: 440 MS
RBC # BLD AUTO: 4.22 10*6/MM3 (ref 3.77–5.28)
RH BLD: POSITIVE
SODIUM SERPL-SCNC: 139 MMOL/L (ref 136–145)
T&S EXPIRATION DATE: NORMAL
WBC NRBC COR # BLD AUTO: 6.33 10*3/MM3 (ref 3.4–10.8)

## 2024-05-03 PROCEDURE — 86850 RBC ANTIBODY SCREEN: CPT

## 2024-05-03 PROCEDURE — 80048 BASIC METABOLIC PNL TOTAL CA: CPT

## 2024-05-03 PROCEDURE — 86900 BLOOD TYPING SEROLOGIC ABO: CPT

## 2024-05-03 PROCEDURE — 85027 COMPLETE CBC AUTOMATED: CPT

## 2024-05-03 PROCEDURE — 86901 BLOOD TYPING SEROLOGIC RH(D): CPT

## 2024-05-03 PROCEDURE — 93005 ELECTROCARDIOGRAM TRACING: CPT

## 2024-05-03 PROCEDURE — 36415 COLL VENOUS BLD VENIPUNCTURE: CPT

## 2024-05-03 RX ORDER — ATENOLOL 25 MG/1
25 TABLET ORAL DAILY
Qty: 90 TABLET | Refills: 2 | Status: SHIPPED | OUTPATIENT
Start: 2024-05-03

## 2024-05-03 RX ORDER — NAPROXEN SODIUM 220 MG
220 TABLET ORAL 2 TIMES DAILY PRN
Status: ON HOLD | COMMUNITY

## 2024-05-03 RX ORDER — ACETAMINOPHEN 500 MG
500 TABLET ORAL EVERY 6 HOURS PRN
Status: ON HOLD | COMMUNITY

## 2024-05-03 RX ORDER — ESCITALOPRAM OXALATE 20 MG/1
20 TABLET ORAL DAILY
Qty: 90 TABLET | Refills: 2 | Status: SHIPPED | OUTPATIENT
Start: 2024-05-03

## 2024-05-03 RX ORDER — NIFEDIPINE 30 MG/1
30 TABLET, EXTENDED RELEASE ORAL DAILY
Qty: 90 TABLET | Refills: 2 | Status: SHIPPED | OUTPATIENT
Start: 2024-05-03

## 2024-05-03 RX ORDER — LEVOTHYROXINE SODIUM 0.05 MG/1
50 TABLET ORAL DAILY
Qty: 90 TABLET | Refills: 2 | Status: SHIPPED | OUTPATIENT
Start: 2024-05-03

## 2024-05-03 NOTE — DISCHARGE INSTRUCTIONS
Take the following medications the morning of surgery:  LEXAPRO, CLARITIN, ATENOLOL, NIFEDIPINE AND LEVOTHYROXINE      If you are on prescription narcotic pain medication to control your pain you may also take that medication the morning of surgery.    General Instructions:  Do not eat solid food after midnight the night before surgery.  You may drink clear liquids day of surgery but must stop at least one hour before your hospital arrival time.  It is beneficial for you to have a clear drink that contains carbohydrates the day of surgery.  We suggest a 12 to 20 ounce bottle of Gatorade or Powerade for non-diabetic patients or a 12 to 20 ounce bottle of G2 or Powerade Zero for diabetic patients. (Pediatric patients, are not advised to drink a 12 to 20 ounce carbohydrate drink)    Clear liquids are liquids you can see through.  Nothing red in color.     Plain water                               Sports drinks  Sodas                                   Gelatin (Jell-O)  Fruit juices without pulp such as white grape juice and apple juice  Popsicles that contain no fruit or yogurt  Tea or coffee (no cream or milk added)  Gatorade / Powerade  G2 / Powerade Zero    Patients who avoid smoking, chewing tobacco and alcohol for 4 weeks prior to surgery have a reduced risk of post-operative complications.  Quit smoking as many days before surgery as you can.  Do not smoke, use chewing tobacco or drink alcohol the day of surgery.   If applicable bring your C-PAP/ BI-PAP machine in with you to preop day of surgery.  Bring any papers given to you in the doctor’s office.  Wear clean comfortable clothes.  Do not wear contact lenses, false eyelashes or make-up.  Bring a case for your glasses.   Bring crutches or walker if applicable.  Remove all piercings.  Leave jewelry and any other valuables at home.  Hair extensions with metal clips must be removed prior to surgery.  The Pre-Admission Testing nurse will instruct you to bring  medications if unable to obtain an accurate list in Pre-Admission Testing.      Preventing a Surgical Site Infection:  For 2 to 3 days before surgery, avoid shaving with a razor because the razor can irritate skin and make it easier to develop an infection.    Any areas of open skin can increase the risk of a post-operative wound infection by allowing bacteria to enter and travel throughout the body.  Notify your surgeon if you have any skin wounds / rashes even if it is not near the expected surgical site.  The area will need assessed to determine if surgery should be delayed until it is healed.  The night prior to surgery shower using a fresh bar of anti-bacterial soap (such as Dial) and clean washcloth.  Sleep in a clean bed with clean clothing.  Do not allow pets to sleep with you.  Shower on the morning of surgery using a fresh bar of anti-bacterial soap (such as Dial) and clean washcloth.  Dry with a clean towel and dress in clean clothing.  Ask your surgeon if you will be receiving antibiotics prior to surgery.  Make sure you, your family, and all healthcare providers clean their hands with soap and water or an alcohol based hand  before caring for you or your wound.      CHLORHEXIDINE CLOTH INSTRUCTIONS  The morning of surgery follow these instructions using the Chlorhexidine cloths you've been given.  These steps reduce bacteria on the body.  Do not use the cloths near your eyes, ears mouth, genitalia or on open wounds.  Throw the cloths away after use but do not try to flush them down a toilet.      Open and remove one cloth at a time from the package.    Leave the cloth unfolded and begin the bathing.  Massage the skin with the cloths using gentle pressure to remove bacteria.  Do not scrub harshly.   Follow the steps below with one 2% CHG cloth per area (6 total cloths).  One cloth for neck, shoulders and chest.  One cloth for both arms, hands, fingers and underarms (do underarms last).  One  cloth for the abdomen followed by groin.  One cloth for right leg and foot including between the toes.  One cloth for left leg and foot including between the toes.  The last cloth is to be used for the back of the neck, back and buttocks.    Allow the CHG to air dry 3 minutes on the skin which will give it time to work and decrease the chance of irritation.  The skin may feel sticky until it is dry.  Do not rinse with water or any other liquid or you will lose the beneficial effects of the CHG.  If mild skin irritation occurs, do rinse the skin to remove the CHG.  Report this to the nurse at time of admission.  Do not apply lotions, creams, ointments, deodorants or perfumes after using the clothes. Dress in clean clothes before coming to the hospital.        Day of surgery:  Your arrival time is approximately two hours before your scheduled surgery time.  Upon arrival, a Pre-op nurse and Anesthesiologist will review your health history, obtain vital signs, and answer questions you may have.  The only belongings needed at this time will be a list of your home medications and if applicable your C-PAP/BI-PAP machine.  A Pre-op nurse will start an IV and you may receive medication in preparation for surgery, including something to help you relax.     Please be aware that surgery does come with discomfort.  We want to make every effort to control your discomfort so please discuss any uncontrolled symptoms with your nurse.   Your doctor will most likely have prescribed pain medications.      If you are going home after surgery you will receive individualized written care instructions before being discharged.  A responsible adult must drive you to and from the hospital on the day of your surgery and ideally stay with you through the night.   .  Discharge prescriptions can be filled by the hospital pharmacy during regular pharmacy hours.  If you are having surgery late in the day/evening your prescription may be  e-prescribed to your pharmacy.  Please verify your pharmacy hours or chose a 24 hour pharmacy to avoid not having access to your prescription because your pharmacy has closed for the day.    If you are staying overnight following surgery, you will be transported to your hospital room following the recovery period.  [facility] has all private rooms.    If you have any questions please call Pre-Admission Testing at [phone].  Deductibles and co-payments are collected on the day of service. Please be prepared to pay the required co-pay, deductible or deposit on the day of service as defined by your plan.    Call your surgeon immediately if you experience any of the following symptoms:  Sore Throat  Shortness of Breath or difficulty breathing  Cough  Chills  Body soreness or muscle pain  Headache  Fever  New loss of taste or smell  Do not arrive for your surgery ill.  Your procedure will need to be rescheduled to another time.  You will need to call your physician before the day of surgery to avoid any unnecessary exposure to hospital staff as well as other patients.

## 2024-05-10 ENCOUNTER — TELEPHONE (OUTPATIENT)
Dept: ORTHOPEDIC SURGERY | Facility: CLINIC | Age: 74
End: 2024-05-10
Payer: MEDICARE

## 2024-05-13 ENCOUNTER — APPOINTMENT (OUTPATIENT)
Dept: GENERAL RADIOLOGY | Facility: HOSPITAL | Age: 74
End: 2024-05-13
Payer: MEDICARE

## 2024-05-13 ENCOUNTER — ANESTHESIA EVENT (OUTPATIENT)
Dept: PERIOP | Facility: HOSPITAL | Age: 74
End: 2024-05-13
Payer: MEDICARE

## 2024-05-13 ENCOUNTER — ANESTHESIA (OUTPATIENT)
Dept: PERIOP | Facility: HOSPITAL | Age: 74
End: 2024-05-13
Payer: MEDICARE

## 2024-05-13 ENCOUNTER — HOSPITAL ENCOUNTER (OUTPATIENT)
Facility: HOSPITAL | Age: 74
Discharge: HOME | End: 2024-05-14
Attending: ORTHOPAEDIC SURGERY | Admitting: ORTHOPAEDIC SURGERY
Payer: MEDICARE

## 2024-05-13 DIAGNOSIS — M16.11 PRIMARY OSTEOARTHRITIS OF RIGHT HIP: ICD-10-CM

## 2024-05-13 DIAGNOSIS — Z96.641 STATUS POST TOTAL HIP REPLACEMENT, RIGHT: Primary | ICD-10-CM

## 2024-05-13 PROCEDURE — 63710000001 ROSUVASTATIN 10 MG TABLET: Performed by: NURSE PRACTITIONER

## 2024-05-13 PROCEDURE — 25010000002 DEXAMETHASONE PER 1 MG: Performed by: NURSE ANESTHETIST, CERTIFIED REGISTERED

## 2024-05-13 PROCEDURE — 25010000002 ACETAMINOPHEN 10 MG/ML SOLUTION: Performed by: NURSE ANESTHETIST, CERTIFIED REGISTERED

## 2024-05-13 PROCEDURE — 25010000002 EPINEPHRINE 1 MG/ML SOLUTION 30 ML VIAL: Performed by: ORTHOPAEDIC SURGERY

## 2024-05-13 PROCEDURE — 25010000002 PROPOFOL 200 MG/20ML EMULSION: Performed by: NURSE ANESTHETIST, CERTIFIED REGISTERED

## 2024-05-13 PROCEDURE — 25010000002 KETOROLAC TROMETHAMINE PER 15 MG: Performed by: ORTHOPAEDIC SURGERY

## 2024-05-13 PROCEDURE — 25010000002 FENTANYL CITRATE (PF) 100 MCG/2ML SOLUTION: Performed by: NURSE ANESTHETIST, CERTIFIED REGISTERED

## 2024-05-13 PROCEDURE — A9270 NON-COVERED ITEM OR SERVICE: HCPCS | Performed by: NURSE PRACTITIONER

## 2024-05-13 PROCEDURE — A9270 NON-COVERED ITEM OR SERVICE: HCPCS | Performed by: ORTHOPAEDIC SURGERY

## 2024-05-13 PROCEDURE — 63710000001 HYDROCODONE-ACETAMINOPHEN 7.5-325 MG TABLET: Performed by: NURSE PRACTITIONER

## 2024-05-13 PROCEDURE — 25010000002 PROPOFOL 10 MG/ML EMULSION: Performed by: NURSE ANESTHETIST, CERTIFIED REGISTERED

## 2024-05-13 PROCEDURE — 25810000003 SODIUM CHLORIDE 0.9 % SOLUTION 250 ML FLEX CONT: Performed by: ORTHOPAEDIC SURGERY

## 2024-05-13 PROCEDURE — 76000 FLUOROSCOPY <1 HR PHYS/QHP: CPT

## 2024-05-13 PROCEDURE — 25810000003 LACTATED RINGERS PER 1000 ML: Performed by: ANESTHESIOLOGY

## 2024-05-13 PROCEDURE — C1776 JOINT DEVICE (IMPLANTABLE): HCPCS | Performed by: ORTHOPAEDIC SURGERY

## 2024-05-13 PROCEDURE — 25010000002 MORPHINE PER 10 MG: Performed by: ORTHOPAEDIC SURGERY

## 2024-05-13 PROCEDURE — 25010000002 SUGAMMADEX 200 MG/2ML SOLUTION: Performed by: NURSE ANESTHETIST, CERTIFIED REGISTERED

## 2024-05-13 PROCEDURE — 25010000002 ONDANSETRON PER 1 MG: Performed by: NURSE ANESTHETIST, CERTIFIED REGISTERED

## 2024-05-13 PROCEDURE — 25010000002 PHENYLEPHRINE 10 MG/ML SOLUTION: Performed by: NURSE ANESTHETIST, CERTIFIED REGISTERED

## 2024-05-13 PROCEDURE — 27130 TOTAL HIP ARTHROPLASTY: CPT | Performed by: ORTHOPAEDIC SURGERY

## 2024-05-13 PROCEDURE — 25010000002 ROPIVACAINE PER 1 MG: Performed by: ORTHOPAEDIC SURGERY

## 2024-05-13 PROCEDURE — 73501 X-RAY EXAM HIP UNI 1 VIEW: CPT

## 2024-05-13 PROCEDURE — 63710000001 MELOXICAM 15 MG TABLET: Performed by: ORTHOPAEDIC SURGERY

## 2024-05-13 PROCEDURE — 97530 THERAPEUTIC ACTIVITIES: CPT

## 2024-05-13 PROCEDURE — 25010000002 CEFAZOLIN PER 500 MG: Performed by: ORTHOPAEDIC SURGERY

## 2024-05-13 PROCEDURE — 25010000002 CEFAZOLIN PER 500 MG: Performed by: NURSE PRACTITIONER

## 2024-05-13 PROCEDURE — 25810000003 LACTATED RINGERS SOLUTION: Performed by: ORTHOPAEDIC SURGERY

## 2024-05-13 PROCEDURE — 63710000001 PREGABALIN 75 MG CAPSULE: Performed by: ORTHOPAEDIC SURGERY

## 2024-05-13 PROCEDURE — 25010000002 VANCOMYCIN HCL 1.25 G RECONSTITUTED SOLUTION 1 EACH VIAL: Performed by: ORTHOPAEDIC SURGERY

## 2024-05-13 PROCEDURE — 97161 PT EVAL LOW COMPLEX 20 MIN: CPT

## 2024-05-13 PROCEDURE — 27130 TOTAL HIP ARTHROPLASTY: CPT | Performed by: NURSE PRACTITIONER

## 2024-05-13 DEVICE — R3 0 DEGREE XLPE ACETABULAR LINER                                    36MM ID X OD 54MM
Type: IMPLANTABLE DEVICE | Site: HIP | Status: FUNCTIONAL
Brand: R3

## 2024-05-13 DEVICE — KNOTLESS TISSUE CONTROL DEVICE, VIOLET UNIDIRECTIONAL (ANTIBACTERIAL) SYNTHETIC ABSORBABLE DEVICE
Type: IMPLANTABLE DEVICE | Site: HIP | Status: FUNCTIONAL
Brand: STRATAFIX

## 2024-05-13 DEVICE — OXINIUM FEMORAL HEAD 12/14 TAPER                                    36 MM +0
Type: IMPLANTABLE DEVICE | Site: HIP | Status: FUNCTIONAL
Brand: OXINIUM

## 2024-05-13 DEVICE — KNOTLESS TISSUE CONTROL DEVICE, UNDYED UNIDIRECTIONAL (ANTIBACTERIAL) SYNTHETIC ABSORBABLE DEVICE
Type: IMPLANTABLE DEVICE | Site: HIP | Status: FUNCTIONAL
Brand: STRATAFIX

## 2024-05-13 DEVICE — POLARSTEM STANDARD NON-CEMENTED                                    WITH TI/HA 4
Type: IMPLANTABLE DEVICE | Site: HIP | Status: FUNCTIONAL
Brand: POLARSTEM

## 2024-05-13 DEVICE — R3 3 HOLE ACETABULAR SHELL 54MM
Type: IMPLANTABLE DEVICE | Site: HIP | Status: FUNCTIONAL
Brand: R3 ACETABULAR

## 2024-05-13 DEVICE — REFLECTION SPHERICAL HEAD SCREW 20MM
Type: IMPLANTABLE DEVICE | Site: HIP | Status: FUNCTIONAL
Brand: REFLECTION

## 2024-05-13 DEVICE — IMPLANTABLE DEVICE: Type: IMPLANTABLE DEVICE | Status: FUNCTIONAL

## 2024-05-13 RX ORDER — PREGABALIN 75 MG/1
150 CAPSULE ORAL ONCE
Status: COMPLETED | OUTPATIENT
Start: 2024-05-13 | End: 2024-05-13

## 2024-05-13 RX ORDER — ATENOLOL 25 MG/1
25 TABLET ORAL DAILY
Status: DISCONTINUED | OUTPATIENT
Start: 2024-05-14 | End: 2024-05-14 | Stop reason: HOSPADM

## 2024-05-13 RX ORDER — PROPOFOL 10 MG/ML
INJECTION, EMULSION INTRAVENOUS AS NEEDED
Status: DISCONTINUED | OUTPATIENT
Start: 2024-05-13 | End: 2024-05-13 | Stop reason: SURG

## 2024-05-13 RX ORDER — ONDANSETRON 2 MG/ML
INJECTION INTRAMUSCULAR; INTRAVENOUS AS NEEDED
Status: DISCONTINUED | OUTPATIENT
Start: 2024-05-13 | End: 2024-05-13 | Stop reason: SURG

## 2024-05-13 RX ORDER — HYDRALAZINE HYDROCHLORIDE 20 MG/ML
5 INJECTION INTRAMUSCULAR; INTRAVENOUS
Status: DISCONTINUED | OUTPATIENT
Start: 2024-05-13 | End: 2024-05-13 | Stop reason: HOSPADM

## 2024-05-13 RX ORDER — LABETALOL HYDROCHLORIDE 5 MG/ML
5 INJECTION, SOLUTION INTRAVENOUS
Status: DISCONTINUED | OUTPATIENT
Start: 2024-05-13 | End: 2024-05-13 | Stop reason: HOSPADM

## 2024-05-13 RX ORDER — FAMOTIDINE 10 MG/ML
20 INJECTION, SOLUTION INTRAVENOUS ONCE
Status: COMPLETED | OUTPATIENT
Start: 2024-05-13 | End: 2024-05-13

## 2024-05-13 RX ORDER — ACETAMINOPHEN 10 MG/ML
INJECTION, SOLUTION INTRAVENOUS AS NEEDED
Status: DISCONTINUED | OUTPATIENT
Start: 2024-05-13 | End: 2024-05-13 | Stop reason: SURG

## 2024-05-13 RX ORDER — PROMETHAZINE HYDROCHLORIDE 25 MG/1
25 SUPPOSITORY RECTAL ONCE AS NEEDED
Status: DISCONTINUED | OUTPATIENT
Start: 2024-05-13 | End: 2024-05-13 | Stop reason: HOSPADM

## 2024-05-13 RX ORDER — OXYCODONE AND ACETAMINOPHEN 7.5; 325 MG/1; MG/1
1 TABLET ORAL EVERY 4 HOURS PRN
Status: DISCONTINUED | OUTPATIENT
Start: 2024-05-13 | End: 2024-05-13 | Stop reason: HOSPADM

## 2024-05-13 RX ORDER — LIDOCAINE HYDROCHLORIDE 20 MG/ML
INJECTION, SOLUTION INFILTRATION; PERINEURAL AS NEEDED
Status: DISCONTINUED | OUTPATIENT
Start: 2024-05-13 | End: 2024-05-13 | Stop reason: SURG

## 2024-05-13 RX ORDER — FLUMAZENIL 0.1 MG/ML
0.2 INJECTION INTRAVENOUS AS NEEDED
Status: DISCONTINUED | OUTPATIENT
Start: 2024-05-13 | End: 2024-05-13 | Stop reason: HOSPADM

## 2024-05-13 RX ORDER — ONDANSETRON 2 MG/ML
4 INJECTION INTRAMUSCULAR; INTRAVENOUS ONCE AS NEEDED
Status: DISCONTINUED | OUTPATIENT
Start: 2024-05-13 | End: 2024-05-14 | Stop reason: HOSPADM

## 2024-05-13 RX ORDER — HYDROMORPHONE HYDROCHLORIDE 1 MG/ML
0.5 INJECTION, SOLUTION INTRAMUSCULAR; INTRAVENOUS; SUBCUTANEOUS
Status: DISCONTINUED | OUTPATIENT
Start: 2024-05-13 | End: 2024-05-13 | Stop reason: HOSPADM

## 2024-05-13 RX ORDER — ONDANSETRON 4 MG/1
4 TABLET, ORALLY DISINTEGRATING ORAL EVERY 6 HOURS PRN
Status: DISCONTINUED | OUTPATIENT
Start: 2024-05-13 | End: 2024-05-14 | Stop reason: HOSPADM

## 2024-05-13 RX ORDER — SODIUM CHLORIDE 0.9 % (FLUSH) 0.9 %
3 SYRINGE (ML) INJECTION EVERY 12 HOURS SCHEDULED
Status: DISCONTINUED | OUTPATIENT
Start: 2024-05-13 | End: 2024-05-13 | Stop reason: HOSPADM

## 2024-05-13 RX ORDER — UREA 10 %
1 LOTION (ML) TOPICAL NIGHTLY PRN
Status: DISCONTINUED | OUTPATIENT
Start: 2024-05-13 | End: 2024-05-14 | Stop reason: HOSPADM

## 2024-05-13 RX ORDER — DROPERIDOL 2.5 MG/ML
0.62 INJECTION, SOLUTION INTRAMUSCULAR; INTRAVENOUS
Status: DISCONTINUED | OUTPATIENT
Start: 2024-05-13 | End: 2024-05-13 | Stop reason: HOSPADM

## 2024-05-13 RX ORDER — FENTANYL CITRATE 50 UG/ML
INJECTION, SOLUTION INTRAMUSCULAR; INTRAVENOUS AS NEEDED
Status: DISCONTINUED | OUTPATIENT
Start: 2024-05-13 | End: 2024-05-13 | Stop reason: SURG

## 2024-05-13 RX ORDER — IPRATROPIUM BROMIDE AND ALBUTEROL SULFATE 2.5; .5 MG/3ML; MG/3ML
3 SOLUTION RESPIRATORY (INHALATION) ONCE AS NEEDED
Status: DISCONTINUED | OUTPATIENT
Start: 2024-05-13 | End: 2024-05-13 | Stop reason: HOSPADM

## 2024-05-13 RX ORDER — HYDROCODONE BITARTRATE AND ACETAMINOPHEN 7.5; 325 MG/1; MG/1
1 TABLET ORAL EVERY 4 HOURS PRN
Status: DISCONTINUED | OUTPATIENT
Start: 2024-05-13 | End: 2024-05-14 | Stop reason: HOSPADM

## 2024-05-13 RX ORDER — LEVOTHYROXINE SODIUM 0.05 MG/1
50 TABLET ORAL DAILY
Status: DISCONTINUED | OUTPATIENT
Start: 2024-05-14 | End: 2024-05-14 | Stop reason: HOSPADM

## 2024-05-13 RX ORDER — NALOXONE HCL 0.4 MG/ML
0.2 VIAL (ML) INJECTION AS NEEDED
Status: DISCONTINUED | OUTPATIENT
Start: 2024-05-13 | End: 2024-05-13 | Stop reason: HOSPADM

## 2024-05-13 RX ORDER — TRANEXAMIC ACID 100 MG/ML
INJECTION, SOLUTION INTRAVENOUS AS NEEDED
Status: DISCONTINUED | OUTPATIENT
Start: 2024-05-13 | End: 2024-05-13 | Stop reason: SURG

## 2024-05-13 RX ORDER — NIFEDIPINE 30 MG/1
30 TABLET, EXTENDED RELEASE ORAL DAILY
Status: DISCONTINUED | OUTPATIENT
Start: 2024-05-14 | End: 2024-05-14 | Stop reason: HOSPADM

## 2024-05-13 RX ORDER — ESCITALOPRAM OXALATE 20 MG/1
20 TABLET ORAL DAILY
Status: DISCONTINUED | OUTPATIENT
Start: 2024-05-14 | End: 2024-05-14 | Stop reason: HOSPADM

## 2024-05-13 RX ORDER — HYDROCODONE BITARTRATE AND ACETAMINOPHEN 5; 325 MG/1; MG/1
1 TABLET ORAL ONCE AS NEEDED
Status: DISCONTINUED | OUTPATIENT
Start: 2024-05-13 | End: 2024-05-13 | Stop reason: HOSPADM

## 2024-05-13 RX ORDER — FENTANYL CITRATE 50 UG/ML
50 INJECTION, SOLUTION INTRAMUSCULAR; INTRAVENOUS
Status: DISCONTINUED | OUTPATIENT
Start: 2024-05-13 | End: 2024-05-13 | Stop reason: HOSPADM

## 2024-05-13 RX ORDER — POLYETHYLENE GLYCOL 3350 17 G/17G
17 POWDER, FOR SOLUTION ORAL 2 TIMES DAILY
Qty: 238 G | Refills: 0 | Status: SHIPPED | OUTPATIENT
Start: 2024-05-13 | End: 2024-05-14

## 2024-05-13 RX ORDER — ONDANSETRON 2 MG/ML
4 INJECTION INTRAMUSCULAR; INTRAVENOUS ONCE AS NEEDED
Status: DISCONTINUED | OUTPATIENT
Start: 2024-05-13 | End: 2024-05-13 | Stop reason: HOSPADM

## 2024-05-13 RX ORDER — ROCURONIUM BROMIDE 10 MG/ML
INJECTION, SOLUTION INTRAVENOUS AS NEEDED
Status: DISCONTINUED | OUTPATIENT
Start: 2024-05-13 | End: 2024-05-13 | Stop reason: SURG

## 2024-05-13 RX ORDER — PANTOPRAZOLE SODIUM 40 MG/1
40 TABLET, DELAYED RELEASE ORAL DAILY
Qty: 14 TABLET | Refills: 0 | Status: SHIPPED | OUTPATIENT
Start: 2024-05-13 | End: 2024-05-14

## 2024-05-13 RX ORDER — EPHEDRINE SULFATE 50 MG/ML
5 INJECTION, SOLUTION INTRAVENOUS ONCE AS NEEDED
Status: DISCONTINUED | OUTPATIENT
Start: 2024-05-13 | End: 2024-05-13 | Stop reason: HOSPADM

## 2024-05-13 RX ORDER — HYDROCODONE BITARTRATE AND ACETAMINOPHEN 7.5; 325 MG/1; MG/1
1 TABLET ORAL EVERY 4 HOURS PRN
Qty: 40 TABLET | Refills: 0 | Status: SHIPPED | OUTPATIENT
Start: 2024-05-13 | End: 2024-05-14

## 2024-05-13 RX ORDER — MAGNESIUM HYDROXIDE 1200 MG/15ML
LIQUID ORAL AS NEEDED
Status: DISCONTINUED | OUTPATIENT
Start: 2024-05-13 | End: 2024-05-13 | Stop reason: HOSPADM

## 2024-05-13 RX ORDER — LIDOCAINE HYDROCHLORIDE 10 MG/ML
0.5 INJECTION, SOLUTION INFILTRATION; PERINEURAL ONCE AS NEEDED
Status: DISCONTINUED | OUTPATIENT
Start: 2024-05-13 | End: 2024-05-13 | Stop reason: HOSPADM

## 2024-05-13 RX ORDER — FENTANYL CITRATE 50 UG/ML
50 INJECTION, SOLUTION INTRAMUSCULAR; INTRAVENOUS ONCE AS NEEDED
Status: DISCONTINUED | OUTPATIENT
Start: 2024-05-13 | End: 2024-05-13 | Stop reason: HOSPADM

## 2024-05-13 RX ORDER — DIPHENHYDRAMINE HYDROCHLORIDE 50 MG/ML
12.5 INJECTION INTRAMUSCULAR; INTRAVENOUS
Status: DISCONTINUED | OUTPATIENT
Start: 2024-05-13 | End: 2024-05-13 | Stop reason: HOSPADM

## 2024-05-13 RX ORDER — MELOXICAM 15 MG/1
15 TABLET ORAL DAILY
Qty: 14 TABLET | Refills: 0 | Status: SHIPPED | OUTPATIENT
Start: 2024-05-13 | End: 2024-05-14

## 2024-05-13 RX ORDER — PHENYLEPHRINE HYDROCHLORIDE 10 MG/ML
INJECTION INTRAVENOUS AS NEEDED
Status: DISCONTINUED | OUTPATIENT
Start: 2024-05-13 | End: 2024-05-13 | Stop reason: SURG

## 2024-05-13 RX ORDER — PROMETHAZINE HYDROCHLORIDE 25 MG/1
25 TABLET ORAL ONCE AS NEEDED
Status: DISCONTINUED | OUTPATIENT
Start: 2024-05-13 | End: 2024-05-13 | Stop reason: HOSPADM

## 2024-05-13 RX ORDER — MELOXICAM 15 MG/1
15 TABLET ORAL ONCE
Status: COMPLETED | OUTPATIENT
Start: 2024-05-13 | End: 2024-05-13

## 2024-05-13 RX ORDER — MIDAZOLAM HYDROCHLORIDE 1 MG/ML
0.5 INJECTION INTRAMUSCULAR; INTRAVENOUS
Status: DISCONTINUED | OUTPATIENT
Start: 2024-05-13 | End: 2024-05-13 | Stop reason: HOSPADM

## 2024-05-13 RX ORDER — ONDANSETRON 4 MG/1
4 TABLET, FILM COATED ORAL EVERY 8 HOURS PRN
Qty: 10 TABLET | Refills: 0 | Status: SHIPPED | OUTPATIENT
Start: 2024-05-13 | End: 2024-05-14

## 2024-05-13 RX ORDER — SODIUM CHLORIDE, SODIUM LACTATE, POTASSIUM CHLORIDE, CALCIUM CHLORIDE 600; 310; 30; 20 MG/100ML; MG/100ML; MG/100ML; MG/100ML
9 INJECTION, SOLUTION INTRAVENOUS CONTINUOUS
Status: DISCONTINUED | OUTPATIENT
Start: 2024-05-13 | End: 2024-05-13

## 2024-05-13 RX ORDER — ACETAMINOPHEN 325 MG/1
650 TABLET ORAL EVERY 6 HOURS PRN
Status: DISCONTINUED | OUTPATIENT
Start: 2024-05-13 | End: 2024-05-14 | Stop reason: HOSPADM

## 2024-05-13 RX ORDER — DEXAMETHASONE SODIUM PHOSPHATE 4 MG/ML
INJECTION, SOLUTION INTRA-ARTICULAR; INTRALESIONAL; INTRAMUSCULAR; INTRAVENOUS; SOFT TISSUE AS NEEDED
Status: DISCONTINUED | OUTPATIENT
Start: 2024-05-13 | End: 2024-05-13 | Stop reason: SURG

## 2024-05-13 RX ORDER — CHLORHEXIDINE GLUCONATE 500 MG/1
CLOTH TOPICAL 2 TIMES DAILY
Status: DISCONTINUED | OUTPATIENT
Start: 2024-05-13 | End: 2024-05-14 | Stop reason: HOSPADM

## 2024-05-13 RX ORDER — PROMETHAZINE HYDROCHLORIDE 12.5 MG/1
12.5 TABLET ORAL EVERY 4 HOURS PRN
Status: DISCONTINUED | OUTPATIENT
Start: 2024-05-13 | End: 2024-05-14 | Stop reason: HOSPADM

## 2024-05-13 RX ORDER — ASPIRIN 81 MG/1
TABLET ORAL
Qty: 60 TABLET | Refills: 0 | Status: SHIPPED | OUTPATIENT
Start: 2024-05-13 | End: 2024-05-14

## 2024-05-13 RX ORDER — HYDROCODONE BITARTRATE AND ACETAMINOPHEN 7.5; 325 MG/1; MG/1
2 TABLET ORAL EVERY 4 HOURS PRN
Status: DISCONTINUED | OUTPATIENT
Start: 2024-05-13 | End: 2024-05-14 | Stop reason: HOSPADM

## 2024-05-13 RX ORDER — ROSUVASTATIN CALCIUM 10 MG/1
10 TABLET, COATED ORAL NIGHTLY
Status: DISCONTINUED | OUTPATIENT
Start: 2024-05-13 | End: 2024-05-14 | Stop reason: HOSPADM

## 2024-05-13 RX ORDER — SODIUM CHLORIDE 0.9 % (FLUSH) 0.9 %
3-10 SYRINGE (ML) INJECTION AS NEEDED
Status: DISCONTINUED | OUTPATIENT
Start: 2024-05-13 | End: 2024-05-13 | Stop reason: HOSPADM

## 2024-05-13 RX ORDER — ASPIRIN 81 MG/1
81 TABLET ORAL EVERY 12 HOURS SCHEDULED
Status: DISCONTINUED | OUTPATIENT
Start: 2024-05-14 | End: 2024-05-14 | Stop reason: HOSPADM

## 2024-05-13 RX ADMIN — PHENYLEPHRINE HYDROCHLORIDE 100 MCG: 10 INJECTION INTRAVENOUS at 07:29

## 2024-05-13 RX ADMIN — HYDROCODONE BITARTRATE AND ACETAMINOPHEN 1 TABLET: 7.5; 325 TABLET ORAL at 23:07

## 2024-05-13 RX ADMIN — PHENYLEPHRINE HYDROCHLORIDE 100 MCG: 10 INJECTION INTRAVENOUS at 07:25

## 2024-05-13 RX ADMIN — ROCURONIUM BROMIDE 50 MG: 10 INJECTION, SOLUTION INTRAVENOUS at 07:16

## 2024-05-13 RX ADMIN — ACETAMINOPHEN 1000 MG: 1000 INJECTION INTRAVENOUS at 07:21

## 2024-05-13 RX ADMIN — SODIUM CHLORIDE 1250 MG: 0.9 INJECTION, SOLUTION INTRAVENOUS at 07:02

## 2024-05-13 RX ADMIN — HYDROCODONE BITARTRATE AND ACETAMINOPHEN 1 TABLET: 7.5; 325 TABLET ORAL at 12:14

## 2024-05-13 RX ADMIN — SODIUM CHLORIDE 2000 MG: 900 INJECTION INTRAVENOUS at 21:34

## 2024-05-13 RX ADMIN — SODIUM CHLORIDE 2 G: 900 INJECTION INTRAVENOUS at 07:04

## 2024-05-13 RX ADMIN — PROPOFOL 10 MG: 10 INJECTION, EMULSION INTRAVENOUS at 08:52

## 2024-05-13 RX ADMIN — FENTANYL CITRATE 25 MCG: 50 INJECTION, SOLUTION INTRAMUSCULAR; INTRAVENOUS at 08:52

## 2024-05-13 RX ADMIN — DEXAMETHASONE SODIUM PHOSPHATE 8 MG: 4 INJECTION, SOLUTION INTRA-ARTICULAR; INTRALESIONAL; INTRAMUSCULAR; INTRAVENOUS; SOFT TISSUE at 07:17

## 2024-05-13 RX ADMIN — ROSUVASTATIN CALCIUM 10 MG: 10 TABLET, FILM COATED ORAL at 21:33

## 2024-05-13 RX ADMIN — PROPOFOL 160 MG: 10 INJECTION, EMULSION INTRAVENOUS at 07:16

## 2024-05-13 RX ADMIN — HYDROCODONE BITARTRATE AND ACETAMINOPHEN 1 TABLET: 7.5; 325 TABLET ORAL at 18:18

## 2024-05-13 RX ADMIN — SUGAMMADEX 200 MG: 100 INJECTION, SOLUTION INTRAVENOUS at 08:52

## 2024-05-13 RX ADMIN — SODIUM CHLORIDE 2000 MG: 900 INJECTION INTRAVENOUS at 14:33

## 2024-05-13 RX ADMIN — SUGAMMADEX 200 MG: 100 INJECTION, SOLUTION INTRAVENOUS at 09:09

## 2024-05-13 RX ADMIN — MELOXICAM 15 MG: 15 TABLET ORAL at 06:52

## 2024-05-13 RX ADMIN — LIDOCAINE HYDROCHLORIDE 90 MG: 20 INJECTION, SOLUTION INFILTRATION; PERINEURAL at 07:16

## 2024-05-13 RX ADMIN — FENTANYL CITRATE 25 MCG: 50 INJECTION, SOLUTION INTRAMUSCULAR; INTRAVENOUS at 08:56

## 2024-05-13 RX ADMIN — PREGABALIN 150 MG: 75 CAPSULE ORAL at 06:52

## 2024-05-13 RX ADMIN — ONDANSETRON 4 MG: 2 INJECTION INTRAMUSCULAR; INTRAVENOUS at 08:28

## 2024-05-13 RX ADMIN — CHLORHEXIDINE GLUCONATE: 500 CLOTH TOPICAL at 21:34

## 2024-05-13 RX ADMIN — TRANEXAMIC ACID 1000 MG: 100 INJECTION, SOLUTION INTRAVENOUS at 08:31

## 2024-05-13 RX ADMIN — PROPOFOL 130 MCG/KG/MIN: 10 INJECTION, EMULSION INTRAVENOUS at 07:16

## 2024-05-13 RX ADMIN — SODIUM CHLORIDE, POTASSIUM CHLORIDE, SODIUM LACTATE AND CALCIUM CHLORIDE: 600; 310; 30; 20 INJECTION, SOLUTION INTRAVENOUS at 07:08

## 2024-05-13 RX ADMIN — TRANEXAMIC ACID 1000 MG: 100 INJECTION, SOLUTION INTRAVENOUS at 07:22

## 2024-05-13 RX ADMIN — SODIUM CHLORIDE, POTASSIUM CHLORIDE, SODIUM LACTATE AND CALCIUM CHLORIDE 500 ML: 600; 310; 30; 20 INJECTION, SOLUTION INTRAVENOUS at 06:51

## 2024-05-13 RX ADMIN — FENTANYL CITRATE 50 MCG: 50 INJECTION, SOLUTION INTRAMUSCULAR; INTRAVENOUS at 07:16

## 2024-05-13 RX ADMIN — FAMOTIDINE 20 MG: 10 INJECTION INTRAVENOUS at 07:06

## 2024-05-13 NOTE — THERAPY EVALUATION
Patient Name: Anastasiia Faria  : 1950    MRN: [id number]                              Today's Date: 2024       Admit Date: 2024    Visit Dx:     ICD-10-CM ICD-9-CM   1. Status post total hip replacement, right  Z96.641 V43.64   2. Primary osteoarthritis of right hip  M16.11 715.15     Patient Active Problem List   Diagnosis    Colon polyp, hyperplastic    Allergic rhinitis due to pollen    Acquired hypothyroidism    Essential hypertension    FH: colon cancer in relative <50 years old    Mixed hyperlipidemia    Chronic right shoulder pain    Periscapular pain    Other idiopathic scoliosis, thoracic region    Prediabetes    Age-related osteoporosis without current pathological fracture    Mild episode of recurrent major depressive disorder    Age-related nuclear cataract of both eyes    Cystoid nevus of conjunctiva    Anxiety    Cataract    Cataract extraction status of left eye    Cataract extraction status of right eye    Conjunctival cyst of left eye    Depressive disorder    Disorder of refraction    Dry eye syndrome of bilateral lacrimal glands    Hearing loss    Hearing loss    Hordeolum internum right upper eyelid    Migraine    Osteopenia    Osteoporosis    Renal stone    Seasonal allergies    Diverticulosis    Internal hemorrhoids    Posterior vitreous detachment of both eyes    Right hip pain    OA (osteoarthritis) of hip    Sepsis    Acute pyelonephritis    Ureteral calculus    E coli bacteremia    Acute respiratory failure with hypoxia    Dysuria     Past Medical History:   Diagnosis Date    Allergic     Anxiety     Cataract     Cataract surgery on right eye 2021 and left eye 2021. (Dr. Nanette Bateman)    Chronic kidney disease     Colon polyps     Depression     Diverticulosis     Encounter for annual health examination 2014    Annual Health Assessment    Family history of colon cancer     Fibrocystic breast     GERD (gastroesophageal reflux disease)     Hard of hearing      HEARING AIDS BILATERALLY    Heart murmur     Herpes labialis without complication     Hip pain     right    History of mammogram 12/20/2010    History of recent hospitalization 03/21/2024    KIDNEY STONE/SEPSIS 4 NIGHT AT [facility]    HL (hearing loss) 2014    Hearing Aids    Hyperlipidemia     Hypertension     Hypothyroidism     Insomnia     Kidney stone     Kidney stones     Migraines     Osteoarthritis of right hip     Osteopenia     Prolia -last 9/2021,3/2022    PONV (postoperative nausea and vomiting)     Scoliosis     Dr. Stanford 5/2018    Slow to wake up after anesthesia     Visual impairment     Wear Glasses     Past Surgical History:   Procedure Laterality Date    BONE MARROW BIOPSY Left     BREAST BIOPSY      BREAST CYST ASPIRATION Right     BREAST SURGERY Right     BIOPSY    CATARACT EXTRACTION, BILATERAL      COLONOSCOPY N/A 10/27/2016    Procedure: COLONOSCOPY INTO CECUM;  Surgeon: Osvaldo Dorado MD;  Location: [facility] ENDOSCOPY;  Service:     COLONOSCOPY  01/26/2011    COLONOSCOPY  02/04/2005    COLONOSCOPY N/A 12/02/2021    Procedure: COLONOSCOPY INTO CECUM WITH COLD BIOPSY POLYPECTOMY AND HOT SNARE POLYPECTOMY;  Surgeon: Osvaldo Dorado MD;  Location: [facility] ENDOSCOPY;  Service: General;  Laterality: N/A;  PRE-FAMILY HISTORY OF COLON CANCER, PERSONAL HISTORY OF COLON CANCER  POST- POLYPS, DIVERTICULOSIS, HEMORRHOIDS    CYSTOSCOPY W/ URETERAL STENT PLACEMENT Left 03/21/2024    Procedure: LEFT CYSTOSCOPY RETROGRADE PYLEOGRAM URETERAL STENT INSERTION;  Surgeon: Jaiden Bolton Jr., MD;  Location: [facility] OR;  Service: Urology;  Laterality: Left;    CYSTOSCOPY W/ URETERAL STENT REMOVAL  04/23/2024    KNEE ARTHROSCOPY Left     PAP SMEAR  12/20/2010      General Information       Row Name 05/13/24 1407          Physical Therapy Time and Intention    Document Type evaluation  -EJ     Mode of Treatment physical therapy  -EJ       Row Name 05/13/24 1407          General  Information    Patient Profile Reviewed yes  -EJ     Prior Level of Function independent:;all household mobility;community mobility;ADL's  -EJ     Existing Precautions/Restrictions hip, anterior;right  -EJ     Barriers to Rehab none identified  -       Row Name 05/13/24 1407          Living Environment    People in Home spouse  -[facility] Name 05/13/24 1407          Home Main Entrance    Number of Stairs, Main Entrance three  -[facility] Name 05/13/24 1407          Stairs Within Home, Primary    Number of Stairs, Within Home, Primary none  -[facility] Name 05/13/24 1407          Cognition    Orientation Status (Cognition) oriented x 4  -[facility] Name 05/13/24 1407          Safety Issues, Functional Mobility    Impairments Affecting Function (Mobility) strength;pain;range of motion (ROM)  -               User Key  (r) = Recorded By, (t) = Taken By, (c) = Cosigned By      Initials Name Provider Type     Isatu De La O, PT Physical Therapist                   Mobility       [facility] Name 05/13/24 1407          Bed Mobility    Bed Mobility supine-sit  -EJ     Supine-Sit [place] (Bed Mobility) verbal cues;contact guard;standby assist  -     Assistive Device (Bed Mobility) head of bed elevated  -[facility] Name 05/13/24 1407          Sit-Stand Transfer    Sit-Stand [place] (Transfers) verbal cues;minimum assist (75% patient effort)  -     Assistive Device (Sit-Stand Transfers) walker, front-wheeled  -[facility] Name 05/13/24 1407          Gait/Stairs (Locomotion)    [place] Level (Gait) verbal cues;contact guard  -     Assistive Device (Gait) walker, front-wheeled  -     Distance in Feet (Gait) 25  -EJ     Deviations/Abnormal Patterns (Gait) phillip decreased;antalgic;stride length decreased  -EJ     Bilateral Gait Deviations heel strike decreased  -EJ     Comment, (Gait/Stairs) cues for sequence, very small steps w cues to increase step length as tolerated, no overt unsteadiness  noted  -EJ               User Key  (r) = Recorded By, (t) = Taken By, (c) = Cosigned By      Initials Name Provider Type    EJ Isatu De La O, PT Physical Therapist                   Obj/Interventions       Row Name 05/13/24 1408          Range of Motion Comprehensive    General Range of Motion no range of motion deficits identified  -EJ     Comment, General Range of Motion x R hip  -EJ       [facility] Name 05/13/24 1408          Strength Comprehensive (MMT)    Comment, General Manual Muscle Testing (MMT) Assessment post op weakness  -[facility] Name 05/13/24 1408          Motor Skills    Therapeutic Exercise --  R THR protocol x 5 reps  -EJ               User Key  (r) = Recorded By, (t) = Taken By, (c) = Cosigned By      Initials Name Provider Type    EJ Isatu De La O, PT Physical Therapist                   Goals/Plan       Row Name 05/13/24 1412          Bed Mobility Goal 1 (PT)    Activity/Assistive Device (Bed Mobility Goal 1, PT) bed mobility activities, all  -EJ     Centre Level/Cues Needed (Bed Mobility Goal 1, PT) supervision required  -EJ     Time Frame (Bed Mobility Goal 1, PT) 3 days  -EJ       Row Name 05/13/24 1412          Transfer Goal 1 (PT)    Activity/Assistive Device (Transfer Goal 1, PT) transfers, all;walker, rolling  -EJ     Centre Level/Cues Needed (Transfer Goal 1, PT) standby assist  -EJ     Time Frame (Transfer Goal 1, PT) 3 days  -EJ       Row Name 05/13/24 1412          Gait Training Goal 1 (PT)    Activity/Assistive Device (Gait Training Goal 1, PT) gait (walking locomotion);walker, rolling  -EJ     Centre Level (Gait Training Goal 1, PT) standby assist  -EJ     Distance (Gait Training Goal 1, PT) 100  -EJ     Time Frame (Gait Training Goal 1, PT) 3 days  -EJ       Row Name 05/13/24 1412          Stairs Goal 1 (PT)    Activity/Assistive Device (Stairs Goal 1, PT) stairs, all skills  -EJ     Centre Level/Cues Needed (Stairs Goal 1, PT) contact guard required   -EJ     Number of Stairs (Stairs Goal 1, PT) 3  -EJ     Time Frame (Stairs Goal 1, PT) 3 days  -       Row Name 05/13/24 1412          Therapy Assessment/Plan (PT)    Planned Therapy Interventions (PT) balance training;bed mobility training;gait training;home exercise program;stretching;strengthening;stair training;ROM (range of motion);patient/family education;transfer training  -EJ               User Key  (r) = Recorded By, (t) = Taken By, (c) = Cosigned By      Initials Name Provider Type    EJ Isatu De La O, PT Physical Therapist                   Clinical Impression       Row Name 05/13/24 1403          Pain    Pretreatment Pain Rating 5/10  -EJ     Posttreatment Pain Rating 5/10  -EJ     Pain Location - Side/Orientation Right  -EJ     Pain Location - hip  -EJ     Pain Intervention(s) Repositioned;Ambulation/increased activity  -       Row Name 05/13/24 0912          Plan of Care Review    Plan of Care Reviewed With patient;spouse  -     Outcome Evaluation Pt seen for PT eval this afternoon. She is POD 0 R anterior THR. She presents w expected post op pain and weakness. Pt doing well. She is in bed upon entry to room. Pt tolerated all hip exercises well prior to mobilizing. She was able to transition to EOB w SBA/CGA. Pt stood w CGA and Rwx w cues for hand placement. Pt ambulated approx 25 ft in room w CGA and Rwx. No unsteadiness. SHe required cues for sequence and to increase step length. Pt up in chair at end of session. Pt educated on anterior hip precautions. She plans home at [place], likely tomorrow, w spouse and HHPT. Pt will continue to benefit from skilled PT to maximize safety, strength and independence w mobility.  -       Row Name 05/13/24 9847          Therapy Assessment/Plan (PT)    Rehab Potential (PT) good, to achieve stated therapy goals  -     Criteria for Skilled Interventions Met (PT) yes  -EJ     Therapy Frequency (PT) daily  -       Row Name 05/13/24 2949          Positioning  and Restraints    Pre-Treatment Position in bed  -EJ     Post Treatment Position chair  -EJ     In Chair notified nsg;reclined;call light within reach;encouraged to call for assist;exit alarm on;with family/caregiver  -EJ               User Key  (r) = Recorded By, (t) = Taken By, (c) = Cosigned By      Initials Name Provider Type    Isatu Fair, PT Physical Therapist                   Outcome Measures       Row Name 05/13/24 1412 05/13/24 1127       How much help from another person do you currently need...    Turning from your back to your side while in flat bed without using bedrails? 4  -EJ 4  -EB    Moving from lying on back to sitting on the side of a flat bed without bedrails? 4  -EJ 4  -EB    Moving to and from a bed to a chair (including a wheelchair)? 3  -EJ 3  -EB    Standing up from a chair using your arms (e.g., wheelchair, bedside chair)? 3  -EJ 3  -EB    Climbing 3-5 steps with a railing? 3  -EJ 3  -EB    To walk in hospital room? 3  -EJ 3  -EB    AM-PAC 6 Clicks Score (PT) 20  -EJ 20  -EB    Highest Level of Mobility Goal 6 --> Walk 10 steps or more  -EJ 6 --> Walk 10 steps or more  -EB              User Key  (r) = Recorded By, (t) = Taken By, (c) = Cosigned By      Initials Name Provider Type    Isatu Fair, PT Physical Therapist    Janet Ness RN Registered Nurse                                   PT Recommendation and Plan  Planned Therapy Interventions (PT): balance training, bed mobility training, gait training, home exercise program, stretching, strengthening, stair training, ROM (range of motion), patient/family education, transfer training  Plan of Care Reviewed With: patient, spouse  Outcome Evaluation: Pt seen for PT eval this afternoon. She is POD 0 R anterior THR. She presents w expected post op pain and weakness. Pt doing well. She is in bed upon entry to room. Pt tolerated all hip exercises well prior to mobilizing. She was able to transition to EOB w SBA/CGA. Pt  stood w CGA and Rwx w cues for hand placement. Pt ambulated approx 25 ft in room w CGA and Rwx. No unsteadiness. SHe required cues for sequence and to increase step length. Pt up in chair at end of session. Pt educated on anterior hip precautions. [place] at [place], likely [place], w spouse and HHPT. Pt will continue to benefit from skilled PT to maximize safety, strength and independence w mobility.     Time Calculation:         PT Charges       Row Name 05/13/24 1413             Time Calculation    Start Time 1347  -EJ      Stop Time 1405  -EJ      Time Calculation (min) 18 min  -EJ      PT Received On 05/13/24  -EJ      PT - Next Appointment 05/14/24  -EJ      PT Goal Re-Cert Due Date 05/16/24  -EJ         Time Calculation- PT    Total Timed Code Minutes- PT 12 minute(s)  -EJ                User Key  (r) = Recorded By, (t) = Taken By, (c) = Cosigned By      Initials Name Provider Type    EJ Isatu De La O, PT Physical Therapist                  Therapy Charges for Today       Code Description Service Date Service Provider Modifiers Qty    18304348368 HC PT EVAL LOW COMPLEXITY 3 5/13/2024 Isatu De La O, PT GP 1    57619092350 HC PT THERAPEUTIC ACT EA 15 MIN 5/13/2024 Isatu De La O, PT GP 1            PT G-Codes  AM-PAC 6 Clicks Score (PT): 20  PT Discharge Summary  Anticipated Discharge Disposition (PT): home with assist, home with home health    Isatu De La O PT  5/13/2024

## 2024-05-13 NOTE — CASE MANAGEMENT/SOCIAL WORK
Discharge Planning Assessment  [facility]     Patient Name: Anastasiia Faria  MRN: [id number]  Today's Date: 5/13/2024    Admit Date: 5/13/2024    Plan: Home with family support & Sikhism .   Discharge Needs Assessment       Row Name 05/13/24 1723       Living Environment    People in Home spouse    Current Living Arrangements home    Primary Care Provided by self    Provides Primary Care For no one    Family Caregiver if Needed spouse    Quality of Family Relationships helpful;involved;supportive    Able to Return to Prior Arrangements yes       Resource/Environmental Concerns    Transportation Concerns none       Transition Planning    Patient/Family Anticipates Transition to home with family;home with help/services    Patient/Family Anticipated Services at Transition     Transportation Anticipated family or friend will provide       Discharge Needs Assessment    Readmission Within the Last 30 Days no previous admission in last 30 days    Equipment Currently Used at Home none    Discharge Facility/Level of Care Needs home with home health    Provided Post Acute Provider List? N/A    N/A Provider List Comment Decline; requests Sikhism .    Patient's Choice of Community Agency(s) [facility] .                   Discharge Plan       Row Name 05/13/24 1723       Plan    Plan Home with family support & Sikhism .    Patient/Family in Agreement with Plan yes    Plan Comments Spoke with the patient and her /Garth, verified current information and explained the role of the CCP. Patient lives with Garth and has family support. She's IADL and has no history with DME/RH/HH. Patient plans to discharge home with family support & Sikhism . Referral sent in [facility]. Patient also plans for family to transport her home at discharge. No other needs identified. CCP will follow.                  Continued Care and Services - Admitted Since 5/13/2024       Home Medical Care       Service Provider Request Status  Selected Services Address Phone Fax Patient Preferred     [facility] Pending - Request Sent N/A [address] [address] [phone] --                  Expected Discharge Date and Time       Expected Discharge Date Expected Discharge Time    May 14, 2024            Demographic Summary       Row Name 05/13/24 1722       General Information    Admission Type observation    Reason for Consult discharge planning    Preferred Language English       Contact Information    Permission Granted to Share Info With ;family/designee                   Functional Status       Row Name 05/13/24 1722       Functional Status    Usual Activity Tolerance good       Functional Status, IADL    Medications independent    Meal Preparation independent    Housekeeping independent    Laundry independent    Shopping independent       Mental Status    General Appearance WDL WDL       Mental Status Summary    Recent Changes in Mental Status/Cognitive Functioning no changes                   Psychosocial       Row Name 05/13/24 1723       Intellectual Performance WDL    Level of Consciousness Alert       Coping/Stress    Patient Personal Strengths able to adapt    Sources of Support spouse    Reaction to Health Status accepting    Understanding of Condition and Treatment adequate understanding of medical condition;adequate understanding of treatment       Developmental Stage (Eriksson's)    Developmental Stage Stage 8 (65 years-death/Late Adulthood) Integrity vs. Despair                    Alicia MANCERA RN

## 2024-05-13 NOTE — ANESTHESIA PREPROCEDURE EVALUATION
Anesthesia Evaluation     history of anesthetic complications:   NPO Solid Status: > 8 hours  NPO Liquid Status: > 8 hours           Airway   Mallampati: I  No difficulty expected  Dental      Pulmonary    Cardiovascular   Exercise tolerance: good (4-7 METS)    (+) hypertension, hyperlipidemia      Neuro/Psych  (+) headaches, psychiatric history  GI/Hepatic/Renal/Endo    (+) GERD, renal disease-, thyroid problem     Musculoskeletal     Abdominal    Substance History      OB/GYN          Other   arthritis,                 Anesthesia Plan    ASA 3     general     intravenous induction     Anesthetic plan, risks, benefits, and alternatives have been provided, discussed and informed consent has been obtained with: patient.    CODE STATUS:

## 2024-05-13 NOTE — ANESTHESIA PROCEDURE NOTES
Airway  Urgency: elective    Date/Time: 5/13/2024 7:19 AM  Airway not difficult    General Information and Staff    Patient location during procedure: OR  Anesthesiologist: Sree Juarez MD  CRNA/CAA: Anat Lipscomb CRNA    Indications and Patient Condition  Indications for airway management: airway protection    Preoxygenated: yes  MILS not maintained throughout  Mask difficulty assessment: 2 - vent by mask + OA or adjuvant +/- NMBA    Final Airway Details  Final airway type: endotracheal airway      Successful airway: ETT  Cuffed: yes   Successful intubation technique: direct laryngoscopy  Facilitating devices/methods: intubating stylet and anterior pressure/BURP  Endotracheal tube insertion site: oral  Blade: Meghann  ETT size (mm): 7.0  Cormack-Lehane Classification: grade I - full view of glottis  Placement verified by: chest auscultation and capnometry   Measured from: lips  ETT/EBT  to lips (cm): 21  Number of attempts at approach: 1  Assessment: lips, teeth, and gum same as pre-op and atraumatic intubation

## 2024-05-13 NOTE — OP NOTE
Name: Anastasiia Faria  YOB: 1950    DATE OF SURGERY: 5/13/2024    PREOPERATIVE DIAGNOSIS: Right hip end-stage osteoarthritis    POSTOPERATIVE DIAGNOSIS: Right hip end-stage osteoarthritis    PROCEDURE PERFORMED: Right anterior total hip replacement    SURGEON: Vasu King M.D.    ASSISTANT: VINNY ORTIZ    A surgical assistant was integral in ensuring a successful outcome with this procedure.  The assistant was utilized to assist in positioning the patient, draping the patient, was used throughout the case to provide with retraction of tissues, suctioning of blood and body fluids for visualization, positioning of the extremity to allow for proper exposure so that I could perform the procedure.  Without the use of a surgical assistant during this procedure I feel that the outcome may have been compromised or would have been suboptimal or at risk for complications.    IMPLANTS: [employer] Polar stem, R3 cup:  Implant Name Type Inv. Item Serial No.  Lot No. LRB No. Used Action   DEV CONTRL TISS STRATAFIX SPIRAL MNCRYL UD 3/0 PLS 30CM - [id number] Implant DEV CONTRL TISS STRATAFIX SPIRAL MNCRYL UD 3/0 PLS 30CM  [employer]  DIV OF  AND J [id number] Right 1 Implanted   DEV WND/CLS CONTRL TISS STRATAFIX SPIRAL PDS PLS CT1 0 30CM - [id number] Implant DEV WND/CLS CONTRL TISS STRATAFIX SPIRAL PDS PLS CT1 0 30CM  [employer]  DIV OF J AND J [id number] Right 1 Implanted   SHLL ACET R3 3H STD 54MM - [id number] Implant SHLL ACET R3 3H STD 54MM  [employer] [id number] Right 1 Implanted   LINER ACET R3 XLPE 0D 03I56PR - [id number] Implant LINER ACET R3 XLPE 0D [id number]  [employer] [id number] Right 1 Implanted   SCRW SPH HD REFLECTION 6.5X20MM - [id number] Implant SCRW SPH HD REFLECTION 6.5X20MM  [employer] [id number] Right 1 Implanted   SCRW SPH HD REFLECTION 6.5X20MM - [id number] Implant SCRW SPH HD REFLECTION 6.5X20MM  [employer] [id number] Right 1 Implanted   SHLL ACET/HIP POLARCUP  "NONCOAT SS SZ55 - [id number] Implant SHLL ACET/HIP POLARCUP NONCOAT SS SZ55  [employer] [id number] Right 1 Implanted   HD FEM/HIP OXINIUM TPR 12/14 36MM PLS0 - [id number] Implant HD FEM/HIP OXINIUM TPR 12/14 36MM PLS0  [employer] [id number] Right 1 Implanted       Estimated Blood Loss: 200cc  Specimens : none  Complications: none    DESCRIPTION OF PROCEDURE: The patient was taken to the operating room and placed in the supine position. A sequential compression device was carefully placed on the non-operative leg. Preoperative antibiotics were administered. Surgical time out was performed. After adequate induction of anesthesia, the feet were padded and placed in the [place] table boots. The patient ws then transferred onto the [place] table and positioned appropriately. C-arm image intensification was then used to take images of the pelvis and operative hip to be used for later comparison. The hip was then prepped and draped in the usual sterile fashion.   An incision was then made starting 2 cm lateral to the ASIS heading distal and lateral at approximately 30 degrees. The subcutaneous fat was then divided down to the fascia overlying the tensor fascia sona (TFL) muscle. This was sharply divided staying a few cm lateral to the interval between the sartorius and the TFL muscle. This interval was then bluntly dissected. The circumflex vessels were then identified, cauterized, and divided. There was excellent hemostasis. Cobra retractors were then placed around the femoral neck capsule. The capsule was then divided using a \"T\" capsulotomy. The retractors were then placed intracapsular and the neck osteotomy was performed. A napkin ring neck fragment was then removed and then the head was removed using a corkscrew. There were end-stage arthritic findings.   The acetabulum was then exposed with \"number 7\" retractors. The labrum and pulvinar were excised. The starting reamer was then used to medialize the cup and then " the acetabular reaming proceeded in 2 mm increments. The cup was reamed line to line. The cup was then partially seated and c-arm was used to confirm the final cup position before final seating occurred. There was excellent position and stability of the cup.  The hip was then injected with anesthetic cocktail and the cup was anchored with 2 screws in the superior and posterior quadrants. The final liner was placed.   Attention was turned to the femur.Traction was removed from the hip and the leg was brought to the neutral position. The femoral elevator hook was placed. The leg was then moved to the external rotation, extension, and adduction position. Retractors were placed around the proximal femur and then the posterior capsule and conjoined tendon was then released. The box osteotome was then used to create the starting hole. The femur was then prepared using the rat tail broach, followed by the chili-pepper broach and then we progressively broached up the final broach which fit nicely with excellent rotational and axial stability. The hip was then reduced and c-arm images were taken which confirmed appropriate fit and position on the implants. There were no complicating factors noted, and fluoro images were taken which confirmed proper restoration of leg length and offset. The trial components were removed, the hip was copiously irrigated and the final implants were then seated. C-arm images again confirmed appropriate anatomy restoration without complicating factors noted. The final head was then placed on a clean dry taper and the hip reduced.   The hip was then copiously irrigated.  There was excellent hemostasis. We placed a one-eighth inch Hemovac drain. We closed the hip in multiple layers in standard fashion. Sterile dressings were applied. At the end of the case, the sponge and needle counts were reported as being correct. There were no known complications. The patient was then transported to the  recovery room.      Vasu King M.D.  5/13/2024

## 2024-05-13 NOTE — NURSING NOTE
Pt arrived to unit around 1125. Ambulated to bathroom with walker and staff. A/O x4, vitals stable, pt on room air. Family at bedside.

## 2024-05-13 NOTE — PLAN OF CARE
Problem: Adult Inpatient Plan of Care  Goal: Plan of Care Review  5/13/2024 1157 by Janet Perdue RN  Outcome: Ongoing, Progressing  5/13/2024 1136 by Janet Perdue RN  Outcome: Ongoing, Progressing  Goal: Patient-Specific Goal (Individualized)  5/13/2024 1157 by Janet Perdue RN  Outcome: Ongoing, Progressing  5/13/2024 1136 by Janet Perdue RN  Outcome: Ongoing, Progressing  Goal: Absence of Hospital-Acquired Illness or Injury  5/13/2024 1157 by Janet Perdue RN  Outcome: Ongoing, Progressing  5/13/2024 1136 by Janet Perdue RN  Outcome: Ongoing, Progressing  Intervention: Identify and Manage Fall Risk  Recent Flowsheet Documentation  Taken 5/13/2024 1125 by Janet Perdue RN  Safety Promotion/Fall Prevention:   fall prevention program maintained   assistive device/personal items within reach   clutter free environment maintained   safety round/check completed   room organization consistent   nonskid shoes/slippers when out of bed  Intervention: Prevent and Manage VTE (Venous Thromboembolism) Risk  Recent Flowsheet Documentation  Taken 5/13/2024 1125 by Janet Perdue RN  Activity Management:   ambulated to bathroom   ambulated in room  VTE Prevention/Management: sequential compression devices on  Range of Motion: (generalized weakness, post surgical) other (see comments)  Goal: Optimal Comfort and Wellbeing  5/13/2024 1157 by Janet Perdue RN  Outcome: Ongoing, Progressing  5/13/2024 1136 by Janet Perdue RN  Outcome: Ongoing, Progressing  Goal: Readiness for Transition of Care  5/13/2024 1157 by Janet Perdue RN  Outcome: Ongoing, Progressing  5/13/2024 1136 by Janet Perdue RN  Outcome: Ongoing, Progressing  Intervention: Mutually Develop Transition Plan  Recent Flowsheet Documentation  Taken 5/13/2024 1129 by Jaent Perdue RN  Transportation Anticipated:   car, drives self   family or friend will provide  Patient/Family Anticipated Services at Transition:   none      home health care  Patient/Family Anticipates  Transition to:   home with family   home     Problem: Adjustment to Surgery (Hip Arthroplasty)  Goal: Optimal Coping  5/13/2024 1157 by Janet Perdue RN  Outcome: Ongoing, Progressing  5/13/2024 1136 by Janet Perdue RN  Outcome: Ongoing, Progressing     Problem: Bleeding (Hip Arthroplasty)  Goal: Absence of Bleeding  5/13/2024 1157 by Janet Perdue RN  Outcome: Ongoing, Progressing  5/13/2024 1136 by Janet Perdue RN  Outcome: Ongoing, Progressing     Problem: Bowel Motility Impaired (Hip Arthroplasty)  Goal: Effective Bowel Elimination  5/13/2024 1157 by Janet Perdue RN  Outcome: Ongoing, Progressing  5/13/2024 1136 by Janet Perdue RN  Outcome: Ongoing, Progressing     Problem: Fluid and Electrolyte Imbalance (Hip Arthroplasty)  Goal: Fluid and Electrolyte Balance  5/13/2024 1157 by Janet Perdue RN  Outcome: Ongoing, Progressing  5/13/2024 1136 by Janet Perdue RN  Outcome: Ongoing, Progressing     Problem: Functional Ability Impaired (Hip Arthroplasty)  Goal: Optimal Functional Ability  5/13/2024 1157 by Janet Perdue RN  Outcome: Ongoing, Progressing  5/13/2024 1136 by Janet Perdue RN  Outcome: Ongoing, Progressing  Intervention: Promote Optimal Functional Status  Recent Flowsheet Documentation  Taken 5/13/2024 1125 by Janet Perdue RN  Activity Management:   ambulated to bathroom   ambulated in room     Problem: Infection (Hip Arthroplasty)  Goal: Absence of Infection Signs and Symptoms  5/13/2024 1157 by Janet Perdue RN  Outcome: Ongoing, Progressing  5/13/2024 1136 by Janet Perdue RN  Outcome: Ongoing, Progressing     Problem: Neurovascular Compromise (Hip Arthroplasty)  Goal: Intact Neurovascular Status  5/13/2024 1157 by Janet Perdue RN  Outcome: Ongoing, Progressing  5/13/2024 1136 by Janet Perdue RN  Outcome: Ongoing, Progressing     Problem: Ongoing Anesthesia Effects (Hip Arthroplasty)  Goal: Anesthesia/Sedation Recovery  5/13/2024 1157 by Janet Perdue RN  Outcome: Ongoing, Progressing  5/13/2024 1136 by Nette  CAMELIA Gonzales  Outcome: Ongoing, Progressing  Intervention: Optimize Anesthesia Recovery  Recent Flowsheet Documentation  Taken 5/13/2024 1125 by Janet Perdue RN  Safety Promotion/Fall Prevention:   fall prevention program maintained   assistive device/personal items within reach   clutter free environment maintained   safety round/check completed   room organization consistent   nonskid shoes/slippers when out of bed  Administration (IS):   instruction provided, initial   proper technique demonstrated   self-administered     Problem: Pain (Hip Arthroplasty)  Goal: Acceptable Pain Control  5/13/2024 1157 by Janet Perdue RN  Outcome: Ongoing, Progressing  5/13/2024 1136 by Janet Perdue RN  Outcome: Ongoing, Progressing     Problem: Postoperative Nausea and Vomiting (Hip Arthroplasty)  Goal: Nausea and Vomiting Relief  5/13/2024 1157 by Janet Perdue RN  Outcome: Ongoing, Progressing  5/13/2024 1136 by Janet Perdue RN  Outcome: Ongoing, Progressing     Problem: Postoperative Urinary Retention (Hip Arthroplasty)  Goal: Effective Urinary Elimination  5/13/2024 1157 by Janet Perdue RN  Outcome: Ongoing, Progressing  5/13/2024 1136 by Janet Perdue RN  Outcome: Ongoing, Progressing     Problem: Respiratory Compromise (Hip Arthroplasty)  Goal: Effective Oxygenation and Ventilation  5/13/2024 1157 by Janet Perdue RN  Outcome: Ongoing, Progressing  5/13/2024 1136 by Janet Perdue RN  Outcome: Ongoing, Progressing     Problem: Fall Injury Risk  Goal: Absence of Fall and Fall-Related Injury  5/13/2024 1157 by Janet Perdue RN  Outcome: Ongoing, Progressing  5/13/2024 1136 by Janet Perdue RN  Outcome: Ongoing, Progressing  Intervention: Promote Injury-Free Environment  Recent Flowsheet Documentation  Taken 5/13/2024 1125 by Janet Perdue RN  Safety Promotion/Fall Prevention:   fall prevention program maintained   assistive device/personal items within reach   clutter free environment maintained   safety round/check completed   room  organization consistent   nonskid shoes/slippers when out of bed   Goal Outcome Evaluation:

## 2024-05-13 NOTE — ANESTHESIA POSTPROCEDURE EVALUATION
Patient: Anastasiia Faria    Procedure Summary       Date: 05/13/24 Room / Location:  [facility] [address] [place] [address] [facility]    Anesthesia Start: 0710 Anesthesia Stop: 0932    Procedure: TOTAL HIP ARTHROPLASTY ANTERIOR WITH HANA TABLE (Right: Hip) Diagnosis:       Primary osteoarthritis of right hip      (Primary osteoarthritis of right hip [M16.11])    Surgeons: Vasu King MD Provider: Sree Juarez MD    Anesthesia Type: general ASA Status: 3            Anesthesia Type: general    Vitals  Vitals Value Taken Time   /61 05/13/24 1030   Temp 36.2 °C (97.1 °F) 05/13/24 0912   Pulse 56 05/13/24 1035   Resp 16 05/13/24 1015   SpO2 96 % 05/13/24 1035   Vitals shown include unfiled device data.        Post Anesthesia Care and Evaluation    Patient location during evaluation: bedside  Patient participation: complete - patient participated  Level of consciousness: awake and alert  Pain management: adequate    Airway patency: patent  Anesthetic complications: No anesthetic complications  PONV Status: controlled  Cardiovascular status: blood pressure returned to baseline and acceptable  Respiratory status: acceptable  Hydration status: acceptable

## 2024-05-13 NOTE — PLAN OF CARE
Goal Outcome Evaluation:  Plan of Care Reviewed With: patient, spouse           Outcome Evaluation: Pt seen for PT eval this afternoon. She is POD 0 R anterior THR. She presents w expected post op pain and weakness. Pt doing well. She is in bed upon entry to room. Pt tolerated all hip exercises well prior to mobilizing. She was able to transition to EOB w SBA/CGA. Pt stood w CGA and Rwx w cues for hand placement. Pt ambulated approx 25 ft in room w CGA and Rwx. No unsteadiness. SHe required cues for sequence and to increase step length. Pt up in chair at end of session. Pt educated on anterior hip precautions. She plans home at [place], likely tomorrow, w spouse and HHPT. Pt will continue to benefit from skilled PT to maximize safety, strength and independence w mobility.      Anticipated Discharge Disposition (PT): home with assist, home with home health

## 2024-05-14 ENCOUNTER — DOCUMENTATION (OUTPATIENT)
Dept: HOME HEALTH SERVICES | Facility: HOME HEALTHCARE | Age: 74
End: 2024-05-14
Payer: MEDICARE

## 2024-05-14 ENCOUNTER — HOME HEALTH ADMISSION (OUTPATIENT)
Dept: HOME HEALTH SERVICES | Facility: HOME HEALTHCARE | Age: 74
End: 2024-05-14
Payer: MEDICARE

## 2024-05-14 VITALS
SYSTOLIC BLOOD PRESSURE: 126 MMHG | HEART RATE: 65 BPM | DIASTOLIC BLOOD PRESSURE: 68 MMHG | HEIGHT: 68 IN | OXYGEN SATURATION: 97 % | WEIGHT: 186 LBS | BODY MASS INDEX: 28.19 KG/M2 | TEMPERATURE: 98.2 F | RESPIRATION RATE: 16 BRPM

## 2024-05-14 LAB
HCT VFR BLD AUTO: 33.4 % (ref 34–46.6)
HGB BLD-MCNC: 10.8 G/DL (ref 12–15.9)

## 2024-05-14 PROCEDURE — A9270 NON-COVERED ITEM OR SERVICE: HCPCS | Performed by: NURSE PRACTITIONER

## 2024-05-14 PROCEDURE — 63710000001 LEVOTHYROXINE 50 MCG TABLET: Performed by: NURSE PRACTITIONER

## 2024-05-14 PROCEDURE — 97530 THERAPEUTIC ACTIVITIES: CPT

## 2024-05-14 PROCEDURE — 63710000001 ASPIRIN 81 MG TABLET DELAYED-RELEASE: Performed by: NURSE PRACTITIONER

## 2024-05-14 PROCEDURE — 63710000001 NIFEDIPINE XL 30 MG TABLET SUSTAINED-RELEASE 24 HOUR: Performed by: NURSE PRACTITIONER

## 2024-05-14 PROCEDURE — 63710000001 HYDROCODONE-ACETAMINOPHEN 7.5-325 MG TABLET: Performed by: NURSE PRACTITIONER

## 2024-05-14 PROCEDURE — 63710000001 ATENOLOL 25 MG TABLET: Performed by: NURSE PRACTITIONER

## 2024-05-14 PROCEDURE — 85018 HEMOGLOBIN: CPT | Performed by: NURSE PRACTITIONER

## 2024-05-14 PROCEDURE — 63710000001 ONDANSETRON ODT 4 MG TABLET DISPERSIBLE: Performed by: NURSE PRACTITIONER

## 2024-05-14 PROCEDURE — 63710000001 ESCITALOPRAM 20 MG TABLET: Performed by: NURSE PRACTITIONER

## 2024-05-14 PROCEDURE — 85014 HEMATOCRIT: CPT | Performed by: NURSE PRACTITIONER

## 2024-05-14 RX ORDER — MELOXICAM 15 MG/1
15 TABLET ORAL DAILY
Qty: 14 TABLET | Refills: 0 | Status: SHIPPED | OUTPATIENT
Start: 2024-05-14

## 2024-05-14 RX ORDER — ONDANSETRON 4 MG/1
4 TABLET, FILM COATED ORAL EVERY 8 HOURS PRN
Qty: 10 TABLET | Refills: 0 | Status: SHIPPED | OUTPATIENT
Start: 2024-05-14

## 2024-05-14 RX ORDER — PANTOPRAZOLE SODIUM 40 MG/1
40 TABLET, DELAYED RELEASE ORAL DAILY
Qty: 14 TABLET | Refills: 0 | Status: SHIPPED | OUTPATIENT
Start: 2024-05-14 | End: 2024-05-28

## 2024-05-14 RX ORDER — HYDROCODONE BITARTRATE AND ACETAMINOPHEN 7.5; 325 MG/1; MG/1
1 TABLET ORAL EVERY 4 HOURS PRN
Qty: 40 TABLET | Refills: 0 | Status: SHIPPED | OUTPATIENT
Start: 2024-05-14

## 2024-05-14 RX ORDER — ASPIRIN 81 MG/1
TABLET ORAL
Qty: 60 TABLET | Refills: 0 | Status: SHIPPED | OUTPATIENT
Start: 2024-05-14

## 2024-05-14 RX ORDER — POLYETHYLENE GLYCOL 3350 17 G/17G
17 POWDER, FOR SOLUTION ORAL 2 TIMES DAILY
Qty: 238 G | Refills: 0 | Status: SHIPPED | OUTPATIENT
Start: 2024-05-14 | End: 2024-05-21

## 2024-05-14 RX ADMIN — NIFEDIPINE 30 MG: 30 TABLET, FILM COATED, EXTENDED RELEASE ORAL at 08:39

## 2024-05-14 RX ADMIN — ESCITALOPRAM 20 MG: 20 TABLET, FILM COATED ORAL at 08:39

## 2024-05-14 RX ADMIN — ONDANSETRON 4 MG: 4 TABLET, ORALLY DISINTEGRATING ORAL at 08:39

## 2024-05-14 RX ADMIN — LEVOTHYROXINE SODIUM 50 MCG: 50 TABLET ORAL at 08:39

## 2024-05-14 RX ADMIN — ATENOLOL 25 MG: 25 TABLET ORAL at 08:39

## 2024-05-14 RX ADMIN — HYDROCODONE BITARTRATE AND ACETAMINOPHEN 2 TABLET: 7.5; 325 TABLET ORAL at 08:39

## 2024-05-14 RX ADMIN — ASPIRIN 81 MG: 81 TABLET, COATED ORAL at 08:39

## 2024-05-14 NOTE — PLAN OF CARE
Goal Outcome Evaluation:  Plan of Care Reviewed With: patient                Pt goals met for discharge. Walker delivered and meds sent to [facility].

## 2024-05-14 NOTE — PROGRESS NOTES
[facility] to follow for home care needs.  D/Cing today.  Noted referral per Dr King.  Verified all info with patient.  No current home health voiced and agreeable to our services.

## 2024-05-14 NOTE — DISCHARGE SUMMARY
Orthopedic Progress Note      Patient: Anastasiia Faria    YOB: 1950    Medical Record Number: [id number]    Attending Physician: Vasu King MD    Date of Admission: 5/13/2024  6:04 AM    Admitting Dx:  Primary osteoarthritis of right hip [M16.11]  OA (osteoarthritis) of hip [M16.9]    Status Post: ND ARTHRP ACETBLR/PROX FEM PROSTC AGRFT/ALGRFT [93686] (TOTAL HIP ARTHROPLASTY ANTERIOR WITH HANA TABLE)    Post Operative Day Number: 1    Current Problem List:   OA (osteoarthritis) of hip      Past Medical History:   Diagnosis Date    Allergic     Anxiety     Cataract     Cataract surgery on right eye June 2021 and left eye July 2021. (Dr. Nanette Bateman)    Chronic kidney disease     Colon polyps     Depression     Diverticulosis 2011    Encounter for annual health examination 03/07/2014    Annual Health Assessment    Family history of colon cancer     Fibrocystic breast     GERD (gastroesophageal reflux disease)     Hard of hearing     HEARING AIDS BILATERALLY    Heart murmur     Herpes labialis without complication     Hip pain     right    History of mammogram 12/20/2010    History of recent hospitalization 03/21/2024    KIDNEY STONE/SEPSIS 4 NIGHT AT [facility]    HL (hearing loss) 2014    Hearing Aids    Hyperlipidemia     Hypertension     Hypothyroidism     Insomnia     Kidney stone     Kidney stones     Migraines     Osteoarthritis of right hip     Osteopenia     Prolia -last 9/2021,3/2022    PONV (postoperative nausea and vomiting)     Scoliosis     Dr. Stanford 5/2018    Slow to wake up after anesthesia     Visual impairment     Wear Glasses       Current Medications:  Scheduled Meds:aspirin, 81 mg, Oral, Q12H  atenolol, 25 mg, Oral, Daily  Chlorhexidine Gluconate Cloth, , Apply externally, BID  escitalopram, 20 mg, Oral, Daily  levothyroxine, 50 mcg, Oral, Daily  NIFEdipine XL, 30 mg, Oral, Daily  rosuvastatin, 10 mg, Oral, Nightly      PRN Meds:.  acetaminophen     HYDROcodone-acetaminophen    HYDROcodone-acetaminophen    HYDROcodone-acetaminophen    melatonin    ondansetron    ondansetron ODT    promethazine    SUBJECTIVE: 74 y.o.  female awake and alert.    OBJECTIVE:   Vitals:    05/13/24 1800 05/13/24 2131 05/14/24 0155 05/14/24 0623   BP: 131/57 127/70 134/72 126/68   BP Location: Right arm Left arm Left arm Left arm   Patient Position: Lying Lying Lying Lying   Pulse: 62 59 69 65   Resp: 17 16 16 16   Temp: 98.5 °F (36.9 °C) 98 °F (36.7 °C) 98.1 °F (36.7 °C) 98.2 °F (36.8 °C)   TempSrc: Oral Oral Oral Oral   SpO2: 93% 99% 98% 97%   Weight:       Height:         I/O last 3 completed shifts:  In: 2680 [P.O.:1180; I.V.:1300; IV Piggyback:200]  Out: 1100 [Urine:900; Blood:200]    Diagnostic Tests:  Lab Results (last 72 hours)       Procedure Component Value Units Date/Time    Hemoglobin & Hematocrit, Blood [[id number]]  (Abnormal) Collected: 05/14/24 0437    Specimen: Blood Updated: 05/14/24 0543     Hemoglobin 10.8 g/dL      Hematocrit 33.4 %              PHYSICAL EXAM: Right anterior guerline hip dressing remains dry and intact.  Battery is functioning properly.  Calf is soft nontender.  She has good motion sensation to her foot and ankle.  Doing well with physical therapy.  Was able to ambulate down the childers and up and down a few stairs.  Hemoglobin is 10.8.  Vital signs remained stable.  Voiding well.     ASSESSMENT & PLAN:    DC home with home health.  Aspirin for DVT prophylaxis.  Return to the office in 2 weeks to see Dr. King for follow-up      Date: 5/14/2024    Rose Farmer RN

## 2024-05-14 NOTE — THERAPY TREATMENT NOTE
Patient Name: Anastasiia Faria  : 1950    MRN: [id number]                              Today's Date: 2024       Admit Date: 2024    Visit Dx:     ICD-10-CM ICD-9-CM   1. Status post total hip replacement, right  Z96.641 V43.64   2. Primary osteoarthritis of right hip  M16.11 715.15     Patient Active Problem List   Diagnosis    Colon polyp, hyperplastic    Allergic rhinitis due to pollen    Acquired hypothyroidism    Essential hypertension    FH: colon cancer in relative <50 years old    Mixed hyperlipidemia    Chronic right shoulder pain    Periscapular pain    Other idiopathic scoliosis, thoracic region    Prediabetes    Age-related osteoporosis without current pathological fracture    Mild episode of recurrent major depressive disorder    Age-related nuclear cataract of both eyes    Cystoid nevus of conjunctiva    Anxiety    Cataract    Cataract extraction status of left eye    Cataract extraction status of right eye    Conjunctival cyst of left eye    Depressive disorder    Disorder of refraction    Dry eye syndrome of bilateral lacrimal glands    Hearing loss    Hearing loss    Hordeolum internum right upper eyelid    Migraine    Osteopenia    Osteoporosis    Renal stone    Seasonal allergies    Diverticulosis    Internal hemorrhoids    Posterior vitreous detachment of both eyes    Right hip pain    OA (osteoarthritis) of hip    Sepsis    Acute pyelonephritis    Ureteral calculus    E coli bacteremia    Acute respiratory failure with hypoxia    Dysuria     Past Medical History:   Diagnosis Date    Allergic     Anxiety     Cataract     Cataract surgery on right eye 2021 and left eye 2021. (Dr. Nanette Bateman)    Chronic kidney disease     Colon polyps     Depression     Diverticulosis     Encounter for annual health examination 2014    Annual Health Assessment    Family history of colon cancer     Fibrocystic breast     GERD (gastroesophageal reflux disease)     Hard of hearing      HEARING AIDS BILATERALLY    Heart murmur     Herpes labialis without complication     Hip pain     right    History of mammogram 12/20/2010    History of recent hospitalization 03/21/2024    KIDNEY STONE/SEPSIS 4 NIGHT AT [facility]    HL (hearing loss) 2014    Hearing Aids    Hyperlipidemia     Hypertension     Hypothyroidism     Insomnia     Kidney stone     Kidney stones     Migraines     Osteoarthritis of right hip     Osteopenia     Prolia -last 9/2021,3/2022    PONV (postoperative nausea and vomiting)     Scoliosis     Dr. Stanford 5/2018    Slow to wake up after anesthesia     Visual impairment     Wear Glasses     Past Surgical History:   Procedure Laterality Date    BONE MARROW BIOPSY Left     BREAST BIOPSY      BREAST CYST ASPIRATION Right     BREAST SURGERY Right     BIOPSY    CATARACT EXTRACTION, BILATERAL      COLONOSCOPY N/A 10/27/2016    Procedure: COLONOSCOPY INTO CECUM;  Surgeon: Osvaldo Dorado MD;  Location: [facility] ENDOSCOPY;  Service:     COLONOSCOPY  01/26/2011    COLONOSCOPY  02/04/2005    COLONOSCOPY N/A 12/02/2021    Procedure: COLONOSCOPY INTO CECUM WITH COLD BIOPSY POLYPECTOMY AND HOT SNARE POLYPECTOMY;  Surgeon: Osvaldo Dorado MD;  Location: [facility] ENDOSCOPY;  Service: General;  Laterality: N/A;  PRE-FAMILY HISTORY OF COLON CANCER, PERSONAL HISTORY OF COLON CANCER  POST- POLYPS, DIVERTICULOSIS, HEMORRHOIDS    CYSTOSCOPY W/ URETERAL STENT PLACEMENT Left 03/21/2024    Procedure: LEFT CYSTOSCOPY RETROGRADE PYLEOGRAM URETERAL STENT INSERTION;  Surgeon: Jaiden Bolton Jr., MD;  Location: [facility];  Service: Urology;  Laterality: Left;    CYSTOSCOPY W/ URETERAL STENT REMOVAL  04/23/2024    KNEE ARTHROSCOPY Left     PAP SMEAR  12/20/2010      General Information       Row Name 05/14/24 0934          Physical Therapy Time and Intention    Document Type therapy note (daily note)  -EJ     Mode of Treatment physical therapy  -EJ       Row Name 05/14/24 0934           General Information    Existing Precautions/Restrictions hip, anterior;right  -EJ               User Key  (r) = Recorded By, (t) = Taken By, (c) = Cosigned By      Initials Name Provider Type    Isatu Fair, PT Physical Therapist                   Mobility       Row Name 05/14/24 0934          Bed Mobility    Comment, (Bed Mobility) up in BR upon entry to room  -EJ       Row Name 05/14/24 0934          Sit-Stand Transfer    Sit-Stand [place] (Transfers) standby assist;contact guard  -EJ     Assistive Device (Sit-Stand Transfers) walker, front-wheeled  -EJ       Row Name 05/14/24 0934          Gait/Stairs (Locomotion)    [place] Level (Gait) standby assist;contact guard  -EJ     Assistive Device (Gait) walker, front-wheeled  -EJ     Distance in Feet (Gait) 200  -EJ     Deviations/Abnormal Patterns (Gait) phillip decreased;antalgic;stride length decreased  -EJ     Bilateral Gait Deviations heel strike decreased  -EJ     [place] Level (Stairs) verbal cues;contact guard  -EJ     Handrail Location (Stairs) right side (ascending)  -EJ     Number of Steps (Stairs) 4  -EJ     Ascending Technique (Stairs) step-to-step  -EJ     Descending Technique (Stairs) step-to-step  -EJ     Comment, (Gait/Stairs) cues for sequence, no unsteadiness noted  -EJ               User Key  (r) = Recorded By, (t) = Taken By, (c) = Cosigned By      Initials Name Provider Type    Isatu Fair, PT Physical Therapist                   Obj/Interventions       Row Name 05/14/24 0936          Motor Skills    Therapeutic Exercise --  THR protocol x 10 reps  -EJ               User Key  (r) = Recorded By, (t) = Taken By, (c) = Cosigned By      Initials Name Provider Type    Isatu Fair, PT Physical Therapist                   Goals/Plan    No documentation.                  Clinical Impression       Row Name 05/14/24 0936          Pain    Pretreatment Pain Rating 5/10  -EJ     Posttreatment Pain Rating 5/10   -EJ     Pain Location - Side/Orientation Right  -EJ     Pain Location - hip  -EJ     Pain Intervention(s) Repositioned;Ambulation/increased activity  -EJ       Row Name 05/14/24 0936          Plan of Care Review    Plan of Care Reviewed With patient  -EJ     Progress improving  -EJ     Outcome Evaluation Pt seen for PT this am. SHe is doing well, up in BR upon entry to room. Pt progressing well w ambulation. SHe was able to ambulate approx 200 ft w SBA/CGA using Rwx. She also negotiated steps w cues for sequence. Pt exhibits slow pace, but is steady. Pt up in chair at end of session. She is tolerating all hip exercises well. Pt educated on anterior hip precautions. She verbalizes understanding. ALl questions answered at this time. NO further concerns. Pt plans home today w family and HHPT.  -EJ       Row Name 05/14/24 0936          Positioning and Restraints    Pre-Treatment Position bathroom  -EJ     Post Treatment Position chair  -EJ     In Chair notified nsg;reclined;call light within reach;encouraged to call for assist  -EJ               User Key  (r) = Recorded By, (t) = Taken By, (c) = Cosigned By      Initials Name Provider Type    Isatu Fair, PT Physical Therapist                   Outcome Measures       Row Name 05/14/24 0938 05/14/24 0840       How much help from another person do you currently need...    Turning from your back to your side while in flat bed without using bedrails? 4  -EJ 4  -SH    Moving from lying on back to sitting on the side of a flat bed without bedrails? 4  -EJ 4  -SH    Moving to and from a bed to a chair (including a wheelchair)? 4  -EJ 4  -SH    Standing up from a chair using your arms (e.g., wheelchair, bedside chair)? 4  -EJ 3  -SH    Climbing 3-5 steps with a railing? 3  -EJ 3  -SH    To walk in hospital room? 4  -EJ 3  -SH    AM-PAC 6 Clicks Score (PT) 23  -EJ 21  -SH    Highest Level of Mobility Goal 7 --> Walk 25 feet or more  -EJ 6 --> Walk 10 steps or more   -              User Key  (r) = Recorded By, (t) = Taken By, (c) = Cosigned By      Initials Name Provider Type    Isatu Fair, PT Physical Therapist    Rebekah Tobar RN Registered Nurse                                   PT Recommendation and Plan  Planned Therapy Interventions (PT): balance training, bed mobility training, gait training, home exercise program, stretching, strengthening, stair training, ROM (range of motion), patient/family education, transfer training  Plan of Care Reviewed With: patient  Progress: improving  Outcome Evaluation: Pt seen for PT this am. SHe is doing well, up in BR upon entry to room. Pt progressing well w ambulation. SHe was able to ambulate approx 200 ft w SBA/CGA using Rwx. She also negotiated steps w cues for sequence. Pt exhibits slow pace, but is steady. Pt up in chair at end of session. She is tolerating all hip exercises well. Pt educated on anterior hip precautions. She verbalizes understanding. ALl questions answered at this time. NO further concerns. Pt plans home today w family and HHPT.     Time Calculation:         PT Charges       Row Name 05/14/24 0939             Time Calculation    Start Time 0858  -EJ      Stop Time 0918  -EJ      Time Calculation (min) 20 min  -EJ      PT Received On 05/14/24  -EJ                User Key  (r) = Recorded By, (t) = Taken By, (c) = Cosigned By      Initials Name Provider Type    Isatu Fair, PT Physical Therapist                  Therapy Charges for Today       Code Description Service Date Service Provider Modifiers Qty    46316563132 HC PT EVAL LOW COMPLEXITY 3 5/13/2024 Isatu De La O, PT GP 1    22095958971 HC PT THERAPEUTIC ACT EA 15 MIN 5/13/2024 Isatu De La O, PT GP 1    03070484695 HC PT THERAPEUTIC ACT EA 15 MIN 5/14/2024 Isatu De La O, PT GP 1            PT G-Codes  AM-PAC 6 Clicks Score (PT): 23  PT Discharge Summary  Anticipated Discharge Disposition (PT): home with assist, home  with home health    Isatu De La O, PT  5/14/2024

## 2024-05-14 NOTE — PLAN OF CARE
Goal Outcome Evaluation:  Plan of Care Reviewed With: patient        Progress: improving  Outcome Evaluation: Pt seen for PT this am. SHe is doing well, up in BR upon entry to room. Pt progressing well w ambulation. SHe was able to ambulate approx 200 ft w SBA/CGA using Rwx. She also negotiated steps w cues for sequence. Pt exhibits slow pace, but is steady. Pt up in chair at end of session. She is tolerating all hip exercises well. Pt educated on anterior hip precautions. She verbalizes understanding. ALl questions answered at this time. NO further concerns. Pt plans home today w family and HHPT.      Anticipated Discharge Disposition (PT): home with assist, home with home health

## 2024-05-15 ENCOUNTER — HOME CARE VISIT (OUTPATIENT)
Dept: HOME HEALTH SERVICES | Facility: HOME HEALTHCARE | Age: 74
End: 2024-05-15
Payer: MEDICARE

## 2024-05-15 VITALS
RESPIRATION RATE: 16 BRPM | TEMPERATURE: 96.6 F | HEART RATE: 59 BPM | OXYGEN SATURATION: 94 % | SYSTOLIC BLOOD PRESSURE: 100 MMHG | DIASTOLIC BLOOD PRESSURE: 58 MMHG

## 2024-05-15 PROCEDURE — G0151 HHCP-SERV OF PT,EA 15 MIN: HCPCS

## 2024-05-15 NOTE — CASE MANAGEMENT/SOCIAL WORK
Case Management Discharge Note      Final Note: Home with [facility]    Provided Post Acute Provider List?: N/A  N/A Provider List Comment: Decline; requests [facility].    Selected Continued Care - Discharged on 5/14/2024 Admission date: 5/13/2024 - Discharge disposition: Home-Health Care Svc      Destination    No services have been selected for the patient.                Durable Medical Equipment    No services have been selected for the patient.                Dialysis/Infusion    No services have been selected for the patient.                Home Medical Care Coordination complete.      Service Provider Selected Services Address Phone Fax Patient Preferred    [facility] [address] [address] [phone] --              Therapy    No services have been selected for the patient.                Community Resources    No services have been selected for the patient.                Community & DME    No services have been selected for the patient.                    Transportation Services  Private: Car    Final Discharge Disposition Code: 06 - home with home health care

## 2024-05-15 NOTE — CASE COMMUNICATION
Patient will be 2w2, 1w1 for HH PT following right GEOVANNA 5/13/24.  Patient at home with spouse, home has steps.  FLORENCE dressing intact and functioning properly with no drainage on bandage.  Able to perform all appropriate exercises and walk with walker.  Has all medication in home except meloxicam which  is picking up from pharmacy.  Anticipate patient will do well.

## 2024-05-15 NOTE — HOME HEALTH
"Physical Therapy Evaluation   Diagnosis: right GEOVANNA 5/13/24  Focus of Care: right GEOVANNA 5/13/24  Surgical Procedure and date: right GEOVANNA 5/13/24  Pertinent Medical History: see epic    Prior level of function:   Ambulation: independent   Activities of daily living: independent   Instrumental activities of daily living: independent   Driving: drives   Other/ Hobbies/Work:  sewing, gardening, goes to gym    Home Environment: lives with  in home with steps to enter/ exit with railing  Goal for Physical Therapy: \"to be able to walk without pain\"  Skilled Physical Therapy indicated for instruction in gait, exercise, education and home safety.    Plan for next visit: review gait and exercise, pain management"

## 2024-05-17 ENCOUNTER — HOME CARE VISIT (OUTPATIENT)
Dept: HOME HEALTH SERVICES | Facility: HOME HEALTHCARE | Age: 74
End: 2024-05-17
Payer: MEDICARE

## 2024-05-17 VITALS
HEART RATE: 75 BPM | TEMPERATURE: 95 F | RESPIRATION RATE: 16 BRPM | SYSTOLIC BLOOD PRESSURE: 130 MMHG | OXYGEN SATURATION: 98 % | DIASTOLIC BLOOD PRESSURE: 68 MMHG

## 2024-05-17 PROCEDURE — G0151 HHCP-SERV OF PT,EA 15 MIN: HCPCS

## 2024-05-17 NOTE — HOME HEALTH
Plan for next visit: review exercises, try cane, add more standing exercise    Subjective: Bowels moved yesterday and today.  Exercises going ok.    Falls since last visit: none  Home/Social Environment:  lives with  in home with steps

## 2024-05-22 ENCOUNTER — HOME CARE VISIT (OUTPATIENT)
Dept: HOME HEALTH SERVICES | Facility: HOME HEALTHCARE | Age: 74
End: 2024-05-22
Payer: MEDICARE

## 2024-05-22 VITALS
OXYGEN SATURATION: 98 % | RESPIRATION RATE: 16 BRPM | DIASTOLIC BLOOD PRESSURE: 70 MMHG | SYSTOLIC BLOOD PRESSURE: 140 MMHG | HEART RATE: 58 BPM

## 2024-05-22 PROCEDURE — G0151 HHCP-SERV OF PT,EA 15 MIN: HCPCS

## 2024-05-22 NOTE — HOME HEALTH
Plan for next visit: review new exercises, review steps, work with cane    Subjective: I'm having to urinate every 2 hours.  I don't think I have a UTI because I have no other symptoms.    Falls since last visit: none  Home/Social Environment:  lives with  in home with steps

## 2024-05-24 ENCOUNTER — HOME CARE VISIT (OUTPATIENT)
Dept: HOME HEALTH SERVICES | Facility: HOME HEALTHCARE | Age: 74
End: 2024-05-24
Payer: MEDICARE

## 2024-05-24 PROCEDURE — G0151 HHCP-SERV OF PT,EA 15 MIN: HCPCS

## 2024-05-28 ENCOUNTER — OFFICE VISIT (OUTPATIENT)
Dept: ORTHOPEDIC SURGERY | Facility: CLINIC | Age: 74
End: 2024-05-28
Payer: MEDICARE

## 2024-05-28 VITALS
OXYGEN SATURATION: 96 % | SYSTOLIC BLOOD PRESSURE: 128 MMHG | TEMPERATURE: 98.9 F | DIASTOLIC BLOOD PRESSURE: 74 MMHG | HEART RATE: 54 BPM

## 2024-05-28 VITALS — WEIGHT: 185.9 LBS | BODY MASS INDEX: 28.17 KG/M2 | TEMPERATURE: 97.7 F | HEIGHT: 68 IN

## 2024-05-28 DIAGNOSIS — M16.11 PRIMARY OSTEOARTHRITIS OF RIGHT HIP: Primary | ICD-10-CM

## 2024-05-28 PROCEDURE — 99024 POSTOP FOLLOW-UP VISIT: CPT | Performed by: ORTHOPAEDIC SURGERY

## 2024-05-28 PROCEDURE — 73502 X-RAY EXAM HIP UNI 2-3 VIEWS: CPT | Performed by: ORTHOPAEDIC SURGERY

## 2024-05-28 NOTE — PROGRESS NOTES
Anastasiia Faria : 1950 MRN: [id number] DATE: 2024    DIAGNOSIS: 2 week follow up right total hip anterior    SUBJECTIVE:Patient returns today for 2 week follow up of right total hip replacement. Patient reports doing well with no unusual complaints. Appears to be progressing appropriately.    OBJECTIVE:   Exam:. The incision is healing appropriately. No sign of infection. Range of motion is progressing as expected. The calf is soft and nontender with a negative Homans sign.    DIAGNOSTIC STUDIES  Xrays: 2 views of the right hip (AP pelvis and lateral right hip) were ordered and reviewed for evaluation of recent hip replacement. They demonstrate a well positioned, well aligned hip replacement without complicating factors noted. In comparison with previous films there has been interval implant placement.    ASSESSMENT: 2 week status post right hip replacement.    PLAN: 1) Staples removed and steri strips applied   2) PT exercises   3) Discontinue JOS hose   4) Continue ice PRN   5) WBAT   6) aspirin 81 mg orally every day for 1 month   7) Follow up in 6 weeks with repeat Xrays of right hip (2views)    Vasu King MD  2024

## 2024-05-28 NOTE — HOME HEALTH
Patient reports good pain control. Her FLORENCE dressing is clean and intact.  She is hesitant to wean off the walker.      Plan for next visit  Finalize HEP, gait training, and do dc assessment.

## 2024-05-29 ENCOUNTER — HOME CARE VISIT (OUTPATIENT)
Dept: HOME HEALTH SERVICES | Facility: HOME HEALTHCARE | Age: 74
End: 2024-05-29
Payer: MEDICARE

## 2024-05-29 VITALS
DIASTOLIC BLOOD PRESSURE: 78 MMHG | HEART RATE: 68 BPM | RESPIRATION RATE: 16 BRPM | OXYGEN SATURATION: 98 % | TEMPERATURE: 96.4 F | SYSTOLIC BLOOD PRESSURE: 118 MMHG

## 2024-05-29 PROCEDURE — G0151 HHCP-SERV OF PT,EA 15 MIN: HCPCS

## 2024-05-29 NOTE — HOME HEALTH
Subjective: I saw Dr. King.  Everything is good.  I've decided not to try out patient PT.  Will continue here at home.   Not having anymore urinary problems.     Falls since last visit: none  Home/Social Environment:  lives with  in home with steps

## 2024-06-12 ENCOUNTER — TELEPHONE (OUTPATIENT)
Dept: FAMILY MEDICINE CLINIC | Facility: CLINIC | Age: 74
End: 2024-06-12

## 2024-06-12 ENCOUNTER — HOSPITAL ENCOUNTER (INPATIENT)
Facility: HOSPITAL | Age: 74
LOS: 3 days | Discharge: HOME | DRG: 660 | End: 2024-06-16
Attending: EMERGENCY MEDICINE | Admitting: HOSPITALIST
Payer: MEDICARE

## 2024-06-12 ENCOUNTER — APPOINTMENT (OUTPATIENT)
Dept: CT IMAGING | Facility: HOSPITAL | Age: 74
DRG: 660 | End: 2024-06-12
Payer: MEDICARE

## 2024-06-12 DIAGNOSIS — D72.829 LEUKOCYTOSIS, UNSPECIFIED TYPE: ICD-10-CM

## 2024-06-12 DIAGNOSIS — Z12.11 ENCOUNTER FOR SCREENING COLONOSCOPY: ICD-10-CM

## 2024-06-12 DIAGNOSIS — N13.30 HYDRONEPHROSIS, UNSPECIFIED HYDRONEPHROSIS TYPE: ICD-10-CM

## 2024-06-12 DIAGNOSIS — R10.9 ACUTE ABDOMINAL PAIN: ICD-10-CM

## 2024-06-12 DIAGNOSIS — K92.2 GASTROINTESTINAL HEMORRHAGE, UNSPECIFIED GASTROINTESTINAL HEMORRHAGE TYPE: ICD-10-CM

## 2024-06-12 DIAGNOSIS — K52.9 COLITIS: ICD-10-CM

## 2024-06-12 DIAGNOSIS — N20.1 URETERAL CALCULUS: Primary | ICD-10-CM

## 2024-06-12 DIAGNOSIS — N39.0 URINARY TRACT INFECTION WITHOUT HEMATURIA, SITE UNSPECIFIED: ICD-10-CM

## 2024-06-12 LAB
ABO GROUP BLD: NORMAL
ADV 40+41 DNA STL QL NAA+NON-PROBE: NOT DETECTED
ALBUMIN SERPL-MCNC: 3.8 G/DL (ref 3.5–5.2)
ALBUMIN/GLOB SERPL: 1.5 G/DL
ALP SERPL-CCNC: 107 U/L (ref 39–117)
ALT SERPL W P-5'-P-CCNC: 10 U/L (ref 1–33)
ANION GAP SERPL CALCULATED.3IONS-SCNC: 12 MMOL/L (ref 5–15)
APTT PPP: 26.5 SECONDS (ref 22.7–35.4)
AST SERPL-CCNC: 11 U/L (ref 1–32)
ASTRO TYP 1-8 RNA STL QL NAA+NON-PROBE: NOT DETECTED
BACTERIA UR QL AUTO: ABNORMAL /HPF
BASOPHILS # BLD AUTO: 0.06 10*3/MM3 (ref 0–0.2)
BASOPHILS NFR BLD AUTO: 0.5 % (ref 0–1.5)
BILIRUB SERPL-MCNC: 0.4 MG/DL (ref 0–1.2)
BILIRUB UR QL STRIP: NEGATIVE
BLD GP AB SCN SERPL QL: NEGATIVE
BUN SERPL-MCNC: 19 MG/DL (ref 8–23)
BUN/CREAT SERPL: 18.3 (ref 7–25)
C CAYETANENSIS DNA STL QL NAA+NON-PROBE: NOT DETECTED
C COLI+JEJ+UPSA DNA STL QL NAA+NON-PROBE: NOT DETECTED
C DIFF TOX GENS STL QL NAA+PROBE: POSITIVE
CALCIUM SPEC-SCNC: 9.7 MG/DL (ref 8.6–10.5)
CHLORIDE SERPL-SCNC: 104 MMOL/L (ref 98–107)
CLARITY UR: ABNORMAL
CO2 SERPL-SCNC: 23 MMOL/L (ref 22–29)
COD CRY URNS QL: PRESENT /HPF
COLOR UR: ABNORMAL
CREAT SERPL-MCNC: 1.04 MG/DL (ref 0.57–1)
CRYPTOSP DNA STL QL NAA+NON-PROBE: NOT DETECTED
DEPRECATED RDW RBC AUTO: 42.6 FL (ref 37–54)
E HISTOLYT DNA STL QL NAA+NON-PROBE: NOT DETECTED
EAEC PAA PLAS AGGR+AATA ST NAA+NON-PRB: NOT DETECTED
EC STX1+STX2 GENES STL QL NAA+NON-PROBE: NOT DETECTED
EGFRCR SERPLBLD CKD-EPI 2021: 56.5 ML/MIN/1.73
EOSINOPHIL # BLD AUTO: 0.07 10*3/MM3 (ref 0–0.4)
EOSINOPHIL NFR BLD AUTO: 0.5 % (ref 0.3–6.2)
EPEC EAE GENE STL QL NAA+NON-PROBE: NOT DETECTED
ERYTHROCYTE [DISTWIDTH] IN BLOOD BY AUTOMATED COUNT: 12.9 % (ref 12.3–15.4)
ETEC LTA+ST1A+ST1B TOX ST NAA+NON-PROBE: NOT DETECTED
EXPIRATION DATE: ABNORMAL
FECAL OCCULT BLOOD SCREEN, POC: POSITIVE
G LAMBLIA DNA STL QL NAA+NON-PROBE: NOT DETECTED
GLOBULIN UR ELPH-MCNC: 2.5 GM/DL
GLUCOSE SERPL-MCNC: 175 MG/DL (ref 65–99)
GLUCOSE UR STRIP-MCNC: NEGATIVE MG/DL
HCT VFR BLD AUTO: 36.5 % (ref 34–46.6)
HCT VFR BLD AUTO: 37.8 % (ref 34–46.6)
HCT VFR BLD AUTO: 38.8 % (ref 34–46.6)
HGB BLD-MCNC: 12.2 G/DL (ref 12–15.9)
HGB BLD-MCNC: 12.3 G/DL (ref 12–15.9)
HGB BLD-MCNC: 13 G/DL (ref 12–15.9)
HGB UR QL STRIP.AUTO: ABNORMAL
HOLD SPECIMEN: NORMAL
HYALINE CASTS UR QL AUTO: ABNORMAL /LPF
IMM GRANULOCYTES # BLD AUTO: 0.04 10*3/MM3 (ref 0–0.05)
IMM GRANULOCYTES NFR BLD AUTO: 0.3 % (ref 0–0.5)
INR PPP: 1.13 (ref 0.9–1.1)
KETONES UR QL STRIP: ABNORMAL
LEUKOCYTE ESTERASE UR QL STRIP.AUTO: ABNORMAL
LIPASE SERPL-CCNC: 18 U/L (ref 13–60)
LYMPHOCYTES # BLD AUTO: 1.69 10*3/MM3 (ref 0.7–3.1)
LYMPHOCYTES NFR BLD AUTO: 13.1 % (ref 19.6–45.3)
Lab: 271
MCH RBC QN AUTO: 30.7 PG (ref 26.6–33)
MCHC RBC AUTO-ENTMCNC: 33.5 G/DL (ref 31.5–35.7)
MCV RBC AUTO: 91.7 FL (ref 79–97)
MONOCYTES # BLD AUTO: 1 10*3/MM3 (ref 0.1–0.9)
MONOCYTES NFR BLD AUTO: 7.7 % (ref 5–12)
NEGATIVE CONTROL: NEGATIVE
NEUTROPHILS NFR BLD AUTO: 10.07 10*3/MM3 (ref 1.7–7)
NEUTROPHILS NFR BLD AUTO: 77.9 % (ref 42.7–76)
NITRITE UR QL STRIP: POSITIVE
NOROVIRUS GI+II RNA STL QL NAA+NON-PROBE: NOT DETECTED
NRBC BLD AUTO-RTO: 0 /100 WBC (ref 0–0.2)
P SHIGELLOIDES DNA STL QL NAA+NON-PROBE: NOT DETECTED
PH UR STRIP.AUTO: <=5 [PH] (ref 5–8)
PLATELET # BLD AUTO: 301 10*3/MM3 (ref 140–450)
PMV BLD AUTO: 9.2 FL (ref 6–12)
POSITIVE CONTROL: POSITIVE
POTASSIUM SERPL-SCNC: 3.4 MMOL/L (ref 3.5–5.2)
POTASSIUM SERPL-SCNC: 4.1 MMOL/L (ref 3.5–5.2)
PROT SERPL-MCNC: 6.3 G/DL (ref 6–8.5)
PROT UR QL STRIP: ABNORMAL
PROTHROMBIN TIME: 14.8 SECONDS (ref 11.7–14.2)
RBC # BLD AUTO: 4.23 10*6/MM3 (ref 3.77–5.28)
RBC # UR STRIP: ABNORMAL /HPF
REF LAB TEST METHOD: ABNORMAL
RH BLD: POSITIVE
RVA RNA STL QL NAA+NON-PROBE: NOT DETECTED
S ENT+BONG DNA STL QL NAA+NON-PROBE: NOT DETECTED
SAPO I+II+IV+V RNA STL QL NAA+NON-PROBE: NOT DETECTED
SHIGELLA SP+EIEC IPAH ST NAA+NON-PROBE: NOT DETECTED
SODIUM SERPL-SCNC: 139 MMOL/L (ref 136–145)
SP GR UR STRIP: 1.03 (ref 1–1.03)
SQUAMOUS #/AREA URNS HPF: ABNORMAL /HPF
T&S EXPIRATION DATE: NORMAL
TSH SERPL DL<=0.05 MIU/L-ACNC: 2.37 UIU/ML (ref 0.27–4.2)
UROBILINOGEN UR QL STRIP: ABNORMAL
V CHOL+PARA+VUL DNA STL QL NAA+NON-PROBE: NOT DETECTED
V CHOLERAE DNA STL QL NAA+NON-PROBE: NOT DETECTED
WBC # UR STRIP: ABNORMAL /HPF
WBC CLUMPS # UR AUTO: PRESENT /HPF
WBC NRBC COR # BLD AUTO: 12.93 10*3/MM3 (ref 3.4–10.8)
Y ENTEROCOL DNA STL QL NAA+NON-PROBE: NOT DETECTED

## 2024-06-12 PROCEDURE — 25810000003 SODIUM CHLORIDE 0.9 % SOLUTION: Performed by: HOSPITALIST

## 2024-06-12 PROCEDURE — 80053 COMPREHEN METABOLIC PANEL: CPT | Performed by: NURSE PRACTITIONER

## 2024-06-12 PROCEDURE — 87507 IADNA-DNA/RNA PROBE TQ 12-25: CPT | Performed by: NURSE PRACTITIONER

## 2024-06-12 PROCEDURE — 82270 OCCULT BLOOD FECES: CPT | Performed by: NURSE PRACTITIONER

## 2024-06-12 PROCEDURE — 74176 CT ABD & PELVIS W/O CONTRAST: CPT

## 2024-06-12 PROCEDURE — 25010000002 ONDANSETRON PER 1 MG: Performed by: HOSPITALIST

## 2024-06-12 PROCEDURE — 86900 BLOOD TYPING SEROLOGIC ABO: CPT | Performed by: NURSE PRACTITIONER

## 2024-06-12 PROCEDURE — 99214 OFFICE O/P EST MOD 30 MIN: CPT | Performed by: PHYSICIAN ASSISTANT

## 2024-06-12 PROCEDURE — 81001 URINALYSIS AUTO W/SCOPE: CPT | Performed by: HOSPITALIST

## 2024-06-12 PROCEDURE — 84132 ASSAY OF SERUM POTASSIUM: CPT | Performed by: HOSPITALIST

## 2024-06-12 PROCEDURE — 99285 EMERGENCY DEPT VISIT HI MDM: CPT

## 2024-06-12 PROCEDURE — G0378 HOSPITAL OBSERVATION PER HR: HCPCS

## 2024-06-12 PROCEDURE — 25010000002 CEFTRIAXONE PER 250 MG: Performed by: NURSE PRACTITIONER

## 2024-06-12 PROCEDURE — 87186 SC STD MICRODIL/AGAR DIL: CPT | Performed by: STUDENT IN AN ORGANIZED HEALTH CARE EDUCATION/TRAINING PROGRAM

## 2024-06-12 PROCEDURE — 87493 C DIFF AMPLIFIED PROBE: CPT | Performed by: NURSE PRACTITIONER

## 2024-06-12 PROCEDURE — 86901 BLOOD TYPING SEROLOGIC RH(D): CPT | Performed by: NURSE PRACTITIONER

## 2024-06-12 PROCEDURE — 85610 PROTHROMBIN TIME: CPT | Performed by: NURSE PRACTITIONER

## 2024-06-12 PROCEDURE — 87040 BLOOD CULTURE FOR BACTERIA: CPT | Performed by: HOSPITALIST

## 2024-06-12 PROCEDURE — 86850 RBC ANTIBODY SCREEN: CPT | Performed by: NURSE PRACTITIONER

## 2024-06-12 PROCEDURE — 83690 ASSAY OF LIPASE: CPT | Performed by: NURSE PRACTITIONER

## 2024-06-12 PROCEDURE — 87088 URINE BACTERIA CULTURE: CPT | Performed by: STUDENT IN AN ORGANIZED HEALTH CARE EDUCATION/TRAINING PROGRAM

## 2024-06-12 PROCEDURE — 85025 COMPLETE CBC W/AUTO DIFF WBC: CPT | Performed by: NURSE PRACTITIONER

## 2024-06-12 PROCEDURE — 25010000002 METRONIDAZOLE 500 MG/100ML SOLUTION: Performed by: NURSE PRACTITIONER

## 2024-06-12 PROCEDURE — 84443 ASSAY THYROID STIM HORMONE: CPT | Performed by: HOSPITALIST

## 2024-06-12 PROCEDURE — 85014 HEMATOCRIT: CPT | Performed by: HOSPITALIST

## 2024-06-12 PROCEDURE — 87086 URINE CULTURE/COLONY COUNT: CPT | Performed by: STUDENT IN AN ORGANIZED HEALTH CARE EDUCATION/TRAINING PROGRAM

## 2024-06-12 PROCEDURE — 85730 THROMBOPLASTIN TIME PARTIAL: CPT | Performed by: NURSE PRACTITIONER

## 2024-06-12 PROCEDURE — 85018 HEMOGLOBIN: CPT | Performed by: HOSPITALIST

## 2024-06-12 RX ORDER — ROSUVASTATIN CALCIUM 10 MG/1
10 TABLET, COATED ORAL NIGHTLY
Status: DISCONTINUED | OUTPATIENT
Start: 2024-06-12 | End: 2024-06-16 | Stop reason: HOSPADM

## 2024-06-12 RX ORDER — POTASSIUM CHLORIDE 750 MG/1
40 TABLET, FILM COATED, EXTENDED RELEASE ORAL EVERY 4 HOURS
Status: COMPLETED | OUTPATIENT
Start: 2024-06-12 | End: 2024-06-12

## 2024-06-12 RX ORDER — ONDANSETRON 4 MG/1
4 TABLET, ORALLY DISINTEGRATING ORAL EVERY 6 HOURS PRN
Status: DISCONTINUED | OUTPATIENT
Start: 2024-06-12 | End: 2024-06-16 | Stop reason: HOSPADM

## 2024-06-12 RX ORDER — LEVOTHYROXINE SODIUM 0.05 MG/1
50 TABLET ORAL DAILY
Status: DISCONTINUED | OUTPATIENT
Start: 2024-06-12 | End: 2024-06-16 | Stop reason: HOSPADM

## 2024-06-12 RX ORDER — SODIUM CHLORIDE 0.9 % (FLUSH) 0.9 %
10 SYRINGE (ML) INJECTION AS NEEDED
Status: DISCONTINUED | OUTPATIENT
Start: 2024-06-12 | End: 2024-06-16 | Stop reason: HOSPADM

## 2024-06-12 RX ORDER — PANTOPRAZOLE SODIUM 40 MG/10ML
80 INJECTION, POWDER, LYOPHILIZED, FOR SOLUTION INTRAVENOUS ONCE
Status: COMPLETED | OUTPATIENT
Start: 2024-06-12 | End: 2024-06-12

## 2024-06-12 RX ORDER — ACETAMINOPHEN 325 MG/1
650 TABLET ORAL EVERY 4 HOURS PRN
Status: DISCONTINUED | OUTPATIENT
Start: 2024-06-12 | End: 2024-06-16 | Stop reason: HOSPADM

## 2024-06-12 RX ORDER — ONDANSETRON 2 MG/ML
4 INJECTION INTRAMUSCULAR; INTRAVENOUS EVERY 6 HOURS PRN
Status: DISCONTINUED | OUTPATIENT
Start: 2024-06-12 | End: 2024-06-16 | Stop reason: HOSPADM

## 2024-06-12 RX ORDER — NIFEDIPINE 30 MG/1
30 TABLET, EXTENDED RELEASE ORAL DAILY
Status: DISCONTINUED | OUTPATIENT
Start: 2024-06-12 | End: 2024-06-16 | Stop reason: HOSPADM

## 2024-06-12 RX ORDER — ACETAMINOPHEN 500 MG
1000 TABLET ORAL ONCE
Status: COMPLETED | OUTPATIENT
Start: 2024-06-12 | End: 2024-06-12

## 2024-06-12 RX ORDER — ACETAMINOPHEN 160 MG/5ML
650 SOLUTION ORAL EVERY 4 HOURS PRN
Status: DISCONTINUED | OUTPATIENT
Start: 2024-06-12 | End: 2024-06-16 | Stop reason: HOSPADM

## 2024-06-12 RX ORDER — ESCITALOPRAM OXALATE 20 MG/1
20 TABLET ORAL DAILY
Status: DISCONTINUED | OUTPATIENT
Start: 2024-06-12 | End: 2024-06-16 | Stop reason: HOSPADM

## 2024-06-12 RX ORDER — SODIUM CHLORIDE 9 MG/ML
40 INJECTION, SOLUTION INTRAVENOUS AS NEEDED
Status: DISCONTINUED | OUTPATIENT
Start: 2024-06-12 | End: 2024-06-16 | Stop reason: HOSPADM

## 2024-06-12 RX ORDER — SODIUM CHLORIDE 9 MG/ML
100 INJECTION, SOLUTION INTRAVENOUS CONTINUOUS
Status: DISCONTINUED | OUTPATIENT
Start: 2024-06-12 | End: 2024-06-16 | Stop reason: HOSPADM

## 2024-06-12 RX ORDER — VANCOMYCIN HYDROCHLORIDE 125 MG/1
125 CAPSULE ORAL EVERY 6 HOURS SCHEDULED
Status: DISCONTINUED | OUTPATIENT
Start: 2024-06-12 | End: 2024-06-16 | Stop reason: HOSPADM

## 2024-06-12 RX ORDER — METRONIDAZOLE 500 MG/100ML
500 INJECTION, SOLUTION INTRAVENOUS ONCE
Status: COMPLETED | OUTPATIENT
Start: 2024-06-12 | End: 2024-06-13

## 2024-06-12 RX ORDER — SODIUM CHLORIDE 0.9 % (FLUSH) 0.9 %
10 SYRINGE (ML) INJECTION EVERY 12 HOURS SCHEDULED
Status: DISCONTINUED | OUTPATIENT
Start: 2024-06-12 | End: 2024-06-16 | Stop reason: HOSPADM

## 2024-06-12 RX ORDER — ACETAMINOPHEN 650 MG/1
650 SUPPOSITORY RECTAL EVERY 4 HOURS PRN
Status: DISCONTINUED | OUTPATIENT
Start: 2024-06-12 | End: 2024-06-16 | Stop reason: HOSPADM

## 2024-06-12 RX ORDER — ONDANSETRON 2 MG/ML
4 INJECTION INTRAMUSCULAR; INTRAVENOUS EVERY 6 HOURS PRN
Status: DISCONTINUED | OUTPATIENT
Start: 2024-06-12 | End: 2024-06-12

## 2024-06-12 RX ORDER — ATENOLOL 25 MG/1
25 TABLET ORAL DAILY
Status: DISCONTINUED | OUTPATIENT
Start: 2024-06-12 | End: 2024-06-16 | Stop reason: HOSPADM

## 2024-06-12 RX ORDER — CETIRIZINE HYDROCHLORIDE 10 MG/1
10 TABLET ORAL DAILY
Status: DISCONTINUED | OUTPATIENT
Start: 2024-06-12 | End: 2024-06-16 | Stop reason: HOSPADM

## 2024-06-12 RX ORDER — NITROGLYCERIN 0.4 MG/1
0.4 TABLET SUBLINGUAL
Status: DISCONTINUED | OUTPATIENT
Start: 2024-06-12 | End: 2024-06-16 | Stop reason: HOSPADM

## 2024-06-12 RX ADMIN — SODIUM CHLORIDE 100 ML/HR: 9 INJECTION, SOLUTION INTRAVENOUS at 13:49

## 2024-06-12 RX ADMIN — VANCOMYCIN HYDROCHLORIDE 125 MG: 125 CAPSULE ORAL at 17:09

## 2024-06-12 RX ADMIN — ROSUVASTATIN CALCIUM 10 MG: 10 TABLET, FILM COATED ORAL at 19:30

## 2024-06-12 RX ADMIN — PANTOPRAZOLE SODIUM 80 MG: 40 INJECTION, POWDER, FOR SOLUTION INTRAVENOUS at 09:41

## 2024-06-12 RX ADMIN — NIFEDIPINE 30 MG: 30 TABLET, FILM COATED, EXTENDED RELEASE ORAL at 15:20

## 2024-06-12 RX ADMIN — POTASSIUM CHLORIDE 40 MEQ: 750 TABLET, EXTENDED RELEASE ORAL at 15:20

## 2024-06-12 RX ADMIN — VANCOMYCIN HYDROCHLORIDE 125 MG: 125 CAPSULE ORAL at 23:30

## 2024-06-12 RX ADMIN — ONDANSETRON 4 MG: 2 INJECTION INTRAMUSCULAR; INTRAVENOUS at 19:38

## 2024-06-12 RX ADMIN — ACETAMINOPHEN 1000 MG: 500 TABLET ORAL at 10:42

## 2024-06-12 RX ADMIN — POTASSIUM CHLORIDE 40 MEQ: 750 TABLET, EXTENDED RELEASE ORAL at 18:26

## 2024-06-12 RX ADMIN — METRONIDAZOLE 500 MG: 500 INJECTION, SOLUTION INTRAVENOUS at 17:08

## 2024-06-12 RX ADMIN — ESCITALOPRAM 20 MG: 20 TABLET, FILM COATED ORAL at 15:23

## 2024-06-12 RX ADMIN — ATENOLOL 25 MG: 25 TABLET ORAL at 15:21

## 2024-06-12 RX ADMIN — CETIRIZINE HYDROCHLORIDE 10 MG: 10 TABLET ORAL at 15:20

## 2024-06-12 RX ADMIN — CEFTRIAXONE 1000 MG: 2 INJECTION, POWDER, FOR SOLUTION INTRAMUSCULAR; INTRAVENOUS at 15:21

## 2024-06-12 RX ADMIN — PANTOPRAZOLE SODIUM 8 MG/HR: 40 INJECTION, POWDER, FOR SOLUTION INTRAVENOUS at 09:41

## 2024-06-12 RX ADMIN — Medication 10 ML: at 15:28

## 2024-06-12 NOTE — H&P
HISTORY AND PHYSICAL   [facility]        Patient Identification:  Name: Anastasiia Faria  Age: 74 y.o.  Sex: female  :  1950  MRN: [id number]                     Primary Care Physician: Mirza Scott Sr., MD    Chief Complaint: Nausea, vomiting, diarrhea...    History of Present Illness:   Pleasant 74-year-old female states she developed nausea and vomiting started about 9 PM last night.  She states initial vomitus was undigested food.  She vomited a total of 3-4 times.  By the last vomitus it was gastric juices.  All that resolved about midnight.  She had noted some epigastric pain but around midnight she started noting general abdominal pain.  She states she felt like she needed go to the bathroom and she passed a very hard stool.  Shortly thereafter this was followed by soft stool and thereafter this was followed by loose stools which are associated with blood in the toilet and on the toilet paper.  No mucus.  On questioning she states she did feel chilled last night.  On presentation she had a low-grade temperature of 100.6.  Her last bowel movement was about 6:00 this morning.  There is been no further abdominal pain.  Patient does have a history of colon polyps and a very strong family history of colon cancer.  She was actually due for follow-up colonoscopy this year.  She does have a history of renal lithiasis with pyelonephritis associated with E. coli septicemia back in March of this year.  Presently with no flank pain.  No dysuria.      Past Medical History:  Past Medical History:   Diagnosis Date    Allergic     Anxiety     Cataract     Cataract surgery on right eye 2021 and left eye 2021. (Dr. Nanette Bateman)    Chronic kidney disease     Colon polyps     Depression     Diverticulosis     Encounter for annual health examination 2014    Annual Health Assessment    Family history of colon cancer     Fibrocystic breast     GERD (gastroesophageal reflux disease)     Hard  of hearing     HEARING AIDS BILATERALLY    Heart murmur     Herpes labialis without complication     Hip pain     right    History of mammogram 12/20/2010    History of recent hospitalization 03/21/2024    KIDNEY STONE/SEPSIS 4 NIGHT AT [facility]    HL (hearing loss) 2014    Hearing Aids    Hyperlipidemia     Hypertension     Hypothyroidism     Insomnia     Kidney stone     Kidney stones     Migraines     Osteoarthritis of right hip     Osteopenia     Prolia -last 9/2021,3/2022    PONV (postoperative nausea and vomiting)     Scoliosis     Dr. Stanford 5/2018    Slow to wake up after anesthesia     Visual impairment     Wear Glasses     Past Surgical History:  Past Surgical History:   Procedure Laterality Date    BONE MARROW BIOPSY Left     BREAST BIOPSY      BREAST CYST ASPIRATION Right     BREAST SURGERY Right     BIOPSY    CATARACT EXTRACTION, BILATERAL      COLONOSCOPY N/A 10/27/2016    Procedure: COLONOSCOPY INTO CECUM;  Surgeon: Osvaldo Dorado MD;  Location: [facility] ENDOSCOPY;  Service:     COLONOSCOPY  01/26/2011    COLONOSCOPY  02/04/2005    COLONOSCOPY N/A 12/02/2021    Procedure: COLONOSCOPY INTO CECUM WITH COLD BIOPSY POLYPECTOMY AND HOT SNARE POLYPECTOMY;  Surgeon: Osvaldo Dorado MD;  Location: [facility] ENDOSCOPY;  Service: General;  Laterality: N/A;  PRE-FAMILY HISTORY OF COLON CANCER, PERSONAL HISTORY OF COLON CANCER  POST- POLYPS, DIVERTICULOSIS, HEMORRHOIDS    CYSTOSCOPY W/ URETERAL STENT PLACEMENT Left 03/21/2024    Procedure: LEFT CYSTOSCOPY RETROGRADE PYLEOGRAM URETERAL STENT INSERTION;  Surgeon: Jaiden Bolton Jr., MD;  Location: [facility] OR;  Service: Urology;  Laterality: Left;    CYSTOSCOPY W/ URETERAL STENT REMOVAL  04/23/2024    KNEE ARTHROSCOPY Left     PAP SMEAR  12/20/2010    TOTAL HIP ARTHROPLASTY Right 5/13/2024    Procedure: TOTAL HIP ARTHROPLASTY ANTERIOR WITH HANA TABLE;  Surgeon: Vasu King MD;  Location: [facility] OR [facility];  Service: Orthopedics;   Laterality: Right;      Home Meds:  (Not in a hospital admission)      Allergies:  No Known Allergies  Immunizations:  Immunization History   Administered Date(s) Administered    COVID-19 (PFIZER) BIVALENT 12+YRS 10/01/2022    COVID-19 (PFIZER) Purple Cap Monovalent 02/26/2021, 03/19/2021, 10/30/2021    COVID-19 F23 (PFIZER) 12YRS+ (COMIRNATY) 10/13/2023    Covid-19 (Pfizer) Gray Cap Monovalent 04/16/2022    FLUAD TRI 65YR+ 11/05/2019    FluMist 2-49yrs 12/11/2012, 10/25/2014, 10/21/2015    Fluad Quad 65+ 11/05/2019, 11/09/2020    Fluzone High Dose =>65 Years ([employer] ONLY) 09/15/2016, 10/12/2017, 10/15/2018, 11/05/2019    Fluzone High-Dose 65+yrs 10/12/2021, 10/13/2022, 10/20/2023    Pneumococcal Conjugate 13-Valent (PCV13) 09/21/2016    Pneumococcal Polysaccharide (PPSV23) 10/16/2017    Shingrix 09/22/2019, 01/04/2020    Tdap 04/03/2017    Zostavax 07/13/2012     Social History:   Social History     Social History Narrative    Not on file     Social History     Tobacco Use    Smoking status: Never    Smokeless tobacco: Never    Tobacco comments:     daily caffine   Substance Use Topics    Alcohol use: Never     Family History:  Family History   Problem Relation Age of Onset    Breast cancer Mother     Colon cancer Mother     Hypertension Mother     Cancer Mother         Breat cancer, skin cancer, colon cancer    Hyperlipidemia Mother     Cancer Father         Skin cancer    Hypertension Sister     Hypertension Sister     Hyperlipidemia Sister     Thyroid disease Sister     Diabetes type II Brother     Cancer Brother         Skin cancer    Colon cancer Maternal Aunt         colon ca 40    Cancer Maternal Aunt         Colon cancer cause of death early 40s    Colon cancer Maternal Aunt         64 yo    Heart disease Maternal Aunt     Colon cancer Maternal Aunt         79 yo    Cancer Maternal Aunt         Colon cancer    Cancer Maternal Aunt         Colon cancer    Cancer Maternal Uncle         Thyroid cancer     Heart disease Maternal Uncle     Stomach cancer Maternal Grandmother         or intestinal    Cancer Maternal Grandmother         GI TRACT CANCER    Breast cancer Maternal Grandmother     Heart disease Maternal Grandmother     Heart attack Maternal Grandfather     Breast cancer Maternal Grandfather     Heart disease Maternal Grandfather     Heart disease Paternal Grandmother     Colon cancer Other     Colon cancer Other     Malig Hyperthermia Neg Hx         Review of Systems  Review of Systems   Constitutional:         As per history of present illness.   HENT: Negative.     Eyes: Negative.    Respiratory: Negative.     Cardiovascular: Negative.    Gastrointestinal:         As per history of present illness.   Endocrine: Negative.    Genitourinary:         As per history of present illness.  No acute complaints.   Musculoskeletal: Negative.    Skin: Negative.    Allergic/Immunologic: Negative.    Neurological: Negative.    Hematological: Negative.    Psychiatric/Behavioral: Negative.         Objective:  T Max 24 hrs: Temp (24hrs), Av.6 °F (38.1 °C), Min:100.6 °F (38.1 °C), Max:100.6 °F (38.1 °C)    Vitals Ranges:   Temp:  [100.6 °F (38.1 °C)] 100.6 °F (38.1 °C)  Heart Rate:  [56-98] 59  Resp:  [18] 18  BP: (134-155)/(71-82) 134/71      Exam:  Physical Exam  Constitutional:       General: She is not in acute distress.     Appearance: Normal appearance. She is normal weight. She is not ill-appearing, toxic-appearing or diaphoretic.   HENT:      Head: Normocephalic and atraumatic.      Right Ear: External ear normal.      Left Ear: External ear normal.      Nose: Nose normal.      Mouth/Throat:      Mouth: Mucous membranes are moist.   Eyes:      General: No scleral icterus.        Right eye: No discharge.         Left eye: No discharge.      Extraocular Movements: Extraocular movements intact.      Conjunctiva/sclera: Conjunctivae normal.   Cardiovascular:      Rate and Rhythm: Normal rate and regular rhythm.       Pulses: Normal pulses.      Heart sounds: Normal heart sounds.   Pulmonary:      Effort: Pulmonary effort is normal.      Breath sounds: Normal breath sounds.   Abdominal:      General: Abdomen is flat. Bowel sounds are normal. There is no distension.      Palpations: Abdomen is soft. There is no mass.      Tenderness: There is no abdominal tenderness. There is no guarding or rebound.   Musculoskeletal:      Cervical back: Neck supple.      Right lower leg: No edema.      Left lower leg: No edema.   Skin:     General: Skin is warm and dry.   Neurological:      General: No focal deficit present.      Mental Status: She is alert and oriented to person, place, and time.   Psychiatric:         Mood and Affect: Mood normal.         Behavior: Behavior normal.         Thought Content: Thought content normal.         Judgment: Judgment normal.         Data Review:  All labs and radiology reviewed.    Assessment:  Colitis: Uncertain etiology.  Stool studies pending.  Exam presently benign.  GI consultation.  Clear liquids pending GI consultation.  Blood per rectum: Secondary to above.  Monitor hemoglobin closely.  GI consult.  Okay to discontinue PPI drip from my point of view.  Renal lithiasis: Clinically appears nonobstructing.  Check urinalysis.  Monitor.  Hypothyroid: Check TSH.  Continue home meds  Hypertension: Continue home meds.  Depression: Continue home meds.  Hypokalemia: Replacement protocol and monitor.      Plan:  Please see above.  Discussed with patient and  at bedside.  Discussed with ER provider.    Valente Villeda MD  6/12/2024  13:05 EDT    EMR Dragon/Transcription disclaimer:   Much of this encounter note is an electronic transcription/translation of spoken language to printed text. The electronic translation of spoken language may permit erroneous, or at times, nonsensical words or phrases to be inadvertently transcribed; Although I have reviewed the note for such errors, some may still  exist.     Addendum:  Stool studies positive for C. difficile.  Oral vancomycin regime started.  Electronically signed by Valente Villeda MD, 06/12/24, 3:42 PM EDT.

## 2024-06-12 NOTE — PROGRESS NOTES
[facility] Clinical Pharmacy Services: C. Difficile Medication Changes       Pharmacy has been consulted to look over Anastasiia Faria's profile to check patient's medications for changes due to C. Difficile diagnosis per Dr. Villeda' request.       Current C. Diff Regimen:     Assessment/Plan/Changes       1. Proton pump inhibitor: None    2. Antiperistalic agents or stool softeners/laxatives: None       Thank you for allowing me to participate in your patient's care.  Please call pharmacy with any questions or concerns.     Mayo Cesar, [facility]  Clinical Pharmacist

## 2024-06-12 NOTE — ED PROVIDER NOTES
EMERGENCY DEPARTMENT ENCOUNTER    Room Number:  04/04  Date seen:  6/12/2024  PCP: Mirza Scott Sr., MD  Historian/Independent historian: n/a  Chronic or social conditions impacting care: age      HPI:  Chief Complaint: n/v/d  A complete HPI/ROS/PMH/PSH/SH/FH are unobtainable due to: n/a  Context: Anastasiia Faria is a 74 y.o. female who presents to the ED c/o n/v/d that started last night. She had spaghetti for dinner. She then states after vomiting several times she began having diarrhea and has noticed blood in her stool. No history of GI bleed.  She is not on any anticoagulation.  No recent travel or antibiotics.  She denies any fever, chills, cough, chest pain, shortness of breath.  She was noted to be mildly febrile in triage, but patient states that she was unaware.     External Medical record review:   5/28/2024: Follow-up from right hip replacement x-ray right hip no medication changes  12/2/2021: Colonoscopy    PAST MEDICAL HISTORY  Active Ambulatory Problems     Diagnosis Date Noted    Colon polyp, hyperplastic 09/21/2016    Allergic rhinitis due to pollen 09/21/2016    Acquired hypothyroidism 09/21/2016    Essential hypertension 09/21/2016    FH: colon cancer in relative <50 years old 09/21/2016    Mixed hyperlipidemia 09/21/2016    Chronic right shoulder pain 03/30/2018    Periscapular pain 03/30/2018    Other idiopathic scoliosis, thoracic region 03/30/2018    Prediabetes 10/15/2018    Age-related osteoporosis without current pathological fracture 01/24/2019    Mild episode of recurrent major depressive disorder 08/06/2020    Age-related nuclear cataract of both eyes 03/26/2021    Cystoid nevus of conjunctiva 03/26/2021    Anxiety 07/16/2021    Cataract 10/12/2021    Cataract extraction status of left eye 07/08/2021    Cataract extraction status of right eye 06/17/2021    Conjunctival cyst of left eye 07/16/2021    Depressive disorder 07/16/2021    Disorder of refraction 07/16/2021    Dry eye syndrome of  bilateral lacrimal glands 07/16/2021    Hearing loss 07/16/2021    Hearing loss 10/12/2021    Hordeolum internum right upper eyelid 07/16/2021    Migraine 07/16/2021    Osteopenia 03/31/2015    Osteoporosis 07/16/2021    Renal stone 07/16/2021    Seasonal allergies 10/12/2021    Diverticulosis 12/02/2021    Internal hemorrhoids 12/02/2021    Posterior vitreous detachment of both eyes 08/17/2022    Right hip pain 10/31/2023    OA (osteoarthritis) of hip 12/12/2023    Sepsis 03/21/2024    Acute pyelonephritis 03/21/2024    Ureteral calculus 03/21/2024    E coli bacteremia 03/22/2024    Acute respiratory failure with hypoxia 03/22/2024    Dysuria 04/01/2024     Resolved Ambulatory Problems     Diagnosis Date Noted    CKD (chronic kidney disease) 09/21/2016    Screen for colon cancer 11/03/2021     Past Medical History:   Diagnosis Date    Allergic     Chronic kidney disease     Colon polyps     Depression     Encounter for annual health examination 03/07/2014    Family history of colon cancer     Fibrocystic breast     GERD (gastroesophageal reflux disease)     Hard of hearing     Heart murmur     Herpes labialis without complication     Hip pain     History of mammogram 12/20/2010    History of recent hospitalization 03/21/2024    HL (hearing loss) 2014    Hyperlipidemia     Hypertension     Hypothyroidism     Insomnia     Kidney stone     Kidney stones     Migraines     Osteoarthritis of right hip     PONV (postoperative nausea and vomiting)     Scoliosis     Slow to wake up after anesthesia     Visual impairment          PAST SURGICAL HISTORY  Past Surgical History:   Procedure Laterality Date    BONE MARROW BIOPSY Left     BREAST BIOPSY      BREAST CYST ASPIRATION Right     BREAST SURGERY Right     BIOPSY    CATARACT EXTRACTION, BILATERAL      COLONOSCOPY N/A 10/27/2016    Procedure: COLONOSCOPY INTO CECUM;  Surgeon: Osvaldo Dorado MD;  Location: [facility] ENDOSCOPY;  Service:     COLONOSCOPY  01/26/2011     COLONOSCOPY  02/04/2005    COLONOSCOPY N/A 12/02/2021    Procedure: COLONOSCOPY INTO CECUM WITH COLD BIOPSY POLYPECTOMY AND HOT SNARE POLYPECTOMY;  Surgeon: Osvaldo Dorado MD;  Location: [facility] ENDOSCOPY;  Service: General;  Laterality: N/A;  PRE-FAMILY HISTORY OF COLON CANCER, PERSONAL HISTORY OF COLON CANCER  POST- POLYPS, DIVERTICULOSIS, HEMORRHOIDS    CYSTOSCOPY W/ URETERAL STENT PLACEMENT Left 03/21/2024    Procedure: LEFT CYSTOSCOPY RETROGRADE PYLEOGRAM URETERAL STENT INSERTION;  Surgeon: Jaiden Bolton Jr., MD;  Location: [facility] MAIN OR;  Service: Urology;  Laterality: Left;    CYSTOSCOPY W/ URETERAL STENT REMOVAL  04/23/2024    KNEE ARTHROSCOPY Left     PAP SMEAR  12/20/2010    TOTAL HIP ARTHROPLASTY Right 5/13/2024    Procedure: TOTAL HIP ARTHROPLASTY ANTERIOR WITH HANA TABLE;  Surgeon: Vasu King MD;  Location: [facility] OR OSC;  Service: Orthopedics;  Laterality: Right;         FAMILY HISTORY  Family History   Problem Relation Age of Onset    Breast cancer Mother     Colon cancer Mother     Hypertension Mother     Cancer Mother         Breat cancer, skin cancer, colon cancer    Hyperlipidemia Mother     Cancer Father         Skin cancer    Hypertension Sister     Hypertension Sister     Hyperlipidemia Sister     Thyroid disease Sister     Diabetes type II Brother     Cancer Brother         Skin cancer    Colon cancer Maternal Aunt         colon ca 40    Cancer Maternal Aunt         Colon cancer cause of death early 40s    Colon cancer Maternal Aunt         64 yo    Heart disease Maternal Aunt     Colon cancer Maternal Aunt         79 yo    Cancer Maternal Aunt         Colon cancer    Cancer Maternal Aunt         Colon cancer    Cancer Maternal Uncle         Thyroid cancer    Heart disease Maternal Uncle     Stomach cancer Maternal Grandmother         or intestinal    Cancer Maternal Grandmother         GI TRACT CANCER    Breast cancer Maternal Grandmother     Heart disease Maternal  Grandmother     Heart attack Maternal Grandfather     Breast cancer Maternal Grandfather     Heart disease Maternal Grandfather     Heart disease Paternal Grandmother     Colon cancer Other     Colon cancer Other     Malig Hyperthermia Neg Hx          SOCIAL HISTORY  Social History     Socioeconomic History    Marital status:    Tobacco Use    Smoking status: Never    Smokeless tobacco: Never    Tobacco comments:     daily caffine   Vaping Use    Vaping status: Never Used   Substance and Sexual Activity    Alcohol use: Never    Drug use: Never    Sexual activity: Not Currently     Partners: Male     Birth control/protection: Post-menopausal     Comment: Took birth control pills approximately 30 years.         ALLERGIES  Patient has no known allergies.        REVIEW OF SYSTEMS  Per HPI, otherwise negative.       PHYSICAL EXAM  ED Triage Vitals   Temp Heart Rate Resp BP SpO2   06/12/24 0913 06/12/24 0913 06/12/24 0913 06/12/24 0917 06/12/24 0913   (!) 100.6 °F (38.1 °C) 98 18 145/82 96 %      Temp src Heart Rate Source Patient Position BP Location FiO2 (%)   -- -- 06/12/24 0917 06/12/24 0917 --     Lying Right arm        Physical Exam  Vitals and nursing note reviewed.   Constitutional:       Appearance: Normal appearance.   HENT:      Head: Normocephalic and atraumatic.      Mouth/Throat:      Mouth: Mucous membranes are moist.   Eyes:      Conjunctiva/sclera: Conjunctivae normal.   Cardiovascular:      Rate and Rhythm: Normal rate and regular rhythm.      Pulses: Normal pulses.      Heart sounds: Normal heart sounds.   Pulmonary:      Effort: Pulmonary effort is normal.      Breath sounds: Normal breath sounds. No wheezing.   Abdominal:      General: Bowel sounds are normal.      Palpations: Abdomen is soft.      Tenderness: There is abdominal tenderness (mild, diffuse). There is no right CVA tenderness, left CVA tenderness, guarding or rebound.   Skin:     General: Skin is warm.      Capillary Refill:  Capillary refill takes less than 2 seconds.   Neurological:      Mental Status: She is alert and oriented to person, place, and time. Mental status is at baseline.   Psychiatric:         Mood and Affect: Mood normal.               LAB RESULTS  Recent Results (from the past 24 hour(s))   Comprehensive Metabolic Panel    Collection Time: 06/12/24  9:32 AM    Specimen: Arm, Right; Blood   Result Value Ref Range    Glucose 175 (H) 65 - 99 mg/dL    BUN 19 8 - 23 mg/dL    Creatinine 1.04 (H) 0.57 - 1.00 mg/dL    Sodium 139 136 - 145 mmol/L    Potassium 3.4 (L) 3.5 - 5.2 mmol/L    Chloride 104 98 - 107 mmol/L    CO2 23.0 22.0 - 29.0 mmol/L    Calcium 9.7 8.6 - 10.5 mg/dL    Total Protein 6.3 6.0 - 8.5 g/dL    Albumin 3.8 3.5 - 5.2 g/dL    ALT (SGPT) 10 1 - 33 U/L    AST (SGOT) 11 1 - 32 U/L    Alkaline Phosphatase 107 39 - 117 U/L    Total Bilirubin 0.4 0.0 - 1.2 mg/dL    Globulin 2.5 gm/dL    A/G Ratio 1.5 g/dL    BUN/Creatinine Ratio 18.3 7.0 - 25.0    Anion Gap 12.0 5.0 - 15.0 mmol/L    eGFR 56.5 (L) >60.0 mL/min/1.73   Lipase    Collection Time: 06/12/24  9:32 AM    Specimen: Arm, Right; Blood   Result Value Ref Range    Lipase 18 13 - 60 U/L   Type & Screen    Collection Time: 06/12/24  9:32 AM    Specimen: Arm, Right; Blood   Result Value Ref Range    ABO Type A     RH type Positive     Antibody Screen Negative     T&S Expiration Date 6/15/2024 11:59:59 PM    Protime-INR    Collection Time: 06/12/24  9:32 AM    Specimen: Arm, Right; Blood   Result Value Ref Range    Protime 14.8 (H) 11.7 - 14.2 Seconds    INR 1.13 (H) 0.90 - 1.10   aPTT    Collection Time: 06/12/24  9:32 AM    Specimen: Arm, Right; Blood   Result Value Ref Range    PTT 26.5 22.7 - 35.4 seconds   CBC Auto Differential    Collection Time: 06/12/24  9:32 AM    Specimen: Arm, Right; Blood   Result Value Ref Range    WBC 12.93 (H) 3.40 - 10.80 10*3/mm3    RBC 4.23 3.77 - 5.28 10*6/mm3    Hemoglobin 13.0 12.0 - 15.9 g/dL    Hematocrit 38.8 34.0 - 46.6 %     MCV 91.7 79.0 - 97.0 fL    MCH 30.7 26.6 - 33.0 pg    MCHC 33.5 31.5 - 35.7 g/dL    RDW 12.9 12.3 - 15.4 %    RDW-SD 42.6 37.0 - 54.0 fl    MPV 9.2 6.0 - 12.0 fL    Platelets 301 140 - 450 10*3/mm3    Neutrophil % 77.9 (H) 42.7 - 76.0 %    Lymphocyte % 13.1 (L) 19.6 - 45.3 %    Monocyte % 7.7 5.0 - 12.0 %    Eosinophil % 0.5 0.3 - 6.2 %    Basophil % 0.5 0.0 - 1.5 %    Immature Grans % 0.3 0.0 - 0.5 %    Neutrophils, Absolute 10.07 (H) 1.70 - 7.00 10*3/mm3    Lymphocytes, Absolute 1.69 0.70 - 3.10 10*3/mm3    Monocytes, Absolute 1.00 (H) 0.10 - 0.90 10*3/mm3    Eosinophils, Absolute 0.07 0.00 - 0.40 10*3/mm3    Basophils, Absolute 0.06 0.00 - 0.20 10*3/mm3    Immature Grans, Absolute 0.04 0.00 - 0.05 10*3/mm3    nRBC 0.0 0.0 - 0.2 /100 WBC   Gray Top    Collection Time: 06/12/24  9:32 AM   Result Value Ref Range    Extra Tube Hold for add-ons.    POCT Occult Blood Stool    Collection Time: 06/12/24 10:40 AM    Specimen: Per Rectum; Stool   Result Value Ref Range    Fecal Occult Blood Positive (A) Negative    Lot Number [id number]     Expiration Date 2/28/2025     Positive Control Positive Positive    Negative Control Negative Negative       Ordered the above labs and reviewed the results.        RADIOLOGY  CT Abdomen Pelvis Without Contrast    Result Date: 6/12/2024  CT ABDOMEN PELVIS WO CONTRAST-  HISTORY: 74 years of age, Female.  epigastric pain; K92.2-Gastrointestinal hemorrhage, unspecified; D72.829-Elevated white blood cell count, unspecified; R10.9-Unspecified abdominal pain  TECHNIQUE:  CT includes axial imaging from the lung bases to the trochanters without intravenous contrast and without use of oral contrast. Data reconstructed in coronal and sagittal planes. Radiation dose reduction techniques were utilized, including automated exposure control and exposure modulation based on body size.  COMPARISON: CT abdomen and pelvis 03/21/2024  FINDINGS: There is mild basilar atelectasis. Moderate hiatal hernia is  present. Hepatic cysts without change. Gallbladder mostly decompressed. Splenic size within normal limits. Adrenal glands, pancreas appear within normal limits.  There is chronic bilateral renal atrophy. A left proximal ureteral stone measures 3 mm diameter x 6 mm in length and there is moderate left hydronephrosis. 1 mm left upper pole calyceal stone is present. There are 4 nonobstructing right renal stones with the largest measuring 5 mm. No right ureteral stone.  There is abnormal wall thickening with pericolonic stranding/inflammation involving hepatic flexure of the colon and proximal half of the transverse colon consistent with acute colitis. There is also mild wall thickening involving the ascending colon without adjacent inflammation. Multifocal colonic diverticulosis is present without evidence to suggest diverticulitis. There is no ascites.  Right total hip arthroplasty is present. Mild atherosclerotic calcifications are present.      1. Left proximal ureteral stone (horizontal level L5 vertebral body) with moderate left hydronephrosis. 2. Acute colitis involving the right colon greatest extending from the hepatic flexure to the mid transverse colon where there is pericolonic inflammation and abnormal wall thickening. This needs follow-up to resolution. 3. In addition to the left proximal ureteral stone, there are nonobstructing bilateral renal calyceal stones.  Radiation dose reduction techniques were utilized, including automated exposure control and exposure modulation based on body size.        Ordered the above noted radiological studies. Reviewed by me in PACS.        MEDICATIONS GIVEN IN ER  Medications   sodium chloride 0.9 % flush 10 mL (has no administration in time range)   pantoprazole (PROTONIX) injection 80 mg (80 mg Intravenous Given 6/12/24 7002)     And   pantoprazole (PROTONIX) 40 mg in sodium chloride 0.9 % 100 mL (0.4 mg/mL) MBP (8 mg/hr Intravenous Currently Infusing 6/12/24 1219)    ondansetron (ZOFRAN) injection 4 mg (has no administration in time range)   acetaminophen (TYLENOL) tablet 1,000 mg (1,000 mg Oral Given 6/12/24 1042)           MEDICAL DECISION MAKING, PROGRESS, and CONSULTS    All labs have been independently reviewed by me.  All radiology studies have been reviewed by me and I have also reviewed the radiology report.   EKG's independently viewed and interpreted by me.  Discussion below represents my analysis of pertinent findings related to patient's condition, differential diagnosis, treatment plan and final disposition.    74-year-old female who presents with diffuse abdominal pain with nausea, vomiting, bloody diarrhea that started last night.  Hemoccult positive.  Hemoglobin stable.  GI consulted and agreeable with plan.  CT abdomen and pelvis concerning for colitis-no evidence of sepsis will go ahead and start IV antibiotics.  Additional concern for possible kidney stone with hydronephrosis.  Kidney function stable, no UTI.  Admitted for further evaluation.          Shared decision making/consideration for admission:  admit      Orders placed during this visit:  Orders Placed This Encounter   Procedures    Gastrointestinal Panel, PCR - Stool, Per Rectum    Clostridioides difficile Toxin - Stool, Per Rectum    Clostridioides difficile Toxin, PCR - Stool, Per Rectum    CT Abdomen Pelvis Without Contrast    Comprehensive Metabolic Panel    Lipase    Protime-INR    aPTT    CBC Auto Differential    Erwin Draw    [facility] (on-call MD unless specified) Details    Gastroenterology (on-call MD unless specified)    Patient Isolation Contact Spore    POCT Occult Blood Stool    Type & Screen    Insert Peripheral IV    Initiate Observation Status    CBC & Differential    Gray Top         Differential diagnosis include, but not limited to: GI bleed, hemorrhoid, GERD, food poisoning, colitis      Additional orders considered but not ordered: levaquin     Independent interpretation of labs,  radiology studies, and discussions with consultants:    Discussed with Dr. Mark, who agrees with plan.     ED Course as of 06/12/24 1256   Wed Jun 12, 2024   0953 WBC(!): 12.93 [JG]   0953 RBC: 4.23 [JG]   0953 Hemoglobin: 13.0 [JG]   0953 Hematocrit: 38.8 [JG]   0953 Platelets: 301 [JG]   1022 Glucose(!): 175 [JG]   1022 BUN: 19 [JG]   1022 Creatinine(!): 1.04 [JG]   1022 Sodium: 139 [JG]   1022 Potassium(!): 3.4 [JG]   1127 I updated the patient on current ED work up, including labs and imaging (if performed) with indication for admission. All questions and concerns addressed and ready for admit at this time.    [JG]   1157 Discussed with GI, they will follow on admission. CT pending, but was done without contrast.  [JG]   1207 CT Abdomen Pelvis Without Contrast  My independent interpretation of the imaging study is no bowel obstruction [TR]   1240 Flagyl and rocephin ordered for acute colitis. Patient updated.  [JG]      ED Course User Index  [JG] Carlee Griffin APRN  [TR] Emmanuel Mark MD             DIAGNOSIS  Final diagnoses:   Gastrointestinal hemorrhage, unspecified gastrointestinal hemorrhage type   Leukocytosis, unspecified type   Acute abdominal pain         DISPOSITION  admit        Latest Documented Vital Signs:  As of 12:25 EDT  BP- 134/71 HR- 59 Temp- (!) 100.6 °F (38.1 °C) O2 sat- 96%              --    Please note that portions of this were completed with a voice recognition program.       Note Disclaimer: At [facility], we believe that sharing information builds trust and better relationships. You are receiving this note because you are receiving care at [facility] or recently visited. It is possible you will see health information before a provider has talked with you about it. This kind of information can be easy to misunderstand. To help you fully understand what it means for your health, we urge you to discuss this note with your provider.             Elias  Carlee, APRN  06/14/24 1206

## 2024-06-12 NOTE — TELEPHONE ENCOUNTER
Caller: Anastasiia Faria    Relationship to patient: Self    Best call back number: [phone]    Chief complaint: PATIENT STATED THAT SHE WAS HAVING SEVERE VOMITING, THAT TURNED INTO DIARRHEA THAT HAD BLOOD IN IT. ADVISED PATIENT TO GO TO THE ER, SHE STATED THAT SHE WOULD     Patient directed to call 911 or go to their nearest emergency room.     Patient verbalized understanding: [x] Yes  [] No  If no, why?    Additional notes:

## 2024-06-12 NOTE — CONSULTS
[facility]  Initial Inpatient Consult Note    Referring Provider: Dr. Mark    Reason for Consultation: colitis, rectal bleeding    Subjective     History of present illness:    74 y.o. female w/ PMHx of recent R total hip replacement 05/2024, presented to the ED w/ acute onset of nausea/vomiting/diarrhea that started last night. This progressed to bloody diarrhea. Reports few episodes overnight and this morning. She denies recent travel, fever, new medications, recent abx use, prior similar symptoms. No NSAIDs use. She is currently on ASA 81mg since the recent hip replacement, but otherwise denies any antiplatelet or anticoagulation therapy. Mother w/ hx of colon cancer.     Colonoscopy 12/2021 w/ Dr. Dorado:   Two small polyps resected. Moderate diverticulosis in sigmoid colon and in descending colon. Nonbleeding internal hemorrhoids.     Past Medical History:  Past Medical History:   Diagnosis Date    Allergic     Anxiety     Cataract     Cataract surgery on right eye June 2021 and left eye July 2021. (Dr. Nanette Bateman)    Chronic kidney disease     Colon polyps     Depression     Diverticulosis 2011    Encounter for annual health examination 03/07/2014    Annual Health Assessment    Family history of colon cancer     Fibrocystic breast     GERD (gastroesophageal reflux disease)     Hard of hearing     HEARING AIDS BILATERALLY    Heart murmur     Herpes labialis without complication     Hip pain     right    History of mammogram 12/20/2010    History of recent hospitalization 03/21/2024    KIDNEY STONE/SEPSIS 4 NIGHT AT [facility]    HL (hearing loss) 2014    Hearing Aids    Hyperlipidemia     Hypertension     Hypothyroidism     Insomnia     Kidney stone     Kidney stones     Migraines     Osteoarthritis of right hip     Osteopenia     Prolia -last 9/2021,3/2022    PONV (postoperative nausea and vomiting)     Scoliosis     Dr. Stanford 5/2018    Slow to wake up after anesthesia      Visual impairment     Wear Glasses     Past Surgical History:  Past Surgical History:   Procedure Laterality Date    BONE MARROW BIOPSY Left     BREAST BIOPSY      BREAST CYST ASPIRATION Right     BREAST SURGERY Right     BIOPSY    CATARACT EXTRACTION, BILATERAL      COLONOSCOPY N/A 10/27/2016    Procedure: COLONOSCOPY INTO CECUM;  Surgeon: Osvaldo Dorado MD;  Location: [facility] ENDOSCOPY;  Service:     COLONOSCOPY  01/26/2011    COLONOSCOPY  02/04/2005    COLONOSCOPY N/A 12/02/2021    Procedure: COLONOSCOPY INTO CECUM WITH COLD BIOPSY POLYPECTOMY AND HOT SNARE POLYPECTOMY;  Surgeon: Osvaldo Dorado MD;  Location: [facility] ENDOSCOPY;  Service: General;  Laterality: N/A;  PRE-FAMILY HISTORY OF COLON CANCER, PERSONAL HISTORY OF COLON CANCER  POST- POLYPS, DIVERTICULOSIS, HEMORRHOIDS    CYSTOSCOPY W/ URETERAL STENT PLACEMENT Left 03/21/2024    Procedure: LEFT CYSTOSCOPY RETROGRADE PYLEOGRAM URETERAL STENT INSERTION;  Surgeon: Jaiden Bolton Jr., MD;  Location: [facility] MAIN OR;  Service: Urology;  Laterality: Left;    CYSTOSCOPY W/ URETERAL STENT REMOVAL  04/23/2024    KNEE ARTHROSCOPY Left     PAP SMEAR  12/20/2010    TOTAL HIP ARTHROPLASTY Right 5/13/2024    Procedure: TOTAL HIP ARTHROPLASTY ANTERIOR WITH HANA TABLE;  Surgeon: Vasu King MD;  Location: [facility] OR [facility];  Service: Orthopedics;  Laterality: Right;      Social History:   Social History     Tobacco Use    Smoking status: Never    Smokeless tobacco: Never    Tobacco comments:     daily caffine   Substance Use Topics    Alcohol use: Never      Family History:  Family History   Problem Relation Age of Onset    Breast cancer Mother     Colon cancer Mother     Hypertension Mother     Cancer Mother         Breat cancer, skin cancer, colon cancer    Hyperlipidemia Mother     Cancer Father         Skin cancer    Hypertension Sister     Hypertension Sister     Hyperlipidemia Sister     Thyroid disease Sister     Diabetes type II Brother      Cancer Brother         Skin cancer    Colon cancer Maternal Aunt         colon ca 40    Cancer Maternal Aunt         Colon cancer cause of death early 40s    Colon cancer Maternal Aunt         64 yo    Heart disease Maternal Aunt     Colon cancer Maternal Aunt         81 yo    Cancer Maternal Aunt         Colon cancer    Cancer Maternal Aunt         Colon cancer    Cancer Maternal Uncle         Thyroid cancer    Heart disease Maternal Uncle     Stomach cancer Maternal Grandmother         or intestinal    Cancer Maternal Grandmother         GI TRACT CANCER    Breast cancer Maternal Grandmother     Heart disease Maternal Grandmother     Heart attack Maternal Grandfather     Breast cancer Maternal Grandfather     Heart disease Maternal Grandfather     Heart disease Paternal Grandmother     Colon cancer Other     Colon cancer Other     Malig Hyperthermia Neg Hx        Home Meds:  (Not in a hospital admission)    Current Meds:   cefTRIAXone, 1,000 mg, Intravenous, Once  metroNIDAZOLE, 500 mg, Intravenous, Once      Allergies:  No Known Allergies    Objective     Vital Signs  Temp:  [100.6 °F (38.1 °C)] 100.6 °F (38.1 °C)  Heart Rate:  [56-98] 59  Resp:  [18] 18  BP: (134-155)/(71-82) 134/71  Physical Exam:  General Appearance:     Alert, cooperative, in no acute distress   Abdomen:     Normal bowel sounds, no masses, no organomegaly, soft     nontender, nondistended, no guarding, no rebound                 tenderness   Rectal:     Deferred       Results Review:   I reviewed the patient's new clinical results.    Results from last 7 days   Lab Units 06/12/24  0932   WBC 10*3/mm3 12.93*   HEMOGLOBIN g/dL 13.0   HEMATOCRIT % 38.8   PLATELETS 10*3/mm3 301     Results from last 7 days   Lab Units 06/12/24  0932   SODIUM mmol/L 139   POTASSIUM mmol/L 3.4*   CHLORIDE mmol/L 104   CO2 mmol/L 23.0   BUN mg/dL 19   CREATININE mg/dL 1.04*   CALCIUM mg/dL 9.7   BILIRUBIN mg/dL 0.4   ALK PHOS U/L 107   ALT (SGPT) U/L 10   AST (SGOT)  U/L 11   GLUCOSE mg/dL 175*     Results from last 7 days   Lab Units 06/12/24  0932   INR  1.13*     Lab Results   Lab Value Date/Time    LIPASE 18 06/12/2024 0932    LIPASE 22 03/21/2024 0820       Radiology:  CT Abdomen Pelvis Without Contrast   Preliminary Result   1. Left proximal ureteral stone (horizontal level L5 vertebral body)   with moderate left hydronephrosis.   2. Acute colitis involving the right colon greatest extending from the   hepatic flexure to the mid transverse colon where there is pericolonic   inflammation and abnormal wall thickening. This needs follow-up to   resolution.   3. In addition to the left proximal ureteral stone, there are   nonobstructing bilateral renal calyceal stones.       Radiation dose reduction techniques were utilized, including automated   exposure control and exposure modulation based on body size.                    Assessment:   R sided colitis- question infectious, possibly ischemic  N/V/D  Hematochezia- normal H/H   Leukocytosis  L ureteral stone w/ mod L hydronephrosis     Plan:   IVF, anti-emetics/analgesics PRN  Continue antibiotics  Trend H/H, monitor for GI bleed  F/u w/ stool studies  Can have clear liquid diet  Can plan colonoscopy in 6 weeks or so once acute inflammation has subsided.     I discussed the patients findings and my recommendations with patient.         Alexis Yuan PA-C  [facility]  [address] [place] [address]  Office: [phone]

## 2024-06-12 NOTE — ED NOTES
Nursing report ED to floor  Anastasiia Faria  74 y.o.  female    HPI :  HPI (Adult)  Stated Reason for Visit: diarrhea  History Obtained From: patient    Chief Complaint  Chief Complaint   Patient presents with    Vomiting Blood       Admitting doctor:   Valente Villeda MD    Admitting diagnosis:   The primary encounter diagnosis was Gastrointestinal hemorrhage, unspecified gastrointestinal hemorrhage type. Diagnoses of Leukocytosis, unspecified type, Acute abdominal pain, Colitis, and Hydronephrosis, unspecified hydronephrosis type were also pertinent to this visit.    Code status:   Current Code Status       Date Active Code Status Order ID Comments User Context       Prior            Allergies:   Patient has no known allergies.    Isolation:   Contact Spore    Intake and Output  No intake or output data in the 24 hours ending 06/12/24 1232    Weight:       06/12/24  0913   Weight: 82.6 kg (182 lb)       Most recent vitals:   Vitals:    06/12/24 1001 06/12/24 1031 06/12/24 1201 06/12/24 1202   BP: 147/72 155/71 134/71    BP Location:       Patient Position:       Pulse: 64 64 56 59   Resp:       Temp:       SpO2: 95% 93%  96%   Weight:       Height:           Active LDAs/IV Access:   Lines, Drains & Airways       Active LDAs       Name Placement date Placement time Site Days    Peripheral IV 06/12/24 0932 Right Antecubital 06/12/24  0932  Antecubital  less than 1                    Labs (abnormal labs have a star):   Labs Reviewed   COMPREHENSIVE METABOLIC PANEL - Abnormal; Notable for the following components:       Result Value    Glucose 175 (*)     Creatinine 1.04 (*)     Potassium 3.4 (*)     eGFR 56.5 (*)     All other components within normal limits    Narrative:     GFR Normal >60  Chronic Kidney Disease <60  Kidney Failure <15    The GFR formula is only valid for adults with stable renal function between ages 18 and 70.   PROTIME-INR - Abnormal; Notable for the following components:    Protime 14.8 (*)      INR 1.13 (*)     All other components within normal limits   CBC WITH AUTO DIFFERENTIAL - Abnormal; Notable for the following components:    WBC 12.93 (*)     Neutrophil % 77.9 (*)     Lymphocyte % 13.1 (*)     Neutrophils, Absolute 10.07 (*)     Monocytes, Absolute 1.00 (*)     All other components within normal limits   POCT OCCULT BLOOD STOOL (ED ONLY) - Abnormal; Notable for the following components:    Fecal Occult Blood Positive (*)     All other components within normal limits   LIPASE - Normal   APTT - Normal   GASTROINTESTINAL PANEL, PCR (PREFERRED) DOES NOT INCLUDE CDIFF   CLOSTRIDIOIDES DIFFICILE TOXIN    Narrative:     The following orders were created for panel order Clostridioides difficile Toxin - Stool, Per Rectum.  Procedure                               Abnormality         Status                     ---------                               -----------         ------                     Clostridioides difficile...[[id number]]                                                   Please view results for these tests on the individual orders.   CLOSTRIDIOIDES DIFFICILE TOXIN, PCR   RAINBOW DRAW    Narrative:     The following orders were created for panel order [place] Draw.  Procedure                               Abnormality         Status                     ---------                               -----------         ------                     Gray Top[[id number]]                                         Final result                 Please view results for these tests on the individual orders.   TYPE AND SCREEN   CBC AND DIFFERENTIAL    Narrative:     The following orders were created for panel order CBC & Differential.  Procedure                               Abnormality         Status                     ---------                               -----------         ------                     CBC Auto Differential[[id number]]        Abnormal            Final result                 Please view results for  these tests on the individual orders.   GRAY TOP       EKG:   No orders to display       Meds given in ED:   Medications   sodium chloride 0.9 % flush 10 mL (has no administration in time range)   pantoprazole (PROTONIX) injection 80 mg (80 mg Intravenous Given 6/12/24 0941)     And   pantoprazole (PROTONIX) 40 mg in sodium chloride 0.9 % 100 mL (0.4 mg/mL) MBP (8 mg/hr Intravenous Currently Infusing 6/12/24 1219)   ondansetron (ZOFRAN) injection 4 mg (has no administration in time range)   acetaminophen (TYLENOL) tablet 1,000 mg (1,000 mg Oral Given 6/12/24 1042)       Imaging results:  CT Abdomen Pelvis Without Contrast    Result Date: 6/12/2024  1. Left proximal ureteral stone (horizontal level L5 vertebral body) with moderate left hydronephrosis. 2. Acute colitis involving the right colon greatest extending from the hepatic flexure to the mid transverse colon where there is pericolonic inflammation and abnormal wall thickening. This needs follow-up to resolution. 3. In addition to the left proximal ureteral stone, there are nonobstructing bilateral renal calyceal stones.  Radiation dose reduction techniques were utilized, including automated exposure control and exposure modulation based on body size.        Ambulatory status:   - standby    Social issues:   Social History     Socioeconomic History    Marital status:    Tobacco Use    Smoking status: Never    Smokeless tobacco: Never    Tobacco comments:     daily caffine   Vaping Use    Vaping status: Never Used   Substance and Sexual Activity    Alcohol use: Never    Drug use: Never    Sexual activity: Not Currently     Partners: Male     Birth control/protection: Post-menopausal     Comment: Took birth control pills approximately 30 years.       Peripheral Neurovascular  Peripheral Neurovascular (Adult)  Peripheral Neurovascular WDL: WDL    Neuro Cognitive  Neuro Cognitive (Adult)  Cognitive/Neuro/Behavioral WDL: WDL    Learning  Learning Assessment  (Adult)  Learning Readiness and Ability: no barriers identified  Education Provided  Person Taught: patient, spouse    Respiratory  Respiratory WDL  Respiratory WDL: .WDL except, rhythm/pattern  Rhythm/Pattern, Respiratory: shortness of breath (SOA since last night)    Abdominal Pain       Pain Assessments  Pain (Adult)  (0-10) Pain Rating: Rest: 3  (0-10) Pain Rating: Activity: 2  Pain Location: abdomen  Response to Pain Interventions: interventions effective per patient    NIH Stroke Scale       Maddison Staton RN  06/12/24 12:32 EDT

## 2024-06-12 NOTE — PROGRESS NOTES
Discharge Planning Assessment  [facility]     Patient Name: Anastasiia Faria  MRN: [id number]  Today's Date: 6/12/2024    Admit Date: 6/12/2024    Plan: Home with spouse; denies any discharge needs   Discharge Needs Assessment       Row Name 06/12/24 1434       Living Environment    People in Home spouse    Current Living Arrangements home    In the past 12 months has the electric, gas, oil, or water company threatened to shut off services in your home? No    Primary Care Provided by self    Family Caregiver if Needed spouse    Family Caregiver Names Garth Faria [phone]    Quality of Family Relationships involved;supportive;helpful    Living Arrangement Comments yes       Transition Planning    Patient/Family Anticipates Transition to home with family    Transportation Anticipated family or friend will provide       Discharge Needs Assessment    Concerns to be Addressed no discharge needs identified    Anticipated Changes Related to Illness none    Equipment Needed After Discharge none    Provided Post Acute Provider List? N/A    N/A Provider List Comment denies any discharge needs    Provided Post Acute Provider Quality & Resource List? N/A    N/A Quality & Resource List Comment denies any discharge needs                   Discharge Plan       Row Name 06/12/24 1441       Plan    Plan Home with spouse; denies any discharge needs    Plan Comments Spoke with patient and spouse Garth Faria [phone] at bedside for discharge planning. She lives in a 2 story home with the bedrooms on the 2nd floor, she uses a cane as needed as she had recent hip surgery. She used [facility] after hip surgery. She plans to return home with spouse and denies any discharge needs. Yoandy DENISE                  Continued Care and Services - Admitted Since 6/12/2024    No active coordination exists for this encounter.       Selected Continued Care - Prior Encounters Includes continued care and service providers with selected  services from prior encounters from 3/14/2024 to 6/12/2024      Discharged on 5/14/2024 Admission date: 5/13/2024 - Discharge disposition: Home or Self Care      Home Medical Care       Service Provider Selected Services Address Phone Fax Patient Preferred     [facility] [address] [id number] [phone] --                             Demographic Summary       Row Name 06/12/24 1433       General Information    Admission Type observation    Arrived From emergency department    Referral Source admission list    Reason for Consult discharge planning    Preferred Language English                   Functional Status       Row Name 06/12/24 1433       Functional Status    Usual Activity Tolerance good    Current Activity Tolerance good       Functional Status, IADL    Medications independent    Meal Preparation independent    Housekeeping independent    Laundry independent    Shopping independent                   Psychosocial    No documentation.                  Abuse/Neglect    No documentation.                  Legal    No documentation.                  Substance Abuse    No documentation.                  Patient Forms    No documentation.                     Marsha Floyd RN

## 2024-06-12 NOTE — PROGRESS NOTES
Clinical Pharmacy Services: Medication History    Anastasiia Faria is a 74 y.o. female presenting to [facility] for   Chief Complaint   Patient presents with    Vomiting Blood       She  has a past medical history of Allergic, Anxiety, Cataract, Chronic kidney disease, Colon polyps, Depression, Diverticulosis (2011), Encounter for annual health examination (03/07/2014), Family history of colon cancer, Fibrocystic breast, GERD (gastroesophageal reflux disease), Hard of hearing, Heart murmur, Herpes labialis without complication, Hip pain, History of mammogram (12/20/2010), History of recent hospitalization (03/21/2024), HL (hearing loss) (2014), Hyperlipidemia, Hypertension, Hypothyroidism, Insomnia, Kidney stone, Kidney stones, Migraines, Osteoarthritis of right hip, Osteopenia, PONV (postoperative nausea and vomiting), Scoliosis, Slow to wake up after anesthesia, and Visual impairment.    Allergies as of 06/12/2024    (No Known Allergies)       Medication information was obtained from: Pharmacy  Pharmacy and Phone Number:   [facility] [place]  [address] [facility] [address]  Phone: [phone] Fax: [phone]    [id number] [place], [place] [facility] [address] [facility] [phone] [facility] [phone] [place] [address]  [facility] [address]  Phone: [phone] Fax: [phone]        Prior to Admission Medications       Prescriptions Last Dose Informant Patient Reported? Taking?    aspirin 81 MG EC tablet  Self No Yes    Take 1 tablet by mouth twice daily for 14 days; then take 1 daily for 28 days.    Patient taking differently:  Take 1 tablet by mouth Daily. Indications: blood thinner    atenolol (TENORMIN) 25 MG tablet  Self, Pharmacy No Yes    Take 1 tablet by mouth Daily.    escitalopram (LEXAPRO) 20 MG tablet  Self, Pharmacy No Yes    Take 1 tablet by mouth Daily.    levothyroxine (SYNTHROID, LEVOTHROID) 50 MCG tablet   Self, Pharmacy No Yes    Take 1 tablet by mouth Daily.    loratadine (CLARITIN) 10 MG tablet  Self Yes Yes    Take 1 tablet by mouth Daily. Indications: Hayfever    NIFEdipine XL (PROCARDIA XL) 30 MG 24 hr tablet  Self, Pharmacy No Yes    Take 1 tablet by mouth Daily.    Psyllium (METAMUCIL FIBER PO)  Self Yes Yes    Take 1 Scoop by mouth Daily. Indications: constipation    rosuvastatin (CRESTOR) 10 MG tablet  Pharmacy, Self No Yes    Take 1 tablet by mouth Every Night.              Medication notes:     This medication list is complete to the best of my knowledge as of 6/12/2024    Please call if questions.    Valdo Alvarado  Medication History Technician  [phone]    6/12/2024 09:46 EDT

## 2024-06-12 NOTE — ED PROVIDER NOTES
EMERGENCY DEPARTMENT MD ATTESTATION NOTE    Room Number:  S513/1  PCP: Mirza Scott Sr., MD  Independent Historians: Patient and Family    HPI:  A complete HPI/ROS/PMH/PSH/SH/FH are unobtainable due to: None    Chronic or social conditions impacting patient care (Social Determinants of Health): None      Context: Anastasiia Faria is a 74 y.o. female with a medical history of hyperlipidemia, hypertension who presents to the ED c/o acute GI bleed.  The patient reports that last night she developed nausea and vomiting.  She reports afterwards she started having diarrhea.  She states it became bloody.  She was found to have a low-grade fever here.  She denies any recent travels.  She denies any abnormal food intake.  He is only on aspirin but no other blood thinners.  She denies prior similar episodes.        Review of prior external notes (non-ED) -and- Review of prior external test results outside of this encounter:  Laboratory evaluation 5/3/2024 shows normal CBC, normal BMP    Prescription drug monitoring program review:           PHYSICAL EXAM    I have reviewed the triage vital signs and nursing notes.    ED Triage Vitals   Temp Heart Rate Resp BP SpO2   06/12/24 0913 06/12/24 0913 06/12/24 0913 06/12/24 0917 06/12/24 0913   (!) 100.6 °F (38.1 °C) 98 18 145/82 96 %      Temp src Heart Rate Source Patient Position BP Location FiO2 (%)   -- -- 06/12/24 0917 06/12/24 0917 --     Lying Right arm        Physical Exam  GENERAL: Awake, alert, no acute distress  SKIN: Warm, dry  HENT: Normocephalic, atraumatic  EYES: no scleral icterus  CV: regular rhythm, regular rate  RESPIRATORY: normal effort, lungs clear  ABDOMEN: soft, nontender, nondistended  MUSCULOSKELETAL: no deformity  NEURO: alert, moves all extremities, follows commands            MEDICATIONS GIVEN IN ER  Medications   sodium chloride 0.9 % flush 10 mL (has no administration in time range)   metroNIDAZOLE (FLAGYL) IVPB 500 mg (has no administration in time  range)   atenolol (TENORMIN) tablet 25 mg (25 mg Oral Given 6/12/24 1521)   escitalopram (LEXAPRO) tablet 20 mg (0 mg Oral Hold 6/12/24 1523)   levothyroxine (SYNTHROID, LEVOTHROID) tablet 50 mcg (50 mcg Oral Not Given 6/12/24 1338)   cetirizine (zyrTEC) tablet 10 mg (10 mg Oral Given 6/12/24 1520)   NIFEdipine XL (PROCARDIA XL) 24 hr tablet 30 mg (30 mg Oral Given 6/12/24 1520)   rosuvastatin (CRESTOR) tablet 10 mg (has no administration in time range)   sodium chloride 0.9 % flush 10 mL (10 mL Intravenous Given 6/12/24 1528)   sodium chloride 0.9 % flush 10 mL (has no administration in time range)   sodium chloride 0.9 % infusion 40 mL (has no administration in time range)   nitroglycerin (NITROSTAT) SL tablet 0.4 mg (has no administration in time range)   sodium chloride 0.9 % infusion (100 mL/hr Intravenous New Bag 6/12/24 1349)   Potassium Replacement - Follow Nurse / BPA Driven Protocol (has no administration in time range)   Magnesium Standard Dose Replacement - Follow Nurse / BPA Driven Protocol (has no administration in time range)   Phosphorus Replacement - Follow Nurse / BPA Driven Protocol (has no administration in time range)   Calcium Replacement - Follow Nurse / BPA Driven Protocol (has no administration in time range)   acetaminophen (TYLENOL) tablet 650 mg (has no administration in time range)     Or   acetaminophen (TYLENOL) 160 MG/5ML oral solution 650 mg (has no administration in time range)     Or   acetaminophen (TYLENOL) suppository 650 mg (has no administration in time range)   ondansetron ODT (ZOFRAN-ODT) disintegrating tablet 4 mg (has no administration in time range)     Or   ondansetron (ZOFRAN) injection 4 mg (has no administration in time range)   potassium chloride (K-DUR,KLOR-CON) ER tablet 40 mEq (40 mEq Oral Given 6/12/24 1520)   Pharmacy Consult (has no administration in time range)   vancomycin (VANCOCIN) capsule 125 mg (has no administration in time range)   pantoprazole  (PROTONIX) injection 80 mg (80 mg Intravenous Given 6/12/24 0925)   acetaminophen (TYLENOL) tablet 1,000 mg (1,000 mg Oral Given 6/12/24 1042)         ORDERS PLACED DURING THIS VISIT:  Orders Placed This Encounter   Procedures    Gastrointestinal Panel, PCR - Stool, Per Rectum    Clostridioides difficile Toxin - Stool, Per Rectum    Clostridioides difficile Toxin, PCR - Stool, Per Rectum    Blood Culture - Blood,    Blood Culture - Blood,    Clostridioides difficile toxin Ag, Reflex - Stool,    CT Abdomen Pelvis Without Contrast    Comprehensive Metabolic Panel    Lipase    Protime-INR    aPTT    CBC Auto Differential    [place] Draw    Basic Metabolic Panel    CBC Auto Differential    TSH Rfx On Abnormal To Free T4    Hemoglobin & Hematocrit, Blood    Urinalysis With Microscopic If Indicated (No Culture) - Urine, Clean Catch    Potassium    Urinalysis, Microscopic Only - Urine, Clean Catch    Diet: Liquid; Clear Liquid; Fluid Consistency: Thin (IDDSI 0)    Vital Signs    Intake & Output    Weigh Patient    Oral Care    Place Sequential Compression Device    Maintain Sequential Compression Device    Maintain IV Access    Telemetry - Place Orders & Notify Provider of Results When Patient Experiences Acute Chest Pain, Dysrhythmia or Respiratory Distress    May Be Off Telemetry for Tests    Code Status and Medical Interventions:    [facility] (on-call MD unless specified) Details    Gastroenterology (on-call MD unless specified)    Patient Isolation Contact Spore    PT Consult: Eval & Treat Functional Mobility Below Baseline    POCT Occult Blood Stool    Telemetry Scan    Type & Screen    Insert Peripheral IV    Insert Peripheral IV    Initiate Observation Status    CBC & Differential    Jon Top         PROCEDURES  Procedures            PROGRESS, DATA ANALYSIS, CONSULTS, AND MEDICAL DECISION MAKING  All labs have been independently interpreted by me.  All radiology studies have been reviewed by me. All EKG's have been  independently viewed and interpreted by me.  Discussion below represents my analysis of pertinent findings related to patient's condition, differential diagnosis, treatment plan and final disposition.    Differential diagnosis includes but is not limited to infectious diarrhea, colitis, diverticulitis, C. difficile.    Clinical Scores:                   ED Course as of 06/12/24 1555   Wed Jun 12, 2024   0953 WBC(!): 12.93 [JG]   0953 RBC: 4.23 [JG]   0953 Hemoglobin: 13.0 [JG]   0953 Hematocrit: 38.8 [JG]   0953 Platelets: 301 [JG]   1022 Glucose(!): 175 [JG]   1022 BUN: 19 [JG]   1022 Creatinine(!): 1.04 [JG]   1022 Sodium: 139 [JG]   1022 Potassium(!): 3.4 [JG]   1127 I updated the patient on current ED work up, including labs and imaging (if performed) with indication for admission. All questions and concerns addressed and ready for admit at this time.    [JG]   1157 Discussed with GI, they will follow on admission. CT pending, but was done without contrast.  [JG]   1207 CT Abdomen Pelvis Without Contrast  My independent interpretation of the imaging study is no bowel obstruction [TR]   1240 Flagyl and rocephin ordered for acute colitis. Patient updated.  [JG]      ED Course User Index  [JG] Carlee Griffin APRN  [TR] Emmanuel Mark MD       MDM: Plan to obtain chemistries and blood counts as well as CT of the abdomen pelvis.  We will send stool for PCR and C. difficile testing.  I will give her Tylenol for her fever.  She will likely require admission given her GI bleed, fever and persistent diarrhea.      COMPLEXITY OF CARE  The patient requires admission.    Please note that portions of this document were completed with a voice recognition program.    Note Disclaimer: At [facility], we believe that sharing information builds trust and better relationships. You are receiving this note because you recently visited [facility]. It is possible you will see health information before a provider has  talked with you about it. This kind of information can be easy to misunderstand. To help you fully understand what it means for your health, we urge you to discuss this note with your provider.         Emmanuel Mark MD  06/12/24 1041       Emmanuel Mark MD  06/12/24 1220       Emmanuel Mark MD  06/12/24 8970

## 2024-06-13 PROBLEM — D72.829 LEUKOCYTOSIS: Status: ACTIVE | Noted: 2024-06-13

## 2024-06-13 PROBLEM — N39.0 UTI (URINARY TRACT INFECTION): Status: ACTIVE | Noted: 2024-06-13

## 2024-06-13 PROBLEM — K52.9 COLITIS: Status: ACTIVE | Noted: 2024-06-13

## 2024-06-13 LAB
ANION GAP SERPL CALCULATED.3IONS-SCNC: 11.1 MMOL/L (ref 5–15)
BASOPHILS # BLD AUTO: 0.05 10*3/MM3 (ref 0–0.2)
BASOPHILS NFR BLD AUTO: 0.3 % (ref 0–1.5)
BUN SERPL-MCNC: 11 MG/DL (ref 8–23)
BUN/CREAT SERPL: 11.2 (ref 7–25)
CALCIUM SPEC-SCNC: 9.5 MG/DL (ref 8.6–10.5)
CHLORIDE SERPL-SCNC: 109 MMOL/L (ref 98–107)
CO2 SERPL-SCNC: 23.9 MMOL/L (ref 22–29)
CREAT SERPL-MCNC: 0.98 MG/DL (ref 0.57–1)
D-LACTATE SERPL-SCNC: 2 MMOL/L (ref 0.5–2)
DEPRECATED RDW RBC AUTO: 43 FL (ref 37–54)
EGFRCR SERPLBLD CKD-EPI 2021: 60.7 ML/MIN/1.73
EOSINOPHIL # BLD AUTO: 0.24 10*3/MM3 (ref 0–0.4)
EOSINOPHIL NFR BLD AUTO: 1.5 % (ref 0.3–6.2)
ERYTHROCYTE [DISTWIDTH] IN BLOOD BY AUTOMATED COUNT: 12.6 % (ref 12.3–15.4)
GLUCOSE SERPL-MCNC: 159 MG/DL (ref 65–99)
HCT VFR BLD AUTO: 33.2 % (ref 34–46.6)
HCT VFR BLD AUTO: 37.2 % (ref 34–46.6)
HCT VFR BLD AUTO: 37.2 % (ref 34–46.6)
HGB BLD-MCNC: 10.8 G/DL (ref 12–15.9)
HGB BLD-MCNC: 12.2 G/DL (ref 12–15.9)
HGB BLD-MCNC: 12.2 G/DL (ref 12–15.9)
IMM GRANULOCYTES # BLD AUTO: 0.05 10*3/MM3 (ref 0–0.05)
IMM GRANULOCYTES NFR BLD AUTO: 0.3 % (ref 0–0.5)
LYMPHOCYTES # BLD AUTO: 2.04 10*3/MM3 (ref 0.7–3.1)
LYMPHOCYTES NFR BLD AUTO: 13.1 % (ref 19.6–45.3)
MCH RBC QN AUTO: 30.7 PG (ref 26.6–33)
MCHC RBC AUTO-ENTMCNC: 32.8 G/DL (ref 31.5–35.7)
MCV RBC AUTO: 93.7 FL (ref 79–97)
MONOCYTES # BLD AUTO: 1.11 10*3/MM3 (ref 0.1–0.9)
MONOCYTES NFR BLD AUTO: 7.1 % (ref 5–12)
NEUTROPHILS NFR BLD AUTO: 12.04 10*3/MM3 (ref 1.7–7)
NEUTROPHILS NFR BLD AUTO: 77.7 % (ref 42.7–76)
NRBC BLD AUTO-RTO: 0 /100 WBC (ref 0–0.2)
PLATELET # BLD AUTO: 295 10*3/MM3 (ref 140–450)
PMV BLD AUTO: 9.1 FL (ref 6–12)
POTASSIUM SERPL-SCNC: 3.8 MMOL/L (ref 3.5–5.2)
RBC # BLD AUTO: 3.97 10*6/MM3 (ref 3.77–5.28)
SODIUM SERPL-SCNC: 144 MMOL/L (ref 136–145)
WBC NRBC COR # BLD AUTO: 15.53 10*3/MM3 (ref 3.4–10.8)

## 2024-06-13 PROCEDURE — 25010000002 ONDANSETRON PER 1 MG: Performed by: HOSPITALIST

## 2024-06-13 PROCEDURE — 99232 SBSQ HOSP IP/OBS MODERATE 35: CPT | Performed by: PHYSICIAN ASSISTANT

## 2024-06-13 PROCEDURE — 0T778DZ DILATION OF LEFT URETER WITH INTRALUMINAL DEVICE, VIA NATURAL OR ARTIFICIAL OPENING ENDOSCOPIC: ICD-10-PCS | Performed by: UROLOGY

## 2024-06-13 PROCEDURE — 83605 ASSAY OF LACTIC ACID: CPT | Performed by: STUDENT IN AN ORGANIZED HEALTH CARE EDUCATION/TRAINING PROGRAM

## 2024-06-13 PROCEDURE — 25010000002 CEFTRIAXONE PER 250 MG: Performed by: STUDENT IN AN ORGANIZED HEALTH CARE EDUCATION/TRAINING PROGRAM

## 2024-06-13 PROCEDURE — 80048 BASIC METABOLIC PNL TOTAL CA: CPT | Performed by: HOSPITALIST

## 2024-06-13 PROCEDURE — 25810000003 SODIUM CHLORIDE 0.9 % SOLUTION: Performed by: HOSPITALIST

## 2024-06-13 PROCEDURE — 85018 HEMOGLOBIN: CPT | Performed by: HOSPITALIST

## 2024-06-13 PROCEDURE — 87040 BLOOD CULTURE FOR BACTERIA: CPT | Performed by: STUDENT IN AN ORGANIZED HEALTH CARE EDUCATION/TRAINING PROGRAM

## 2024-06-13 PROCEDURE — 85014 HEMATOCRIT: CPT | Performed by: HOSPITALIST

## 2024-06-13 PROCEDURE — 85025 COMPLETE CBC W/AUTO DIFF WBC: CPT | Performed by: HOSPITALIST

## 2024-06-13 RX ADMIN — ONDANSETRON 4 MG: 2 INJECTION INTRAMUSCULAR; INTRAVENOUS at 16:48

## 2024-06-13 RX ADMIN — ESCITALOPRAM 20 MG: 20 TABLET, FILM COATED ORAL at 08:25

## 2024-06-13 RX ADMIN — Medication 10 ML: at 20:54

## 2024-06-13 RX ADMIN — ATENOLOL 25 MG: 25 TABLET ORAL at 08:25

## 2024-06-13 RX ADMIN — VANCOMYCIN HYDROCHLORIDE 125 MG: 125 CAPSULE ORAL at 17:13

## 2024-06-13 RX ADMIN — ONDANSETRON 4 MG: 2 INJECTION INTRAMUSCULAR; INTRAVENOUS at 09:58

## 2024-06-13 RX ADMIN — SODIUM CHLORIDE 100 ML/HR: 9 INJECTION, SOLUTION INTRAVENOUS at 23:49

## 2024-06-13 RX ADMIN — VANCOMYCIN HYDROCHLORIDE 125 MG: 125 CAPSULE ORAL at 12:13

## 2024-06-13 RX ADMIN — LEVOTHYROXINE SODIUM 50 MCG: 50 TABLET ORAL at 08:25

## 2024-06-13 RX ADMIN — CEFTRIAXONE 2000 MG: 2 INJECTION, POWDER, FOR SOLUTION INTRAMUSCULAR; INTRAVENOUS at 16:43

## 2024-06-13 RX ADMIN — VANCOMYCIN HYDROCHLORIDE 125 MG: 125 CAPSULE ORAL at 23:48

## 2024-06-13 RX ADMIN — VANCOMYCIN HYDROCHLORIDE 125 MG: 125 CAPSULE ORAL at 05:54

## 2024-06-13 RX ADMIN — Medication 10 ML: at 08:26

## 2024-06-13 RX ADMIN — CETIRIZINE HYDROCHLORIDE 10 MG: 10 TABLET ORAL at 08:25

## 2024-06-13 RX ADMIN — ROSUVASTATIN CALCIUM 10 MG: 10 TABLET, FILM COATED ORAL at 20:54

## 2024-06-13 RX ADMIN — NIFEDIPINE 30 MG: 30 TABLET, FILM COATED, EXTENDED RELEASE ORAL at 08:25

## 2024-06-13 NOTE — SIGNIFICANT NOTE
06/13/24 1108   OTHER   Discipline physical therapist   Rehab Time/Intention   Session Not Performed other (see comments)  (Pt states she is independent with cane at baseline and reports she has been mobilizing in room and to bathroom without difficulty. Pt denies need for acute PT services at this time; will sign off.)   Therapy Assessment/Plan (PT)   Criteria for Skilled Interventions Met (PT) no problems identified which require skilled intervention

## 2024-06-13 NOTE — PROGRESS NOTES
[facility]  Inpatient Progress Note    Reason for Followup: Colitis, nausea, vomiting    Subjective     Interval History:   She reports 1 episode of vomiting overnight.  She was able to eat some this morning but does complain of some nausea.  Diarrhea has improved.    Current Facility-Administered Medications:     acetaminophen (TYLENOL) tablet 650 mg, 650 mg, Oral, Q4H PRN **OR** acetaminophen (TYLENOL) 160 MG/5ML oral solution 650 mg, 650 mg, Oral, Q4H PRN **OR** acetaminophen (TYLENOL) suppository 650 mg, 650 mg, Rectal, Q4H PRN, Valente Villeda MD    atenolol (TENORMIN) tablet 25 mg, 25 mg, Oral, Daily, Valente Villeda MD, 25 mg at 06/12/24 1521    Calcium Replacement - Follow Nurse / BPA Driven Protocol, , Does not apply, PRN, Valente Villeda MD    cetirizine (zyrTEC) tablet 10 mg, 10 mg, Oral, Daily, Valente Villeda MD, 10 mg at 06/12/24 1520    escitalopram (LEXAPRO) tablet 20 mg, 20 mg, Oral, Daily, Valente Villeda MD, 20 mg at 06/12/24 1523    levothyroxine (SYNTHROID, LEVOTHROID) tablet 50 mcg, 50 mcg, Oral, Daily, Valente Villeda MD    Magnesium Standard Dose Replacement - Follow Nurse / BPA Driven Protocol, , Does not apply, PRN, Valente Villeda MD    NIFEdipine XL (PROCARDIA XL) 24 hr tablet 30 mg, 30 mg, Oral, Daily, Valente Villeda MD, 30 mg at 06/12/24 1520    nitroglycerin (NITROSTAT) SL tablet 0.4 mg, 0.4 mg, Sublingual, Q5 Min PRN, Valente Villeda MD    ondansetron ODT (ZOFRAN-ODT) disintegrating tablet 4 mg, 4 mg, Oral, Q6H PRN **OR** ondansetron (ZOFRAN) injection 4 mg, 4 mg, Intravenous, Q6H PRN, Valente Villeda MD, 4 mg at 06/12/24 1938    Pharmacy Consult, , Does not apply, Continuous PRChristiana BARBER Emmett J., MD    Phosphorus Replacement - Follow Nurse / BPA Driven Protocol, , Does not apply, Christiana PRITCHARD Emmett J., MD    Potassium Replacement - Follow Nurse / BPA Driven Protocol, , Does not apply, PRN, [place],  Valente FORBES MD    rosuvastatin (CRESTOR) tablet 10 mg, 10 mg, Oral, Nightly, Valente Villeda MD, 10 mg at 06/12/24 1930    [COMPLETED] Insert Peripheral IV, , , Once **AND** sodium chloride 0.9 % flush 10 mL, 10 mL, Intravenous, PRN, GetteCarlee feng, APRN    sodium chloride 0.9 % flush 10 mL, 10 mL, Intravenous, Q12H, Valente Villeda MD, 10 mL at 06/12/24 1528    sodium chloride 0.9 % flush 10 mL, 10 mL, Intravenous, PRN, Valente Villeda MD    sodium chloride 0.9 % infusion 40 mL, 40 mL, Intravenous, PRN, Valente Villeda MD    sodium chloride 0.9 % infusion, 100 mL/hr, Intravenous, Continuous, Valente Villeda MD, Last Rate: 100 mL/hr at 06/12/24 1713, 100 mL/hr at 06/12/24 1713    vancomycin (VANCOCIN) capsule 125 mg, 125 mg, Oral, Q6H, Valente Villeda MD, 125 mg at 06/13/24 0554    Objective     Vital Signs  Temp:  [97.3 °F (36.3 °C)-100.6 °F (38.1 °C)] 98.4 °F (36.9 °C)  Heart Rate:  [56-98] 76  Resp:  [16-18] 18  BP: (134-159)/(61-82) 146/61  Body mass index is 27.67 kg/m².    Intake/Output Summary (Last 24 hours) at 6/13/2024 0816  Last data filed at 6/12/2024 1713  Gross per 24 hour   Intake 340 ml   Output --   Net 340 ml     No intake/output data recorded.     Physical Exam:   General: patient awake, alert and cooperative   Abdomen: soft, nontender, nondistended; normal bowel sounds     Results Review:     I reviewed the patient's new clinical results.    Results from last 7 days   Lab Units 06/12/24  2245 06/12/24  1554 06/12/24  0932   WBC 10*3/mm3  --   --  12.93*   HEMOGLOBIN g/dL 12.2 12.3 13.0   HEMATOCRIT % 36.5 37.8 38.8   PLATELETS 10*3/mm3  --   --  301     Results from last 7 days   Lab Units 06/12/24  2245 06/12/24  0932   SODIUM mmol/L  --  139   POTASSIUM mmol/L 4.1 3.4*   CHLORIDE mmol/L  --  104   CO2 mmol/L  --  23.0   BUN mg/dL  --  19   CREATININE mg/dL  --  1.04*   CALCIUM mg/dL  --  9.7   BILIRUBIN mg/dL  --  0.4   ALK PHOS U/L  --  107   ALT (SGPT) U/L   --  10   AST (SGOT) U/L  --  11   GLUCOSE mg/dL  --  175*     Results from last 7 days   Lab Units 06/12/24  0932   INR  1.13*     Lab Results   Lab Value Date/Time    LIPASE 18 06/12/2024 0932    LIPASE 22 03/21/2024 0820       Radiology:  CT Abdomen Pelvis Without Contrast   Final Result   1. Left proximal ureteral stone (horizontal level L5 vertebral body)   with moderate left hydronephrosis.   2. Acute colitis involving the right colon greatest extending from the   hepatic flexure to the mid transverse colon where there is pericolonic   inflammation and abnormal wall thickening. This needs follow-up to   resolution.   3. In addition to the left proximal ureteral stone, there are   nonobstructing bilateral renal calyceal stones.       Radiation dose reduction techniques were utilized, including automated   exposure control and exposure modulation based on body size.           This report was finalized on 6/12/2024 12:54 PM by Dr. Ralph Camp M.D on Workstation: [id number]                Assessment & Plan   Assessment:   R sided colitis - C. difficile toxin positive  N/V/D  Hematochezia- normal H/H   Leukocytosis  L ureteral stone w/ mod L hydronephrosis     All problems are new to me.  Plan:   IVF, anti-emetics/analgesics PRN  Continue antibiotics  Trend H/H, currently stable  Continue p.o. vancomycin  Continue clear liquid diet, AAT  Will discuss timing of colonoscopy in the outpatient setting    I discussed the patient's findings and my recommendations with patient.    Dictated utilizing Dragon dictation.        Samantha Sanchez PA-C   [facility]  [address] [place] [address]  Office: [phone]

## 2024-06-13 NOTE — PLAN OF CARE
Problem: Adult Inpatient Plan of Care  Goal: Absence of Hospital-Acquired Illness or Injury  Intervention: Identify and Manage Fall Risk  Description: Perform standard risk assessment on admission using a validated tool or comprehensive approach appropriate to the patient; reassess fall risk frequently, with change in status or transfer to another level of care.  Communicate fall injury risk to interprofessional healthcare team.  Determine need for increased observation, equipment and environmental modification, such as low bed, signage and supportive, nonskid footwear.  Adjust safety measures to individual developmental age, stage and identified risk factors.  Reinforce the importance of safety and physical activity with patient and family.  Perform regular intentional rounding to assess need for position change, pain assessment and personal needs, including assistance with toileting.  Recent Flowsheet Documentation  Taken 6/13/2024 1423 by Luis Antonio Floyd RN  Safety Promotion/Fall Prevention:   activity supervised   assistive device/personal items within reach   clutter free environment maintained   room organization consistent   safety round/check completed  Taken 6/13/2024 1220 by Luis Antonio Folyd RN  Safety Promotion/Fall Prevention:   activity supervised   assistive device/personal items within reach   clutter free environment maintained   room organization consistent   safety round/check completed  Taken 6/13/2024 1022 by Luis Antonio Floyd RN  Safety Promotion/Fall Prevention:   activity supervised   assistive device/personal items within reach   clutter free environment maintained   safety round/check completed   room organization consistent  Taken 6/13/2024 0822 by Luis Antonio Floyd RN  Safety Promotion/Fall Prevention:   activity supervised   assistive device/personal items within reach   Goal Outcome Evaluation:   Patient ax4, Vss, call light in reach. Patient complaining of some nausea zofran given x2.   No bloody stools reported during shift.

## 2024-06-13 NOTE — SIGNIFICANT NOTE
06/13/24 1515   OTHER   Discipline occupational therapist   Rehab Time/Intention   Session Not Performed other (see comments)  (per chart, pt states she is independent with cane at baseline and reports she has been mobilizing in room and to bathroom without difficulty. no further acute OT needs.)   Therapy Assessment/Plan (PT)   Criteria for Skilled Interventions Met (PT) no problems identified which require skilled intervention

## 2024-06-13 NOTE — PROGRESS NOTES
Name: Anastasiia Faria ADMIT: 2024   : 1950  PCP: Mirza Scott Sr., MD    MRN: [id number] LOS: 0 days   AGE/SEX: 74 y.o. female  ROOM: [facility]     Subjective   Subjective   Patient seen and examined, resting in bed, abdominal pain, nausea improving. No emesis since evening of .       Objective   Objective   Vital Signs  Temp:  [97.3 °F (36.3 °C)-98.6 °F (37 °C)] 98.6 °F (37 °C)  Heart Rate:  [57-92] 72  Resp:  [16-18] 18  BP: (120-155)/(56-79) 120/56  SpO2:  [93 %-97 %] 93 %  on   ;   Device (Oxygen Therapy): room air  Body mass index is 27.67 kg/m².  Physical Exam  Vitals and nursing note reviewed.   Constitutional:       General: She is not in acute distress.  Cardiovascular:      Rate and Rhythm: Normal rate and regular rhythm.      Pulses: Normal pulses.      Heart sounds: Normal heart sounds.   Pulmonary:      Effort: Pulmonary effort is normal. No respiratory distress.      Breath sounds: Normal breath sounds. No wheezing.   Abdominal:      General: Bowel sounds are normal. There is no distension.      Palpations: Abdomen is soft.      Tenderness: There is abdominal tenderness.   Musculoskeletal:      Right lower leg: No edema.      Left lower leg: No edema.   Skin:     General: Skin is warm and dry.   Neurological:      Mental Status: She is alert.       Results Review     I reviewed the patient's new clinical results.  Results from last 7 days   Lab Units 24  0810 24  1554 24  0932   WBC 10*3/mm3 15.53*  --   --  12.93*   HEMOGLOBIN g/dL 12.2  12.2 12.2 12.3 13.0   PLATELETS 10*3/mm3 295  --   --  301     Results from last 7 days   Lab Units 24  0810 24  2245 24  0932   SODIUM mmol/L 144  --  139   POTASSIUM mmol/L 3.8 4.1 3.4*   CHLORIDE mmol/L 109*  --  104   CO2 mmol/L 23.9  --  23.0   BUN mg/dL 11  --  19   CREATININE mg/dL 0.98  --  1.04*   GLUCOSE mg/dL 159*  --  175*   EGFR mL/min/1.73 60.7  --  56.5*     Results from last 7 days  "  Lab Units 06/12/24  0932   ALBUMIN g/dL 3.8   BILIRUBIN mg/dL 0.4   ALK PHOS U/L 107   AST (SGOT) U/L 11   ALT (SGPT) U/L 10     Results from last 7 days   Lab Units 06/13/24  0810 06/12/24  0932   CALCIUM mg/dL 9.5 9.7   ALBUMIN g/dL  --  3.8       No results found for: \"HGBA1C\", \"POCGLU\"    CT Abdomen Pelvis Without Contrast    Result Date: 6/12/2024  1. Left proximal ureteral stone (horizontal level L5 vertebral body) with moderate left hydronephrosis. 2. Acute colitis involving the right colon greatest extending from the hepatic flexure to the mid transverse colon where there is pericolonic inflammation and abnormal wall thickening. This needs follow-up to resolution. 3. In addition to the left proximal ureteral stone, there are nonobstructing bilateral renal calyceal stones.  Radiation dose reduction techniques were utilized, including automated exposure control and exposure modulation based on body size.   This report was finalized on 6/12/2024 12:54 PM by Dr. Ralph Camp M.D on Workstation: [id number]       I have personally reviewed all medications:  Scheduled Medications  atenolol, 25 mg, Oral, Daily  cetirizine, 10 mg, Oral, Daily  escitalopram, 20 mg, Oral, Daily  levothyroxine, 50 mcg, Oral, Daily  NIFEdipine XL, 30 mg, Oral, Daily  rosuvastatin, 10 mg, Oral, Nightly  sodium chloride, 10 mL, Intravenous, Q12H  vancomycin, 125 mg, Oral, Q6H    Infusions  Pharmacy Consult,   sodium chloride, 100 mL/hr, Last Rate: 100 mL/hr (06/12/24 7773)    Diet  Diet: Liquid; Clear Liquid; Fluid Consistency: Thin (IDDSI 0)    I have personally reviewed:  [x]  Laboratory   [x]  Microbiology   [x]  Radiology   [x]  EKG/Telemetry  [x]  Cardiology/Vascular   []  Pathology    []  Records       Assessment/Plan     Active Hospital Problems    Diagnosis  POA    **GI bleed [K92.2]  Yes    Colitis [K52.9]  Yes    UTI (urinary tract infection) [N39.0]  Yes    Leukocytosis [D72.829]  Yes    Prediabetes [R73.03]  Yes    " Essential hypertension [I10]  Yes    Acquired hypothyroidism [E03.9]  Yes    Mixed hyperlipidemia [E78.2]  Yes      Resolved Hospital Problems   No resolved problems to display.       74 y.o. female admitted with GI bleed.    Generalized abdominal pain  Nausea with vomiting  Colitis, C. Diff infection  - Imaging noted, GI following, C. Diff testing positive, started on Vancomycin. Will continue to follow their plans/recommendations. Greatly appreciate their help.  - Follow up blood cultures.    UTI  - UA suggestive of UTI, coupled with leukocytosis, start Ceftriaxone, add on urine culture, follow up results.    Leukocytosis  - WBC count elevated on most recent labs, likely 2/2 infections as above. Treat contributing etiologies, continue to monitor.   - Order repeat CBC in AM for reassessment.  If no improvement and/or worsens, can pursue further infectious work-up.    Ureteral stone  Hydronephrosis  - CT noting Left proximal ureteral stone (horizontal level L5 vertebral body) with moderate left hydronephrosis, in addition to bilateral renal calyceal stones. Assess for urinary retention, consider Urology consult based on results of testing.    Hypertension  - Blood pressure appears stable and acceptable acutely at this time. No indications to warrant acute changes/intervention at this time.  - Continue current medications as prescribed. Trend BP to guide ongoing management decisions.    Hypothyroidism  - Continue levothyroxine as prescribed.    Hyperlipidemia  - Continue Statin as prescribed.      SCDs for DVT prophylaxis.  Full code.  Discussed with patient, nursing staff, and consulting provider.  Anticipate discharge  TBD  timing yet to be determined.  Expected Discharge Date: 6/13/2024; Expected Discharge Time:       Jesus Ro DO  [facility]  06/13/24

## 2024-06-13 NOTE — PLAN OF CARE
Goal Outcome Evaluation:  Plan of Care Reviewed With: patient        Progress: improving  Outcome Evaluation: Vitals WNL. No bowel movements noted overnight. Nausea resolved with PRN zofran.

## 2024-06-14 ENCOUNTER — ANESTHESIA EVENT (OUTPATIENT)
Dept: PERIOP | Facility: HOSPITAL | Age: 74
End: 2024-06-14
Payer: MEDICARE

## 2024-06-14 ENCOUNTER — ANESTHESIA (OUTPATIENT)
Dept: PERIOP | Facility: HOSPITAL | Age: 74
End: 2024-06-14
Payer: MEDICARE

## 2024-06-14 ENCOUNTER — APPOINTMENT (OUTPATIENT)
Dept: GENERAL RADIOLOGY | Facility: HOSPITAL | Age: 74
DRG: 660 | End: 2024-06-14
Payer: MEDICARE

## 2024-06-14 LAB
ALBUMIN SERPL-MCNC: 2.8 G/DL (ref 3.5–5.2)
ALBUMIN/GLOB SERPL: 1.2 G/DL
ALP SERPL-CCNC: 81 U/L (ref 39–117)
ALT SERPL W P-5'-P-CCNC: <5 U/L (ref 1–33)
ANION GAP SERPL CALCULATED.3IONS-SCNC: 8.9 MMOL/L (ref 5–15)
AST SERPL-CCNC: 8 U/L (ref 1–32)
BASOPHILS # BLD AUTO: 0.05 10*3/MM3 (ref 0–0.2)
BASOPHILS NFR BLD AUTO: 0.4 % (ref 0–1.5)
BILIRUB SERPL-MCNC: 0.3 MG/DL (ref 0–1.2)
BUN SERPL-MCNC: 7 MG/DL (ref 8–23)
BUN/CREAT SERPL: 9.1 (ref 7–25)
CALCIUM SPEC-SCNC: 8.8 MG/DL (ref 8.6–10.5)
CHLORIDE SERPL-SCNC: 110 MMOL/L (ref 98–107)
CO2 SERPL-SCNC: 23.1 MMOL/L (ref 22–29)
CREAT SERPL-MCNC: 0.77 MG/DL (ref 0.57–1)
DEPRECATED RDW RBC AUTO: 42.4 FL (ref 37–54)
EGFRCR SERPLBLD CKD-EPI 2021: 81.1 ML/MIN/1.73
EOSINOPHIL # BLD AUTO: 0.66 10*3/MM3 (ref 0–0.4)
EOSINOPHIL NFR BLD AUTO: 5.7 % (ref 0.3–6.2)
ERYTHROCYTE [DISTWIDTH] IN BLOOD BY AUTOMATED COUNT: 12.5 % (ref 12.3–15.4)
GLOBULIN UR ELPH-MCNC: 2.3 GM/DL
GLUCOSE SERPL-MCNC: 90 MG/DL (ref 65–99)
HBA1C MFR BLD: 5.4 % (ref 4.8–5.6)
HCT VFR BLD AUTO: 30.6 % (ref 34–46.6)
HCT VFR BLD AUTO: 31.1 % (ref 34–46.6)
HCT VFR BLD AUTO: 32.7 % (ref 34–46.6)
HGB BLD-MCNC: 10.2 G/DL (ref 12–15.9)
HGB BLD-MCNC: 10.2 G/DL (ref 12–15.9)
HGB BLD-MCNC: 11.1 G/DL (ref 12–15.9)
IMM GRANULOCYTES # BLD AUTO: 0.08 10*3/MM3 (ref 0–0.05)
IMM GRANULOCYTES NFR BLD AUTO: 0.7 % (ref 0–0.5)
LYMPHOCYTES # BLD AUTO: 1.77 10*3/MM3 (ref 0.7–3.1)
LYMPHOCYTES NFR BLD AUTO: 15.2 % (ref 19.6–45.3)
MCH RBC QN AUTO: 30.5 PG (ref 26.6–33)
MCHC RBC AUTO-ENTMCNC: 32.8 G/DL (ref 31.5–35.7)
MCV RBC AUTO: 93.1 FL (ref 79–97)
MONOCYTES # BLD AUTO: 0.78 10*3/MM3 (ref 0.1–0.9)
MONOCYTES NFR BLD AUTO: 6.7 % (ref 5–12)
NEUTROPHILS NFR BLD AUTO: 71.3 % (ref 42.7–76)
NEUTROPHILS NFR BLD AUTO: 8.28 10*3/MM3 (ref 1.7–7)
NRBC BLD AUTO-RTO: 0 /100 WBC (ref 0–0.2)
PLATELET # BLD AUTO: 243 10*3/MM3 (ref 140–450)
PMV BLD AUTO: 9.4 FL (ref 6–12)
POTASSIUM SERPL-SCNC: 3.4 MMOL/L (ref 3.5–5.2)
POTASSIUM SERPL-SCNC: 4.1 MMOL/L (ref 3.5–5.2)
PROT SERPL-MCNC: 5.1 G/DL (ref 6–8.5)
RBC # BLD AUTO: 3.34 10*6/MM3 (ref 3.77–5.28)
SODIUM SERPL-SCNC: 142 MMOL/L (ref 136–145)
WBC NRBC COR # BLD AUTO: 11.62 10*3/MM3 (ref 3.4–10.8)

## 2024-06-14 PROCEDURE — 25810000003 LACTATED RINGERS PER 1000 ML: Performed by: ANESTHESIOLOGY

## 2024-06-14 PROCEDURE — 80053 COMPREHEN METABOLIC PANEL: CPT | Performed by: STUDENT IN AN ORGANIZED HEALTH CARE EDUCATION/TRAINING PROGRAM

## 2024-06-14 PROCEDURE — 25010000002 ONDANSETRON PER 1 MG: Performed by: HOSPITALIST

## 2024-06-14 PROCEDURE — 76000 FLUOROSCOPY <1 HR PHYS/QHP: CPT

## 2024-06-14 PROCEDURE — 85018 HEMOGLOBIN: CPT | Performed by: HOSPITALIST

## 2024-06-14 PROCEDURE — 83036 HEMOGLOBIN GLYCOSYLATED A1C: CPT | Performed by: STUDENT IN AN ORGANIZED HEALTH CARE EDUCATION/TRAINING PROGRAM

## 2024-06-14 PROCEDURE — 25010000002 CEFTRIAXONE PER 250 MG: Performed by: STUDENT IN AN ORGANIZED HEALTH CARE EDUCATION/TRAINING PROGRAM

## 2024-06-14 PROCEDURE — C1758 CATHETER, URETERAL: HCPCS | Performed by: UROLOGY

## 2024-06-14 PROCEDURE — C1769 GUIDE WIRE: HCPCS | Performed by: UROLOGY

## 2024-06-14 PROCEDURE — 25010000002 PROPOFOL 10 MG/ML EMULSION: Performed by: ANESTHESIOLOGY

## 2024-06-14 PROCEDURE — 85025 COMPLETE CBC W/AUTO DIFF WBC: CPT | Performed by: STUDENT IN AN ORGANIZED HEALTH CARE EDUCATION/TRAINING PROGRAM

## 2024-06-14 PROCEDURE — 99232 SBSQ HOSP IP/OBS MODERATE 35: CPT | Performed by: PHYSICIAN ASSISTANT

## 2024-06-14 PROCEDURE — 85014 HEMATOCRIT: CPT | Performed by: HOSPITALIST

## 2024-06-14 PROCEDURE — 25010000002 FENTANYL CITRATE (PF) 50 MCG/ML SOLUTION: Performed by: NURSE ANESTHETIST, CERTIFIED REGISTERED

## 2024-06-14 PROCEDURE — C2617 STENT, NON-COR, TEM W/O DEL: HCPCS | Performed by: UROLOGY

## 2024-06-14 PROCEDURE — 84132 ASSAY OF SERUM POTASSIUM: CPT | Performed by: STUDENT IN AN ORGANIZED HEALTH CARE EDUCATION/TRAINING PROGRAM

## 2024-06-14 DEVICE — URETERAL STENT
Type: IMPLANTABLE DEVICE | Site: URETER | Status: FUNCTIONAL
Brand: CONTOUR™

## 2024-06-14 RX ORDER — SODIUM CHLORIDE 0.9 % (FLUSH) 0.9 %
3 SYRINGE (ML) INJECTION EVERY 12 HOURS SCHEDULED
Status: DISCONTINUED | OUTPATIENT
Start: 2024-06-14 | End: 2024-06-14 | Stop reason: HOSPADM

## 2024-06-14 RX ORDER — IPRATROPIUM BROMIDE AND ALBUTEROL SULFATE 2.5; .5 MG/3ML; MG/3ML
3 SOLUTION RESPIRATORY (INHALATION) ONCE AS NEEDED
Status: DISCONTINUED | OUTPATIENT
Start: 2024-06-14 | End: 2024-06-14

## 2024-06-14 RX ORDER — DIPHENHYDRAMINE HYDROCHLORIDE 50 MG/ML
12.5 INJECTION INTRAMUSCULAR; INTRAVENOUS
Status: DISCONTINUED | OUTPATIENT
Start: 2024-06-14 | End: 2024-06-14

## 2024-06-14 RX ORDER — EPHEDRINE SULFATE 50 MG/ML
5 INJECTION, SOLUTION INTRAVENOUS ONCE AS NEEDED
Status: DISCONTINUED | OUTPATIENT
Start: 2024-06-14 | End: 2024-06-14

## 2024-06-14 RX ORDER — MAGNESIUM HYDROXIDE 1200 MG/15ML
LIQUID ORAL AS NEEDED
Status: DISCONTINUED | OUTPATIENT
Start: 2024-06-14 | End: 2024-06-14 | Stop reason: HOSPADM

## 2024-06-14 RX ORDER — MIDAZOLAM HYDROCHLORIDE 1 MG/ML
0.5 INJECTION INTRAMUSCULAR; INTRAVENOUS
Status: DISCONTINUED | OUTPATIENT
Start: 2024-06-14 | End: 2024-06-14 | Stop reason: HOSPADM

## 2024-06-14 RX ORDER — PROMETHAZINE HYDROCHLORIDE 25 MG/1
25 TABLET ORAL ONCE AS NEEDED
Status: DISCONTINUED | OUTPATIENT
Start: 2024-06-14 | End: 2024-06-14

## 2024-06-14 RX ORDER — HYDROMORPHONE HYDROCHLORIDE 1 MG/ML
0.5 INJECTION, SOLUTION INTRAMUSCULAR; INTRAVENOUS; SUBCUTANEOUS
Status: DISCONTINUED | OUTPATIENT
Start: 2024-06-14 | End: 2024-06-14

## 2024-06-14 RX ORDER — PROPOFOL 10 MG/ML
VIAL (ML) INTRAVENOUS AS NEEDED
Status: DISCONTINUED | OUTPATIENT
Start: 2024-06-14 | End: 2024-06-14 | Stop reason: SURG

## 2024-06-14 RX ORDER — LIDOCAINE HYDROCHLORIDE 20 MG/ML
INJECTION, SOLUTION INFILTRATION; PERINEURAL AS NEEDED
Status: DISCONTINUED | OUTPATIENT
Start: 2024-06-14 | End: 2024-06-14 | Stop reason: SURG

## 2024-06-14 RX ORDER — LIDOCAINE HYDROCHLORIDE 10 MG/ML
0.5 INJECTION, SOLUTION INFILTRATION; PERINEURAL ONCE AS NEEDED
Status: DISCONTINUED | OUTPATIENT
Start: 2024-06-14 | End: 2024-06-14 | Stop reason: HOSPADM

## 2024-06-14 RX ORDER — SODIUM CHLORIDE 0.9 % (FLUSH) 0.9 %
3-10 SYRINGE (ML) INJECTION AS NEEDED
Status: DISCONTINUED | OUTPATIENT
Start: 2024-06-14 | End: 2024-06-14 | Stop reason: HOSPADM

## 2024-06-14 RX ORDER — POTASSIUM CHLORIDE 750 MG/1
40 TABLET, FILM COATED, EXTENDED RELEASE ORAL EVERY 4 HOURS
Status: COMPLETED | OUTPATIENT
Start: 2024-06-14 | End: 2024-06-14

## 2024-06-14 RX ORDER — NALOXONE HCL 0.4 MG/ML
0.2 VIAL (ML) INJECTION AS NEEDED
Status: DISCONTINUED | OUTPATIENT
Start: 2024-06-14 | End: 2024-06-14

## 2024-06-14 RX ORDER — DROPERIDOL 2.5 MG/ML
0.62 INJECTION, SOLUTION INTRAMUSCULAR; INTRAVENOUS
Status: DISCONTINUED | OUTPATIENT
Start: 2024-06-14 | End: 2024-06-14

## 2024-06-14 RX ORDER — FENTANYL CITRATE 50 UG/ML
50 INJECTION, SOLUTION INTRAMUSCULAR; INTRAVENOUS ONCE AS NEEDED
Status: DISCONTINUED | OUTPATIENT
Start: 2024-06-14 | End: 2024-06-14 | Stop reason: HOSPADM

## 2024-06-14 RX ORDER — HYDROCODONE BITARTRATE AND ACETAMINOPHEN 5; 325 MG/1; MG/1
1 TABLET ORAL ONCE AS NEEDED
Status: DISCONTINUED | OUTPATIENT
Start: 2024-06-14 | End: 2024-06-14

## 2024-06-14 RX ORDER — FLUMAZENIL 0.1 MG/ML
0.2 INJECTION INTRAVENOUS AS NEEDED
Status: DISCONTINUED | OUTPATIENT
Start: 2024-06-14 | End: 2024-06-14

## 2024-06-14 RX ORDER — FENTANYL CITRATE 50 UG/ML
INJECTION, SOLUTION INTRAMUSCULAR; INTRAVENOUS AS NEEDED
Status: DISCONTINUED | OUTPATIENT
Start: 2024-06-14 | End: 2024-06-14 | Stop reason: SURG

## 2024-06-14 RX ORDER — SCOLOPAMINE TRANSDERMAL SYSTEM 1 MG/1
1 PATCH, EXTENDED RELEASE TRANSDERMAL
Status: DISCONTINUED | OUTPATIENT
Start: 2024-06-14 | End: 2024-06-16 | Stop reason: HOSPADM

## 2024-06-14 RX ORDER — SODIUM CHLORIDE, SODIUM LACTATE, POTASSIUM CHLORIDE, CALCIUM CHLORIDE 600; 310; 30; 20 MG/100ML; MG/100ML; MG/100ML; MG/100ML
9 INJECTION, SOLUTION INTRAVENOUS CONTINUOUS
Status: DISCONTINUED | OUTPATIENT
Start: 2024-06-14 | End: 2024-06-14

## 2024-06-14 RX ORDER — FENTANYL CITRATE 50 UG/ML
50 INJECTION, SOLUTION INTRAMUSCULAR; INTRAVENOUS
Status: DISCONTINUED | OUTPATIENT
Start: 2024-06-14 | End: 2024-06-14

## 2024-06-14 RX ORDER — FAMOTIDINE 10 MG/ML
20 INJECTION, SOLUTION INTRAVENOUS ONCE
Status: COMPLETED | OUTPATIENT
Start: 2024-06-14 | End: 2024-06-14

## 2024-06-14 RX ORDER — OXYCODONE AND ACETAMINOPHEN 7.5; 325 MG/1; MG/1
1 TABLET ORAL EVERY 4 HOURS PRN
Status: DISCONTINUED | OUTPATIENT
Start: 2024-06-14 | End: 2024-06-14

## 2024-06-14 RX ORDER — HYDRALAZINE HYDROCHLORIDE 20 MG/ML
5 INJECTION INTRAMUSCULAR; INTRAVENOUS
Status: DISCONTINUED | OUTPATIENT
Start: 2024-06-14 | End: 2024-06-14

## 2024-06-14 RX ORDER — LABETALOL HYDROCHLORIDE 5 MG/ML
5 INJECTION, SOLUTION INTRAVENOUS
Status: DISCONTINUED | OUTPATIENT
Start: 2024-06-14 | End: 2024-06-14

## 2024-06-14 RX ORDER — PROMETHAZINE HYDROCHLORIDE 25 MG/1
25 SUPPOSITORY RECTAL ONCE AS NEEDED
Status: DISCONTINUED | OUTPATIENT
Start: 2024-06-14 | End: 2024-06-14

## 2024-06-14 RX ORDER — ONDANSETRON 2 MG/ML
4 INJECTION INTRAMUSCULAR; INTRAVENOUS ONCE AS NEEDED
Status: DISCONTINUED | OUTPATIENT
Start: 2024-06-14 | End: 2024-06-14

## 2024-06-14 RX ADMIN — FENTANYL CITRATE 25 MCG: 50 INJECTION, SOLUTION INTRAMUSCULAR; INTRAVENOUS at 20:44

## 2024-06-14 RX ADMIN — NIFEDIPINE 30 MG: 30 TABLET, FILM COATED, EXTENDED RELEASE ORAL at 08:29

## 2024-06-14 RX ADMIN — VANCOMYCIN HYDROCHLORIDE 125 MG: 125 CAPSULE ORAL at 05:27

## 2024-06-14 RX ADMIN — VANCOMYCIN HYDROCHLORIDE 125 MG: 125 CAPSULE ORAL at 23:21

## 2024-06-14 RX ADMIN — ESCITALOPRAM 20 MG: 20 TABLET, FILM COATED ORAL at 08:29

## 2024-06-14 RX ADMIN — Medication 10 ML: at 09:14

## 2024-06-14 RX ADMIN — LEVOTHYROXINE SODIUM 50 MCG: 50 TABLET ORAL at 08:29

## 2024-06-14 RX ADMIN — SCOPALAMINE 1 PATCH: 1 PATCH, EXTENDED RELEASE TRANSDERMAL at 20:25

## 2024-06-14 RX ADMIN — ONDANSETRON 4 MG: 2 INJECTION INTRAMUSCULAR; INTRAVENOUS at 20:55

## 2024-06-14 RX ADMIN — FENTANYL CITRATE 25 MCG: 50 INJECTION, SOLUTION INTRAMUSCULAR; INTRAVENOUS at 20:47

## 2024-06-14 RX ADMIN — SODIUM CHLORIDE, POTASSIUM CHLORIDE, SODIUM LACTATE AND CALCIUM CHLORIDE 9 ML/HR: 600; 310; 30; 20 INJECTION, SOLUTION INTRAVENOUS at 20:25

## 2024-06-14 RX ADMIN — POTASSIUM CHLORIDE 40 MEQ: 750 TABLET, EXTENDED RELEASE ORAL at 08:29

## 2024-06-14 RX ADMIN — PROPOFOL 120 MG: 10 INJECTION, EMULSION INTRAVENOUS at 20:36

## 2024-06-14 RX ADMIN — LIDOCAINE HYDROCHLORIDE 100 MG: 20 INJECTION, SOLUTION INFILTRATION; PERINEURAL at 20:36

## 2024-06-14 RX ADMIN — CETIRIZINE HYDROCHLORIDE 10 MG: 10 TABLET ORAL at 08:29

## 2024-06-14 RX ADMIN — CEFTRIAXONE 2000 MG: 2 INJECTION, POWDER, FOR SOLUTION INTRAMUSCULAR; INTRAVENOUS at 15:48

## 2024-06-14 RX ADMIN — ROSUVASTATIN CALCIUM 10 MG: 10 TABLET, FILM COATED ORAL at 22:32

## 2024-06-14 RX ADMIN — VANCOMYCIN HYDROCHLORIDE 125 MG: 125 CAPSULE ORAL at 17:02

## 2024-06-14 RX ADMIN — VANCOMYCIN HYDROCHLORIDE 125 MG: 125 CAPSULE ORAL at 13:00

## 2024-06-14 RX ADMIN — ATENOLOL 25 MG: 25 TABLET ORAL at 08:29

## 2024-06-14 RX ADMIN — FAMOTIDINE 20 MG: 10 INJECTION INTRAVENOUS at 20:25

## 2024-06-14 RX ADMIN — POTASSIUM CHLORIDE 40 MEQ: 750 TABLET, EXTENDED RELEASE ORAL at 13:00

## 2024-06-14 RX ADMIN — Medication 10 ML: at 22:35

## 2024-06-14 NOTE — ANESTHESIA PREPROCEDURE EVALUATION
Anesthesia Evaluation     Patient summary reviewed and Nursing notes reviewed   history of anesthetic complications:  PONV  NPO Solid Status: > 8 hours  NPO Liquid Status: > 2 hours           Airway   Mallampati: IV  TM distance: <3 FB  Neck ROM: full  No difficulty expected  Comment: Previous grade I  Dental - normal exam     Pulmonary - normal exam    breath sounds clear to auscultation  Cardiovascular - normal exam    ECG reviewed  Rhythm: regular  Rate: normal    (+) hypertension, valvular problems/murmurs murmur, hyperlipidemia    ROS comment: EF 64%, trace-mild MR by ECHO 6/21/normal stress test 8/21    Neuro/Psych  (+) headaches, psychiatric history Anxiety and Depression    ROS Comment: Migraines  GI/Hepatic/Renal/Endo    (+) GERD, renal disease- stones and CRI, thyroid problem hypothyroidism    ROS Comment: Urosepsis    Musculoskeletal     Abdominal    Substance History      OB/GYN          Other   arthritis,                   Anesthesia Plan    ASA 3     general     (Previous LMA 4)  intravenous induction     Anesthetic plan, risks, benefits, and alternatives have been provided, discussed and informed consent has been obtained with: patient.    CODE STATUS:    Code Status (Patient has no pulse and is not breathing): CPR (Attempt to Resuscitate)  Medical Interventions (Patient has pulse or is breathing): Full Support

## 2024-06-14 NOTE — CONSULTS
[facility] CONSULT      Patient Identification:  NAME:  Anastasiia Faria  Age:  74 y.o.   Sex:  female   :  1950   MRN:  [id number]     Chief complaint: Vomiting and bloody stool    History of present illness:      Pt is a 74 y.o. female with a history of hydronephrosis and kidney stones that presented to the ER on 24 with complaints of vomiting and blood in stool. Patient with a low-grade fever on admission. Patient was diagnosed and admitted for an acute GI bleed, leukocytosis, abdominal pain, colitis and hydronephrosis. Urology consulted for evaluation and management of hydronephrosis. Pt denies gross hematuria, fever and  vomiting. Endorses flank pain, nausea and abdominal pain. Patient currently sees Dr. Bolton with [facility]. Known history of kidney stones. Had left ureteral stent placed in March for a proximal ureteral stone. Patient returned to office in April and believed she had past the stone. Che reviewed CT imaging and found no ureteral stone. Stent was removed.     In hospital:  -Low-grade temp on admission at 100.6, currently AVSS, good UOP  -WBC - 11.62 (15.53)  -Creat - 0.77 (0.98)  -UA - Large LE, positive nitrites, moderate blood, TNTC RBC, TNTC WBC, 4+ bacteria, 3-6 SE cells  -UCx - In process  -Blood culture - No growth at 24 hours    -CT - Bilateral renal atrophy; Moderate left hydronephrosis s/t 6 mm proximal ureteral stone; Bilateral non-obstructing nephrolithiasis      Asked to see    Past medical history:  Past Medical History:   Diagnosis Date    Allergic     Anxiety     Cataract     Cataract surgery on right eye 2021 and left eye 2021. (Dr. Nanette Bateman)    Chronic kidney disease     Colon polyps     Depression     Diverticulosis     Encounter for annual health examination 2014    Annual Health Assessment    Family history of colon cancer     Fibrocystic breast     GERD (gastroesophageal reflux disease)     Hard of hearing     HEARING AIDS  BILATERALLY    Heart murmur     Herpes labialis without complication     Hip pain     right    History of mammogram 12/20/2010    History of recent hospitalization 03/21/2024    KIDNEY STONE/SEPSIS 4 NIGHT AT [facility]    HL (hearing loss) 2014    Hearing Aids    Hyperlipidemia     Hypertension     Hypothyroidism     Insomnia     Kidney stone     Kidney stones     Migraines     Osteoarthritis of right hip     Osteopenia     Prolia -last 9/2021,3/2022    PONV (postoperative nausea and vomiting)     Scoliosis     Dr. Stanford 5/2018    Slow to wake up after anesthesia     Visual impairment     Wear Glasses       Past surgical history:  Past Surgical History:   Procedure Laterality Date    BONE MARROW BIOPSY Left     BREAST BIOPSY      BREAST CYST ASPIRATION Right     BREAST SURGERY Right     BIOPSY    CATARACT EXTRACTION, BILATERAL      COLONOSCOPY N/A 10/27/2016    Procedure: COLONOSCOPY INTO CECUM;  Surgeon: Osvaldo Dorado MD;  Location: [facility] END[facility]COPY;  Service:     COLONOSCOPY  01/26/2011    COLONOSCOPY  02/04/2005    COLONOSCOPY N/A 12/02/2021    Procedure: COLONOSCOPY INTO CECUM WITH COLD BIOPSY POLYPECTOMY AND HOT SNARE POLYPECTOMY;  Surgeon: Osvaldo Dorado MD;  Location: [facility] ENDOSCOPY;  Service: General;  Laterality: N/A;  PRE-FAMILY HISTORY OF COLON CANCER, PERSONAL HISTORY OF COLON CANCER  POST- POLYPS, DIVERTICULOSIS, HEMORRHOIDS    CYSTOSCOPY W/ URETERAL STENT PLACEMENT Left 03/21/2024    Procedure: LEFT CYSTOSCOPY RETROGRADE PYLEOGRAM URETERAL STENT INSERTION;  Surgeon: Jaiden Bolton Jr., MD;  Location: [facility];  Service: Urology;  Laterality: Left;    CYSTOSCOPY W/ URETERAL STENT REMOVAL  04/23/2024    KNEE ARTHROSCOPY Left     PAP SMEAR  12/20/2010    TOTAL HIP ARTHROPLASTY Right 5/13/2024    Procedure: TOTAL HIP ARTHROPLASTY ANTERIOR WITH HANA TABLE;  Surgeon: Vasu King MD;  Location: [facility];  Service: Orthopedics;  Laterality: Right;        Allergies:  Patient has no known allergies.    Home medications:  Medications Prior to Admission   Medication Sig Dispense Refill Last Dose    aspirin 81 MG EC tablet Take 1 tablet by mouth twice daily for 14 days; then take 1 daily for 28 days. (Patient taking differently: Take 1 tablet by mouth Daily. Indications: blood thinner) 60 tablet 0 6/11/2024    atenolol (TENORMIN) 25 MG tablet Take 1 tablet by mouth Daily. 90 tablet 2 6/11/2024    escitalopram (LEXAPRO) 20 MG tablet Take 1 tablet by mouth Daily. 90 tablet 2 6/11/2024    levothyroxine (SYNTHROID, LEVOTHROID) 50 MCG tablet Take 1 tablet by mouth Daily. 90 tablet 2 6/12/2024    loratadine (CLARITIN) 10 MG tablet Take 1 tablet by mouth Daily. Indications: Hayfever   6/11/2024    NIFEdipine XL (PROCARDIA XL) 30 MG 24 hr tablet Take 1 tablet by mouth Daily. 90 tablet 2 6/11/2024    rosuvastatin (CRESTOR) 10 MG tablet Take 1 tablet by mouth Every Night. 90 tablet 3 6/11/2024    Psyllium (METAMUCIL FIBER PO) Take 1 Scoop by mouth Daily. Indications: constipation           Hospital medications:  atenolol, 25 mg, Oral, Daily  cefTRIAXone, 2,000 mg, Intravenous, Q24H  cetirizine, 10 mg, Oral, Daily  escitalopram, 20 mg, Oral, Daily  levothyroxine, 50 mcg, Oral, Daily  NIFEdipine XL, 30 mg, Oral, Daily  potassium chloride ER, 40 mEq, Oral, Q4H  rosuvastatin, 10 mg, Oral, Nightly  sodium chloride, 10 mL, Intravenous, Q12H  vancomycin, 125 mg, Oral, Q6H      Pharmacy Consult,   sodium chloride, 100 mL/hr, Last Rate: 100 mL/hr (06/13/24 1766)        acetaminophen **OR** acetaminophen **OR** acetaminophen    Calcium Replacement - Follow Nurse / BPA Driven Protocol    Magnesium Standard Dose Replacement - Follow Nurse / BPA Driven Protocol    nitroglycerin    ondansetron ODT **OR** ondansetron    Pharmacy Consult    Phosphorus Replacement - Follow Nurse / BPA Driven Protocol    Potassium Replacement - Follow Nurse / BPA Driven Protocol    [COMPLETED] Insert  Peripheral IV **AND** sodium chloride    sodium chloride    sodium chloride    Family history:  Family History   Problem Relation Age of Onset    Breast cancer Mother     Colon cancer Mother     Hypertension Mother     Cancer Mother         Breat cancer, skin cancer, colon cancer    Hyperlipidemia Mother     Cancer Father         Skin cancer    Hypertension Sister     Hypertension Sister     Hyperlipidemia Sister     Thyroid disease Sister     Diabetes type II Brother     Cancer Brother         Skin cancer    Colon cancer Maternal Aunt         colon ca 40    Cancer Maternal Aunt         Colon cancer cause of death early 40s    Colon cancer Maternal Aunt         66 yo    Heart disease Maternal Aunt     Colon cancer Maternal Aunt         81 yo    Cancer Maternal Aunt         Colon cancer    Cancer Maternal Aunt         Colon cancer    Cancer Maternal Uncle         Thyroid cancer    Heart disease Maternal Uncle     Stomach cancer Maternal Grandmother         or intestinal    Cancer Maternal Grandmother         GI TRACT CANCER    Breast cancer Maternal Grandmother     Heart disease Maternal Grandmother     Heart attack Maternal Grandfather     Breast cancer Maternal Grandfather     Heart disease Maternal Grandfather     Heart disease Paternal Grandmother     Colon cancer Other     Colon cancer Other     Malig Hyperthermia Neg Hx        Social history:  Social History     Tobacco Use    Smoking status: Never    Smokeless tobacco: Never    Tobacco comments:     daily caffine   Vaping Use    Vaping status: Never Used   Substance Use Topics    Alcohol use: Never    Drug use: Never       Review of systems:      12 point negative except as in HPI    Objective:  TMax 24 hours:   Temp (24hrs), Av.8 °F (37.1 °C), Min:98.3 °F (36.8 °C), Max:99.5 °F (37.5 °C)      Vitals Ranges:   Temp:  [98.3 °F (36.8 °C)-99.5 °F (37.5 °C)] 98.4 °F (36.9 °C)  Heart Rate:  [59-73] 64  Resp:  [16-18] 16  BP: (113-128)/(53-59)  118/59    Intake/Output Last 3 shifts:  I/O last 3 completed shifts:  In: -   Out: 600 [Urine:600]     Physical Exam:    General Appearance:    Alert, NAD, sitting in chair   Back:     Mild bilateral CVA tenderness   Abdomen:     Soft, ND, mild lower abdominal tenderness, no masses, no guarding   :    Deferred   Neuro/Psych:   Orientation intact, mood/affect pleasant       Results review:   I reviewed the patient's new clinical results.    Data review:  Lab Results (last 24 hours)       Procedure Component Value Units Date/Time    Comprehensive Metabolic Panel [[id number]]  (Abnormal) Collected: 06/14/24 0440    Specimen: Blood Updated: 06/14/24 0559     Glucose 90 mg/dL      BUN 7 mg/dL      Creatinine 0.77 mg/dL      Sodium 142 mmol/L      Potassium 3.4 mmol/L      Chloride 110 mmol/L      CO2 23.1 mmol/L      Calcium 8.8 mg/dL      Total Protein 5.1 g/dL      Albumin 2.8 g/dL      ALT (SGPT) <5 U/L      AST (SGOT) 8 U/L      Alkaline Phosphatase 81 U/L      Total Bilirubin 0.3 mg/dL      Globulin 2.3 gm/dL      A/G Ratio 1.2 g/dL      BUN/Creatinine Ratio 9.1     Anion Gap 8.9 mmol/L      eGFR 81.1 mL/min/1.73     Narrative:      GFR Normal >60  Chronic Kidney Disease <60  Kidney Failure <15    The GFR formula is only valid for adults with stable renal function between ages 18 and 70.    Hemoglobin A1c [[id number]]  (Normal) Collected: 06/14/24 0440    Specimen: Blood Updated: 06/14/24 0540     Hemoglobin A1C 5.40 %     Narrative:      Hemoglobin A1C Ranges:    Increased Risk for Diabetes  5.7% to 6.4%  Diabetes                     >= 6.5%  Diabetic Goal                < 7.0%    CBC & Differential [[id number]]  (Abnormal) Collected: 06/14/24 0440    Specimen: Blood Updated: 06/14/24 0527    Narrative:      The following orders were created for panel order CBC & Differential.  Procedure                               Abnormality         Status                     ---------                               -----------          ------                     CBC Auto Differential[[id number]]        Abnormal            Final result                 Please view results for these tests on the individual orders.    CBC Auto Differential [[id number]]  (Abnormal) Collected: 06/14/24 0440    Specimen: Blood Updated: 06/14/24 0527     WBC 11.62 10*3/mm3      RBC 3.34 10*6/mm3      Hemoglobin 10.2 g/dL      Hematocrit 31.1 %      MCV 93.1 fL      MCH 30.5 pg      MCHC 32.8 g/dL      RDW 12.5 %      RDW-SD 42.4 fl      MPV 9.4 fL      Platelets 243 10*3/mm3      Neutrophil % 71.3 %      Lymphocyte % 15.2 %      Monocyte % 6.7 %      Eosinophil % 5.7 %      Basophil % 0.4 %      Immature Grans % 0.7 %      Neutrophils, Absolute 8.28 10*3/mm3      Lymphocytes, Absolute 1.77 10*3/mm3      Monocytes, Absolute 0.78 10*3/mm3      Eosinophils, Absolute 0.66 10*3/mm3      Basophils, Absolute 0.05 10*3/mm3      Immature Grans, Absolute 0.08 10*3/mm3      nRBC 0.0 /100 WBC     Hemoglobin & Hematocrit, Blood [[id number]]  (Abnormal) Collected: 06/14/24 0026    Specimen: Blood Updated: 06/14/24 0037     Hemoglobin 10.2 g/dL      Hematocrit 30.6 %     Blood Culture - Blood, Arm, Right [[id number]] Collected: 06/13/24 1557    Specimen: Blood from Arm, Right Updated: 06/13/24 1638    Hemoglobin & Hematocrit, Blood [[id number]]  (Abnormal) Collected: 06/13/24 1557    Specimen: Blood Updated: 06/13/24 1627     Hemoglobin 10.8 g/dL      Hematocrit 33.2 %     Urine Culture - Urine, Urine, Clean Catch [[id number]] Collected: 06/12/24 1427    Specimen: Urine, Clean Catch Updated: 06/13/24 1601    Lactic Acid, Plasma [[id number]]  (Normal) Collected: 06/13/24 1510    Specimen: Blood Updated: 06/13/24 1544     Lactate 2.0 mmol/L     Blood Culture - Blood, Arm, Left [[id number]] Collected: 06/13/24 1510    Specimen: Blood from Arm, Left Updated: 06/13/24 1521    Blood Culture - Blood, Arm, Left [[id number]]  (Normal) Collected: 06/12/24 1356    Specimen: Blood from Arm, Left  Updated: 06/13/24 1431     Blood Culture No growth at 24 hours    Narrative:      Less than seven (7) mL's of blood was collected.  Insufficient quantity may yield false negative results.    Blood Culture - Blood, Arm, Right [[id number]]  (Normal) Collected: 06/12/24 1400    Specimen: Blood from Arm, Right Updated: 06/13/24 1431     Blood Culture No growth at 24 hours             Imaging:  Imaging Results (Last 24 Hours)       ** No results found for the last 24 hours. **             Assessment:     Left ureteral stone  Left hydronephrosis  Urinary tract infection    Plan:     - NPO  - Consent  - Add on for cystoscopy, retrograde pyelogram, left ureteral stent placement this afternoon  - Patient will need definitive stone management at a later date    ESTHER Mishra  06/14/24  10:18 EDT    Plan reviewed and discussed with Dr. Faria

## 2024-06-14 NOTE — PLAN OF CARE
Goal Outcome Evaluation:            A/O x 4, NSR on monitor, room air. Assist x 1 to bathroom. Contact spore isolation. NPO, cystoscopy tonight. VSS.

## 2024-06-14 NOTE — ANESTHESIA PREPROCEDURE EVALUATION
Anesthesia Evaluation     Patient summary reviewed and Nursing notes reviewed   history of anesthetic complications:  PONV  NPO Solid Status: > 8 hours  NPO Liquid Status: > 2 hours           Airway   Mallampati: IV  TM distance: <3 FB  Neck ROM: full  No difficulty expected  Comment: Previous grade I  Dental - normal exam     Pulmonary - normal exam    breath sounds clear to auscultation  Cardiovascular - normal exam    ECG reviewed  Rhythm: regular  Rate: normal    (+) hypertension, valvular problems/murmurs murmur, hyperlipidemia    ROS comment: 2024 Echo     Left ventricular systolic function is hyperdynamic (EF > 70%).  ·  Left ventricular diastolic function was normal.  ·  The right ventricular cavity is mildly dilated but normal systolic function.      Neuro/Psych  (+) headaches, psychiatric history Anxiety and Depression    ROS Comment: Migraines  GI/Hepatic/Renal/Endo    (+) GERD, renal disease- stones and CRI, thyroid problem hypothyroidism    ROS Comment: Urosepsis    Musculoskeletal     Abdominal    Substance History      OB/GYN          Other   arthritis,                   Anesthesia Plan    ASA 3     general     (Previous LMA 4)  intravenous induction     Anesthetic plan, risks, benefits, and alternatives have been provided, discussed and informed consent has been obtained with: patient.    CODE STATUS:    Code Status (Patient has no pulse and is not breathing): CPR (Attempt to Resuscitate)  Medical Interventions (Patient has pulse or is breathing): Full Support       Patient: Dolores Robles    Procedure(s):  Open Latarjet, humeral head allograft    Diagnosis: Recurrent instability of joint [M25.30]  Diagnosis Additional Information: No value filed.    Anesthesia Type:   General     Note:    Oropharynx: oropharynx clear of all foreign objects and spontaneously breathing  Level of Consciousness: awake  Oxygen Supplementation: face mask  Level of Supplemental Oxygen: 6  Independent Airway: airway patency satisfactory and stable  Dentition: dentition unchanged  Vital Signs Stable: post-procedure vital signs reviewed and stable  Report to RN Given: handoff report given  Patient transferred to: PACU    Handoff Report: Identifed the Patient, Identified the Reponsible Provider, Reviewed the pertinent medical history, Discussed the surgical course, Reviewed Intra-OP anesthesia mangement and issues during anesthesia, Set expectations for post-procedure period and Allowed opportunity for questions and acknowledgement of understanding      Vitals: (Last set prior to Anesthesia Care Transfer)  CRNA VITALS  1/19/2021 1650 - 1/19/2021 1726      1/19/2021             Resp Rate (observed):  (!) 3        Electronically Signed By: TARAS Leggett CRNA  January 19, 2021  5:26 PM

## 2024-06-14 NOTE — PROGRESS NOTES
Name: Anastasiia Faria ADMIT: 2024   : 1950  PCP: Mirza Scott Sr., MD    MRN: [id number] LOS: 1 days   AGE/SEX: 74 y.o. female  ROOM: [facility]     Subjective   Subjective   Patient seen and examined, resting in chair, continues to have abdominal pain, but improving.  No further notice of blood in stool.  Also endorses increased urinary urgency and frequency.       Objective   Objective   Vital Signs  Temp:  [98.3 °F (36.8 °C)-99.5 °F (37.5 °C)] 98.4 °F (36.9 °C)  Heart Rate:  [59-73] 64  Resp:  [16-18] 16  BP: (113-128)/(53-59) 118/59  SpO2:  [86 %-98 %] 98 %  on  Flow (L/min):  [2-4] 4;   Device (Oxygen Therapy): nasal cannula  Body mass index is 27.67 kg/m².  Physical Exam  Vitals and nursing note reviewed.   Constitutional:       General: She is not in acute distress.  Cardiovascular:      Rate and Rhythm: Normal rate and regular rhythm.      Pulses: Normal pulses.      Heart sounds: Normal heart sounds.   Pulmonary:      Effort: Pulmonary effort is normal. No respiratory distress.      Breath sounds: Normal breath sounds. No wheezing.   Abdominal:      General: Bowel sounds are normal. There is no distension.      Palpations: Abdomen is soft.      Tenderness: There is abdominal tenderness.   Musculoskeletal:         General: No tenderness.      Right lower leg: No edema.      Left lower leg: No edema.   Skin:     General: Skin is warm and dry.   Neurological:      Mental Status: She is alert.       Results Review     I reviewed the patient's new clinical results.  Results from last 7 days   Lab Units 24  0440 24  0026 24  1557 24  0810 24  1554 24  0932   WBC 10*3/mm3 11.62*  --   --  15.53*  --  12.93*   HEMOGLOBIN g/dL 10.2* 10.2* 10.8* 12.2  12.2   < > 13.0   PLATELETS 10*3/mm3 243  --   --  295  --  301    < > = values in this interval not displayed.     Results from last 7 days   Lab Units 24  0440 24  0810 24  2245 24  0932   SODIUM  mmol/L 142 144  --  139   POTASSIUM mmol/L 3.4* 3.8 4.1 3.4*   CHLORIDE mmol/L 110* 109*  --  104   CO2 mmol/L 23.1 23.9  --  23.0   BUN mg/dL 7* 11  --  19   CREATININE mg/dL 0.77 0.98  --  1.04*   GLUCOSE mg/dL 90 159*  --  175*   EGFR mL/min/1.73 81.1 60.7  --  56.5*     Results from last 7 days   Lab Units 06/14/24  0440 06/12/24  0932   ALBUMIN g/dL 2.8* 3.8   BILIRUBIN mg/dL 0.3 0.4   ALK PHOS U/L 81 107   AST (SGOT) U/L 8 11   ALT (SGPT) U/L <5 10     Results from last 7 days   Lab Units 06/14/24  0440 06/13/24  0810 06/12/24  0932   CALCIUM mg/dL 8.8 9.5 9.7   ALBUMIN g/dL 2.8*  --  3.8     Results from last 7 days   Lab Units 06/13/24  1510   LACTATE mmol/L 2.0     Hemoglobin A1C   Date/Time Value Ref Range Status   06/14/2024 0440 5.40 4.80 - 5.60 % Final       CT Abdomen Pelvis Without Contrast    Result Date: 6/12/2024  1. Left proximal ureteral stone (horizontal level L5 vertebral body) with moderate left hydronephrosis. 2. Acute colitis involving the right colon greatest extending from the hepatic flexure to the mid transverse colon where there is pericolonic inflammation and abnormal wall thickening. This needs follow-up to resolution. 3. In addition to the left proximal ureteral stone, there are nonobstructing bilateral renal calyceal stones.  Radiation dose reduction techniques were utilized, including automated exposure control and exposure modulation based on body size.   This report was finalized on 6/12/2024 12:54 PM by Dr. Ralph Camp M.D on Workstation: [id number]       I have personally reviewed all medications:  Scheduled Medications  atenolol, 25 mg, Oral, Daily  cefTRIAXone, 2,000 mg, Intravenous, Q24H  cetirizine, 10 mg, Oral, Daily  escitalopram, 20 mg, Oral, Daily  levothyroxine, 50 mcg, Oral, Daily  NIFEdipine XL, 30 mg, Oral, Daily  potassium chloride ER, 40 mEq, Oral, Q4H  rosuvastatin, 10 mg, Oral, Nightly  sodium chloride, 10 mL, Intravenous, Q12H  vancomycin, 125 mg, Oral,  Q6H    Infusions  Pharmacy Consult,   sodium chloride, 100 mL/hr, Last Rate: 100 mL/hr (06/13/24 9944)    Diet  Diet: Liquid; Clear Liquid; Fluid Consistency: Thin (IDDSI 0)    I have personally reviewed:  [x]  Laboratory   [x]  Microbiology   [x]  Radiology   [x]  EKG/Telemetry  [x]  Cardiology/Vascular   []  Pathology    []  Records       Assessment/Plan     Active Hospital Problems    Diagnosis  POA    **GI bleed [K92.2]  Yes    Colitis [K52.9]  Yes    UTI (urinary tract infection) [N39.0]  Yes    Leukocytosis [D72.829]  Yes    Prediabetes [R73.03]  Yes    Essential hypertension [I10]  Yes    Acquired hypothyroidism [E03.9]  Yes    Mixed hyperlipidemia [E78.2]  Yes      Resolved Hospital Problems   No resolved problems to display.       74 y.o. female admitted with GI bleed.    Generalized abdominal pain  Nausea with vomiting  Colitis, C. Diff infection  GI bleeding  - Imaging noted, GI following, C. Diff testing positive, started on Vancomycin. No further notice of bleeding at present. Hemoglobin low but relatively stable. Will continue to follow their plans/recommendations. Greatly appreciate their help.    UTI  - UA suggestive of UTI, coupled with leukocytosis and urinary urgency and frequency. She is on Ceftriaxone for treatment, urine and blood cultures are pending.  Follow up results, continue antibiotics as prescribed for now.    Leukocytosis  - WBC count elevated on most recent labs, likely 2/2 infections as above. Treat contributing etiologies, continue to monitor.   - Order repeat CBC in AM for reassessment.     Ureteral stone  Hydronephrosis  - CT noting Left proximal ureteral stone (horizontal level L5 vertebral body) with moderate left hydronephrosis, in addition to bilateral renal calyceal stones. She states she required a stent placement by Urology in the past. Will consult them to see her now while admitted, appreciate their help.    Hypertension  - Blood pressure appears stable and acceptable  acutely at this time. No indications to warrant acute changes/intervention at this time.  - Continue current medications as prescribed. Trend BP to guide ongoing management decisions.    Anemia  - Hemoglobin low on most recent labs.  - Order repeat CBC in AM for reassessment. Continue to monitor, transfuse for hemoglobin <7.    Hypokalemia  - K low on most recent labs; Will replete via electrolyte protocol. Follow up repeat labs to guide ongoing management decisions. Replete PRN per protocol. Continue to monitor    Hypothyroidism  - Continue levothyroxine as prescribed.    Hyperlipidemia  - Continue Statin as prescribed.      SCDs for DVT prophylaxis.  Full code.  Discussed with patient, nursing staff, and consulting provider.  Anticipate discharge  TBD  timing yet to be determined.  Expected Discharge Date: 6/13/2024; Expected Discharge Time:       Jesus Ro DO  [facility]  06/14/24

## 2024-06-14 NOTE — PROGRESS NOTES
[facility]  Inpatient Progress Note    Reason for Followup: Colitis    Subjective     Interval History:   Patient is overall clinically improved.  She had some additional episodes of diarrhea but no blood in the stool.  Patient continues to have intermittent nausea but no vomiting.  She is being evaluated by urology in the setting of her urinary tract infection and kidney stones.    Current Facility-Administered Medications:     acetaminophen (TYLENOL) tablet 650 mg, 650 mg, Oral, Q4H PRN **OR** acetaminophen (TYLENOL) 160 MG/5ML oral solution 650 mg, 650 mg, Oral, Q4H PRN **OR** acetaminophen (TYLENOL) suppository 650 mg, 650 mg, Rectal, Q4H PRN, Valente Villeda MD    atenolol (TENORMIN) tablet 25 mg, 25 mg, Oral, Daily, Valente Villeda MD, 25 mg at 06/14/24 0829    Calcium Replacement - Follow Nurse / BPA Driven Protocol, , Does not apply, PRN, Valente Villeda MD    cefTRIAXone (ROCEPHIN) 2,000 mg in sodium chloride 0.9 % 100 mL MBP, 2,000 mg, Intravenous, Q24H, Jesus Ro, DO, Last Rate: 200 mL/hr at 06/13/24 1643, 2,000 mg at 06/13/24 1643    cetirizine (zyrTEC) tablet 10 mg, 10 mg, Oral, Daily, Valente Villeda MD, 10 mg at 06/14/24 0829    escitalopram (LEXAPRO) tablet 20 mg, 20 mg, Oral, Daily, Valente Villeda MD, 20 mg at 06/14/24 0829    levothyroxine (SYNTHROID, LEVOTHROID) tablet 50 mcg, 50 mcg, Oral, Daily, Valente Villeda MD, 50 mcg at 06/14/24 0829    Magnesium Standard Dose Replacement - Follow Nurse / BPA Driven Protocol, , Does not apply, Christiana PRITCHARD Emmett J., MD    NIFEdipine XL (PROCARDIA XL) 24 hr tablet 30 mg, 30 mg, Oral, Daily, Valente Villeda MD, 30 mg at 06/14/24 0829    nitroglycerin (NITROSTAT) SL tablet 0.4 mg, 0.4 mg, Sublingual, Q5 Min PRN, Valente Villeda MD    ondansetron ODT (ZOFRAN-ODT) disintegrating tablet 4 mg, 4 mg, Oral, Q6H PRN **OR** ondansetron (ZOFRAN) injection 4 mg, 4 mg, Intravenous, Q6H PRN, Christiana,  Valente FORBES MD, 4 mg at 06/13/24 1648    Pharmacy Consult, , Does not apply, Continuous PRNChirstiana Emmett J., MD    Phosphorus Replacement - Follow Nurse / BPA Driven Protocol, , Does not apply, Christiana PRITCHARD Emmett J., MD    potassium chloride (K-DUR,KLOR-CON) ER tablet 40 mEq, 40 mEq, Oral, Q4H, Jesus Ro, , 40 mEq at 06/14/24 0829    Potassium Replacement - Follow Nurse / BPA Driven Protocol, , Does not apply, PRNChristiana Emmett J., MD    rosuvastatin (CRESTOR) tablet 10 mg, 10 mg, Oral, Nightly, Valente Villeda MD, 10 mg at 06/13/24 2054    [COMPLETED] Insert Peripheral IV, , , Once **AND** sodium chloride 0.9 % flush 10 mL, 10 mL, Intravenous, PRN, Carlee Griffin, ESTHER    sodium chloride 0.9 % flush 10 mL, 10 mL, Intravenous, Q12H, Valente Vlileda MD, 10 mL at 06/13/24 2054    sodium chloride 0.9 % flush 10 mL, 10 mL, Intravenous, PRN, Valente Villeda MD    sodium chloride 0.9 % infusion 40 mL, 40 mL, Intravenous, PRN, Valente Villeda MD    sodium chloride 0.9 % infusion, 100 mL/hr, Intravenous, Continuous, Valente Villeda MD, Last Rate: 100 mL/hr at 06/13/24 2349, 100 mL/hr at 06/13/24 2349    vancomycin (VANCOCIN) capsule 125 mg, 125 mg, Oral, Q6H, Valente Villeda MD, 125 mg at 06/14/24 0527    Objective     Vital Signs  Temp:  [98.3 °F (36.8 °C)-99.5 °F (37.5 °C)] 98.4 °F (36.9 °C)  Heart Rate:  [59-73] 64  Resp:  [16-18] 16  BP: (113-128)/(53-59) 118/59  Body mass index is 27.67 kg/m².    Intake/Output Summary (Last 24 hours) at 6/14/2024 0831  Last data filed at 6/13/2024 2050  Gross per 24 hour   Intake --   Output 600 ml   Net -600 ml     No intake/output data recorded.     Physical Exam:   General: patient awake, alert and cooperative   Abdomen: soft, nontender, nondistended; normal bowel sounds     Results Review:     I reviewed the patient's new clinical results.    Results from last 7 days   Lab Units 06/14/24  0440 06/14/24  0026 06/13/24  1557  06/13/24  0810 06/12/24  1554 06/12/24  0932   WBC 10*3/mm3 11.62*  --   --  15.53*  --  12.93*   HEMOGLOBIN g/dL 10.2* 10.2* 10.8* 12.2  12.2   < > 13.0   HEMATOCRIT % 31.1* 30.6* 33.2* 37.2  37.2   < > 38.8   PLATELETS 10*3/mm3 243  --   --  295  --  301    < > = values in this interval not displayed.     Results from last 7 days   Lab Units 06/14/24  0440 06/13/24  0810 06/12/24  2245 06/12/24  0932   SODIUM mmol/L 142 144  --  139   POTASSIUM mmol/L 3.4* 3.8 4.1 3.4*   CHLORIDE mmol/L 110* 109*  --  104   CO2 mmol/L 23.1 23.9  --  23.0   BUN mg/dL 7* 11  --  19   CREATININE mg/dL 0.77 0.98  --  1.04*   CALCIUM mg/dL 8.8 9.5  --  9.7   BILIRUBIN mg/dL 0.3  --   --  0.4   ALK PHOS U/L 81  --   --  107   ALT (SGPT) U/L <5  --   --  10   AST (SGOT) U/L 8  --   --  11   GLUCOSE mg/dL 90 159*  --  175*     Results from last 7 days   Lab Units 06/12/24  0932   INR  1.13*     Lab Results   Lab Value Date/Time    LIPASE 18 06/12/2024 0932    LIPASE 22 03/21/2024 0820       Radiology:  CT Abdomen Pelvis Without Contrast   Final Result   1. Left proximal ureteral stone (horizontal level L5 vertebral body)   with moderate left hydronephrosis.   2. Acute colitis involving the right colon greatest extending from the   hepatic flexure to the mid transverse colon where there is pericolonic   inflammation and abnormal wall thickening. This needs follow-up to   resolution.   3. In addition to the left proximal ureteral stone, there are   nonobstructing bilateral renal calyceal stones.       Radiation dose reduction techniques were utilized, including automated   exposure control and exposure modulation based on body size.           This report was finalized on 6/12/2024 12:54 PM by Dr. Ralph Camp M.D on Workstation: [id number]                Assessment & Plan   Assessment:   R sided colitis - C. difficile toxin positive  N/V/D  Hematochezia- normal H/H   Leukocytosis -improving  L ureteral stone w/ mod L hydronephrosis      Plan:   IVF, anti-emetics/analgesics PRN  Continue antibiotics  Trend H/H, currently stable  Continue p.o. vancomycin  Advance diet as tolerated  Will discuss timing of colonoscopy in the outpatient setting    I discussed the patient's findings and my recommendations with patient.    Dictated utilizing Dragon dictation.        Samantha Sanchez PA-C   [facility]  [address] [place] [address]  Office: [phone]

## 2024-06-14 NOTE — PLAN OF CARE
Goal Outcome Evaluation:  Plan of Care Reviewed With: patient        Progress: no change  Outcome Evaluation: Received pt in bed. Alert and orientedx4. No complaints. Amb with cane to bathroom. c/o diarrheax1 with no blood noted in stool. Nausea intermittent. MIVF infusing. O2Sat down while pt asleep. 2LNC applied and increased to 3LNC when desat continued. Oxygen cannula place in mouth since pt mouthbreathing and snoring while asleep and desat persisted. Contact spore for Chandlerf. NAD. Plan of care ongoing.

## 2024-06-15 LAB
ALBUMIN SERPL-MCNC: 3.3 G/DL (ref 3.5–5.2)
ALBUMIN/GLOB SERPL: 1.4 G/DL
ALP SERPL-CCNC: 94 U/L (ref 39–117)
ALT SERPL W P-5'-P-CCNC: 7 U/L (ref 1–33)
ANION GAP SERPL CALCULATED.3IONS-SCNC: 12.2 MMOL/L (ref 5–15)
AST SERPL-CCNC: 11 U/L (ref 1–32)
BACTERIA SPEC AEROBE CULT: ABNORMAL
BASOPHILS # BLD AUTO: 0.04 10*3/MM3 (ref 0–0.2)
BASOPHILS NFR BLD AUTO: 0.4 % (ref 0–1.5)
BILIRUB SERPL-MCNC: 0.3 MG/DL (ref 0–1.2)
BUN SERPL-MCNC: 5 MG/DL (ref 8–23)
BUN/CREAT SERPL: 5.9 (ref 7–25)
CALCIUM SPEC-SCNC: 9 MG/DL (ref 8.6–10.5)
CHLORIDE SERPL-SCNC: 107 MMOL/L (ref 98–107)
CO2 SERPL-SCNC: 22.8 MMOL/L (ref 22–29)
CREAT SERPL-MCNC: 0.85 MG/DL (ref 0.57–1)
DEPRECATED RDW RBC AUTO: 40.7 FL (ref 37–54)
EGFRCR SERPLBLD CKD-EPI 2021: 72 ML/MIN/1.73
EOSINOPHIL # BLD AUTO: 0.69 10*3/MM3 (ref 0–0.4)
EOSINOPHIL NFR BLD AUTO: 7.3 % (ref 0.3–6.2)
ERYTHROCYTE [DISTWIDTH] IN BLOOD BY AUTOMATED COUNT: 12.3 % (ref 12.3–15.4)
GLOBULIN UR ELPH-MCNC: 2.4 GM/DL
GLUCOSE SERPL-MCNC: 89 MG/DL (ref 65–99)
HCT VFR BLD AUTO: 32.6 % (ref 34–46.6)
HCT VFR BLD AUTO: 33.6 % (ref 34–46.6)
HCT VFR BLD AUTO: 33.6 % (ref 34–46.6)
HGB BLD-MCNC: 10.8 G/DL (ref 12–15.9)
HGB BLD-MCNC: 11.3 G/DL (ref 12–15.9)
HGB BLD-MCNC: 11.3 G/DL (ref 12–15.9)
IMM GRANULOCYTES # BLD AUTO: 0.04 10*3/MM3 (ref 0–0.05)
IMM GRANULOCYTES NFR BLD AUTO: 0.4 % (ref 0–0.5)
LYMPHOCYTES # BLD AUTO: 1.45 10*3/MM3 (ref 0.7–3.1)
LYMPHOCYTES NFR BLD AUTO: 15.3 % (ref 19.6–45.3)
MCH RBC QN AUTO: 30.9 PG (ref 26.6–33)
MCHC RBC AUTO-ENTMCNC: 33.6 G/DL (ref 31.5–35.7)
MCV RBC AUTO: 91.8 FL (ref 79–97)
MONOCYTES # BLD AUTO: 0.78 10*3/MM3 (ref 0.1–0.9)
MONOCYTES NFR BLD AUTO: 8.2 % (ref 5–12)
NEUTROPHILS NFR BLD AUTO: 6.46 10*3/MM3 (ref 1.7–7)
NEUTROPHILS NFR BLD AUTO: 68.4 % (ref 42.7–76)
NRBC BLD AUTO-RTO: 0 /100 WBC (ref 0–0.2)
PLATELET # BLD AUTO: 292 10*3/MM3 (ref 140–450)
PMV BLD AUTO: 9.1 FL (ref 6–12)
POTASSIUM SERPL-SCNC: 3.7 MMOL/L (ref 3.5–5.2)
PROT SERPL-MCNC: 5.7 G/DL (ref 6–8.5)
RBC # BLD AUTO: 3.66 10*6/MM3 (ref 3.77–5.28)
SODIUM SERPL-SCNC: 142 MMOL/L (ref 136–145)
WBC NRBC COR # BLD AUTO: 9.46 10*3/MM3 (ref 3.4–10.8)

## 2024-06-15 PROCEDURE — 99232 SBSQ HOSP IP/OBS MODERATE 35: CPT | Performed by: NURSE PRACTITIONER

## 2024-06-15 PROCEDURE — 85014 HEMATOCRIT: CPT | Performed by: UROLOGY

## 2024-06-15 PROCEDURE — 80053 COMPREHEN METABOLIC PANEL: CPT | Performed by: UROLOGY

## 2024-06-15 PROCEDURE — 25810000003 SODIUM CHLORIDE 0.9 % SOLUTION: Performed by: UROLOGY

## 2024-06-15 PROCEDURE — 85018 HEMOGLOBIN: CPT | Performed by: UROLOGY

## 2024-06-15 PROCEDURE — 85025 COMPLETE CBC W/AUTO DIFF WBC: CPT | Performed by: UROLOGY

## 2024-06-15 PROCEDURE — 25010000002 CEFTRIAXONE PER 250 MG: Performed by: UROLOGY

## 2024-06-15 RX ADMIN — SODIUM CHLORIDE 100 ML/HR: 9 INJECTION, SOLUTION INTRAVENOUS at 04:48

## 2024-06-15 RX ADMIN — ESCITALOPRAM 20 MG: 20 TABLET, FILM COATED ORAL at 08:30

## 2024-06-15 RX ADMIN — ROSUVASTATIN CALCIUM 10 MG: 10 TABLET, FILM COATED ORAL at 20:07

## 2024-06-15 RX ADMIN — VANCOMYCIN HYDROCHLORIDE 125 MG: 125 CAPSULE ORAL at 05:08

## 2024-06-15 RX ADMIN — SODIUM CHLORIDE 100 ML/HR: 9 INJECTION, SOLUTION INTRAVENOUS at 22:53

## 2024-06-15 RX ADMIN — LEVOTHYROXINE SODIUM 50 MCG: 50 TABLET ORAL at 08:22

## 2024-06-15 RX ADMIN — NIFEDIPINE 30 MG: 30 TABLET, FILM COATED, EXTENDED RELEASE ORAL at 08:29

## 2024-06-15 RX ADMIN — CETIRIZINE HYDROCHLORIDE 10 MG: 10 TABLET ORAL at 08:30

## 2024-06-15 RX ADMIN — VANCOMYCIN HYDROCHLORIDE 125 MG: 125 CAPSULE ORAL at 18:14

## 2024-06-15 RX ADMIN — CEFTRIAXONE 2000 MG: 2 INJECTION, POWDER, FOR SOLUTION INTRAMUSCULAR; INTRAVENOUS at 15:47

## 2024-06-15 RX ADMIN — SODIUM CHLORIDE 100 ML/HR: 9 INJECTION, SOLUTION INTRAVENOUS at 14:07

## 2024-06-15 RX ADMIN — VANCOMYCIN HYDROCHLORIDE 125 MG: 125 CAPSULE ORAL at 11:19

## 2024-06-15 RX ADMIN — Medication 10 ML: at 20:07

## 2024-06-15 RX ADMIN — Medication 10 ML: at 08:30

## 2024-06-15 RX ADMIN — ATENOLOL 25 MG: 25 TABLET ORAL at 08:30

## 2024-06-15 NOTE — PLAN OF CARE
Goal Outcome Evaluation:  Plan of Care Reviewed With: patient        Progress: improving  Pt is alert and orientedx4, RA, NSR/SB, no c/o pain today. Pt ambulating to BR with cane and stand by assist. IVF and IV and PO antibiotics continue. Pt reports some bladder spasms earlier today but have improved. No c/o nausea and vomiting. Pt reports she had BM today but her stools are soft now and not liquid. Will continue to monitor pt.

## 2024-06-15 NOTE — PROGRESS NOTES
She has a pan sensitive ecoli    No further plans for intervention during this admission    Our office will call to arrange followup with kub and to make plans for ureteroscopy    Po antibiotics for 2 weeks

## 2024-06-15 NOTE — PLAN OF CARE
Goal Outcome Evaluation:  Plan of Care Reviewed With: patient        Progress: improving  Outcome Evaluation: Pt A&Ox4, RA to 2 l. nc overnight, went for cystoscopy this shift, NSR on monitor, no issues overnight, VSS.

## 2024-06-15 NOTE — OP NOTE
CYSTOSCOPY RETROGRADE PYELOGRAM AND STENT INSERTION  Procedure Note    Anastasiia Faria  6/14/2024    Pre-op Diagnosis:   Ureteral calculus [N20.1]  Urinary tract infection without hematuria, site unspecified [N39.0]    Post-op Diagnosis:     Post-Op Diagnosis Codes:     * Ureteral calculus [N20.1]     * Urinary tract infection without hematuria, site unspecified [N39.0]    Procedure(s):  CYSTOSCOPY RETROGRADE PYELOGRAM AND STENT INSERTION    Surgeon(s):  Yahir Faria MD    Anesthesia: General    Staff:   Circulator: Paula Sifuentes RN  Radiology Technologist: Daisy Chapa  Scrub Person: Janell Marcial    Estimated Blood Loss: none    Specimens:                * No orders in the log *      Drains:   [REMOVED] Ureteral Drain/Stent Left ureter 7 Fr. (Removed)       [REMOVED] External Urinary Catheter (Removed)       Findings: Left mid ureteral calculus.  Stent placed without tether.    Complications: None apparent    Indications: 74-year-old female with a history of stone disease had a recent obstructing calculus in March which she thinks she passed.  Stent was removed shortly thereafter.  She comes in now with abdominal pain found to have colitis CT scan 2 days ago shows an obstructing left mid ureteral stone.  She now presents for stent placement due to her leukocytosis and septic like picture.  She is clinically very stable.  He has minimal pain at this time.    Procedure: Patient was taken the operative suite given general anesthesia.  Placed in comfortable supine then lithotomy position.  Prepped and draped in sterile fashion.  Surgical timeout was performed.  Cystoscope inserted.  The bladder was unremarkable.  The left orifice was cannulated with a guidewire and with some gentle manipulation post up to the left renal pelvis.  A 6 Frisian by 26 cm stent was then placed the left collecting system with a good curl proximal distal.  Her bladder was drained she was awoken and taken recovery stable condition.   Stone was at the L5 level and hard to see.  She will need delayed ureteroscopy.      Yahir Faria MD     Date: 6/14/2024  Time: 21:10 EDT

## 2024-06-15 NOTE — PROGRESS NOTES
Name: Anastasiia Faria ADMIT: 2024   : 1950  PCP: Mirza Scott Sr., MD    MRN: [id number] LOS: 2 days   AGE/SEX: 74 y.o. female  ROOM: [facility]     Subjective   Subjective   Patient seen and examined, resting in bed, POD #1 s/p cystoscopy, retrograde pyelogram and stent insertion with urology on . She is doing okay this morning, abdominal and urinary symptoms improving.       Objective   Objective   Vital Signs  Temp:  [97.5 °F (36.4 °C)-99.1 °F (37.3 °C)] 97.5 °F (36.4 °C)  Heart Rate:  [64-78] 76  Resp:  [16-18] 16  BP: (116-134)/(56-84) 134/84  SpO2:  [94 %-97 %] 95 %  on  Flow (L/min):  [2-4] 2;   Device (Oxygen Therapy): room air  Body mass index is 27.67 kg/m².  Physical Exam  Vitals and nursing note reviewed.   Constitutional:       General: She is not in acute distress.  Cardiovascular:      Rate and Rhythm: Normal rate and regular rhythm.      Pulses: Normal pulses.      Heart sounds: Normal heart sounds.   Pulmonary:      Effort: Pulmonary effort is normal. No respiratory distress.      Breath sounds: Normal breath sounds.   Abdominal:      General: Bowel sounds are normal. There is no distension.      Palpations: Abdomen is soft.      Tenderness: There is abdominal tenderness (mild, improved from prior). There is no guarding or rebound.   Skin:     General: Skin is warm and dry.   Neurological:      Mental Status: She is alert.       Results Review     I reviewed the patient's new clinical results.  Results from last 7 days   Lab Units 06/15/24  0809 06/15/24  0003 24  1712 24  0440 24  1557 24  0810 24  1554 24  0932   WBC 10*3/mm3 9.46  --   --  11.62*  --  15.53*  --  12.93*   HEMOGLOBIN g/dL 11.3*  11.3* 10.8* 11.1* 10.2*   < > 12.2  12.2   < > 13.0   PLATELETS 10*3/mm3 292  --   --  243  --  295  --  301    < > = values in this interval not displayed.     Results from last 7 days   Lab Units 06/15/24  0809 24  1712 24  3412  06/13/24  0810 06/12/24  2245 06/12/24  0932   SODIUM mmol/L 142  --  142 144  --  139   POTASSIUM mmol/L 3.7 4.1 3.4* 3.8   < > 3.4*   CHLORIDE mmol/L 107  --  110* 109*  --  104   CO2 mmol/L 22.8  --  23.1 23.9  --  23.0   BUN mg/dL 5*  --  7* 11  --  19   CREATININE mg/dL 0.85  --  0.77 0.98  --  1.04*   GLUCOSE mg/dL 89  --  90 159*  --  175*   EGFR mL/min/1.73 72.0  --  81.1 60.7  --  56.5*    < > = values in this interval not displayed.     Results from last 7 days   Lab Units 06/15/24  0809 06/14/24  0440 06/12/24  0932   ALBUMIN g/dL 3.3* 2.8* 3.8   BILIRUBIN mg/dL 0.3 0.3 0.4   ALK PHOS U/L 94 81 107   AST (SGOT) U/L 11 8 11   ALT (SGPT) U/L 7 <5 10     Results from last 7 days   Lab Units 06/15/24  0809 06/14/24  0440 06/13/24  0810 06/12/24  0932   CALCIUM mg/dL 9.0 8.8 9.5 9.7   ALBUMIN g/dL 3.3* 2.8*  --  3.8     Results from last 7 days   Lab Units 06/13/24  1510   LACTATE mmol/L 2.0     Hemoglobin A1C   Date/Time Value Ref Range Status   06/14/2024 0440 5.40 4.80 - 5.60 % Final       No radiology results for the last day    I have personally reviewed all medications:  Scheduled Medications  atenolol, 25 mg, Oral, Daily  cefTRIAXone, 2,000 mg, Intravenous, Q24H  cetirizine, 10 mg, Oral, Daily  escitalopram, 20 mg, Oral, Daily  levothyroxine, 50 mcg, Oral, Daily  NIFEdipine XL, 30 mg, Oral, Daily  rosuvastatin, 10 mg, Oral, Nightly  Scopolamine, 1 patch, Transdermal, Q72H  sodium chloride, 10 mL, Intravenous, Q12H  vancomycin, 125 mg, Oral, Q6H    Infusions  Pharmacy Consult,   sodium chloride, 100 mL/hr, Last Rate: 100 mL/hr (06/15/24 1120)    Diet  Diet: Regular/House; Fluid Consistency: Thin (IDDSI 0)    I have personally reviewed:  [x]  Laboratory   [x]  Microbiology   [x]  Radiology   [x]  EKG/Telemetry  [x]  Cardiology/Vascular   []  Pathology    []  Records       Assessment/Plan     Active Hospital Problems    Diagnosis  POA    **GI bleed [K92.2]  Yes    Colitis [K52.9]  Yes    UTI (urinary tract  infection) [N39.0]  Yes    Leukocytosis [D72.829]  Yes    Ureteral calculus [N20.1]  Unknown    Prediabetes [R73.03]  Yes    Essential hypertension [I10]  Yes    Acquired hypothyroidism [E03.9]  Yes    Mixed hyperlipidemia [E78.2]  Yes      Resolved Hospital Problems   No resolved problems to display.       74 y.o. female admitted with GI bleed.    Generalized abdominal pain  Nausea with vomiting  Colitis, C. Diff infection  GI bleeding  - Imaging noted, GI following, C. Diff testing positive, started on Vancomycin. No further notice of bleeding at present. Hemoglobin low but relatively stable. She remains on PO Vancomycin at present, tolerating well, symptoms improving. Will continue to follow GI plans/recommendations. Greatly appreciate their help.    UTI/Acute cystitis without hematuria  - UA suggestive of UTI, coupled with leukocytosis and urinary urgency and frequency. She is on Ceftriaxone for treatment, urine culture showing >100K E.Coli, pan-sensitive, blood cultures no growth to date. Tolerating treatment well, symptoms improving, continue as prescribed.     Leukocytosis  - WBC count elevated on prior labs, likely 2/2 infections as above. Values now improving with treatment of contributing etiologies, continue to monitor.   - Order repeat CBC in AM for reassessment.     Ureteral stone  Hydronephrosis  - CT noting Left proximal ureteral stone (horizontal level L5 vertebral body) with moderate left hydronephrosis, in addition to bilateral renal calyceal stones. She stated she required a stent placement by Urology in the past.   - Urology consulted, patient underwent further intervention. She is POD #1 s/p cystoscopy, retrograde pyelogram and stent insertion with urology on 06/14.   - Most recent Urology note reviewed, no further plans for intervention during this admission, they plan for outpatient uteroscopy and KUB after discharge, recommending antibiotics for UTI for 2 weeks. Greatly appreciate their  help.    Hypertension  - Blood pressure appears stable and acceptable acutely at this time. No indications to warrant acute changes/intervention at this time.  - Continue current medications as prescribed. Trend BP to guide ongoing management decisions.    Anemia  - Hemoglobin low on most recent labs.  - Order repeat CBC in AM for reassessment. Continue to monitor, transfuse for hemoglobin <7.    Hypokalemia  - K low on prior labs, repleted via electrolyte protocol. Follow up repeat labs to guide ongoing management decisions. Replete PRN per protocol. Continue to monitor    Hypothyroidism  - Continue levothyroxine as prescribed.    Hyperlipidemia  - Continue Statin as prescribed.      SCDs for DVT prophylaxis.  Full code.  Discussed with patient, nursing staff, and consulting provider.  Anticipate discharge  home  tomorrow.  Expected Discharge Date: 6/13/2024; Expected Discharge Time:       Jesus Ro DO  [facility]  06/15/24

## 2024-06-15 NOTE — ANESTHESIA PROCEDURE NOTES
Airway  Urgency: elective    Date/Time: 6/14/2024 8:37 PM  End Time:6/14/2024 8:40 PM  Airway not difficult    General Information and Staff    Patient location during procedure: OR  Anesthesiologist: Laxmi Calloway MD    Indications and Patient Condition  Indications for airway management: airway protection    Preoxygenated: yes  Mask difficulty assessment: 0 - not attempted    Final Airway Details  Final airway type: supraglottic airway      Successful airway: LMA  Size 4  Airway Seal Pressure (cm H2O): 20     Number of attempts at approach: 1  Assessment: lips, teeth, and gum same as pre-op and atraumatic intubation

## 2024-06-15 NOTE — PROGRESS NOTES
Gastroenterology   Inpatient Progress Note    Reason for Follow Up: Colitis    Subjective     Interval History:   Patient reports that her bowel movements are starting to become a little bit thicker in consistency but are not formed.  She reports her abdominal pain has improved and rates it as a 1-2/10.  She denies any blood in her stool today.  Tolerating diet without any nausea or vomiting.    Current Facility-Administered Medications:     acetaminophen (TYLENOL) tablet 650 mg, 650 mg, Oral, Q4H PRN **OR** acetaminophen (TYLENOL) 160 MG/5ML oral solution 650 mg, 650 mg, Oral, Q4H PRN **OR** acetaminophen (TYLENOL) suppository 650 mg, 650 mg, Rectal, Q4H PRN, Yahir Farai MD    atenolol (TENORMIN) tablet 25 mg, 25 mg, Oral, Daily, Yahir Faria MD, 25 mg at 06/15/24 0830    Calcium Replacement - Follow Nurse / BPA Driven Protocol, , Does not apply, PRBienvenido BARBER Christopher E, MD    cefTRIAXone (ROCEPHIN) 2,000 mg in sodium chloride 0.9 % 100 mL MBP, 2,000 mg, Intravenous, Q24H, Yahir Faria MD, Last Rate: 200 mL/hr at 06/14/24 1548, 2,000 mg at 06/14/24 1548    cetirizine (zyrTEC) tablet 10 mg, 10 mg, Oral, Daily, Yahir Faria MD, 10 mg at 06/15/24 0830    escitalopram (LEXAPRO) tablet 20 mg, 20 mg, Oral, Daily, Yahir Faria MD, 20 mg at 06/15/24 0830    levothyroxine (SYNTHROID, LEVOTHROID) tablet 50 mcg, 50 mcg, Oral, Daily, Yahir Faria MD, 50 mcg at 06/15/24 0822    Magnesium Standard Dose Replacement - Follow Nurse / BPA Driven Protocol, , Does not apply, Bienvenido PRITCHRAD Christopher E, MD    NIFEdipine XL (PROCARDIA XL) 24 hr tablet 30 mg, 30 mg, Oral, Daily, Yahir Faria MD, 30 mg at 06/15/24 0829    nitroglycerin (NITROSTAT) SL tablet 0.4 mg, 0.4 mg, Sublingual, Q5 Min PRN, Yahir Faria MD    ondansetron ODT (ZOFRAN-ODT) disintegrating tablet 4 mg, 4 mg, Oral, Q6H PRN **OR** ondansetron (ZOFRAN) injection 4 mg, 4 mg, Intravenous, Q6H PRN,  Yahir Faria MD, 4 mg at 06/14/24 2055    Pharmacy Consult, , Does not apply, Continuous Bienvenido PRITCHARD Christopher E, MD    Phosphorus Replacement - Follow Nurse / BPA Driven Protocol, , Does not apply, Bienvenido PRITCHARD Christopher E, MD    Potassium Replacement - Follow Nurse / BPA Driven Protocol, , Does not apply, Bienvenido PRITCHARD Christopher E, MD    rosuvastatin (CRESTOR) tablet 10 mg, 10 mg, Oral, Nightly, Yahir Faria MD, 10 mg at 06/14/24 2232    scopolamine patch 1 mg/72 hr, 1 patch, Transdermal, Q72H, Yahir Faria MD, 1 patch at 06/14/24 2025    [COMPLETED] Insert Peripheral IV, , , Once **AND** sodium chloride 0.9 % flush 10 mL, 10 mL, Intravenous, PRN, Yahir Faria MD    sodium chloride 0.9 % flush 10 mL, 10 mL, Intravenous, Q12H, Yahir Faria MD, 10 mL at 06/15/24 0830    sodium chloride 0.9 % flush 10 mL, 10 mL, Intravenous, PRN, Yahir Faria MD    sodium chloride 0.9 % infusion 40 mL, 40 mL, Intravenous, PRN, Yahir Faria MD    sodium chloride 0.9 % infusion, 100 mL/hr, Intravenous, Continuous, Yahir Faria MD, Last Rate: 100 mL/hr at 06/15/24 1120, 100 mL/hr at 06/15/24 1120    vancomycin (VANCOCIN) capsule 125 mg, 125 mg, Oral, Q6H, Yahir Faria MD, 125 mg at 06/15/24 1119  Review of Systems:    All systems were reviewed and negative except for:  Gastrointestinal: positive for  diarrhea and pain    Objective     Vital Signs  Temp:  [97.5 °F (36.4 °C)-99.1 °F (37.3 °C)] 97.5 °F (36.4 °C)  Heart Rate:  [61-78] 76  Resp:  [16-18] 16  BP: (116-134)/(56-84) 134/84  Body mass index is 27.67 kg/m².    Intake/Output Summary (Last 24 hours) at 6/15/2024 1123  Last data filed at 6/15/2024 1120  Gross per 24 hour   Intake 1053.33 ml   Output --   Net 1053.33 ml     I/O this shift:  In: 653.3 [I.V.:653.3]  Out: -      Physical Exam:   General: patient awake, alert and cooperative   Eyes: no scleral icterus   Skin: warm and dry, not  jaundiced   Abdomen: soft, nontender, nondistended; normal bowel sounds, no masses palpated, no periumbical lymphadenopathy   Psychiatric: Appropriate affect and behavior     Results Review:     I reviewed the patient's new clinical results.  I reviewed the patient's new imaging results and agree with the interpretation.  I reviewed the patient's other test results and agree with the interpretation    Results from last 7 days   Lab Units 06/15/24  0809 06/15/24  0003 06/14/24 1712 06/14/24  0440 06/13/24  1557 06/13/24  0810   WBC 10*3/mm3 9.46  --   --  11.62*  --  15.53*   HEMOGLOBIN g/dL 11.3*  11.3* 10.8* 11.1* 10.2*   < > 12.2  12.2   HEMATOCRIT % 33.6*  33.6* 32.6* 32.7* 31.1*   < > 37.2  37.2   PLATELETS 10*3/mm3 292  --   --  243  --  295    < > = values in this interval not displayed.     Results from last 7 days   Lab Units 06/15/24  0809 06/14/24  1712 06/14/24  0440 06/13/24  0810 06/12/24  2245 06/12/24  0932   SODIUM mmol/L 142  --  142 144  --  139   POTASSIUM mmol/L 3.7 4.1 3.4* 3.8   < > 3.4*   CHLORIDE mmol/L 107  --  110* 109*  --  104   CO2 mmol/L 22.8  --  23.1 23.9  --  23.0   BUN mg/dL 5*  --  7* 11  --  19   CREATININE mg/dL 0.85  --  0.77 0.98  --  1.04*   CALCIUM mg/dL 9.0  --  8.8 9.5  --  9.7   BILIRUBIN mg/dL 0.3  --  0.3  --   --  0.4   ALK PHOS U/L 94  --  81  --   --  107   ALT (SGPT) U/L 7  --  <5  --   --  10   AST (SGOT) U/L 11  --  8  --   --  11   GLUCOSE mg/dL 89  --  90 159*  --  175*    < > = values in this interval not displayed.     Results from last 7 days   Lab Units 06/12/24  0932   INR  1.13*     Lab Results   Lab Value Date/Time    LIPASE 18 06/12/2024 0932    LIPASE 22 03/21/2024 0820       Radiology:  FL C Arm During Surgery   Final Result      CT Abdomen Pelvis Without Contrast   Final Result   1. Left proximal ureteral stone (horizontal level L5 vertebral body)   with moderate left hydronephrosis.   2. Acute colitis involving the right colon greatest extending  from the   hepatic flexure to the mid transverse colon where there is pericolonic   inflammation and abnormal wall thickening. This needs follow-up to   resolution.   3. In addition to the left proximal ureteral stone, there are   nonobstructing bilateral renal calyceal stones.       Radiation dose reduction techniques were utilized, including automated   exposure control and exposure modulation based on body size.           This report was finalized on 6/12/2024 12:54 PM by Dr. Ralph Camp M.D on Workstation: [id number]              Assessment & Plan     Active Hospital Problems    Diagnosis     **GI bleed     Colitis     UTI (urinary tract infection)     Leukocytosis     Ureteral calculus     Prediabetes     Essential hypertension     Acquired hypothyroidism     Mixed hyperlipidemia        Assessment:  Right-sided colitis-C. difficile toxin positive  Nausea, vomiting, diarrhea  Hematochezia with normal H/H  Leukocytosis-resolved  Left ureteral stone with moderate left hydronephrosis    These problems are new to me.  Plan:  Continue oral vancomycin for C. Difficile  Continue to monitor lab work and trend H/H-no further blood in stool per patient  Advance diet as tolerated  We will plan for colonoscopy in the outpatient setting-date to be determined based upon symptoms and clinical course  Patient to continue vancomycin for a total of a 10-day course of therapy.  Recommend follow-up with main [facility] group on outpatient basis in 2 to 3 weeks for hospital follow-up.  Patient will need outpatient colonoscopy at date to be determined based upon response to vancomycin.  GI will sign off for now.  As always thank you for allowing us to participate in the care of your patient.  If we can be of further assistance please do not hesitate to reach out to us.    I discussed the patients findings and my recommendations with patient and nursing staff.    ESTHER Hurst APRN [facility] [address] [address]  Office: [phone]

## 2024-06-15 NOTE — ANESTHESIA POSTPROCEDURE EVALUATION
Patient: Anastasiia Faria    Procedure Summary       Date: 06/14/24 Room / Location: [facility] OR  / [facility] MAIN OR    Anesthesia Start: 2032 Anesthesia Stop: 2104    Procedure: CYSTOSCOPY RETROGRADE PYELOGRAM AND STENT INSERTION (Left) Diagnosis:       Ureteral calculus      Urinary tract infection without hematuria, site unspecified      (Ureteral calculus [N20.1])      (Urinary tract infection without hematuria, site unspecified [N39.0])    Surgeons: Yahir Faria MD Provider: Laxmi Calloway MD    Anesthesia Type: general ASA Status: 3            Anesthesia Type: general    Vitals  Vitals Value Taken Time   /62 06/14/24 2130   Temp 36.8 °C (98.3 °F) 06/14/24 2100   Pulse 64 06/14/24 2137   Resp 16 06/14/24 2115   SpO2 95 % 06/14/24 2137   Vitals shown include unfiled device data.        Post Anesthesia Care and Evaluation    Patient location during evaluation: bedside  Pain management: adequate    Airway patency: patent  Anesthetic complications: No anesthetic complications    Cardiovascular status: acceptable  Respiratory status: acceptable  Hydration status: acceptable

## 2024-06-16 ENCOUNTER — READMISSION MANAGEMENT (OUTPATIENT)
Dept: CALL CENTER | Facility: HOSPITAL | Age: 74
End: 2024-06-16
Payer: MEDICARE

## 2024-06-16 VITALS
TEMPERATURE: 98.4 F | BODY MASS INDEX: 27.58 KG/M2 | RESPIRATION RATE: 16 BRPM | HEIGHT: 68 IN | SYSTOLIC BLOOD PRESSURE: 148 MMHG | OXYGEN SATURATION: 96 % | HEART RATE: 75 BPM | WEIGHT: 182 LBS | DIASTOLIC BLOOD PRESSURE: 78 MMHG

## 2024-06-16 LAB
ALBUMIN SERPL-MCNC: 3.2 G/DL (ref 3.5–5.2)
ALBUMIN/GLOB SERPL: 1.5 G/DL
ALP SERPL-CCNC: 84 U/L (ref 39–117)
ALT SERPL W P-5'-P-CCNC: 7 U/L (ref 1–33)
ANION GAP SERPL CALCULATED.3IONS-SCNC: 9.2 MMOL/L (ref 5–15)
AST SERPL-CCNC: 9 U/L (ref 1–32)
BASOPHILS # BLD AUTO: 0.03 10*3/MM3 (ref 0–0.2)
BASOPHILS NFR BLD AUTO: 0.4 % (ref 0–1.5)
BILIRUB SERPL-MCNC: <0.2 MG/DL (ref 0–1.2)
BUN SERPL-MCNC: 4 MG/DL (ref 8–23)
BUN/CREAT SERPL: 5.3 (ref 7–25)
CALCIUM SPEC-SCNC: 8.9 MG/DL (ref 8.6–10.5)
CHLORIDE SERPL-SCNC: 108 MMOL/L (ref 98–107)
CO2 SERPL-SCNC: 25.8 MMOL/L (ref 22–29)
CREAT SERPL-MCNC: 0.75 MG/DL (ref 0.57–1)
DEPRECATED RDW RBC AUTO: 42.2 FL (ref 37–54)
EGFRCR SERPLBLD CKD-EPI 2021: 83.7 ML/MIN/1.73
EOSINOPHIL # BLD AUTO: 0.72 10*3/MM3 (ref 0–0.4)
EOSINOPHIL NFR BLD AUTO: 9.5 % (ref 0.3–6.2)
ERYTHROCYTE [DISTWIDTH] IN BLOOD BY AUTOMATED COUNT: 12.6 % (ref 12.3–15.4)
GLOBULIN UR ELPH-MCNC: 2.2 GM/DL
GLUCOSE SERPL-MCNC: 94 MG/DL (ref 65–99)
HCT VFR BLD AUTO: 34.4 % (ref 34–46.6)
HGB BLD-MCNC: 11 G/DL (ref 12–15.9)
IMM GRANULOCYTES # BLD AUTO: 0.09 10*3/MM3 (ref 0–0.05)
IMM GRANULOCYTES NFR BLD AUTO: 1.2 % (ref 0–0.5)
LYMPHOCYTES # BLD AUTO: 1.47 10*3/MM3 (ref 0.7–3.1)
LYMPHOCYTES NFR BLD AUTO: 19.3 % (ref 19.6–45.3)
MCH RBC QN AUTO: 29.7 PG (ref 26.6–33)
MCHC RBC AUTO-ENTMCNC: 32 G/DL (ref 31.5–35.7)
MCV RBC AUTO: 93 FL (ref 79–97)
MONOCYTES # BLD AUTO: 0.75 10*3/MM3 (ref 0.1–0.9)
MONOCYTES NFR BLD AUTO: 9.9 % (ref 5–12)
NEUTROPHILS NFR BLD AUTO: 4.54 10*3/MM3 (ref 1.7–7)
NEUTROPHILS NFR BLD AUTO: 59.7 % (ref 42.7–76)
NRBC BLD AUTO-RTO: 0 /100 WBC (ref 0–0.2)
PLATELET # BLD AUTO: 315 10*3/MM3 (ref 140–450)
PMV BLD AUTO: 8.9 FL (ref 6–12)
POTASSIUM SERPL-SCNC: 3.6 MMOL/L (ref 3.5–5.2)
PROT SERPL-MCNC: 5.4 G/DL (ref 6–8.5)
RBC # BLD AUTO: 3.7 10*6/MM3 (ref 3.77–5.28)
SODIUM SERPL-SCNC: 143 MMOL/L (ref 136–145)
WBC NRBC COR # BLD AUTO: 7.6 10*3/MM3 (ref 3.4–10.8)

## 2024-06-16 PROCEDURE — 85025 COMPLETE CBC W/AUTO DIFF WBC: CPT | Performed by: UROLOGY

## 2024-06-16 PROCEDURE — 80053 COMPREHEN METABOLIC PANEL: CPT | Performed by: UROLOGY

## 2024-06-16 PROCEDURE — 25810000003 SODIUM CHLORIDE 0.9 % SOLUTION: Performed by: UROLOGY

## 2024-06-16 RX ORDER — VANCOMYCIN HYDROCHLORIDE 125 MG/1
125 CAPSULE ORAL EVERY 6 HOURS SCHEDULED
Qty: 25 CAPSULE | Refills: 0 | Status: SHIPPED | OUTPATIENT
Start: 2024-06-16 | End: 2024-06-23

## 2024-06-16 RX ORDER — CEFDINIR 300 MG/1
300 CAPSULE ORAL 2 TIMES DAILY
Qty: 22 CAPSULE | Refills: 0 | Status: SHIPPED | OUTPATIENT
Start: 2024-06-16 | End: 2024-06-16

## 2024-06-16 RX ORDER — VANCOMYCIN HYDROCHLORIDE 125 MG/1
125 CAPSULE ORAL EVERY 6 HOURS SCHEDULED
Qty: 25 CAPSULE | Refills: 0 | Status: SHIPPED | OUTPATIENT
Start: 2024-06-16 | End: 2024-06-16

## 2024-06-16 RX ORDER — CEFDINIR 300 MG/1
300 CAPSULE ORAL 2 TIMES DAILY
Qty: 22 CAPSULE | Refills: 0 | Status: SHIPPED | OUTPATIENT
Start: 2024-06-16 | End: 2024-06-27

## 2024-06-16 RX ADMIN — Medication 10 ML: at 09:39

## 2024-06-16 RX ADMIN — SODIUM CHLORIDE 100 ML/HR: 9 INJECTION, SOLUTION INTRAVENOUS at 07:02

## 2024-06-16 RX ADMIN — VANCOMYCIN HYDROCHLORIDE 125 MG: 125 CAPSULE ORAL at 00:16

## 2024-06-16 RX ADMIN — VANCOMYCIN HYDROCHLORIDE 125 MG: 125 CAPSULE ORAL at 11:16

## 2024-06-16 RX ADMIN — ESCITALOPRAM 20 MG: 20 TABLET, FILM COATED ORAL at 09:39

## 2024-06-16 RX ADMIN — VANCOMYCIN HYDROCHLORIDE 125 MG: 125 CAPSULE ORAL at 05:09

## 2024-06-16 RX ADMIN — LEVOTHYROXINE SODIUM 50 MCG: 50 TABLET ORAL at 09:39

## 2024-06-16 RX ADMIN — NIFEDIPINE 30 MG: 30 TABLET, FILM COATED, EXTENDED RELEASE ORAL at 09:39

## 2024-06-16 RX ADMIN — CETIRIZINE HYDROCHLORIDE 10 MG: 10 TABLET ORAL at 09:39

## 2024-06-16 RX ADMIN — ATENOLOL 25 MG: 25 TABLET ORAL at 09:39

## 2024-06-16 NOTE — DISCHARGE SUMMARY
"    Patient Name: Anastasiia Faria  : 1950  MRN: [id number]    Date of Admission: 2024  Date of Discharge:  2024  Primary Care Physician: Mirza Scott Sr., MD      Chief Complaint:   Vomiting Blood      Discharge Diagnoses     Active Hospital Problems    Diagnosis  POA    **GI bleed [K92.2]  Yes    Colitis [K52.9]  Yes    UTI (urinary tract infection) [N39.0]  Yes    Leukocytosis [D72.829]  Yes    Ureteral calculus [N20.1]  Yes    Prediabetes [R73.03]  Yes    Essential hypertension [I10]  Yes    Acquired hypothyroidism [E03.9]  Yes    Mixed hyperlipidemia [E78.2]  Yes      Resolved Hospital Problems   No resolved problems to display.        Hospital Course     Ms. Faria is a 74 y.o. female who presented to [facility] initially complaining of abdominal pain, nausea, vomiting, rectal bleeding.  Please see the admitting history and physical for further details.  She was admitted to the hospital for further evaluation and treatment.     Generalized abdominal pain  Nausea with vomiting  Colitis, C. Diff infection  GI bleeding  - Imaging noted, GI consulted and followed, C. Diff testing positive, she was started on Vancomycin and continued this for remaining duration of hospital stay. Had great response to treatment, symptoms resolved by time of discharge. GI reassessed prior to discharge and cleared patient for discharge from their perspective, recommending continuation of Vancomycin as prescribed to complete 10 day antibiotic course. Additionally they note that \"Recommend follow-up with main [facility] group on outpatient basis in 2 to 3 weeks for hospital follow-up. Patient will need outpatient colonoscopy at date to be determined based upon response to vancomycin.\" Ambulatory referral placed to GI. Vancomycin ordered at time of discharge.    UTI/Acute cystitis without hematuria  - UA suggestive of UTI, coupled with leukocytosis and urinary urgency and frequency. She was treated with " Ceftriaxone, urine culture showing >100K E.Coli, pan-sensitive, blood cultures no growth to date. She tolerated treatments well during hospital stay. Will transition to oral antibiotics with Cefdinir, to be taken for 11 more days after discharge to complete 14 day antibiotic course per Urology recommendations.      Leukocytosis  - WBC count elevated on prior labs, likely 2/2 infections as above. Values subsequently improved with treatment of contributing etiologies.     Ureteral stone  Hydronephrosis  - CT noting Left proximal ureteral stone (horizontal level L5 vertebral body) with moderate left hydronephrosis, in addition to bilateral renal calyceal stones. She stated she required a stent placement by Urology in the past. Urology consulted, patient underwent further intervention. She is POD #2 s/p cystoscopy, retrograde pyelogram and stent insertion with urology on 06/14. Most recent Urology note reviewed, no further plans for intervention during this admission, they plan for outpatient uteroscopy and KUB after discharge, recommending antibiotics for UTI for 2 weeks. Follow up with Urology after discharge as scheduled.     Hypertension  - Blood pressure appears stable and acceptable acutely at this time. No indications to warrant acute changes/intervention at this time. Continue current medications as prescribed. Recommend that patient check her blood pressure 2-3 times daily and record those values in log book and take with her to next PCP visit to allow for greater insight into treatment efficacy to guide further management decisions. Recommend heart healthy/low sodium diet. Recommend close follow up with PCP within 1 week after discharge for reassessment to guide ongoing management decisions.    Anemia  - Hemoglobin low on most recent labs.  No further evidence of bleeding at this time, hemoglobin appears with stable from prior. Recommend repeat CBC with PCP within 1 week for reassessment.    Hypokalemia  - K  low on prior labs, repleted via electrolyte protocol. Recommend repeat BMP with PCP within 1 week for reassessment.    Hypothyroidism  - Continue levothyroxine as prescribed.     Hyperlipidemia  - Continue Statin as prescribed.    Day of Discharge     Subjective:  Patient seen and examined this morning.  Hospital day 4.  She is awake and resting comfortably in bed.  She states that she feels well today, abdominal symptoms have resolved, no acute complaints at present.  She has been cleared for discharge by consultants with plans for outpatient follow-up.    Physical Exam:  Temp:  [97.7 °F (36.5 °C)-99 °F (37.2 °C)] 98.4 °F (36.9 °C)  Heart Rate:  [67-69] 67  Resp:  [16] 16  BP: (120-148)/(62-78) 148/78  Body mass index is 27.67 kg/m².  Physical Exam  Vitals and nursing note reviewed.   Constitutional:       General: She is not in acute distress.  Cardiovascular:      Rate and Rhythm: Normal rate and regular rhythm.      Pulses: Normal pulses.      Heart sounds: Normal heart sounds.   Pulmonary:      Effort: Pulmonary effort is normal. No respiratory distress.      Breath sounds: Normal breath sounds.   Abdominal:      General: Bowel sounds are normal. There is no distension.      Palpations: Abdomen is soft.      Tenderness: There is no abdominal tenderness. There is no guarding or rebound.   Skin:     General: Skin is warm and dry.   Neurological:      Mental Status: She is alert.         Consultants     Consult Orders (all) (From admission, onward)       Start     Ordered    06/14/24 0935  Inpatient Urology Consult  Once        Specialty:  Urology  Provider:  Juanito Ramirez MD    06/14/24 0934    06/12/24 1129  [facility] (on-call MD unless specified) Details  Once        Specialty:  Hospitalist  Provider:  (Not yet assigned)    06/12/24 1128    06/12/24 1129  Gastroenterology (on-call MD unless specified)  Once        Specialty:  Gastroenterology  Provider:  Brian Leonardo MD    06/12/24 1128                   Procedures     CYSTOSCOPY RETROGRADE PYELOGRAM AND STENT INSERTION    Imaging Results (All)       Procedure Component Value Units Date/Time    FL C Arm During Surgery [[id number]] Resulted: 06/14/24 2058     Updated: 06/14/24 2058    Narrative:      This procedure was auto-finalized with no dictation required.    CT Abdomen Pelvis Without Contrast [[id number]] Collected: 06/12/24 1158     Updated: 06/12/24 1257    Narrative:      CT ABDOMEN PELVIS WO CONTRAST-     HISTORY: 74 years of age, Female.  epigastric pain;  K92.2-Gastrointestinal hemorrhage, unspecified; D72.829-Elevated white  blood cell count, unspecified; R10.9-Unspecified abdominal pain     TECHNIQUE:  CT includes axial imaging from the lung bases to the  trochanters without intravenous contrast and without use of oral  contrast. Data reconstructed in coronal and sagittal planes. Radiation  dose reduction techniques were utilized, including automated exposure  control and exposure modulation based on body size.     COMPARISON: CT abdomen and pelvis 03/21/2024     FINDINGS: There is mild basilar atelectasis. Moderate hiatal hernia is  present. Hepatic cysts without change. Gallbladder mostly decompressed.  Splenic size within normal limits. Adrenal glands, pancreas appear  within normal limits.     There is chronic bilateral renal atrophy. A left proximal ureteral stone  measures 3 mm diameter x 6 mm in length and there is moderate left  hydronephrosis. 1 mm left upper pole calyceal stone is present. There  are 4 nonobstructing right renal stones with the largest measuring 5 mm.  No right ureteral stone.     There is abnormal wall thickening with pericolonic  stranding/inflammation involving hepatic flexure of the colon and  proximal half of the transverse colon consistent with acute colitis.  There is also mild wall thickening involving the ascending colon without  adjacent inflammation. Multifocal colonic diverticulosis is present  without evidence  to suggest diverticulitis. There is no ascites.     Right total hip arthroplasty is present. Mild atherosclerotic  calcifications are present.       Impression:      1. Left proximal ureteral stone (horizontal level L5 vertebral body)  with moderate left hydronephrosis.  2. Acute colitis involving the right colon greatest extending from the  hepatic flexure to the mid transverse colon where there is pericolonic  inflammation and abnormal wall thickening. This needs follow-up to  resolution.  3. In addition to the left proximal ureteral stone, there are  nonobstructing bilateral renal calyceal stones.     Radiation dose reduction techniques were utilized, including automated  exposure control and exposure modulation based on body size.        This report was finalized on 6/12/2024 12:54 PM by Dr. Ralph Camp M.D on Workstation: [id number]               Results for orders placed during the hospital encounter of 03/21/24    Adult Transthoracic Echo Complete W/ Cont if Necessary Per Protocol    Interpretation Summary    Left ventricular systolic function is hyperdynamic (EF > 70%).    Left ventricular diastolic function was normal.    The right ventricular cavity is mildly dilated but normal systolic function.    Pertinent Labs     Results from last 7 days   Lab Units 06/16/24  0811 06/15/24  0809 06/15/24  0003 06/14/24  1712 06/14/24  0440 06/13/24  1557 06/13/24  0810   WBC 10*3/mm3 7.60 9.46  --   --  11.62*  --  15.53*   HEMOGLOBIN g/dL 11.0* 11.3*  11.3* 10.8* 11.1* 10.2*   < > 12.2  12.2   PLATELETS 10*3/mm3 315 292  --   --  243  --  295    < > = values in this interval not displayed.     Results from last 7 days   Lab Units 06/15/24  0809 06/14/24  1712 06/14/24  0440 06/13/24  0810 06/12/24  2245 06/12/24  0932   SODIUM mmol/L 142  --  142 144  --  139   POTASSIUM mmol/L 3.7 4.1 3.4* 3.8   < > 3.4*   CHLORIDE mmol/L 107  --  110* 109*  --  104   CO2 mmol/L 22.8  --  23.1 23.9  --  23.0   BUN mg/dL 5*   "--  7* 11  --  19   CREATININE mg/dL 0.85  --  0.77 0.98  --  1.04*   GLUCOSE mg/dL 89  --  90 159*  --  175*   EGFR mL/min/1.73 72.0  --  81.1 60.7  --  56.5*    < > = values in this interval not displayed.     Results from last 7 days   Lab Units 06/15/24  0809 06/14/24  0440 06/12/24  0932   ALBUMIN g/dL 3.3* 2.8* 3.8   BILIRUBIN mg/dL 0.3 0.3 0.4   ALK PHOS U/L 94 81 107   AST (SGOT) U/L 11 8 11   ALT (SGPT) U/L 7 <5 10     Results from last 7 days   Lab Units 06/15/24  0809 06/14/24  0440 06/13/24  0810 06/12/24  0932   CALCIUM mg/dL 9.0 8.8 9.5 9.7   ALBUMIN g/dL 3.3* 2.8*  --  3.8     Results from last 7 days   Lab Units 06/12/24  0932   LIPASE U/L 18             Invalid input(s): \"LDLCALC\"  Results from last 7 days   Lab Units 06/13/24  1557 06/13/24  1510 06/12/24  1427 06/12/24  1400 06/12/24  1356   BLOODCX  No growth at 2 days No growth at 2 days  --  No growth at 3 days No growth at 3 days   URINECX   --   --  >100,000 CFU/mL Escherichia coli*  --   --          Test Results Pending at Discharge     Pending Labs       Order Current Status    Comprehensive Metabolic Panel In process    Blood Culture - Blood, Arm, Left Preliminary result    Blood Culture - Blood, Arm, Left Preliminary result    Blood Culture - Blood, Arm, Right Preliminary result    Blood Culture - Blood, Arm, Right Preliminary result            Discharge Details        Discharge Medications        New Medications        Instructions Start Date   cefdinir 300 MG capsule  Commonly known as: OMNICEF   300 mg, Oral, 2 Times Daily      vancomycin 125 MG capsule  Commonly known as: VANCOCIN   125 mg, Oral, Every 6 Hours Scheduled             Changes to Medications        Instructions Start Date   aspirin 81 MG EC tablet  What changed:   how much to take  how to take this  when to take this  additional instructions   Take 1 tablet by mouth twice daily for 14 days; then take 1 daily for 28 days.             Continue These Medications        " Instructions Start Date   atenolol 25 MG tablet  Commonly known as: TENORMIN   25 mg, Oral, Daily      escitalopram 20 MG tablet  Commonly known as: LEXAPRO   20 mg, Oral, Daily      levothyroxine 50 MCG tablet  Commonly known as: SYNTHROID, LEVOTHROID   50 mcg, Oral, Daily      loratadine 10 MG tablet  Commonly known as: CLARITIN   1 tablet, Oral, Daily      METAMUCIL FIBER PO   1 Scoop, Oral, Daily      NIFEdipine XL 30 MG 24 hr tablet  Commonly known as: PROCARDIA XL   30 mg, Oral, Daily      rosuvastatin 10 MG tablet  Commonly known as: CRESTOR   10 mg, Oral, Nightly               No Known Allergies    Discharge Disposition:  Home or Self Care      Discharge Diet:  Diet Order   Procedures    Diet: Regular/House; Fluid Consistency: Thin (IDDSI 0)       Discharge Activity:   Activity Instructions       Activity as Tolerated              CODE STATUS:    Code Status and Medical Interventions:   Ordered at: 06/12/24 1327     Code Status (Patient has no pulse and is not breathing):    CPR (Attempt to Resuscitate)     Medical Interventions (Patient has pulse or is breathing):    Full Support       Future Appointments   Date Time Provider Department Center   7/1/2024  1:00 PM Dhiraj Gallegos MD [facility] [place]   7/16/2024  9:40 AM Karan Rebolledo APRN [facility] [address] [facility]   8/8/2024 11:00 AM REFERRED INJECTION CHAIR [facility]     Additional Instructions for the Follow-ups that You Need to Schedule       Discharge Follow-up with PCP   As directed       Currently Documented PCP:    Mirza Scott Sr., MD    PCP Phone Number:    [phone]     Follow Up Details: Within 1 week after discharge for reassessment, medication symptom review, blood pressure check, BMP and CBC        Discharge Follow-up with Specialty: Gastroenterology, urology   As directed      Specialty: Gastroenterology, urology   Follow Up Details: Within 1 week after discharge or as scheduled.  Please call their office to ensure follow-up appointment  is made.               Follow-up Information       Mirza Scott Sr., MD .    Specialties: Family Medicine, Urgent Care  Why: Within 1 week after discharge for reassessment, medication symptom review, blood pressure check, BMP and CBC  Contact information:  [address]  [address] [place] [address]  [phone]                             Additional Instructions for the Follow-ups that You Need to Schedule       Discharge Follow-up with PCP   As directed       Currently Documented PCP:    Mirza Scott Sr., MD    PCP Phone Number:    [phone]     Follow Up Details: Within 1 week after discharge for reassessment, medication symptom review, blood pressure check, BMP and CBC        Discharge Follow-up with Specialty: Gastroenterology, urology   As directed      Specialty: Gastroenterology, urology   Follow Up Details: Within 1 week after discharge or as scheduled.  Please call their office to ensure follow-up appointment is made.            Time Spent on Discharge:  Greater than 30 minutes      Jesus Ro DO  [facility]  06/16/24  08:45 EDT

## 2024-06-16 NOTE — PLAN OF CARE
Goal Outcome Evaluation:  Plan of Care Reviewed With: patient        Progress: improving  Pt discharging home with  today.

## 2024-06-16 NOTE — PLAN OF CARE
Goal Outcome Evaluation:  Plan of Care Reviewed With: patient        Progress: improving  Outcome Evaluation: Pt A&Ox4, RA, NSR on monitor, no pain and SOA reported, denied nausea and vomiting, and diarrhea, vss, possible dc today.

## 2024-06-16 NOTE — OUTREACH NOTE
Prep Survey      Flowsheet Row Responses   [facility] [place] patient discharged from? [place]   Is LACE score < 7 ? No   Eligibility [facility]   Date of Admission 06/12/24   Date of Discharge 06/16/24   Discharge Disposition Home or Self Care   Discharge diagnosis GI bleed   Does the patient have one of the following disease processes/diagnoses(primary or secondary)? Other   Does the patient have Home health ordered? No   Is there a DME ordered? No   Prep survey completed? Yes            Flaca KING - Registered Nurse

## 2024-06-17 ENCOUNTER — TRANSITIONAL CARE MANAGEMENT TELEPHONE ENCOUNTER (OUTPATIENT)
Dept: CALL CENTER | Facility: HOSPITAL | Age: 74
End: 2024-06-17
Payer: MEDICARE

## 2024-06-17 LAB
BACTERIA SPEC AEROBE CULT: NORMAL
BACTERIA SPEC AEROBE CULT: NORMAL

## 2024-06-17 NOTE — OUTREACH NOTE
Call Center TCM Note      Flowsheet Row Responses   [facility] patient discharged from? [place]   Does the patient have one of the following disease processes/diagnoses(primary or secondary)? Other   TCM attempt successful? Yes  [VR from 2018 lists Garth, spouse]   Call start time 1009   Call end time 1014   Discharge diagnosis GI bleed   Meds reviewed with patient/caregiver? Yes   Is the patient having any side effects they believe may be caused by any medication additions or changes? No   Does the patient have all medications ordered at discharge? Yes   Is the patient taking all medications as directed (includes completed medication regime)? Yes   Medication comments Taking Vanco and cednfir as ordered   Comments Hospital f/u 6/21/24@1000am   Does the patient have an appointment with their PCP within 7-14 days of discharge? Yes   Has home health visited the patient within 72 hours of discharge? N/A   Psychosocial issues? No   Did the patient receive a copy of their discharge instructions? Yes   Nursing interventions Reviewed instructions with patient   What is the patient's perception of their health status since discharge? Same  [Denies diarrhea and ongoing s/s bleeding. Reports urinating frequently but r/t stent, no hematuria and afebrile. Encouraged fluids. Pt has I.S. for use.  Encouraged BP checks per AVS.  No immediate needs or concerns today.]   Is the patient/caregiver able to teach back signs and symptoms related to disease process for when to call PCP? Yes   Is the patient/caregiver able to teach back signs and symptoms related to disease process for when to call 911? Yes   TCM call completed? Yes   Call end time 1014            Shu Fontana RN    6/17/2024, 10:19 EDT

## 2024-06-17 NOTE — PROGRESS NOTES
Case Management Discharge Note      Final Note: DC'd home 6/16    Provided Post Acute Provider List?: N/A  N/A Provider List Comment: denies any discharge needs  Provided Post Acute Provider Quality & Resource List?: N/A  N/A Quality & Resource List Comment: denies any discharge needs            Selected Continued Care - Prior Encounters Includes continued care and service providers with selected services from prior encounters from 3/14/2024 to 6/16/2024      Discharged on 5/14/2024 Admission date: 5/13/2024 - Discharge disposition: Home or Self Care      Home Medical Care       Service Provider Selected Services Address Phone Fax Patient Preferred     [facility] [address] [address] [phone] --                          Transportation Services  Private: Car    Final Discharge Disposition Code: 01 - home or self-care

## 2024-06-18 LAB
BACTERIA SPEC AEROBE CULT: NORMAL
BACTERIA SPEC AEROBE CULT: NORMAL

## 2024-06-21 ENCOUNTER — OFFICE VISIT (OUTPATIENT)
Dept: FAMILY MEDICINE CLINIC | Facility: CLINIC | Age: 74
End: 2024-06-21
Payer: MEDICARE

## 2024-06-21 VITALS
HEART RATE: 67 BPM | BODY MASS INDEX: 27.28 KG/M2 | SYSTOLIC BLOOD PRESSURE: 120 MMHG | TEMPERATURE: 97.7 F | HEIGHT: 68 IN | OXYGEN SATURATION: 96 % | DIASTOLIC BLOOD PRESSURE: 60 MMHG | WEIGHT: 180 LBS | RESPIRATION RATE: 18 BRPM

## 2024-06-21 DIAGNOSIS — N20.0 RENAL STONE: ICD-10-CM

## 2024-06-21 DIAGNOSIS — K52.9 COLITIS: ICD-10-CM

## 2024-06-21 DIAGNOSIS — A41.9 SEPSIS, DUE TO UNSPECIFIED ORGANISM, UNSPECIFIED WHETHER ACUTE ORGAN DYSFUNCTION PRESENT: Primary | ICD-10-CM

## 2024-06-21 DIAGNOSIS — N39.0 URINARY TRACT INFECTION WITHOUT HEMATURIA, SITE UNSPECIFIED: ICD-10-CM

## 2024-06-21 PROCEDURE — 1126F AMNT PAIN NOTED NONE PRSNT: CPT | Performed by: FAMILY MEDICINE

## 2024-06-21 PROCEDURE — 3074F SYST BP LT 130 MM HG: CPT | Performed by: FAMILY MEDICINE

## 2024-06-21 PROCEDURE — 3078F DIAST BP <80 MM HG: CPT | Performed by: FAMILY MEDICINE

## 2024-06-21 PROCEDURE — 99495 TRANSJ CARE MGMT MOD F2F 14D: CPT | Performed by: FAMILY MEDICINE

## 2024-06-21 PROCEDURE — 1111F DSCHRG MED/CURRENT MED MERGE: CPT | Performed by: FAMILY MEDICINE

## 2024-06-21 NOTE — PROGRESS NOTES
Chief Complaint  Chief Complaint   Patient presents with    Hospital Follow Up Visit     Pt here for f/u of hip surgery on 5/13 and c. Diff and kidney stones        Transitional Care Progress Note        Subjective    History of Present Illness        Anastasiia Faria is a 74 y.o. female who presents for a transitional care management visit.    Within 48 business hours after discharge our office contacted her via telephone to coordinate her care and needs.      I reviewed and discussed the details of that call along with the discharge summary, hospital problems, inpatient lab results, inpatient diagnostic studies, and consultation reports with Anastasiia.     Current outpatient and discharge medications have been reconciled for the patient.  Reviewed by: Mirza Scott Sr., MD          6/16/2024     2:44 PM   Date of TCM Phone Call   [facility]   Date of Admission 6/12/2024   Date of Discharge 6/16/2024   Discharge Disposition Home or Self Care     Risk for Readmission (LACE) No data recorded      Anastasiia Faria presents to [facility] for   History of Present Illness  The patient presents for evaluation of multiple medical concerns.    The patient was evaluated in 04/2023 for a hospital follow-up for sepsis and kidney stones. During her last visit, she received injections and additional antibiotics, which effectively resolved her condition. In 05/2023, she underwent a right hip replacement and was referred to Dr. King. Prior to her follow-up with Dr. King, she suffered from colitis, C. difficile, kidney stones, and UTI. She has a stent in place between her kidney and bladder. She has experienced three hospital admissions in the past 4 months, the cause of which is unclear to her. She believes her C. difficile infection was a result of the antibiotics she was prescribed during her hospital stay. Post-surgery, she was prescribed one antibiotic tablet. Currently, she reports  "feeling better, although the stent effects have been causing her increased urinary frequency and urgency. She is currently completing her course of vancomycin and Omnicef.    The patient expresses a desire to have her incision examined from her right hip surgery. She reports some swelling and a pulling sensation during ambulation. She has discontinued pain medication, except for occasional Tylenol. She discontinued hydrocodone some time ago. She applies ice for pain and reports no pain. She completed a follow-up two weeks post-surgery and has another follow-up scheduled in a few weeks. She took Tylenol last night due to increased ambulation. She has completed home physical therapy and continues to perform her exercises. She believes she will be able to ambulate without the assistance of a cane.     History of Present Illness      Objective   Vital Signs:   Visit Vitals  /60   Pulse 67   Temp 97.7 °F (36.5 °C)   Resp 18   Ht 172.7 cm (68\")   Wt 81.6 kg (180 lb)   LMP  (LMP Unknown)   SpO2 96%   BMI 27.37 kg/m²             Physical Exam  Vitals reviewed.   Constitutional:       Appearance: She is well-developed.   HENT:      Head: Normocephalic.      Right Ear: External ear normal.      Left Ear: External ear normal.      Nose: Nose normal.   Eyes:      Conjunctiva/sclera: Conjunctivae normal.   Cardiovascular:      Rate and Rhythm: Normal rate and regular rhythm.   Pulmonary:      Effort: Pulmonary effort is normal.      Breath sounds: Normal breath sounds.   Musculoskeletal:         General: Normal range of motion.      Cervical back: Normal range of motion and neck supple.   Skin:     General: Skin is warm and dry.      Capillary Refill: Capillary refill takes less than 2 seconds.   Neurological:      Mental Status: She is alert and oriented to person, place, and time.        Physical Exam          Result Review :           Results               Assessment and Plan      Diagnoses and all orders for this " visit:    1. Sepsis, due to unspecified organism, unspecified whether acute organ dysfunction present (Primary)  Assessment & Plan:  Hospital records reviewed.  Patient's symptoms have improved since hospitalization.      2. Colitis  Assessment & Plan:  Patient's symptoms have improved since hospitalization.  No new treatment at this time.      3. Urinary tract infection without hematuria, site unspecified  Assessment & Plan:  Patient symptoms are improved.        4. Renal stone  Assessment & Plan:  Patient is being managed by urology         Assessment & Plan          Follow Up   No follow-ups on file.  Patient was given instructions and counseling regarding her condition or for health maintenance advice. Please see specific information pulled into the AVS if appropriate.

## 2024-06-26 ENCOUNTER — READMISSION MANAGEMENT (OUTPATIENT)
Dept: CALL CENTER | Facility: HOSPITAL | Age: 74
End: 2024-06-26
Payer: MEDICARE

## 2024-06-26 NOTE — OUTREACH NOTE
Medical Week 2 Survey      Flowsheet Row Responses   [facility] patient discharged from? [place]   Does the patient have one of the following disease processes/diagnoses(primary or secondary)? Other   Week 2 attempt successful? Yes   Call start time 1211   Discharge diagnosis GI bleed   Call end time 1215   Meds reviewed with patient/caregiver? Yes   Is the patient having any side effects they believe may be caused by any medication additions or changes? No   Does the patient have all medications ordered at discharge? Yes   Is the patient taking all medications as directed (includes completed medication regime)? Yes   Medication comments Finished Vancomycin and last day tomorrow for cefdinir.   Does the patient have a primary care provider?  Yes   Has the patient kept scheduled appointments due by today? Yes   Psychosocial issues? No   Did the patient receive a copy of their discharge instructions? Yes   Nursing interventions Reviewed instructions with patient   What is the patient's perception of their health status since discharge? Improving   Is the patient/caregiver able to teach back signs and symptoms related to disease process for when to call PCP? Yes   Is the patient/caregiver able to teach back signs and symptoms related to disease process for when to call 911? Yes   Is the patient/caregiver able to teach back the hierarchy of who to call/visit for symptoms/problems? PCP, Specialist, Home health nurse, Urgent Care, ED, 911 Yes   Additional teach back comments States the stent is still in place and some discomfort for that.  Still have pink tinged urine.  Denies any rectal bleeding at this time.  Had f/u with PCP and Urology.  Has appt with GI.   Week 2 Call Completed? Yes   Wrap up additional comments No questions or needs at this time.   Call end time 1215            Jane WEST - Licensed Nurse

## 2024-07-01 ENCOUNTER — OFFICE VISIT (OUTPATIENT)
Dept: OBSTETRICS AND GYNECOLOGY | Facility: CLINIC | Age: 74
End: 2024-07-01
Payer: MEDICARE

## 2024-07-01 VITALS
WEIGHT: 177 LBS | SYSTOLIC BLOOD PRESSURE: 137 MMHG | DIASTOLIC BLOOD PRESSURE: 79 MMHG | HEART RATE: 69 BPM | BODY MASS INDEX: 26.91 KG/M2

## 2024-07-01 DIAGNOSIS — N75.0 BARTHOLIN'S CYST: Primary | ICD-10-CM

## 2024-07-01 PROCEDURE — 1160F RVW MEDS BY RX/DR IN RCRD: CPT | Performed by: OBSTETRICS & GYNECOLOGY

## 2024-07-01 PROCEDURE — 3075F SYST BP GE 130 - 139MM HG: CPT | Performed by: OBSTETRICS & GYNECOLOGY

## 2024-07-01 PROCEDURE — 1159F MED LIST DOCD IN RCRD: CPT | Performed by: OBSTETRICS & GYNECOLOGY

## 2024-07-01 PROCEDURE — 3078F DIAST BP <80 MM HG: CPT | Performed by: OBSTETRICS & GYNECOLOGY

## 2024-07-01 PROCEDURE — 99213 OFFICE O/P EST LOW 20 MIN: CPT | Performed by: OBSTETRICS & GYNECOLOGY

## 2024-07-01 NOTE — PROGRESS NOTES
REASON FOR FOLLOWUP/CHIEF COMPLAINT: Bartholin cyst     HISTORY OF PRESENT ILLNESS: Patient is here after having had a CT scan with her urology group that showed a left Bartholin gland cystic structure.  It measured 2.8 x 2.3 cm.  Patient states that she has had history of Bartholin gland cyst and may be an abscess back in the early 90s for which sheshe had office drainage.  Does feel it at times with certain sitting positions but is not overly bothered by the cyst and does not have any significant discomfort.  Additionally, the patient discusses her history of being on Prolia now for 6 to 7 years.  I reviewed her last several DEXA studies and they all fit in the range of osteopenia.  She wonders how long she needs to be on the medication and I told her that I thought she would be a good candidate to come off of the medication given her current values.  She is in agreement with that plan and does not wish to move toward a bisphosphonate as she was unable to tolerate Fosamax in the past.      Past Medical History, Past Surgical History, Social History, Family History have been reviewed and are without significant changes except as mentioned.    Review of Systems   Review of Systems    Medications:  The current medication list was reviewed in the EMR    ALLERGIES:  No Known Allergies           Vitals:    07/01/24 1255   BP: 137/79   Pulse: 69   Weight: 80.3 kg (177 lb)     Physical Exam    CONSTITUTIONAL:  Vital signs reviewed.  No distress, looks comfortable.  PELVIC: Normal external genitalia and vaginal sidewalls.  Deep on the left side is a soft nontender compressible Bartholin gland cyst measuring about 3 cm in diameter.  PSYCHIATRIC:  Normal judgment and insight.  Normal mood and affect.       RECENT LABS:        ASSESSMENT/PLAN:  Anastasiia Faria [unfilled]   1.  Benign Bartholin gland cyst.  Recommend continued close observation.  I did discuss what would be entailed with a marsupialization procedure but do  not feel that that is necessary at this visit.  2.  Osteopenia, on Prolia for 6 to 7 years.  Will recommend coming off the Prolia at this point and will repeat DEXA study in December 2025.

## 2024-07-16 ENCOUNTER — OFFICE VISIT (OUTPATIENT)
Dept: ORTHOPEDIC SURGERY | Facility: CLINIC | Age: 74
End: 2024-07-16
Payer: MEDICARE

## 2024-07-16 VITALS — HEIGHT: 68 IN | WEIGHT: 177 LBS | BODY MASS INDEX: 26.83 KG/M2 | TEMPERATURE: 98.6 F

## 2024-07-16 DIAGNOSIS — Z96.641 STATUS POST TOTAL HIP REPLACEMENT, RIGHT: Primary | ICD-10-CM

## 2024-07-16 PROCEDURE — 99024 POSTOP FOLLOW-UP VISIT: CPT | Performed by: NURSE PRACTITIONER

## 2024-07-16 PROCEDURE — 73502 X-RAY EXAM HIP UNI 2-3 VIEWS: CPT | Performed by: NURSE PRACTITIONER

## 2024-07-16 NOTE — PROGRESS NOTES
Anastasiia Faria : 1950 MRN: [id number] DATE: 2024    DIAGNOSIS: 8 week follow up right total hip Anterior Approach    SUBJECTIVE:Patient returns today for 8 week follow up of right total hip replacement. Patient reports doing well with no unusual complaints.  Patient reports that her pain level is very minimal.  Currently rates it as a 0 out of 10.  Appears to be progressing appropriately and is off a cane.  Reports that she is just doing home exercises at this time.  Denies any significant limitations or any instability issues from surgery.  Denies any signs or symptoms of infection, and she is without any other significant complaints today    OBJECTIVE:   Exam:. The incision is healed. No sign of infection. Range of motion is progressing as expected. The calf is soft and nontender with a negative Homans sign. Strength progressing    DIAGNOSTIC STUDIES  Xrays: 2 views of the right hip (AP pelvis and lateral right hip) were ordered and reviewed for evaluation of recent hip replacement. They demonstrate a well positioned, well aligned hip replacement without complicating factors noted. In comparison with previous films there has been interval implant placement.    ASSESSMENT: 8 week status post right hip replacement Anterior Approach    PLAN: 1) Activity as tolerated   2) Continue hip strengthening exercises    3) Follow up 1 year post-op with repeat Xrays of right hip (2views AP Pelvis  and lateral left hip)    ESTHER Dunne  2024

## 2024-07-18 ENCOUNTER — PRE-ADMISSION TESTING (OUTPATIENT)
Dept: PREADMISSION TESTING | Facility: HOSPITAL | Age: 74
End: 2024-07-18
Payer: MEDICARE

## 2024-07-18 VITALS
HEIGHT: 69 IN | SYSTOLIC BLOOD PRESSURE: 117 MMHG | TEMPERATURE: 97.5 F | WEIGHT: 175.8 LBS | DIASTOLIC BLOOD PRESSURE: 68 MMHG | OXYGEN SATURATION: 94 % | HEART RATE: 63 BPM | RESPIRATION RATE: 16 BRPM | BODY MASS INDEX: 26.04 KG/M2

## 2024-07-18 LAB
ALBUMIN SERPL-MCNC: 3.8 G/DL (ref 3.5–5.2)
ALBUMIN/GLOB SERPL: 1.5 G/DL
ALP SERPL-CCNC: 98 U/L (ref 39–117)
ALT SERPL W P-5'-P-CCNC: 9 U/L (ref 1–33)
ANION GAP SERPL CALCULATED.3IONS-SCNC: 9 MMOL/L (ref 5–15)
AST SERPL-CCNC: 19 U/L (ref 1–32)
BASOPHILS # BLD AUTO: 0.08 10*3/MM3 (ref 0–0.2)
BASOPHILS NFR BLD AUTO: 1.1 % (ref 0–1.5)
BILIRUB SERPL-MCNC: 0.3 MG/DL (ref 0–1.2)
BUN SERPL-MCNC: 15 MG/DL (ref 8–23)
BUN/CREAT SERPL: 14.9 (ref 7–25)
CALCIUM SPEC-SCNC: 10.1 MG/DL (ref 8.6–10.5)
CHLORIDE SERPL-SCNC: 104 MMOL/L (ref 98–107)
CO2 SERPL-SCNC: 25 MMOL/L (ref 22–29)
CREAT SERPL-MCNC: 1.01 MG/DL (ref 0.57–1)
DEPRECATED RDW RBC AUTO: 40.2 FL (ref 37–54)
EGFRCR SERPLBLD CKD-EPI 2021: 58.5 ML/MIN/1.73
EOSINOPHIL # BLD AUTO: 0.41 10*3/MM3 (ref 0–0.4)
EOSINOPHIL NFR BLD AUTO: 5.6 % (ref 0.3–6.2)
ERYTHROCYTE [DISTWIDTH] IN BLOOD BY AUTOMATED COUNT: 12 % (ref 12.3–15.4)
GLOBULIN UR ELPH-MCNC: 2.6 GM/DL
GLUCOSE SERPL-MCNC: 106 MG/DL (ref 65–99)
HCT VFR BLD AUTO: 40.6 % (ref 34–46.6)
HGB BLD-MCNC: 13.3 G/DL (ref 12–15.9)
IMM GRANULOCYTES # BLD AUTO: 0.02 10*3/MM3 (ref 0–0.05)
IMM GRANULOCYTES NFR BLD AUTO: 0.3 % (ref 0–0.5)
LYMPHOCYTES # BLD AUTO: 2.42 10*3/MM3 (ref 0.7–3.1)
LYMPHOCYTES NFR BLD AUTO: 33.2 % (ref 19.6–45.3)
MCH RBC QN AUTO: 30 PG (ref 26.6–33)
MCHC RBC AUTO-ENTMCNC: 32.8 G/DL (ref 31.5–35.7)
MCV RBC AUTO: 91.6 FL (ref 79–97)
MONOCYTES # BLD AUTO: 0.7 10*3/MM3 (ref 0.1–0.9)
MONOCYTES NFR BLD AUTO: 9.6 % (ref 5–12)
NEUTROPHILS NFR BLD AUTO: 3.67 10*3/MM3 (ref 1.7–7)
NEUTROPHILS NFR BLD AUTO: 50.2 % (ref 42.7–76)
NRBC BLD AUTO-RTO: 0 /100 WBC (ref 0–0.2)
PLATELET # BLD AUTO: 400 10*3/MM3 (ref 140–450)
PMV BLD AUTO: 9 FL (ref 6–12)
POTASSIUM SERPL-SCNC: 4 MMOL/L (ref 3.5–5.2)
PROT SERPL-MCNC: 6.4 G/DL (ref 6–8.5)
RBC # BLD AUTO: 4.43 10*6/MM3 (ref 3.77–5.28)
SODIUM SERPL-SCNC: 138 MMOL/L (ref 136–145)
WBC NRBC COR # BLD AUTO: 7.3 10*3/MM3 (ref 3.4–10.8)

## 2024-07-18 PROCEDURE — 36415 COLL VENOUS BLD VENIPUNCTURE: CPT

## 2024-07-18 PROCEDURE — 85025 COMPLETE CBC W/AUTO DIFF WBC: CPT

## 2024-07-18 PROCEDURE — 80053 COMPREHEN METABOLIC PANEL: CPT

## 2024-07-18 NOTE — DISCHARGE INSTRUCTIONS
Take the following medications the morning of surgery with a small sip of water:ATENOLOL, ESCITALOPRAM, LEVOTHYROXINE, NIFEDIPINE    ARRIVE TO ENTRANCE C.    If you are on prescription narcotic pain medication to control your pain you may also take that medication the morning of surgery.    General Instructions:  Do not eat or drink anything after midnight the night before surgery.  Infants may have breast milk up to four hours before surgery.  Infants drinking formula may drink formula up to six hours before surgery.   Patients who avoid smoking, chewing tobacco and alcohol for 4 weeks prior to surgery have a reduced risk of post-operative complications.  Quit smoking as many days before surgery as you can.  Do not smoke, use chewing tobacco or drink alcohol the day of surgery.   If applicable bring your C-PAP/ BI-PAP machine in with you to preop day of surgery.  Bring any papers given to you in the doctor’s office.  Wear clean comfortable clothes.  Do not wear contact lenses, false eyelashes or make-up.  Bring a case for your glasses.   Bring crutches or walker if applicable.  Remove all piercings.  Leave jewelry and any other valuables at home.  Hair extensions with metal clips must be removed prior to surgery.  The Pre-Admission Testing nurse will instruct you to bring medications if unable to obtain an accurate list in Pre-Admission Testing.        If you were given a blood bank ID arm band remember to bring it with you the day of surgery.    Preventing a Surgical Site Infection:  For 2 to 3 days before surgery, avoid shaving with a razor because the razor can irritate skin and make it easier to develop an infection.    Any areas of open skin can increase the risk of a post-operative wound infection by allowing bacteria to enter and travel throughout the body.  Notify your surgeon if you have any skin wounds / rashes even if it is not near the expected surgical site.  The area will need assessed to determine if  surgery should be delayed until it is healed.  The night prior to surgery shower using a fresh bar of anti-bacterial soap (such as Dial) and clean washcloth.  Sleep in a clean bed with clean clothing.  Do not allow pets to sleep with you.  Shower on the morning of surgery using a fresh bar of anti-bacterial soap (such as Dial) and clean washcloth.  Dry with a clean towel and dress in clean clothing.  Ask your surgeon if you will be receiving antibiotics prior to surgery.  Make sure you, your family, and all healthcare providers clean their hands with soap and water or an alcohol based hand  before caring for you or your wound.    Day of surgery:  Your arrival time is approximately two hours before your scheduled surgery time.  Upon arrival, a Pre-op nurse and Anesthesiologist will review your health history, obtain vital signs, and answer questions you may have.  The only belongings needed at this time will be your home medications and if applicable your C-PAP/BI-PAP machine.  A Pre-op nurse will start an IV and you may receive medication in preparation for surgery, including something to help you relax.      Please be aware that surgery does come with discomfort.  We want to make every effort to control your discomfort so please discuss any uncontrolled symptoms with your nurse.   Your doctor will most likely have prescribed pain medications.      If you are going home after surgery you will receive individualized written care instructions before being discharged.  A responsible adult must drive you to and from the hospital on the day of your surgery and ideally stay with you through the night.  Discharge prescriptions can be filled by the hospital pharmacy during regular pharmacy hours.  If you are having surgery late in the day/evening your prescription may be e-prescribed to your pharmacy.  Please verify your pharmacy hours or chose a 24 hour pharmacy to avoid not having access to your prescription  because your pharmacy has closed for the day.    If you are staying overnight following surgery, you will be transported to your hospital room following the recovery period.  [facility] has all private rooms.    If you have any questions please call Pre-Admission Testing at [phone].  Deductibles and co-payments are collected on the day of service. Please be prepared to pay the required co-pay, deductible or deposit on the day of service as defined by your plan.    Call your surgeon immediately if you experience any of the following symptoms:  Sore Throat  Shortness of Breath or difficulty breathing  Cough  Chills  Body soreness or muscle pain  Headache  Fever  New loss of taste or smell  Do not arrive for your surgery ill.  Your procedure will need to be rescheduled to another time.  You will need to call your physician before the day of surgery to avoid any unnecessary exposure to hospital staff as well as other patients.

## 2024-07-25 ENCOUNTER — APPOINTMENT (OUTPATIENT)
Dept: GENERAL RADIOLOGY | Facility: HOSPITAL | Age: 74
DRG: 659 | End: 2024-07-25
Payer: MEDICARE

## 2024-07-25 ENCOUNTER — HOSPITAL ENCOUNTER (OUTPATIENT)
Facility: HOSPITAL | Age: 74
Setting detail: HOSPITAL OUTPATIENT SURGERY
Discharge: HOME | DRG: 659 | End: 2024-07-25
Attending: UROLOGY | Admitting: UROLOGY
Payer: MEDICARE

## 2024-07-25 ENCOUNTER — ANESTHESIA (OUTPATIENT)
Dept: PERIOP | Facility: HOSPITAL | Age: 74
End: 2024-07-25
Payer: MEDICARE

## 2024-07-25 ENCOUNTER — ANESTHESIA EVENT (OUTPATIENT)
Dept: PERIOP | Facility: HOSPITAL | Age: 74
End: 2024-07-25
Payer: MEDICARE

## 2024-07-25 VITALS
OXYGEN SATURATION: 92 % | HEART RATE: 87 BPM | RESPIRATION RATE: 14 BRPM | SYSTOLIC BLOOD PRESSURE: 129 MMHG | TEMPERATURE: 98.4 F | DIASTOLIC BLOOD PRESSURE: 67 MMHG

## 2024-07-25 DIAGNOSIS — N20.0 STONE, KIDNEY: ICD-10-CM

## 2024-07-25 DIAGNOSIS — N20.1 URETERAL CALCULUS: Primary | ICD-10-CM

## 2024-07-25 PROCEDURE — 25010000002 DEXAMETHASONE SODIUM PHOSPHATE 20 MG/5ML SOLUTION: Performed by: NURSE ANESTHETIST, CERTIFIED REGISTERED

## 2024-07-25 PROCEDURE — 25010000002 FENTANYL CITRATE (PF) 50 MCG/ML SOLUTION: Performed by: NURSE ANESTHETIST, CERTIFIED REGISTERED

## 2024-07-25 PROCEDURE — 76000 FLUOROSCOPY <1 HR PHYS/QHP: CPT

## 2024-07-25 PROCEDURE — C2617 STENT, NON-COR, TEM W/O DEL: HCPCS | Performed by: UROLOGY

## 2024-07-25 PROCEDURE — 82365 CALCULUS SPECTROSCOPY: CPT | Performed by: UROLOGY

## 2024-07-25 PROCEDURE — 25810000003 LACTATED RINGERS PER 1000 ML: Performed by: NURSE ANESTHETIST, CERTIFIED REGISTERED

## 2024-07-25 PROCEDURE — 25010000002 PROPOFOL 200 MG/20ML EMULSION: Performed by: NURSE ANESTHETIST, CERTIFIED REGISTERED

## 2024-07-25 PROCEDURE — 0T778DZ DILATION OF LEFT URETER WITH INTRALUMINAL DEVICE, VIA NATURAL OR ARTIFICIAL OPENING ENDOSCOPIC: ICD-10-PCS | Performed by: UROLOGY

## 2024-07-25 PROCEDURE — 25010000002 HYDROMORPHONE PER 4 MG: Performed by: NURSE ANESTHETIST, CERTIFIED REGISTERED

## 2024-07-25 PROCEDURE — 25010000002 CEFAZOLIN PER 500 MG: Performed by: UROLOGY

## 2024-07-25 PROCEDURE — 25010000002 ONDANSETRON PER 1 MG: Performed by: NURSE ANESTHETIST, CERTIFIED REGISTERED

## 2024-07-25 PROCEDURE — 0TC78ZZ EXTIRPATION OF MATTER FROM LEFT URETER, VIA NATURAL OR ARTIFICIAL OPENING ENDOSCOPIC: ICD-10-PCS | Performed by: UROLOGY

## 2024-07-25 PROCEDURE — 0TP98DZ REMOVAL OF INTRALUMINAL DEVICE FROM URETER, VIA NATURAL OR ARTIFICIAL OPENING ENDOSCOPIC: ICD-10-PCS | Performed by: UROLOGY

## 2024-07-25 PROCEDURE — C1758 CATHETER, URETERAL: HCPCS | Performed by: UROLOGY

## 2024-07-25 DEVICE — URETERAL STENT
Type: IMPLANTABLE DEVICE | Site: URETER | Status: FUNCTIONAL
Brand: CONTOUR™

## 2024-07-25 RX ORDER — LIDOCAINE HYDROCHLORIDE 20 MG/ML
INJECTION, SOLUTION INFILTRATION; PERINEURAL AS NEEDED
Status: DISCONTINUED | OUTPATIENT
Start: 2024-07-25 | End: 2024-07-25 | Stop reason: SURG

## 2024-07-25 RX ORDER — CEPHALEXIN 500 MG/1
500 CAPSULE ORAL 3 TIMES DAILY
COMMUNITY
End: 2024-08-07 | Stop reason: HOSPADM

## 2024-07-25 RX ORDER — FENTANYL CITRATE 50 UG/ML
50 INJECTION, SOLUTION INTRAMUSCULAR; INTRAVENOUS ONCE AS NEEDED
Status: DISCONTINUED | OUTPATIENT
Start: 2024-07-25 | End: 2024-07-25 | Stop reason: HOSPADM

## 2024-07-25 RX ORDER — SCOLOPAMINE TRANSDERMAL SYSTEM 1 MG/1
1 PATCH, EXTENDED RELEASE TRANSDERMAL
Status: DISCONTINUED | OUTPATIENT
Start: 2024-07-25 | End: 2024-07-25 | Stop reason: HOSPADM

## 2024-07-25 RX ORDER — FAMOTIDINE 10 MG/ML
20 INJECTION, SOLUTION INTRAVENOUS ONCE
Status: COMPLETED | OUTPATIENT
Start: 2024-07-25 | End: 2024-07-25

## 2024-07-25 RX ORDER — LABETALOL HYDROCHLORIDE 5 MG/ML
5 INJECTION, SOLUTION INTRAVENOUS
Status: DISCONTINUED | OUTPATIENT
Start: 2024-07-25 | End: 2024-07-25 | Stop reason: HOSPADM

## 2024-07-25 RX ORDER — LIDOCAINE HYDROCHLORIDE 10 MG/ML
0.5 INJECTION, SOLUTION INFILTRATION; PERINEURAL ONCE AS NEEDED
Status: DISCONTINUED | OUTPATIENT
Start: 2024-07-25 | End: 2024-07-25 | Stop reason: HOSPADM

## 2024-07-25 RX ORDER — SODIUM CHLORIDE, SODIUM LACTATE, POTASSIUM CHLORIDE, CALCIUM CHLORIDE 600; 310; 30; 20 MG/100ML; MG/100ML; MG/100ML; MG/100ML
9 INJECTION, SOLUTION INTRAVENOUS CONTINUOUS
Status: DISCONTINUED | OUTPATIENT
Start: 2024-07-25 | End: 2024-07-25 | Stop reason: HOSPADM

## 2024-07-25 RX ORDER — FLUMAZENIL 0.1 MG/ML
0.2 INJECTION INTRAVENOUS AS NEEDED
Status: DISCONTINUED | OUTPATIENT
Start: 2024-07-25 | End: 2024-07-25 | Stop reason: HOSPADM

## 2024-07-25 RX ORDER — DEXAMETHASONE SODIUM PHOSPHATE 4 MG/ML
INJECTION, SOLUTION INTRA-ARTICULAR; INTRALESIONAL; INTRAMUSCULAR; INTRAVENOUS; SOFT TISSUE AS NEEDED
Status: DISCONTINUED | OUTPATIENT
Start: 2024-07-25 | End: 2024-07-25 | Stop reason: SURG

## 2024-07-25 RX ORDER — SODIUM CHLORIDE, SODIUM LACTATE, POTASSIUM CHLORIDE, CALCIUM CHLORIDE 600; 310; 30; 20 MG/100ML; MG/100ML; MG/100ML; MG/100ML
INJECTION, SOLUTION INTRAVENOUS CONTINUOUS PRN
Status: DISCONTINUED | OUTPATIENT
Start: 2024-07-25 | End: 2024-07-25 | Stop reason: SURG

## 2024-07-25 RX ORDER — NALOXONE HCL 0.4 MG/ML
0.2 VIAL (ML) INJECTION AS NEEDED
Status: DISCONTINUED | OUTPATIENT
Start: 2024-07-25 | End: 2024-07-25 | Stop reason: HOSPADM

## 2024-07-25 RX ORDER — IPRATROPIUM BROMIDE AND ALBUTEROL SULFATE 2.5; .5 MG/3ML; MG/3ML
3 SOLUTION RESPIRATORY (INHALATION) ONCE AS NEEDED
Status: DISCONTINUED | OUTPATIENT
Start: 2024-07-25 | End: 2024-07-25 | Stop reason: HOSPADM

## 2024-07-25 RX ORDER — FENTANYL CITRATE 50 UG/ML
INJECTION, SOLUTION INTRAMUSCULAR; INTRAVENOUS AS NEEDED
Status: DISCONTINUED | OUTPATIENT
Start: 2024-07-25 | End: 2024-07-25 | Stop reason: SURG

## 2024-07-25 RX ORDER — HYDROMORPHONE HYDROCHLORIDE 1 MG/ML
0.25 INJECTION, SOLUTION INTRAMUSCULAR; INTRAVENOUS; SUBCUTANEOUS
Status: DISCONTINUED | OUTPATIENT
Start: 2024-07-25 | End: 2024-07-25 | Stop reason: HOSPADM

## 2024-07-25 RX ORDER — FENTANYL CITRATE 50 UG/ML
25 INJECTION, SOLUTION INTRAMUSCULAR; INTRAVENOUS
Status: DISCONTINUED | OUTPATIENT
Start: 2024-07-25 | End: 2024-07-25 | Stop reason: HOSPADM

## 2024-07-25 RX ORDER — HYDROCODONE BITARTRATE AND ACETAMINOPHEN 7.5; 325 MG/1; MG/1
1 TABLET ORAL EVERY 4 HOURS PRN
Status: DISCONTINUED | OUTPATIENT
Start: 2024-07-25 | End: 2024-07-25 | Stop reason: HOSPADM

## 2024-07-25 RX ORDER — EPHEDRINE SULFATE 50 MG/ML
INJECTION INTRAVENOUS AS NEEDED
Status: DISCONTINUED | OUTPATIENT
Start: 2024-07-25 | End: 2024-07-25 | Stop reason: SURG

## 2024-07-25 RX ORDER — EPHEDRINE SULFATE 50 MG/ML
5 INJECTION, SOLUTION INTRAVENOUS ONCE AS NEEDED
Status: DISCONTINUED | OUTPATIENT
Start: 2024-07-25 | End: 2024-07-25 | Stop reason: HOSPADM

## 2024-07-25 RX ORDER — PROPOFOL 10 MG/ML
INJECTION, EMULSION INTRAVENOUS AS NEEDED
Status: DISCONTINUED | OUTPATIENT
Start: 2024-07-25 | End: 2024-07-25 | Stop reason: SURG

## 2024-07-25 RX ORDER — HYDRALAZINE HYDROCHLORIDE 20 MG/ML
5 INJECTION INTRAMUSCULAR; INTRAVENOUS
Status: DISCONTINUED | OUTPATIENT
Start: 2024-07-25 | End: 2024-07-25 | Stop reason: HOSPADM

## 2024-07-25 RX ORDER — SODIUM CHLORIDE 0.9 % (FLUSH) 0.9 %
3 SYRINGE (ML) INJECTION EVERY 12 HOURS SCHEDULED
Status: DISCONTINUED | OUTPATIENT
Start: 2024-07-25 | End: 2024-07-25 | Stop reason: HOSPADM

## 2024-07-25 RX ORDER — DIPHENHYDRAMINE HYDROCHLORIDE 50 MG/ML
12.5 INJECTION INTRAMUSCULAR; INTRAVENOUS
Status: DISCONTINUED | OUTPATIENT
Start: 2024-07-25 | End: 2024-07-25 | Stop reason: HOSPADM

## 2024-07-25 RX ORDER — HYDROCODONE BITARTRATE AND ACETAMINOPHEN 5; 325 MG/1; MG/1
1 TABLET ORAL ONCE AS NEEDED
Status: DISCONTINUED | OUTPATIENT
Start: 2024-07-25 | End: 2024-07-25 | Stop reason: HOSPADM

## 2024-07-25 RX ORDER — MAGNESIUM HYDROXIDE 1200 MG/15ML
LIQUID ORAL AS NEEDED
Status: DISCONTINUED | OUTPATIENT
Start: 2024-07-25 | End: 2024-07-25 | Stop reason: HOSPADM

## 2024-07-25 RX ORDER — DROPERIDOL 2.5 MG/ML
0.62 INJECTION, SOLUTION INTRAMUSCULAR; INTRAVENOUS
Status: DISCONTINUED | OUTPATIENT
Start: 2024-07-25 | End: 2024-07-25 | Stop reason: HOSPADM

## 2024-07-25 RX ORDER — HYDROCODONE BITARTRATE AND ACETAMINOPHEN 5; 325 MG/1; MG/1
1-2 TABLET ORAL EVERY 6 HOURS PRN
Qty: 12 TABLET | Refills: 0 | Status: SHIPPED | OUTPATIENT
Start: 2024-07-25 | End: 2024-08-07 | Stop reason: HOSPADM

## 2024-07-25 RX ORDER — PROMETHAZINE HYDROCHLORIDE 25 MG/1
25 TABLET ORAL ONCE AS NEEDED
Status: DISCONTINUED | OUTPATIENT
Start: 2024-07-25 | End: 2024-07-25 | Stop reason: HOSPADM

## 2024-07-25 RX ORDER — ONDANSETRON 2 MG/ML
4 INJECTION INTRAMUSCULAR; INTRAVENOUS ONCE AS NEEDED
Status: COMPLETED | OUTPATIENT
Start: 2024-07-25 | End: 2024-07-25

## 2024-07-25 RX ORDER — PROMETHAZINE HYDROCHLORIDE 25 MG/1
25 SUPPOSITORY RECTAL ONCE AS NEEDED
Status: DISCONTINUED | OUTPATIENT
Start: 2024-07-25 | End: 2024-07-25 | Stop reason: HOSPADM

## 2024-07-25 RX ORDER — ONDANSETRON 2 MG/ML
INJECTION INTRAMUSCULAR; INTRAVENOUS AS NEEDED
Status: DISCONTINUED | OUTPATIENT
Start: 2024-07-25 | End: 2024-07-25 | Stop reason: SURG

## 2024-07-25 RX ORDER — SULFAMETHOXAZOLE/TRIMETHOPRIM 800-160 MG
1 TABLET ORAL 2 TIMES DAILY
Qty: 10 TABLET | Refills: 0 | Status: SHIPPED | OUTPATIENT
Start: 2024-07-25 | End: 2024-08-07 | Stop reason: HOSPADM

## 2024-07-25 RX ORDER — SODIUM CHLORIDE 0.9 % (FLUSH) 0.9 %
3-10 SYRINGE (ML) INJECTION AS NEEDED
Status: DISCONTINUED | OUTPATIENT
Start: 2024-07-25 | End: 2024-07-25 | Stop reason: HOSPADM

## 2024-07-25 RX ADMIN — HYDROMORPHONE HYDROCHLORIDE 0.25 MG: 1 INJECTION, SOLUTION INTRAMUSCULAR; INTRAVENOUS; SUBCUTANEOUS at 12:04

## 2024-07-25 RX ADMIN — ONDANSETRON 4 MG: 2 INJECTION INTRAMUSCULAR; INTRAVENOUS at 12:05

## 2024-07-25 RX ADMIN — EPHEDRINE SULFATE 5 MG: 50 INJECTION INTRAVENOUS at 11:09

## 2024-07-25 RX ADMIN — DEXAMETHASONE SODIUM PHOSPHATE 8 MG: 4 INJECTION, SOLUTION INTRAMUSCULAR; INTRAVENOUS at 11:00

## 2024-07-25 RX ADMIN — SODIUM CHLORIDE, POTASSIUM CHLORIDE, SODIUM LACTATE AND CALCIUM CHLORIDE: 600; 310; 30; 20 INJECTION, SOLUTION INTRAVENOUS at 10:46

## 2024-07-25 RX ADMIN — HYDROMORPHONE HYDROCHLORIDE 0.25 MG: 1 INJECTION, SOLUTION INTRAMUSCULAR; INTRAVENOUS; SUBCUTANEOUS at 12:34

## 2024-07-25 RX ADMIN — PROPOFOL 200 MG: 10 INJECTION, EMULSION INTRAVENOUS at 10:52

## 2024-07-25 RX ADMIN — EPHEDRINE SULFATE 5 MG: 50 INJECTION INTRAVENOUS at 11:25

## 2024-07-25 RX ADMIN — ONDANSETRON 4 MG: 2 INJECTION INTRAMUSCULAR; INTRAVENOUS at 11:00

## 2024-07-25 RX ADMIN — HYDROMORPHONE HYDROCHLORIDE 0.25 MG: 1 INJECTION, SOLUTION INTRAMUSCULAR; INTRAVENOUS; SUBCUTANEOUS at 12:42

## 2024-07-25 RX ADMIN — LIDOCAINE HYDROCHLORIDE 60 MG: 20 INJECTION, SOLUTION INFILTRATION; PERINEURAL at 10:52

## 2024-07-25 RX ADMIN — SCOPALAMINE 1 PATCH: 1 PATCH, EXTENDED RELEASE TRANSDERMAL at 10:22

## 2024-07-25 RX ADMIN — EPHEDRINE SULFATE 5 MG: 50 INJECTION INTRAVENOUS at 11:22

## 2024-07-25 RX ADMIN — SODIUM CHLORIDE 2000 MG: 900 INJECTION INTRAVENOUS at 10:40

## 2024-07-25 RX ADMIN — FAMOTIDINE 20 MG: 10 INJECTION INTRAVENOUS at 10:23

## 2024-07-25 RX ADMIN — FENTANYL CITRATE 50 MCG: 50 INJECTION, SOLUTION INTRAMUSCULAR; INTRAVENOUS at 10:58

## 2024-07-25 RX ADMIN — HYDROMORPHONE HYDROCHLORIDE 0.25 MG: 1 INJECTION, SOLUTION INTRAMUSCULAR; INTRAVENOUS; SUBCUTANEOUS at 12:12

## 2024-07-25 RX ADMIN — EPHEDRINE SULFATE 10 MG: 50 INJECTION INTRAVENOUS at 11:07

## 2024-07-25 RX ADMIN — HYDROCODONE BITARTRATE AND ACETAMINOPHEN 1 TABLET: 7.5; 325 TABLET ORAL at 12:05

## 2024-07-25 NOTE — ANESTHESIA POSTPROCEDURE EVALUATION
Patient: Anastasiia Faria    Procedure Summary       Date: 07/25/24 Room / Location: [facility] OR 01 / [facility] MAIN OR    Anesthesia Start: 1046 Anesthesia Stop: 1143    Procedure: CYSTOSCOPY, LEFT URETEROSCOPY, LASER LITHOTRIPSY, STENT EXCHANGE (Left) Diagnosis:     Surgeons: Jaiden Bolton Jr., MD Provider: Yahir Jimenez MD    Anesthesia Type: general ASA Status: 3            Anesthesia Type: general    Vitals  Vitals Value Taken Time   /62 07/25/24 1330   Temp 36.9 °C (98.4 °F) 07/25/24 1315   Pulse 91 07/25/24 1337   Resp 16 07/25/24 1330   SpO2 90 % 07/25/24 1337   Vitals shown include unfiled device data.        Post Anesthesia Care and Evaluation    Patient location during evaluation: PACU  Patient participation: complete - patient participated  Level of consciousness: awake and alert  Pain management: adequate    Airway patency: patent  Anesthetic complications: No anesthetic complications  PONV Status: controlled  Cardiovascular status: acceptable and hemodynamically stable  Respiratory status: acceptable  Hydration status: acceptable    Comments: /62   Pulse 109   Temp 36.9 °C (98.4 °F) (Oral)   Resp 14   LMP  (LMP Unknown)   SpO2 95%

## 2024-07-25 NOTE — ANESTHESIA PREPROCEDURE EVALUATION
Anesthesia Evaluation     history of anesthetic complications:  PONV  NPO Solid Status: > 8 hours             Airway   Mallampati: III  Neck ROM: full  Anterior and Possible difficult intubation  Dental - normal exam     Pulmonary    Cardiovascular     Patient on routine beta blocker    (+) hypertension 2 medications or greater, valvular problems/murmurs murmur, hyperlipidemia    ROS comment: Reviewed echo    Neuro/Psych  (+) headaches, psychiatric history Anxiety and Depression  GI/Hepatic/Renal/Endo    (+) GERD, GI bleeding , renal disease- stones, thyroid problem hypothyroidism    Musculoskeletal     Abdominal    Substance History      OB/GYN          Other   arthritis,                 Anesthesia Plan    ASA 3     general     intravenous induction     Anesthetic plan, risks, benefits, and alternatives have been provided, discussed and informed consent has been obtained with: patient and spouse/significant other.    CODE STATUS:

## 2024-07-25 NOTE — H&P
FIRST UROLOGY CONSULT      Patient Identification:  NAME:  Anastasiia Faria  Age:  74 y.o.   Sex:  female   :  1950   MRN:  [id number]     Chief complaint: Left ureteral stone    History of present illness:      This is a 74 year old woman with a history of urolithiasis who underwent a left ureteral stent placement for a left ureteral stone recently. She returns today for cystoscopy, left ureteroscopy, possible laser lithotripsy, possible stone extraction, and either stent exchange versus removal. We discussed the risks of the procedure including bleeding, infection, damage to surrounding structures, pain, need to perform additional procedures, possible need for a stent, possible long term risks to kidney function, risks of anesthesia. The patient understands these risks and would like to proceed.    Past medical history:  Past Medical History:   Diagnosis Date    Allergic     Anxiety     Chronic kidney disease     Clostridium difficile infection 2024    TREATED AT [facility]    Colon polyps     Depression     Diverticulosis     Encounter for annual health examination 2014    Annual Health Assessment    Family history of colon cancer     Fibrocystic breast     GERD (gastroesophageal reflux disease)     Hard of hearing     HEARING AIDS BILATERALLY    Heart murmur     Herpes labialis without complication     History of cataract     History of mammogram 2010    History of recent hospitalization 2024    KIDNEY STONE/SEPSIS 4 NIGHT AT [facility]    HL (hearing loss) 2014    Hearing Aids    Hydronephrosis     Hyperlipidemia     Hypertension     Hypothyroidism     Insomnia     Kidney stones     Migraines     Osteoarthritis of right hip     Osteopenia     Prolia -last 2021,3/2022    PONV (postoperative nausea and vomiting)     Scoliosis     Dr. Stanford 2018    Sepsis 2024    Slow to wake up after anesthesia     Urinary tract infection     3/2024,      Visual impairment     Wear  Glasses       Past surgical history:  Past Surgical History:   Procedure Laterality Date    BONE MARROW BIOPSY Left     BREAST CYST ASPIRATION Right     BREAST SURGERY Right     BIOPSY    CATARACT EXTRACTION, BILATERAL      Cataract surgery on right eye June 2021 and left eye July 2021. (Dr. Nnaette Bateman)    COLONOSCOPY N/A 10/27/2016    Procedure: COLONOSCOPY INTO CECUM;  Surgeon: Osvaldo Dorado MD;  Location: [facility] ENDOSCOPY;  Service:     COLONOSCOPY  01/26/2011    COLONOSCOPY  02/04/2005    COLONOSCOPY N/A 12/02/2021    Procedure: COLONOSCOPY INTO CECUM WITH COLD BIOPSY POLYPECTOMY AND HOT SNARE POLYPECTOMY;  Surgeon: Osvaldo Dorado MD;  Location: [facility] ENDOSCOPY;  Service: General;  Laterality: N/A;  PRE-FAMILY HISTORY OF COLON CANCER, PERSONAL HISTORY OF COLON CANCER  POST- POLYPS, DIVERTICULOSIS, HEMORRHOIDS    CYSTOSCOPY W/ URETERAL STENT PLACEMENT Left 03/21/2024    Procedure: LEFT CYSTOSCOPY RETROGRADE PYLEOGRAM URETERAL STENT INSERTION;  Surgeon: Jaiden Bolton Jr., MD;  Location: [facility] OR;  Service: Urology;  Laterality: Left;    CYSTOSCOPY W/ URETERAL STENT REMOVAL  04/23/2024    CYSTOSCOPY, RETROGRADE PYELOGRAM AND STENT INSERTION Left 06/14/2024    Procedure: CYSTOSCOPY RETROGRADE PYELOGRAM AND STENT INSERTION;  Surgeon: Yahir Faria MD;  Location: [facility] OR;  Service: Urology;  Laterality: Left;    JOINT REPLACEMENT  5/2024    Right Hip   Dr. Vasu King    KNEE ARTHROSCOPY Left     PAP SMEAR  12/20/2010    TOTAL HIP ARTHROPLASTY Right 05/13/2024    Procedure: TOTAL HIP ARTHROPLASTY ANTERIOR WITH HANA TABLE;  Surgeon: Vasu King MD;  Location: [facility];  Service: Orthopedics;  Laterality: Right;       Allergies:  Patient has no known allergies.    Home medications:  Medications Prior to Admission   Medication Sig Dispense Refill Last Dose    atenolol (TENORMIN) 25 MG tablet Take 1 tablet by mouth Daily. 90 tablet 2 7/25/2024 at 0730     cephalexin (KEFLEX) 500 MG capsule Take 1 capsule by mouth 3 (Three) Times a Day.   7/24/2024 at 2300    escitalopram (LEXAPRO) 20 MG tablet Take 1 tablet by mouth Daily. 90 tablet 2 7/25/2024 at 0730    levothyroxine (SYNTHROID, LEVOTHROID) 50 MCG tablet Take 1 tablet by mouth Daily. 90 tablet 2 7/25/2024 at 0730    loratadine (CLARITIN) 10 MG tablet Take 1 tablet by mouth Daily. Indications: Hayfever   7/24/2024 at 0800    NIFEdipine XL (PROCARDIA XL) 30 MG 24 hr tablet Take 1 tablet by mouth Daily. 90 tablet 2 7/25/2024 at 0730    Psyllium (METAMUCIL FIBER PO) Take 1 Scoop by mouth Daily. Indications: constipation   Past Month    rosuvastatin (CRESTOR) 10 MG tablet Take 1 tablet by mouth Every Night. 90 tablet 3 7/24/2024 at 2100        Hospital medications:  ceFAZolin, 2,000 mg, Intravenous, Once  Scopolamine, 1 patch, Transdermal, Q72H  sodium chloride, 3 mL, Intravenous, Q12H      lactated ringers, 9 mL/hr        fentanyl    iothalamate    lidocaine    sodium chloride    Family history:  Family History   Problem Relation Age of Onset    Breast cancer Mother     Colon cancer Mother     Hypertension Mother     Cancer Mother         Breat cancer, skin cancer, colon cancer    Hyperlipidemia Mother     Cancer Father         Skin cancer    Hypertension Sister     Hypertension Sister     Hyperlipidemia Sister     Thyroid disease Sister     Diabetes type II Brother     Cancer Brother         Skin cancer    Diabetes Brother     Colon cancer Maternal Aunt         colon ca 40    Cancer Maternal Aunt         Colon cancer cause of death early 40s    Colon cancer Maternal Aunt         64 yo    Heart disease Maternal Aunt     Colon cancer Maternal Aunt         81 yo    Cancer Maternal Aunt         Colon cancer    Cancer Maternal Aunt         Colon cancer    Cancer Maternal Uncle         Thyroid cancer    Heart disease Maternal Uncle     Stomach cancer Maternal Grandmother         or intestinal    Cancer Maternal Grandmother          GI TRACT CANCER    Breast cancer Maternal Grandmother     Heart disease Maternal Grandmother     Heart attack Maternal Grandfather     Breast cancer Maternal Grandfather     Heart disease Maternal Grandfather     Heart disease Paternal Grandmother     Colon cancer Other     Colon cancer Other     Malig Hyperthermia Neg Hx        Social history:  Social History     Tobacco Use    Smoking status: Never    Smokeless tobacco: Never    Tobacco comments:     daily caffine   Vaping Use    Vaping status: Never Used   Substance Use Topics    Alcohol use: Never    Drug use: Never       Objective:  TMax 24 hours:   Temp (24hrs), Av.9 °F (36.6 °C), Min:97.9 °F (36.6 °C), Max:97.9 °F (36.6 °C)      Vitals Ranges:   Temp:  [97.9 °F (36.6 °C)] 97.9 °F (36.6 °C)  Heart Rate:  [65] 65  Resp:  [16] 16  BP: (128)/(65) 128/65    Intake/Output Last 3 shifts:  No intake/output data recorded.     Physical Exam:    General Appearance:    Alert, cooperative, NAD                       Neuro/Psych:   Orientation intact, mood/affect pleasant       Results review:   I reviewed the patient's new clinical results.    Data review:  Lab Results (last 24 hours)       ** No results found for the last 24 hours. **             Imaging:  Imaging Results (Last 24 Hours)       ** No results found for the last 24 hours. **             Assessment:     Left ureteral stone    Plan:     Cysto, uscope, HLL, SBE, stent removal vs exchange    Jaiden Bolton Jr., MD  24  10:43 EDT

## 2024-07-25 NOTE — ANESTHESIA PROCEDURE NOTES
Airway  Urgency: elective    Date/Time: 7/25/2024 10:54 AM  Airway not difficult    General Information and Staff    Patient location during procedure: OR  CRNA/CAA: Willa Palmer CRNA    Indications and Patient Condition  Indications for airway management: airway protection    Preoxygenated: yes  Mask difficulty assessment: 1 - vent by mask    Final Airway Details  Final airway type: supraglottic airway      Successful airway: classic  Size 4     Number of attempts at approach: 1  Assessment: lips, teeth, and gum same as pre-op    Additional Comments  Smooth IV induction. LMA inserted with ease. Cuff up. LMA secured BEBS

## 2024-07-25 NOTE — OP NOTE
Operative Report    [facility] OR    Patient: Anastasiia Faria  Age:      74 y.o.  :     1950  Sex:      female    Medical Record:  [id number]    Date of Operation/Procedure:  2024    Pre-operative Diagnosis:  Left ureteral stone    Post-operative Diagnosis:  Same    Surgeon:  Che    Name of Operation/Procedure:  Procedure(s) and Anesthesia Type:     * CYSTOSCOPY, LEFT URETEROSCOPY, LASER LITHOTRIPSY, STENT EXCHANGE - General    Findings/Complications:  No complications.     Proximal left ureteral stone    Description of procedure: The patient was taken to the OR and placed under GA in lithotomy position.  Prepped and draped in sterile fashion.  The 21 Fr cystoscope was introduced and pan-cystoscopy was performed.  No tumors or stones were seen. The left ureteral stent was withdrawn to the meatus, and a Sensor guidewire was passed through it into the kidney under fluoro guidance without difficulty. The flexible ureteroscope was passed over the wire into the kidney. No renal stones were seen larger than 1 mm. While drawing the scope back down the ureter, a stone was seen imbedded in the wall of the proximal ureter. It was fragmented with the holmium laser. All stone fragments were basketed and removed with a zero-tip nitinol basket. Thereafter, no fragments were over 1mm. The scope was backed down the ureter, and no significant fragments were identified.  A 6 Fr x 28 cm JJ stent was passed into the kidney into the proper anatomic position under fluoroscopic guidance.    Estimated Blood Loss: minimal    Specimens:   Order Name Source Comment Collection Info Order Time   STONE ANALYSIS Ureter, Left  Collected By: Jaiden Bolton Jr., MD 2024 11:22 AM     Release to patient   Routine Release            Fluids/Drains: left ureteral stent    Jaiden Bolton Jr., MD  2024  11:59 EDT

## 2024-07-27 ENCOUNTER — APPOINTMENT (OUTPATIENT)
Dept: NUCLEAR MEDICINE | Facility: HOSPITAL | Age: 74
DRG: 659 | End: 2024-07-27
Payer: MEDICARE

## 2024-07-27 ENCOUNTER — HOSPITAL ENCOUNTER (INPATIENT)
Facility: HOSPITAL | Age: 74
LOS: 10 days | Discharge: HOME | DRG: 659 | End: 2024-08-07
Attending: EMERGENCY MEDICINE | Admitting: HOSPITALIST
Payer: MEDICARE

## 2024-07-27 ENCOUNTER — APPOINTMENT (OUTPATIENT)
Dept: CT IMAGING | Facility: HOSPITAL | Age: 74
DRG: 659 | End: 2024-07-27
Payer: MEDICARE

## 2024-07-27 ENCOUNTER — APPOINTMENT (OUTPATIENT)
Dept: GENERAL RADIOLOGY | Facility: HOSPITAL | Age: 74
DRG: 659 | End: 2024-07-27
Payer: MEDICARE

## 2024-07-27 DIAGNOSIS — N17.9 AKI (ACUTE KIDNEY INJURY): ICD-10-CM

## 2024-07-27 DIAGNOSIS — M00.9: ICD-10-CM

## 2024-07-27 DIAGNOSIS — N39.0 ACUTE UTI: Primary | ICD-10-CM

## 2024-07-27 DIAGNOSIS — A41.9 SEPSIS WITHOUT ACUTE ORGAN DYSFUNCTION, DUE TO UNSPECIFIED ORGANISM: ICD-10-CM

## 2024-07-27 LAB
ALBUMIN SERPL-MCNC: 3.3 G/DL (ref 3.5–5.2)
ALBUMIN/GLOB SERPL: 1.3 G/DL
ALP SERPL-CCNC: 84 U/L (ref 39–117)
ALT SERPL W P-5'-P-CCNC: 7 U/L (ref 1–33)
ANION GAP SERPL CALCULATED.3IONS-SCNC: 8 MMOL/L (ref 5–15)
ANION GAP SERPL CALCULATED.3IONS-SCNC: 8.9 MMOL/L (ref 5–15)
AST SERPL-CCNC: 15 U/L (ref 1–32)
BACTERIA UR QL AUTO: ABNORMAL /HPF
BASOPHILS # BLD AUTO: 0.06 10*3/MM3 (ref 0–0.2)
BASOPHILS NFR BLD AUTO: 0.3 % (ref 0–1.5)
BILIRUB SERPL-MCNC: 0.4 MG/DL (ref 0–1.2)
BILIRUB UR QL STRIP: NEGATIVE
BUN SERPL-MCNC: 22 MG/DL (ref 8–23)
BUN SERPL-MCNC: 23 MG/DL (ref 8–23)
BUN/CREAT SERPL: 17 (ref 7–25)
BUN/CREAT SERPL: 19.1 (ref 7–25)
CALCIUM SPEC-SCNC: 8.3 MG/DL (ref 8.6–10.5)
CALCIUM SPEC-SCNC: 9 MG/DL (ref 8.6–10.5)
CHLORIDE SERPL-SCNC: 103 MMOL/L (ref 98–107)
CHLORIDE SERPL-SCNC: 105 MMOL/L (ref 98–107)
CLARITY UR: ABNORMAL
CO2 SERPL-SCNC: 22 MMOL/L (ref 22–29)
CO2 SERPL-SCNC: 23.1 MMOL/L (ref 22–29)
COLOR UR: YELLOW
CREAT SERPL-MCNC: 1.15 MG/DL (ref 0.57–1)
CREAT SERPL-MCNC: 1.35 MG/DL (ref 0.57–1)
D DIMER PPP FEU-MCNC: 7.87 MCGFEU/ML (ref 0–0.74)
D-LACTATE SERPL-SCNC: 1.2 MMOL/L (ref 0.5–2)
DEPRECATED RDW RBC AUTO: 39.8 FL (ref 37–54)
DEPRECATED RDW RBC AUTO: 40.1 FL (ref 37–54)
EGFRCR SERPLBLD CKD-EPI 2021: 41.3 ML/MIN/1.73
EGFRCR SERPLBLD CKD-EPI 2021: 50.1 ML/MIN/1.73
EOSINOPHIL # BLD AUTO: 0.03 10*3/MM3 (ref 0–0.4)
EOSINOPHIL NFR BLD AUTO: 0.1 % (ref 0.3–6.2)
ERYTHROCYTE [DISTWIDTH] IN BLOOD BY AUTOMATED COUNT: 11.9 % (ref 12.3–15.4)
ERYTHROCYTE [DISTWIDTH] IN BLOOD BY AUTOMATED COUNT: 12 % (ref 12.3–15.4)
GLOBULIN UR ELPH-MCNC: 2.5 GM/DL
GLUCOSE SERPL-MCNC: 119 MG/DL (ref 65–99)
GLUCOSE SERPL-MCNC: 148 MG/DL (ref 65–99)
GLUCOSE UR STRIP-MCNC: NEGATIVE MG/DL
HCT VFR BLD AUTO: 31 % (ref 34–46.6)
HCT VFR BLD AUTO: 33.8 % (ref 34–46.6)
HGB BLD-MCNC: 10.3 G/DL (ref 12–15.9)
HGB BLD-MCNC: 11.2 G/DL (ref 12–15.9)
HGB UR QL STRIP.AUTO: ABNORMAL
HYALINE CASTS UR QL AUTO: ABNORMAL /LPF
IMM GRANULOCYTES # BLD AUTO: 0.52 10*3/MM3 (ref 0–0.05)
IMM GRANULOCYTES NFR BLD AUTO: 2.5 % (ref 0–0.5)
KETONES UR QL STRIP: NEGATIVE
LEUKOCYTE ESTERASE UR QL STRIP.AUTO: ABNORMAL
LYMPHOCYTES # BLD AUTO: 0.94 10*3/MM3 (ref 0.7–3.1)
LYMPHOCYTES NFR BLD AUTO: 4.6 % (ref 19.6–45.3)
MCH RBC QN AUTO: 30.1 PG (ref 26.6–33)
MCH RBC QN AUTO: 30.4 PG (ref 26.6–33)
MCHC RBC AUTO-ENTMCNC: 33.1 G/DL (ref 31.5–35.7)
MCHC RBC AUTO-ENTMCNC: 33.2 G/DL (ref 31.5–35.7)
MCV RBC AUTO: 90.6 FL (ref 79–97)
MCV RBC AUTO: 91.8 FL (ref 79–97)
MONOCYTES # BLD AUTO: 1.13 10*3/MM3 (ref 0.1–0.9)
MONOCYTES NFR BLD AUTO: 5.5 % (ref 5–12)
NEUTROPHILS NFR BLD AUTO: 17.93 10*3/MM3 (ref 1.7–7)
NEUTROPHILS NFR BLD AUTO: 87 % (ref 42.7–76)
NITRITE UR QL STRIP: POSITIVE
NRBC BLD AUTO-RTO: 0 /100 WBC (ref 0–0.2)
PH UR STRIP.AUTO: 5.5 [PH] (ref 5–8)
PLATELET # BLD AUTO: 196 10*3/MM3 (ref 140–450)
PLATELET # BLD AUTO: 220 10*3/MM3 (ref 140–450)
PMV BLD AUTO: 9.8 FL (ref 6–12)
PMV BLD AUTO: 9.8 FL (ref 6–12)
POTASSIUM SERPL-SCNC: 3.9 MMOL/L (ref 3.5–5.2)
POTASSIUM SERPL-SCNC: 4 MMOL/L (ref 3.5–5.2)
PROT SERPL-MCNC: 5.8 G/DL (ref 6–8.5)
PROT UR QL STRIP: ABNORMAL
RBC # BLD AUTO: 3.42 10*6/MM3 (ref 3.77–5.28)
RBC # BLD AUTO: 3.68 10*6/MM3 (ref 3.77–5.28)
RBC # UR STRIP: ABNORMAL /HPF
REF LAB TEST METHOD: ABNORMAL
SODIUM SERPL-SCNC: 135 MMOL/L (ref 136–145)
SODIUM SERPL-SCNC: 135 MMOL/L (ref 136–145)
SP GR UR STRIP: 1.02 (ref 1–1.03)
SQUAMOUS #/AREA URNS HPF: ABNORMAL /HPF
UROBILINOGEN UR QL STRIP: ABNORMAL
WBC # UR STRIP: ABNORMAL /HPF
WBC NRBC COR # BLD AUTO: 18.22 10*3/MM3 (ref 3.4–10.8)
WBC NRBC COR # BLD AUTO: 20.61 10*3/MM3 (ref 3.4–10.8)

## 2024-07-27 PROCEDURE — 87077 CULTURE AEROBIC IDENTIFY: CPT | Performed by: NURSE PRACTITIONER

## 2024-07-27 PROCEDURE — 87040 BLOOD CULTURE FOR BACTERIA: CPT | Performed by: NURSE PRACTITIONER

## 2024-07-27 PROCEDURE — 25510000001 IOPAMIDOL 61 % SOLUTION: Performed by: EMERGENCY MEDICINE

## 2024-07-27 PROCEDURE — 93005 ELECTROCARDIOGRAM TRACING: CPT | Performed by: HOSPITALIST

## 2024-07-27 PROCEDURE — 87186 SC STD MICRODIL/AGAR DIL: CPT | Performed by: NURSE PRACTITIONER

## 2024-07-27 PROCEDURE — 80053 COMPREHEN METABOLIC PANEL: CPT | Performed by: NURSE PRACTITIONER

## 2024-07-27 PROCEDURE — 0 TECHNETIUM TC 99M PENTETATE INHALER: Performed by: HOSPITALIST

## 2024-07-27 PROCEDURE — G0378 HOSPITAL OBSERVATION PER HR: HCPCS

## 2024-07-27 PROCEDURE — 74177 CT ABD & PELVIS W/CONTRAST: CPT

## 2024-07-27 PROCEDURE — 87086 URINE CULTURE/COLONY COUNT: CPT | Performed by: NURSE PRACTITIONER

## 2024-07-27 PROCEDURE — 25010000002 ONDANSETRON PER 1 MG: Performed by: NURSE PRACTITIONER

## 2024-07-27 PROCEDURE — 86140 C-REACTIVE PROTEIN: CPT | Performed by: HOSPITALIST

## 2024-07-27 PROCEDURE — 83605 ASSAY OF LACTIC ACID: CPT | Performed by: NURSE PRACTITIONER

## 2024-07-27 PROCEDURE — 99204 OFFICE O/P NEW MOD 45 MIN: CPT | Performed by: INTERNAL MEDICINE

## 2024-07-27 PROCEDURE — 85025 COMPLETE CBC W/AUTO DIFF WBC: CPT | Performed by: NURSE PRACTITIONER

## 2024-07-27 PROCEDURE — 73502 X-RAY EXAM HIP UNI 2-3 VIEWS: CPT

## 2024-07-27 PROCEDURE — 78582 LUNG VENTILAT&PERFUS IMAGING: CPT

## 2024-07-27 PROCEDURE — 93010 ELECTROCARDIOGRAM REPORT: CPT | Performed by: INTERNAL MEDICINE

## 2024-07-27 PROCEDURE — 87154 CUL TYP ID BLD PTHGN 6+ TRGT: CPT | Performed by: NURSE PRACTITIONER

## 2024-07-27 PROCEDURE — 99285 EMERGENCY DEPT VISIT HI MDM: CPT

## 2024-07-27 PROCEDURE — 85379 FIBRIN DEGRADATION QUANT: CPT | Performed by: HOSPITALIST

## 2024-07-27 PROCEDURE — A9540 TC99M MAA: HCPCS | Performed by: HOSPITALIST

## 2024-07-27 PROCEDURE — 25010000002 CEFTRIAXONE PER 250 MG: Performed by: NURSE PRACTITIONER

## 2024-07-27 PROCEDURE — 36415 COLL VENOUS BLD VENIPUNCTURE: CPT | Performed by: NURSE PRACTITIONER

## 2024-07-27 PROCEDURE — A9567 TECHNETIUM TC-99M AEROSOL: HCPCS | Performed by: HOSPITALIST

## 2024-07-27 PROCEDURE — 0 TECHNETIUM ALBUMIN AGGREGATED: Performed by: HOSPITALIST

## 2024-07-27 PROCEDURE — 25810000003 SODIUM CHLORIDE 0.9 % SOLUTION: Performed by: NURSE PRACTITIONER

## 2024-07-27 PROCEDURE — 25810000003 SODIUM CHLORIDE 0.9 % SOLUTION: Performed by: HOSPITALIST

## 2024-07-27 PROCEDURE — 81001 URINALYSIS AUTO W/SCOPE: CPT | Performed by: NURSE PRACTITIONER

## 2024-07-27 PROCEDURE — 85027 COMPLETE CBC AUTOMATED: CPT | Performed by: NURSE PRACTITIONER

## 2024-07-27 PROCEDURE — 71046 X-RAY EXAM CHEST 2 VIEWS: CPT

## 2024-07-27 RX ORDER — SODIUM CHLORIDE 0.9 % (FLUSH) 0.9 %
10 SYRINGE (ML) INJECTION AS NEEDED
Status: DISCONTINUED | OUTPATIENT
Start: 2024-07-27 | End: 2024-08-07 | Stop reason: HOSPADM

## 2024-07-27 RX ORDER — VANCOMYCIN HYDROCHLORIDE 125 MG/1
125 CAPSULE ORAL 3 TIMES DAILY
Status: DISCONTINUED | OUTPATIENT
Start: 2024-07-27 | End: 2024-07-27

## 2024-07-27 RX ORDER — SODIUM CHLORIDE 9 MG/ML
40 INJECTION, SOLUTION INTRAVENOUS AS NEEDED
Status: DISCONTINUED | OUTPATIENT
Start: 2024-07-27 | End: 2024-08-07 | Stop reason: HOSPADM

## 2024-07-27 RX ORDER — ONDANSETRON 4 MG/1
4 TABLET, ORALLY DISINTEGRATING ORAL EVERY 6 HOURS PRN
Status: DISCONTINUED | OUTPATIENT
Start: 2024-07-27 | End: 2024-08-07 | Stop reason: HOSPADM

## 2024-07-27 RX ORDER — POLYETHYLENE GLYCOL 3350 17 G/17G
17 POWDER, FOR SOLUTION ORAL DAILY PRN
Status: DISCONTINUED | OUTPATIENT
Start: 2024-07-27 | End: 2024-07-27

## 2024-07-27 RX ORDER — IOPAMIDOL 612 MG/ML
100 INJECTION, SOLUTION INTRAVASCULAR
Status: COMPLETED | OUTPATIENT
Start: 2024-07-27 | End: 2024-07-27

## 2024-07-27 RX ORDER — ACETAMINOPHEN 500 MG
1000 TABLET ORAL ONCE
Status: COMPLETED | OUTPATIENT
Start: 2024-07-27 | End: 2024-07-27

## 2024-07-27 RX ORDER — LEVOTHYROXINE SODIUM 50 UG/1
50 TABLET ORAL DAILY
Status: DISCONTINUED | OUTPATIENT
Start: 2024-07-27 | End: 2024-08-07 | Stop reason: HOSPADM

## 2024-07-27 RX ORDER — ACETAMINOPHEN 325 MG/1
650 TABLET ORAL EVERY 4 HOURS PRN
Status: DISCONTINUED | OUTPATIENT
Start: 2024-07-27 | End: 2024-08-07 | Stop reason: HOSPADM

## 2024-07-27 RX ORDER — SODIUM CHLORIDE 9 MG/ML
75 INJECTION, SOLUTION INTRAVENOUS CONTINUOUS
Status: DISCONTINUED | OUTPATIENT
Start: 2024-07-27 | End: 2024-07-29

## 2024-07-27 RX ORDER — ROSUVASTATIN CALCIUM 10 MG/1
10 TABLET, COATED ORAL NIGHTLY
Status: DISCONTINUED | OUTPATIENT
Start: 2024-07-27 | End: 2024-08-07 | Stop reason: HOSPADM

## 2024-07-27 RX ORDER — ONDANSETRON 2 MG/ML
4 INJECTION INTRAMUSCULAR; INTRAVENOUS EVERY 6 HOURS PRN
Status: DISCONTINUED | OUTPATIENT
Start: 2024-07-27 | End: 2024-08-07 | Stop reason: HOSPADM

## 2024-07-27 RX ORDER — ATENOLOL 25 MG/1
25 TABLET ORAL NIGHTLY
Status: DISCONTINUED | OUTPATIENT
Start: 2024-07-27 | End: 2024-08-07 | Stop reason: HOSPADM

## 2024-07-27 RX ORDER — ESCITALOPRAM OXALATE 20 MG/1
20 TABLET ORAL DAILY
Status: DISCONTINUED | OUTPATIENT
Start: 2024-07-27 | End: 2024-08-07 | Stop reason: HOSPADM

## 2024-07-27 RX ORDER — BISACODYL 5 MG/1
5 TABLET, DELAYED RELEASE ORAL DAILY PRN
Status: DISCONTINUED | OUTPATIENT
Start: 2024-07-27 | End: 2024-08-03

## 2024-07-27 RX ORDER — AMOXICILLIN 250 MG
2 CAPSULE ORAL 2 TIMES DAILY PRN
Status: DISCONTINUED | OUTPATIENT
Start: 2024-07-27 | End: 2024-07-27

## 2024-07-27 RX ORDER — CALCIUM CARBONATE 500 MG/1
2 TABLET, CHEWABLE ORAL 2 TIMES DAILY PRN
Status: DISCONTINUED | OUTPATIENT
Start: 2024-07-27 | End: 2024-08-07 | Stop reason: HOSPADM

## 2024-07-27 RX ORDER — SODIUM CHLORIDE 0.9 % (FLUSH) 0.9 %
10 SYRINGE (ML) INJECTION EVERY 12 HOURS SCHEDULED
Status: DISCONTINUED | OUTPATIENT
Start: 2024-07-27 | End: 2024-08-07 | Stop reason: HOSPADM

## 2024-07-27 RX ORDER — ACETAMINOPHEN 650 MG/1
650 SUPPOSITORY RECTAL EVERY 4 HOURS PRN
Status: DISCONTINUED | OUTPATIENT
Start: 2024-07-27 | End: 2024-08-07 | Stop reason: HOSPADM

## 2024-07-27 RX ORDER — ACETAMINOPHEN 160 MG/5ML
650 SOLUTION ORAL EVERY 4 HOURS PRN
Status: DISCONTINUED | OUTPATIENT
Start: 2024-07-27 | End: 2024-08-07 | Stop reason: HOSPADM

## 2024-07-27 RX ORDER — BISACODYL 10 MG
10 SUPPOSITORY, RECTAL RECTAL DAILY PRN
Status: DISCONTINUED | OUTPATIENT
Start: 2024-07-27 | End: 2024-08-03

## 2024-07-27 RX ADMIN — KIT FOR THE PREPARATION OF TECHNETIUM TC 99M PENTETATE 1 DOSE: 20 INJECTION, POWDER, LYOPHILIZED, FOR SOLUTION INTRAVENOUS; RESPIRATORY (INHALATION) at 20:04

## 2024-07-27 RX ADMIN — ACETAMINOPHEN 325MG 650 MG: 325 TABLET ORAL at 18:10

## 2024-07-27 RX ADMIN — CEFTRIAXONE 2000 MG: 2 INJECTION, POWDER, FOR SOLUTION INTRAMUSCULAR; INTRAVENOUS at 03:16

## 2024-07-27 RX ADMIN — ACETAMINOPHEN 1000 MG: 500 TABLET ORAL at 02:51

## 2024-07-27 RX ADMIN — ACETAMINOPHEN 325MG 650 MG: 325 TABLET ORAL at 12:49

## 2024-07-27 RX ADMIN — SODIUM CHLORIDE 1000 ML: 9 INJECTION, SOLUTION INTRAVENOUS at 03:03

## 2024-07-27 RX ADMIN — ACETAMINOPHEN 325MG 650 MG: 325 TABLET ORAL at 08:18

## 2024-07-27 RX ADMIN — KIT FOR THE PREPARATION OF TECHNETIUM TC 99M ALBUMIN AGGREGATED 1 DOSE: 2.5 INJECTION, POWDER, FOR SOLUTION INTRAVENOUS at 20:04

## 2024-07-27 RX ADMIN — SODIUM CHLORIDE 100 ML/HR: 9 INJECTION, SOLUTION INTRAVENOUS at 07:42

## 2024-07-27 RX ADMIN — SODIUM CHLORIDE 500 ML: 9 INJECTION, SOLUTION INTRAVENOUS at 05:19

## 2024-07-27 RX ADMIN — ESCITALOPRAM 20 MG: 20 TABLET, FILM COATED ORAL at 16:03

## 2024-07-27 RX ADMIN — IOPAMIDOL 85 ML: 612 INJECTION, SOLUTION INTRAVENOUS at 03:38

## 2024-07-27 RX ADMIN — SODIUM CHLORIDE 75 ML/HR: 9 INJECTION, SOLUTION INTRAVENOUS at 20:58

## 2024-07-27 RX ADMIN — ROSUVASTATIN CALCIUM 10 MG: 10 TABLET, FILM COATED ORAL at 20:58

## 2024-07-27 RX ADMIN — ATENOLOL 25 MG: 25 TABLET ORAL at 20:58

## 2024-07-27 RX ADMIN — ONDANSETRON 4 MG: 2 INJECTION INTRAMUSCULAR; INTRAVENOUS at 18:10

## 2024-07-27 NOTE — CONSULTS
FIRST UROLOGY CONSULT      Patient Identification:  NAME:  Anastasiia Faria  Age:  74 y.o.   Sex:  female   :  1950   MRN:  [id number]     Chief complaint: flank pain    History of present illness:       Patient admitted through ED for flank pain, n/v.  Patient had stent replacement by Dr. Bolton 2024.  WBC elevated, currently on rocephin. Urine culture pending.  Complains of sudden on set of right hip pain.  Started a few days ago, denies fall had hip replacement in May and has been doing well with hip.    In hospital:  -AVSS, good UOP  -WBC - 18  -Creat - 1.15  -UA -     -CT - reviewed       Asked to see    Past medical history:  Past Medical History:   Diagnosis Date    Allergic     Anxiety     Chronic kidney disease     Clostridium difficile infection 2024    TREATED AT [facility]    Colon polyps     Depression     Diverticulosis     Encounter for annual health examination 2014    Annual Health Assessment    Family history of colon cancer     Fibrocystic breast     GERD (gastroesophageal reflux disease)     Hard of hearing     HEARING AIDS BILATERALLY    Heart murmur     Herpes labialis without complication     History of cataract     History of mammogram 2010    History of recent hospitalization 2024    KIDNEY STONE/SEPSIS 4 NIGHT AT [facility]    HL (hearing loss) 2014    Hearing Aids    Hydronephrosis     Hyperlipidemia     Hypertension     Hypothyroidism     Insomnia     Kidney stones     Migraines     Osteoarthritis of right hip     Osteopenia     Prolia -last 2021,3/2022    PONV (postoperative nausea and vomiting)     Scoliosis     Dr. Stanford 2018    Sepsis 2024    Slow to wake up after anesthesia     Urinary tract infection     3/2024,      Visual impairment     Wear Glasses       Past surgical history:  Past Surgical History:   Procedure Laterality Date    BONE MARROW BIOPSY Left     BREAST CYST ASPIRATION Right     BREAST SURGERY Right     BIOPSY     CATARACT EXTRACTION, BILATERAL      Cataract surgery on right eye June 2021 and left eye July 2021. (Dr. Nanette Bateman)    COLONOSCOPY N/A 10/27/2016    Procedure: COLONOSCOPY INTO CECUM;  Surgeon: Osvaldo Dorado MD;  Location: [facility] ENDOSCOPY;  Service:     COLONOSCOPY  01/26/2011    COLONOSCOPY  02/04/2005    COLONOSCOPY N/A 12/02/2021    Procedure: COLONOSCOPY INTO CECUM WITH COLD BIOPSY POLYPECTOMY AND HOT SNARE POLYPECTOMY;  Surgeon: Osvaldo Dorado MD;  Location: [facility] ENDOSCOPY;  Service: General;  Laterality: N/A;  PRE-FAMILY HISTORY OF COLON CANCER, PERSONAL HISTORY OF COLON CANCER  POST- POLYPS, DIVERTICULOSIS, HEMORRHOIDS    CYSTOSCOPY W/ URETERAL STENT PLACEMENT Left 03/21/2024    Procedure: LEFT CYSTOSCOPY RETROGRADE PYLEOGRAM URETERAL STENT INSERTION;  Surgeon: Jaiden Bolton Jr., MD;  Location: [facility] MAIN OR;  Service: Urology;  Laterality: Left;    CYSTOSCOPY W/ URETERAL STENT REMOVAL  04/23/2024    CYSTOSCOPY, RETROGRADE PYELOGRAM AND STENT INSERTION Left 06/14/2024    Procedure: CYSTOSCOPY RETROGRADE PYELOGRAM AND STENT INSERTION;  Surgeon: Yahir Faria MD;  Location: [facility] MAIN OR;  Service: Urology;  Laterality: Left;    JOINT REPLACEMENT  5/2024    Right Hip   Dr. Vasu King    KNEE ARTHROSCOPY Left     PAP SMEAR  12/20/2010    TOTAL HIP ARTHROPLASTY Right 05/13/2024    Procedure: TOTAL HIP ARTHROPLASTY ANTERIOR WITH HANA TABLE;  Surgeon: Vasu King MD;  Location: [facility] OR OSC;  Service: Orthopedics;  Laterality: Right;    URETEROSCOPY LASER LITHOTRIPSY WITH STENT INSERTION Left 7/25/2024    Procedure: CYSTOSCOPY, LEFT URETEROSCOPY, LASER LITHOTRIPSY, STENT EXCHANGE;  Surgeon: Jaiden Bolton Jr., MD;  Location: [facility] MAIN OR;  Service: Urology;  Laterality: Left;       Allergies:  Patient has no known allergies.    Home medications:  Medications Prior to Admission   Medication Sig Dispense Refill Last Dose    atenolol (TENORMIN) 25 MG tablet Take  1 tablet by mouth Daily. 90 tablet 2     cephalexin (KEFLEX) 500 MG capsule Take 1 capsule by mouth 3 (Three) Times a Day.       escitalopram (LEXAPRO) 20 MG tablet Take 1 tablet by mouth Daily. 90 tablet 2     HYDROcodone-acetaminophen (NORCO) 5-325 MG per tablet Take 1-2 tablets by mouth Every 6 (Six) Hours As Needed for Moderate Pain (Pain). 12 tablet 0     levothyroxine (SYNTHROID, LEVOTHROID) 50 MCG tablet Take 1 tablet by mouth Daily. 90 tablet 2     loratadine (CLARITIN) 10 MG tablet Take 1 tablet by mouth Daily. Indications: Hayfever       NIFEdipine XL (PROCARDIA XL) 30 MG 24 hr tablet Take 1 tablet by mouth Daily. 90 tablet 2     Psyllium (METAMUCIL FIBER PO) Take 1 Scoop by mouth Daily. Indications: constipation       rosuvastatin (CRESTOR) 10 MG tablet Take 1 tablet by mouth Every Night. 90 tablet 3     sulfamethoxazole-trimethoprim (Bactrim DS) 800-160 MG per tablet Take 1 tablet by mouth 2 (Two) Times a Day. 10 tablet 0         Hospital medications:  [START ON 7/28/2024] cefTRIAXone, 2,000 mg, Intravenous, Q24H  sodium chloride, 10 mL, Intravenous, Q12H      sodium chloride, 100 mL/hr, Last Rate: 100 mL/hr (07/27/24 0742)        acetaminophen **OR** acetaminophen **OR** acetaminophen    senna-docusate sodium **AND** polyethylene glycol **AND** bisacodyl **AND** bisacodyl    calcium carbonate    ondansetron ODT **OR** ondansetron    [COMPLETED] Insert Peripheral IV **AND** sodium chloride    sodium chloride    sodium chloride    Family history:  Family History   Problem Relation Age of Onset    Breast cancer Mother     Colon cancer Mother     Hypertension Mother     Cancer Mother         Breat cancer, skin cancer, colon cancer    Hyperlipidemia Mother     Cancer Father         Skin cancer    Hypertension Sister     Hypertension Sister     Hyperlipidemia Sister     Thyroid disease Sister     Diabetes type II Brother     Cancer Brother         Skin cancer    Diabetes Brother     Colon cancer Maternal Aunt          colon ca 40    Cancer Maternal Aunt         Colon cancer cause of death early 40s    Colon cancer Maternal Aunt         64 yo    Heart disease Maternal Aunt     Colon cancer Maternal Aunt         79 yo    Cancer Maternal Aunt         Colon cancer    Cancer Maternal Aunt         Colon cancer    Cancer Maternal Uncle         Thyroid cancer    Heart disease Maternal Uncle     Stomach cancer Maternal Grandmother         or intestinal    Cancer Maternal Grandmother         GI TRACT CANCER    Breast cancer Maternal Grandmother     Heart disease Maternal Grandmother     Heart attack Maternal Grandfather     Breast cancer Maternal Grandfather     Heart disease Maternal Grandfather     Heart disease Paternal Grandmother     Colon cancer Other     Colon cancer Other     Malig Hyperthermia Neg Hx        Social history:  Social History     Tobacco Use    Smoking status: Never    Smokeless tobacco: Never    Tobacco comments:     daily caffine   Vaping Use    Vaping status: Never Used   Substance Use Topics    Alcohol use: Never    Drug use: Never       Review of systems:      Positive for:  nothing  Negative for:  chest pain, cough, sob, o/w neg    Objective:  TMax 24 hours:   Temp (24hrs), Av.5 °F (36.9 °C), Min:97.2 °F (36.2 °C), Max:101.4 °F (38.6 °C)      Vitals Ranges:   Temp:  [97.2 °F (36.2 °C)-101.4 °F (38.6 °C)] 97.2 °F (36.2 °C)  Heart Rate:  [60-86] 66  Resp:  [18] 18  BP: ()/(52-65) 98/62    Intake/Output Last 3 shifts:  No intake/output data recorded.     Physical Exam:    General Appearance:    Alert, cooperative, NAD   Back:     No CVA tenderness   Lungs:     Respirations unlabored, no wheezing    Heart:    RRR, intact peripheral pulses   Abdomen:     Soft, NDNT, no masses, no guarding   :    Pelvic not performed, bladder nondistended and nontender   Neuro/Psych:   Orientation intact, mood/affect pleasant       Results review:   I reviewed the patient's new clinical results.    Data  review:  Lab Results (last 24 hours)       Procedure Component Value Units Date/Time    Basic Metabolic Panel [[id number]]  (Abnormal) Collected: 07/27/24 0819    Specimen: Blood Updated: 07/27/24 0918     Glucose 119 mg/dL      BUN 22 mg/dL      Creatinine 1.15 mg/dL      Sodium 135 mmol/L      Potassium 3.9 mmol/L      Chloride 105 mmol/L      CO2 22.0 mmol/L      Calcium 8.3 mg/dL      BUN/Creatinine Ratio 19.1     Anion Gap 8.0 mmol/L      eGFR 50.1 mL/min/1.73     Narrative:      GFR Normal >60  Chronic Kidney Disease <60  Kidney Failure <15    The GFR formula is only valid for adults with stable renal function between ages 18 and 70.    CBC (No Diff) [[id number]]  (Abnormal) Collected: 07/27/24 0819    Specimen: Blood Updated: 07/27/24 0855     WBC 18.22 10*3/mm3      RBC 3.42 10*6/mm3      Hemoglobin 10.3 g/dL      Hematocrit 31.0 %      MCV 90.6 fL      MCH 30.1 pg      MCHC 33.2 g/dL      RDW 12.0 %      RDW-SD 39.8 fl      MPV 9.8 fL      Platelets 196 10*3/mm3     Urinalysis, Microscopic Only - Urine, Clean Catch [[id number]]  (Abnormal) Collected: 07/27/24 0416    Specimen: Urine, Clean Catch Updated: 07/27/24 0434     RBC, UA Too Numerous to Count /HPF      WBC, UA Too Numerous to Count /HPF      Bacteria, UA 1+ /HPF      Squamous Epithelial Cells, UA 3-6 /HPF      Hyaline Casts, UA 7-12 /LPF      Methodology Automated Microscopy    Urinalysis With Culture If Indicated - Urine, Clean Catch [[id number]]  (Abnormal) Collected: 07/27/24 0416    Specimen: Urine, Clean Catch Updated: 07/27/24 0434     Color, UA Yellow     Appearance, UA Turbid     pH, UA 5.5     Specific Gravity, UA 1.023     Glucose, UA Negative     Ketones, UA Negative     Bilirubin, UA Negative     Blood, UA Large (3+)     Protein,  mg/dL (2+)     Leuk Esterase, UA Large (3+)     Nitrite, UA Positive     Urobilinogen, UA 0.2 E.U./dL    Narrative:      In absence of clinical symptoms, the presence of pyuria, bacteria, and/or nitrites on  the urinalysis result does not correlate with infection.    Urine Culture - Urine, Urine, Clean Catch [[id number]] Collected: 07/27/24 0416    Specimen: Urine, Clean Catch Updated: 07/27/24 0434    Comprehensive Metabolic Panel [[id number]]  (Abnormal) Collected: 07/27/24 0240    Specimen: Blood from Arm, Right Updated: 07/27/24 0321     Glucose 148 mg/dL      BUN 23 mg/dL      Creatinine 1.35 mg/dL      Sodium 135 mmol/L      Potassium 4.0 mmol/L      Chloride 103 mmol/L      CO2 23.1 mmol/L      Calcium 9.0 mg/dL      Total Protein 5.8 g/dL      Albumin 3.3 g/dL      ALT (SGPT) 7 U/L      AST (SGOT) 15 U/L      Alkaline Phosphatase 84 U/L      Total Bilirubin 0.4 mg/dL      Globulin 2.5 gm/dL      A/G Ratio 1.3 g/dL      BUN/Creatinine Ratio 17.0     Anion Gap 8.9 mmol/L      eGFR 41.3 mL/min/1.73     Narrative:      GFR Normal >60  Chronic Kidney Disease <60  Kidney Failure <15    The GFR formula is only valid for adults with stable renal function between ages 18 and 70.    Lactic Acid, Plasma [[id number]]  (Normal) Collected: 07/27/24 0240    Specimen: Blood from Arm, Right Updated: 07/27/24 0317     Lactate 1.2 mmol/L     CBC & Differential [[id number]]  (Abnormal) Collected: 07/27/24 0240    Specimen: Blood from Arm, Right Updated: 07/27/24 0303    Narrative:      The following orders were created for panel order CBC & Differential.  Procedure                               Abnormality         Status                     ---------                               -----------         ------                     CBC Auto Differential[[id number]]        Abnormal            Final result                 Please view results for these tests on the individual orders.    CBC Auto Differential [[id number]]  (Abnormal) Collected: 07/27/24 0240    Specimen: Blood from Arm, Right Updated: 07/27/24 0303     WBC 20.61 10*3/mm3      RBC 3.68 10*6/mm3      Hemoglobin 11.2 g/dL      Hematocrit 33.8 %      MCV 91.8 fL      MCH 30.4 pg       MCHC 33.1 g/dL      RDW 11.9 %      RDW-SD 40.1 fl      MPV 9.8 fL      Platelets 220 10*3/mm3      Neutrophil % 87.0 %      Lymphocyte % 4.6 %      Monocyte % 5.5 %      Eosinophil % 0.1 %      Basophil % 0.3 %      Immature Grans % 2.5 %      Neutrophils, Absolute 17.93 10*3/mm3      Lymphocytes, Absolute 0.94 10*3/mm3      Monocytes, Absolute 1.13 10*3/mm3      Eosinophils, Absolute 0.03 10*3/mm3      Basophils, Absolute 0.06 10*3/mm3      Immature Grans, Absolute 0.52 10*3/mm3      nRBC 0.0 /100 WBC     Blood Culture - Blood, Hand, Left [[id number]] Collected: 07/27/24 0253    Specimen: Blood from Hand, Left Updated: 07/27/24 0258    Blood Culture - Blood, Arm, Right [[id number]] Collected: 07/27/24 0240    Specimen: Blood from Arm, Right Updated: 07/27/24 0258             Imaging:  Imaging Results (Last 24 Hours)       Procedure Component Value Units Date/Time    CT Abdomen Pelvis With Contrast [[id number]] Collected: 07/27/24 0410     Updated: 07/27/24 0410    Narrative:        Patient: JAYDEN MCDONNELL  Time Out: 04:09  Exam(s): CT ABDOMEN + PELVIS With Contrast     EXAM:  CT Abdomen and Pelvis With Intravenous Contrast    CLINICAL HISTORY:  Reason for exam: right flank pain, recent litho.    TECHNIQUE:  Axial computed tomography images of the abdomen and pelvis with   intravenous contrast.  CTDI is 14.51 mGy and DLP is 714 mGy-cm.  This CT   exam was performed according to the principle of ALARA (As Low As   Reasonably Achievable) by using one or more of the following dose   reduction techniques: automated exposure control, adjustment of the mA   and or kV according to patient size, and or use of iterative   reconstruction technique.    COMPARISON:  6 12 24    FINDINGS:  Lung bases:  Bibasilar subsegmental atelectasis.    ABDOMEN:  Liver:  Benign hepatic cysts.  Gallbladder and bile ducts:  Unremarkable.  No calcified stones.  No   ductal dilation.  Pancreas:  Unremarkable.  No mass.  No ductal dilation.  Spleen:   Unremarkable.  No splenomegaly.  Adrenals:  Unremarkable.  No mass.  Kidneys and ureters:  There are 2 nonobstructing right kidney stones, the   largest measuring 4 mm.  There is no right-sided hydroureteronephrosis.    There is a left ureteral stent, appropriately positioned.  There is mild   left-sided hydronephrosis.  There is urothelial thickening within the   left renal pelvis and left ureter with periureteral fat stranding.  Some   hyperdense material is suggested within the left renal calyces.  There is   a nonobstructing 2 mm left kidney stone.    Stomach and bowel:  No bowel obstruction.  Diverticulosis without   diverticulitis.    PELVIS:  Appendix:  Normal appendix.  Bladder:  Unremarkable.  No mass.  Reproductive:  Unremarkable as visualized.    ABDOMEN and PELVIS:  Intraperitoneal space:  Unremarkable.  No free air, significant free   fluid, or fluid collection.  Bones joints:  No acute fracture or dislocation.  Right total hip   arthroplasty.  Soft tissues:  Unremarkable.  Vasculature:  Atherosclerosis.  No aortic aneurysm.  Lymph nodes:  Unremarkable.  No enlarged lymph nodes.    IMPRESSION:       1.  Left ureteral stent in place.  2.  Mild left-sided hydroureteronephrosis without obstructing stone.    There is a nonobstructing left kidney upper pole stone.  3.  Left-sided urothelial thickening with periureteral fat stranding,   which may be secondary to urinary tract infection or indwelling stent.  4.  Some hyperdensity is suggested within the left renal calyces, which   may represent blood, purulent material, or early excretion of contrast.    Correlate with urinalysis.      Impression:          Electronically signed by Loyda Bee M.D. on 07-27-24 at 0409             Assessment:         Plan:     Review culture  Continue Rocephin until culture results  No urological intervention for now  Call with questions.  Discussed hip pain with nurse, she will discuss with hospitalist. Recommend XRAY of  hip    Phyllis Bruce, APRN  07/27/24  10:28 EDT

## 2024-07-27 NOTE — ED NOTES
".Nursing report ED to floor  Anastasiia Faria  74 y.o.  female    HPI :  HPI (Adult)  Stated Reason for Visit: Right hip pain, worse tonight.  History Obtained From: patient    Chief Complaint  Chief Complaint   Patient presents with    Hip Pain       Admitting doctor:   Senthil Medeiros MD    Admitting diagnosis:   The primary encounter diagnosis was Acute UTI. Diagnoses of Sepsis without acute organ dysfunction, due to unspecified organism and ELVIA (acute kidney injury) were also pertinent to this visit.    Code status:   Current Code Status       Date Active Code Status Order ID Comments User Context       7/27/2024 0537 CPR (Attempt to Resuscitate) [id number]  Kimberley Manrique APRN ED        Question Answer    Code Status (Patient has no pulse and is not breathing) CPR (Attempt to Resuscitate)    Medical Interventions (Patient has pulse or is breathing) Full Support                    Allergies:   Patient has no known allergies.    Isolation:   No active isolations    Intake and Output  No intake or output data in the 24 hours ending 07/27/24 0603    Weight:       07/27/24  0200   Weight: 79.4 kg (175 lb)       Most recent vitals:   Vitals:    07/27/24 0200 07/27/24 0209 07/27/24 0447 07/27/24 0531   BP:  125/57 100/52 99/59   BP Location:   Left arm    Patient Position:   Lying    Pulse: 86 74 66 63   Resp: 18  18    Temp: (!) 101.4 °F (38.6 °C)  97.9 °F (36.6 °C)    TempSrc:   Oral    SpO2: 92% 91% 94% 92%   Weight: 79.4 kg (175 lb)      Height: 172.7 cm (68\")          Active LDAs/IV Access:   Lines, Drains & Airways       Active LDAs       Name Placement date Placement time Site Days    Peripheral IV 07/27/24 0301 Anterior;Right;Distal;Medial Forearm 07/27/24  0301  Forearm  less than 1    Ureteral Drain/Stent 07/25/24  in OR  --  --  2                    Labs (abnormal labs have a star):   Labs Reviewed   COMPREHENSIVE METABOLIC PANEL - Abnormal; Notable for the following components:       Result Value    " Glucose 148 (*)     Creatinine 1.35 (*)     Sodium 135 (*)     Total Protein 5.8 (*)     Albumin 3.3 (*)     eGFR 41.3 (*)     All other components within normal limits    Narrative:     GFR Normal >60  Chronic Kidney Disease <60  Kidney Failure <15    The GFR formula is only valid for adults with stable renal function between ages 18 and 70.   URINALYSIS W/ CULTURE IF INDICATED - Abnormal; Notable for the following components:    Appearance, UA Turbid (*)     Blood, UA Large (3+) (*)     Protein,  mg/dL (2+) (*)     Leuk Esterase, UA Large (3+) (*)     Nitrite, UA Positive (*)     All other components within normal limits    Narrative:     In absence of clinical symptoms, the presence of pyuria, bacteria, and/or nitrites on the urinalysis result does not correlate with infection.   CBC WITH AUTO DIFFERENTIAL - Abnormal; Notable for the following components:    WBC 20.61 (*)     RBC 3.68 (*)     Hemoglobin 11.2 (*)     Hematocrit 33.8 (*)     RDW 11.9 (*)     Neutrophil % 87.0 (*)     Lymphocyte % 4.6 (*)     Eosinophil % 0.1 (*)     Immature Grans % 2.5 (*)     Neutrophils, Absolute 17.93 (*)     Monocytes, Absolute 1.13 (*)     Immature Grans, Absolute 0.52 (*)     All other components within normal limits   URINALYSIS, MICROSCOPIC ONLY - Abnormal; Notable for the following components:    RBC, UA Too Numerous to Count (*)     WBC, UA Too Numerous to Count (*)     Bacteria, UA 1+ (*)     Squamous Epithelial Cells, UA 3-6 (*)     All other components within normal limits   LACTIC ACID, PLASMA - Normal   BLOOD CULTURE   BLOOD CULTURE   URINE CULTURE   BASIC METABOLIC PANEL   CBC (NO DIFF)   CBC AND DIFFERENTIAL    Narrative:     The following orders were created for panel order CBC & Differential.  Procedure                               Abnormality         Status                     ---------                               -----------         ------                     CBC Auto Differential[[id number]]         Abnormal            Final result                 Please view results for these tests on the individual orders.       EKG:   No orders to display       Meds given in ED:   Medications   sodium chloride 0.9 % flush 10 mL (has no administration in time range)   sodium chloride 0.9 % bolus 500 mL (500 mL Intravenous New Bag 7/27/24 8308)   sodium chloride 0.9 % flush 10 mL (has no administration in time range)   sodium chloride 0.9 % flush 10 mL (has no administration in time range)   sodium chloride 0.9 % infusion 40 mL (has no administration in time range)   sodium chloride 0.9 % infusion (has no administration in time range)   acetaminophen (TYLENOL) tablet 650 mg (has no administration in time range)     Or   acetaminophen (TYLENOL) 160 MG/5ML oral solution 650 mg (has no administration in time range)     Or   acetaminophen (TYLENOL) suppository 650 mg (has no administration in time range)   ondansetron ODT (ZOFRAN-ODT) disintegrating tablet 4 mg (has no administration in time range)     Or   ondansetron (ZOFRAN) injection 4 mg (has no administration in time range)   sennosides-docusate (PERICOLACE) 8.6-50 MG per tablet 2 tablet (has no administration in time range)     And   polyethylene glycol (MIRALAX) packet 17 g (has no administration in time range)     And   bisacodyl (DULCOLAX) EC tablet 5 mg (has no administration in time range)     And   bisacodyl (DULCOLAX) suppository 10 mg (has no administration in time range)   calcium carbonate (TUMS) chewable tablet 500 mg (200 mg elemental) (has no administration in time range)   cefTRIAXone (ROCEPHIN) 2,000 mg in sodium chloride 0.9 % 100 mL MBP (has no administration in time range)   acetaminophen (TYLENOL) tablet 1,000 mg (1,000 mg Oral Given 7/27/24 0251)   sodium chloride 0.9 % bolus 1,000 mL (0 mL Intravenous Stopped 7/27/24 2987)   cefTRIAXone (ROCEPHIN) 2,000 mg in sodium chloride 0.9 % 100 mL MBP (0 mg Intravenous Stopped 7/27/24 6148)   iopamidol  (ISOVUE-300) 61 % injection 100 mL (85 mL Intravenous Given 7/27/24 0338)       Imaging results:  CT Abdomen Pelvis With Contrast    Result Date: 7/27/2024  Electronically signed by Loyda Bee M.D. on 07-27-24 at 0409     Ambulatory status:   - up with assist x1    Social issues:   Social History     Socioeconomic History    Marital status:    Tobacco Use    Smoking status: Never    Smokeless tobacco: Never    Tobacco comments:     daily caffine   Vaping Use    Vaping status: Never Used   Substance and Sexual Activity    Alcohol use: Never    Drug use: Never    Sexual activity: Not Currently     Partners: Male     Birth control/protection: Post-menopausal     Comment: Took birth control pills approximately 30 years.       Peripheral Neurovascular  Peripheral Neurovascular (Adult)  Peripheral Neurovascular WDL: .WDL except  Additional Documentation: Edema (Group)  Edema  Edema: ankle, left, ankle, right    Neuro Cognitive  Neuro Cognitive (Adult)  Cognitive/Neuro/Behavioral WDL: WDL    Learning  Learning Assessment (Adult)  Learning Readiness and Ability: no barriers identified  Education Provided  Person Taught: patient, spouse  Teaching Method: verbal instruction  Teaching Focus: symptom/problem overview  Education Outcome Evaluation: eager to learn, acceptance expressed    Respiratory  Respiratory (Adult)  Airway WDL: .WDL except  Additional Documentation: Breath Sounds (Group)  Respiratory WDL  Respiratory WDL: WDL  Breath Sounds  Breath Sounds: All Fields  All Lung Fields Breath Sounds: diminished    Abdominal Pain       Pain Assessments  Pain (Adult)  (0-10) Pain Rating: Rest: 9  (0-10) Pain Rating: Activity: 9    NIH Stroke Scale       Christine Paniagua RN  07/27/24 06:03 EDT

## 2024-07-27 NOTE — ED PROVIDER NOTES
MD ATTESTATION NOTE    SHARED VISIT: This visit was performed by BOTH a physician and an APC. The substantive portion of the medical decision making was performed by this attesting physician who made or approved the management plan and takes responsibility for patient management. All studies documented in the APC note (if performed) were independently interpreted by me.    The INES and I have discussed this patient's history, physical exam, MDM, and treatment plan.  I have reviewed the documentation and personally had a face to face interaction with the patient. The attached note describes my personal findings.      Anastasiia Faria is a 74 y.o. female who presents to the ED c/o acute right flank pain.  Patient on.  Febrile.  Patient recently underwent ureteral stenting.  Patient reports nausea vomiting.    On exam:  GENERAL: Mild distressed  HENT: nares patent  EYES: no scleral icterus  CV: regular rhythm, regular rate  RESPIRATORY: normal effort  ABDOMEN: soft right flank tender to palpation  MUSCULOSKELETAL: no deformity  NEURO: alert, moves all extremities, follows commands  SKIN: warm, dry    Labs  Recent Results (from the past 24 hour(s))   Comprehensive Metabolic Panel    Collection Time: 07/27/24  2:40 AM    Specimen: Arm, Right; Blood   Result Value Ref Range    Glucose 148 (H) 65 - 99 mg/dL    BUN 23 8 - 23 mg/dL    Creatinine 1.35 (H) 0.57 - 1.00 mg/dL    Sodium 135 (L) 136 - 145 mmol/L    Potassium 4.0 3.5 - 5.2 mmol/L    Chloride 103 98 - 107 mmol/L    CO2 23.1 22.0 - 29.0 mmol/L    Calcium 9.0 8.6 - 10.5 mg/dL    Total Protein 5.8 (L) 6.0 - 8.5 g/dL    Albumin 3.3 (L) 3.5 - 5.2 g/dL    ALT (SGPT) 7 1 - 33 U/L    AST (SGOT) 15 1 - 32 U/L    Alkaline Phosphatase 84 39 - 117 U/L    Total Bilirubin 0.4 0.0 - 1.2 mg/dL    Globulin 2.5 gm/dL    A/G Ratio 1.3 g/dL    BUN/Creatinine Ratio 17.0 7.0 - 25.0    Anion Gap 8.9 5.0 - 15.0 mmol/L    eGFR 41.3 (L) >60.0 mL/min/1.73   Lactic Acid, Plasma    Collection Time:  07/27/24  2:40 AM    Specimen: Arm, Right; Blood   Result Value Ref Range    Lactate 1.2 0.5 - 2.0 mmol/L   CBC Auto Differential    Collection Time: 07/27/24  2:40 AM    Specimen: Arm, Right; Blood   Result Value Ref Range    WBC 20.61 (H) 3.40 - 10.80 10*3/mm3    RBC 3.68 (L) 3.77 - 5.28 10*6/mm3    Hemoglobin 11.2 (L) 12.0 - 15.9 g/dL    Hematocrit 33.8 (L) 34.0 - 46.6 %    MCV 91.8 79.0 - 97.0 fL    MCH 30.4 26.6 - 33.0 pg    MCHC 33.1 31.5 - 35.7 g/dL    RDW 11.9 (L) 12.3 - 15.4 %    RDW-SD 40.1 37.0 - 54.0 fl    MPV 9.8 6.0 - 12.0 fL    Platelets 220 140 - 450 10*3/mm3    Neutrophil % 87.0 (H) 42.7 - 76.0 %    Lymphocyte % 4.6 (L) 19.6 - 45.3 %    Monocyte % 5.5 5.0 - 12.0 %    Eosinophil % 0.1 (L) 0.3 - 6.2 %    Basophil % 0.3 0.0 - 1.5 %    Immature Grans % 2.5 (H) 0.0 - 0.5 %    Neutrophils, Absolute 17.93 (H) 1.70 - 7.00 10*3/mm3    Lymphocytes, Absolute 0.94 0.70 - 3.10 10*3/mm3    Monocytes, Absolute 1.13 (H) 0.10 - 0.90 10*3/mm3    Eosinophils, Absolute 0.03 0.00 - 0.40 10*3/mm3    Basophils, Absolute 0.06 0.00 - 0.20 10*3/mm3    Immature Grans, Absolute 0.52 (H) 0.00 - 0.05 10*3/mm3    nRBC 0.0 0.0 - 0.2 /100 WBC   Urinalysis With Culture If Indicated - Urine, Clean Catch    Collection Time: 07/27/24  4:16 AM    Specimen: Urine, Clean Catch   Result Value Ref Range    Color, UA Yellow Yellow, Straw    Appearance, UA Turbid (A) Clear    pH, UA 5.5 5.0 - 8.0    Specific Gravity, UA 1.023 1.005 - 1.030    Glucose, UA Negative Negative    Ketones, UA Negative Negative    Bilirubin, UA Negative Negative    Blood, UA Large (3+) (A) Negative    Protein,  mg/dL (2+) (A) Negative    Leuk Esterase, UA Large (3+) (A) Negative    Nitrite, UA Positive (A) Negative    Urobilinogen, UA 0.2 E.U./dL 0.2 - 1.0 E.U./dL   Urinalysis, Microscopic Only - Urine, Clean Catch    Collection Time: 07/27/24  4:16 AM    Specimen: Urine, Clean Catch   Result Value Ref Range    RBC, UA Too Numerous to Count (A) None Seen, 0-2  /HPF    WBC, UA Too Numerous to Count (A) None Seen, 0-2 /HPF    Bacteria, UA 1+ (A) None Seen /HPF    Squamous Epithelial Cells, UA 3-6 (A) None Seen, 0-2 /HPF    Hyaline Casts, UA 7-12 None Seen /LPF    Methodology Automated Microscopy        Radiology  CT Abdomen Pelvis With Contrast    Result Date: 7/27/2024  Patient: JAYDEN MCDONNELL  Time Out: 04:09 Exam(s): CT ABDOMEN + PELVIS With Contrast EXAM: CT Abdomen and Pelvis With Intravenous Contrast CLINICAL HISTORY: Reason for exam: right flank pain, recent litho. TECHNIQUE: Axial computed tomography images of the abdomen and pelvis with intravenous contrast.  CTDI is 14.51 mGy and DLP is 714 mGy-cm.  This CT exam was performed according to the principle of ALARA (As Low As Reasonably Achievable) by using one or more of the following dose reduction techniques: automated exposure control, adjustment of the mA and or kV according to patient size, and or use of iterative reconstruction technique. COMPARISON: 6 12 24 FINDINGS: Lung bases:  Bibasilar subsegmental atelectasis. ABDOMEN: Liver:  Benign hepatic cysts. Gallbladder and bile ducts:  Unremarkable.  No calcified stones.  No ductal dilation. Pancreas:  Unremarkable.  No mass.  No ductal dilation. Spleen:  Unremarkable.  No splenomegaly. Adrenals:  Unremarkable.  No mass. Kidneys and ureters:  There are 2 nonobstructing right kidney stones, the largest measuring 4 mm.  There is no right-sided hydroureteronephrosis.  There is a left ureteral stent, appropriately positioned.  There is mild left-sided hydronephrosis.  There is urothelial thickening within the left renal pelvis and left ureter with periureteral fat stranding.  Some hyperdense material is suggested within the left renal calyces.  There is a nonobstructing 2 mm left kidney stone.  Stomach and bowel:  No bowel obstruction.  Diverticulosis without diverticulitis. PELVIS: Appendix:  Normal appendix. Bladder:  Unremarkable.  No mass. Reproductive:  Unremarkable  as visualized. ABDOMEN and PELVIS: Intraperitoneal space:  Unremarkable.  No free air, significant free fluid, or fluid collection. Bones joints:  No acute fracture or dislocation.  Right total hip arthroplasty. Soft tissues:  Unremarkable. Vasculature:  Atherosclerosis.  No aortic aneurysm. Lymph nodes:  Unremarkable.  No enlarged lymph nodes. IMPRESSION:     1.  Left ureteral stent in place. 2.  Mild left-sided hydroureteronephrosis without obstructing stone.  There is a nonobstructing left kidney upper pole stone. 3.  Left-sided urothelial thickening with periureteral fat stranding, which may be secondary to urinary tract infection or indwelling stent. 4.  Some hyperdensity is suggested within the left renal calyces, which may represent blood, purulent material, or early excretion of contrast.  Correlate with urinalysis.     Electronically signed by Loyda Bee M.D. on 07-27-24 at 0409     Medications given in the ED:  Medications   sodium chloride 0.9 % flush 10 mL (has no administration in time range)   sodium chloride 0.9 % flush 10 mL (has no administration in time range)   sodium chloride 0.9 % flush 10 mL (has no administration in time range)   sodium chloride 0.9 % infusion 40 mL (has no administration in time range)   sodium chloride 0.9 % infusion (has no administration in time range)   acetaminophen (TYLENOL) tablet 650 mg (has no administration in time range)     Or   acetaminophen (TYLENOL) 160 MG/5ML oral solution 650 mg (has no administration in time range)     Or   acetaminophen (TYLENOL) suppository 650 mg (has no administration in time range)   ondansetron ODT (ZOFRAN-ODT) disintegrating tablet 4 mg (has no administration in time range)     Or   ondansetron (ZOFRAN) injection 4 mg (has no administration in time range)   sennosides-docusate (PERICOLACE) 8.6-50 MG per tablet 2 tablet (has no administration in time range)     And   polyethylene glycol (MIRALAX) packet 17 g (has no administration  in time range)     And   bisacodyl (DULCOLAX) EC tablet 5 mg (has no administration in time range)     And   bisacodyl (DULCOLAX) suppository 10 mg (has no administration in time range)   calcium carbonate (TUMS) chewable tablet 500 mg (200 mg elemental) (has no administration in time range)   cefTRIAXone (ROCEPHIN) 2,000 mg in sodium chloride 0.9 % 100 mL MBP (has no administration in time range)   acetaminophen (TYLENOL) tablet 1,000 mg (1,000 mg Oral Given 7/27/24 0251)   sodium chloride 0.9 % bolus 1,000 mL (0 mL Intravenous Stopped 7/27/24 0415)   cefTRIAXone (ROCEPHIN) 2,000 mg in sodium chloride 0.9 % 100 mL MBP (0 mg Intravenous Stopped 7/27/24 0447)   iopamidol (ISOVUE-300) 61 % injection 100 mL (85 mL Intravenous Given 7/27/24 0338)   sodium chloride 0.9 % bolus 500 mL (500 mL Intravenous New Bag 7/27/24 4308)       Orders placed during this visit:  Orders Placed This Encounter   Procedures    Blood Culture - Blood,    Blood Culture - Blood,    Urine Culture - Urine,    CT Abdomen Pelvis With Contrast    Comprehensive Metabolic Panel    Urinalysis With Culture If Indicated - Urine, Clean Catch    Lactic Acid, Plasma    CBC Auto Differential    Urinalysis, Microscopic Only - Urine, Clean Catch    Basic Metabolic Panel    CBC (No Diff)    Diet: Regular/House; Fluid Consistency: Thin (IDDSI 0)    Monitor Blood Pressure    Continuous Pulse Oximetry    Vital Signs    Intake & Output    Weigh Patient    Oral Care    Saline Lock & Maintain IV Access    Place Sequential Compression Device    Maintain Sequential Compression Device    Code Status and Medical Interventions: CPR (Attempt to Resuscitate); Full Support    [facility] (on-call MD unless specified) Details    Inpatient Urology Consult    Insert Peripheral IV    Insert Peripheral IV    Initiate Observation Status    CBC & Differential       Medical Decision Making:  ED Course as of 07/27/24 0729   Sat Jul 27, 2024 0220 I discussed the case with Dr. Pacheco and  he agrees to evaluate the patient at the bedside.    [AR]   0309 WBC(!): 20.61 [AR]   0407 Creatinine(!): 1.35 [AR]   0407 Lactate: 1.2 [AR]   0458 Patient reassessed.  Discussed ED findings, differential diagnosis, and the need for admission for evaluation/treatment.  They are agreeable to admission and all questions were answered.     [AR]   5236 Phone call with DEMETRIS Chu with [facility].  Discussed the patient, relevant history, exam, diagnostics, ED findings/progress, and concerns. They agree to admit the patient to Dr. Ro. Care assumed by the admitting physician at this time.     [AR]      ED Course User Index  [AR] Beena Cannon APRN       Differential diagnosis:  UTI, pyelonephritis, sepsis    Diagnosis  Final diagnoses:   Acute UTI   Sepsis without acute organ dysfunction, due to unspecified organism   ELVIA (acute kidney injury)          De Pacheco MD  07/27/24 9997

## 2024-07-27 NOTE — ED PROVIDER NOTES
EMERGENCY DEPARTMENT ENCOUNTER  Room Number:  02/02  PCP: Mirza Scott Sr., MD  Independent Historians: Patient      HPI:  Chief Complaint: had concerns including Hip Pain.     A complete HPI/ROS/PMH/PSH/SH/FH are unobtainable due to: None    Chronic or social conditions impacting patient care (Social Determinants of Health): None      Context: Anastasiia Faria is a 74 y.o. female who presents to the ED with c/o right flank pain. Patient also verbalizes c/o chills and dysuria. Symptoms started approximately one hour prior to arrival. Pain is described as stabbing. She is unaware of any alleviating or aggravating factors. Additionally, patient reports she recently underwent a ureteral stent placement by [facility] for a stone. Currently on abx for UTI. Patient denies headache, lightheadedness, dizziness, numbness, tingling, unilateral extremity weakness, syncope, shortness of breath, chest pain, abdominal pain, nausea, vomiting, diarrhea, hematuria, or other complaints.        PAST MEDICAL HISTORY  Active Ambulatory Problems     Diagnosis Date Noted    Colon polyp, hyperplastic 09/21/2016    Allergic rhinitis due to pollen 09/21/2016    Acquired hypothyroidism 09/21/2016    Essential hypertension 09/21/2016    FH: colon cancer in relative <50 years old 09/21/2016    Mixed hyperlipidemia 09/21/2016    Chronic right shoulder pain 03/30/2018    Periscapular pain 03/30/2018    Other idiopathic scoliosis, thoracic region 03/30/2018    Prediabetes 10/15/2018    Age-related osteoporosis without current pathological fracture 01/24/2019    Mild episode of recurrent major depressive disorder 08/06/2020    Age-related nuclear cataract of both eyes 03/26/2021    Cystoid nevus of conjunctiva 03/26/2021    Anxiety 07/16/2021    Cataract 10/12/2021    Cataract extraction status of left eye 07/08/2021    Cataract extraction status of right eye 06/17/2021    Conjunctival cyst of left eye 07/16/2021    Depressive disorder 07/16/2021     Disorder of refraction 07/16/2021    Dry eye syndrome of bilateral lacrimal glands 07/16/2021    Hearing loss 07/16/2021    Hearing loss 10/12/2021    Hordeolum internum right upper eyelid 07/16/2021    Migraine 07/16/2021    Osteopenia 03/31/2015    Osteoporosis 07/16/2021    Renal stone 07/16/2021    Seasonal allergies 10/12/2021    Diverticulosis 12/02/2021    Internal hemorrhoids 12/02/2021    Posterior vitreous detachment of both eyes 08/17/2022    Right hip pain 10/31/2023    OA (osteoarthritis) of hip 12/12/2023    Sepsis 03/21/2024    Acute pyelonephritis 03/21/2024    Ureteral calculus 03/21/2024    E coli bacteremia 03/22/2024    Acute respiratory failure with hypoxia 03/22/2024    Dysuria 04/01/2024    GI bleed 06/12/2024    Colitis 06/13/2024    UTI (urinary tract infection) 06/13/2024    Leukocytosis 06/13/2024     Resolved Ambulatory Problems     Diagnosis Date Noted    CKD (chronic kidney disease) 09/21/2016    Screen for colon cancer 11/03/2021     Past Medical History:   Diagnosis Date    Allergic     Chronic kidney disease     Clostridium difficile infection 06/2024    Colon polyps     Depression     Encounter for annual health examination 03/07/2014    Family history of colon cancer     Fibrocystic breast     GERD (gastroesophageal reflux disease)     Hard of hearing     Heart murmur     Herpes labialis without complication     History of cataract     History of mammogram 12/20/2010    History of recent hospitalization 03/21/2024    HL (hearing loss) 2014    Hydronephrosis     Hyperlipidemia     Hypertension     Hypothyroidism     Insomnia     Kidney stones     Migraines     Osteoarthritis of right hip     PONV (postoperative nausea and vomiting)     Scoliosis     Slow to wake up after anesthesia     Urinary tract infection     Visual impairment          PAST SURGICAL HISTORY  Past Surgical History:   Procedure Laterality Date    BONE MARROW BIOPSY Left     BREAST CYST ASPIRATION Right      BREAST SURGERY Right     BIOPSY    CATARACT EXTRACTION, BILATERAL      Cataract surgery on right eye June 2021 and left eye July 2021. (Dr. Nanette Btaeman)    COLONOSCOPY N/A 10/27/2016    Procedure: COLONOSCOPY INTO CECUM;  Surgeon: Osvaldo Dorado MD;  Location: [facility] ENDOSCOPY;  Service:     COLONOSCOPY  01/26/2011    COLONOSCOPY  02/04/2005    COLONOSCOPY N/A 12/02/2021    Procedure: COLONOSCOPY INTO CECUM WITH COLD BIOPSY POLYPECTOMY AND HOT SNARE POLYPECTOMY;  Surgeon: Osvaldo Dorado MD;  Location: [facility] ENDOSCOPY;  Service: General;  Laterality: N/A;  PRE-FAMILY HISTORY OF COLON CANCER, PERSONAL HISTORY OF COLON CANCER  POST- POLYPS, DIVERTICULOSIS, HEMORRHOIDS    CYSTOSCOPY W/ URETERAL STENT PLACEMENT Left 03/21/2024    Procedure: LEFT CYSTOSCOPY RETROGRADE PYLEOGRAM URETERAL STENT INSERTION;  Surgeon: Jaiden Bolton Jr., MD;  Location: [facility] MAIN OR;  Service: Urology;  Laterality: Left;    CYSTOSCOPY W/ URETERAL STENT REMOVAL  04/23/2024    CYSTOSCOPY, RETROGRADE PYELOGRAM AND STENT INSERTION Left 06/14/2024    Procedure: CYSTOSCOPY RETROGRADE PYELOGRAM AND STENT INSERTION;  Surgeon: Yahri Faria MD;  Location: [facility] MAIN OR;  Service: Urology;  Laterality: Left;    JOINT REPLACEMENT  5/2024    Right Hip   Dr. Vasu King    KNEE ARTHROSCOPY Left     PAP SMEAR  12/20/2010    TOTAL HIP ARTHROPLASTY Right 05/13/2024    Procedure: TOTAL HIP ARTHROPLASTY ANTERIOR WITH HANA TABLE;  Surgeon: Vasu King MD;  Location: [facility] OR OSC;  Service: Orthopedics;  Laterality: Right;    URETEROSCOPY LASER LITHOTRIPSY WITH STENT INSERTION Left 7/25/2024    Procedure: CYSTOSCOPY, LEFT URETEROSCOPY, LASER LITHOTRIPSY, STENT EXCHANGE;  Surgeon: Jaiden Bolton Jr., MD;  Location: [facility] MAIN OR;  Service: Urology;  Laterality: Left;         FAMILY HISTORY  Family History   Problem Relation Age of Onset    Breast cancer Mother     Colon cancer Mother     Hypertension Mother      Cancer Mother         Breat cancer, skin cancer, colon cancer    Hyperlipidemia Mother     Cancer Father         Skin cancer    Hypertension Sister     Hypertension Sister     Hyperlipidemia Sister     Thyroid disease Sister     Diabetes type II Brother     Cancer Brother         Skin cancer    Diabetes Brother     Colon cancer Maternal Aunt         colon ca 40    Cancer Maternal Aunt         Colon cancer cause of death early 40s    Colon cancer Maternal Aunt         64 yo    Heart disease Maternal Aunt     Colon cancer Maternal Aunt         81 yo    Cancer Maternal Aunt         Colon cancer    Cancer Maternal Aunt         Colon cancer    Cancer Maternal Uncle         Thyroid cancer    Heart disease Maternal Uncle     Stomach cancer Maternal Grandmother         or intestinal    Cancer Maternal Grandmother         GI TRACT CANCER    Breast cancer Maternal Grandmother     Heart disease Maternal Grandmother     Heart attack Maternal Grandfather     Breast cancer Maternal Grandfather     Heart disease Maternal Grandfather     Heart disease Paternal Grandmother     Colon cancer Other     Colon cancer Other     Malig Hyperthermia Neg Hx          SOCIAL HISTORY  Social History     Socioeconomic History    Marital status:    Tobacco Use    Smoking status: Never    Smokeless tobacco: Never    Tobacco comments:     daily caffine   Vaping Use    Vaping status: Never Used   Substance and Sexual Activity    Alcohol use: Never    Drug use: Never    Sexual activity: Not Currently     Partners: Male     Birth control/protection: Post-menopausal     Comment: Took birth control pills approximately 30 years.         ALLERGIES  Patient has no known allergies.      REVIEW OF SYSTEMS  Included in HPI  All systems reviewed and negative except for those discussed in HPI.      PHYSICAL EXAM    I have reviewed the triage vital signs and nursing notes.    ED Triage Vitals [07/27/24 0200]   Temp Heart Rate Resp BP SpO2   (!) 101.4 °F  (38.6 °C) 86 18 -- 92 %      Temp src Heart Rate Source Patient Position BP Location FiO2 (%)   -- -- -- -- --       GENERAL: not distressed  HENT: nares patent  Head/neck/ face are symmetric without gross deformity or swelling  EYES: no scleral icterus  CV: regular rhythm, regular rate with intact distal pulses  RESPIRATORY: normal effort and no respiratory distress  ABDOMEN: soft, + right CVA TTP  MUSCULOSKELETAL: no deformity  No TTP or erythema to right hip  NEURO: alert and appropriate, moves all extremities, follows commands  SKIN: warm, dry    Vital signs and nursing notes reviewed.      LAB RESULTS  Recent Results (from the past 24 hour(s))   Comprehensive Metabolic Panel    Collection Time: 07/27/24  2:40 AM    Specimen: Arm, Right; Blood   Result Value Ref Range    Glucose 148 (H) 65 - 99 mg/dL    BUN 23 8 - 23 mg/dL    Creatinine 1.35 (H) 0.57 - 1.00 mg/dL    Sodium 135 (L) 136 - 145 mmol/L    Potassium 4.0 3.5 - 5.2 mmol/L    Chloride 103 98 - 107 mmol/L    CO2 23.1 22.0 - 29.0 mmol/L    Calcium 9.0 8.6 - 10.5 mg/dL    Total Protein 5.8 (L) 6.0 - 8.5 g/dL    Albumin 3.3 (L) 3.5 - 5.2 g/dL    ALT (SGPT) 7 1 - 33 U/L    AST (SGOT) 15 1 - 32 U/L    Alkaline Phosphatase 84 39 - 117 U/L    Total Bilirubin 0.4 0.0 - 1.2 mg/dL    Globulin 2.5 gm/dL    A/G Ratio 1.3 g/dL    BUN/Creatinine Ratio 17.0 7.0 - 25.0    Anion Gap 8.9 5.0 - 15.0 mmol/L    eGFR 41.3 (L) >60.0 mL/min/1.73   Lactic Acid, Plasma    Collection Time: 07/27/24  2:40 AM    Specimen: Arm, Right; Blood   Result Value Ref Range    Lactate 1.2 0.5 - 2.0 mmol/L   CBC Auto Differential    Collection Time: 07/27/24  2:40 AM    Specimen: Arm, Right; Blood   Result Value Ref Range    WBC 20.61 (H) 3.40 - 10.80 10*3/mm3    RBC 3.68 (L) 3.77 - 5.28 10*6/mm3    Hemoglobin 11.2 (L) 12.0 - 15.9 g/dL    Hematocrit 33.8 (L) 34.0 - 46.6 %    MCV 91.8 79.0 - 97.0 fL    MCH 30.4 26.6 - 33.0 pg    MCHC 33.1 31.5 - 35.7 g/dL    RDW 11.9 (L) 12.3 - 15.4 %    RDW-SD  40.1 37.0 - 54.0 fl    MPV 9.8 6.0 - 12.0 fL    Platelets 220 140 - 450 10*3/mm3    Neutrophil % 87.0 (H) 42.7 - 76.0 %    Lymphocyte % 4.6 (L) 19.6 - 45.3 %    Monocyte % 5.5 5.0 - 12.0 %    Eosinophil % 0.1 (L) 0.3 - 6.2 %    Basophil % 0.3 0.0 - 1.5 %    Immature Grans % 2.5 (H) 0.0 - 0.5 %    Neutrophils, Absolute 17.93 (H) 1.70 - 7.00 10*3/mm3    Lymphocytes, Absolute 0.94 0.70 - 3.10 10*3/mm3    Monocytes, Absolute 1.13 (H) 0.10 - 0.90 10*3/mm3    Eosinophils, Absolute 0.03 0.00 - 0.40 10*3/mm3    Basophils, Absolute 0.06 0.00 - 0.20 10*3/mm3    Immature Grans, Absolute 0.52 (H) 0.00 - 0.05 10*3/mm3    nRBC 0.0 0.0 - 0.2 /100 WBC   Urinalysis With Culture If Indicated - Urine, Clean Catch    Collection Time: 07/27/24  4:16 AM    Specimen: Urine, Clean Catch   Result Value Ref Range    Color, UA Yellow Yellow, Straw    Appearance, UA Turbid (A) Clear    pH, UA 5.5 5.0 - 8.0    Specific Gravity, UA 1.023 1.005 - 1.030    Glucose, UA Negative Negative    Ketones, UA Negative Negative    Bilirubin, UA Negative Negative    Blood, UA Large (3+) (A) Negative    Protein,  mg/dL (2+) (A) Negative    Leuk Esterase, UA Large (3+) (A) Negative    Nitrite, UA Positive (A) Negative    Urobilinogen, UA 0.2 E.U./dL 0.2 - 1.0 E.U./dL   Urinalysis, Microscopic Only - Urine, Clean Catch    Collection Time: 07/27/24  4:16 AM    Specimen: Urine, Clean Catch   Result Value Ref Range    RBC, UA Too Numerous to Count (A) None Seen, 0-2 /HPF    WBC, UA Too Numerous to Count (A) None Seen, 0-2 /HPF    Bacteria, UA 1+ (A) None Seen /HPF    Squamous Epithelial Cells, UA 3-6 (A) None Seen, 0-2 /HPF    Hyaline Casts, UA 7-12 None Seen /LPF    Methodology Automated Microscopy          RADIOLOGY  CT Abdomen Pelvis With Contrast    Result Date: 7/27/2024  Patient: JAYDEN MCDONNELL  Time Out: 04:09 Exam(s): CT ABDOMEN + PELVIS With Contrast EXAM: CT Abdomen and Pelvis With Intravenous Contrast CLINICAL HISTORY: Reason for exam: right flank  pain, recent litho. TECHNIQUE: Axial computed tomography images of the abdomen and pelvis with intravenous contrast.  CTDI is 14.51 mGy and DLP is 714 mGy-cm.  This CT exam was performed according to the principle of ALARA (As Low As Reasonably Achievable) by using one or more of the following dose reduction techniques: automated exposure control, adjustment of the mA and or kV according to patient size, and or use of iterative reconstruction technique. COMPARISON: 6 12 24 FINDINGS: Lung bases:  Bibasilar subsegmental atelectasis. ABDOMEN: Liver:  Benign hepatic cysts. Gallbladder and bile ducts:  Unremarkable.  No calcified stones.  No ductal dilation. Pancreas:  Unremarkable.  No mass.  No ductal dilation. Spleen:  Unremarkable.  No splenomegaly. Adrenals:  Unremarkable.  No mass. Kidneys and ureters:  There are 2 nonobstructing right kidney stones, the largest measuring 4 mm.  There is no right-sided hydroureteronephrosis.  There is a left ureteral stent, appropriately positioned.  There is mild left-sided hydronephrosis.  There is urothelial thickening within the left renal pelvis and left ureter with periureteral fat stranding.  Some hyperdense material is suggested within the left renal calyces.  There is a nonobstructing 2 mm left kidney stone.  Stomach and bowel:  No bowel obstruction.  Diverticulosis without diverticulitis. PELVIS: Appendix:  Normal appendix. Bladder:  Unremarkable.  No mass. Reproductive:  Unremarkable as visualized. ABDOMEN and PELVIS: Intraperitoneal space:  Unremarkable.  No free air, significant free fluid, or fluid collection. Bones joints:  No acute fracture or dislocation.  Right total hip arthroplasty. Soft tissues:  Unremarkable. Vasculature:  Atherosclerosis.  No aortic aneurysm. Lymph nodes:  Unremarkable.  No enlarged lymph nodes. IMPRESSION:     1.  Left ureteral stent in place. 2.  Mild left-sided hydroureteronephrosis without obstructing stone.  There is a nonobstructing  left kidney upper pole stone. 3.  Left-sided urothelial thickening with periureteral fat stranding, which may be secondary to urinary tract infection or indwelling stent. 4.  Some hyperdensity is suggested within the left renal calyces, which may represent blood, purulent material, or early excretion of contrast.  Correlate with urinalysis.     Electronically signed by Loyda Bee M.D. on 07-27-24 at 0409       MEDICATIONS GIVEN IN ER  Medications   sodium chloride 0.9 % flush 10 mL (has no administration in time range)   sodium chloride 0.9 % bolus 500 mL (500 mL Intravenous New Bag 7/27/24 0519)   sodium chloride 0.9 % flush 10 mL (has no administration in time range)   sodium chloride 0.9 % flush 10 mL (has no administration in time range)   sodium chloride 0.9 % infusion 40 mL (has no administration in time range)   sodium chloride 0.9 % infusion (has no administration in time range)   acetaminophen (TYLENOL) tablet 650 mg (has no administration in time range)     Or   acetaminophen (TYLENOL) 160 MG/5ML oral solution 650 mg (has no administration in time range)     Or   acetaminophen (TYLENOL) suppository 650 mg (has no administration in time range)   ondansetron ODT (ZOFRAN-ODT) disintegrating tablet 4 mg (has no administration in time range)     Or   ondansetron (ZOFRAN) injection 4 mg (has no administration in time range)   sennosides-docusate (PERICOLACE) 8.6-50 MG per tablet 2 tablet (has no administration in time range)     And   polyethylene glycol (MIRALAX) packet 17 g (has no administration in time range)     And   bisacodyl (DULCOLAX) EC tablet 5 mg (has no administration in time range)     And   bisacodyl (DULCOLAX) suppository 10 mg (has no administration in time range)   calcium carbonate (TUMS) chewable tablet 500 mg (200 mg elemental) (has no administration in time range)   cefTRIAXone (ROCEPHIN) 2,000 mg in sodium chloride 0.9 % 100 mL MBP (has no administration in time range)   acetaminophen  (TYLENOL) tablet 1,000 mg (1,000 mg Oral Given 7/27/24 0251)   sodium chloride 0.9 % bolus 1,000 mL (0 mL Intravenous Stopped 7/27/24 0415)   cefTRIAXone (ROCEPHIN) 2,000 mg in sodium chloride 0.9 % 100 mL MBP (0 mg Intravenous Stopped 7/27/24 0447)   iopamidol (ISOVUE-300) 61 % injection 100 mL (85 mL Intravenous Given 7/27/24 0568)           OUTPATIENT MEDICATION MANAGEMENT:  Current Facility-Administered Medications Ordered in Epic   Medication Dose Route Frequency Provider Last Rate Last Admin    acetaminophen (TYLENOL) tablet 650 mg  650 mg Oral Q4H PRN Kimberley Manrique APRN        Or    acetaminophen (TYLENOL) 160 MG/5ML oral solution 650 mg  650 mg Oral Q4H PRN Kimberley Manrique APRN        Or    acetaminophen (TYLENOL) suppository 650 mg  650 mg Rectal Q4H PRN Kimberley Manrique APRN        sennosides-docusate (PERICOLACE) 8.6-50 MG per tablet 2 tablet  2 tablet Oral BID PRN Kimberley Manrique APRN        And    polyethylene glycol (MIRALAX) packet 17 g  17 g Oral Daily PRN Kimberley Manrique APRN        And    bisacodyl (DULCOLAX) EC tablet 5 mg  5 mg Oral Daily PRN Kimberley Manrique APRN        And    bisacodyl (DULCOLAX) suppository 10 mg  10 mg Rectal Daily PRN Kimberley Manrique APRN        calcium carbonate (TUMS) chewable tablet 500 mg (200 mg elemental)  2 tablet Oral BID PRN Kimberley Manrique APRN        [START ON 7/28/2024] cefTRIAXone (ROCEPHIN) 2,000 mg in sodium chloride 0.9 % 100 mL MBP  2,000 mg Intravenous Q24H Kimberley Manrique APRN        ondansetron ODT (ZOFRAN-ODT) disintegrating tablet 4 mg  4 mg Oral Q6H PRN Kimberley Manrique APRN        Or    ondansetron (ZOFRAN) injection 4 mg  4 mg Intravenous Q6H PRN Kimberley Manrique APRN        sodium chloride 0.9 % bolus 500 mL  500 mL Intravenous Once Beena Cannon APRN 500 mL/hr at 07/27/24 0519 500 mL at 07/27/24 0519    sodium chloride 0.9 % flush 10 mL  10 mL Intravenous PRN Beena Cannon, ESTHER         sodium chloride 0.9 % flush 10 mL  10 mL Intravenous Q12H Kimberley Manrique APRN        sodium chloride 0.9 % flush 10 mL  10 mL Intravenous PRN Kimberley Manrique APRN        sodium chloride 0.9 % infusion 40 mL  40 mL Intravenous PRN Kimberley Manrique APRN        sodium chloride 0.9 % infusion  100 mL/hr Intravenous Continuous Kimberley Manrique APRN         Current Outpatient Medications Ordered in Epic   Medication Sig Dispense Refill    atenolol (TENORMIN) 25 MG tablet Take 1 tablet by mouth Daily. 90 tablet 2    cephalexin (KEFLEX) 500 MG capsule Take 1 capsule by mouth 3 (Three) Times a Day.      escitalopram (LEXAPRO) 20 MG tablet Take 1 tablet by mouth Daily. 90 tablet 2    HYDROcodone-acetaminophen (NORCO) 5-325 MG per tablet Take 1-2 tablets by mouth Every 6 (Six) Hours As Needed for Moderate Pain (Pain). 12 tablet 0    levothyroxine (SYNTHROID, LEVOTHROID) 50 MCG tablet Take 1 tablet by mouth Daily. 90 tablet 2    loratadine (CLARITIN) 10 MG tablet Take 1 tablet by mouth Daily. Indications: Hayfever      NIFEdipine XL (PROCARDIA XL) 30 MG 24 hr tablet Take 1 tablet by mouth Daily. 90 tablet 2    Psyllium (METAMUCIL FIBER PO) Take 1 Scoop by mouth Daily. Indications: constipation      rosuvastatin (CRESTOR) 10 MG tablet Take 1 tablet by mouth Every Night. 90 tablet 3    sulfamethoxazole-trimethoprim (Bactrim DS) 800-160 MG per tablet Take 1 tablet by mouth 2 (Two) Times a Day. 10 tablet 0         PROGRESS, DATA ANALYSIS, CONSULTS, AND MEDICAL DECISION MAKING  ORDERS PLACED DURING THIS VISIT:  Orders Placed This Encounter   Procedures    Blood Culture - Blood,    Blood Culture - Blood,    Urine Culture - Urine,    CT Abdomen Pelvis With Contrast    Comprehensive Metabolic Panel    Urinalysis With Culture If Indicated - Urine, Clean Catch    Lactic Acid, Plasma    CBC Auto Differential    Urinalysis, Microscopic Only - Urine, Clean Catch    Basic Metabolic Panel    CBC (No Diff)    Diet:  Regular/House; Fluid Consistency: Thin (IDDSI 0)    Monitor Blood Pressure    Continuous Pulse Oximetry    Vital Signs    Intake & Output    Weigh Patient    Oral Care    Saline Lock & Maintain IV Access    Place Sequential Compression Device    Maintain Sequential Compression Device    Code Status and Medical Interventions: CPR (Attempt to Resuscitate); Full Support    [facility] (on-call MD unless specified) Details    Inpatient Urology Consult    Insert Peripheral IV    Insert Peripheral IV    Initiate Observation Status    CBC & Differential       All labs have been independently interpreted by me.  All radiology studies have been reviewed by me. All EKG's have been independently viewed by me.  Discussion below represents my analysis of pertinent findings related to patient's condition, differential diagnosis, treatment plan and final disposition.    Differential diagnosis includes but is not limited to:   Septic arthritis, UTI, bacteremia, etc.    ED Course/Progress Notes/MDM:  ED Course as of 07/27/24 0539   Sat Jul 27, 2024   0220 I discussed the case with Dr. Pacheco and he agrees to evaluate the patient at the bedside.    [AR]   0309 WBC(!): 20.61 [AR]   0407 Creatinine(!): 1.35 [AR]   0407 Lactate: 1.2 [AR]   0458 Patient reassessed.  Discussed ED findings, differential diagnosis, and the need for admission for evaluation/treatment.  They are agreeable to admission and all questions were answered.     [AR]   0538 Phone call with DEMETRIS Chu with [facility].  Discussed the patient, relevant history, exam, diagnostics, ED findings/progress, and concerns. They agree to admit the patient to Dr. Ro. Care assumed by the admitting physician at this time.     [AR]      ED Course User Index  [AR] Beena Cannon APRN     Patient is septic.  Patient is receiving broad-spectrum antibiotics, blood cultures prior to antibiotics, initial lactic acid ordered with repeat lactic acid if initial value is greater than 2. Patient  does not meet CMS sepsis core measure criteria of mandatory 30 cc/kg fluid bolus as patient is normotensive and lactate is not greater than or equal to 4.       AS OF 05:39 EDT VITALS:    BP - 100/52  HR - 66  TEMP - 97.9 °F (36.6 °C) (Oral)  O2 SATS - 94%        COMPLEXITY OF CARE  The patient requires admission.        DIAGNOSIS  Final diagnoses:   Acute UTI   Sepsis without acute organ dysfunction, due to unspecified organism   ELVIA (acute kidney injury)         DISPOSITION  ED Disposition       ED Disposition   Decision to Admit    Condition   --    Comment   Level of Care: Med/Surg [1]   Diagnosis: Acute UTI [254701]   Admitting Physician: ALINE BUSCH [[id number]]   Attending Physician: ALINE BUSCH [[id number]]                    Please note that portions of this document were completed with a voice recognition program.    Note Disclaimer: At [facility], we believe that sharing information builds trust and better relationships. You are receiving this note because you recently visited [facility]. It is possible you will see health information before a provider has talked with you about it. This kind of information can be easy to misunderstand. To help you fully understand what it means for your health, we urge you to discuss this note with your provider.     Beena Cannon, ESTHER  08/09/24 0426

## 2024-07-27 NOTE — PLAN OF CARE
Goal Outcome Evaluation:  Plan of Care Reviewed With: patient, spouse        Progress: no change  Outcome Evaluation: From ER this AM. IVFs infusing. Contact spore precautions for hx CDiff. SCDs when in bed. C/o right hip pain (recent surgery). Ortho consulted, will see in AM. Infectious disease and urology consulted. To continue current abx. Up with assist, walker. Bed alarm for safety. Spouse at bedside. XR completed.       Stat VQ scan and BLE dopplers ordered d/t elevated D-dimer. Radiology attempting to page NM on call to see if they can come in to perform scan. Dr. Medeiros notified.

## 2024-07-27 NOTE — PROGRESS NOTES
[facility] Clinical Pharmacy Services: C. Difficile Medication Changes       Pharmacy has been consulted to look over Anastasiia Faria's profile to check patient's medications for changes due to C. Difficile diagnosis per Dr. Medeiros's request.       Current C. Diff Regimen:     Assessment/Plan/Changes       1. Proton pump inhibitor: none    2. Antiperistalic agents or stool softeners/laxatives:  d/c pericolace  and miralax       Thank you for allowing me to participate in your patient's care.  Please call pharmacy with any questions or concerns.     Khadra Quesada, [facility]  Clinical Pharmacist

## 2024-07-27 NOTE — H&P
[facility]   HISTORY AND PHYSICAL    Patient Name: Anastasiia Faria  : 1950  MRN: [id number]  Primary Care Physician:  Mirza Scott Sr., MD  Date of admission: 2024    Subjective   Subjective     Chief Complaint: Nausea and vomiting    History of Present Illness  74-year-old female comes to the hospital because of right flank pain and nausea and vomiting.  She was febrile in the emergency room.  She had a recent hospitalization for UTI and C. difficile.  She was treated with a course of antibiotics and vancomycin during prior hospital stay.      Denies any diarrhea at this point.          Review of Systems     Personal History     Past Medical History:   Diagnosis Date    Allergic     Anxiety     Chronic kidney disease     Clostridium difficile infection 2024    TREATED AT [facility]    Colon polyps     Depression     Diverticulosis     Encounter for annual health examination 2014    Annual Health Assessment    Family history of colon cancer     Fibrocystic breast     GERD (gastroesophageal reflux disease)     Hard of hearing     HEARING AIDS BILATERALLY    Heart murmur     Herpes labialis without complication     History of cataract     History of mammogram 2010    History of recent hospitalization 2024    KIDNEY STONE/SEPSIS 4 NIGHT AT [facility]    HL (hearing loss) 2014    Hearing Aids    Hydronephrosis     Hyperlipidemia     Hypertension     Hypothyroidism     Insomnia     Kidney stones     Migraines     Osteoarthritis of right hip     Osteopenia     Prolia -last 2021,3/2022    PONV (postoperative nausea and vomiting)     Scoliosis     Dr. Stanford 2018    Sepsis 2024    Slow to wake up after anesthesia     Urinary tract infection     3/2024,      Visual impairment     Wear Glasses       Past Surgical History:   Procedure Laterality Date    BONE MARROW BIOPSY Left     BREAST CYST ASPIRATION Right     BREAST SURGERY Right     BIOPSY    CATARACT EXTRACTION,  BILATERAL      Cataract surgery on right eye June 2021 and left eye July 2021. (Dr. Nanette Bateman)    COLONOSCOPY N/A 10/27/2016    Procedure: COLONOSCOPY INTO CECUM;  Surgeon: Osvaldo Dorado MD;  Location: [facility] ENDOSCOPY;  Service:     COLONOSCOPY  01/26/2011    COLONOSCOPY  02/04/2005    COLONOSCOPY N/A 12/02/2021    Procedure: COLONOSCOPY INTO CECUM WITH COLD BIOPSY POLYPECTOMY AND HOT SNARE POLYPECTOMY;  Surgeon: Osvaldo Dorado MD;  Location: [facility] ENDOSCOPY;  Service: General;  Laterality: N/A;  PRE-FAMILY HISTORY OF COLON CANCER, PERSONAL HISTORY OF COLON CANCER  POST- POLYPS, DIVERTICULOSIS, HEMORRHOIDS    CYSTOSCOPY W/ URETERAL STENT PLACEMENT Left 03/21/2024    Procedure: LEFT CYSTOSCOPY RETROGRADE PYLEOGRAM URETERAL STENT INSERTION;  Surgeon: Jaiden Bolton Jr., MD;  Location: [facility] MAIN OR;  Service: Urology;  Laterality: Left;    CYSTOSCOPY W/ URETERAL STENT REMOVAL  04/23/2024    CYSTOSCOPY, RETROGRADE PYELOGRAM AND STENT INSERTION Left 06/14/2024    Procedure: CYSTOSCOPY RETROGRADE PYELOGRAM AND STENT INSERTION;  Surgeon: Yahir Faria MD;  Location: [facility] MAIN OR;  Service: Urology;  Laterality: Left;    JOINT REPLACEMENT  5/2024    Right Hip   Dr. Vasu King    KNEE ARTHROSCOPY Left     PAP SMEAR  12/20/2010    TOTAL HIP ARTHROPLASTY Right 05/13/2024    Procedure: TOTAL HIP ARTHROPLASTY ANTERIOR WITH HANA TABLE;  Surgeon: Vasu King MD;  Location: [facility] OR OSC;  Service: Orthopedics;  Laterality: Right;    URETEROSCOPY LASER LITHOTRIPSY WITH STENT INSERTION Left 7/25/2024    Procedure: CYSTOSCOPY, LEFT URETEROSCOPY, LASER LITHOTRIPSY, STENT EXCHANGE;  Surgeon: Jaiden Bolton Jr., MD;  Location: [facility] MAIN OR;  Service: Urology;  Laterality: Left;       Family History: family history includes Breast cancer in her maternal grandfather, maternal grandmother, and mother; Cancer in her brother, father, maternal aunt, maternal aunt, maternal aunt, maternal  grandmother, maternal uncle, and mother; Colon cancer in her maternal aunt, maternal aunt, maternal aunt, mother, and other family members; Diabetes in her brother; Diabetes type II in her brother; Heart attack in her maternal grandfather; Heart disease in her maternal aunt, maternal grandfather, maternal grandmother, maternal uncle, and paternal grandmother; Hyperlipidemia in her mother and sister; Hypertension in her mother, sister, and sister; Stomach cancer in her maternal grandmother; Thyroid disease in her sister. Otherwise pertinent FHx was reviewed and not pertinent to current issue.    Social History:  reports that she has never smoked. She has never used smokeless tobacco. She reports that she does not drink alcohol and does not use drugs.    Home Medications:  Fiber, HYDROcodone-acetaminophen, NIFEdipine XL, atenolol, cephalexin, escitalopram, levothyroxine, loratadine, rosuvastatin, and sulfamethoxazole-trimethoprim    Allergies:  No Known Allergies    Objective    Objective     Vitals:   Temp:  [97.2 °F (36.2 °C)-101.4 °F (38.6 °C)] 97.2 °F (36.2 °C)  Heart Rate:  [60-86] 66  Resp:  [18] 18  BP: ()/(52-65) 98/62  Flow (L/min):  [2] 2    Physical Exam  HENT:      Head: Normocephalic and atraumatic.   Cardiovascular:      Rate and Rhythm: Normal rate and regular rhythm.      Heart sounds: No murmur heard.     No friction rub.   Pulmonary:      Effort: Pulmonary effort is normal.      Breath sounds: Normal breath sounds.   Abdominal:      General: Bowel sounds are normal. There is no distension.      Palpations: Abdomen is soft.      Tenderness: There is no abdominal tenderness.   Skin:     General: Skin is warm and dry.   Psychiatric:         Mood and Affect: Mood normal.         Behavior: Behavior normal.         Result Review    Result Review:  I have personally reviewed the results from the time of this admission to 7/27/2024 17:55 EDT and agree with these findings:  []  Laboratory list /  accordion  [x]  Microbiology  [x]  Radiology  [x]  EKG/Telemetry   [x]  Cardiology/Vascular   [x]  Pathology  [x]  Old records  []  Other:  Most notable findings include: Chest x-ray with possible pneumonia, urinalysis with tumors to count white cells with recent procedure      Assessment & Plan   Assessment / Plan     Brief Patient Summary:  Anastasiia Faria is a 74 y.o. female who had recent urologic procedure on the 25th and now with flank pain on the opposite side.    Active Hospital Problems:  Active Hospital Problems    Diagnosis     **Acute UTI      Plan:   Fevers leukocytosis with recent stent exchange on July 25  -Patient's complaint is worsening pain on the right, and CT findings are on the left    Patient with increasing right hip pain  -CT completed and x-ray done  -ortho consultation for hip pain    Mild back pain  -Chest x-ray shows possible atelectasis  -Recommend I-S    Mild acute kidney injury which is improved    Leukocytosis difficult to say    Dimer is quite elevated-we will check a VQ scan stat because creatinine slightly elevated    VTE Prophylaxis:  Mechanical VTE prophylaxis orders are present.        CODE STATUS:    Code Status (Patient has no pulse and is not breathing): CPR (Attempt to Resuscitate)  Medical Interventions (Patient has pulse or is breathing): Full Support    Admission Status:  I believe this patient meets observation status.    Senthil Medeiros MD

## 2024-07-27 NOTE — CONSULTS
Referring Provider: Jesus Ro DO  [id number]  [facility] [address]  [place] [address]  Reason for Consultation: Concern for UTI    Subjective   History of present illness: This is a 74-year-old female with a history of kidney stones requiring ureteral stent placement, CKD, hypertension and hyperlipidemia who was admitted on July 27 with right hip pain  The patient was last hospitalized from June 12 through the 16th with a GI bleed.  C. difficile PCR testing came back positive for what ever reason antigen was never done.  She received a 10-day course of oral vancomycin.  He was also discharged on cefdinir for E. coli UTI for a 14-day course per urology recommendations.  She was taken to the OR on July 25 for left ureteral stent exchange.  Thereafter she was instructed to take Keflex x 7 days which was later switched to Bactrim.  Around that time she developed acute right hip pain.  Of note she underwent right total hip replacement on May 13.  Her incision remains well-healed and there is no erythema or swelling but she has acute pain in the area.  She reports continued urinary urgency and frequency and states these are her chronic urinary symptoms.  She denies any new symptoms.  Upon presentation to the emergency room she was found to be febrile.  Urinalysis showed too numerous to count white and red blood cells and she was empirically started on ceftriaxone    Past Medical History:   Diagnosis Date    Allergic     Anxiety     Chronic kidney disease     Clostridium difficile infection 06/2024    Depression     Diverticulosis 2011    GERD (gastroesophageal reflux disease)     Hyperlipidemia     Hypertension     Hypothyroidism     Kidney stones     Migraines     Osteopenia        Past Surgical History:   Procedure Laterality Date    CYSTOSCOPY W/ URETERAL STENT PLACEMENT Left 03/21/2024    Procedure: LEFT CYSTOSCOPY RETROGRADE PYLEOGRAM URETERAL STENT INSERTION;  Surgeon: Jaiden Bolton Jr., MD;  Location: [facility]  MAIN OR;  Service: Urology;  Laterality: Left;    CYSTOSCOPY W/ URETERAL STENT REMOVAL  04/23/2024    CYSTOSCOPY, RETROGRADE PYELOGRAM AND STENT INSERTION Left 06/14/2024    Procedure: CYSTOSCOPY RETROGRADE PYELOGRAM AND STENT INSERTION;  Surgeon: Yahir Faria MD;  Location: [facility] OR;  Service: Urology;  Laterality: Left;    JOINT REPLACEMENT  5/2024    Right Hip   Dr. Vasu King    KNEE ARTHROSCOPY Left     PAP SMEAR  12/20/2010    TOTAL HIP ARTHROPLASTY Right 05/13/2024    Procedure: TOTAL HIP ARTHROPLASTY ANTERIOR WITH HANA TABLE;  Surgeon: Vasu King MD;  Location: [facility] OSC;  Service: Orthopedics;  Laterality: Right;    URETEROSCOPY LASER LITHOTRIPSY WITH STENT INSERTION Left 7/25/2024    Procedure: CYSTOSCOPY, LEFT URETEROSCOPY, LASER LITHOTRIPSY, STENT EXCHANGE;  Surgeon: Jaiden Bolton Jr., MD;  Location: [facility] OR;  Service: Urology;  Laterality: Left;        reports that she has never smoked. She has never used smokeless tobacco. She reports that she does not drink alcohol and does not use drugs.    family history includes Breast cancer in her maternal grandfather, maternal grandmother, and mother; Cancer in her brother, father, maternal aunt, maternal aunt, maternal aunt, maternal grandmother, maternal uncle, and mother; Colon cancer in her maternal aunt, maternal aunt, maternal aunt, mother, and other family members; Diabetes in her brother; Diabetes type II in her brother; Heart attack in her maternal grandfather; Heart disease in her maternal aunt, maternal grandfather, maternal grandmother, maternal uncle, and paternal grandmother; Hyperlipidemia in her mother and sister; Hypertension in her mother, sister, and sister; Stomach cancer in her maternal grandmother; Thyroid disease in her sister.    No Known Allergies    Medication:  Antibiotics:  Vancomycin 125 mg p.o. 3 times daily  Ceftriaxone 2 g IV every 24 hours    Please refer to the medical record for a full  medication list    Review of Systems  Pertinent items are noted in HPI, all other systems reviewed and negative    Objective   Vital Signs   Temp:  [97.2 °F (36.2 °C)-101.4 °F (38.6 °C)] 97.2 °F (36.2 °C)  Heart Rate:  [60-86] 66  Resp:  [18] 18  BP: ()/(52-65) 98/62    Physical Exam:   General: In no acute distress  HEENT: Normocephalic, atraumatic,  no scleral icterus.   Neck: Supple, trachea is midline  Cardiovascular: Normal rate, regular rhythm, normal S1 and S2, no murmurs, rubs, or gallops   Respiratory: Lungs are clear to auscultation bilaterally, no wheezing  GI: Abdomen is soft, nontender, nondistended, positive bowel sounds bilaterally,  Musculoskeletal: Right hip incision well-healed without erythema swelling or drainage  Skin: No rashes  Extremities: No E/C/C  Neurological: Alert and oriented, moving all 4 extremities  Psychiatric: Normal mood and affect     Results Review:   I reviewed the patient's new clinical results.  I reviewed the patient's new imaging results and agree with the interpretation.    Lab Results   Component Value Date    WBC 18.22 (H) 07/27/2024    HGB 10.3 (L) 07/27/2024    HCT 31.0 (L) 07/27/2024    MCV 90.6 07/27/2024     07/27/2024       Lab Results   Component Value Date    GLUCOSE 119 (H) 07/27/2024    BUN 22 07/27/2024    CREATININE 1.15 (H) 07/27/2024    EGFRIFNONA 52 (L) 09/22/2021    EGFRIFAFRI 67 11/01/2019    BCR 19.1 07/27/2024    CO2 22.0 07/27/2024    CALCIUM 8.3 (L) 07/27/2024    PROTENTOTREF 6.4 11/01/2019    ALBUMIN 3.3 (L) 07/27/2024    LABIL2 2.2 11/01/2019    AST 15 07/27/2024    ALT 7 07/27/2024     Urinalysis too numerous to count red blood cells too numerous to count white blood cells 1+ bacteria    Microbiology:  7/27 UCx P  7/27 BCx P x 2    Radiology:  CAT scan of the abdomen pelvis shows left ureteral stent in place.  Mild right hydroureteronephrosis without obstructing stone.  Left-sided urothelial thickening with periureteral fat stranding.   Some hypodensity suggested in the left renal calyces    Assessment & Plan   Fever  Leukocytosis  History of kidney stone status post left ureteral stent placement with stent exchange on July 25  Status post right hip arthroplasty in May now with acute pain  Recent history of C. difficile colitis    I am not convinced the patient has a urinary tract infection.  She certainly does not have any new symptoms consistent with such having said that though I do not know the etiology of her fever and she had recent ureteral stent exchange therefore I think it is reasonable to continue her on ceftriaxone 2 g IV every 24 hours while awaiting blood culture results.  The patient is not having any diarrhea and there is really no indication for prophylactic or suppressive oral vancomycin therapy (this is used only in select few of patients) therefore we will discontinue this and monitor the patient for diarrhea.  Agree with orthopedic surgery consultation regarding acute onset of right hip pain.    I discussed the patient's findings and my recommendations with patient, family, and nursing staff

## 2024-07-27 NOTE — ED NOTES
Pt to ED c/o R hip pain that woke her up out of her sleep. Pt got a total R hip replacement in May and states that she's always had discomfort but tonight it got worse. No known injury per pt.

## 2024-07-28 ENCOUNTER — APPOINTMENT (OUTPATIENT)
Dept: CARDIOLOGY | Facility: HOSPITAL | Age: 74
DRG: 659 | End: 2024-07-28
Payer: MEDICARE

## 2024-07-28 PROBLEM — M54.50 LUMBAGO: Status: ACTIVE | Noted: 2024-07-28

## 2024-07-28 PROBLEM — A49.8 BACTERIAL INFECTION DUE TO PSEUDOMONAS: Status: ACTIVE | Noted: 2024-07-28

## 2024-07-28 LAB
ANION GAP SERPL CALCULATED.3IONS-SCNC: 11.6 MMOL/L (ref 5–15)
BACTERIA BLD CULT: ABNORMAL
BASOPHILS # BLD AUTO: 0.06 10*3/MM3 (ref 0–0.2)
BASOPHILS NFR BLD AUTO: 0.3 % (ref 0–1.5)
BH CV LOWER VASCULAR LEFT COMMON FEMORAL AUGMENT: NORMAL
BH CV LOWER VASCULAR LEFT COMMON FEMORAL COMPETENT: NORMAL
BH CV LOWER VASCULAR LEFT COMMON FEMORAL COMPRESS: NORMAL
BH CV LOWER VASCULAR LEFT COMMON FEMORAL PHASIC: NORMAL
BH CV LOWER VASCULAR LEFT COMMON FEMORAL SPONT: NORMAL
BH CV LOWER VASCULAR LEFT DISTAL FEMORAL COMPRESS: NORMAL
BH CV LOWER VASCULAR LEFT GASTRONEMIUS COMPRESS: NORMAL
BH CV LOWER VASCULAR LEFT GREATER SAPH AK COMPRESS: NORMAL
BH CV LOWER VASCULAR LEFT GREATER SAPH BK COMPRESS: NORMAL
BH CV LOWER VASCULAR LEFT LESSER SAPH COMPRESS: NORMAL
BH CV LOWER VASCULAR LEFT MID FEMORAL AUGMENT: NORMAL
BH CV LOWER VASCULAR LEFT MID FEMORAL COMPETENT: NORMAL
BH CV LOWER VASCULAR LEFT MID FEMORAL COMPRESS: NORMAL
BH CV LOWER VASCULAR LEFT MID FEMORAL PHASIC: NORMAL
BH CV LOWER VASCULAR LEFT MID FEMORAL SPONT: NORMAL
BH CV LOWER VASCULAR LEFT PERONEAL COMPRESS: NORMAL
BH CV LOWER VASCULAR LEFT POPLITEAL AUGMENT: NORMAL
BH CV LOWER VASCULAR LEFT POPLITEAL COMPETENT: NORMAL
BH CV LOWER VASCULAR LEFT POPLITEAL COMPRESS: NORMAL
BH CV LOWER VASCULAR LEFT POPLITEAL PHASIC: NORMAL
BH CV LOWER VASCULAR LEFT POPLITEAL SPONT: NORMAL
BH CV LOWER VASCULAR LEFT POSTERIOR TIBIAL COMPRESS: NORMAL
BH CV LOWER VASCULAR LEFT PROFUNDA FEMORAL COMPRESS: NORMAL
BH CV LOWER VASCULAR LEFT PROXIMAL FEMORAL COMPRESS: NORMAL
BH CV LOWER VASCULAR LEFT SAPHENOFEMORAL JUNCTION COMPRESS: NORMAL
BH CV LOWER VASCULAR RIGHT COMMON FEMORAL AUGMENT: NORMAL
BH CV LOWER VASCULAR RIGHT COMMON FEMORAL COMPETENT: NORMAL
BH CV LOWER VASCULAR RIGHT COMMON FEMORAL COMPRESS: NORMAL
BH CV LOWER VASCULAR RIGHT COMMON FEMORAL PHASIC: NORMAL
BH CV LOWER VASCULAR RIGHT COMMON FEMORAL SPONT: NORMAL
BH CV LOWER VASCULAR RIGHT DISTAL FEMORAL COMPRESS: NORMAL
BH CV LOWER VASCULAR RIGHT GASTRONEMIUS COMPRESS: NORMAL
BH CV LOWER VASCULAR RIGHT GREATER SAPH AK COMPRESS: NORMAL
BH CV LOWER VASCULAR RIGHT GREATER SAPH BK COMPRESS: NORMAL
BH CV LOWER VASCULAR RIGHT LESSER SAPH COMPRESS: NORMAL
BH CV LOWER VASCULAR RIGHT MID FEMORAL AUGMENT: NORMAL
BH CV LOWER VASCULAR RIGHT MID FEMORAL COMPETENT: NORMAL
BH CV LOWER VASCULAR RIGHT MID FEMORAL COMPRESS: NORMAL
BH CV LOWER VASCULAR RIGHT MID FEMORAL PHASIC: NORMAL
BH CV LOWER VASCULAR RIGHT MID FEMORAL SPONT: NORMAL
BH CV LOWER VASCULAR RIGHT PERONEAL COMPRESS: NORMAL
BH CV LOWER VASCULAR RIGHT POPLITEAL AUGMENT: NORMAL
BH CV LOWER VASCULAR RIGHT POPLITEAL COMPETENT: NORMAL
BH CV LOWER VASCULAR RIGHT POPLITEAL COMPRESS: NORMAL
BH CV LOWER VASCULAR RIGHT POPLITEAL PHASIC: NORMAL
BH CV LOWER VASCULAR RIGHT POPLITEAL SPONT: NORMAL
BH CV LOWER VASCULAR RIGHT POSTERIOR TIBIAL COMPRESS: NORMAL
BH CV LOWER VASCULAR RIGHT PROFUNDA FEMORAL COMPRESS: NORMAL
BH CV LOWER VASCULAR RIGHT PROXIMAL FEMORAL COMPRESS: NORMAL
BH CV LOWER VASCULAR RIGHT SAPHENOFEMORAL JUNCTION COMPRESS: NORMAL
BH CV UPPER VENOUS LEFT AXILLARY AUGMENT: NORMAL
BH CV UPPER VENOUS LEFT AXILLARY COMPRESS: NORMAL
BH CV UPPER VENOUS LEFT AXILLARY PHASIC: NORMAL
BH CV UPPER VENOUS LEFT AXILLARY SPONT: NORMAL
BH CV UPPER VENOUS LEFT BASILIC FOREARM COMPRESS: NORMAL
BH CV UPPER VENOUS LEFT BASILIC UPPER COMPRESS: NORMAL
BH CV UPPER VENOUS LEFT BRACHIAL COMPRESS: NORMAL
BH CV UPPER VENOUS LEFT CEPHALIC FOREARM COMPRESS: NORMAL
BH CV UPPER VENOUS LEFT CEPHALIC UPPER COMPRESS: NORMAL
BH CV UPPER VENOUS LEFT INTERNAL JUGULAR AUGMENT: NORMAL
BH CV UPPER VENOUS LEFT INTERNAL JUGULAR COMPRESS: NORMAL
BH CV UPPER VENOUS LEFT INTERNAL JUGULAR PHASIC: NORMAL
BH CV UPPER VENOUS LEFT INTERNAL JUGULAR SPONT: NORMAL
BH CV UPPER VENOUS LEFT RADIAL COMPRESS: NORMAL
BH CV UPPER VENOUS LEFT SUBCLAVIAN AUGMENT: NORMAL
BH CV UPPER VENOUS LEFT SUBCLAVIAN COMPRESS: NORMAL
BH CV UPPER VENOUS LEFT SUBCLAVIAN PHASIC: NORMAL
BH CV UPPER VENOUS LEFT SUBCLAVIAN SPONT: NORMAL
BH CV UPPER VENOUS LEFT ULNAR COMPRESS: NORMAL
BH CV UPPER VENOUS RIGHT AXILLARY AUGMENT: NORMAL
BH CV UPPER VENOUS RIGHT AXILLARY COMPRESS: NORMAL
BH CV UPPER VENOUS RIGHT AXILLARY PHASIC: NORMAL
BH CV UPPER VENOUS RIGHT AXILLARY SPONT: NORMAL
BH CV UPPER VENOUS RIGHT BASILIC FOREARM COMPRESS: NORMAL
BH CV UPPER VENOUS RIGHT BASILIC UPPER COMPRESS: NORMAL
BH CV UPPER VENOUS RIGHT BRACHIAL COMPRESS: NORMAL
BH CV UPPER VENOUS RIGHT CEPHALIC FOREARM COMPRESS: NORMAL
BH CV UPPER VENOUS RIGHT CEPHALIC UPPER COMPRESS: NORMAL
BH CV UPPER VENOUS RIGHT INTERNAL JUGULAR AUGMENT: NORMAL
BH CV UPPER VENOUS RIGHT INTERNAL JUGULAR COMPRESS: NORMAL
BH CV UPPER VENOUS RIGHT INTERNAL JUGULAR PHASIC: NORMAL
BH CV UPPER VENOUS RIGHT INTERNAL JUGULAR SPONT: NORMAL
BH CV UPPER VENOUS RIGHT RADIAL COMPRESS: NORMAL
BH CV UPPER VENOUS RIGHT SUBCLAVIAN AUGMENT: NORMAL
BH CV UPPER VENOUS RIGHT SUBCLAVIAN COMPRESS: NORMAL
BH CV UPPER VENOUS RIGHT SUBCLAVIAN PHASIC: NORMAL
BH CV UPPER VENOUS RIGHT SUBCLAVIAN SPONT: NORMAL
BH CV UPPER VENOUS RIGHT ULNAR COMPRESS: NORMAL
BOTTLE TYPE: ABNORMAL
BUN SERPL-MCNC: 13 MG/DL (ref 8–23)
BUN/CREAT SERPL: 13.7 (ref 7–25)
CALCIUM SPEC-SCNC: 8.8 MG/DL (ref 8.6–10.5)
CHLORIDE SERPL-SCNC: 106 MMOL/L (ref 98–107)
CO2 SERPL-SCNC: 20.4 MMOL/L (ref 22–29)
CREAT SERPL-MCNC: 0.95 MG/DL (ref 0.57–1)
CRP SERPL-MCNC: 23.93 MG/DL (ref 0–0.5)
DEPRECATED RDW RBC AUTO: 41.1 FL (ref 37–54)
EGFRCR SERPLBLD CKD-EPI 2021: 63 ML/MIN/1.73
EOSINOPHIL # BLD AUTO: 0.1 10*3/MM3 (ref 0–0.4)
EOSINOPHIL NFR BLD AUTO: 0.6 % (ref 0.3–6.2)
ERYTHROCYTE [DISTWIDTH] IN BLOOD BY AUTOMATED COUNT: 12.3 % (ref 12.3–15.4)
GLUCOSE SERPL-MCNC: 116 MG/DL (ref 65–99)
HCT VFR BLD AUTO: 32.6 % (ref 34–46.6)
HGB BLD-MCNC: 10.8 G/DL (ref 12–15.9)
IMM GRANULOCYTES # BLD AUTO: 0.11 10*3/MM3 (ref 0–0.05)
IMM GRANULOCYTES NFR BLD AUTO: 0.6 % (ref 0–0.5)
LYMPHOCYTES # BLD AUTO: 1.28 10*3/MM3 (ref 0.7–3.1)
LYMPHOCYTES NFR BLD AUTO: 7 % (ref 19.6–45.3)
MCH RBC QN AUTO: 30.6 PG (ref 26.6–33)
MCHC RBC AUTO-ENTMCNC: 33.1 G/DL (ref 31.5–35.7)
MCV RBC AUTO: 92.4 FL (ref 79–97)
MONOCYTES # BLD AUTO: 1.19 10*3/MM3 (ref 0.1–0.9)
MONOCYTES NFR BLD AUTO: 6.5 % (ref 5–12)
NEUTROPHILS NFR BLD AUTO: 15.44 10*3/MM3 (ref 1.7–7)
NEUTROPHILS NFR BLD AUTO: 85 % (ref 42.7–76)
NRBC BLD AUTO-RTO: 0 /100 WBC (ref 0–0.2)
PLATELET # BLD AUTO: 208 10*3/MM3 (ref 140–450)
PMV BLD AUTO: 9.6 FL (ref 6–12)
POTASSIUM SERPL-SCNC: 4 MMOL/L (ref 3.5–5.2)
QT INTERVAL: 423 MS
QTC INTERVAL: 467 MS
RBC # BLD AUTO: 3.53 10*6/MM3 (ref 3.77–5.28)
SODIUM SERPL-SCNC: 138 MMOL/L (ref 136–145)
WBC NRBC COR # BLD AUTO: 18.18 10*3/MM3 (ref 3.4–10.8)

## 2024-07-28 PROCEDURE — 85025 COMPLETE CBC W/AUTO DIFF WBC: CPT | Performed by: HOSPITALIST

## 2024-07-28 PROCEDURE — 93970 EXTREMITY STUDY: CPT | Performed by: SURGERY

## 2024-07-28 PROCEDURE — 80048 BASIC METABOLIC PNL TOTAL CA: CPT | Performed by: HOSPITALIST

## 2024-07-28 PROCEDURE — 25010000002 CEFEPIME PER 500 MG: Performed by: INTERNAL MEDICINE

## 2024-07-28 PROCEDURE — 25010000002 CEFTRIAXONE PER 250 MG: Performed by: NURSE PRACTITIONER

## 2024-07-28 PROCEDURE — 25810000003 SODIUM CHLORIDE 0.9 % SOLUTION: Performed by: HOSPITALIST

## 2024-07-28 PROCEDURE — 93970 EXTREMITY STUDY: CPT

## 2024-07-28 PROCEDURE — 99232 SBSQ HOSP IP/OBS MODERATE 35: CPT | Performed by: INTERNAL MEDICINE

## 2024-07-28 PROCEDURE — 25010000002 ENOXAPARIN PER 10 MG: Performed by: HOSPITALIST

## 2024-07-28 PROCEDURE — 94799 UNLISTED PULMONARY SVC/PX: CPT

## 2024-07-28 RX ORDER — HYDROCODONE BITARTRATE AND ACETAMINOPHEN 5; 325 MG/1; MG/1
2 TABLET ORAL EVERY 6 HOURS PRN
Status: DISCONTINUED | OUTPATIENT
Start: 2024-07-28 | End: 2024-08-01

## 2024-07-28 RX ORDER — HYDROCODONE BITARTRATE AND ACETAMINOPHEN 5; 325 MG/1; MG/1
1 TABLET ORAL EVERY 6 HOURS PRN
Status: DISCONTINUED | OUTPATIENT
Start: 2024-07-28 | End: 2024-08-01

## 2024-07-28 RX ORDER — ENOXAPARIN SODIUM 100 MG/ML
40 INJECTION SUBCUTANEOUS EVERY 24 HOURS
Status: DISCONTINUED | OUTPATIENT
Start: 2024-07-28 | End: 2024-08-07 | Stop reason: HOSPADM

## 2024-07-28 RX ADMIN — ACETAMINOPHEN 325MG 650 MG: 325 TABLET ORAL at 08:18

## 2024-07-28 RX ADMIN — CEFTRIAXONE 2000 MG: 2 INJECTION, POWDER, FOR SOLUTION INTRAMUSCULAR; INTRAVENOUS at 03:58

## 2024-07-28 RX ADMIN — HYDROCODONE BITARTRATE AND ACETAMINOPHEN 2 TABLET: 5; 325 TABLET ORAL at 12:21

## 2024-07-28 RX ADMIN — CEFEPIME 2000 MG: 2 INJECTION, POWDER, FOR SOLUTION INTRAVENOUS at 10:17

## 2024-07-28 RX ADMIN — ENOXAPARIN SODIUM 40 MG: 100 INJECTION SUBCUTANEOUS at 20:32

## 2024-07-28 RX ADMIN — ESCITALOPRAM 20 MG: 20 TABLET, FILM COATED ORAL at 08:13

## 2024-07-28 RX ADMIN — SODIUM CHLORIDE 75 ML/HR: 9 INJECTION, SOLUTION INTRAVENOUS at 10:18

## 2024-07-28 RX ADMIN — ROSUVASTATIN CALCIUM 10 MG: 10 TABLET, FILM COATED ORAL at 20:40

## 2024-07-28 RX ADMIN — HYDROCODONE BITARTRATE AND ACETAMINOPHEN 2 TABLET: 5; 325 TABLET ORAL at 20:39

## 2024-07-28 RX ADMIN — ATENOLOL 25 MG: 25 TABLET ORAL at 20:40

## 2024-07-28 RX ADMIN — LEVOTHYROXINE SODIUM 50 MCG: 50 TABLET ORAL at 08:13

## 2024-07-28 NOTE — PROGRESS NOTES
"[facility] Clinical Pharmacy Services: Enoxaparin Consult    Anastasiia Faria has a pharmacy consult to dose prophylactic enoxaparin per Dr. Medeiros's request.     Indication: VTE Prophylaxis    Relevant clinical data and objective history reviewed:  74 y.o. female 172.7 cm (67.99\") 82.6 kg (182 lb 1.6 oz)   Body mass index is 27.69 kg/m².   Results from last 7 days   Lab Units 07/27/24  0819   PLATELETS 10*3/mm3 196     Estimated Creatinine Clearance: 48.4 mL/min (A) (by C-G formula based on SCr of 1.15 mg/dL (H)).    Assessment/Plan    Will start patient on 40mg subcutaneous every 24 hours, adjusted for renal function. Consult order will be discontinued but pharmacy will continue to follow.     Diana Reyes, [facility]  Clinical Pharmacist    "

## 2024-07-28 NOTE — PROGRESS NOTES
LOS: 0 days     Chief Complaint: Fever    Interval History: Afebrile, continues to report significant pain in the right hip now also reporting some left thigh pain.  Tolerating antibiotics of vomiting diarrhea or rash    Vital Signs  Temp:  [97.5 °F (36.4 °C)-99.4 °F (37.4 °C)] 97.5 °F (36.4 °C)  Heart Rate:  [68-84] 68  Resp:  [18-20] 20  BP: (115-143)/(53-72) 133/72    Physical Exam:  General: In no acute distress  Cardiovascular: RRR, no lower extremity edema  Respiratory: Breathing comfortably on room air  GI: Soft, NT/ND, + bowel sounds bilaterally  Skin: No rashes     Antibiotics:  Cefepime 2 g IV every 12 hours     Results Review:    Lab Results   Component Value Date    WBC 18.22 (H) 07/27/2024    HGB 10.3 (L) 07/27/2024    HCT 31.0 (L) 07/27/2024    MCV 90.6 07/27/2024     07/27/2024     Lab Results   Component Value Date    GLUCOSE 119 (H) 07/27/2024    BUN 22 07/27/2024    CREATININE 1.15 (H) 07/27/2024    EGFRIFNONA 52 (L) 09/22/2021    EGFRIFAFRI 67 11/01/2019    BCR 19.1 07/27/2024    CO2 22.0 07/27/2024    CALCIUM 8.3 (L) 07/27/2024    PROTENTOTREF 6.4 11/01/2019    ALBUMIN 3.3 (L) 07/27/2024    LABIL2 2.2 11/01/2019    AST 15 07/27/2024    ALT 7 07/27/2024       Microbiology:  7/27 UCx >100K GNR  7/27 BCx Pseudomonas x 2    Assessment & Plan   Fever  Leukocytosis  History of kidney stone status post left ureteral stent placement with stent exchange on July 25  Status post right hip arthroplasty in May now with acute pain  Recent history of C. difficile colitis    Blood cultures with Pseudomonas.  I discontinued empiric ceftriaxone and started her on cefepime 2 g IV every 12 hours (renally dosed).  Most likely  source.  Still awaiting orthopedic evaluation of right hip pain.  She will need additional imaging of the area that is hurting but I would like to await orthopedic surgery recommendations

## 2024-07-28 NOTE — PLAN OF CARE
Goal Outcome Evaluation:  Plan of Care Reviewed With: spouse, patient        Progress: no change  Outcome Evaluation: C/o pain in both hips - norco ordered. Waiting for ortho to see. O2 up to 4 L. Falls precautions maintained. IVFs infusing. New abx started per orders. SCDs when in bed. Doppler to be done. Spouse at bedside.         1630, ortho has not seen patient yet. Dr. Medeiros asked this RN to call them. Spoke w/ Yoselin with ortho who stated that another consult needed to be called in. Consult called for today, Dr. Jones on call.                        60

## 2024-07-28 NOTE — PLAN OF CARE
Goal Outcome Evaluation:  Plan of Care Reviewed With: patient        Progress: no change  Outcome Evaluation: ivf and iv abx, [place] with jay hearing aids, vdg, falls protocol, vq scan done-verified by aprn-edling not notified due to it was neg, multiple consults, isolation, O2 on @ 3L, pos blood cultures-aprn notified

## 2024-07-28 NOTE — PROGRESS NOTES
[facility]     LOS: 0 days     Subjective:    CC:Hip Pain    DIET:  Diet Order   Procedures    Diet: Regular/House; Fluid Consistency: Thin (IDDSI 0)     Moderate to severe right hip pain    Objective:    Vital Signs:  Temp:  [97.2 °F (36.2 °C)-99.4 °F (37.4 °C)] 97.2 °F (36.2 °C)  Heart Rate:  [64-84] 64  Resp:  [18-20] 18  BP: (115-143)/(53-72) 125/60    SpO2:  [91 %-94 %] 94 %  on  Flow (L/min):  [3-4] 4;   Device (Oxygen Therapy): humidified;nasal cannula  Body mass index is 27.69 kg/m².    Physical Exam  Constitutional:       Appearance: Normal appearance.   HENT:      Head: Normocephalic and atraumatic.   Cardiovascular:      Rate and Rhythm: Normal rate and regular rhythm.      Heart sounds: No murmur heard.     No friction rub.   Pulmonary:      Effort: Pulmonary effort is normal.      Breath sounds: Normal breath sounds.   Abdominal:      General: Bowel sounds are normal. There is no distension.      Palpations: Abdomen is soft.      Tenderness: There is no abdominal tenderness.   Skin:     General: Skin is warm and dry.   Neurological:      Mental Status: She is alert.   Psychiatric:         Mood and Affect: Mood normal.         Behavior: Behavior normal.         Results Review:    Glucose   Date Value Ref Range Status   07/27/2024 119 (H) 65 - 99 mg/dL Final   07/27/2024 148 (H) 65 - 99 mg/dL Final     Results from last 7 days   Lab Units 07/27/24  0819   WBC 10*3/mm3 18.22*   HEMOGLOBIN g/dL 10.3*   HEMATOCRIT % 31.0*   PLATELETS 10*3/mm3 196     Results from last 7 days   Lab Units 07/27/24  0819 07/27/24  0240   SODIUM mmol/L 135* 135*   POTASSIUM mmol/L 3.9 4.0   CHLORIDE mmol/L 105 103   CO2 mmol/L 22.0 23.1   BUN mg/dL 22 23   CREATININE mg/dL 1.15* 1.35*   CALCIUM mg/dL 8.3* 9.0   BILIRUBIN mg/dL  --  0.4   ALK PHOS U/L  --  84   ALT (SGPT) U/L  --  7   AST (SGOT) U/L  --  15   GLUCOSE mg/dL 119* 148*                 Cultures:  Blood Culture   Date Value Ref Range  Status   07/27/2024 Abnormal Stain (C)  Preliminary   07/27/2024 Abnormal Stain (C)  Preliminary     Urine Culture   Date Value Ref Range Status   07/27/2024 >100,000 CFU/mL Gram Negative Bacilli (A)  Preliminary       I have reviewed daily medications and changes in CPOE    Scheduled meds  atenolol, 25 mg, Oral, Nightly  cefepime, 2,000 mg, Intravenous, Q12H  escitalopram, 20 mg, Oral, Daily  levothyroxine, 50 mcg, Oral, Daily  rosuvastatin, 10 mg, Oral, Nightly  sodium chloride, 10 mL, Intravenous, Q12H        Pharmacy Consult,   sodium chloride, 75 mL/hr, Last Rate: 75 mL/hr (07/28/24 1018)      PRN meds    acetaminophen **OR** acetaminophen **OR** acetaminophen    [DISCONTINUED] senna-docusate sodium **AND** [DISCONTINUED] polyethylene glycol **AND** bisacodyl **AND** bisacodyl    calcium carbonate    HYDROcodone-acetaminophen    HYDROcodone-acetaminophen    ondansetron ODT **OR** ondansetron    Pharmacy Consult    [COMPLETED] Insert Peripheral IV **AND** sodium chloride    sodium chloride    sodium chloride        Bacterial infection due to Pseudomonas    Acquired hypothyroidism    Essential hypertension    Anxiety    Acute UTI    Lumbago        Assessment/Plan:  Bacteremia-Fevers better  -recheck wbc  - recent stent exchange on July 25  -possible UTI vs possible hip infection (having moderate to severe hip)     Patient with increasing right hip pain  -CT completed and x-ray done  -ortho consultation for hip pain - pending, d/w nurse   -able to walk short distances    Mild back pain    Mild hypoxemia  -Chest x-ray shows possible atelectasis  -Recommend I-S     Mild acute kidney injury which is improved     Leukocytosis-recheck     Dimer is quite elevated-VQ scan normal  -doppler pending    DVT PPX: lovenox low doses      Senthil Medeiros MD  07/28/24  15:12 EDT

## 2024-07-29 ENCOUNTER — APPOINTMENT (OUTPATIENT)
Dept: GENERAL RADIOLOGY | Facility: HOSPITAL | Age: 74
DRG: 659 | End: 2024-07-29
Payer: MEDICARE

## 2024-07-29 LAB
ALBUMIN SERPL-MCNC: 2.6 G/DL (ref 3.5–5.2)
ALBUMIN/GLOB SERPL: 1 G/DL
ALP SERPL-CCNC: 93 U/L (ref 39–117)
ALT SERPL W P-5'-P-CCNC: 7 U/L (ref 1–33)
ANION GAP SERPL CALCULATED.3IONS-SCNC: 7.6 MMOL/L (ref 5–15)
APPEARANCE FLD: ABNORMAL
AST SERPL-CCNC: 9 U/L (ref 1–32)
BASOPHILS # BLD AUTO: 0.06 10*3/MM3 (ref 0–0.2)
BASOPHILS NFR BLD AUTO: 0.4 % (ref 0–1.5)
BILIRUB SERPL-MCNC: 0.3 MG/DL (ref 0–1.2)
BUN SERPL-MCNC: 11 MG/DL (ref 8–23)
BUN/CREAT SERPL: 13.3 (ref 7–25)
CALCIUM SPEC-SCNC: 8.6 MG/DL (ref 8.6–10.5)
CHLORIDE SERPL-SCNC: 108 MMOL/L (ref 98–107)
CO2 SERPL-SCNC: 20.4 MMOL/L (ref 22–29)
COLOR FLD: ABNORMAL
CREAT SERPL-MCNC: 0.83 MG/DL (ref 0.57–1)
CRP SERPL-MCNC: 30 MG/DL (ref 0–0.5)
DEPRECATED RDW RBC AUTO: 43.5 FL (ref 37–54)
EGFRCR SERPLBLD CKD-EPI 2021: 74.1 ML/MIN/1.73
EOSINOPHIL # BLD AUTO: 0.18 10*3/MM3 (ref 0–0.4)
EOSINOPHIL NFR BLD AUTO: 1.2 % (ref 0.3–6.2)
ERYTHROCYTE [DISTWIDTH] IN BLOOD BY AUTOMATED COUNT: 12.6 % (ref 12.3–15.4)
ERYTHROCYTE [SEDIMENTATION RATE] IN BLOOD: 39 MM/HR (ref 0–30)
GLOBULIN UR ELPH-MCNC: 2.7 GM/DL
GLUCOSE SERPL-MCNC: 106 MG/DL (ref 65–99)
HCT VFR BLD AUTO: 31.7 % (ref 34–46.6)
HGB BLD-MCNC: 10.3 G/DL (ref 12–15.9)
IMM GRANULOCYTES # BLD AUTO: 0.1 10*3/MM3 (ref 0–0.05)
IMM GRANULOCYTES NFR BLD AUTO: 0.7 % (ref 0–0.5)
LYMPHOCYTES # BLD AUTO: 1.28 10*3/MM3 (ref 0.7–3.1)
LYMPHOCYTES NFR BLD AUTO: 8.6 % (ref 19.6–45.3)
LYMPHOCYTES NFR FLD MANUAL: 48 %
MCH RBC QN AUTO: 30.3 PG (ref 26.6–33)
MCHC RBC AUTO-ENTMCNC: 32.5 G/DL (ref 31.5–35.7)
MCV RBC AUTO: 93.2 FL (ref 79–97)
METHOD: ABNORMAL
MONOCYTES # BLD AUTO: 1.28 10*3/MM3 (ref 0.1–0.9)
MONOCYTES NFR BLD AUTO: 8.6 % (ref 5–12)
MONOCYTES NFR FLD: 12 %
NEUTROPHILS NFR BLD AUTO: 11.99 10*3/MM3 (ref 1.7–7)
NEUTROPHILS NFR BLD AUTO: 80.5 % (ref 42.7–76)
NEUTROPHILS NFR FLD MANUAL: 40 %
NRBC BLD AUTO-RTO: 0 /100 WBC (ref 0–0.2)
NUC CELL # FLD: 138 /MM3
PLATELET # BLD AUTO: 203 10*3/MM3 (ref 140–450)
PMV BLD AUTO: 9.7 FL (ref 6–12)
POTASSIUM SERPL-SCNC: 3.5 MMOL/L (ref 3.5–5.2)
PROT SERPL-MCNC: 5.3 G/DL (ref 6–8.5)
RBC # BLD AUTO: 3.4 10*6/MM3 (ref 3.77–5.28)
RBC # FLD AUTO: ABNORMAL /MM3
SODIUM SERPL-SCNC: 136 MMOL/L (ref 136–145)
WBC NRBC COR # BLD AUTO: 14.89 10*3/MM3 (ref 3.4–10.8)

## 2024-07-29 PROCEDURE — 86140 C-REACTIVE PROTEIN: CPT | Performed by: ORTHOPAEDIC SURGERY

## 2024-07-29 PROCEDURE — 25010000002 CEFEPIME PER 500 MG: Performed by: INTERNAL MEDICINE

## 2024-07-29 PROCEDURE — 25810000003 SODIUM CHLORIDE 0.9 % SOLUTION: Performed by: HOSPITALIST

## 2024-07-29 PROCEDURE — 87205 SMEAR GRAM STAIN: CPT | Performed by: ORTHOPAEDIC SURGERY

## 2024-07-29 PROCEDURE — 89051 BODY FLUID CELL COUNT: CPT | Performed by: ORTHOPAEDIC SURGERY

## 2024-07-29 PROCEDURE — 25010000002 LIDOCAINE 1 % SOLUTION: Performed by: RADIOLOGY

## 2024-07-29 PROCEDURE — 87070 CULTURE OTHR SPECIMN AEROBIC: CPT | Performed by: ORTHOPAEDIC SURGERY

## 2024-07-29 PROCEDURE — 85025 COMPLETE CBC W/AUTO DIFF WBC: CPT | Performed by: HOSPITALIST

## 2024-07-29 PROCEDURE — 77002 NEEDLE LOCALIZATION BY XRAY: CPT

## 2024-07-29 PROCEDURE — 85652 RBC SED RATE AUTOMATED: CPT | Performed by: ORTHOPAEDIC SURGERY

## 2024-07-29 PROCEDURE — 0S993ZX DRAINAGE OF RIGHT HIP JOINT, PERCUTANEOUS APPROACH, DIAGNOSTIC: ICD-10-PCS | Performed by: RADIOLOGY

## 2024-07-29 PROCEDURE — 80053 COMPREHEN METABOLIC PANEL: CPT | Performed by: HOSPITALIST

## 2024-07-29 PROCEDURE — 87040 BLOOD CULTURE FOR BACTERIA: CPT | Performed by: INTERNAL MEDICINE

## 2024-07-29 PROCEDURE — 25010000002 ENOXAPARIN PER 10 MG: Performed by: HOSPITALIST

## 2024-07-29 PROCEDURE — 99232 SBSQ HOSP IP/OBS MODERATE 35: CPT | Performed by: INTERNAL MEDICINE

## 2024-07-29 RX ORDER — FUROSEMIDE 40 MG
40 TABLET ORAL ONCE
Status: COMPLETED | OUTPATIENT
Start: 2024-07-29 | End: 2024-07-29

## 2024-07-29 RX ORDER — LIDOCAINE HYDROCHLORIDE 10 MG/ML
10 INJECTION, SOLUTION INFILTRATION; PERINEURAL ONCE
Status: COMPLETED | OUTPATIENT
Start: 2024-07-29 | End: 2024-07-29

## 2024-07-29 RX ADMIN — ATENOLOL 25 MG: 25 TABLET ORAL at 21:43

## 2024-07-29 RX ADMIN — LIDOCAINE HYDROCHLORIDE 3 ML: 10 INJECTION, SOLUTION INFILTRATION; PERINEURAL at 11:21

## 2024-07-29 RX ADMIN — HYDROCODONE BITARTRATE AND ACETAMINOPHEN 2 TABLET: 5; 325 TABLET ORAL at 17:47

## 2024-07-29 RX ADMIN — ENOXAPARIN SODIUM 40 MG: 100 INJECTION SUBCUTANEOUS at 20:03

## 2024-07-29 RX ADMIN — CEFEPIME 2000 MG: 2 INJECTION, POWDER, FOR SOLUTION INTRAVENOUS at 00:07

## 2024-07-29 RX ADMIN — CEFEPIME 2000 MG: 2 INJECTION, POWDER, FOR SOLUTION INTRAVENOUS at 10:11

## 2024-07-29 RX ADMIN — HYDROCODONE BITARTRATE AND ACETAMINOPHEN 2 TABLET: 5; 325 TABLET ORAL at 05:46

## 2024-07-29 RX ADMIN — FUROSEMIDE 40 MG: 40 TABLET ORAL at 12:27

## 2024-07-29 RX ADMIN — HYDROCODONE BITARTRATE AND ACETAMINOPHEN 2 TABLET: 5; 325 TABLET ORAL at 11:40

## 2024-07-29 RX ADMIN — ESCITALOPRAM 20 MG: 20 TABLET, FILM COATED ORAL at 10:07

## 2024-07-29 RX ADMIN — ROSUVASTATIN CALCIUM 10 MG: 10 TABLET, FILM COATED ORAL at 21:43

## 2024-07-29 RX ADMIN — Medication 10 ML: at 21:42

## 2024-07-29 RX ADMIN — LEVOTHYROXINE SODIUM 50 MCG: 50 TABLET ORAL at 10:07

## 2024-07-29 RX ADMIN — CEFEPIME 2000 MG: 2 INJECTION, POWDER, FOR SOLUTION INTRAVENOUS at 17:47

## 2024-07-29 RX ADMIN — SODIUM CHLORIDE 75 ML/HR: 9 INJECTION, SOLUTION INTRAVENOUS at 00:05

## 2024-07-29 NOTE — PLAN OF CARE
Goal Outcome Evaluation:  Plan of Care Reviewed With: patient        Progress: no change  Outcome Evaluation: medicated with [place] once for c/o jay hip pain Rt hip > Lt hip, assisted to the bathroom with walker,falls precaution maintained, Lovenox started, IV Cefepime continued, ID still to see Pt, VSS

## 2024-07-29 NOTE — CASE MANAGEMENT/SOCIAL WORK
Discharge Planning Assessment  [facility]     Patient Name: Anastasiia Faria  MRN: [id number]  Today's Date: 7/29/2024    Admit Date: 7/27/2024    Plan: home with spouse   Discharge Needs Assessment       Row Name 07/29/24 1250       Living Environment    People in Home spouse    Current Living Arrangements home    Potentially Unsafe Housing Conditions none    In the past 12 months has the electric, gas, oil, or water company threatened to shut off services in your home? No    Primary Care Provided by self    Provides Primary Care For no one    Family Caregiver if Needed spouse    Quality of Family Relationships helpful;involved    Able to Return to Prior Arrangements yes       Resource/Environmental Concerns    Resource/Environmental Concerns none       Transportation Needs    In the past 12 months, has lack of transportation kept you from medical appointments or from getting medications? no    In the past 12 months, has lack of transportation kept you from meetings, work, or from getting things needed for daily living? No       Food Insecurity    Within the past 12 months, you worried that your food would run out before you got the money to buy more. Never true    Within the past 12 months, the food you bought just didn't last and you didn't have money to get more. Never true       Transition Planning    Patient/Family Anticipates Transition to home with family    Patient/Family Anticipated Services at Transition none    Transportation Anticipated family or friend will provide       Discharge Needs Assessment    Readmission Within the Last 30 Days no previous admission in last 30 days    Equipment Currently Used at Home cane, straight;walker, rolling    Concerns to be Addressed denies needs/concerns at this time;no discharge needs identified    Equipment Needed After Discharge none    Provided Post Acute Provider List? Refused    Refused Provider List Comment declined need                   Discharge Plan       Luzma  Name 07/29/24 1251       Plan    Plan home with spouse    Patient/Family in Agreement with Plan yes    Plan Comments Spoke with pt bedside. Confirmed facesheet correct. Explained role of CCP. Pt reports she lives with her spouse in a multi level house. She is IADLs she has walker and cane she uses to ambualte. She no longer drives, spouse provides transportation. Pt has used [facility] in past and no SNF history. Her PCP is Dr. Mirza Scott and uses [facility] no issues. She plans home, declines d/c needs at this time. CCP to follow.                  Continued Care and Services - Admitted Since 7/27/2024    No active coordination exists for this encounter.       Selected Continued Care - Prior Encounters Includes continued care and service providers with selected services from prior encounters from 4/28/2024 to 7/29/2024      Discharged on 5/14/2024 Admission date: 5/13/2024 - Discharge disposition: Home or Self Care      Home Medical Care       Service Provider Selected Services Address Phone Fax Patient Preferred    [facility] [address] [address] [phone] --                          Expected Discharge Date and Time       Expected Discharge Date Expected Discharge Time    Jul 31, 2024            Demographic Summary    No documentation.                  Functional Status    No documentation.                  Psychosocial    No documentation.                  Abuse/Neglect    No documentation.                  Legal    No documentation.                  Substance Abuse    No documentation.                  Patient Forms    No documentation.                     ALLIE Chaudhary

## 2024-07-29 NOTE — PROGRESS NOTES
Name: Anastasiia Faria ADMIT: 2024   : 1950  PCP: Mirza Scott Sr., MD    MRN: [id number] LOS: 1 days   AGE/SEX: 74 y.o. female  ROOM: [facility]     Subjective   Subjective   Pt feeling okay this AM. Still c/o back pain worse on left. Voiding well w/o LUTS or hematuria. Mild N but no V. Tolerating po. No F/C/NS. Right hip still hurting.        Objective   Objective   Vital Signs  Temp:  [97.2 °F (36.2 °C)-99.1 °F (37.3 °C)] 99.1 °F (37.3 °C)  Heart Rate:  [64-75] 71  Resp:  [18-20] 18  BP: (125-142)/(60-72) 142/72  SpO2:  [93 %-95 %] 93 %  on  Flow (L/min):  [4] 4;   Device (Oxygen Therapy): nasal cannula;humidified  Body mass index is 27.69 kg/m².  Physical Exam  Vitals and nursing note reviewed. Exam conducted with a chaperone present (CNA).   Constitutional:       General: She is not in acute distress.     Appearance: She is not ill-appearing, toxic-appearing or diaphoretic.   HENT:      Head: Normocephalic.      Nose: Nose normal.      Mouth/Throat:      Mouth: Mucous membranes are moist.      Pharynx: Oropharynx is clear.   Eyes:      General: No scleral icterus.        Right eye: No discharge.         Left eye: No discharge.      Conjunctiva/sclera: Conjunctivae normal.   Cardiovascular:      Rate and Rhythm: Normal rate and regular rhythm.      Pulses: Normal pulses.   Pulmonary:      Effort: Pulmonary effort is normal. No respiratory distress.      Breath sounds: Rales (in bases) present. No wheezing.   Abdominal:      General: Bowel sounds are normal. There is no distension.      Palpations: Abdomen is soft.      Tenderness: There is no abdominal tenderness.   Musculoskeletal:         General: Swelling (Trace in BLEs) present.      Cervical back: Neck supple.      Comments: NVI in distal BLEs   Skin:     General: Skin is warm and dry.      Capillary Refill: Capillary refill takes less than 2 seconds.      Coloration: Skin is not jaundiced.   Neurological:      General: No focal deficit present.  "     Mental Status: She is alert and oriented to person, place, and time. Mental status is at baseline.      Cranial Nerves: No cranial nerve deficit.      Coordination: Coordination normal.   Psychiatric:         Mood and Affect: Mood normal.         Behavior: Behavior normal.         Thought Content: Thought content normal.       Results Review     I reviewed the patient's new clinical results.  Results from last 7 days   Lab Units 07/29/24  0707 07/28/24  1527 07/27/24  0819 07/27/24  0240   WBC 10*3/mm3 14.89* 18.18* 18.22* 20.61*   HEMOGLOBIN g/dL 10.3* 10.8* 10.3* 11.2*   PLATELETS 10*3/mm3 203 208 196 220     Results from last 7 days   Lab Units 07/29/24  0707 07/28/24  1527 07/27/24  0819 07/27/24  0240   SODIUM mmol/L 136 138 135* 135*   POTASSIUM mmol/L 3.5 4.0 3.9 4.0   CHLORIDE mmol/L 108* 106 105 103   CO2 mmol/L 20.4* 20.4* 22.0 23.1   BUN mg/dL 11 13 22 23   CREATININE mg/dL 0.83 0.95 1.15* 1.35*   GLUCOSE mg/dL 106* 116* 119* 148*   EGFR mL/min/1.73 74.1 63.0 50.1* 41.3*     Results from last 7 days   Lab Units 07/29/24  0707 07/27/24  0240   ALBUMIN g/dL 2.6* 3.3*   BILIRUBIN mg/dL 0.3 0.4   ALK PHOS U/L 93 84   AST (SGOT) U/L 9 15   ALT (SGPT) U/L 7 7     Results from last 7 days   Lab Units 07/29/24  0707 07/28/24  1527 07/27/24  0819 07/27/24  0240   CALCIUM mg/dL 8.6 8.8 8.3* 9.0   ALBUMIN g/dL 2.6*  --   --  3.3*     Results from last 7 days   Lab Units 07/27/24  0240   LACTATE mmol/L 1.2     No results found for: \"HGBA1C\", \"POCGLU\"    NM Lung Ventilation Perfusion    Result Date: 7/27/2024  Electronically signed by Javon Dominguez MD on 07-27-24 at 2358    XR Chest PA & Lateral    Result Date: 7/27/2024  1. Mild left lower lobe atelectasis, scar or pneumonia.  This report was finalized on 7/27/2024 4:42 PM by Dr. Francois Saleh M.D on Workstation: [id number]       I have personally reviewed all medications:  Scheduled Medications  atenolol, 25 mg, Oral, Nightly  cefepime, 2,000 mg, " Intravenous, Q8H  enoxaparin, 40 mg, Subcutaneous, Q24H  escitalopram, 20 mg, Oral, Daily  levothyroxine, 50 mcg, Oral, Daily  rosuvastatin, 10 mg, Oral, Nightly  sodium chloride, 10 mL, Intravenous, Q12H    Infusions  Pharmacy Consult,   Pharmacy to Dose enoxaparin (LOVENOX),   sodium chloride, 75 mL/hr, Last Rate: 75 mL/hr (07/29/24 0005)    Diet  Diet: Regular/House; Fluid Consistency: Thin (IDDSI 0)    I have personally reviewed:  [x]  Laboratory   [x]  Microbiology   [x]  Radiology   [x]  EKG/Telemetry  [x]  Cardiology/Vascular   []  Pathology    [x]  Records       Assessment/Plan     Active Hospital Problems    Diagnosis  POA    **Bacterial infection due to Pseudomonas [A49.8]  Unknown    Lumbago [M54.50]  Unknown    Acute UTI [N39.0]  Yes    Anxiety [F41.9]  Yes    Essential hypertension [I10]  Yes    Acquired hypothyroidism [E03.9]  Yes      Resolved Hospital Problems   No resolved problems to display.       74 y.o. female admitted with Bacterial infection due to Pseudomonas.    Pseudomonas bacteremia and bacteruria   - Appreciate ID attention to pt  - Rocephin changed to Cefepime  - Fever resolved  - WBC falling  - recent left ureteral stent exchange on July 25  - suspect  source but concerned for hip infection as well (see below)     Right hip pain s/p GEOVANNA in May  -XR okay  -Ortho consultation for hip pain still pending, d/w nurse  -able to walk short distances     Hypoxia  -Chest x-ray showed possible atelectasis  -Continue IS use and wean O2 as able  -Rales on exam, will stop IVFs and give a dose of oral Lasix     Mild ELVIA  -Cr normalized--can stop IVFs now     Dimer elevated  -VQ scan normal  -doppler US of all 4 limbs neg for DVT    HypoT4  -TSH wnl last month  -continue L-T4    HTN  -BPs acceptable on home Atenolol      Lovenox 40 mg SC daily for DVT prophylaxis.  Full code.  Discussed with patient and RN.  Anticipate discharge  TBD    Expected discharge date/ time has not been documented.      Jaiden  FRANCISCO Garcia MD  [facility]  07/29/24  08:57 EDT

## 2024-07-29 NOTE — PLAN OF CARE
Goal Outcome Evaluation:              Outcome Evaluation: VSS. Temp max 99.1. Pain controlled with oral pain meds. Hip FL guided aspiration complete. Dressing c/d/i. IV antibiotics as ordered.

## 2024-07-29 NOTE — PROGRESS NOTES
LOS: 1 day     Chief Complaint: Fever    Interval History: Afebrile, continues to report significant pain in the right hip now also reporting some left thigh pain.  Tolerating antibiotics of vomiting diarrhea or rash    Vital Signs  Temp:  [97.2 °F (36.2 °C)-99.1 °F (37.3 °C)] 98.5 °F (36.9 °C)  Heart Rate:  [64-75] 69  Resp:  [16-20] 16  BP: (125-142)/(60-72) 134/64    Physical Exam:  General: In no acute distress  Cardiovascular: RRR, no lower extremity edema  Respiratory: Breathing comfortably on room air  GI: Soft, NT/ND, + bowel sounds bilaterally  Skin: No rashes     Antibiotics:  Cefepime 2 g IV every 8 hours     Results Review:    Lab Results   Component Value Date    WBC 14.89 (H) 07/29/2024    HGB 10.3 (L) 07/29/2024    HCT 31.7 (L) 07/29/2024    MCV 93.2 07/29/2024     07/29/2024     Lab Results   Component Value Date    GLUCOSE 106 (H) 07/29/2024    BUN 11 07/29/2024    CREATININE 0.83 07/29/2024    EGFRIFNONA 52 (L) 09/22/2021    EGFRIFAFRI 67 11/01/2019    BCR 13.3 07/29/2024    CO2 20.4 (L) 07/29/2024    CALCIUM 8.6 07/29/2024    PROTENTOTREF 6.4 11/01/2019    ALBUMIN 2.6 (L) 07/29/2024    LABIL2 2.2 11/01/2019    AST 9 07/29/2024    ALT 7 07/29/2024       Microbiology:  7/27 UCx >100K Pseudomonas  7/27 BCx Pseudomonas x 2    Assessment & Plan   Fever  Leukocytosis  History of kidney stone status post left ureteral stent placement with stent exchange on July 25  Status post right hip arthroplasty in May now with acute pain  Recent history of C. difficile colitis    Fevers have resolved.  Leukocytosis is trending down.  Urine culture also with Pseudomonas.  Will obtain repeat blood cultures x 2 today.  Still awaiting orthopedic surgery evaluation to determine what kind of imaging is required.  Creatinine has improved so cefepime has been increased to 2 g IV every 8 hours.  Antibiotic duration to be determined

## 2024-07-30 ENCOUNTER — APPOINTMENT (OUTPATIENT)
Dept: GENERAL RADIOLOGY | Facility: HOSPITAL | Age: 74
DRG: 659 | End: 2024-07-30
Payer: MEDICARE

## 2024-07-30 LAB
ANION GAP SERPL CALCULATED.3IONS-SCNC: 8 MMOL/L (ref 5–15)
BACTERIA SPEC AEROBE CULT: ABNORMAL
BUN SERPL-MCNC: 13 MG/DL (ref 8–23)
BUN/CREAT SERPL: 17.1 (ref 7–25)
CALCIUM SPEC-SCNC: 9 MG/DL (ref 8.6–10.5)
CHLORIDE SERPL-SCNC: 106 MMOL/L (ref 98–107)
CO2 SERPL-SCNC: 22 MMOL/L (ref 22–29)
CREAT SERPL-MCNC: 0.76 MG/DL (ref 0.57–1)
DEPRECATED RDW RBC AUTO: 42.2 FL (ref 37–54)
EGFRCR SERPLBLD CKD-EPI 2021: 82.3 ML/MIN/1.73
ERYTHROCYTE [DISTWIDTH] IN BLOOD BY AUTOMATED COUNT: 12.6 % (ref 12.3–15.4)
GLUCOSE SERPL-MCNC: 112 MG/DL (ref 65–99)
GRAM STN SPEC: ABNORMAL
GRAM STN SPEC: ABNORMAL
HCT VFR BLD AUTO: 31.6 % (ref 34–46.6)
HGB BLD-MCNC: 10.3 G/DL (ref 12–15.9)
ISOLATED FROM: ABNORMAL
ISOLATED FROM: ABNORMAL
MAGNESIUM SERPL-MCNC: 1.8 MG/DL (ref 1.6–2.4)
MCH RBC QN AUTO: 29.8 PG (ref 26.6–33)
MCHC RBC AUTO-ENTMCNC: 32.6 G/DL (ref 31.5–35.7)
MCV RBC AUTO: 91.3 FL (ref 79–97)
PHOSPHATE SERPL-MCNC: 2 MG/DL (ref 2.5–4.5)
PLATELET # BLD AUTO: 216 10*3/MM3 (ref 140–450)
PMV BLD AUTO: 9.5 FL (ref 6–12)
POTASSIUM SERPL-SCNC: 3.2 MMOL/L (ref 3.5–5.2)
POTASSIUM SERPL-SCNC: 4.1 MMOL/L (ref 3.5–5.2)
RBC # BLD AUTO: 3.46 10*6/MM3 (ref 3.77–5.28)
SODIUM SERPL-SCNC: 136 MMOL/L (ref 136–145)
WBC NRBC COR # BLD AUTO: 11.77 10*3/MM3 (ref 3.4–10.8)

## 2024-07-30 PROCEDURE — 84132 ASSAY OF SERUM POTASSIUM: CPT | Performed by: HOSPITALIST

## 2024-07-30 PROCEDURE — 80048 BASIC METABOLIC PNL TOTAL CA: CPT | Performed by: HOSPITALIST

## 2024-07-30 PROCEDURE — 71046 X-RAY EXAM CHEST 2 VIEWS: CPT

## 2024-07-30 PROCEDURE — 85027 COMPLETE CBC AUTOMATED: CPT | Performed by: HOSPITALIST

## 2024-07-30 PROCEDURE — 99232 SBSQ HOSP IP/OBS MODERATE 35: CPT | Performed by: INTERNAL MEDICINE

## 2024-07-30 PROCEDURE — 25010000002 CEFEPIME PER 500 MG: Performed by: INTERNAL MEDICINE

## 2024-07-30 PROCEDURE — 25010000002 ENOXAPARIN PER 10 MG: Performed by: HOSPITALIST

## 2024-07-30 PROCEDURE — 83735 ASSAY OF MAGNESIUM: CPT | Performed by: HOSPITALIST

## 2024-07-30 PROCEDURE — 99222 1ST HOSP IP/OBS MODERATE 55: CPT | Performed by: ORTHOPAEDIC SURGERY

## 2024-07-30 PROCEDURE — 84100 ASSAY OF PHOSPHORUS: CPT | Performed by: HOSPITALIST

## 2024-07-30 RX ORDER — POTASSIUM CHLORIDE 750 MG/1
40 TABLET, FILM COATED, EXTENDED RELEASE ORAL EVERY 4 HOURS
Status: COMPLETED | OUTPATIENT
Start: 2024-07-30 | End: 2024-07-30

## 2024-07-30 RX ADMIN — HYDROCODONE BITARTRATE AND ACETAMINOPHEN 2 TABLET: 5; 325 TABLET ORAL at 10:06

## 2024-07-30 RX ADMIN — CEFEPIME 2000 MG: 2 INJECTION, POWDER, FOR SOLUTION INTRAVENOUS at 01:41

## 2024-07-30 RX ADMIN — ENOXAPARIN SODIUM 40 MG: 100 INJECTION SUBCUTANEOUS at 18:31

## 2024-07-30 RX ADMIN — CEFEPIME 2000 MG: 2 INJECTION, POWDER, FOR SOLUTION INTRAVENOUS at 08:49

## 2024-07-30 RX ADMIN — ROSUVASTATIN CALCIUM 10 MG: 10 TABLET, FILM COATED ORAL at 21:42

## 2024-07-30 RX ADMIN — HYDROCODONE BITARTRATE AND ACETAMINOPHEN 2 TABLET: 5; 325 TABLET ORAL at 21:45

## 2024-07-30 RX ADMIN — ATENOLOL 25 MG: 25 TABLET ORAL at 21:42

## 2024-07-30 RX ADMIN — HYDROCODONE BITARTRATE AND ACETAMINOPHEN 2 TABLET: 5; 325 TABLET ORAL at 16:02

## 2024-07-30 RX ADMIN — HYDROCODONE BITARTRATE AND ACETAMINOPHEN 2 TABLET: 5; 325 TABLET ORAL at 02:03

## 2024-07-30 RX ADMIN — CEFEPIME 2000 MG: 2 INJECTION, POWDER, FOR SOLUTION INTRAVENOUS at 18:31

## 2024-07-30 RX ADMIN — ESCITALOPRAM 20 MG: 20 TABLET, FILM COATED ORAL at 08:49

## 2024-07-30 RX ADMIN — LEVOTHYROXINE SODIUM 50 MCG: 50 TABLET ORAL at 08:49

## 2024-07-30 RX ADMIN — POTASSIUM CHLORIDE 40 MEQ: 750 TABLET, EXTENDED RELEASE ORAL at 14:08

## 2024-07-30 RX ADMIN — POTASSIUM CHLORIDE 40 MEQ: 750 TABLET, EXTENDED RELEASE ORAL at 10:06

## 2024-07-30 RX ADMIN — Medication 10 ML: at 08:49

## 2024-07-30 NOTE — PROGRESS NOTES
Name: Anastasiia Faria ADMIT: 2024   : 1950  PCP: Mirza Scott Sr., MD    MRN: [id number] LOS: 2 days   AGE/SEX: 74 y.o. female  [facility]     Subjective   Subjective   Pt still c/o back pain worse on left, radiating into BLEs. Voiding well w/o LUTS or hematuria. No N/V. Tolerating po. No F/C/NS. Right hip still hurting.        Objective   Objective   Vital Signs  Temp:  [97 °F (36.1 °C)-98.9 °F (37.2 °C)] 97.8 °F (36.6 °C)  Heart Rate:  [64-81] 69  Resp:  [16-18] 16  BP: (113-149)/(57-79) 129/74  SpO2:  [94 %-97 %] 97 %  on  Flow (L/min):  [2-4] 2;   Device (Oxygen Therapy): nasal cannula  Body mass index is 27.69 kg/m².  Physical Exam  Vitals and nursing note reviewed.   Constitutional:       General: She is not in acute distress.     Appearance: She is not ill-appearing, toxic-appearing or diaphoretic.   HENT:      Head: Normocephalic.      Nose: Nose normal.      Mouth/Throat:      Mouth: Mucous membranes are moist.      Pharynx: Oropharynx is clear.   Eyes:      General: No scleral icterus.        Right eye: No discharge.         Left eye: No discharge.      Conjunctiva/sclera: Conjunctivae normal.   Cardiovascular:      Rate and Rhythm: Normal rate and regular rhythm.      Pulses: Normal pulses.   Pulmonary:      Effort: Pulmonary effort is normal. No respiratory distress.      Breath sounds: Rales (in bases) present. No wheezing.   Abdominal:      General: Bowel sounds are normal. There is no distension.      Palpations: Abdomen is soft.      Tenderness: There is no abdominal tenderness.   Musculoskeletal:         General: Swelling (Trace in BLEs) present.      Cervical back: Neck supple.      Comments: NVI in distal BLEs   Skin:     General: Skin is warm and dry.      Capillary Refill: Capillary refill takes less than 2 seconds.      Coloration: Skin is not jaundiced.   Neurological:      General: No focal deficit present.      Mental Status: She is alert and oriented to person, place, and  "time. Mental status is at baseline.      Cranial Nerves: No cranial nerve deficit.      Coordination: Coordination normal.   Psychiatric:         Mood and Affect: Mood normal.         Behavior: Behavior normal.         Thought Content: Thought content normal.       Results Review     I reviewed the patient's new clinical results.  Results from last 7 days   Lab Units 07/30/24  0615 07/29/24  0707 07/28/24  1527 07/27/24  0819   WBC 10*3/mm3 11.77* 14.89* 18.18* 18.22*   HEMOGLOBIN g/dL 10.3* 10.3* 10.8* 10.3*   PLATELETS 10*3/mm3 216 203 208 196     Results from last 7 days   Lab Units 07/30/24  0615 07/29/24  0707 07/28/24  1527 07/27/24  0819   SODIUM mmol/L 136 136 138 135*   POTASSIUM mmol/L 3.2* 3.5 4.0 3.9   CHLORIDE mmol/L 106 108* 106 105   CO2 mmol/L 22.0 20.4* 20.4* 22.0   BUN mg/dL 13 11 13 22   CREATININE mg/dL 0.76 0.83 0.95 1.15*   GLUCOSE mg/dL 112* 106* 116* 119*   EGFR mL/min/1.73 82.3 74.1 63.0 50.1*     Results from last 7 days   Lab Units 07/29/24  0707 07/27/24  0240   ALBUMIN g/dL 2.6* 3.3*   BILIRUBIN mg/dL 0.3 0.4   ALK PHOS U/L 93 84   AST (SGOT) U/L 9 15   ALT (SGPT) U/L 7 7     Results from last 7 days   Lab Units 07/30/24  0615 07/29/24  0707 07/28/24  1527 07/27/24  0819 07/27/24  0240   CALCIUM mg/dL 9.0 8.6 8.8 8.3* 9.0   ALBUMIN g/dL  --  2.6*  --   --  3.3*   MAGNESIUM mg/dL 1.8  --   --   --   --    PHOSPHORUS mg/dL 2.0*  --   --   --   --      Results from last 7 days   Lab Units 07/27/24  0240   LACTATE mmol/L 1.2     No results found for: \"HGBA1C\", \"POCGLU\"    No radiology results for the last day    I have personally reviewed all medications:  Scheduled Medications  atenolol, 25 mg, Oral, Nightly  cefepime, 2,000 mg, Intravenous, Q8H  enoxaparin, 40 mg, Subcutaneous, Q24H  escitalopram, 20 mg, Oral, Daily  levothyroxine, 50 mcg, Oral, Daily  potassium chloride ER, 40 mEq, Oral, Q4H  rosuvastatin, 10 mg, Oral, Nightly  sodium chloride, 10 mL, Intravenous, Q12H    Infusions   "   Diet  Diet: Regular/House; Fluid Consistency: Thin (IDDSI 0)    I have personally reviewed:  [x]  Laboratory   [x]  Microbiology   [x]  Radiology   []  EKG/Telemetry  []  Cardiology/Vascular   []  Pathology    []  Records       Assessment/Plan     Active Hospital Problems    Diagnosis  POA    **Bacterial infection due to Pseudomonas [A49.8]  Unknown    Lumbago [M54.50]  Unknown    Acute UTI [N39.0]  Yes    Anxiety [F41.9]  Yes    Essential hypertension [I10]  Yes    Acquired hypothyroidism [E03.9]  Yes      Resolved Hospital Problems   No resolved problems to display.       74 y.o. female admitted with Bacterial infection due to Pseudomonas.    Pseudomonas bacteremia and bacteruria   - Appreciate ID attention to pt  - Rocephin changed to Cefepime  - Fever resolved  - WBC falling  - recent left ureteral stent exchange on July 25  - suspect  source but was concerned for hip infection as well (see below)     Right hip pain s/p GEOVANNA in May  -XR okay  -Ortho consulted, no note in Epic but they did order joint aspiration which was done 7/29  -Fluid did not look infected and culture negative so far  -Pt says that Ortho mentioned checking MRI of her low back to r/o spinal pathology as etiology of her hip and back pain     Hypoxia  -Chest x-ray showed possible atelectasis  -Continue IS use and wean O2 as able  -Rales on exam yesterday, stopped IVFs and gave a dose of oral Lasix  -Still requiring 4L/min--will repeat CXR today    Hypokalemia  -Replace with protocol  -Mg++ level fine     Mild ELVIA  -Cr normalized  -IVFs stopped     Dimer elevated  -VQ scan normal  -doppler US of all 4 limbs neg for DVT    HypoT4  -TSH wnl last month  -continue L-T4    HTN  -BPs acceptable on home Atenolol      Lovenox 40 mg SC daily for DVT prophylaxis.  Full code.  Discussed with patient and RN.  Anticipate discharge  TBD    Expected discharge date/ time has not been documented.      Jaiden Garcia MD  [facility]  07/30/24  10:26 EDT

## 2024-07-30 NOTE — CONSULTS
Orthopedic Consult      Patient: Anastasiia Faria    Date of Admission: 7/27/2024  2:00 AM    YOB: 1950    Medical Record Number: [id number]    Attending Physician: Senthil Medeiros MD    Consulting Physician: Vasu King MD      Chief Complaints: Acute UTI [N39.0]  ELVIA (acute kidney injury) [N17.9]  Sepsis without acute organ dysfunction, due to unspecified organism [A41.9]  Bacterial infection due to Pseudomonas [A49.8]      History of Present Illness: 74 y.o. female admitted to [facility] with Acute UTI [N39.0]  ELVIA (acute kidney injury) [N17.9]  Sepsis without acute organ dysfunction, due to unspecified organism [A41.9]  Bacterial infection due to Pseudomonas [A49.8]. I was consulted for further evaluation and treatment.  No Known Allergies  I was consulted initially for complaints of right hip pain and she is a couple months out from total hip replacement she has had some medical issues including sepsis and infections at remote sites and there was some concern she could have a hip infection.  But initially saw her she did have some mild irritability of her right hip we sent her for an aspiration and then today on exam she states the right hip is not hurting her much in fact is more in the left hip and leg.  She also complains of back pain.  She has had an elevated C-reactive protein and there is been some concerns of infection.  Again today she does not complain of any pain within the right hip.  Home Medications:  Medications Prior to Admission   Medication Sig Dispense Refill Last Dose    atenolol (TENORMIN) 25 MG tablet Take 1 tablet by mouth Daily. 90 tablet 2     escitalopram (LEXAPRO) 20 MG tablet Take 1 tablet by mouth Daily. 90 tablet 2     HYDROcodone-acetaminophen (NORCO) 5-325 MG per tablet Take 1-2 tablets by mouth Every 6 (Six) Hours As Needed for Moderate Pain (Pain). 12 tablet 0     levothyroxine (SYNTHROID, LEVOTHROID) 50 MCG tablet Take 1 tablet by mouth Daily. 90 tablet  2     loratadine (CLARITIN) 10 MG tablet Take 1 tablet by mouth Daily. Indications: Hayfever       NIFEdipine XL (PROCARDIA XL) 30 MG 24 hr tablet Take 1 tablet by mouth Daily. 90 tablet 2     Psyllium (METAMUCIL FIBER PO) Take 1 Scoop by mouth Daily. Indications: constipation       rosuvastatin (CRESTOR) 10 MG tablet Take 1 tablet by mouth Every Night. 90 tablet 3     sulfamethoxazole-trimethoprim (Bactrim DS) 800-160 MG per tablet Take 1 tablet by mouth 2 (Two) Times a Day. 10 tablet 0     cephalexin (KEFLEX) 500 MG capsule Take 1 capsule by mouth 3 (Three) Times a Day.          Current Medications:  Scheduled Meds:atenolol, 25 mg, Oral, Nightly  cefepime, 2,000 mg, Intravenous, Q8H  enoxaparin, 40 mg, Subcutaneous, Q24H  escitalopram, 20 mg, Oral, Daily  levothyroxine, 50 mcg, Oral, Daily  rosuvastatin, 10 mg, Oral, Nightly  sodium chloride, 10 mL, Intravenous, Q12H      Continuous Infusions:   PRN Meds:.  acetaminophen **OR** acetaminophen **OR** acetaminophen    [DISCONTINUED] senna-docusate sodium **AND** [DISCONTINUED] polyethylene glycol **AND** bisacodyl **AND** bisacodyl    calcium carbonate    HYDROcodone-acetaminophen    HYDROcodone-acetaminophen    ondansetron ODT **OR** ondansetron    Potassium Replacement - Follow Nurse / BPA Driven Protocol    [COMPLETED] Insert Peripheral IV **AND** sodium chloride    sodium chloride    sodium chloride    Past Medical History:   Diagnosis Date    Allergic     Anxiety     Chronic kidney disease     Clostridium difficile infection 06/2024    TREATED AT [facility]    Colon polyps     Depression     Diverticulosis 2011    Encounter for annual health examination 03/07/2014    Annual Health Assessment    Family history of colon cancer     Fibrocystic breast     GERD (gastroesophageal reflux disease)     Hard of hearing     HEARING AIDS BILATERALLY    Heart murmur     Herpes labialis without complication     History of cataract     History of mammogram 12/20/2010    History of  recent hospitalization 03/21/2024    KIDNEY STONE/SEPSIS 4 NIGHT AT [facility]    HL (hearing loss) 2014    Hearing Aids    Hydronephrosis     Hyperlipidemia     Hypertension     Hypothyroidism     Insomnia     Kidney stones     Migraines     Osteoarthritis of right hip     Osteopenia     Prolia -last 9/2021,3/2022    PONV (postoperative nausea and vomiting)     Scoliosis     Dr. Stanford 5/2018    Sepsis 03/2024    Slow to wake up after anesthesia     Urinary tract infection     3/2024,  6/202    Visual impairment     Wear Glasses        Past Surgical History:   Procedure Laterality Date    BONE MARROW BIOPSY Left     BREAST CYST ASPIRATION Right     BREAST SURGERY Right     BIOPSY    CATARACT EXTRACTION, BILATERAL      Cataract surgery on right eye June 2021 and left eye July 2021. (Dr. Nanette Bateman)    COLONOSCOPY N/A 10/27/2016    Procedure: COLONOSCOPY INTO CECUM;  Surgeon: Osvaldo Dorado MD;  Location: [facility] ENDOSCOPY;  Service:     COLONOSCOPY  01/26/2011    COLONOSCOPY  02/04/2005    COLONOSCOPY N/A 12/02/2021    Procedure: COLONOSCOPY INTO CECUM WITH COLD BIOPSY POLYPECTOMY AND HOT SNARE POLYPECTOMY;  Surgeon: Osvaldo Dorado MD;  Location: [facility] ENDOSCOPY;  Service: General;  Laterality: N/A;  PRE-FAMILY HISTORY OF COLON CANCER, PERSONAL HISTORY OF COLON CANCER  POST- POLYPS, DIVERTICULOSIS, HEMORRHOIDS    CYSTOSCOPY W/ URETERAL STENT PLACEMENT Left 03/21/2024    Procedure: LEFT CYSTOSCOPY RETROGRADE PYLEOGRAM URETERAL STENT INSERTION;  Surgeon: Jaiden Bolton Jr., MD;  Location: [facility] OR;  Service: Urology;  Laterality: Left;    CYSTOSCOPY W/ URETERAL STENT REMOVAL  04/23/2024    CYSTOSCOPY, RETROGRADE PYELOGRAM AND STENT INSERTION Left 06/14/2024    Procedure: CYSTOSCOPY RETROGRADE PYELOGRAM AND STENT INSERTION;  Surgeon: Yahir Faria MD;  Location: [facility] OR;  Service: Urology;  Laterality: Left;    JOINT REPLACEMENT  5/2024    Right Hip   Dr. Leone  Fernando    KNEE ARTHROSCOPY Left     PAP SMEAR  12/20/2010    TOTAL HIP ARTHROPLASTY Right 05/13/2024    Procedure: TOTAL HIP ARTHROPLASTY ANTERIOR WITH HANA TABLE;  Surgeon: Vasu King MD;  Location: [facility];  Service: Orthopedics;  Laterality: Right;    URETEROSCOPY LASER LITHOTRIPSY WITH STENT INSERTION Left 7/25/2024    Procedure: CYSTOSCOPY, LEFT URETEROSCOPY, LASER LITHOTRIPSY, STENT EXCHANGE;  Surgeon: Jaiden Bolton Jr., MD;  Location: [facility] OR;  Service: Urology;  Laterality: Left;        Social History     Occupational History    Not on file   Tobacco Use    Smoking status: Never    Smokeless tobacco: Never    Tobacco comments:     daily caffine   Vaping Use    Vaping status: Never Used   Substance and Sexual Activity    Alcohol use: Never    Drug use: Never    Sexual activity: Not Currently     Partners: Male     Birth control/protection: Post-menopausal     Comment: Took birth control pills approximately 30 years.      Social History     Social History Narrative    Not on file        Family History   Problem Relation Age of Onset    Breast cancer Mother     Colon cancer Mother     Hypertension Mother     Cancer Mother         Breat cancer, skin cancer, colon cancer    Hyperlipidemia Mother     Cancer Father         Skin cancer    Hypertension Sister     Hypertension Sister     Hyperlipidemia Sister     Thyroid disease Sister     Diabetes type II Brother     Cancer Brother         Skin cancer    Diabetes Brother     Colon cancer Maternal Aunt         colon ca 40    Cancer Maternal Aunt         Colon cancer cause of death early 40s    Colon cancer Maternal Aunt         64 yo    Heart disease Maternal Aunt     Colon cancer Maternal Aunt         79 yo    Cancer Maternal Aunt         Colon cancer    Cancer Maternal Aunt         Colon cancer    Cancer Maternal Uncle         Thyroid cancer    Heart disease Maternal Uncle     Stomach cancer Maternal Grandmother         or intestinal    Cancer  Maternal Grandmother         GI TRACT CANCER    Breast cancer Maternal Grandmother     Heart disease Maternal Grandmother     Heart attack Maternal Grandfather     Breast cancer Maternal Grandfather     Heart disease Maternal Grandfather     Heart disease Paternal Grandmother     Colon cancer Other     Colon cancer Other     Malig Hyperthermia Neg Hx          Review of Systems:   HEENT: Patient denies any headaches, vision changes, change in hearing, or tinnitus.  Patient denies any rhinorrhea, epistaxis, sinus pain, mouth or dental problems, sore throat or hoarseness, or dysphagia.  Pulmonary: Patient denies any cough, congestion, SOA, or wheezing.  Cardiovascular: Patient denies any chest pain, dyspnea, palpitations, weakness, intolerance of exercise, varicosities, swelling of extremities, known murmur.  Gastrointestinal:  Patient denies nausea, vomiting, diarrhea, constipation, loss  of appetite, change in appetite, dysphagia, gas, heartburn, melena, change in bowel habits, use of laxatives or other drugs to alter the function of the gastrointestinal tract.  Genital/Urinary: Patient denies dysuria, change in color of urine, change in frequency of urination, pain with urgency, incontinence, retention, or nocturia.  Musculoskeletal: Complains of at first right and now left hip and leg pain as well as low back pain  Neurological: Patient denies dizziness, tremor, ataxia, difficulty in speaking, change in speech, paresthesia, loss of sensation, seizures, syncope, changes in memory.  Endocrine system: Patient denies tremors, palpitations, intolerance of heat or cold, polyuria, polydipsia, polyphagia, diaphoresis, exophthalmos, or goiter.  Psychological: Patient denies thoughts/plans of harming self or other; depression,  insomnia, night terrors, chuy, memory loss, disorientation.  Skin: Patient denies any bruising, rashes, discoloration, pruritus, wounds, ulcers, decubiti, changes in the hair or  nails.  Hematopoietic: Patient denies history of spontaneous or excessive bleeding, epistaxis, hematuria, melena, fatigue, enlarged or tender lymph nodes, pallor, history of anemia.    Physical Exam: 74 y.o. female  General Appearance:    Alert, cooperative, in no acute distress                   Vitals:    07/30/24 0144 07/30/24 0526 07/30/24 0938 07/30/24 1230   BP: 135/73 149/68 129/74    BP Location: Left arm Left arm Left arm    Patient Position: Lying Lying Sitting    Pulse: 68 68 69    Resp: 18 16 16    Temp: 98.9 °F (37.2 °C) 97 °F (36.1 °C) 97.8 °F (36.6 °C)    TempSrc: Oral Oral Oral    SpO2: 94% 94% 97% 93%   Weight:       Height:            Head:  Normocephalic, without obvious abnormality, atraumatic   Eyes:          Lids and lashes normal, conjunctivae and sclerae normal, no icterus, no pallor, corneas clear, PERRLA   Ears:  Ears appear intact with no abnormalities noted   Throat: No oral lesions, no thrush, oral mucosa moist   Neck: No adenopathy, supple, trachea midline, no thyromegaly, no carotid bruit, no JVD   Back:   No kyphosis present, no scoliosis present, no skin lesions,    erythema or scars, no tenderness to percussion or                   palpation, range of motion normal   Lungs:   Clear to auscultation, respirations regular, even and                unlabored    Heart:  Regular rhythm and normal rate, normal S1 and S2, no         murmur, no gallop, no rub, no click   Chest Wall:  No abnormalities observed   Abdomen:   Normal bowel sounds, no masses, no organomegaly, soft     nontender, nondistended, no guarding, no rebound                tenderness   Rectal:   Deferred   Extremities: Tenderness noted at left hip with range of motion and throughout the left thigh just past her knee.  Healed anterior right hip surgical incision without sign of infection fullness fluctuance or erythema.  Moves all extremities well, no       edema, no cyanosis, no redness   Pulses: Pulses palpable and equal  bilaterally   Skin: No bleeding, bruising or rash   Lymph nodes: No palpable adenopathy   Neurologic: Cranial nerves 2 - 12 grossly intact, sensation intact, DTR     present and equal bilaterally           Diagnostic Tests:  Right hip aspiration performed yesterday not consistent with infection  No results displayed because visit has over 200 results.          No results found.      Assessment:    Bacterial infection due to Pseudomonas    Acquired hypothyroidism    Essential hypertension    Anxiety    Acute UTI    Lumbago           Plan:  The patient voiced understanding of the risks, benefits, and alternative forms of treatment that were discussed and the patient consents to proceed with MRI lumbar spine.  I have a low suspicion of infection in either hip joint and I am concerned with the migratory nature of her pain as well as back pain and bilateral leg pain that she may have a lumbar origin..             Date: 7/30/2024    Vasu King MD

## 2024-07-30 NOTE — PLAN OF CARE
Goal Outcome Evaluation:  Plan of Care Reviewed With: patient        Progress: no change  Outcome Evaluation: iv at k[facility] for abx, vdg, up with assist, falls protocol, med for hip pain, waiting for ortho to see pt, ble sl swollen, i/s, band aid rlq c/d/i

## 2024-07-30 NOTE — PROGRESS NOTES
LOS: 2 days     Chief Complaint: Fever    Interval History: Afebrile, status post right hip arthrocentesis yesterday.  Continues to report hip and back pain.  Tolerating antibiotics without vomiting diarrhea or rash    Vital Signs  Temp:  [97 °F (36.1 °C)-98.9 °F (37.2 °C)] 97.8 °F (36.6 °C)  Heart Rate:  [64-81] 69  Resp:  [16-18] 16  BP: (113-149)/(57-79) 129/74    Physical Exam:  General: In no acute distress  Cardiovascular: RRR, no lower extremity edema  Respiratory: Breathing comfortably on room air  GI: Soft, NT/ND, + bowel sounds bilaterally  Skin: No rashes     Antibiotics:  Cefepime 2 g IV every 8 hours     Results Review:    Lab Results   Component Value Date    WBC 11.77 (H) 07/30/2024    HGB 10.3 (L) 07/30/2024    HCT 31.6 (L) 07/30/2024    MCV 91.3 07/30/2024     07/30/2024     Lab Results   Component Value Date    GLUCOSE 112 (H) 07/30/2024    BUN 13 07/30/2024    CREATININE 0.76 07/30/2024    EGFRIFNONA 52 (L) 09/22/2021    EGFRIFAFRI 67 11/01/2019    BCR 17.1 07/30/2024    CO2 22.0 07/30/2024    CALCIUM 9.0 07/30/2024    PROTENTOTREF 6.4 11/01/2019    ALBUMIN 2.6 (L) 07/29/2024    LABIL2 2.2 11/01/2019    AST 9 07/29/2024    ALT 7 07/29/2024     Right hip arthrocentesis with 138 nucleated cells 40% neutrophils 48% lymphocytes red blood cells 14,700    Microbiology:  7/29 BCx P x 2  7/29 right hip cultures negative to date  7/27 UCx >100K Pseudomonas  7/27 BCx Pseudomonas x 2    Assessment & Plan   Fever  Leukocytosis  History of kidney stone status post left ureteral stent placement with stent exchange on July 25  Status post right hip arthroplasty in May now with acute pain  Recent history of C. difficile colitis    Fevers have resolved.  White blood cell count is trending down.  Status post right hip arthrocentesis yesterday with cell counts not consistent with infection.  Follow-up cultures.  Agree that the patient needs additional imaging such as MRI given her persistent pain.  Continue  cefepime 2 g IV every 8 hours.  Antibiotic duration to be determined.

## 2024-07-30 NOTE — PLAN OF CARE
Goal Outcome Evaluation:  Plan of Care Reviewed With: patient        Progress: improving  Outcome Evaluation: O2 now at 1 L. MRI ordered for back. Norco given for bilateral hip pain. Abx given per order. Up with assist. Falls precautions maintained. Spouse at bedside. Chest XR completed this afternoon.    Potassium replaced per protocol.

## 2024-07-31 ENCOUNTER — APPOINTMENT (OUTPATIENT)
Dept: MRI IMAGING | Facility: HOSPITAL | Age: 74
DRG: 659 | End: 2024-07-31
Payer: MEDICARE

## 2024-07-31 LAB
ALBUMIN SERPL-MCNC: 2.7 G/DL (ref 3.5–5.2)
ALBUMIN/GLOB SERPL: 0.9 G/DL
ALP SERPL-CCNC: 102 U/L (ref 39–117)
ALT SERPL W P-5'-P-CCNC: 15 U/L (ref 1–33)
ANION GAP SERPL CALCULATED.3IONS-SCNC: 8.5 MMOL/L (ref 5–15)
AST SERPL-CCNC: 19 U/L (ref 1–32)
BILIRUB SERPL-MCNC: 0.4 MG/DL (ref 0–1.2)
BUN SERPL-MCNC: 13 MG/DL (ref 8–23)
BUN/CREAT SERPL: 16.9 (ref 7–25)
CALCIUM SPEC-SCNC: 9.2 MG/DL (ref 8.6–10.5)
CHLORIDE SERPL-SCNC: 105 MMOL/L (ref 98–107)
CK SERPL-CCNC: 14 U/L (ref 20–180)
CO2 SERPL-SCNC: 20.5 MMOL/L (ref 22–29)
CREAT SERPL-MCNC: 0.77 MG/DL (ref 0.57–1)
DEPRECATED RDW RBC AUTO: 41.8 FL (ref 37–54)
EGFRCR SERPLBLD CKD-EPI 2021: 81.1 ML/MIN/1.73
ERYTHROCYTE [DISTWIDTH] IN BLOOD BY AUTOMATED COUNT: 12.5 % (ref 12.3–15.4)
GLOBULIN UR ELPH-MCNC: 2.9 GM/DL
GLUCOSE SERPL-MCNC: 93 MG/DL (ref 65–99)
HCT VFR BLD AUTO: 31.3 % (ref 34–46.6)
HGB BLD-MCNC: 10.1 G/DL (ref 12–15.9)
MAGNESIUM SERPL-MCNC: 1.8 MG/DL (ref 1.6–2.4)
MAGNESIUM SERPL-MCNC: 1.9 MG/DL (ref 1.6–2.4)
MCH RBC QN AUTO: 29.7 PG (ref 26.6–33)
MCHC RBC AUTO-ENTMCNC: 32.3 G/DL (ref 31.5–35.7)
MCV RBC AUTO: 92.1 FL (ref 79–97)
PHOSPHATE SERPL-MCNC: 1.7 MG/DL (ref 2.5–4.5)
PHOSPHATE SERPL-MCNC: 2 MG/DL (ref 2.5–4.5)
PLATELET # BLD AUTO: 264 10*3/MM3 (ref 140–450)
PMV BLD AUTO: 9.5 FL (ref 6–12)
POTASSIUM SERPL-SCNC: 3.6 MMOL/L (ref 3.5–5.2)
POTASSIUM SERPL-SCNC: 4 MMOL/L (ref 3.5–5.2)
PROT SERPL-MCNC: 5.6 G/DL (ref 6–8.5)
RBC # BLD AUTO: 3.4 10*6/MM3 (ref 3.77–5.28)
SODIUM SERPL-SCNC: 134 MMOL/L (ref 136–145)
WBC NRBC COR # BLD AUTO: 12.57 10*3/MM3 (ref 3.4–10.8)

## 2024-07-31 PROCEDURE — 83735 ASSAY OF MAGNESIUM: CPT | Performed by: HOSPITALIST

## 2024-07-31 PROCEDURE — 97162 PT EVAL MOD COMPLEX 30 MIN: CPT

## 2024-07-31 PROCEDURE — 25010000002 CEFEPIME PER 500 MG: Performed by: INTERNAL MEDICINE

## 2024-07-31 PROCEDURE — 84132 ASSAY OF SERUM POTASSIUM: CPT | Performed by: HOSPITALIST

## 2024-07-31 PROCEDURE — 85027 COMPLETE CBC AUTOMATED: CPT | Performed by: HOSPITALIST

## 2024-07-31 PROCEDURE — 97530 THERAPEUTIC ACTIVITIES: CPT

## 2024-07-31 PROCEDURE — 82550 ASSAY OF CK (CPK): CPT | Performed by: HOSPITALIST

## 2024-07-31 PROCEDURE — 72148 MRI LUMBAR SPINE W/O DYE: CPT

## 2024-07-31 PROCEDURE — 99232 SBSQ HOSP IP/OBS MODERATE 35: CPT | Performed by: INTERNAL MEDICINE

## 2024-07-31 PROCEDURE — 80053 COMPREHEN METABOLIC PANEL: CPT | Performed by: HOSPITALIST

## 2024-07-31 PROCEDURE — 84100 ASSAY OF PHOSPHORUS: CPT | Performed by: HOSPITALIST

## 2024-07-31 PROCEDURE — 25010000002 ENOXAPARIN PER 10 MG: Performed by: HOSPITALIST

## 2024-07-31 RX ORDER — POTASSIUM CHLORIDE 750 MG/1
40 TABLET, FILM COATED, EXTENDED RELEASE ORAL EVERY 4 HOURS
Status: COMPLETED | OUTPATIENT
Start: 2024-07-31 | End: 2024-07-31

## 2024-07-31 RX ADMIN — ROSUVASTATIN CALCIUM 10 MG: 10 TABLET, FILM COATED ORAL at 21:02

## 2024-07-31 RX ADMIN — CEFEPIME 2000 MG: 2 INJECTION, POWDER, FOR SOLUTION INTRAVENOUS at 18:37

## 2024-07-31 RX ADMIN — POTASSIUM, SODIUM PHOSPHATES 280 MG-160 MG-250 MG ORAL POWDER PACKET 2 PACKET: POWDER IN PACKET at 15:28

## 2024-07-31 RX ADMIN — ENOXAPARIN SODIUM 40 MG: 100 INJECTION SUBCUTANEOUS at 18:36

## 2024-07-31 RX ADMIN — ESCITALOPRAM 20 MG: 20 TABLET, FILM COATED ORAL at 08:20

## 2024-07-31 RX ADMIN — CEFEPIME 2000 MG: 2 INJECTION, POWDER, FOR SOLUTION INTRAVENOUS at 02:33

## 2024-07-31 RX ADMIN — LEVOTHYROXINE SODIUM 50 MCG: 50 TABLET ORAL at 08:21

## 2024-07-31 RX ADMIN — Medication 10 ML: at 21:02

## 2024-07-31 RX ADMIN — HYDROCODONE BITARTRATE AND ACETAMINOPHEN 2 TABLET: 5; 325 TABLET ORAL at 12:32

## 2024-07-31 RX ADMIN — Medication 10 ML: at 08:21

## 2024-07-31 RX ADMIN — ATENOLOL 25 MG: 25 TABLET ORAL at 21:02

## 2024-07-31 RX ADMIN — POTASSIUM CHLORIDE 40 MEQ: 750 TABLET, EXTENDED RELEASE ORAL at 15:29

## 2024-07-31 RX ADMIN — POTASSIUM CHLORIDE 40 MEQ: 750 TABLET, EXTENDED RELEASE ORAL at 12:32

## 2024-07-31 RX ADMIN — HYDROCODONE BITARTRATE AND ACETAMINOPHEN 2 TABLET: 5; 325 TABLET ORAL at 18:36

## 2024-07-31 RX ADMIN — HYDROCODONE BITARTRATE AND ACETAMINOPHEN 2 TABLET: 5; 325 TABLET ORAL at 05:15

## 2024-07-31 RX ADMIN — BISACODYL 5 MG: 5 TABLET, COATED ORAL at 05:15

## 2024-07-31 RX ADMIN — CEFEPIME 2000 MG: 2 INJECTION, POWDER, FOR SOLUTION INTRAVENOUS at 10:22

## 2024-07-31 NOTE — PLAN OF CARE
Goal Outcome Evaluation:  Plan of Care Reviewed With: patient        Progress: no change  Outcome Evaluation: ivf, O2 @ 1L, up with assist and walker, falls protocl, mri done this shift, med for pain

## 2024-07-31 NOTE — PROGRESS NOTES
Name: Anastasiia Faria ADMIT: 2024   : 1950  PCP: Mirza Scott Sr., MD    MRN: [id number] LOS: 3 days   AGE/SEX: 74 y.o. female  ROOM: [facility]     Subjective   Subjective   Still complaining of right hip pain but able to ambulate with a walker.     Objective   Objective   Vital Signs  Temp:  [97.2 °F (36.2 °C)-98.8 °F (37.1 °C)] 97.9 °F (36.6 °C)  Heart Rate:  [69-74] 74  Resp:  [16] 16  BP: (131-152)/(69-76) 151/76  SpO2:  [90 %-95 %] 90 %  on  Flow (L/min):  [1-2] 1;   Device (Oxygen Therapy): nasal cannula  Body mass index is 28.37 kg/m².    Physical Exam  Constitutional:       General: She is not in acute distress.     Appearance: She is not toxic-appearing.      Comments: Frail, elderly   HENT:      Head: Normocephalic and atraumatic.   Cardiovascular:      Rate and Rhythm: Normal rate and regular rhythm.   Pulmonary:      Effort: Pulmonary effort is normal. No respiratory distress.      Breath sounds: Normal breath sounds. No wheezing or rhonchi.   Abdominal:      General: Bowel sounds are normal.      Palpations: Abdomen is soft.      Tenderness: There is no abdominal tenderness. There is no guarding or rebound.   Skin:     General: Skin is warm and dry.   Neurological:      General: No focal deficit present.      Mental Status: She is alert.   Psychiatric:         Mood and Affect: Mood normal.         Behavior: Behavior normal.     Results Review  I reviewed the patient's new clinical results.  Results from last 7 days   Lab Units 24  0817 24  0615 24  0707 24  1527   WBC 10*3/mm3 12.57* 11.77* 14.89* 18.18*   HEMOGLOBIN g/dL 10.1* 10.3* 10.3* 10.8*   PLATELETS 10*3/mm3 264 216 203 208     Results from last 7 days   Lab Units 24  0817 24  1816 24  0615 24  0707 24  1527   SODIUM mmol/L 134*  --  136 136 138   POTASSIUM mmol/L 3.6 4.1 3.2* 3.5 4.0   CHLORIDE mmol/L 105  --  106 108* 106   CO2 mmol/L 20.5*  --  22.0 20.4* 20.4*   BUN mg/dL 13  --   "13 11 13   CREATININE mg/dL 0.77  --  0.76 0.83 0.95   GLUCOSE mg/dL 93  --  112* 106* 116*     Lab Results   Component Value Date    ANIONGAP 8.5 07/31/2024     Estimated Creatinine Clearance: 73.1 mL/min (by C-G formula based on SCr of 0.77 mg/dL).   Lab Results   Component Value Date    EGFR 81.1 07/31/2024     Results from last 7 days   Lab Units 07/31/24  0817 07/29/24  0707 07/27/24  0240   ALBUMIN g/dL 2.7* 2.6* 3.3*   BILIRUBIN mg/dL 0.4 0.3 0.4   ALK PHOS U/L 102 93 84   AST (SGOT) U/L 19 9 15   ALT (SGPT) U/L 15 7 7     Results from last 7 days   Lab Units 07/31/24  0817 07/30/24  0615 07/29/24  0707 07/28/24  1527 07/27/24  0819 07/27/24  0240   CALCIUM mg/dL 9.2 9.0 8.6 8.8   < > 9.0   ALBUMIN g/dL 2.7*  --  2.6*  --   --  3.3*   MAGNESIUM mg/dL 1.9 1.8  --   --   --   --    PHOSPHORUS mg/dL 2.0* 2.0*  --   --   --   --     < > = values in this interval not displayed.     Results from last 7 days   Lab Units 07/27/24  0240   LACTATE mmol/L 1.2     No results found for: \"HGBA1C\", \"POCGLU\"    MRI Lumbar Spine Without Contrast    Result Date: 7/31/2024  There is no evidence of a focal herniation or of a compression fracture/compression deformity. Degenerative disease involving lumbar spine as described above including mild levoscoliosis, loss of disc height, mild broad-based disc osteophyte complexes and mild foraminal stenosis. Facet degenerative disease is most prominent at L4-L5 and L5-S1 where is estimated to be mild to moderate. See above.  This report was finalized on 7/31/2024 8:19 AM by Dr. Senthil Kemp M.D on Workstation: [id number]      XR Chest PA & Lateral    Result Date: 7/30/2024  As described.  This report was finalized on 7/30/2024 4:05 PM by Dr. Chidi Perez M.D on Workstation: [id number]       Scheduled Meds  atenolol, 25 mg, Oral, Nightly  cefepime, 2,000 mg, Intravenous, Q8H  enoxaparin, 40 mg, Subcutaneous, Q24H  escitalopram, 20 mg, Oral, Daily  levothyroxine, 50 mcg, Oral, " Daily  potassium & sodium phosphates, 2 packet, Oral, Once  potassium chloride ER, 40 mEq, Oral, Q4H  rosuvastatin, 10 mg, Oral, Nightly  sodium chloride, 10 mL, Intravenous, Q12H    Continuous Infusions   PRN Meds    acetaminophen **OR** acetaminophen **OR** acetaminophen    [DISCONTINUED] senna-docusate sodium **AND** [DISCONTINUED] polyethylene glycol **AND** bisacodyl **AND** bisacodyl    calcium carbonate    HYDROcodone-acetaminophen    HYDROcodone-acetaminophen    Magnesium Standard Dose Replacement - Follow Nurse / BPA Driven Protocol    ondansetron ODT **OR** ondansetron    Phosphorus Replacement - Follow Nurse / BPA Driven Protocol    Potassium Replacement - Follow Nurse / BPA Driven Protocol    [COMPLETED] Insert Peripheral IV **AND** sodium chloride    sodium chloride    sodium chloride     Diet  Diet: Regular/House; Fluid Consistency: Thin (IDDSI 0)       Assessment/Plan     Active Hospital Problems    Diagnosis  POA    **Bacterial infection due to Pseudomonas [A49.8]  Unknown    Lumbago [M54.50]  Unknown    Acute UTI [N39.0]  Yes    Anxiety [F41.9]  Yes    Essential hypertension [I10]  Yes    Acquired hypothyroidism [E03.9]  Yes      Resolved Hospital Problems   No resolved problems to display.     74 y.o. female with Bacterial infection due to Pseudomonas.     Pseudomonas bacteremia and bacteruria   - Appreciate ID   - Rocephin changed to Cefepime for 14-day course stop date August 12 (if discharged prior switch to levofloxacin 750 mg p.o. daily)  - Fever resolved, has some mild leukocytosis  - recent left ureteral stent exchange on July 25  - suspect  source but was concerned for hip infection as well (see below)     Right hip pain s/p GEOVANNA in May  -XR okay  -Right hip aspirate culture no growth 2 days  -Ortho has low suspicion of infection  -Lumbar spine MRI no evidence of infection  -Continue therapy     Hypoxia  -7/27/2024 chest x-ray showed possible atelectasis  -Yesterday IVF stopped and patient  received a dose of furosemide   -Yesterday chest x-ray shows mild left basilar atelectasis/infiltrate, small bilateral pleural effusions.   -She has less oxygen requirement down to Flow (L/min):  [1-2] 1 (from 4 L/min two days ago). Continue to wean as tolerated     Hypokalemia  -Replace with protocol  -Mg++ level fine    Hypophosphatemia will replace     Mild ELVIA  -Cr normalized  -IVFs stopped     Dimer elevated  -VQ scan normal  -doppler US of all 4 limbs neg for DVT     HypoT4  -TSH wnl last month  -continue L-T4     HTN  -BPs acceptable on home Atenolol     DVT prophylaxis  Lovenox 40 mg SC daily    Discharge  Probably home with home health if hip pain improved and no further Ortho recommendations.  Expected Discharge Date: 8/3/2024; Expected Discharge Time:     Discussed with patient and nursing staff and Dr. Du.    Chele Barroso MD  [facility]  07/31/24

## 2024-07-31 NOTE — PLAN OF CARE
Goal Outcome Evaluation:  Plan of Care Reviewed With: patient           Outcome Evaluation: VSS on 1 L NC, falls precautions maintained, up to bathroom with assist x1 and walker, several BM's today, pain medication given with relief, abx given, replacing phos and K,  at bedside

## 2024-07-31 NOTE — PROGRESS NOTES
LOS: 3 days     Chief Complaint: Fever    Interval History: Afebrile, states she feels a little bit better but continues to report right hip pain left thigh pain radiating down to her knee.  Tolerating antibiotics without vomiting diarrhea or rash    Vital Signs  Temp:  [97.2 °F (36.2 °C)-98.8 °F (37.1 °C)] 98.8 °F (37.1 °C)  Heart Rate:  [69-74] 73  Resp:  [16] 16  BP: (131-152)/(69-76) 144/76    Physical Exam:  General: In no acute distress  Cardiovascular: RRR, no lower extremity edema  Respiratory: Breathing comfortably on room air  GI: Soft, NT/ND, + bowel sounds bilaterally  Skin: No rashes     Antibiotics:  Cefepime 2 g IV every 8 hours     Results Review:    Lab Results   Component Value Date    WBC 12.57 (H) 07/31/2024    HGB 10.1 (L) 07/31/2024    HCT 31.3 (L) 07/31/2024    MCV 92.1 07/31/2024     07/31/2024     Lab Results   Component Value Date    GLUCOSE 112 (H) 07/30/2024    BUN 13 07/30/2024    CREATININE 0.76 07/30/2024    EGFRIFNONA 52 (L) 09/22/2021    EGFRIFAFRI 67 11/01/2019    BCR 17.1 07/30/2024    CO2 22.0 07/30/2024    CALCIUM 9.0 07/30/2024    PROTENTOTREF 6.4 11/01/2019    ALBUMIN 2.6 (L) 07/29/2024    LABIL2 2.2 11/01/2019    AST 9 07/29/2024    ALT 7 07/29/2024     Right hip arthrocentesis with 138 nucleated cells 40% neutrophils 48% lymphocytes red blood cells 14,700    Microbiology:  7/29 BCx NGTD x 2  7/29 right hip cultures negative to date  7/27 UCx >100K Pseudomonas  7/27 BCx Pseudomonas x 2    MRI of the lumbar spine with no evidence of herniation compression fracture or compression deformity.  Degenerative changes.  No evidence of infection    Assessment & Plan   Fever  Leukocytosis  History of kidney stone status post left ureteral stent placement with stent exchange on July 25  Status post right hip arthroplasty in May now with acute pain  Recent history of C. difficile colitis    Fevers have resolved.  Mild leukocytosis persists.  Repeat blood cultures are negative to  date.  MRI of the lumbar spine with no evidence of infection.  Right prosthetic hip arthrocentesis cultures remain negative.  Continue cefepime 2 g IV every 8 hours for a 14-day course.  Antibiotic stop date is August 12.  If the patient gets discharged prior to that date she should be transition to levofloxacin 750 mg p.o. daily to complete the 14-day course

## 2024-07-31 NOTE — PLAN OF CARE
Goal Outcome Evaluation:  Plan of Care Reviewed With: patient, spouse           Outcome Evaluation: Pt is a 75 [facility] F [facility] with [facility]/o flank and B hip pain. Work-up revealing bacterial infection due to pseudomonas. X-ray completed of R hip and was negative for acute fx; doppler completed and was negative for acute DVT in B LE; MRI also completed was negative for acute fx or herniation but noted to have mild facet degenerative disease at L4-S1. Ortho following. Pt received in bed and agreeable to PT. Pt reports she lives wit her spouse with 3 VANESSA and is typically (I) with mobility at baseline. Pt presents to PT with c/o B hip pain (shooting in nature L>R), generalized weakness, and decreased activity tolerance. Pt required mod A for B LE to reach sitting EOB. Pt stood and ambulated 40' c RW requiring SBA/CGA. Pt demo's a very slow step-to antalgic gait without a LOB. Anticipate home wtih spouse and HHPT at D/C.      Anticipated Discharge Disposition (PT): home with assist, home with home health

## 2024-07-31 NOTE — THERAPY EVALUATION
Patient Name: Anastasiia Faria  : 1950    MRN: [id number]                              Today's Date: 2024       Admit Date: 2024    Visit Dx:     ICD-10-CM ICD-9-CM   1. Acute UTI  N39.0 599.0   2. Sepsis without acute organ dysfunction, due to unspecified organism  A41.9 038.9     995.91   3. ELVIA (acute kidney injury)  N17.9 584.9     Patient Active Problem List   Diagnosis    Colon polyp, hyperplastic    Allergic rhinitis due to pollen    Acquired hypothyroidism    Essential hypertension    FH: colon cancer in relative <50 years old    Mixed hyperlipidemia    Chronic right shoulder pain    Periscapular pain    Other idiopathic scoliosis, thoracic region    Prediabetes    Age-related osteoporosis without current pathological fracture    Mild episode of recurrent major depressive disorder    Age-related nuclear cataract of both eyes    Cystoid nevus of conjunctiva    Anxiety    Cataract    Cataract extraction status of left eye    Cataract extraction status of right eye    Conjunctival cyst of left eye    Depressive disorder    Disorder of refraction    Dry eye syndrome of bilateral lacrimal glands    Hearing loss    Hearing loss    Hordeolum internum right upper eyelid    Migraine    Osteopenia    Osteoporosis    Renal stone    Seasonal allergies    Diverticulosis    Internal hemorrhoids    Posterior vitreous detachment of both eyes    Right hip pain    OA (osteoarthritis) of hip    Sepsis    Acute pyelonephritis    Ureteral calculus    E coli bacteremia    Acute respiratory failure with hypoxia    Dysuria    GI bleed    Colitis    UTI (urinary tract infection)    Leukocytosis    Acute UTI    Lumbago    Bacterial infection due to Pseudomonas     Past Medical History:   Diagnosis Date    Allergic     Anxiety     Chronic kidney disease     Clostridium difficile infection 2024    TREATED AT [facility]    Colon polyps     Depression     Diverticulosis     Encounter for annual health examination 2014     Annual Health Assessment    Family history of colon cancer     Fibrocystic breast     GERD (gastroesophageal reflux disease)     Hard of hearing     HEARING AIDS BILATERALLY    Heart murmur     Herpes labialis without complication     History of cataract     History of mammogram 12/20/2010    History of recent hospitalization 03/21/2024    KIDNEY STONE/SEPSIS 4 NIGHT AT [facility]    HL (hearing loss) 2014    Hearing Aids    Hydronephrosis     Hyperlipidemia     Hypertension     Hypothyroidism     Insomnia     Kidney stones     Migraines     Osteoarthritis of right hip     Osteopenia     Prolia -last 9/2021,3/2022    PONV (postoperative nausea and vomiting)     Scoliosis     Dr. Stanford 5/2018    Sepsis 03/2024    Slow to wake up after anesthesia     Urinary tract infection     3/2024,  6/202    Visual impairment     Wear Glasses     Past Surgical History:   Procedure Laterality Date    BONE MARROW BIOPSY Left     BREAST CYST ASPIRATION Right     BREAST SURGERY Right     BIOPSY    CATARACT EXTRACTION, BILATERAL      Cataract surgery on right eye June 2021 and left eye July 2021. (Dr. Nanette Bateman)    COLONOSCOPY N/A 10/27/2016    Procedure: COLONOSCOPY INTO CECUM;  Surgeon: Osvaldo Dorado MD;  Location: [facility] ENDOSCOPY;  Service:     COLONOSCOPY  01/26/2011    COLONOSCOPY  02/04/2005    COLONOSCOPY N/A 12/02/2021    Procedure: COLONOSCOPY INTO CECUM WITH COLD BIOPSY POLYPECTOMY AND HOT SNARE POLYPECTOMY;  Surgeon: Osvaldo Dorado MD;  Location: [facility] ENDOSCOPY;  Service: General;  Laterality: N/A;  PRE-FAMILY HISTORY OF COLON CANCER, PERSONAL HISTORY OF COLON CANCER  POST- POLYPS, DIVERTICULOSIS, HEMORRHOIDS    CYSTOSCOPY W/ URETERAL STENT PLACEMENT Left 03/21/2024    Procedure: LEFT CYSTOSCOPY RETROGRADE PYLEOGRAM URETERAL STENT INSERTION;  Surgeon: Jaiden Bolton Jr., MD;  Location: [facility] OR;  Service: Urology;  Laterality: Left;    CYSTOSCOPY W/ URETERAL STENT REMOVAL   04/23/2024    CYSTOSCOPY, RETROGRADE PYELOGRAM AND STENT INSERTION Left 06/14/2024    Procedure: CYSTOSCOPY RETROGRADE PYELOGRAM AND STENT INSERTION;  Surgeon: Yahir Faria MD;  Location: [facility];  Service: Urology;  Laterality: Left;    JOINT REPLACEMENT  5/2024    Right Hip   Dr. Vasu King    KNEE ARTHROSCOPY Left     PAP SMEAR  12/20/2010    TOTAL HIP ARTHROPLASTY Right 05/13/2024    Procedure: TOTAL HIP ARTHROPLASTY ANTERIOR WITH HANA TABLE;  Surgeon: Vasu King MD;  Location: [facility];  Service: Orthopedics;  Laterality: Right;    URETEROSCOPY LASER LITHOTRIPSY WITH STENT INSERTION Left 7/25/2024    Procedure: CYSTOSCOPY, LEFT URETEROSCOPY, LASER LITHOTRIPSY, STENT EXCHANGE;  Surgeon: Jaiden Bolton Jr., MD;  Location: [facility];  Service: Urology;  Laterality: Left;      General Information       Row Name 07/31/24 1018          Physical Therapy Time and Intention    Document Type evaluation  -CS     Mode of Treatment individual therapy;physical therapy  -CS       Row Name 07/31/24 1018          General Information    Patient Profile Reviewed yes  -CS     Prior Level of Function independent:;gait;transfer;bed mobility  -CS     Existing Precautions/Restrictions fall  -CS     Barriers to Rehab none identified  -CS       Row Name 07/31/24 1018          Living Environment    People in Home spouse  -CS       Row Name 07/31/24 1018          Home Main Entrance    Number of Stairs, Main Entrance three  -CS     Stair Railings, Main Entrance none  -CS       Row Name 07/31/24 1018          Stairs Within Home, Primary    Number of Stairs, Within Home, Primary twelve  -CS     Stair Railings, Within Home, Primary railings safe and in good condition  -CS       Row Name 07/31/24 1018          Cognition    Orientation Status (Cognition) oriented x 4  -CS       Row Name 07/31/24 1018          Safety Issues, Functional Mobility    Impairments Affecting Function (Mobility)  pain;endurance/activity tolerance;strength  -CS               User Key  (r) = Recorded By, (t) = Taken By, (c) = Cosigned By      Initials Name Provider Type    CS Camille Rico, PT Physical Therapist                   Mobility       Row Name 07/31/24 1018          Bed Mobility    Bed Mobility supine-sit;sit-supine  -CS     Supine-Sit [place] (Bed Mobility) moderate assist (50% patient effort);verbal cues  -CS     Sit-Supine [place] (Bed Mobility) not tested  -CS     Assistive Device (Bed Mobility) head of bed elevated  -CS     Comment, (Bed Mobility) required assist with B LE to reach sitting EOB 2:2 pain; UIC at end of session  -CS       Row Name 07/31/24 1018          Sit-Stand Transfer    Sit-Stand [place] (Transfers) contact guard;verbal cues  -CS     Assistive Device (Sit-Stand Transfers) walker, front-wheeled  -       Row Name 07/31/24 1018          Gait/Stairs (Locomotion)    [place] Level (Gait) standby assist;contact guard  -CS     Assistive Device (Gait) walker, front-wheeled  -CS     Distance in Feet (Gait) 40  -CS     Deviations/Abnormal Patterns (Gait) antalgic;phillip decreased;gait speed decreased;stride length decreased  -CS     [place] Level (Stairs) not tested  -CS     Comment, (Gait/Stairs) very slow antalgic step-to gait; no LOB  -CS               User Key  (r) = Recorded By, (t) = Taken By, (c) = Cosigned By      Initials Name Provider Type    Camille Ford, PT Physical Therapist                   Obj/Interventions       Row Name 07/31/24 1020          Range of Motion Comprehensive    General Range of Motion bilateral lower extremity ROM WFL  -       Row Name 07/31/24 1020          Strength Comprehensive (MMT)    General Manual Muscle Testing (MMT) Assessment other (see comments)  -CS     Comment, General Manual Muscle Testing (MMT) Assessment generalized weakness; MMT deferred due to B LE pain  -       Row Name 07/31/24 1020          Balance    Balance  Assessment sitting static balance;sitting dynamic balance;standing static balance;standing dynamic balance  -CS     Static Sitting Balance independent  -CS     Dynamic Sitting Balance independent  -CS     Position, Sitting Balance unsupported;sitting edge of bed  -CS     Static Standing Balance standby assist  -CS     Dynamic Standing Balance standby assist;contact guard  -CS     Position/Device Used, Standing Balance supported;walker, front-wheeled  -CS               User Key  (r) = Recorded By, (t) = Taken By, (c) = Cosigned By      Initials Name Provider Type    CS Camille Rico, PT Physical Therapist                   Goals/Plan       Row Name 07/31/24 1028          Bed Mobility Goal 1 (PT)    Activity/Assistive Device (Bed Mobility Goal 1, PT) bed mobility activities, all  -CS     [place] Level/Cues Needed (Bed Mobility Goal 1, PT) supervision required  -CS     Time Frame (Bed Mobility Goal 1, PT) 2 weeks  -CS       Row Name 07/31/24 1028          Transfer Goal 1 (PT)    Activity/Assistive Device (Transfer Goal 1, PT) sit-to-stand/stand-to-sit;bed-to-chair/chair-to-bed  -CS     [place] Level/Cues Needed (Transfer Goal 1, PT) supervision required  -CS     Time Frame (Transfer Goal 1, PT) 2 weeks  -CS       Row Name 07/31/24 1028          Gait Training Goal 1 (PT)    Activity/Assistive Device (Gait Training Goal 1, PT) gait (walking locomotion);assistive device use  -CS     [place] Level (Gait Training Goal 1, PT) supervision required  -CS     Distance (Gait Training Goal 1, PT) 100'  -CS     Time Frame (Gait Training Goal 1, PT) 2 weeks  -CS               User Key  (r) = Recorded By, (t) = Taken By, (c) = Cosigned By      Initials Name Provider Type    CS Camille Rico, PT Physical Therapist                   Clinical Impression       Row Name 07/31/24 1021          Pain    Pretreatment Pain Rating 6/10  -CS     Posttreatment Pain Rating 7/10  -CS     Pain Location - Side/Orientation Bilateral   -CS     Pain Location - hip  -CS     Pain Intervention(s) Ambulation/increased activity;Rest;Repositioned  -CS       Row Name 07/31/24 1021          Plan of Care Review    Plan of Care Reviewed With patient;spouse  -CS     Outcome Evaluation Pt is a 75 y/o F admitted to [facility] with c/o flank and B hip pain. Work-up revealing bacterial infection due to pseudomonas. X-ray completed of R hip and was negative for acute fx; doppler completed and was negative for acute DVT in B LE; MRI also completed was negative for acute fx or herniation but noted to have mild facet degenerative disease at L4-S1. Ortho following. Pt received in bed and agreeable to PT. Pt reports she lives wit her spouse with 3 VANESSA and is typically (I) with mobility at baseline. Pt presents to PT with c/o B hip pain (shooting in nature L>R), generalized weakness, and decreased activity tolerance. Pt required mod A for B LE to reach sitting EOB. Pt stood and ambulated 40' c RW requiring SBA/CGA. Pt demo's a very slow step-to antalgic gait without a LOB. Anticipate home wtih spouse and HHPT at D/C.  -CS       Row Name 07/31/24 1021          Therapy Assessment/Plan (PT)    Rehab Potential (PT) good, to achieve stated therapy goals  -CS     Criteria for Skilled Interventions Met (PT) yes;meets criteria  -CS     Therapy Frequency (PT) 5 times/wk  -CS       Row Name 07/31/24 1021          Vital Signs    Pre SpO2 (%) 93  -CS     O2 Delivery Pre Treatment supplemental O2  -CS     Intra SpO2 (%) 90  -CS     O2 Delivery Intra Treatment room air  -CS     Post SpO2 (%) 91  -CS     O2 Delivery Post Treatment room air  -CS       Row Name 07/31/24 1021          Positioning and Restraints    Pre-Treatment Position in bed  -CS     Post Treatment Position chair  -CS     In Chair notified nsg;reclined;encouraged to call for assist;call light within reach;exit alarm on;with family/caregiver  -CS               User Key  (r) = Recorded By, (t) = Taken By, (c) = Cosigned By       Initials Name Provider Type    Camille Ford, ARIC Physical Therapist                   Outcome Measures       Row Name 07/31/24 1029 07/31/24 0820       How much help from another person do you currently need...    Turning from your back to your side while in flat bed without using bedrails? 3  -CS 3  -KS    Moving from lying on back to sitting on the side of a flat bed without bedrails? 3  -CS 3  -KS    Moving to and from a bed to a chair (including a wheelchair)? 3  -CS 3  -KS    Standing up from a chair using your arms (e.g., wheelchair, bedside chair)? 3  -CS 3  -KS    Climbing 3-5 steps with a railing? 3  -CS 2  -KS    To walk in hospital room? 3  -CS 2  -KS    AM-PAC 6 Clicks Score (PT) 18  -CS 16  -KS    Highest Level of Mobility Goal 6 --> Walk 10 steps or more  -CS 5 --> Static standing  -KS      Row Name 07/31/24 1029          Functional Assessment    Outcome Measure Options AM-PAC 6 Clicks Basic Mobility (PT)  -CS               User Key  (r) = Recorded By, (t) = Taken By, (c) = Cosigned By      Initials Name Provider Type    Susan Mccartney, RN Registered Nurse    Camille Ford PT Physical Therapist                                 Physical Therapy Education       Title: PT OT SLP Therapies (Done)       Topic: Physical Therapy (Done)       Point: Mobility training (Done)       Learning Progress Summary             Patient Acceptance, E,TB, VU,DU,NR by  at 7/31/2024 1030                         Point: Home exercise program (Done)       Learning Progress Summary             Patient Acceptance, E,TB, VU,DU,NR by CS at 7/31/2024 1030                         Point: Body mechanics (Done)       Learning Progress Summary             Patient Acceptance, E,TB, VU,DU,NR by  at 7/31/2024 1030                         Point: Precautions (Done)       Learning Progress Summary             Patient Acceptance, E,TB, VU,DU,NR by  at 7/31/2024 1030                                         User Key        Initials Effective Dates Name Provider Type Discipline     09/22/22 -  Camille Rico, PT Physical Therapist PT                  PT Recommendation and Plan     Plan of Care Reviewed With: patient, spouse  Outcome Evaluation: Pt is a 73 y/o F admitted to [facility] with c/o flank and B hip pain. Work-up revealing bacterial infection due to pseudomonas. X-ray completed of R hip and was negative for acute fx; doppler completed and was negative for acute DVT in B LE; MRI also completed was negative for acute fx or herniation but noted to have mild facet degenerative disease at L4-S1. Ortho following. Pt received in bed and agreeable to PT. Pt reports she lives wit her spouse with 3 VANESSA and is typically (I) with mobility at baseline. Pt presents to PT with c/o B hip pain (shooting in nature L>R), generalized weakness, and decreased activity tolerance. Pt required mod A for B LE to reach sitting EOB. Pt stood and ambulated 40' c RW requiring SBA/CGA. Pt demo's a very slow step-to antalgic gait without a LOB. Anticipate home wtih spouse and HHPT at D/C.     Time Calculation:         PT Charges       Row Name 07/31/24 1030             Time Calculation    Start Time 0933  -CS      Stop Time 0959  -CS      Time Calculation (min) 26 min  -CS      PT Received On 07/31/24  -      PT - Next Appointment 08/01/24  -      PT Goal Re-Cert Due Date 08/14/24  -         Time Calculation- PT    Total Timed Code Minutes- PT 20 minute(s)  -CS         Timed Charges    91955 - Gait Training Minutes  8  -CS      71007 - PT Therapeutic Activity Minutes 12  -CS         Total Minutes    Timed Charges Total Minutes 20  -CS       Total Minutes 20  -CS                User Key  (r) = Recorded By, (t) = Taken By, (c) = Cosigned By      Initials Name Provider Type    CS Camille Rico, PT Physical Therapist                  Therapy Charges for Today       Code Description Service Date Service Provider Modifiers Qty    [id number]  PT  THERAPEUTIC ACT EA 15 MIN 7/31/2024 Camille Rico, PT GP 1    [id number] HC PT EVAL MOD COMPLEXITY 3 7/31/2024 Camille Rico, PT GP 1            PT G-Codes  Outcome Measure Options: AM-PAC 6 Clicks Basic Mobility (PT)  AM-PAC 6 Clicks Score (PT): 18  PT Discharge Summary  Anticipated Discharge Disposition (PT): home with assist, home with home health    Camille Rico PT  7/31/2024

## 2024-08-01 LAB
ANION GAP SERPL CALCULATED.3IONS-SCNC: 12 MMOL/L (ref 5–15)
BUN SERPL-MCNC: 12 MG/DL (ref 8–23)
BUN/CREAT SERPL: 15.2 (ref 7–25)
CALCIUM SPEC-SCNC: 9.5 MG/DL (ref 8.6–10.5)
CHLORIDE SERPL-SCNC: 105 MMOL/L (ref 98–107)
CO2 SERPL-SCNC: 19 MMOL/L (ref 22–29)
CREAT SERPL-MCNC: 0.79 MG/DL (ref 0.57–1)
DEPRECATED RDW RBC AUTO: 43.5 FL (ref 37–54)
EGFRCR SERPLBLD CKD-EPI 2021: 78.6 ML/MIN/1.73
ERYTHROCYTE [DISTWIDTH] IN BLOOD BY AUTOMATED COUNT: 12.7 % (ref 12.3–15.4)
GLUCOSE SERPL-MCNC: 105 MG/DL (ref 65–99)
HCT VFR BLD AUTO: 33.7 % (ref 34–46.6)
HGB BLD-MCNC: 10.6 G/DL (ref 12–15.9)
MAGNESIUM SERPL-MCNC: 2.1 MG/DL (ref 1.6–2.4)
MCH RBC QN AUTO: 29.3 PG (ref 26.6–33)
MCHC RBC AUTO-ENTMCNC: 31.5 G/DL (ref 31.5–35.7)
MCV RBC AUTO: 93.1 FL (ref 79–97)
PHOSPHATE SERPL-MCNC: 2.2 MG/DL (ref 2.5–4.5)
PHOSPHATE SERPL-MCNC: 2.3 MG/DL (ref 2.5–4.5)
PLATELET # BLD AUTO: 324 10*3/MM3 (ref 140–450)
PMV BLD AUTO: 9.2 FL (ref 6–12)
POTASSIUM SERPL-SCNC: 3.9 MMOL/L (ref 3.5–5.2)
RBC # BLD AUTO: 3.62 10*6/MM3 (ref 3.77–5.28)
SODIUM SERPL-SCNC: 136 MMOL/L (ref 136–145)
WBC NRBC COR # BLD AUTO: 13.92 10*3/MM3 (ref 3.4–10.8)

## 2024-08-01 PROCEDURE — 83735 ASSAY OF MAGNESIUM: CPT | Performed by: INTERNAL MEDICINE

## 2024-08-01 PROCEDURE — 84100 ASSAY OF PHOSPHORUS: CPT | Performed by: INTERNAL MEDICINE

## 2024-08-01 PROCEDURE — 84100 ASSAY OF PHOSPHORUS: CPT | Performed by: HOSPITALIST

## 2024-08-01 PROCEDURE — 99222 1ST HOSP IP/OBS MODERATE 55: CPT | Performed by: NURSE PRACTITIONER

## 2024-08-01 PROCEDURE — 80048 BASIC METABOLIC PNL TOTAL CA: CPT | Performed by: INTERNAL MEDICINE

## 2024-08-01 PROCEDURE — 85027 COMPLETE CBC AUTOMATED: CPT | Performed by: INTERNAL MEDICINE

## 2024-08-01 PROCEDURE — 25010000002 ENOXAPARIN PER 10 MG: Performed by: HOSPITALIST

## 2024-08-01 PROCEDURE — 25010000002 CEFEPIME PER 500 MG: Performed by: INTERNAL MEDICINE

## 2024-08-01 RX ORDER — METHOCARBAMOL 750 MG/1
750 TABLET, FILM COATED ORAL EVERY 6 HOURS PRN
Status: DISCONTINUED | OUTPATIENT
Start: 2024-08-01 | End: 2024-08-07 | Stop reason: HOSPADM

## 2024-08-01 RX ORDER — LIDOCAINE 4 G/G
3 PATCH TOPICAL
Status: DISCONTINUED | OUTPATIENT
Start: 2024-08-01 | End: 2024-08-07 | Stop reason: HOSPADM

## 2024-08-01 RX ORDER — HYDROCODONE BITARTRATE AND ACETAMINOPHEN 10; 325 MG/1; MG/1
1 TABLET ORAL EVERY 4 HOURS PRN
Status: DISCONTINUED | OUTPATIENT
Start: 2024-08-01 | End: 2024-08-02

## 2024-08-01 RX ORDER — HYDROCODONE BITARTRATE AND ACETAMINOPHEN 5; 325 MG/1; MG/1
1 TABLET ORAL EVERY 4 HOURS PRN
Status: DISCONTINUED | OUTPATIENT
Start: 2024-08-01 | End: 2024-08-02

## 2024-08-01 RX ADMIN — ESCITALOPRAM 20 MG: 20 TABLET, FILM COATED ORAL at 08:52

## 2024-08-01 RX ADMIN — ENOXAPARIN SODIUM 40 MG: 100 INJECTION SUBCUTANEOUS at 18:23

## 2024-08-01 RX ADMIN — CEFEPIME 2000 MG: 2 INJECTION, POWDER, FOR SOLUTION INTRAVENOUS at 18:23

## 2024-08-01 RX ADMIN — POTASSIUM, SODIUM PHOSPHATES 280 MG-160 MG-250 MG ORAL POWDER PACKET 2 PACKET: POWDER IN PACKET at 00:03

## 2024-08-01 RX ADMIN — Medication 10 ML: at 08:52

## 2024-08-01 RX ADMIN — POTASSIUM, SODIUM PHOSPHATES 280 MG-160 MG-250 MG ORAL POWDER PACKET 2 PACKET: POWDER IN PACKET at 08:52

## 2024-08-01 RX ADMIN — ATENOLOL 25 MG: 25 TABLET ORAL at 20:55

## 2024-08-01 RX ADMIN — LEVOTHYROXINE SODIUM 50 MCG: 50 TABLET ORAL at 08:52

## 2024-08-01 RX ADMIN — ROSUVASTATIN CALCIUM 10 MG: 10 TABLET, FILM COATED ORAL at 20:55

## 2024-08-01 RX ADMIN — HYDROCODONE BITARTRATE AND ACETAMINOPHEN 1 TABLET: 10; 325 TABLET ORAL at 22:19

## 2024-08-01 RX ADMIN — CEFEPIME 2000 MG: 2 INJECTION, POWDER, FOR SOLUTION INTRAVENOUS at 10:31

## 2024-08-01 RX ADMIN — METHOCARBAMOL TABLETS 750 MG: 750 TABLET, COATED ORAL at 18:22

## 2024-08-01 RX ADMIN — CEFEPIME 2000 MG: 2 INJECTION, POWDER, FOR SOLUTION INTRAVENOUS at 02:35

## 2024-08-01 RX ADMIN — HYDROCODONE BITARTRATE AND ACETAMINOPHEN 2 TABLET: 5; 325 TABLET ORAL at 10:32

## 2024-08-01 RX ADMIN — LIDOCAINE 3 PATCH: 4 PATCH TOPICAL at 13:56

## 2024-08-01 NOTE — CONSULTS
[facility] NEUROSURGERY CONSULT NOTE    Patient name: Anastasiia Faria  Referring Provider: Dr. Reyes  Reason for Consultation: bilateral hip and thigh pain     Patient Care Team:  Mirza Scott Sr., MD as PCP - General (Family Medicine)  Dhiraj Gallegos MD as Consulting Physician (Obstetrics and Gynecology)  Jaiden Bolton Jr., MD as Consulting Physician (Urology)    Chief complaint: Back and bilateral leg pain    Subjective .     History of present illness:    Patient is a 74 y.o.  female with multiple medical issues this year including E. coli urosepsis secondary to infected kidney stone, C. difficile/GIB, and right total hip replacement 5/13/2024 with Dr. Vasu King.  She continues to have a left ureteral stent in place with recent laser lithotripsy and stent exchange on 7/25/2024.  New blood and urine cultures showing Pseudomonas.  ID following.  Seen by orthopedics 7/30 due to complaints of acute right hip pain with elevated inflammatory markers.  Status post right hip aspiration with cultures showing NGTD.  MRI lumbar spine ordered by orthopedic surgeon due to patient's complaint of back and bilateral hip pain.  Patient reports that she did well following her right hip replacement but during this admission she began having acute right hip pain that was severe although it seemed to ease up and then began on the left side she.  She describes the pain in the same location in both hips.  She states is a sharp pain in her left lateral thigh with weightbearing.  She also has some aching pain that is there most of the time.  There is pain also in the groin as well as in the buttock and low back.  It is causing her difficulty walking. White blood cell count remains elevated, but no fever.    No cancer history.  Previously seen by Dr. Stanford for scoliosis.  No prior spine surgery.  Non-smoker.      Review of Systems  Review of Systems   Constitutional:  Negative for chills and fever.   Musculoskeletal:  Positive  for arthralgias, back pain and gait problem.   Neurological:  Positive for weakness. Negative for numbness.       History  PAST MEDICAL HISTORY  Past Medical History:   Diagnosis Date    Allergic     Anxiety     Chronic kidney disease     Clostridium difficile infection 06/2024    TREATED AT [facility]    Colon polyps     Depression     Diverticulosis 2011    Encounter for annual health examination 03/07/2014    Annual Health Assessment    Family history of colon cancer     Fibrocystic breast     GERD (gastroesophageal reflux disease)     Hard of hearing     HEARING AIDS BILATERALLY    Heart murmur     Herpes labialis without complication     History of cataract     History of mammogram 12/20/2010    History of recent hospitalization 03/21/2024    KIDNEY STONE/SEPSIS 4 NIGHT AT [facility]    HL (hearing loss) 2014    Hearing Aids    Hydronephrosis     Hyperlipidemia     Hypertension     Hypothyroidism     Insomnia     Kidney stones     Migraines     Osteoarthritis of right hip     Osteopenia     Prolia -last 9/2021,3/2022    PONV (postoperative nausea and vomiting)     Scoliosis     Dr. Stanford 5/2018    Sepsis 03/2024    Slow to wake up after anesthesia     Urinary tract infection     3/2024,  6/202    Visual impairment     Wear Glasses       PAST SURGICAL HISTORY  Past Surgical History:   Procedure Laterality Date    BONE MARROW BIOPSY Left     BREAST CYST ASPIRATION Right     BREAST SURGERY Right     BIOPSY    CATARACT EXTRACTION, BILATERAL      Cataract surgery on right eye June 2021 and left eye July 2021. (Dr. Nanette Bateman)    COLONOSCOPY N/A 10/27/2016    Procedure: COLONOSCOPY INTO CECUM;  Surgeon: Osvaldo Dorado MD;  Location: [facility] ENDOSCOPY;  Service:     COLONOSCOPY  01/26/2011    COLONOSCOPY  02/04/2005    COLONOSCOPY N/A 12/02/2021    Procedure: COLONOSCOPY INTO CECUM WITH COLD BIOPSY POLYPECTOMY AND HOT SNARE POLYPECTOMY;  Surgeon: Osvaldo Dorado MD;  Location: [facility] ENDOSCOPY;   Service: General;  Laterality: N/A;  PRE-FAMILY HISTORY OF COLON CANCER, PERSONAL HISTORY OF COLON CANCER  POST- POLYPS, DIVERTICULOSIS, HEMORRHOIDS    CYSTOSCOPY W/ URETERAL STENT PLACEMENT Left 03/21/2024    Procedure: LEFT CYSTOSCOPY RETROGRADE PYLEOGRAM URETERAL STENT INSERTION;  Surgeon: Jaiden Bolton Jr., MD;  Location: [facility] MAIN OR;  Service: Urology;  Laterality: Left;    CYSTOSCOPY W/ URETERAL STENT REMOVAL  04/23/2024    CYSTOSCOPY, RETROGRADE PYELOGRAM AND STENT INSERTION Left 06/14/2024    Procedure: CYSTOSCOPY RETROGRADE PYELOGRAM AND STENT INSERTION;  Surgeon: Yahir Faria MD;  Location: [facility] MAIN OR;  Service: Urology;  Laterality: Left;    JOINT REPLACEMENT  5/2024    Right Hip   Dr. Vasu King    KNEE ARTHROSCOPY Left     PAP SMEAR  12/20/2010    TOTAL HIP ARTHROPLASTY Right 05/13/2024    Procedure: TOTAL HIP ARTHROPLASTY ANTERIOR WITH HANA TABLE;  Surgeon: Vasu King MD;  Location: [facility] OR OSC;  Service: Orthopedics;  Laterality: Right;    URETEROSCOPY LASER LITHOTRIPSY WITH STENT INSERTION Left 7/25/2024    Procedure: CYSTOSCOPY, LEFT URETEROSCOPY, LASER LITHOTRIPSY, STENT EXCHANGE;  Surgeon: Jaiden Bolton Jr., MD;  Location: [facility] MAIN OR;  Service: Urology;  Laterality: Left;       FAMILY HISTORY  Family History   Problem Relation Age of Onset    Breast cancer Mother     Colon cancer Mother     Hypertension Mother     Cancer Mother         Breat cancer, skin cancer, colon cancer    Hyperlipidemia Mother     Cancer Father         Skin cancer    Hypertension Sister     Hypertension Sister     Hyperlipidemia Sister     Thyroid disease Sister     Diabetes type II Brother     Cancer Brother         Skin cancer    Diabetes Brother     Colon cancer Maternal Aunt         colon ca 40    Cancer Maternal Aunt         Colon cancer cause of death early 40s    Colon cancer Maternal Aunt         64 yo    Heart disease Maternal Aunt     Colon cancer Maternal Aunt         81 yo     Cancer Maternal Aunt         Colon cancer    Cancer Maternal Aunt         Colon cancer    Cancer Maternal Uncle         Thyroid cancer    Heart disease Maternal Uncle     Stomach cancer Maternal Grandmother         or intestinal    Cancer Maternal Grandmother         GI TRACT CANCER    Breast cancer Maternal Grandmother     Heart disease Maternal Grandmother     Heart attack Maternal Grandfather     Breast cancer Maternal Grandfather     Heart disease Maternal Grandfather     Heart disease Paternal Grandmother     Colon cancer Other     Colon cancer Other     Malig Hyperthermia Neg Hx        SOCIAL HISTORY  Social History     Tobacco Use    Smoking status: Never    Smokeless tobacco: Never    Tobacco comments:     daily caffine   Vaping Use    Vaping status: Never Used   Substance Use Topics    Alcohol use: Never    Drug use: Never       retired  Lives with     Allergies:  Patient has no known allergies.    MEDICATIONS:  Medications Prior to Admission   Medication Sig Dispense Refill Last Dose    atenolol (TENORMIN) 25 MG tablet Take 1 tablet by mouth Daily. 90 tablet 2     escitalopram (LEXAPRO) 20 MG tablet Take 1 tablet by mouth Daily. 90 tablet 2     HYDROcodone-acetaminophen (NORCO) 5-325 MG per tablet Take 1-2 tablets by mouth Every 6 (Six) Hours As Needed for Moderate Pain (Pain). 12 tablet 0     levothyroxine (SYNTHROID, LEVOTHROID) 50 MCG tablet Take 1 tablet by mouth Daily. 90 tablet 2     loratadine (CLARITIN) 10 MG tablet Take 1 tablet by mouth Daily. Indications: Hayfever       NIFEdipine XL (PROCARDIA XL) 30 MG 24 hr tablet Take 1 tablet by mouth Daily. 90 tablet 2     Psyllium (METAMUCIL FIBER PO) Take 1 Scoop by mouth Daily. Indications: constipation       rosuvastatin (CRESTOR) 10 MG tablet Take 1 tablet by mouth Every Night. 90 tablet 3     sulfamethoxazole-trimethoprim (Bactrim DS) 800-160 MG per tablet Take 1 tablet by mouth 2 (Two) Times a Day. 10 tablet 0     cephalexin  (KEFLEX) 500 MG capsule Take 1 capsule by mouth 3 (Three) Times a Day.            Current Facility-Administered Medications:     acetaminophen (TYLENOL) tablet 650 mg, 650 mg, Oral, Q4H PRN, 650 mg at 07/28/24 0818 **OR** acetaminophen (TYLENOL) 160 MG/5ML oral solution 650 mg, 650 mg, Oral, Q4H PRN **OR** acetaminophen (TYLENOL) suppository 650 mg, 650 mg, Rectal, Q4H PRN, Kimberley Manrique APRN    atenolol (TENORMIN) tablet 25 mg, 25 mg, Oral, Nightly, Senthil Medeiros MD, 25 mg at 07/31/24 2102    [DISCONTINUED] sennosides-docusate (PERICOLACE) 8.6-50 MG per tablet 2 tablet, 2 tablet, Oral, BID PRN **AND** [DISCONTINUED] polyethylene glycol (MIRALAX) packet 17 g, 17 g, Oral, Daily PRN **AND** bisacodyl (DULCOLAX) EC tablet 5 mg, 5 mg, Oral, Daily PRN, 5 mg at 07/31/24 0515 **AND** bisacodyl (DULCOLAX) suppository 10 mg, 10 mg, Rectal, Daily PRN, Kimberley Manrique APRN    calcium carbonate (TUMS) chewable tablet 500 mg (200 mg elemental), 2 tablet, Oral, BID PRN, Kimberley Manrique APRN    cefepime 2000 mg IVPB in 100 mL NS (MBP), 2,000 mg, Intravenous, Q8H, Laurie Du MD, 2,000 mg at 08/01/24 1031    Enoxaparin Sodium (LOVENOX) syringe 40 mg, 40 mg, Subcutaneous, Q24H, Senthil Medeiros MD, 40 mg at 07/31/24 1836    escitalopram (LEXAPRO) tablet 20 mg, 20 mg, Oral, Daily, Senthil Medeiros MD, 20 mg at 08/01/24 0852    HYDROcodone-acetaminophen (NORCO) 5-325 MG per tablet 1 tablet, 1 tablet, Oral, Q4H PRN, Brenden Reyes MD    HYDROcodone-acetaminophen (NORCO) 5-325 MG per tablet 2 tablet, 2 tablet, Oral, Q6H PRN, Senthil Medeiros MD, 2 tablet at 08/01/24 1032    levothyroxine (SYNTHROID, LEVOTHROID) tablet 50 mcg, 50 mcg, Oral, Daily, Senthil Medeiros MD, 50 mcg at 08/01/24 0852    Lidocaine 4 % 3 patch, 3 patch, Transdermal, Q24H, Beth Esteban, APRN    Magnesium Standard Dose Replacement - Follow Nurse / BPA Driven Protocol, , Does not apply, PRN, Chele Barroso MD     methocarbamol (ROBAXIN) tablet 750 mg, 750 mg, Oral, Q6H PRN, Beth Esteban APRN    ondansetron ODT (ZOFRAN-ODT) disintegrating tablet 4 mg, 4 mg, Oral, Q6H PRN **OR** ondansetron (ZOFRAN) injection 4 mg, 4 mg, Intravenous, Q6H PRN, Kimberley Manrique APRN, 4 mg at 07/27/24 1810    Phosphorus Replacement - Follow Nurse / BPA Driven Protocol, , Does not apply, PRN, Chele Barroso MD    Potassium Replacement - Follow Nurse / BPA Driven Protocol, , Does not apply, PRN, Jaiden Garcia MD    rosuvastatin (CRESTOR) tablet 10 mg, 10 mg, Oral, Nightly, EdlingSenthil MD, 10 mg at 07/31/24 2102    [COMPLETED] Insert Peripheral IV, , , Once **AND** sodium chloride 0.9 % flush 10 mL, 10 mL, Intravenous, PRN, Beena Cannon APRN    sodium chloride 0.9 % flush 10 mL, 10 mL, Intravenous, Q12H, Kimberley Manrique APRN, 10 mL at 08/01/24 0852    sodium chloride 0.9 % flush 10 mL, 10 mL, Intravenous, PRN, Kimberley Manrique APRN    sodium chloride 0.9 % infusion 40 mL, 40 mL, Intravenous, PRN, Kimberley Manrique APRN      Objective     Results Review:  LABS:  Results from last 7 days   Lab Units 08/01/24  0839 07/31/24  0817 07/30/24  0615   WBC 10*3/mm3 13.92* 12.57* 11.77*   HEMOGLOBIN g/dL 10.6* 10.1* 10.3*   HEMATOCRIT % 33.7* 31.3* 31.6*   PLATELETS 10*3/mm3 324 264 216     Results from last 7 days   Lab Units 08/01/24  0614 07/31/24  2120 07/31/24  0817 07/30/24  1816 07/30/24  0615 07/29/24  0707 07/27/24  0819 07/27/24  0240   SODIUM mmol/L 136  --  134*  --  136 136   < > 135*   POTASSIUM mmol/L 3.9 4.0 3.6   < > 3.2* 3.5   < > 4.0   CHLORIDE mmol/L 105  --  105  --  106 108*   < > 103   CO2 mmol/L 19.0*  --  20.5*  --  22.0 20.4*   < > 23.1   BUN mg/dL 12  --  13  --  13 11   < > 23   CREATININE mg/dL 0.79  --  0.77  --  0.76 0.83   < > 1.35*   CALCIUM mg/dL 9.5  --  9.2  --  9.0 8.6   < > 9.0   BILIRUBIN mg/dL  --   --  0.4  --   --  0.3  --  0.4   ALK PHOS U/L  --   --  102  --   --  93  --  84    ALT (SGPT) U/L  --   --  15  --   --  7  --  7   AST (SGOT) U/L  --   --  19  --   --  9  --  15   GLUCOSE mg/dL 105*  --  93  --  112* 106*   < > 148*    < > = values in this interval not displayed.     Results from last 7 days   Lab Units 07/29/24  0707   SED RATE mm/hr 39*     Results from last 7 days   Lab Units 07/29/24  0707 07/27/24  0819   CRP mg/dL 30.00* 23.93*       Blood culture 7/29-NGTD  Blood culture 7/27-Pseudomonas    Blood culture 3/21/2024-E. Coli    Urine culture 7/27/2024-greater than 100 K CFU Pseudomonas    Right hip fluid culture-NGTD    DIAGNOSTICS:  CT abdomen pelvis reviewed for spine findings.  There is very mild retrolisthesis of L3 on 4 with unroofing of disc at L3/4.  Broad-based disc bulging at nearly every level of the lumbar spine however does not result in any significant canal stenosis.  There is moderate right L2/3 and L4/5 foraminal narrowing due to right lateral disc protrusion.  There is levoscoliosis with apex at L3.    MRI lumbar spine without contrast.  There is motion artifact.  There is mild anterolisthesis of L5 on S1..  Facet disease and right far lateral disc bulge results in mild to moderate foraminal narrowing to the right.  There is mild facet hypertrophy at L3/4.  L4/5 there is mild to moderate facet hypertrophy bilaterally and broad-based disc that results in mild bilateral neuroforaminal narrowing.  No significant canal stenosis.  Although a noncontrast study, there is no evidence of any abnormal marrow or vertebral body/disc signal to suggest infectious process    Bilateral lower extremity Doppler negative for DVT    Results Review:   I reviewed the patient's new clinical results.  I personally viewed and interpreted the patient's CT abdomen pelvis.  MRI lumbar spine also reviewed by and discussed with Dr. Wallace    Vital Signs   Temp:  [98 °F (36.7 °C)-99.2 °F (37.3 °C)] 98 °F (36.7 °C)  Heart Rate:  [68-79] 79  Resp:  [15-16] 16  BP: (145-155)/(70-78)  155/78    Physical Exam:  Physical Exam  Vitals reviewed.   Constitutional:       Appearance: Normal appearance.      Comments: Sitting up in recliner.  No acute distress.   at bedside.   Pulmonary:      Effort: Pulmonary effort is normal.   Abdominal:      Tenderness: There is no right CVA tenderness or left CVA tenderness.   Musculoskeletal:      Thoracic back: No tenderness or bony tenderness.      Lumbar back: Tenderness (Bilateral SI joint left> right.  Patient states this is the area of pain when she stands.) present. No bony tenderness.      Right hip: Decreased range of motion.      Left hip: Decreased range of motion.      Comments:   Straight leg raise causes groin, lateral thigh and back discomfort on the left and some groin pain on the right    She is a bit guarded with attempts at hip range of motion bilaterally.   Skin:     General: Skin is warm and dry.   Neurological:      General: No focal deficit present.      Mental Status: She is alert.      Deep Tendon Reflexes:      Reflex Scores:       Patellar reflexes are 2+ on the right side and 2+ on the left side.  Psychiatric:         Mood and Affect: Mood normal.         Speech: Speech normal.         Thought Content: Thought content normal.       Neurologic Exam     Mental Status   Speech: speech is normal   Level of consciousness: alert  Knowledge: good.   Normal comprehension.     Motor Exam   Muscle bulk: normal  Overall muscle tone: normal    Strength   Right iliopsoas: 5/5  Left iliopsoas: 4/5  Right quadriceps: 5/5  Left quadriceps: 4/5  Right hamstrin/5  Left hamstrin/5  Right anterior tibial: 5/5  Left anterior tibial: 5/5  Right gastroc: 5/5  Left gastroc: 5/5    Sensory Exam   Right leg light touch: normal  Left leg light touch: normal    Gait, Coordination, and Reflexes     Reflexes   Right patellar: 2+  Left patellar: 2+  Right ankle clonus: absent  Left ankle clonus: absentPer PT note -required mod A for B LE to reach  sitting EOB. Pt stood and ambulated 40' c RW requiring SBA/CGA. Pt demo's a very slow step-to antalgic gait without a LOB.       Assessment & Plan       Bacterial infection due to Pseudomonas    Acquired hypothyroidism    Essential hypertension    Anxiety    Acute UTI    Lumbago      Problem List Items Addressed This Visit          Unprioritized    Sepsis    Acute UTI - Primary    Relevant Medications    cefepime 2000 mg IVPB in 100 mL NS (MBP)     Other Visit Diagnoses       ELVIA (acute kidney injury)        Relevant Medications    furosemide (LASIX) tablet 40 mg (Completed)             COMORBID CONDITIONS:  Bacteremia, osteoporosis    Patient with a fairly complicated medical course this year beginning with E. coli urosepsis from infected kidney stone requiring stent placement in March followed by right total hip replacement, C. difficile infection, recurrent kidney stone issue requiring additional lithotripsy and stent and a new UTI/bacteremia with Pseudomonas.  She presented with some right flank pain which no new stones were seen.  Her right hip has been evaluated by her orthopedic surgeon.  She had hip aspiration on the right which was unremarkable for any bacterial growth thus far.  She is afebrile but her white blood cell count and inflammatory markers continue elevated.  MRI of the lumbar spine was obtained but was a noncontrast study.  There is no obvious infectious finding and some mild degenerative changes.  The left SI joint does show fair amount of degeneration.  Her symptoms point more to sacroiliitis in my opinion particularly with the acute onset and her mobility changes of late; however, with the leukocytosis, bacteremia, and elevated CRP, further studies with contrasted imaging are indicated.  Typically we would recommend steroid injection, but in the case of an active infection that would not be a good idea.  I am going to put her on some lidocaine patches and some Robaxin to see if we can get her  "a little more comfortable.  We will follow-up after the new MRI studies are complete.    PLAN:   MRI lumbar spine with and without contrast  MRI pelvis with and without contrast  ESR/CRP  Lidoderm patch to areas of pain  Robaxin-750 milligram p.o. every 6 hours as needed    I discussed the patient's findings and my recommendations with patient, family, and Dr. Wallace    During patient visit, I utilized appropriate personal protective equipment including gloves. Appropriate PPE was worn during the entire visit.  Hand hygiene was completed before and after.     Beth Esteban, APRN  08/01/24  12:11 EDT    \"Dictated utilizing Dragon dictation\".      "

## 2024-08-01 NOTE — SIGNIFICANT NOTE
08/01/24 1547   OTHER   Discipline physical therapist   Rehab Time/Intention   Session Not Performed other (see comments)   Therapy Assessment/Plan (PT)   Criteria for Skilled Interventions Met (PT) yes   Recommendation   PT - Next Appointment 08/02/24     Discussed findings with pt as AGUSTIN now following and plans to proceed with MRI with/without contrast to hopefully find the cause of ongoing B hip pain. Pt reports she has been ambulating with staff and sitting UIC as tolerated. Pt does report significant pain with all activity and would be wise to hold PT today until proper pain control and a plan of care moving forward. RN notified. PT will continue to follow.

## 2024-08-01 NOTE — DISCHARGE PLACEMENT REQUEST
"Anastasiia Faria (74 y.o. Female)       Date of Birth   1950    Social Security Number       Address   [address] [place] [address]    Home Phone   [phone]    MRN   [id number]       [facility]    Marital Status                               Admission Date   7/27/24    Admission Type   Emergency    Admitting Provider   Senthil Medeiros MD    Attending Provider   Brenden Reyes MD    Department, Room/Bed   [address], 97/[address]       Discharge Date       Discharge Disposition       Discharge Destination                                 Attending Provider: Brenden Reyes MD    Allergies: No Known Allergies    Isolation: None   Infection: None   Code Status: CPR    Ht: 172.7 cm (67.99\")   Wt: 84.1 kg (185 lb 6.5 oz)    Admission Cmt: None   Principal Problem: Bacterial infection due to Pseudomonas [A49.8]                   Active Insurance as of 7/27/2024       Primary Coverage       Payor Plan Insurance Group Employer/Plan Group    [facility] MEDICARE REPLACEMENT [facility] MED ADV GROUP [id number]       Payor Plan Address Payor Plan Phone Number Payor Plan Fax Number Effective Dates    [address] [address]   1/1/2023 - None Entered    [facility] [id number]         Subscriber Name Subscriber Birth Date Member ID       ANASTASIIA FARIA 1950 [id number]                     Emergency Contacts        (Rel.) Home Phone Work Phone Mobile Phone    Garth Faria (Spouse) [phone] -- [phone]                "

## 2024-08-01 NOTE — CASE MANAGEMENT/SOCIAL WORK
Continued Stay Note  [facility]     Patient Name: Anastasiia Faria  MRN: [id number]  Today's Date: 8/1/2024    Admit Date: 7/27/2024    Plan: Home w/ HH & home infusion for IV abx   Discharge Plan       Row Name 08/01/24 1123       Plan    Plan Home w/ HH & home infusion for IV abx    Plan Comments Per ID note from 7/31, patient will need IV abx with a stop date of 8/12. [facility] spoke with patient and spouse at bedside regarding dc plan. Patient is agreeable to  for IV abx needs and is requesting referral to [facility]. Patient is agreeable to additional referrals as needed if [facility] cannot accept. Family can transport home. LEIGH DORSEY                   Discharge Codes    No documentation.                 Expected Discharge Date and Time       Expected Discharge Date Expected Discharge Time    Aug 3, 2024               LEIGH Cha

## 2024-08-01 NOTE — PLAN OF CARE
Goal Outcome Evaluation:  Plan of Care Reviewed With: patient        Progress: no change  Outcome Evaluation: VSS, up with assist x1 and walker, falls precautions maintained, abx given, replace phos, pt still have pain with movement- PO pain medication given with some relief, on RA, up in chair,  at bedside, neurosurgery consulted- MRI ordered and sheet faxed

## 2024-08-01 NOTE — PLAN OF CARE
Goal Outcome Evaluation:  Plan of Care Reviewed With: patient        Progress: no change  Outcome Evaluation: VSS on 2L nc. Falls precautions in place. No c/o pain. Replacing phosphorus per protocol. K redraw WDL. IV abx infusing.

## 2024-08-01 NOTE — PROGRESS NOTES
Name: Anastasiia Faria ADMIT: 2024   : 1950  PCP: Mirza Scott Sr., MD    MRN: [id number] LOS: 4 days   AGE/SEX: 74 y.o. female  ROOM: [facility]     Subjective   Subjective   Pain is bilateral and lateral hip with radiation the frontal thighs. Having difficulty with leg raise but did not have acute back pain on passive straight leg raise (motion was limited however). No CP SOA. Having some abdominal fullness and nausea. Reports loose stool with bowel regimen.     Objective   Objective   Vital Signs  Temp:  [97.9 °F (36.6 °C)-99.2 °F (37.3 °C)] 98 °F (36.7 °C)  Heart Rate:  [68-79] 79  Resp:  [15-16] 16  BP: (145-155)/(70-78) 155/78  SpO2:  [90 %-96 %] 94 %  on  Flow (L/min):  [1-3] 3;   Device (Oxygen Therapy): room air  Body mass index is 28.2 kg/m².    Physical Exam  Constitutional:       General: She is not in acute distress.     Appearance: She is not toxic-appearing.      Comments: Frail, elderly   HENT:      Head: Normocephalic and atraumatic.   Cardiovascular:      Rate and Rhythm: Normal rate and regular rhythm.   Pulmonary:      Effort: Pulmonary effort is normal. No respiratory distress.      Breath sounds: Normal breath sounds. No wheezing or rhonchi.   Abdominal:      General: Bowel sounds are normal.      Palpations: Abdomen is soft.      Tenderness: There is no abdominal tenderness. There is no guarding or rebound.   Musculoskeletal:         General: Tenderness present.   Skin:     General: Skin is warm and dry.   Neurological:      Mental Status: She is alert.      Motor: Weakness present.   Psychiatric:         Mood and Affect: Mood normal.         Behavior: Behavior normal.     Results Review  I reviewed the patient's new clinical results.  Results from last 7 days   Lab Units 24  0839 24  0817 24  0615 24  0707   WBC 10*3/mm3 13.92* 12.57* 11.77* 14.89*   HEMOGLOBIN g/dL 10.6* 10.1* 10.3* 10.3*   PLATELETS 10*3/mm3 324 264 216 203     Results from last 7 days   Lab  "Units 08/01/24  0614 07/31/24 2120 07/31/24  0817 07/30/24  1816 07/30/24  0615 07/29/24  0707   SODIUM mmol/L 136  --  134*  --  136 136   POTASSIUM mmol/L 3.9 4.0 3.6 4.1 3.2* 3.5   CHLORIDE mmol/L 105  --  105  --  106 108*   CO2 mmol/L 19.0*  --  20.5*  --  22.0 20.4*   BUN mg/dL 12  --  13  --  13 11   CREATININE mg/dL 0.79  --  0.77  --  0.76 0.83   GLUCOSE mg/dL 105*  --  93  --  112* 106*     Lab Results   Component Value Date    ANIONGAP 12.0 08/01/2024     Estimated Creatinine Clearance: 71 mL/min (by C-G formula based on SCr of 0.79 mg/dL).   Lab Results   Component Value Date    EGFR 78.6 08/01/2024     Results from last 7 days   Lab Units 07/31/24  0817 07/29/24  0707 07/27/24  0240   ALBUMIN g/dL 2.7* 2.6* 3.3*   BILIRUBIN mg/dL 0.4 0.3 0.4   ALK PHOS U/L 102 93 84   AST (SGOT) U/L 19 9 15   ALT (SGPT) U/L 15 7 7     Results from last 7 days   Lab Units 08/01/24  0614 07/31/24 2120 07/31/24  1524 07/31/24  0817 07/30/24  0615 07/29/24  0707 07/27/24  0819 07/27/24  0240   CALCIUM mg/dL 9.5  --   --  9.2 9.0 8.6   < > 9.0   ALBUMIN g/dL  --   --   --  2.7*  --  2.6*  --  3.3*   MAGNESIUM mg/dL 2.1  --  1.8 1.9 1.8  --   --   --    PHOSPHORUS mg/dL 2.2* 1.7*  --  2.0* 2.0*  --   --   --     < > = values in this interval not displayed.     Results from last 7 days   Lab Units 07/27/24  0240   LACTATE mmol/L 1.2     No results found for: \"HGBA1C\", \"POCGLU\"    MRI Lumbar Spine Without Contrast    Result Date: 7/31/2024  There is no evidence of a focal herniation or of a compression fracture/compression deformity. Degenerative disease involving lumbar spine as described above including mild levoscoliosis, loss of disc height, mild broad-based disc osteophyte complexes and mild foraminal stenosis. Facet degenerative disease is most prominent at L4-L5 and L5-S1 where is estimated to be mild to moderate. See above.  This report was finalized on 7/31/2024 8:19 AM by Dr. Senthil Kemp M.D on Workstation: " [id number]      XR Chest PA & Lateral    Result Date: 7/30/2024  As described.  This report was finalized on 7/30/2024 4:05 PM by Dr. Chidi Perez M.D on Workstation: [id number]       Scheduled Meds  atenolol, 25 mg, Oral, Nightly  cefepime, 2,000 mg, Intravenous, Q8H  enoxaparin, 40 mg, Subcutaneous, Q24H  escitalopram, 20 mg, Oral, Daily  levothyroxine, 50 mcg, Oral, Daily  rosuvastatin, 10 mg, Oral, Nightly  sodium chloride, 10 mL, Intravenous, Q12H    Continuous Infusions   PRN Meds    acetaminophen **OR** acetaminophen **OR** acetaminophen    [DISCONTINUED] senna-docusate sodium **AND** [DISCONTINUED] polyethylene glycol **AND** bisacodyl **AND** bisacodyl    calcium carbonate    HYDROcodone-acetaminophen    HYDROcodone-acetaminophen    Magnesium Standard Dose Replacement - Follow Nurse / BPA Driven Protocol    ondansetron ODT **OR** ondansetron    Phosphorus Replacement - Follow Nurse / BPA Driven Protocol    Potassium Replacement - Follow Nurse / BPA Driven Protocol    [COMPLETED] Insert Peripheral IV **AND** sodium chloride    sodium chloride    sodium chloride     Diet  Diet: Regular/House; Fluid Consistency: Thin (IDDSI 0)       Assessment/Plan     Active Hospital Problems    Diagnosis  POA    **Bacterial infection due to Pseudomonas [A49.8]  Unknown    Lumbago [M54.50]  Unknown    Acute UTI [N39.0]  Yes    Anxiety [F41.9]  Yes    Essential hypertension [I10]  Yes    Acquired hypothyroidism [E03.9]  Yes      Resolved Hospital Problems   No resolved problems to display.     74 y.o. female with Bacterial infection due to Pseudomonas.     Pseudomonas bacteremia and bacteruria   - Appreciate ID   - Rocephin changed to Cefepime for 14-day course stop date August 12 (if discharged prior switch to levofloxacin 750 mg p.o. daily)  - Fever resolved, leukocytosis present  - recent left ureteral stent exchange on July 25  -  source    Ureteral Stent  -Urology evaluated. Treatement as above. Outpatient follow  up planned.     Right hip pain s/p GEOVANNA in May, Left Hip Pain  -Right hip aspirate culture no growth 2 days  -Ortho evaluated  -Lumbar spine MRI no evidence of infection, chronic degenerative changes  -Will ask AGUSTIN to review     Hypoxia  -7/27/2024 chest x-ray showed possible atelectasis  -IVF stopped and received diuretic previously  -Atelectasis with small effusion  -Screen BNP. On room air    Hypophosphatemia replace     Mild ELVIA  -Resolved with treatment     Dimer elevated  -VQ scan normal  -Negative duplex     HypoT4  -Synthroid     HTN  -BPs acceptable  -Continue current regimen     DVT prophylaxis  Lovenox 40 mg SC daily    Discharge    HH/few days    Expected Discharge Date: 8/3/2024; Expected Discharge Time:     Brenden Reyes MD  [facility]  08/01/24

## 2024-08-02 ENCOUNTER — TELEPHONE (OUTPATIENT)
Dept: FAMILY MEDICINE CLINIC | Facility: CLINIC | Age: 74
End: 2024-08-02

## 2024-08-02 ENCOUNTER — APPOINTMENT (OUTPATIENT)
Dept: MRI IMAGING | Facility: HOSPITAL | Age: 74
DRG: 659 | End: 2024-08-02
Payer: MEDICARE

## 2024-08-02 LAB
ALBUMIN SERPL-MCNC: 2.7 G/DL (ref 3.5–5.2)
ANION GAP SERPL CALCULATED.3IONS-SCNC: 12 MMOL/L (ref 5–15)
BUN SERPL-MCNC: 10 MG/DL (ref 8–23)
BUN/CREAT SERPL: 12.3 (ref 7–25)
CALCIUM SPEC-SCNC: 9.6 MG/DL (ref 8.6–10.5)
CHLORIDE SERPL-SCNC: 102 MMOL/L (ref 98–107)
CO2 SERPL-SCNC: 20 MMOL/L (ref 22–29)
CREAT SERPL-MCNC: 0.81 MG/DL (ref 0.57–1)
CRP SERPL-MCNC: 20.77 MG/DL (ref 0–0.5)
DEPRECATED RDW RBC AUTO: 41.9 FL (ref 37–54)
EGFRCR SERPLBLD CKD-EPI 2021: 76.3 ML/MIN/1.73
ERYTHROCYTE [DISTWIDTH] IN BLOOD BY AUTOMATED COUNT: 12.5 % (ref 12.3–15.4)
ERYTHROCYTE [SEDIMENTATION RATE] IN BLOOD: 41 MM/HR (ref 0–30)
GLUCOSE SERPL-MCNC: 172 MG/DL (ref 65–99)
HCT VFR BLD AUTO: 33.3 % (ref 34–46.6)
HGB BLD-MCNC: 10.7 G/DL (ref 12–15.9)
MCH RBC QN AUTO: 29.6 PG (ref 26.6–33)
MCHC RBC AUTO-ENTMCNC: 32.1 G/DL (ref 31.5–35.7)
MCV RBC AUTO: 92.2 FL (ref 79–97)
NT-PROBNP SERPL-MCNC: 5494 PG/ML (ref 0–900)
PHOSPHATE SERPL-MCNC: 2.2 MG/DL (ref 2.5–4.5)
PLATELET # BLD AUTO: 372 10*3/MM3 (ref 140–450)
PMV BLD AUTO: 9.1 FL (ref 6–12)
POTASSIUM SERPL-SCNC: 3.6 MMOL/L (ref 3.5–5.2)
RBC # BLD AUTO: 3.61 10*6/MM3 (ref 3.77–5.28)
SODIUM SERPL-SCNC: 134 MMOL/L (ref 136–145)
WBC NRBC COR # BLD AUTO: 13.41 10*3/MM3 (ref 3.4–10.8)

## 2024-08-02 PROCEDURE — 72158 MRI LUMBAR SPINE W/O & W/DYE: CPT

## 2024-08-02 PROCEDURE — 86140 C-REACTIVE PROTEIN: CPT | Performed by: NURSE PRACTITIONER

## 2024-08-02 PROCEDURE — 80069 RENAL FUNCTION PANEL: CPT | Performed by: INTERNAL MEDICINE

## 2024-08-02 PROCEDURE — 97530 THERAPEUTIC ACTIVITIES: CPT

## 2024-08-02 PROCEDURE — 99232 SBSQ HOSP IP/OBS MODERATE 35: CPT | Performed by: NURSE PRACTITIONER

## 2024-08-02 PROCEDURE — 25010000002 ENOXAPARIN PER 10 MG: Performed by: HOSPITALIST

## 2024-08-02 PROCEDURE — 99232 SBSQ HOSP IP/OBS MODERATE 35: CPT | Performed by: INTERNAL MEDICINE

## 2024-08-02 PROCEDURE — 25010000002 CEFEPIME PER 500 MG: Performed by: INTERNAL MEDICINE

## 2024-08-02 PROCEDURE — 83880 ASSAY OF NATRIURETIC PEPTIDE: CPT | Performed by: INTERNAL MEDICINE

## 2024-08-02 PROCEDURE — 0 GADOBENATE DIMEGLUMINE 529 MG/ML SOLUTION: Performed by: INTERNAL MEDICINE

## 2024-08-02 PROCEDURE — 72197 MRI PELVIS W/O & W/DYE: CPT

## 2024-08-02 PROCEDURE — 85652 RBC SED RATE AUTOMATED: CPT | Performed by: NURSE PRACTITIONER

## 2024-08-02 PROCEDURE — A9577 INJ MULTIHANCE: HCPCS | Performed by: INTERNAL MEDICINE

## 2024-08-02 PROCEDURE — 85027 COMPLETE CBC AUTOMATED: CPT | Performed by: INTERNAL MEDICINE

## 2024-08-02 RX ORDER — POTASSIUM CHLORIDE 750 MG/1
40 TABLET, FILM COATED, EXTENDED RELEASE ORAL EVERY 4 HOURS
Status: COMPLETED | OUTPATIENT
Start: 2024-08-02 | End: 2024-08-02

## 2024-08-02 RX ORDER — HYDROCODONE BITARTRATE AND ACETAMINOPHEN 7.5; 325 MG/1; MG/1
2 TABLET ORAL EVERY 4 HOURS PRN
Status: DISCONTINUED | OUTPATIENT
Start: 2024-08-02 | End: 2024-08-03

## 2024-08-02 RX ORDER — HYDROCODONE BITARTRATE AND ACETAMINOPHEN 7.5; 325 MG/1; MG/1
1 TABLET ORAL EVERY 4 HOURS PRN
Status: DISCONTINUED | OUTPATIENT
Start: 2024-08-02 | End: 2024-08-03

## 2024-08-02 RX ADMIN — POTASSIUM, SODIUM PHOSPHATES 280 MG-160 MG-250 MG ORAL POWDER PACKET 2 PACKET: POWDER IN PACKET at 16:39

## 2024-08-02 RX ADMIN — CEFEPIME 2000 MG: 2 INJECTION, POWDER, FOR SOLUTION INTRAVENOUS at 02:15

## 2024-08-02 RX ADMIN — HYDROCODONE BITARTRATE AND ACETAMINOPHEN 2 TABLET: 7.5; 325 TABLET ORAL at 18:14

## 2024-08-02 RX ADMIN — POTASSIUM CHLORIDE 40 MEQ: 750 TABLET, EXTENDED RELEASE ORAL at 20:25

## 2024-08-02 RX ADMIN — POTASSIUM CHLORIDE 40 MEQ: 750 TABLET, EXTENDED RELEASE ORAL at 16:39

## 2024-08-02 RX ADMIN — CEFEPIME 2000 MG: 2 INJECTION, POWDER, FOR SOLUTION INTRAVENOUS at 09:54

## 2024-08-02 RX ADMIN — LEVOTHYROXINE SODIUM 50 MCG: 50 TABLET ORAL at 08:15

## 2024-08-02 RX ADMIN — ESCITALOPRAM 20 MG: 20 TABLET, FILM COATED ORAL at 08:14

## 2024-08-02 RX ADMIN — CEFEPIME 2000 MG: 2 INJECTION, POWDER, FOR SOLUTION INTRAVENOUS at 18:14

## 2024-08-02 RX ADMIN — ROSUVASTATIN CALCIUM 10 MG: 10 TABLET, FILM COATED ORAL at 20:24

## 2024-08-02 RX ADMIN — Medication 10 ML: at 08:15

## 2024-08-02 RX ADMIN — METHOCARBAMOL TABLETS 750 MG: 750 TABLET, COATED ORAL at 09:54

## 2024-08-02 RX ADMIN — HYDROCODONE BITARTRATE AND ACETAMINOPHEN 1 TABLET: 10; 325 TABLET ORAL at 13:01

## 2024-08-02 RX ADMIN — METHOCARBAMOL TABLETS 750 MG: 750 TABLET, COATED ORAL at 22:22

## 2024-08-02 RX ADMIN — GADOBENATE DIMEGLUMINE 17 ML: 529 INJECTION, SOLUTION INTRAVENOUS at 05:42

## 2024-08-02 RX ADMIN — ATENOLOL 25 MG: 25 TABLET ORAL at 20:24

## 2024-08-02 RX ADMIN — ENOXAPARIN SODIUM 40 MG: 100 INJECTION SUBCUTANEOUS at 18:15

## 2024-08-02 RX ADMIN — Medication 10 ML: at 20:25

## 2024-08-02 RX ADMIN — HYDROCODONE BITARTRATE AND ACETAMINOPHEN 1 TABLET: 10; 325 TABLET ORAL at 08:14

## 2024-08-02 RX ADMIN — METHOCARBAMOL TABLETS 750 MG: 750 TABLET, COATED ORAL at 16:39

## 2024-08-02 NOTE — PLAN OF CARE
Goal Outcome Evaluation:  Plan of Care Reviewed With: patient        Progress: no change  Outcome Evaluation: VSS on room air. Up with assist x1 and walker. Falls precautions in place. IV abx as ordered. Pt stated robaxin did not relieve pain, used norco for pain.

## 2024-08-02 NOTE — CASE MANAGEMENT/SOCIAL WORK
Continued Stay Note  [facility]     Patient Name: Anastasiia Faria  MRN: [id number]  Today's Date: 8/2/2024    Admit Date: 7/27/2024    Plan: Home w/ [facility] & [facility] for IV abx   Discharge Plan       Row Name 08/02/24 0940       Plan    Plan Home w/ [facility] for IV abx    Plan Comments [facility] notified [facility] of possible dc over the weekend and need for IV abx. Referral has been sent to [facility]. Family will transport home. IV abx will need to be delivered prior to dc. KENDRA DORSEYW                   Discharge Codes    No documentation.                 Expected Discharge Date and Time       Expected Discharge Date Expected Discharge Time    Aug 3, 2024               LEIGH Cha

## 2024-08-02 NOTE — TELEPHONE ENCOUNTER
Attempted to call pt. No answer LM     Relay     Orders have been approved   Referred by: Adrianna Barr MD; Medical Diagnosis (from order):    Diagnosis Information      Diagnosis    719.41, 338.29 (ICD-9-CM) - M25.511, G89.29 (ICD-10-CM) - Chronic right shoulder pain                Physical Therapy -  Daily Treatment Note    Visit:  3     SUBJECTIVE                                                                                                             Have a sinus infection so am under the weather. Didn't do exercises yesterday because of it. Pain is up there today. Not sure why.   Functional Change: Getting back into exercise routine.    Pain / Symptoms:  Pain rating (out of 10): Current: 6     OBJECTIVE                                                                                                                          TREATMENT                                                                                                                  Therapeutic Exercise:  UBE, level 1.0, forward and retro, x 6 minutes- subjective taken during perforamnce    Orange -band:  Rows  Shoulder extension  Shoulder adduction    In depth discussion and performance of back care and body mechanics concepts including sleeping, sitting, standing and lifting. Demos and handouts provided as well.    Manual Therapy:  GH PROM multi-directional motion  Inferior/posterior GH mobs, grade I-III  STM to clear right upper trap, and biceps  Subscapularis releases      Skilled input: verbal instruction/cues, tactile instruction/cues and posture correction    Home Exercise Program: (*above indicates provided as part of home exercise program)  [employer] access code  [email or web address]   URL: [email or web address]     Exercises Corner Pec Major Stretch- 3-5 reps- 20-30 hold- 1x daily   Standing High Row with Resistance- 20 reps- 5 hold- 1x daily    Orange -band:  Rows  Shoulder extension  Shoulder adduction         ASSESSMENT                                                                                                                  Pt with mid to high subjective pain reports that she states may be from having a cold and poor wether conditions. Compliance has suffered because of this. Thus encouraged improved home compliance. Scap weakness noted along with fair postural awareness, thus  band activities were added along with postural education. Feel pt could benefit from continued postural reminders as well as continued general upper body strengthening.     Pain/symptoms after session: 4  Patient Education:   Results of above outlined education: Verbalizes understanding and Demonstrates understanding   PLAN                                                                                                                             Suggestions for next session as indicated: Progress per plan of care,  Progress t-band scapular strength, pec flexibility, thoracic mobility, review body mechanics training for ADLs/reaching/lifting        Procedures and total treatment time documented Time Entry flowsheet.

## 2024-08-02 NOTE — PROGRESS NOTES
[employer] THORACIC/LUMBAR NEUROSURGERY PROGRESS NOTE      CC: LBP/jay hip pain      Subjective     Interval History: MRI lumbar and pelvis +/- completed. Did not feel lidoderm patches helped, but muscle relaxant offered some pain relief. Still feels she needs narcotic. MRI was painful    ROS:  Constitutional: No fever, chills  MS: back pain  Neuro: No numbness, tingling, or weakness,  no balance difficulties  : No difficulty voiding, no incontinence    Objective     Vital signs in last 24 hours:  Temp:  [98.1 °F (36.7 °C)-99.4 °F (37.4 °C)] 98.4 °F (36.9 °C)  Heart Rate:  [72-82] 75  Resp:  [16-18] 16  BP: (135-152)/(65-79) 135/65    Intake/Output this shift:  I/O this shift:  In: -   Out: 100 [Urine:100]    LABS:  Results from last 7 days   Lab Units 08/02/24  0939 08/01/24  0839 07/31/24  0817   WBC 10*3/mm3 13.41* 13.92* 12.57*   HEMOGLOBIN g/dL 10.7* 10.6* 10.1*   HEMATOCRIT % 33.3* 33.7* 31.3*   PLATELETS 10*3/mm3 372 324 264     Results from last 7 days   Lab Units 08/01/24  0614 07/31/24  2120 07/31/24  0817 07/30/24  1816 07/30/24  0615 07/29/24  0707 07/27/24  0819 07/27/24  0240   SODIUM mmol/L 136  --  134*  --  136 136   < > 135*   POTASSIUM mmol/L 3.9 4.0 3.6   < > 3.2* 3.5   < > 4.0   CHLORIDE mmol/L 105  --  105  --  106 108*   < > 103   CO2 mmol/L 19.0*  --  20.5*  --  22.0 20.4*   < > 23.1   BUN mg/dL 12  --  13  --  13 11   < > 23   CREATININE mg/dL 0.79  --  0.77  --  0.76 0.83   < > 1.35*   CALCIUM mg/dL 9.5  --  9.2  --  9.0 8.6   < > 9.0   BILIRUBIN mg/dL  --   --  0.4  --   --  0.3  --  0.4   ALK PHOS U/L  --   --  102  --   --  93  --  84   ALT (SGPT) U/L  --   --  15  --   --  7  --  7   AST (SGOT) U/L  --   --  19  --   --  9  --  15   GLUCOSE mg/dL 105*  --  93  --  112* 106*   < > 148*    < > = values in this interval not displayed.     Results from last 7 days   Lab Units 08/02/24 0939 07/29/24  0707   SED RATE mm/hr 41* 39*       Results from last 7 days   Lab Units 08/02/24  0939  07/29/24  0707 07/27/24  0819   CRP mg/dL 20.77* 30.00* 23.93*     Blood cx 7/29- NGTD    R hip asp 7/29- NGTD      IMAGING STUDIES:  MRI lumbar spine with and without contrast unchanged from yesterday study except for contrasted study shows no evidence of abnormal enhancement to suggest osteomyelitis, discitis, epidural abscess there is some degenerative enhancement of the facets at L2/3 and L5/S1    MRI pelvis with and without contrast-  IMPRESSION:     1. Partially imaged right SI joint arthropathic changes with a small  joint effusion, suspicious for septic arthritis. Speckled T1 marrow  replacement, patchy bone edema-like signal, and enhancement of the  posterior right iliac bone and right sacral ala along the inferior  aspect of the right SI joint could be reactive or could reflect early  osteomyelitis. Partially imaged multilobulated cystic changes along the  anterior aspect of the right SI joint and right sacral ala could  represent abscess formation, imaged only on the coronal views.  2. Moderate to severe left iliopsoas bursitis and mild right iliopsoas  bursitis, possibly communicating with bilateral hip joint effusions.  3. Edema and amorphous enhancement of the bilateral iliopsoas  musculature, right greater than left, which could be reactive or could  reflect myositis.  4. Right GEOVANNA with a small moderate right hip joint effusion and mild to  moderate left hip joint chondromalacia/DJD with a moderate joint  effusion. Septic arthritis is not excluded.  5. Edema and amorphous enhancement throughout the left gluteus minimus  muscle and the anterior bilateral gluteus medius muscles, possible  myositis.  6. Mild left greater trochanter bursitis.    I personally viewed and interpreted the patient's MRI lumbar spine.  Also reviewed by and discussed with Dr. Wallace.    Meds reviewed/changed: Yes  Cefepime 2 g IV every 8 hours  Lovenox 40 mg subcu every 24 hours  Lidocaine patch x 3 every 24 hours  Norco 10 mg  p.o. every 4 as needed-2 doses  Robaxin-750 milligram p.o. every 6 hours as needed-1 dose    Physical Exam:    General:   Awake, alert. Sitting up in chair. No acute distress.  at bedside  Station and Gait:             Per PT note 8/2-stood and ambulated 100' c RW requiring SBA. Pt continues to demo a very slow antalgic gait with heavy use of B UE support to off-load B hip pain (L>R).          Assessment & Plan     ASSESSMENT:      Bacterial infection due to Pseudomonas    Acquired hypothyroidism    Essential hypertension    Anxiety    Acute UTI    Lumbago    Patient with bilateral low back buttock pain more so on the left although she presented to the hospital with the severe pain on the right.  That improved and she has been most bothered by left-sided discomfort.  She has had negative cultures from right hip aspiration and blood cultures thus far are NGTD.  However, her CRP and WBC remain elevated although some improved particularly the CRP.  She is on cefepime empirically due to fever and recent ureteral stent exchange.  MRI of the lumbar spine is unremarkable for any infectious etiology, but the pelvic MRI has quite a bit of abnormality bilaterally particularly the right SI joint as well as some reported abnormality within the left hip joint effusion.  Both areas indicate unable to rule out septic arthritis and possible early osteomyelitis of the right SI joint with cystic changes along the anterior aspect of the right SI joint and sacral ala, possible abscess.  Discussed with Dr. Wallace, will defer to Ortho and ID for further evaluation and treatment.  Will obtain LSO brace for comfort.    PLAN:   LSO for comfort as tolerated  Defer pelvic MRI findings to ortho/ID- messaged nursing to notify their services  No AGUSTIN follow up needed    Addendum: Contacted by Dr. Du regarding concerns of right SI joint infection based on MRI pelvis results.  She would like opinion of neurosurgery whether surgical  "intervention may be indicated.  Discussed with Dr. Wallace and we will have Dr. Scott review for his opinion.  I did advise Dr. Du that the patient was complaining more of left-sided pain and stated that her right-sided symptoms had improved quite a bit since admission.    I discussed the patient's findings and my recommendations with patient, family, and Dr. Wallace, Dr. Du.    During patient visit, I utilized appropriate personal protective equipment including gloves. Appropriate PPE was worn during the entire visit.  Hand hygiene was completed before and after.     A LSO brace has been ordered due to diagnosis of SI joint/low back pain.  The purpose of the brace is to support weak muscles, stabilize and restrict movement of the trunk to aid in healing and pain relief of back pain (diagnosis)        LOS: 5 days       Beth Esteban, APRN  8/2/2024  13:24 EDT    \"Dictated utilizing Dragon dictation\".     " stated

## 2024-08-02 NOTE — PROGRESS NOTES
Name: Anastasiia Faria ADMIT: 2024   : 1950  PCP: Mirza Scott Sr., MD    MRN: [id number] LOS: 5 days   AGE/SEX: 74 y.o. female  ROOM: [facility]     Subjective   Subjective   Pain is present has improved some but difficulty with ambulating and bearing weight. No CP SOA. Has some nausea at times . Having bowel movements.     Objective   Objective   Vital Signs  Temp:  [98.1 °F (36.7 °C)-99.4 °F (37.4 °C)] 98.4 °F (36.9 °C)  Heart Rate:  [72-82] 75  Resp:  [16-18] 16  BP: (135-152)/(65-79) 135/65  SpO2:  [92 %-94 %] 92 %  on   ;   Device (Oxygen Therapy): room air  Body mass index is 28.2 kg/m².    Physical Exam  Constitutional:       General: She is not in acute distress.     Appearance: She is not toxic-appearing.      Comments: Frail, elderly   HENT:      Head: Normocephalic and atraumatic.   Cardiovascular:      Rate and Rhythm: Normal rate and regular rhythm.   Pulmonary:      Effort: Pulmonary effort is normal. No respiratory distress.      Breath sounds: Normal breath sounds. No wheezing or rhonchi.   Abdominal:      General: Bowel sounds are normal.      Palpations: Abdomen is soft.      Tenderness: There is no abdominal tenderness. There is no guarding or rebound.   Musculoskeletal:         General: Tenderness present.   Skin:     General: Skin is warm and dry.   Neurological:      Mental Status: She is alert.      Motor: Weakness present.   Psychiatric:         Mood and Affect: Mood normal.         Behavior: Behavior normal.     Results Review  I reviewed the patient's new clinical results.  Results from last 7 days   Lab Units 24  0939 24  0839 24  0817 24  0615   WBC 10*3/mm3 13.41* 13.92* 12.57* 11.77*   HEMOGLOBIN g/dL 10.7* 10.6* 10.1* 10.3*   PLATELETS 10*3/mm3 372 324 264 216     Results from last 7 days   Lab Units 24  0614 24  2120 24  0817 24  1816 24  0615 24  0707   SODIUM mmol/L 136  --  134*  --  136 136   POTASSIUM mmol/L 3.9  "4.0 3.6 4.1 3.2* 3.5   CHLORIDE mmol/L 105  --  105  --  106 108*   CO2 mmol/L 19.0*  --  20.5*  --  22.0 20.4*   BUN mg/dL 12  --  13  --  13 11   CREATININE mg/dL 0.79  --  0.77  --  0.76 0.83   GLUCOSE mg/dL 105*  --  93  --  112* 106*     Lab Results   Component Value Date    ANIONGAP 12.0 08/01/2024     Estimated Creatinine Clearance: 71 mL/min (by C-G formula based on SCr of 0.79 mg/dL).   Lab Results   Component Value Date    EGFR 78.6 08/01/2024     Results from last 7 days   Lab Units 07/31/24  0817 07/29/24  0707 07/27/24  0240   ALBUMIN g/dL 2.7* 2.6* 3.3*   BILIRUBIN mg/dL 0.4 0.3 0.4   ALK PHOS U/L 102 93 84   AST (SGOT) U/L 19 9 15   ALT (SGPT) U/L 15 7 7     Results from last 7 days   Lab Units 08/01/24  1456 08/01/24  0614 07/31/24  2120 07/31/24  1524 07/31/24  0817 07/30/24  0615 07/30/24  0615 07/29/24  0707 07/27/24  0819 07/27/24  0240   CALCIUM mg/dL  --  9.5  --   --  9.2  --  9.0 8.6   < > 9.0   ALBUMIN g/dL  --   --   --   --  2.7*  --   --  2.6*  --  3.3*   MAGNESIUM mg/dL  --  2.1  --  1.8 1.9  --  1.8  --   --   --    PHOSPHORUS mg/dL 2.3* 2.2* 1.7*  --  2.0*   < > 2.0*  --   --   --     < > = values in this interval not displayed.     Results from last 7 days   Lab Units 07/27/24  0240   LACTATE mmol/L 1.2     No results found for: \"HGBA1C\", \"POCGLU\"    MRI Pelvis With & Without Contrast    Result Date: 8/2/2024   1. Partially imaged right SI joint arthropathic changes with a small joint effusion, suspicious for septic arthritis. Speckled T1 marrow replacement, patchy bone edema-like signal, and enhancement of the posterior right iliac bone and right sacral ala along the inferior aspect of the right SI joint could be reactive or could reflect early osteomyelitis. Partially imaged multilobulated cystic changes along the anterior aspect of the right SI joint and right sacral ala could represent abscess formation, imaged only on the coronal views. 2. Moderate to severe left iliopsoas " bursitis and mild right iliopsoas bursitis, possibly communicating with bilateral hip joint effusions. 3. Edema and amorphous enhancement of the bilateral iliopsoas musculature, right greater than left, which could be reactive or could reflect myositis. 4. Right GEOVANNA with a small moderate right hip joint effusion and mild to moderate left hip joint chondromalacia/DJD with a moderate joint effusion. Septic arthritis is not excluded. 5. Edema and amorphous enhancement throughout the left gluteus minimus muscle and the anterior bilateral gluteus medius muscles, possible myositis. 6. Mild left greater trochanter bursitis.  This report was finalized on 8/2/2024 10:02 AM by Emre Pelaez MD on Workstation: [id number]      MRI Lumbar Spine With & Without Contrast    Result Date: 8/2/2024  There is no evidence of disc herniation, discitis, osteomyelitis or an epidural abscess. Multilevel degenerative disease involving lumbar spine is noted as described above unchanged versus 7/31/2024. Enhancement involving the facets at L2-L3 and L5-S1 is noted bilaterally consistent with degenerative disease.   This report was finalized on 8/2/2024 7:05 AM by Dr. Senthil Kemp M.D on Workstation: [id number]       Scheduled Meds  atenolol, 25 mg, Oral, Nightly  cefepime, 2,000 mg, Intravenous, Q8H  enoxaparin, 40 mg, Subcutaneous, Q24H  escitalopram, 20 mg, Oral, Daily  levothyroxine, 50 mcg, Oral, Daily  Lidocaine, 3 patch, Transdermal, Q24H  rosuvastatin, 10 mg, Oral, Nightly  sodium chloride, 10 mL, Intravenous, Q12H    Continuous Infusions   PRN Meds    acetaminophen **OR** acetaminophen **OR** acetaminophen    [DISCONTINUED] senna-docusate sodium **AND** [DISCONTINUED] polyethylene glycol **AND** bisacodyl **AND** bisacodyl    calcium carbonate    HYDROcodone-acetaminophen    HYDROcodone-acetaminophen    Magnesium Standard Dose Replacement - Follow Nurse / BPA Driven Protocol    methocarbamol    ondansetron ODT **OR**  ondansetron    Phosphorus Replacement - Follow Nurse / BPA Driven Protocol    Potassium Replacement - Follow Nurse / BPA Driven Protocol    [COMPLETED] Insert Peripheral IV **AND** sodium chloride    sodium chloride    sodium chloride     Diet  Diet: Regular/House; Fluid Consistency: Thin (IDDSI 0)       Assessment/Plan     Active Hospital Problems    Diagnosis  POA    **Bacterial infection due to Pseudomonas [A49.8]  Unknown    Lumbago [M54.50]  Unknown    Acute UTI [N39.0]  Yes    Anxiety [F41.9]  Yes    Essential hypertension [I10]  Yes    Acquired hypothyroidism [E03.9]  Yes      Resolved Hospital Problems   No resolved problems to display.     74 y.o. female with Bacterial infection due to Pseudomonas.     Pseudomonas bacteremia and bacteruria   - Cefepime continues with extension of course for 4-6 weeks due concern for right SI joint septic arthritis and potential early osteomyelitis. Infectious disease and Neurosurgery have disucssed her case. Orthopedic surgery following.  -Will discuss options with patient and family tomorrow about inquiring about transfer to  ortho/trauma vs close outpatient visit to establish care with them.    Ureteral Stent  -Urology evaluated. Treatement as above. Outpatient follow up planned.     Right hip pain s/p GEOVANNA in May, Left Hip Pain  -Right hip aspirate culture no growth 2 days  -Ortho evaluated    Concern for SI Joint Infection:  -possible infection, bursitis, early osteomyelitis on MRI  -See above     Hypoxia  -7/27/2024 chest x-ray showed possible atelectasis  -IVF stopped and received diuretic previously  -Atelectasis with small effusion  -Screen BNP. On room air    Hypophosphatemia replace     Mild ELVIA  -Resolved with treatment     Dimer elevated  -VQ scan normal  -Negative duplex     HypoT4  -Synthroid     HTN  -BPs acceptable  -Continue current regimen     DVT prophylaxis  Lovenox 40 mg SC daily    Discharge  Transfer v /anticipate will be here over the  weekend    Discussed with Dr Du    Expected Discharge Date: 8/5/2024; Expected Discharge Time:     Brenden Reyes MD  [facility]  08/02/24

## 2024-08-02 NOTE — PROGRESS NOTES
AGUSTIN addendum:  I discussed the patient, pelvic imaging findings, and ID concerns with Dr. Scott.  He reviewed the CT and MRIs of the pelvis.  He states at this time he feels that current treatment can be with antibiotics alone; however if refractory to antibiotics or develops fractures or instability, he recommends evaluation with orthopedic surgeon or Ortho spine or Ortho trauma surgeon.  This would likely need to be done through the [facility].  Although he assesses and treats SI joint dysfunction, he does not surgically address SI joint infection or instability from it. No further AGUSTIN work up or follow up planned.

## 2024-08-02 NOTE — PROGRESS NOTES
Ortho     Came to see patient on rounds today she is reportedly down for pelvic MRI.    Reviewed her cultures cell count not consistent with infection.  Cultures show no growth from right hip aspiration.    According to nursing.  She continues to complain of low back pain and bilateral leg pain left greater than right.  Very confusing picture at this point there is a low suspicion of right hip infection but will continue to follow along.    Electronically signed by Vasu King MD, 08/02/24, 6:59 AM EDT.

## 2024-08-02 NOTE — THERAPY TREATMENT NOTE
Patient Name: Anastasiia Faria  : 1950    MRN: [id number]                              Today's Date: 2024       Admit Date: 2024    Visit Dx:     ICD-10-CM ICD-9-CM   1. Acute UTI  N39.0 599.0   2. Sepsis without acute organ dysfunction, due to unspecified organism  A41.9 038.9     995.91   3. ELVIA (acute kidney injury)  N17.9 584.9     Patient Active Problem List   Diagnosis    Colon polyp, hyperplastic    Allergic rhinitis due to pollen    Acquired hypothyroidism    Essential hypertension    FH: colon cancer in relative <50 years old    Mixed hyperlipidemia    Chronic right shoulder pain    Periscapular pain    Other idiopathic scoliosis, thoracic region    Prediabetes    Age-related osteoporosis without current pathological fracture    Mild episode of recurrent major depressive disorder    Age-related nuclear cataract of both eyes    Cystoid nevus of conjunctiva    Anxiety    Cataract    Cataract extraction status of left eye    Cataract extraction status of right eye    Conjunctival cyst of left eye    Depressive disorder    Disorder of refraction    Dry eye syndrome of bilateral lacrimal glands    Hearing loss    Hearing loss    Hordeolum internum right upper eyelid    Migraine    Osteopenia    Osteoporosis    Renal stone    Seasonal allergies    Diverticulosis    Internal hemorrhoids    Posterior vitreous detachment of both eyes    Right hip pain    OA (osteoarthritis) of hip    Sepsis    Acute pyelonephritis    Ureteral calculus    E coli bacteremia    Acute respiratory failure with hypoxia    Dysuria    GI bleed    Colitis    UTI (urinary tract infection)    Leukocytosis    Acute UTI    Lumbago    Bacterial infection due to Pseudomonas     Past Medical History:   Diagnosis Date    Allergic     Anxiety     Chronic kidney disease     Clostridium difficile infection 2024    TREATED AT [facility]    Colon polyps     Depression     Diverticulosis     Encounter for annual health examination 2014     Annual Health Assessment    Family history of colon cancer     Fibrocystic breast     GERD (gastroesophageal reflux disease)     Hard of hearing     HEARING AIDS BILATERALLY    Heart murmur     Herpes labialis without complication     History of cataract     History of mammogram 12/20/2010    History of recent hospitalization 03/21/2024    KIDNEY STONE/SEPSIS 4 NIGHT AT [facility]    HL (hearing loss) 2014    Hearing Aids    Hydronephrosis     Hyperlipidemia     Hypertension     Hypothyroidism     Insomnia     Kidney stones     Migraines     Osteoarthritis of right hip     Osteopenia     Prolia -last 9/2021,3/2022    PONV (postoperative nausea and vomiting)     Scoliosis     Dr. Stanford 5/2018    Sepsis 03/2024    Slow to wake up after anesthesia     Urinary tract infection     3/2024,  6/202    Visual impairment     Wear Glasses     Past Surgical History:   Procedure Laterality Date    BONE MARROW BIOPSY Left     BREAST CYST ASPIRATION Right     BREAST SURGERY Right     BIOPSY    CATARACT EXTRACTION, BILATERAL      Cataract surgery on right eye June 2021 and left eye July 2021. (Dr. Nanette Bateman)    COLONOSCOPY N/A 10/27/2016    Procedure: COLONOSCOPY INTO CECUM;  Surgeon: Osvaldo Dorado MD;  Location: [facility] ENDOSCOPY;  Service:     COLONOSCOPY  01/26/2011    COLONOSCOPY  02/04/2005    COLONOSCOPY N/A 12/02/2021    Procedure: COLONOSCOPY INTO CECUM WITH COLD BIOPSY POLYPECTOMY AND HOT SNARE POLYPECTOMY;  Surgeon: Osvaldo Dorado MD;  Location: [facility] ENDOSCOPY;  Service: General;  Laterality: N/A;  PRE-FAMILY HISTORY OF COLON CANCER, PERSONAL HISTORY OF COLON CANCER  POST- POLYPS, DIVERTICULOSIS, HEMORRHOIDS    CYSTOSCOPY W/ URETERAL STENT PLACEMENT Left 03/21/2024    Procedure: LEFT CYSTOSCOPY RETROGRADE PYLEOGRAM URETERAL STENT INSERTION;  Surgeon: Jaiden Bolton Jr., MD;  Location: [facility] OR;  Service: Urology;  Laterality: Left;    CYSTOSCOPY W/ URETERAL STENT REMOVAL   04/23/2024    CYSTOSCOPY, RETROGRADE PYELOGRAM AND STENT INSERTION Left 06/14/2024    Procedure: CYSTOSCOPY RETROGRADE PYELOGRAM AND STENT INSERTION;  Surgeon: Yahir Faria MD;  Location: [facility];  Service: Urology;  Laterality: Left;    JOINT REPLACEMENT  5/2024    Right Hip   Dr. Vasu King    KNEE ARTHROSCOPY Left     PAP SMEAR  12/20/2010    TOTAL HIP ARTHROPLASTY Right 05/13/2024    Procedure: TOTAL HIP ARTHROPLASTY ANTERIOR WITH HANA TABLE;  Surgeon: Vasu King MD;  Location: [facility];  Service: Orthopedics;  Laterality: Right;    URETEROSCOPY LASER LITHOTRIPSY WITH STENT INSERTION Left 7/25/2024    Procedure: CYSTOSCOPY, LEFT URETEROSCOPY, LASER LITHOTRIPSY, STENT EXCHANGE;  Surgeon: Jaiden Bolton Jr., MD;  Location: [facility];  Service: Urology;  Laterality: Left;      General Information       Row Name 08/02/24 1131          Physical Therapy Time and Intention    Document Type therapy note (daily note)  -CS     Mode of Treatment individual therapy;physical therapy  -       Row Name 08/02/24 1131          General Information    Patient Profile Reviewed yes  -CS     Existing Precautions/Restrictions fall  -CS       Row Name 08/02/24 1131          Cognition    Orientation Status (Cognition) oriented x 4  -CS       Row Name 08/02/24 1131          Safety Issues, Functional Mobility    Impairments Affecting Function (Mobility) pain;endurance/activity tolerance;strength  -CS               User Key  (r) = Recorded By, (t) = Taken By, (c) = Cosigned By      Initials Name Provider Type    CS Camille Rico, ARIC Physical Therapist                   Mobility       Row Name 08/02/24 1131          Bed Mobility    Comment, (Bed Mobility) NT - [facility]  -CS       Row Name 08/02/24 1131          Sit-Stand Transfer    Sit-Stand [place] (Transfers) standby assist  -CS     Assistive Device (Sit-Stand Transfers) walker, front-wheeled  -CS       Row Name 08/02/24 1131          Gait/Stairs  (Locomotion)    [place] Level (Gait) standby assist  -CS     Assistive Device (Gait) walker, front-wheeled  -CS     Distance in Feet (Gait) 100  -CS     Deviations/Abnormal Patterns (Gait) antalgic;phillip decreased;gait speed decreased;stride length decreased  -CS     Comment, (Gait/Stairs) slow antalgic gait; heavy use of B UE to off-load  -CS               User Key  (r) = Recorded By, (t) = Taken By, (c) = Cosigned By      Initials Name Provider Type    CS Camille Rico, PT Physical Therapist                   Obj/Interventions       Row Name 08/02/24 1133          Motor Skills    Therapeutic Exercise other (see comments)  discussed HEP  -CS       Row Name 08/02/24 1133          Balance    Balance Assessment standing static balance;standing dynamic balance  -CS     Static Standing Balance standby assist  -CS     Dynamic Standing Balance standby assist  -CS     Position/Device Used, Standing Balance supported;walker, front-wheeled  -CS               User Key  (r) = Recorded By, (t) = Taken By, (c) = Cosigned By      Initials Name Provider Type    CS Camille Rico, PT Physical Therapist                   Goals/Plan    No documentation.                  Clinical Impression       Row Name 08/02/24 1134          Pain    Pretreatment Pain Rating 6/10  -CS     Posttreatment Pain Rating 9/10  -CS     Pain Location - Side/Orientation Bilateral  -CS     Pain Location - hip  -CS     Pre/Posttreatment Pain Comment L > R  -CS     Pain Intervention(s) Ambulation/increased activity;Rest;Repositioned  -CS       Row Name 08/02/24 113          Plan of Care Review    Plan of Care Reviewed With patient;spouse  -CS     Progress improving  -     Outcome Evaluation Pt received [facility] and agreeable to PT. Pt stood and ambulated 100' c RW requiring SBA. Pt continues to demo a very slow antalgic gait with heavy use of B UE support to off-load B hip pain (L>R). Pt awaiting answers from MRI to have a plan of care moving  forward. PT discussed simple ther-ex and ambulate functional distances. PT recommends outpatient PT at D/C.  -       Row Name 08/02/24 1134          Therapy Assessment/Plan (PT)    Criteria for Skilled Interventions Met (PT) yes;meets criteria  -CS     Therapy Frequency (PT) 3 times/wk  -       Row Name 08/02/24 1134          Positioning and Restraints    Pre-Treatment Position sitting in chair/recliner  -CS     Post Treatment Position chair  -CS     In Chair reclined;call light within reach;encouraged to call for assist;exit alarm on;with family/caregiver  -               User Key  (r) = Recorded By, (t) = Taken By, (c) = Cosigned By      Initials Name Provider Type    Camille Ford PT Physical Therapist                   Outcome Measures       Row Name 08/02/24 1137 08/02/24 0814       How much help from another person do you currently need...    Turning from your back to your side while in flat bed without using bedrails? 4  -CS 3  -MC    Moving from lying on back to sitting on the side of a flat bed without bedrails? 3  -CS 3  -MC    Moving to and from a bed to a chair (including a wheelchair)? 4  -CS 3  -MC    Standing up from a chair using your arms (e.g., wheelchair, bedside chair)? 4  -CS 3  -MC    Climbing 3-5 steps with a railing? 3  -CS 3  -MC    To walk in hospital room? 4  -CS 3  -MC    AM-PAC 6 Clicks Score (PT) 22  -CS 18  -MC    Highest Level of Mobility Goal 7 --> Walk 25 feet or more  - 6 --> Walk 10 steps or more  -      Row Name 08/02/24 1137          Functional Assessment    Outcome Measure Options AM-PAC 6 Clicks Basic Mobility (PT)  -               User Key  (r) = Recorded By, (t) = Taken By, (c) = Cosigned By      Initials Name Provider Type    Claudia Anderson, RN Registered Nurse    Camille Ford PT Physical Therapist                                 Physical Therapy Education       Title: PT OT SLP Therapies (Done)       Topic: Physical Therapy (Done)       Point:  Mobility training (Done)       Learning Progress Summary             Patient Acceptance, E,TB, VU,DU by  at 8/2/2024 1137    Acceptance, E,TB, VU,DU,NR by CS at 7/31/2024 1030                         Point: Home exercise program (Done)       Learning Progress Summary             Patient Acceptance, E,TB, VU,DU by CS at 8/2/2024 1137    Acceptance, E,TB, VU,DU,NR by CS at 7/31/2024 1030                         Point: Body mechanics (Done)       Learning Progress Summary             Patient Acceptance, E,TB, VU,DU by CS at 8/2/2024 1137    Acceptance, E,TB, VU,DU,NR by CS at 7/31/2024 1030                         Point: Precautions (Done)       Learning Progress Summary             Patient Acceptance, E,TB, VU,DU by  at 8/2/2024 1137    Acceptance, E,TB, VU,DU,NR by  at 7/31/2024 1030                                         User Key       Initials Effective Dates Name Provider Type Discipline     09/22/22 -  Camille Rico, PT Physical Therapist PT                  PT Recommendation and Plan     Plan of Care Reviewed With: patient, spouse  Progress: improving  Outcome Evaluation: Pt received sitting [facility] and agreeable to PT. Pt stood and ambulated 100' c RW requiring SBA. Pt continues to demo a very slow antalgic gait with heavy use of B UE support to off-load B hip pain (L>R). Pt awaiting answers from MRI to have a plan of care moving forward. PT discussed simple ther-ex and ambulate functional distances. PT recommends outpatient PT at D/C.     Time Calculation:         PT Charges       Row Name 08/02/24 1138             Time Calculation    Start Time 1114  -CS      Stop Time 1128  -      Time Calculation (min) 14 min  -CS      PT Received On 08/02/24  -      PT - Next Appointment 08/05/24  -         Time Calculation- PT    Total Timed Code Minutes- PT 12 minute(s)  -CS         Timed Charges    04841 - PT Therapeutic Activity Minutes 12  -CS         Total Minutes    Timed Charges Total Minutes 12  -CS        Total Minutes 12  -CS                User Key  (r) = Recorded By, (t) = Taken By, (c) = Cosigned By      Initials Name Provider Type    CS Camille Rico, PT Physical Therapist                  Therapy Charges for Today       Code Description Service Date Service Provider Modifiers Qty    [id number]  PT THERAPEUTIC ACT EA 15 MIN 8/2/2024 Camille Rico, PT GP 1            PT G-Codes  Outcome Measure Options: AM-PAC 6 Clicks Basic Mobility (PT)  AM-PAC 6 Clicks Score (PT): 22  PT Discharge Summary  Anticipated Discharge Disposition (PT): home with outpatient therapy services    Camille Rico PT  8/2/2024

## 2024-08-02 NOTE — PROGRESS NOTES
LOS: 5 days     Chief Complaint: Fever    Interval History: Afebrile, continues to report bilateral hip pain although now the left side hurts more than the right.  Tolerating antibiotics without vomiting diarrhea or rash    Vital Signs  Temp:  [98.1 °F (36.7 °C)-99.4 °F (37.4 °C)] 98.4 °F (36.9 °C)  Heart Rate:  [72-82] 75  Resp:  [16-18] 16  BP: (135-152)/(65-79) 135/65    Physical Exam:  General: In no acute distress  Cardiovascular: RRR, no lower extremity edema  Respiratory: Breathing comfortably on room air  GI: Soft, NT/ND, + bowel sounds bilaterally  Skin: No rashes     Antibiotics:  Cefepime 2 g IV every 8 hours     Results Review:    Lab Results   Component Value Date    WBC 13.92 (H) 08/01/2024    HGB 10.6 (L) 08/01/2024    HCT 33.7 (L) 08/01/2024    MCV 93.1 08/01/2024     08/01/2024     Lab Results   Component Value Date    GLUCOSE 105 (H) 08/01/2024    BUN 12 08/01/2024    CREATININE 0.79 08/01/2024    EGFRIFNONA 52 (L) 09/22/2021    EGFRIFAFRI 67 11/01/2019    BCR 15.2 08/01/2024    CO2 19.0 (L) 08/01/2024    CALCIUM 9.5 08/01/2024    PROTENTOTREF 6.4 11/01/2019    ALBUMIN 2.7 (L) 07/31/2024    LABIL2 2.2 11/01/2019    AST 19 07/31/2024    ALT 15 07/31/2024     Right hip arthrocentesis with 138 nucleated cells 40% neutrophils 48% lymphocytes red blood cells 14,700    Microbiology:  7/29 BCx NGTD x 2  7/29 right hip cultures negative to date  7/27 UCx >100K Pseudomonas  7/27 BCx Pseudomonas x 2    MRI of the lumbar spine with no evidence of herniation compression fracture or compression deformity.  Degenerative changes.  No evidence of infection    MRI of the pelvis with findings concerning for right SI joint septic arthritis possible findings of early osteomyelitis.  Possible abscess formation in that area.  Moderate to severe left iliopsoas bursitis and mild right iliopsoas bursitis possibly communicating with bilateral hip joint effusions.    Assessment & Plan   Fever  Leukocytosis  History  of kidney stone status post left ureteral stent placement with stent exchange on July 25  Status post right hip arthroplasty in May now with acute pain  Recent history of C. difficile colitis    MRI of the pelvis reviewed.  There is concern for right SI joint septic arthritis with possibly early osteomyelitis and abscess formation.  This will require surgical evaluation.  Discussed the above with Angie Esteban who will discuss the above with Dr. Wallace    Continue cefepime 2 g IV every 8 hours given the above findings we will extend the course to 4 to 6 weeks pending any surgical plans.      Addendum: Discussed with Beth Esteban.  None of the neurosurgeons [facility] operate on with SI joint infections.  The only surgeons capable of doing so are in [facility] trauma.  They are recommending discharge on antibiotics and if the patient does not improve to follow-up with [facility] orthopedic trauma surgery.  I can certainly put the patient on a 4 to 6-week course of antibiotics but my hesitation is that despite antibiotics she continues to have a leukocytosis and persistent pain.  I also do not know how long it will take her to be evaluated as a new outpatient orthopedic trauma consult

## 2024-08-02 NOTE — PLAN OF CARE
Goal Outcome Evaluation:  Plan of Care Reviewed With: patient, spouse        Progress: improving  Outcome Evaluation: Pt received sitting UIC and agreeable to PT. Pt stood and ambulated 100' c RW requiring SBA. Pt continues to demo a very slow antalgic gait with heavy use of B UE support to off-load B hip pain (L>R). Pt awaiting answers from MRI to have a plan of care moving forward. PT discussed simple ther-ex and ambulate functional distances. PT recommends outpatient PT at D/C.      Anticipated Discharge Disposition (PT): home with outpatient therapy services

## 2024-08-02 NOTE — TELEPHONE ENCOUNTER
Caller: [facility]    Relationship:     Best call back number:     [phone]       What was the call regarding:   PATIENT ANTICIPATED DISCHARGE IS THIS WEEKEND.  [facility] REQUESTING:  NURSING/THERAPY ASSESSMENT IF NEEDED.

## 2024-08-02 NOTE — PLAN OF CARE
Goal Outcome Evaluation:  Plan of Care Reviewed With: patient        Progress: improving  Outcome Evaluation: MRI completed this AM. Pain improved with use of robaxin and norco. Falls precautions maintained. On room air. Spouse at bedside. Abx continued. Brace applied to back this afternoon.

## 2024-08-03 LAB
ALBUMIN SERPL-MCNC: 2.6 G/DL (ref 3.5–5.2)
ANION GAP SERPL CALCULATED.3IONS-SCNC: 7 MMOL/L (ref 5–15)
BACTERIA FLD CULT: NORMAL
BACTERIA SPEC AEROBE CULT: NORMAL
BACTERIA SPEC AEROBE CULT: NORMAL
BUN SERPL-MCNC: 10 MG/DL (ref 8–23)
BUN/CREAT SERPL: 14.3 (ref 7–25)
CALCIUM OXALATE DIHYDRATE MFR STONE IR: 80 %
CALCIUM SPEC-SCNC: 9.3 MG/DL (ref 8.6–10.5)
CHLORIDE SERPL-SCNC: 104 MMOL/L (ref 98–107)
CO2 SERPL-SCNC: 24 MMOL/L (ref 22–29)
COLOR STONE: NORMAL
COM MFR STONE: 10 %
COMPN STONE: NORMAL
CREAT SERPL-MCNC: 0.7 MG/DL (ref 0.57–1)
DEPRECATED RDW RBC AUTO: 42.8 FL (ref 37–54)
EGFRCR SERPLBLD CKD-EPI 2021: 90.9 ML/MIN/1.73
ERYTHROCYTE [DISTWIDTH] IN BLOOD BY AUTOMATED COUNT: 12.7 % (ref 12.3–15.4)
GLUCOSE SERPL-MCNC: 101 MG/DL (ref 65–99)
GRAM STN SPEC: NORMAL
HCT VFR BLD AUTO: 30.4 % (ref 34–46.6)
HGB BLD-MCNC: 10 G/DL (ref 12–15.9)
HYDROXYAPATITE: 10 %
LABORATORY COMMENT REPORT: NORMAL
Lab: NORMAL
Lab: NORMAL
MCH RBC QN AUTO: 30.4 PG (ref 26.6–33)
MCHC RBC AUTO-ENTMCNC: 32.9 G/DL (ref 31.5–35.7)
MCV RBC AUTO: 92.4 FL (ref 79–97)
PHOSPHATE SERPL-MCNC: 2.2 MG/DL (ref 2.5–4.5)
PHOSPHATE SERPL-MCNC: 2.2 MG/DL (ref 2.5–4.5)
PHOSPHATE SERPL-MCNC: 2.4 MG/DL (ref 2.5–4.5)
PHOTO: NORMAL
PLATELET # BLD AUTO: 422 10*3/MM3 (ref 140–450)
PMV BLD AUTO: 8.9 FL (ref 6–12)
POTASSIUM SERPL-SCNC: 4.3 MMOL/L (ref 3.5–5.2)
RBC # BLD AUTO: 3.29 10*6/MM3 (ref 3.77–5.28)
SIZE STONE: NORMAL MM
SODIUM SERPL-SCNC: 135 MMOL/L (ref 136–145)
SPEC SOURCE SUBJ: NORMAL
STONE ANALYSIS-IMP: NORMAL
WBC NRBC COR # BLD AUTO: 12.1 10*3/MM3 (ref 3.4–10.8)
WT STONE: 23 MG

## 2024-08-03 PROCEDURE — 25010000002 ENOXAPARIN PER 10 MG: Performed by: HOSPITALIST

## 2024-08-03 PROCEDURE — 84100 ASSAY OF PHOSPHORUS: CPT | Performed by: INTERNAL MEDICINE

## 2024-08-03 PROCEDURE — 25010000002 CEFEPIME PER 500 MG: Performed by: INTERNAL MEDICINE

## 2024-08-03 PROCEDURE — 80069 RENAL FUNCTION PANEL: CPT | Performed by: INTERNAL MEDICINE

## 2024-08-03 PROCEDURE — 85027 COMPLETE CBC AUTOMATED: CPT | Performed by: INTERNAL MEDICINE

## 2024-08-03 RX ORDER — HYDROCODONE BITARTRATE AND ACETAMINOPHEN 10; 325 MG/1; MG/1
2 TABLET ORAL EVERY 4 HOURS PRN
Status: DISCONTINUED | OUTPATIENT
Start: 2024-08-03 | End: 2024-08-07 | Stop reason: HOSPADM

## 2024-08-03 RX ORDER — BISACODYL 10 MG
10 SUPPOSITORY, RECTAL RECTAL DAILY PRN
Status: DISCONTINUED | OUTPATIENT
Start: 2024-08-03 | End: 2024-08-07 | Stop reason: HOSPADM

## 2024-08-03 RX ORDER — BISACODYL 5 MG/1
5 TABLET, DELAYED RELEASE ORAL DAILY PRN
Status: DISCONTINUED | OUTPATIENT
Start: 2024-08-03 | End: 2024-08-07 | Stop reason: HOSPADM

## 2024-08-03 RX ORDER — POLYETHYLENE GLYCOL 3350 17 G/17G
17 POWDER, FOR SOLUTION ORAL DAILY PRN
Status: DISCONTINUED | OUTPATIENT
Start: 2024-08-03 | End: 2024-08-07 | Stop reason: HOSPADM

## 2024-08-03 RX ORDER — AMOXICILLIN 250 MG
2 CAPSULE ORAL 2 TIMES DAILY PRN
Status: DISCONTINUED | OUTPATIENT
Start: 2024-08-03 | End: 2024-08-07 | Stop reason: HOSPADM

## 2024-08-03 RX ORDER — HYDROCODONE BITARTRATE AND ACETAMINOPHEN 10; 325 MG/1; MG/1
1 TABLET ORAL EVERY 4 HOURS PRN
Status: DISCONTINUED | OUTPATIENT
Start: 2024-08-03 | End: 2024-08-07 | Stop reason: HOSPADM

## 2024-08-03 RX ADMIN — CEFEPIME 2000 MG: 2 INJECTION, POWDER, FOR SOLUTION INTRAVENOUS at 17:42

## 2024-08-03 RX ADMIN — ENOXAPARIN SODIUM 40 MG: 100 INJECTION SUBCUTANEOUS at 18:33

## 2024-08-03 RX ADMIN — HYDROCODONE BITARTRATE AND ACETAMINOPHEN 1 TABLET: 10; 325 TABLET ORAL at 17:42

## 2024-08-03 RX ADMIN — POTASSIUM, SODIUM PHOSPHATES 280 MG-160 MG-250 MG ORAL POWDER PACKET 2 PACKET: POWDER IN PACKET at 09:17

## 2024-08-03 RX ADMIN — Medication 10 ML: at 20:19

## 2024-08-03 RX ADMIN — METHOCARBAMOL TABLETS 750 MG: 750 TABLET, COATED ORAL at 12:51

## 2024-08-03 RX ADMIN — LEVOTHYROXINE SODIUM 50 MCG: 50 TABLET ORAL at 09:07

## 2024-08-03 RX ADMIN — ROSUVASTATIN CALCIUM 10 MG: 10 TABLET, FILM COATED ORAL at 20:19

## 2024-08-03 RX ADMIN — Medication 10 ML: at 09:07

## 2024-08-03 RX ADMIN — ATENOLOL 25 MG: 25 TABLET ORAL at 20:19

## 2024-08-03 RX ADMIN — HYDROCODONE BITARTRATE AND ACETAMINOPHEN 2 TABLET: 7.5; 325 TABLET ORAL at 04:58

## 2024-08-03 RX ADMIN — HYDROCODONE BITARTRATE AND ACETAMINOPHEN 1 TABLET: 10; 325 TABLET ORAL at 22:56

## 2024-08-03 RX ADMIN — CEFEPIME 2000 MG: 2 INJECTION, POWDER, FOR SOLUTION INTRAVENOUS at 09:19

## 2024-08-03 RX ADMIN — ESCITALOPRAM 20 MG: 20 TABLET, FILM COATED ORAL at 09:07

## 2024-08-03 RX ADMIN — METHOCARBAMOL TABLETS 750 MG: 750 TABLET, COATED ORAL at 20:19

## 2024-08-03 RX ADMIN — CEFEPIME 2000 MG: 2 INJECTION, POWDER, FOR SOLUTION INTRAVENOUS at 01:13

## 2024-08-03 NOTE — PROGRESS NOTES
Name: Anastasiia Faria ADMIT: 2024   : 1950  PCP: Mirza Scott Sr., MD    MRN: [id number] LOS: 6 days   AGE/SEX: 74 y.o. female  ROOM: [facility]     Subjective   Subjective   Pain continues today and is more severe than yesterday.  No chest pain shortness of breath nausea vomiting or abdominal pain reported.  Discussed the recommendations from infectious disease and neurosurgery with her and her .     Objective   Objective   Vital Signs  Temp:  [97.9 °F (36.6 °C)-98.3 °F (36.8 °C)] 98 °F (36.7 °C)  Heart Rate:  [69-74] 71  Resp:  [16-18] 18  BP: (139-158)/(67-77) 139/76  SpO2:  [92 %-94 %] 94 %  on   ;   Device (Oxygen Therapy): room air  Body mass index is 28.37 kg/m².    Physical Exam  Constitutional:       General: She is not in acute distress.     Appearance: She is not toxic-appearing.      Comments: Frail, elderly   HENT:      Head: Normocephalic and atraumatic.   Cardiovascular:      Rate and Rhythm: Normal rate and regular rhythm.   Pulmonary:      Effort: Pulmonary effort is normal. No respiratory distress.      Breath sounds: Normal breath sounds. No wheezing or rhonchi.   Abdominal:      General: Bowel sounds are normal.      Palpations: Abdomen is soft.      Tenderness: There is no abdominal tenderness. There is no guarding or rebound.   Musculoskeletal:         General: Tenderness present.   Skin:     General: Skin is warm and dry.   Neurological:      Mental Status: She is alert.      Motor: Weakness present.   Psychiatric:         Mood and Affect: Mood normal.         Behavior: Behavior normal.     Results Review  I reviewed the patient's new clinical results.  Results from last 7 days   Lab Units 24  0605 24  0939 24  0839 24  0817   WBC 10*3/mm3 12.10* 13.41* 13.92* 12.57*   HEMOGLOBIN g/dL 10.0* 10.7* 10.6* 10.1*   PLATELETS 10*3/mm3 422 372 324 264     Results from last 7 days   Lab Units 24  0605 24  0939 24  0614 24  2120  "07/31/24  0817   SODIUM mmol/L 135* 134* 136  --  134*   POTASSIUM mmol/L 4.3 3.6 3.9 4.0 3.6   CHLORIDE mmol/L 104 102 105  --  105   CO2 mmol/L 24.0 20.0* 19.0*  --  20.5*   BUN mg/dL 10 10 12  --  13   CREATININE mg/dL 0.70 0.81 0.79  --  0.77   GLUCOSE mg/dL 101* 172* 105*  --  93     Lab Results   Component Value Date    ANIONGAP 7.0 08/03/2024     Estimated Creatinine Clearance: 80.4 mL/min (by C-G formula based on SCr of 0.7 mg/dL).   Lab Results   Component Value Date    EGFR 90.9 08/03/2024     Results from last 7 days   Lab Units 08/03/24  0605 08/02/24  0939 07/31/24  0817 07/29/24  0707   ALBUMIN g/dL 2.6* 2.7* 2.7* 2.6*   BILIRUBIN mg/dL  --   --  0.4 0.3   ALK PHOS U/L  --   --  102 93   AST (SGOT) U/L  --   --  19 9   ALT (SGPT) U/L  --   --  15 7     Results from last 7 days   Lab Units 08/03/24  0605 08/02/24  0939 08/01/24  1456 08/01/24  0614 07/31/24  2120 07/31/24  1524 07/31/24  0817 07/30/24  0615 07/29/24  0707 07/29/24  0707   CALCIUM mg/dL 9.3 9.6  --  9.5  --   --  9.2 9.0  --  8.6   ALBUMIN g/dL 2.6* 2.7*  --   --   --   --  2.7*  --   --  2.6*   MAGNESIUM mg/dL  --   --   --  2.1  --  1.8 1.9 1.8  --   --    PHOSPHORUS mg/dL 2.2*  2.2* 2.2* 2.3* 2.2*   < >  --  2.0* 2.0*   < >  --     < > = values in this interval not displayed.           No results found for: \"HGBA1C\", \"POCGLU\"    MRI Pelvis With & Without Contrast    Result Date: 8/2/2024   1. Partially imaged right SI joint arthropathic changes with a small joint effusion, suspicious for septic arthritis. Speckled T1 marrow replacement, patchy bone edema-like signal, and enhancement of the posterior right iliac bone and right sacral ala along the inferior aspect of the right SI joint could be reactive or could reflect early osteomyelitis. Partially imaged multilobulated cystic changes along the anterior aspect of the right SI joint and right sacral ala could represent abscess formation, imaged only on the coronal views. 2. Moderate " to severe left iliopsoas bursitis and mild right iliopsoas bursitis, possibly communicating with bilateral hip joint effusions. 3. Edema and amorphous enhancement of the bilateral iliopsoas musculature, right greater than left, which could be reactive or could reflect myositis. 4. Right GEOVANNA with a small moderate right hip joint effusion and mild to moderate left hip joint chondromalacia/DJD with a moderate joint effusion. Septic arthritis is not excluded. 5. Edema and amorphous enhancement throughout the left gluteus minimus muscle and the anterior bilateral gluteus medius muscles, possible myositis. 6. Mild left greater trochanter bursitis.  This report was finalized on 8/2/2024 10:02 AM by Emre Pelaez MD on Workstation: [id number]      MRI Lumbar Spine With & Without Contrast    Result Date: 8/2/2024  There is no evidence of disc herniation, discitis, osteomyelitis or an epidural abscess. Multilevel degenerative disease involving lumbar spine is noted as described above unchanged versus 7/31/2024. Enhancement involving the facets at L2-L3 and L5-S1 is noted bilaterally consistent with degenerative disease.   This report was finalized on 8/2/2024 7:05 AM by Dr. Senthil Kemp M.D on Workstation: [id number]       Scheduled Meds  atenolol, 25 mg, Oral, Nightly  cefepime, 2,000 mg, Intravenous, Q8H  enoxaparin, 40 mg, Subcutaneous, Q24H  escitalopram, 20 mg, Oral, Daily  levothyroxine, 50 mcg, Oral, Daily  Lidocaine, 3 patch, Transdermal, Q24H  rosuvastatin, 10 mg, Oral, Nightly  sodium chloride, 10 mL, Intravenous, Q12H    Continuous Infusions   PRN Meds    acetaminophen **OR** acetaminophen **OR** acetaminophen    [DISCONTINUED] senna-docusate sodium **AND** [DISCONTINUED] polyethylene glycol **AND** bisacodyl **AND** bisacodyl    calcium carbonate    HYDROcodone-acetaminophen    HYDROcodone-acetaminophen    Magnesium Standard Dose Replacement - Follow Nurse / BPA Driven Protocol    methocarbamol     ondansetron ODT **OR** ondansetron    Phosphorus Replacement - Follow Nurse / BPA Driven Protocol    Potassium Replacement - Follow Nurse / BPA Driven Protocol    [COMPLETED] Insert Peripheral IV **AND** sodium chloride    sodium chloride    sodium chloride     Diet  Diet: Regular/House; Fluid Consistency: Thin (IDDSI 0)       Assessment/Plan     Active Hospital Problems    Diagnosis  POA    **Bacterial infection due to Pseudomonas [A49.8]  Unknown    Lumbago [M54.50]  Unknown    Acute UTI [N39.0]  Yes    Anxiety [F41.9]  Yes    Essential hypertension [I10]  Yes    Acquired hypothyroidism [E03.9]  Yes      Resolved Hospital Problems   No resolved problems to display.     74 y.o. female with Bacterial infection due to Pseudomonas.     Pseudomonas bacteremia and bacteruria   - Cefepime continues with extension of course for 4-6 weeks due concern for right SI joint septic arthritis and potential early osteomyelitis. Infectious disease and Neurosurgery have discussed her case. Orthopedic surgery following.  -Will attempt to contact [facility] to discuss her case and the surgery recommendations for either close outpatient follow-up while on antibiotics or potential transfer    Ureteral Stent  -Urology evaluated. Treatement as above. Outpatient follow up planned.     Right hip pain s/p GEOVANNA in May, Left Hip Pain  -Right hip aspirate culture no growth 2 days  -Ortho evaluated    Concern for SI Joint Infection:  -possible infection, bursitis, early osteomyelitis on MRI  -See above     Hypoxia  -7/27/2024 chest x-ray showed possible atelectasis  -IVF stopped and received diuretic previously  -Atelectasis with small effusion  -Screen BNP. On room air    Hypophosphatemia replace     Mild ELVIA  -Resolved with treatment     Dimer elevated  -VQ scan normal  -Negative duplex     HypoT4  -Synthroid     HTN  -BPs acceptable  -Continue current regimen     DVT prophylaxis  Lovenox 40 mg SC daily    Discharge  Transfer v  HH/anticipate will be here over the weekend    Expected Discharge Date: 8/5/2024; Expected Discharge Time:     Brenden Reyes MD  [facility]  08/03/24

## 2024-08-03 NOTE — PLAN OF CARE
Goal Outcome Evaluation:  Plan of Care Reviewed With: patient        Progress: improving  Outcome Evaluation: Alert and oriented. VSS. Norco and Robaxin given PRN for bilateral hip pain. LSO brace in place, up with assist with walker, voided freely, educated on proper pericare . IV Fluids at KVO rate, due IV Cefepime given. Slept on and off.

## 2024-08-03 NOTE — PLAN OF CARE
Goal Outcome Evaluation:  Plan of Care Reviewed With: patient        Progress: improving  Outcome Evaluation: VSS, PO pain medication given with relief, up in chair, up with assist x1 and walker, falls precautions maintained, replaced phos, LSO brace on,  at bedside, abx given

## 2024-08-04 LAB
ALBUMIN SERPL-MCNC: 2.7 G/DL (ref 3.5–5.2)
ANION GAP SERPL CALCULATED.3IONS-SCNC: 8.5 MMOL/L (ref 5–15)
BUN SERPL-MCNC: 10 MG/DL (ref 8–23)
BUN/CREAT SERPL: 13.9 (ref 7–25)
CALCIUM SPEC-SCNC: 9.9 MG/DL (ref 8.6–10.5)
CHLORIDE SERPL-SCNC: 103 MMOL/L (ref 98–107)
CO2 SERPL-SCNC: 25.5 MMOL/L (ref 22–29)
CREAT SERPL-MCNC: 0.72 MG/DL (ref 0.57–1)
DEPRECATED RDW RBC AUTO: 44.9 FL (ref 37–54)
EGFRCR SERPLBLD CKD-EPI 2021: 87.9 ML/MIN/1.73
ERYTHROCYTE [DISTWIDTH] IN BLOOD BY AUTOMATED COUNT: 13 % (ref 12.3–15.4)
GLUCOSE SERPL-MCNC: 100 MG/DL (ref 65–99)
HCT VFR BLD AUTO: 32.7 % (ref 34–46.6)
HGB BLD-MCNC: 10.2 G/DL (ref 12–15.9)
MCH RBC QN AUTO: 29.1 PG (ref 26.6–33)
MCHC RBC AUTO-ENTMCNC: 31.2 G/DL (ref 31.5–35.7)
MCV RBC AUTO: 93.4 FL (ref 79–97)
PHOSPHATE SERPL-MCNC: 2.8 MG/DL (ref 2.5–4.5)
PLATELET # BLD AUTO: 472 10*3/MM3 (ref 140–450)
PMV BLD AUTO: 8.9 FL (ref 6–12)
POTASSIUM SERPL-SCNC: 4.7 MMOL/L (ref 3.5–5.2)
RBC # BLD AUTO: 3.5 10*6/MM3 (ref 3.77–5.28)
SODIUM SERPL-SCNC: 137 MMOL/L (ref 136–145)
WBC NRBC COR # BLD AUTO: 11.9 10*3/MM3 (ref 3.4–10.8)

## 2024-08-04 PROCEDURE — 25010000002 CEFEPIME PER 500 MG: Performed by: INTERNAL MEDICINE

## 2024-08-04 PROCEDURE — 25010000002 ENOXAPARIN PER 10 MG: Performed by: HOSPITALIST

## 2024-08-04 PROCEDURE — 85027 COMPLETE CBC AUTOMATED: CPT | Performed by: INTERNAL MEDICINE

## 2024-08-04 PROCEDURE — 80069 RENAL FUNCTION PANEL: CPT | Performed by: INTERNAL MEDICINE

## 2024-08-04 RX ADMIN — ROSUVASTATIN CALCIUM 10 MG: 10 TABLET, FILM COATED ORAL at 20:54

## 2024-08-04 RX ADMIN — LEVOTHYROXINE SODIUM 50 MCG: 50 TABLET ORAL at 08:46

## 2024-08-04 RX ADMIN — CEFEPIME 2000 MG: 2 INJECTION, POWDER, FOR SOLUTION INTRAVENOUS at 10:10

## 2024-08-04 RX ADMIN — CEFEPIME 2000 MG: 2 INJECTION, POWDER, FOR SOLUTION INTRAVENOUS at 01:58

## 2024-08-04 RX ADMIN — HYDROCODONE BITARTRATE AND ACETAMINOPHEN 1 TABLET: 10; 325 TABLET ORAL at 18:55

## 2024-08-04 RX ADMIN — CEFEPIME 2000 MG: 2 INJECTION, POWDER, FOR SOLUTION INTRAVENOUS at 18:49

## 2024-08-04 RX ADMIN — METHOCARBAMOL TABLETS 750 MG: 750 TABLET, COATED ORAL at 23:22

## 2024-08-04 RX ADMIN — Medication 10 ML: at 08:46

## 2024-08-04 RX ADMIN — METHOCARBAMOL TABLETS 750 MG: 750 TABLET, COATED ORAL at 10:11

## 2024-08-04 RX ADMIN — HYDROCODONE BITARTRATE AND ACETAMINOPHEN 1 TABLET: 10; 325 TABLET ORAL at 06:05

## 2024-08-04 RX ADMIN — ESCITALOPRAM 20 MG: 20 TABLET, FILM COATED ORAL at 08:46

## 2024-08-04 RX ADMIN — ATENOLOL 25 MG: 25 TABLET ORAL at 20:54

## 2024-08-04 RX ADMIN — ENOXAPARIN SODIUM 40 MG: 100 INJECTION SUBCUTANEOUS at 18:50

## 2024-08-04 NOTE — PLAN OF CARE
Goal Outcome Evaluation:  Plan of Care Reviewed With: patient        Progress: improving  Outcome Evaluation: Alert andoriented. C/o of pain more on her left hip, Norco given. IV Fluids @ KVO rate, due Cefepime given. LSO brace on. Up with assist, falls precaution maintained. Slept well.

## 2024-08-04 NOTE — PLAN OF CARE
Goal Outcome Evaluation:  Plan of Care Reviewed With: patient        Progress: improving  Outcome Evaluation: VSS, up in chair, up with assist x1 and walker to bathroom, PO pain medication given with relief, abx given, refused to wear her LSO brace most of shift, falls precautions maintained,  at bedside, possible home tomorrow with IV abx

## 2024-08-05 LAB
ALBUMIN SERPL-MCNC: 2.6 G/DL (ref 3.5–5.2)
ANION GAP SERPL CALCULATED.3IONS-SCNC: 5.8 MMOL/L (ref 5–15)
BUN SERPL-MCNC: 9 MG/DL (ref 8–23)
BUN/CREAT SERPL: 12.7 (ref 7–25)
CALCIUM SPEC-SCNC: 9.9 MG/DL (ref 8.6–10.5)
CHLORIDE SERPL-SCNC: 102 MMOL/L (ref 98–107)
CO2 SERPL-SCNC: 28.2 MMOL/L (ref 22–29)
CREAT SERPL-MCNC: 0.71 MG/DL (ref 0.57–1)
CRP SERPL-MCNC: 13.47 MG/DL (ref 0–0.5)
DEPRECATED RDW RBC AUTO: 42.2 FL (ref 37–54)
EGFRCR SERPLBLD CKD-EPI 2021: 89.4 ML/MIN/1.73
ERYTHROCYTE [DISTWIDTH] IN BLOOD BY AUTOMATED COUNT: 12.7 % (ref 12.3–15.4)
GLUCOSE SERPL-MCNC: 95 MG/DL (ref 65–99)
HCT VFR BLD AUTO: 30.8 % (ref 34–46.6)
HGB BLD-MCNC: 10.1 G/DL (ref 12–15.9)
MCH RBC QN AUTO: 30.1 PG (ref 26.6–33)
MCHC RBC AUTO-ENTMCNC: 32.8 G/DL (ref 31.5–35.7)
MCV RBC AUTO: 91.7 FL (ref 79–97)
PHOSPHATE SERPL-MCNC: 2.7 MG/DL (ref 2.5–4.5)
PLATELET # BLD AUTO: 532 10*3/MM3 (ref 140–450)
PMV BLD AUTO: 8.8 FL (ref 6–12)
POTASSIUM SERPL-SCNC: 4.4 MMOL/L (ref 3.5–5.2)
RBC # BLD AUTO: 3.36 10*6/MM3 (ref 3.77–5.28)
SODIUM SERPL-SCNC: 136 MMOL/L (ref 136–145)
WBC NRBC COR # BLD AUTO: 10.95 10*3/MM3 (ref 3.4–10.8)

## 2024-08-05 PROCEDURE — 85027 COMPLETE CBC AUTOMATED: CPT | Performed by: INTERNAL MEDICINE

## 2024-08-05 PROCEDURE — 25010000002 ENOXAPARIN PER 10 MG: Performed by: HOSPITALIST

## 2024-08-05 PROCEDURE — 25010000002 CEFEPIME PER 500 MG: Performed by: INTERNAL MEDICINE

## 2024-08-05 PROCEDURE — 80069 RENAL FUNCTION PANEL: CPT | Performed by: INTERNAL MEDICINE

## 2024-08-05 PROCEDURE — 99232 SBSQ HOSP IP/OBS MODERATE 35: CPT | Performed by: INTERNAL MEDICINE

## 2024-08-05 PROCEDURE — 86140 C-REACTIVE PROTEIN: CPT | Performed by: INTERNAL MEDICINE

## 2024-08-05 RX ADMIN — CEFEPIME 2000 MG: 2 INJECTION, POWDER, FOR SOLUTION INTRAVENOUS at 02:32

## 2024-08-05 RX ADMIN — ESCITALOPRAM 20 MG: 20 TABLET, FILM COATED ORAL at 09:20

## 2024-08-05 RX ADMIN — ROSUVASTATIN CALCIUM 10 MG: 10 TABLET, FILM COATED ORAL at 21:41

## 2024-08-05 RX ADMIN — Medication 10 ML: at 09:21

## 2024-08-05 RX ADMIN — HYDROCODONE BITARTRATE AND ACETAMINOPHEN 1 TABLET: 10; 325 TABLET ORAL at 22:17

## 2024-08-05 RX ADMIN — CEFEPIME 2000 MG: 2 INJECTION, POWDER, FOR SOLUTION INTRAVENOUS at 18:01

## 2024-08-05 RX ADMIN — ENOXAPARIN SODIUM 40 MG: 100 INJECTION SUBCUTANEOUS at 18:02

## 2024-08-05 RX ADMIN — ATENOLOL 25 MG: 25 TABLET ORAL at 21:41

## 2024-08-05 RX ADMIN — CEFEPIME 2000 MG: 2 INJECTION, POWDER, FOR SOLUTION INTRAVENOUS at 09:26

## 2024-08-05 RX ADMIN — HYDROCODONE BITARTRATE AND ACETAMINOPHEN 1 TABLET: 10; 325 TABLET ORAL at 02:38

## 2024-08-05 RX ADMIN — LEVOTHYROXINE SODIUM 50 MCG: 50 TABLET ORAL at 09:20

## 2024-08-05 RX ADMIN — Medication 10 ML: at 21:40

## 2024-08-05 NOTE — PLAN OF CARE
Goal Outcome Evaluation:           Progress: improving  Outcome Evaluation: up with assist walker and gait belt.  sat in chair, walks to BR.  bed/chair alarm activated for safety.  IV cefepime continues.  using IS with encouragement.  CCP to work on outpatient followup with [facility].  WBC 10.95, afebrile.  no c/o;quiet today.  sig other at bedside.

## 2024-08-05 NOTE — PROGRESS NOTES
Name: Anastasiia Faria ADMIT: 2024   : 1950  PCP: Mirza Scott Sr., MD    MRN: [id number] LOS: 8 days   AGE/SEX: 74 y.o. female  ROOM: [facility]     Subjective   Subjective   Pain continues today.  No chest pain shortness of breath nausea vomiting or abdominal pain reported.     Objective   Objective   Vital Signs  Temp:  [97.4 °F (36.3 °C)-99.3 °F (37.4 °C)] 98 °F (36.7 °C)  Heart Rate:  [65-85] 77  Resp:  [16-18] 16  BP: (146-164)/(72-77) 146/73  SpO2:  [93 %-94 %] 94 %  on   ;   Device (Oxygen Therapy): room air  Body mass index is 27.63 kg/m².    Physical Exam  Constitutional:       General: She is not in acute distress.     Appearance: She is not toxic-appearing.      Comments: Frail, elderly   HENT:      Head: Normocephalic and atraumatic.   Cardiovascular:      Rate and Rhythm: Normal rate and regular rhythm.   Pulmonary:      Effort: Pulmonary effort is normal. No respiratory distress.      Breath sounds: Normal breath sounds. No wheezing or rhonchi.   Abdominal:      General: Bowel sounds are normal.      Palpations: Abdomen is soft.      Tenderness: There is no abdominal tenderness. There is no guarding or rebound.   Musculoskeletal:         General: Tenderness present.   Skin:     General: Skin is warm and dry.   Neurological:      Mental Status: She is alert.      Motor: Weakness present.   Psychiatric:         Mood and Affect: Mood normal.         Behavior: Behavior normal.     Results Review  I reviewed the patient's new clinical results.  Results from last 7 days   Lab Units 24  0624  0939   WBC 10*3/mm3 10.95* 11.90* 12.10* 13.41*   HEMOGLOBIN g/dL 10.1* 10.2* 10.0* 10.7*   PLATELETS 10*3/mm3 532* 472* 422 372     Results from last 7 days   Lab Units 24  05245 24  0605 24  0939   SODIUM mmol/L 136 137 135* 134*   POTASSIUM mmol/L 4.4 4.7 4.3 3.6   CHLORIDE mmol/L 102 103 104 102   CO2 mmol/L 28.2 25.5 24.0 20.0*   BUN  "mg/dL 9 10 10 10   CREATININE mg/dL 0.71 0.72 0.70 0.81   GLUCOSE mg/dL 95 100* 101* 172*     Lab Results   Component Value Date    ANIONGAP 5.8 08/05/2024     Estimated Creatinine Clearance: 78.2 mL/min (by C-G formula based on SCr of 0.71 mg/dL).   Lab Results   Component Value Date    EGFR 89.4 08/05/2024     Results from last 7 days   Lab Units 08/05/24  0529 08/04/24 0415 08/03/24  0605 08/02/24  0939 07/31/24  0817   ALBUMIN g/dL 2.6* 2.7* 2.6* 2.7* 2.7*   BILIRUBIN mg/dL  --   --   --   --  0.4   ALK PHOS U/L  --   --   --   --  102   AST (SGOT) U/L  --   --   --   --  19   ALT (SGPT) U/L  --   --   --   --  15     Results from last 7 days   Lab Units 08/05/24 0529 08/04/24 0415 08/03/24  1747 08/03/24  0605 08/02/24  0939 08/01/24  1456 08/01/24  0614 07/31/24  2120 07/31/24  1524 07/31/24  0817 07/30/24  0615 07/30/24  0615   CALCIUM mg/dL 9.9 9.9  --  9.3 9.6  --  9.5  --   --  9.2  --  9.0   ALBUMIN g/dL 2.6* 2.7*  --  2.6* 2.7*  --   --   --   --  2.7*   < >  --    MAGNESIUM mg/dL  --   --   --   --   --   --  2.1  --  1.8 1.9  --  1.8   PHOSPHORUS mg/dL 2.7 2.8 2.4* 2.2*  2.2* 2.2*   < > 2.2*   < >  --  2.0*  --  2.0*    < > = values in this interval not displayed.           No results found for: \"HGBA1C\", \"POCGLU\"    No radiology results for the last day    Scheduled Meds  atenolol, 25 mg, Oral, Nightly  cefepime, 2,000 mg, Intravenous, Q8H  enoxaparin, 40 mg, Subcutaneous, Q24H  escitalopram, 20 mg, Oral, Daily  levothyroxine, 50 mcg, Oral, Daily  Lidocaine, 3 patch, Transdermal, Q24H  rosuvastatin, 10 mg, Oral, Nightly  sodium chloride, 10 mL, Intravenous, Q12H    Continuous Infusions   PRN Meds    acetaminophen **OR** acetaminophen **OR** acetaminophen    senna-docusate sodium **AND** polyethylene glycol **AND** bisacodyl **AND** bisacodyl    calcium carbonate    HYDROcodone-acetaminophen    HYDROcodone-acetaminophen    Magnesium Standard Dose Replacement - Follow Nurse / BPA Driven Protocol    " methocarbamol    ondansetron ODT **OR** ondansetron    Phosphorus Replacement - Follow Nurse / BPA Driven Protocol    Potassium Replacement - Follow Nurse / BPA Driven Protocol    [COMPLETED] Insert Peripheral IV **AND** sodium chloride    sodium chloride    sodium chloride     Diet  Diet: Regular/House; Fluid Consistency: Thin (IDDSI 0)       Assessment/Plan     Active Hospital Problems    Diagnosis  POA    **Bacterial infection due to Pseudomonas [A49.8]  Unknown    Lumbago [M54.50]  Unknown    Acute UTI [N39.0]  Yes    Anxiety [F41.9]  Yes    Essential hypertension [I10]  Yes    Acquired hypothyroidism [E03.9]  Yes      Resolved Hospital Problems   No resolved problems to display.     74 y.o. female with Bacterial infection due to Pseudomonas.     Pseudomonas bacteremia and bacteruria   - Cefepime continues with extension of course for 4-6 weeks due concern for right SI joint septic arthritis and potential early osteomyelitis. Infectious disease and Neurosurgery have discussed her case. Orthopedic surgery following.  -Discussed with on-call physician for [facility] orthopedic surgery.  Reviewed the neurosurgery recommendations clinical course and MRI findings with them.  They stated it sounded like she did not need acute inpatient transfer and recommended to continue the antibiotic therapy.  They did state that if any fluid collection was large enough for tapping then interventional radiology could be considered.  Asked about outpatient follow-up and the person on-call was not certain about timing or arrangement.    Ureteral Stent  -Urology evaluated. Treatement as above. Outpatient follow up planned.     Right hip pain s/p GEOVANNA in May, Left Hip Pain  -Right hip aspirate culture no growth 2 days  -Ortho evaluated    Concern for SI Joint Infection:  -possible infection, bursitis, early osteomyelitis on MRI  -See above     Hypoxia  -7/27/2024 chest x-ray showed possible atelectasis  -IVF stopped and received diuretic  previously  -Atelectasis with small effusion  -BNP elevated and near level of previous value.  On room air.    Hypophosphatemia replaced     Mild ELVIA  -Resolved with treatment     Dimer elevated  -VQ scan normal  -Negative duplex     HypoT4  -Synthroid     HTN  -Continue current regimen     DVT prophylaxis  Lovenox 40 mg SC daily    Discharge  Tentative plan now is for discharge with home health when clinically able.  Will ask CCP to see about obtaining outpatient follow-up with [facility].    Expected Discharge Date: 8/5/2024; Expected Discharge Time:     Brenden Reyes MD  [facility]  08/05/24

## 2024-08-05 NOTE — PLAN OF CARE
Goal Outcome Evaluation:  Plan of Care Reviewed With: patient        Progress: improving  Outcome Evaluation: iv abx, vdg, uses back brace when oob with assist and walker, falls protocol, scd, accumax, i/s, med for pain, poss d/c tomorrow

## 2024-08-05 NOTE — PROGRESS NOTES
LOS: 8 days     Chief Complaint: Fever    Interval History: Pain has not significantly improved she says.  She is afebrile    Vital Signs  Temp:  [97.4 °F (36.3 °C)-99.3 °F (37.4 °C)] 98 °F (36.7 °C)  Heart Rate:  [65-85] 77  Resp:  [16-18] 16  BP: (146-164)/(72-77) 146/73    Physical Exam:  GENERAL: Awake and alert sickly  HEENT: Oropharynx is clear. Hearing is grossly normal.   EYES: . No conjunctival injection. No lid lag.   LUNGS:normal respiratory effort.   SKIN: no cutaneous eruptions in exposed areas  PSYCHIATRIC: Appropriate mood, affect, insight, and judgment.           CRP 13.5  White count 10.5  Creatinine 0.71  Microbiology:  7/29 BCx negative x 2  7/29 right hip cultures negative  7/27 UCx >100K Pseudomonas  7/27 BCx Pseudomonas x 2    Assessment and plan  Pseudomonal septicemia   Right SI joint septic arthritis with possible abscess  Iliopsoas bursitis  Myositis  History of kidney stone status post left ureteral stent placement with stent exchange on July 25  Status post right hip arthroplasty in May now with acute pain  Recent history of C. difficile colitis      Continue cefepime 2 g IV every 8 hours.  Anticipate 6-week course of antibiotics.  Working on potential transfer to [facility] to address SI joint versus close outpatient follow-up.  Appreciate help of hospitalist.  Repeat blood cultures are negative.  White count has improved and CRP is down although her objective pain is not significantly improved

## 2024-08-05 NOTE — SIGNIFICANT NOTE
08/05/24 1440   OTHER   Discipline physical therapist   Rehab Time/Intention   Session Not Performed other (see comments)   Therapy Assessment/Plan (PT)   Criteria for Skilled Interventions Met (PT) no;no problems identified which require skilled intervention     Pt reports continued pain in B hips (L>R) that has not improved and increases with activity. Per chart review pt plans antibiotic course and f/u with [facility] for further tx options. Pt reports she does not feel that PT at this time would be beneficial. PT recommends pt continue functional transfers and ambulation with staff as tolerated. Pt plans home with spouse and recommend outpatient PT once pain is more well controlled to address general strengthening and endurance. Pt in agreement. Acute PT will sign off at this time.

## 2024-08-06 LAB
ANION GAP SERPL CALCULATED.3IONS-SCNC: 4 MMOL/L (ref 5–15)
BUN SERPL-MCNC: 10 MG/DL (ref 8–23)
BUN/CREAT SERPL: 14.1 (ref 7–25)
CALCIUM SPEC-SCNC: 9.5 MG/DL (ref 8.6–10.5)
CHLORIDE SERPL-SCNC: 103 MMOL/L (ref 98–107)
CO2 SERPL-SCNC: 28 MMOL/L (ref 22–29)
CREAT SERPL-MCNC: 0.71 MG/DL (ref 0.57–1)
DEPRECATED RDW RBC AUTO: 40.6 FL (ref 37–54)
EGFRCR SERPLBLD CKD-EPI 2021: 89.4 ML/MIN/1.73
ERYTHROCYTE [DISTWIDTH] IN BLOOD BY AUTOMATED COUNT: 12.4 % (ref 12.3–15.4)
GLUCOSE SERPL-MCNC: 93 MG/DL (ref 65–99)
HCT VFR BLD AUTO: 30.9 % (ref 34–46.6)
HGB BLD-MCNC: 9.9 G/DL (ref 12–15.9)
MCH RBC QN AUTO: 29.2 PG (ref 26.6–33)
MCHC RBC AUTO-ENTMCNC: 32 G/DL (ref 31.5–35.7)
MCV RBC AUTO: 91.2 FL (ref 79–97)
PLATELET # BLD AUTO: 564 10*3/MM3 (ref 140–450)
PMV BLD AUTO: 8.8 FL (ref 6–12)
POTASSIUM SERPL-SCNC: 3.6 MMOL/L (ref 3.5–5.2)
POTASSIUM SERPL-SCNC: 4.2 MMOL/L (ref 3.5–5.2)
RBC # BLD AUTO: 3.39 10*6/MM3 (ref 3.77–5.28)
SODIUM SERPL-SCNC: 135 MMOL/L (ref 136–145)
WBC NRBC COR # BLD AUTO: 10.16 10*3/MM3 (ref 3.4–10.8)

## 2024-08-06 PROCEDURE — 25010000002 CEFEPIME PER 500 MG: Performed by: INTERNAL MEDICINE

## 2024-08-06 PROCEDURE — 99232 SBSQ HOSP IP/OBS MODERATE 35: CPT | Performed by: INTERNAL MEDICINE

## 2024-08-06 PROCEDURE — 84132 ASSAY OF SERUM POTASSIUM: CPT | Performed by: STUDENT IN AN ORGANIZED HEALTH CARE EDUCATION/TRAINING PROGRAM

## 2024-08-06 PROCEDURE — 85027 COMPLETE CBC AUTOMATED: CPT | Performed by: INTERNAL MEDICINE

## 2024-08-06 PROCEDURE — 25010000002 ENOXAPARIN PER 10 MG: Performed by: HOSPITALIST

## 2024-08-06 PROCEDURE — 80048 BASIC METABOLIC PNL TOTAL CA: CPT | Performed by: INTERNAL MEDICINE

## 2024-08-06 RX ORDER — LEVOFLOXACIN 750 MG/1
750 TABLET, FILM COATED ORAL EVERY 24 HOURS
Status: DISCONTINUED | OUTPATIENT
Start: 2024-08-06 | End: 2024-08-07 | Stop reason: HOSPADM

## 2024-08-06 RX ORDER — METHOCARBAMOL 750 MG/1
750 TABLET, FILM COATED ORAL EVERY 6 HOURS PRN
Qty: 56 TABLET | Refills: 0 | Status: SHIPPED | OUTPATIENT
Start: 2024-08-06 | End: 2024-08-07

## 2024-08-06 RX ORDER — LIDOCAINE 4 G/G
3 PATCH TOPICAL
Qty: 21 PATCH | Refills: 0 | Status: SHIPPED | OUTPATIENT
Start: 2024-08-07 | End: 2024-08-07

## 2024-08-06 RX ORDER — LEVOFLOXACIN 750 MG/1
750 TABLET, FILM COATED ORAL EVERY 24 HOURS
Qty: 25 TABLET | Refills: 0 | Status: SHIPPED | OUTPATIENT
Start: 2024-08-07 | End: 2024-08-07

## 2024-08-06 RX ORDER — POTASSIUM CHLORIDE 750 MG/1
40 TABLET, FILM COATED, EXTENDED RELEASE ORAL EVERY 4 HOURS
Status: COMPLETED | OUTPATIENT
Start: 2024-08-06 | End: 2024-08-06

## 2024-08-06 RX ORDER — HYDROCODONE BITARTRATE AND ACETAMINOPHEN 10; 325 MG/1; MG/1
1 TABLET ORAL EVERY 4 HOURS PRN
Qty: 18 TABLET | Refills: 0 | Status: SHIPPED | OUTPATIENT
Start: 2024-08-06 | End: 2024-08-07

## 2024-08-06 RX ADMIN — LIDOCAINE 3 PATCH: 4 PATCH TOPICAL at 08:46

## 2024-08-06 RX ADMIN — ESCITALOPRAM 20 MG: 20 TABLET, FILM COATED ORAL at 08:46

## 2024-08-06 RX ADMIN — BISACODYL 5 MG: 5 TABLET, COATED ORAL at 06:12

## 2024-08-06 RX ADMIN — METHOCARBAMOL TABLETS 750 MG: 750 TABLET, COATED ORAL at 02:49

## 2024-08-06 RX ADMIN — CEFEPIME 2000 MG: 2 INJECTION, POWDER, FOR SOLUTION INTRAVENOUS at 02:46

## 2024-08-06 RX ADMIN — HYDROCODONE BITARTRATE AND ACETAMINOPHEN 1 TABLET: 10; 325 TABLET ORAL at 21:15

## 2024-08-06 RX ADMIN — HYDROCODONE BITARTRATE AND ACETAMINOPHEN 1 TABLET: 10; 325 TABLET ORAL at 16:12

## 2024-08-06 RX ADMIN — POTASSIUM CHLORIDE 40 MEQ: 750 TABLET, EXTENDED RELEASE ORAL at 08:46

## 2024-08-06 RX ADMIN — ROSUVASTATIN CALCIUM 10 MG: 10 TABLET, FILM COATED ORAL at 21:08

## 2024-08-06 RX ADMIN — ATENOLOL 25 MG: 25 TABLET ORAL at 21:15

## 2024-08-06 RX ADMIN — HYDROCODONE BITARTRATE AND ACETAMINOPHEN 1 TABLET: 10; 325 TABLET ORAL at 06:12

## 2024-08-06 RX ADMIN — Medication 10 ML: at 08:53

## 2024-08-06 RX ADMIN — HYDROCODONE BITARTRATE AND ACETAMINOPHEN 1 TABLET: 10; 325 TABLET ORAL at 12:07

## 2024-08-06 RX ADMIN — METHOCARBAMOL TABLETS 750 MG: 750 TABLET, COATED ORAL at 08:46

## 2024-08-06 RX ADMIN — LEVOTHYROXINE SODIUM 50 MCG: 50 TABLET ORAL at 08:46

## 2024-08-06 RX ADMIN — Medication 10 ML: at 21:31

## 2024-08-06 RX ADMIN — METHOCARBAMOL TABLETS 750 MG: 750 TABLET, COATED ORAL at 18:22

## 2024-08-06 RX ADMIN — LEVOFLOXACIN 750 MG: 750 TABLET, FILM COATED ORAL at 12:07

## 2024-08-06 RX ADMIN — POTASSIUM CHLORIDE 40 MEQ: 750 TABLET, EXTENDED RELEASE ORAL at 12:07

## 2024-08-06 RX ADMIN — ENOXAPARIN SODIUM 40 MG: 100 INJECTION SUBCUTANEOUS at 18:21

## 2024-08-06 NOTE — PROGRESS NOTES
Continued Stay Note  [facility]     Patient Name: Anastasiia Faria  MRN: [id number]  Today's Date: 8/6/2024    Admit Date: 7/27/2024    Plan: Home w/ [facility] for IV abx   Discharge Plan       Row Name 08/06/24 1352       Plan    Plan Comments Recieved a call from Mindy with  Spine at [phone]. Discussed order and reason for referral. Stated she will have to send referral over for review and will let CCP know if can accept.      Row Name 08/06/24 1316       Plan    Plan Comments Called [facility] at [phone] to FU on referral faxed. Spoke to Debbie who stated the person that reviews all referrals is off today and will back tomorrow. Stated she would make note that we need to schedule an appointment. Informed MD.      Row Name 08/06/24 1213       Plan    Plan Comments Call [facility] [place] at [phone] to schedule an appointment. Office required a referral to be faxed then will need to go through triage department. Referral obtained from MD and faxed to [facility] [place] at [phone].                   Discharge Codes    No documentation.                 Expected Discharge Date and Time       Expected Discharge Date Expected Discharge Time    Aug 7, 2024               Emilie Baez, RN

## 2024-08-06 NOTE — PROGRESS NOTES
LOS: 9 days     Chief Complaint: Fever    Interval History: She says she is actually feeling better today for the first time in a while.  Afebrile.  Pain improved.    Vital Signs  Temp:  [98 °F (36.7 °C)-98.7 °F (37.1 °C)] 98 °F (36.7 °C)  Heart Rate:  [69-79] 69  Resp:  [16] 16  BP: (146-160)/(66-75) 157/75    Physical Exam:  GENERAL: Awake and alert sickly  HEENT: Oropharynx is clear. Hearing is grossly normal.   EYES: . No conjunctival injection. No lid lag.   LUNGS:normal respiratory effort.   SKIN: no cutaneous eruptions in exposed areas  PSYCHIATRIC: Appropriate mood, affect, insight, and judgment.           White count 10.16  Creatinine 0.71  Microbiology:  7/29 BCx negative x 2  7/29 right hip cultures negative  7/27 UCx >100K Pseudomonas  7/27 BCx Pseudomonas x 2    Assessment and plan  Pseudomonal septicemia   Right SI joint septic arthritis with possible abscess  Iliopsoas bursitis  Myositis  History of kidney stone status post left ureteral stent placement with stent exchange on July 25  Status post right hip arthroplasty in May now with acute pain  Recent history of C. difficile colitis      Neurosurgery here and orthopedics at [facility] both recommended medical management.  Appreciate [facility] assistance an in coordinating care.      Discussed with patient potential transition to oral quinolone which she has tolerated well in the past and has excellent bioavailability.  She is in agreement with this plan.  Will discontinue IV antibiotics and start Levofloxacin 750 mg p.o. every 24 hours.  Discussed with patient proper dosing administration and side effects of the antibiotic  We will arrange ID follow-up  No objection to discharge when okay with others.

## 2024-08-06 NOTE — PROGRESS NOTES
Continued Stay Note  [facility]     Patient Name: Anastasiia Faria  MRN: [id number]  Today's Date: 8/6/2024    Admit Date: 7/27/2024    Plan: Home w/ [facility] for IV abx   Discharge Plan       Row Name 08/06/24 1213       Plan    Plan Comments Call [facility] at [phone] to schedule an appointment. Office required a referral to be faxed then will need to go through triage department. Referral obtained from MD and faxed to [facility] [place] at [phone].                   Discharge Codes    No documentation.                 Expected Discharge Date and Time       Expected Discharge Date Expected Discharge Time    Aug 9, 2024               Emilie Baez RN

## 2024-08-06 NOTE — PROGRESS NOTES
Name: Anastasiia Faria ADMIT: 2024   : 1950  PCP: Mirza Scott Sr., MD    MRN: [id number] LOS: 9 days   AGE/SEX: 74 y.o. female  ROOM: [facility]     Subjective   Subjective   Patient sitting up in her recliner,  accompanying. No chest pain shortness of breath nausea vomiting or abdominal pain reported. They are hopeful for discharge soon.      Objective   Objective   Vital Signs  Temp:  [97.9 °F (36.6 °C)-98.7 °F (37.1 °C)] 97.9 °F (36.6 °C)  Heart Rate:  [69-79] 76  Resp:  [16] 16  BP: (144-160)/(57-75) 144/57  SpO2:  [92 %-94 %] 94 %  on   ;   Device (Oxygen Therapy): room air  Body mass index is 27.06 kg/m².    Physical Exam  Constitutional:       General: She is not in acute distress.     Appearance: She is not toxic-appearing.      Comments: Frail, elderly   HENT:      Head: Normocephalic and atraumatic.   Cardiovascular:      Rate and Rhythm: Normal rate and regular rhythm.   Pulmonary:      Effort: Pulmonary effort is normal. No respiratory distress.      Breath sounds: Normal breath sounds. No wheezing or rhonchi.   Abdominal:      General: Bowel sounds are normal.      Palpations: Abdomen is soft.      Tenderness: There is no abdominal tenderness. There is no guarding or rebound.   Musculoskeletal:         General: Tenderness present.   Skin:     General: Skin is warm and dry.   Neurological:      Mental Status: She is alert.      Motor: Weakness present.   Psychiatric:         Mood and Affect: Mood normal.         Behavior: Behavior normal.     Results Review  I reviewed the patient's new clinical results.  Results from last 7 days   Lab Units 24  0505 24  0529 24  0415 24  0605   WBC 10*3/mm3 10.16 10.95* 11.90* 12.10*   HEMOGLOBIN g/dL 9.9* 10.1* 10.2* 10.0*   PLATELETS 10*3/mm3 564* 532* 472* 422     Results from last 7 days   Lab Units 24  0505 24  0529 24  0415 24  0605   SODIUM mmol/L 135* 136 137 135*   POTASSIUM mmol/L 3.6 4.4 4.7 4.3  "  CHLORIDE mmol/L 103 102 103 104   CO2 mmol/L 28.0 28.2 25.5 24.0   BUN mg/dL 10 9 10 10   CREATININE mg/dL 0.71 0.71 0.72 0.70   GLUCOSE mg/dL 93 95 100* 101*     Lab Results   Component Value Date    ANIONGAP 4.0 (L) 08/06/2024     Estimated Creatinine Clearance: 77.5 mL/min (by C-G formula based on SCr of 0.71 mg/dL).   Lab Results   Component Value Date    EGFR 89.4 08/06/2024     Results from last 7 days   Lab Units 08/05/24  0529 08/04/24  0415 08/03/24  0605 08/02/24  0939 07/31/24  0817   ALBUMIN g/dL 2.6* 2.7* 2.6* 2.7* 2.7*   BILIRUBIN mg/dL  --   --   --   --  0.4   ALK PHOS U/L  --   --   --   --  102   AST (SGOT) U/L  --   --   --   --  19   ALT (SGPT) U/L  --   --   --   --  15     Results from last 7 days   Lab Units 08/06/24  0505 08/05/24  0529 08/04/24  0415 08/03/24  1747 08/03/24  0605 08/02/24  0939 08/01/24  1456 08/01/24  0614 07/31/24  2120 07/31/24  1524 07/31/24  0817   CALCIUM mg/dL 9.5 9.9 9.9  --  9.3 9.6  --  9.5  --   --  9.2   ALBUMIN g/dL  --  2.6* 2.7*  --  2.6* 2.7*  --   --   --   --  2.7*   MAGNESIUM mg/dL  --   --   --   --   --   --   --  2.1  --  1.8 1.9   PHOSPHORUS mg/dL  --  2.7 2.8 2.4* 2.2*  2.2* 2.2*   < > 2.2*   < >  --  2.0*    < > = values in this interval not displayed.           No results found for: \"HGBA1C\", \"POCGLU\"    No radiology results for the last day    Scheduled Meds  atenolol, 25 mg, Oral, Nightly  enoxaparin, 40 mg, Subcutaneous, Q24H  escitalopram, 20 mg, Oral, Daily  levoFLOXacin, 750 mg, Oral, Q24H  levothyroxine, 50 mcg, Oral, Daily  Lidocaine, 3 patch, Transdermal, Q24H  rosuvastatin, 10 mg, Oral, Nightly  sodium chloride, 10 mL, Intravenous, Q12H    Continuous Infusions   PRN Meds    acetaminophen **OR** acetaminophen **OR** acetaminophen    senna-docusate sodium **AND** polyethylene glycol **AND** bisacodyl **AND** bisacodyl    calcium carbonate    HYDROcodone-acetaminophen    HYDROcodone-acetaminophen    Magnesium Standard Dose Replacement - " Follow Nurse / BPA Driven Protocol    methocarbamol    ondansetron ODT **OR** ondansetron    Phosphorus Replacement - Follow Nurse / BPA Driven Protocol    Potassium Replacement - Follow Nurse / BPA Driven Protocol    [COMPLETED] Insert Peripheral IV **AND** sodium chloride    sodium chloride    sodium chloride     Diet  Diet: Regular/House; Fluid Consistency: Thin (IDDSI 0)       Assessment/Plan     Active Hospital Problems    Diagnosis  POA    **Bacterial infection due to Pseudomonas [A49.8]  Yes    Lumbago [M54.50]  Yes    Acute UTI [N39.0]  Yes    Anxiety [F41.9]  Yes    Essential hypertension [I10]  Yes    Acquired hypothyroidism [E03.9]  Yes      Resolved Hospital Problems   No resolved problems to display.     74 y.o. female with Bacterial infection due to Pseudomonas.     Pseudomonas bacteremia and bacteruria   - Antibiotics continues with extension of course for 4-6 weeks due concern for right SI joint septic arthritis and potential early osteomyelitis- cefepime changed to po levaquin. Infectious disease and Neurosurgery have discussed her case. Orthopedic surgery following.  -Prior hospitalist d/w on-call physician for [facility] orthopedic surgery.  Reviewed the neurosurgery recommendations clinical course and MRI findings with them.  They stated she did not need acute inpatient transfer and recommended to continue the antibiotic therapy.  They did state that if any fluid collection was large enough for tapping then interventional radiology could be considered.  Asked about outpatient follow-up and the person on-call was not certain about timing or arrangement. I discussed with CCP- referral sent to [facility]- they will not be able to schedule patient until her referral is reviewed which won't be done until tomorrow. With no clear follow up plan established- will delay dc until we hear back from ortho-spine.     Ureteral Stent  -Urology evaluated. Outpatient follow up planned.     Right hip pain s/p  GEOVANNA in May, Left Hip Pain  -Right hip aspirate culture no growth 2 days  -Ortho evaluated    Concern for SI Joint Infection:  -possible infection, bursitis, early osteomyelitis on MRI  -See above     Hypoxia  -7/27/2024 chest x-ray showed possible atelectasis  -IVF stopped and received diuretic previously  -Atelectasis with small effusion  -BNP elevated and near level of previous value.  On room air.    Hypophosphatemia replaced     Mild ELVIA  -Resolved with treatment     Dimer elevated  -VQ scan normal  -Negative duplex     HypoT4  -Synthroid     HTN  -Continue current regimen     DVT prophylaxis  Lovenox 40 mg SC daily    Discharge  Tentative plan now is for discharge with home health when clinically able.  CCP going to follow up with [facility] tomorrow- if patient can get o/p appt arranged- ok to discharge.     Expected Discharge Date: 8/7/2024; Expected Discharge Time:     Debo Vines MD  [facility]  08/06/24

## 2024-08-06 NOTE — PROGRESS NOTES
Continued Stay Note  [facility]     Patient Name: Anastasiia Faria  MRN: [id number]  Today's Date: 8/6/2024    Admit Date: 7/27/2024    Plan: Home w/ [facility] for IV abx   Discharge Plan       Row Name 08/06/24 1316       Plan    Plan Comments Called [facility] at [phone] to FU on referral faxed. Spoke to Debbie who stated the person that reviews all referrals is off today and will back tomorrow. Stated she would make note that we need to schedule an appointment. Informed MD.      Row Name 08/06/24 1217       Plan    Plan Comments Call [facility] [place] at [phone] to schedule an appointment. Office required a referral to be faxed then will need to go through triage department. Referral obtained from MD and faxed to [facility] [place] at [phone].                   Discharge Codes    No documentation.                 Expected Discharge Date and Time       Expected Discharge Date Expected Discharge Time    Aug 9, 2024               Emilie Baez, RN

## 2024-08-06 NOTE — PLAN OF CARE
Goal Outcome Evaluation:  Plan of Care Reviewed With: patient        Progress: improving  Outcome Evaluation: iv abx, vdg, up with assist and walker, falls protocol, i/s, poss d/c today to [facility]

## 2024-08-07 ENCOUNTER — TELEPHONE (OUTPATIENT)
Dept: INFECTIOUS DISEASES | Facility: CLINIC | Age: 74
End: 2024-08-07
Payer: MEDICARE

## 2024-08-07 ENCOUNTER — HOME HEALTH ADMISSION (OUTPATIENT)
Dept: HOME HEALTH SERVICES | Facility: HOME HEALTHCARE | Age: 74
End: 2024-08-07
Payer: MEDICARE

## 2024-08-07 ENCOUNTER — READMISSION MANAGEMENT (OUTPATIENT)
Dept: CALL CENTER | Facility: HOSPITAL | Age: 74
End: 2024-08-07
Payer: MEDICARE

## 2024-08-07 VITALS
HEART RATE: 94 BPM | WEIGHT: 176.37 LBS | OXYGEN SATURATION: 93 % | RESPIRATION RATE: 16 BRPM | BODY MASS INDEX: 26.73 KG/M2 | DIASTOLIC BLOOD PRESSURE: 65 MMHG | TEMPERATURE: 98.7 F | SYSTOLIC BLOOD PRESSURE: 110 MMHG | HEIGHT: 68 IN

## 2024-08-07 PROCEDURE — 99233 SBSQ HOSP IP/OBS HIGH 50: CPT | Performed by: INTERNAL MEDICINE

## 2024-08-07 RX ORDER — HYDROCODONE BITARTRATE AND ACETAMINOPHEN 10; 325 MG/1; MG/1
1 TABLET ORAL EVERY 4 HOURS PRN
Qty: 18 TABLET | Refills: 0 | Status: SHIPPED | OUTPATIENT
Start: 2024-08-07 | End: 2024-08-16 | Stop reason: SDUPTHER

## 2024-08-07 RX ORDER — LEVOFLOXACIN 750 MG/1
750 TABLET, FILM COATED ORAL EVERY 24 HOURS
Qty: 25 TABLET | Refills: 0 | Status: SHIPPED | OUTPATIENT
Start: 2024-08-07 | End: 2024-08-30 | Stop reason: SDUPTHER

## 2024-08-07 RX ORDER — LIDOCAINE 4 G/G
3 PATCH TOPICAL
Qty: 21 PATCH | Refills: 0 | Status: SHIPPED | OUTPATIENT
Start: 2024-08-07 | End: 2024-08-14

## 2024-08-07 RX ORDER — METHOCARBAMOL 750 MG/1
750 TABLET, FILM COATED ORAL EVERY 6 HOURS PRN
Qty: 56 TABLET | Refills: 0 | Status: SHIPPED | OUTPATIENT
Start: 2024-08-07 | End: 2024-08-21

## 2024-08-07 RX ADMIN — LIDOCAINE 3 PATCH: 4 PATCH TOPICAL at 08:39

## 2024-08-07 RX ADMIN — LEVOFLOXACIN 750 MG: 750 TABLET, FILM COATED ORAL at 10:50

## 2024-08-07 RX ADMIN — HYDROCODONE BITARTRATE AND ACETAMINOPHEN 1 TABLET: 10; 325 TABLET ORAL at 08:39

## 2024-08-07 RX ADMIN — Medication 10 ML: at 10:51

## 2024-08-07 RX ADMIN — ESCITALOPRAM 20 MG: 20 TABLET, FILM COATED ORAL at 08:39

## 2024-08-07 NOTE — PLAN OF CARE
Goal Outcome Evaluation:  VSS, medicated for pain x 1, up with assist of 1, voiding, up in chair,  at bedside, [place], hearing aids in use, falls protocol, bed/chair alarm in use  Plan of Care Reviewed With: patient, spouse

## 2024-08-07 NOTE — OUTREACH NOTE
Prep Survey      Flowsheet Row Responses   [facility] patient discharged from? [place]   Is LACE score < 7 ? No   Eligibility [facility]   Date of Admission 07/27/24   Date of Discharge 08/07/24   Discharge Disposition Home or Self Care   Discharge diagnosis Bacterial infection due to Pseudomonas   Does the patient have one of the following disease processes/diagnoses(primary or secondary)? Other   Does the patient have Home health ordered? Yes   What is the Home health agency?  [facility]   Is there a DME ordered? No   Prep survey completed? Yes            Thu KING - Registered Nurse

## 2024-08-07 NOTE — PLAN OF CARE
Goal Outcome Evaluation:  Plan of Care Reviewed With: patient        Progress: improving  Outcome Evaluation: Patient is A&Ox4, VSS, ambulates well with walker to bathroom. No complaints of n/v/d. Pain controlled with PRN Norco x2. Bruise noted on R wrist/hand then swelling was noted- ice pack applied. Saliene locked. Pt used IS throghout night when awake- up to 2000 at highest point.  Pt refused SCDs, is on Lovenox. Possible d/c today.

## 2024-08-07 NOTE — DISCHARGE SUMMARY
Patient Name: Anastasiia Faria  : 1950  MRN: [id number]    Date of Admission: 2024  Date of Discharge:  2024  Primary Care Physician: Mirza Scott Sr., MD      Chief Complaint:   Hip Pain      Discharge Diagnoses     Active Hospital Problems    Diagnosis  POA    **Bacterial infection due to Pseudomonas [A49.8]  Yes    Lumbago [M54.50]  Yes    Acute UTI [N39.0]  Yes    Anxiety [F41.9]  Yes    Essential hypertension [I10]  Yes    Acquired hypothyroidism [E03.9]  Yes      Resolved Hospital Problems   No resolved problems to display.        Hospital Course     Ms. Faria is a 74 y.o. female with a history of kidney stones requiring ureteral stent placement, CKD, hypertension and hyperlipidemia who presented to [facility] initially complaining of right flank pain, nausea and vomiting.  Please see the admitting history and physical for further details.  She was was admitted to the hospital for further evaluation and treatment.  The patient was noted to be febrile on arrival to the ER and in the setting of recent ureteral stent placement she was initiated on broad-spectrum antibiotics and infectious disease consult was requested.  Early during the course of her admission her blood and urine cultures became positive for Pseudomonas, likely from  source.  She was also complaining of persistent right hip pain for which an MRI was obtained.  Her right SI joint showed arthropathic changes and a small joint effusion which was concerning for possible septic arthritis.  Right hip arthrocentesis performed-fluid cultures remained no growth.  Orthopedic surgery and neurosurgery were consulted.  Both orthopedic surgery and neurosurgery hopeful that treatment could be with antibiotics alone however if the patient continues to have severe pain/instability she would need to be evaluated by an orthopedic surgeon/spine surgeon likely at the [facility] as the surgeons at [facility]  do not address this joint infection.  [facility] orthopedic surgery was contacted to discuss possible inpatient transfer, they do not believe the patient meets inpatient transfer requirements at this time and recommended follow-up in their clinic.  A referral has been placed and case management has been helping with setting up an appointment.  The patient is stable for discharge home, she will continue Levaquin until her follow-up with infectious disease/Dr. Du on 8/30/2024.  She has been counseled to present to the [facility] if she continues/develops severe hip pain.  She should also follow-up with her PCP in a week for posthospital follow-up visit.    Day of Discharge     Subjective:  Resting in her recliner,  at bedside. Eager to discharge home today.     Physical Exam:  Temp:  [97 °F (36.1 °C)-98.7 °F (37.1 °C)] 98.7 °F (37.1 °C)  Heart Rate:  [68-94] 94  Resp:  [16] 16  BP: (109-149)/(60-78) 110/65  Body mass index is 26.82 kg/m².  Physical Exam  Constitutional:       General: She is not in acute distress.     Appearance: She is not toxic-appearing.      Comments: Frail, elderly   HENT:      Head: Normocephalic and atraumatic.   Cardiovascular:      Rate and Rhythm: Normal rate and regular rhythm.   Pulmonary:      Effort: Pulmonary effort is normal. No respiratory distress.      Breath sounds: Normal breath sounds. No wheezing or rhonchi.   Abdominal:      General: Bowel sounds are normal.      Palpations: Abdomen is soft.      Tenderness: There is no abdominal tenderness. There is no guarding or rebound.   Musculoskeletal:         General: Tenderness present.   Skin:     General: Skin is warm and dry.   Neurological:      Mental Status: She is alert.      Motor: Weakness present.   Psychiatric:         Mood and Affect: Mood normal.         Behavior: Behavior normal.   Consultants     Consult Orders (all) (From admission, onward)       Start     Ordered    08/05/24 8842   Inpatient Case Management  Consult  Once        Provider:  (Not yet assigned)    08/05/24 1312    08/01/24 0939  Inpatient Neurosurgery Consult  Once        Specialty:  Neurosurgery  Provider:  Jaiden Scott IV, MD    08/01/24 0939    07/27/24 1244  Inpatient Orthopedic Surgery Consult  Once        Specialty:  Orthopedic Surgery  Provider:  Vasu King MD    07/27/24 1245    07/27/24 1234  Inpatient Infectious Diseases Consult  Once        Specialty:  Infectious Diseases  Provider:  Axel Jackson MD    07/27/24 1245    07/27/24 0833  Inpatient Case Management  Consult  Once        Provider:  (Not yet assigned)    07/27/24 0832    07/27/24 0537  Inpatient Urology Consult  Once        Specialty:  Urology  Provider:  Jaiden Bolton Jr., MD    07/27/24 0537    07/27/24 0500  [facility] (on-call MD unless specified) Details  Once        Specialty:  Hospitalist  Provider:  (Not yet assigned)    07/27/24 0459                  Procedures     Imaging Results (All)       Procedure Component Value Units Date/Time    MRI Pelvis With & Without Contrast [[id number]] Collected: 08/02/24 0927     Updated: 08/02/24 1005    Narrative:      MRI PELVIS W WO CONTRAST-     8/2/2024 4:44 AM     INDICATION: Bilateral hip, groin, and SI joint pain. Bacteremia. Right  GEOVANNA placement in May 2024.     COMPARISON: MRI lumbar spine 8/2/2024 and 7/31/2024. Pelvis and right  hip radiographs 7/27/2024. CT abdomen pelvis 7/27/2024 and 6/12/2024.     TECHNIQUE: Multiplanar, multisequence MR imaging of the pelvis was  performed before and following the intravenous administration of 17 mL  of MultiHance.     FINDINGS:  SOFT TISSUES: Diffuse soft tissue edema, greatest at each pelvic  sidewall and in the subcutaneous fat lateral to each hip, with amorphous  soft tissue enhancement in each pelvic sidewall. Incompletely imaged  small cystic changes anterior to the superior right sacral ala and SI  joint, possible  abscess formation. Well-circumscribed cystic lesion  along the posterolateral left inferior wall of the vagina, measuring 2.2  cm x 1.7 cm in axial dimensions and 3 cm craniocaudal, probable benign  Bartholin gland cyst. No solid soft tissue mass. No nodular or masslike  enhancement. Moderate to severe abnormal fluid distention of the left  iliopsoas bursa, which may be communicating with the left hip joint  space, with thin peripheral and septal enhancement. Mild abnormal fluid  distention of the right iliopsoas bursa, which may be communicating with  the right hip joint space. Mild fluid distention of the left greater  trochanter bursa. The sciatic nerves are unremarkable as imaged. Colonic  diverticula.     BONES: Right GEOVANNA with associated artifact. No definite periprosthetic  osseous abnormality. Speckled T1 marrow replacement, bone marrow  edema-like signal, and enhancement of the posterior right iliac bone and  right sacral ala along the inferior margin of the right SI joint the  could be reactive or could represent early osteomyelitis. No acute  fracture. Likely degenerative subchondral cystic changes in the superior  left acetabulum. No concerning bone marrow lesion or diffuse T1 marrow  replacing process.     JOINTS: Right GEOVANNA with a small to moderate joint effusion. Moderate left  hip joint effusion. Mild to moderate chondromalacia/DJD at the left hip  given the wide field-of-view sequences utilized. The ligamentum teres is  intact. The pubic symphysis is unremarkable. Partially imaged right SI  joint arthropathic changes with a small joint effusion, concerning for  possible septic arthritis. Mild left SI joint DJD. Partially imaged  lumbosacral spondylosis.     MUSCLES AND TENDONS: Edema and amorphous enhancement in the bilateral  iliopsoas musculature, right greater than left, possible myositis. The  gluteal tendons are intact. Edema and amorphous enhancement throughout  the left gluteus minimus muscle  and the anterior bilateral gluteus  medius muscles, possible myositis. Advanced fatty atrophy of the  bilateral gluteus minimus muscles superimposed on mild diffuse muscular  fatty atrophy. The proximal hamstring tendons are intact.       Impression:         1. Partially imaged right SI joint arthropathic changes with a small  joint effusion, suspicious for septic arthritis. Speckled T1 marrow  replacement, patchy bone edema-like signal, and enhancement of the  posterior right iliac bone and right sacral ala along the inferior  aspect of the right SI joint could be reactive or could reflect early  osteomyelitis. Partially imaged multilobulated cystic changes along the  anterior aspect of the right SI joint and right sacral ala could  represent abscess formation, imaged only on the coronal views.  2. Moderate to severe left iliopsoas bursitis and mild right iliopsoas  bursitis, possibly communicating with bilateral hip joint effusions.  3. Edema and amorphous enhancement of the bilateral iliopsoas  musculature, right greater than left, which could be reactive or could  reflect myositis.  4. Right GEOVANNA with a small moderate right hip joint effusion and mild to  moderate left hip joint chondromalacia/DJD with a moderate joint  effusion. Septic arthritis is not excluded.  5. Edema and amorphous enhancement throughout the left gluteus minimus  muscle and the anterior bilateral gluteus medius muscles, possible  myositis.  6. Mild left greater trochanter bursitis.     This report was finalized on 8/2/2024 10:02 AM by Emre Pelaez MD on  Workstation: [id number]       MRI Lumbar Spine With & Without Contrast [[id number]] Collected: 08/02/24 0650     Updated: 08/02/24 0708    Narrative:      MRI LUMBAR SPINE W WO CONTRAST-     HISTORY:  low back and LLE>RLE pain, has bacteremia; prior MRI non  contrast; N39.0-Urinary tract infection, site not specified;  A41.9-Sepsis, unspecified organism; N17.9-Acute kidney  failure,  unspecified     COMPARISON: MRI lumbar spine 7/31/2024.     FINDINGS:     The study is hampered by patient motion.     The alignment of the lumbar spine is within normal limits. There is mild  levoscoliosis of lumbar spine with apex at the level of L3. Mild  anterolisthesis of L5 upon S1 is noted, unchanged.     L1-L2: There is no evidence of disc bulge or herniation.     L2-L3: Mild facet degenerative disease is present bilaterally. A mild  disc bulge is present which results in mild flattening of the ventral  surface of the thecal sac. It contributes to mild foraminal stenosis on  the right, unchanged     L3-L4: Mild facet degenerative disease is present bilaterally. There is  no evidence of disc bulge or herniation.     L4-L5: Mild to moderate facet degenerative disease is present  bilaterally. A mild broad-based disc-osteophyte complex is present  resulting mild flattening of the ventral surface of the thecal sac. Mild  foraminal stenosis is present bilaterally secondary to extension of a  small disc osteophyte complex into the neural foramen.     L5-S1: Mild to moderate facet degenerative disease is present  bilaterally. Transitional anatomy is appreciated consisting of partial  sacralization of L5.     The postcontrast imaging is degraded by patient motion but there was no  evidence of abnormal intradural enhancement. Enhancement involving the  facets bilaterally at L2-3 and L5-S1 is noted consistent with  degenerative disease.       Impression:      There is no evidence of disc herniation, discitis,  osteomyelitis or an epidural abscess. Multilevel degenerative disease  involving lumbar spine is noted as described above unchanged versus  7/31/2024. Enhancement involving the facets at L2-L3 and L5-S1 is noted  bilaterally consistent with degenerative disease.        This report was finalized on 8/2/2024 7:05 AM by Dr. Senthil Kemp M.D  on Workstation: [id number]       MRI Lumbar Spine Without  Contrast [[id number]] Collected: 07/31/24 0802     Updated: 07/31/24 0822    Narrative:      MRI LUMBAR SPINE WO CONTRAST-     HISTORY:  Intermittent bilateral leg pain and low back pain, history of  right hip replacement; N39.0-Urinary tract infection, site not  specified; A41.9-Sepsis, unspecified organism; N17.9-Acute kidney  failure, unspecified     COMPARISON: None     FINDINGS: The study is hampered by patient motion. There is mild  levoscoliosis of the lumbar spine with apex at level of L3. There is  mild disc desiccation at all levels. 1 to 2 mm of anterolisthesis of L5  upon S1 is noted.     L1-L2: There is no evidence of a disc bulge or herniation.     L2-L3: Minimal disc bulge is present centrally. It is slightly more  prominent far laterally to the right where it contributes to mild  foraminal stenosis. Mild facet degenerative disease is present  bilaterally.     L3-L4: There is no evidence of disc bulge or herniation.     L4-L5: A mild broad-based disc osteophyte complex is present which is a  more prominent to the left. Mild to moderate facet degenerative disease  is present bilaterally. Mild foraminal stenosis is present on the right  secondary to extension of a small disc osteophyte complex into the  neural foramen.     L5-S1: Mild to moderate facet degenerative disease is present  bilaterally. Minimal foraminal stenosis is present on the left.  Transitional anatomy is appreciated consisting of partial sacralization  of L5.       Impression:      There is no evidence of a focal herniation or of a  compression fracture/compression deformity. Degenerative disease  involving lumbar spine as described above including mild levoscoliosis,  loss of disc height, mild broad-based disc osteophyte complexes and mild  foraminal stenosis. Facet degenerative disease is most prominent at  L4-L5 and L5-S1 where is estimated to be mild to moderate. See above.     This report was finalized on 7/31/2024 8:19 AM by   Senthil Kemp M.D  on Workstation: [id number]       XR Chest PA & Lateral [[id number]] Collected: 07/30/24 1602     Updated: 07/30/24 1608    Narrative:      XR CHEST PA AND LATERAL-     HISTORY: Female who is 74 years-old, hypoxia     TECHNIQUE: Frontal and lateral views of the chest     COMPARISON: 7/27/2024     FINDINGS: Heart size is borderline. Pulmonary vasculature is  unremarkable. Mild left basilar atelectasis or infiltrate, and small  bilateral pleural effusions are apparent, follow-up suggested. No  pneumothorax. Old granulomatous disease is apparent. No acute osseous  process.       Impression:      As described.     This report was finalized on 7/30/2024 4:05 PM by Dr. Chidi Perez M.D on Workstation: [id number]       FL Guided Aspiration Joint [[id number]] Collected: 07/29/24 1241     Updated: 07/29/24 1404    Narrative:      FLUOROSCOPIC GUIDED ASPIRATION OF THE RIGHT HIP 07/29/2024     HISTORY: Right hip pain. Previous arthroplasty.     After signed informed consent was obtained the anterior right hip was  prepped and draped in the usual sterile fashion. Lidocaine was used for  local anesthesia. Right hip arthroplasty appears in good position.     22-gauge needle was introduced into the right hip joint using sterile  technique. Approximately 10 cc of nonpurulent joint fluid was aspirated.     Confirmatory images were obtained.     Patient tolerated the procedure well with no complications.     Fluoroscopy time 0.2 minutes 2.1 mGy, 1 image.      This report was finalized on 7/29/2024 2:01 PM by Dr. Francois Saleh M.D on Workstation: [id number]       NM Lung Ventilation Perfusion [[id number]] Collected: 07/27/24 2358     Updated: 07/27/24 2358    Narrative:        Patient: JAYDEN MCDONNELL  Time Out: 23:58  Exam(s): NM VQ     EXAM:    NM Lung Perfusion and Ventilation Scan    CLINICAL HISTORY:     Reason for exam: elevated dimer and pain with inspiration.    TECHNIQUE:    Nuclear Medicine ventilation  and perfusion images of the lungs were   obtained in multiple projections following radiopharmaceutical inhalation   followed by injection of Tc99m MAA.    COMPARISON:    No relevant prior studies available.    FINDINGS:    Ventilation:  Unremarkable.  No ventilation defects.    Perfusion:  Unremarkable.  No perfusion defects.    IMPRESSION:         No findings to suggest pulmonary embolism.      Impression:          Electronically signed by Javon Dominguez MD on 07-27-24 at 2358    XR Chest PA & Lateral [[id number]] Collected: 07/27/24 1632     Updated: 07/27/24 1645    Narrative:      XR CHEST PA AND LATERAL-7/27/2024     HISTORY: Oxygen requirement.     Heart size is within normal limits. Patient is rotated slightly to the  left. Lungs are relatively well expanded. There is slight elevation of  the right hemidiaphragm. There is mild atelectasis, chronic parenchymal  change or pneumonia in the left lower lobe. There was some increased  density in this region on the previous study of 3/23/2024.     Patient is rotated slightly to the left.       Impression:      1. Mild left lower lobe atelectasis, scar or pneumonia.     This report was finalized on 7/27/2024 4:42 PM by Dr. Francois Saleh M.D on Workstation: [id number]       XR Hip With or Without Pelvis 2 - 3 View Right [[id number]] Collected: 07/27/24 1600     Updated: 07/27/24 1604    Narrative:      XR HIP W OR WO PELVIS 2-3 VIEW RIGHT-7/27/2024     HISTORY: Right hip pain.     The acetabular and proximal femoral components of the right hip  prosthesis are well seated with no abnormal surrounding bony lucencies.  Contrast material is seen in the urinary bladder. Double-J left ureteral  stent is seen terminating over the urinary bladder.     No fractures or other acute bony abnormalities are seen.        This report was finalized on 7/27/2024 4:01 PM by Dr. Francois Saleh M.D on Workstation: [id number]       CT Abdomen Pelvis With Contrast  [[id number]] Collected: 07/27/24 0410     Updated: 07/27/24 0410    Narrative:        Patient: JAYDEN MCDONNELL  Time Out: 04:09  Exam(s): CT ABDOMEN + PELVIS With Contrast     EXAM:  CT Abdomen and Pelvis With Intravenous Contrast    CLINICAL HISTORY:  Reason for exam: right flank pain, recent litho.    TECHNIQUE:  Axial computed tomography images of the abdomen and pelvis with   intravenous contrast.  CTDI is 14.51 mGy and DLP is 714 mGy-cm.  This CT   exam was performed according to the principle of ALARA (As Low As   Reasonably Achievable) by using one or more of the following dose   reduction techniques: automated exposure control, adjustment of the mA   and or kV according to patient size, and or use of iterative   reconstruction technique.    COMPARISON:  6 12 24    FINDINGS:  Lung bases:  Bibasilar subsegmental atelectasis.    ABDOMEN:  Liver:  Benign hepatic cysts.  Gallbladder and bile ducts:  Unremarkable.  No calcified stones.  No   ductal dilation.  Pancreas:  Unremarkable.  No mass.  No ductal dilation.  Spleen:  Unremarkable.  No splenomegaly.  Adrenals:  Unremarkable.  No mass.  Kidneys and ureters:  There are 2 nonobstructing right kidney stones, the   largest measuring 4 mm.  There is no right-sided hydroureteronephrosis.    There is a left ureteral stent, appropriately positioned.  There is mild   left-sided hydronephrosis.  There is urothelial thickening within the   left renal pelvis and left ureter with periureteral fat stranding.  Some   hyperdense material is suggested within the left renal calyces.  There is   a nonobstructing 2 mm left kidney stone.    Stomach and bowel:  No bowel obstruction.  Diverticulosis without   diverticulitis.    PELVIS:  Appendix:  Normal appendix.  Bladder:  Unremarkable.  No mass.  Reproductive:  Unremarkable as visualized.    ABDOMEN and PELVIS:  Intraperitoneal space:  Unremarkable.  No free air, significant free   fluid, or fluid collection.  Bones joints:  No acute  fracture or dislocation.  Right total hip   arthroplasty.  Soft tissues:  Unremarkable.  Vasculature:  Atherosclerosis.  No aortic aneurysm.  Lymph nodes:  Unremarkable.  No enlarged lymph nodes.    IMPRESSION:       1.  Left ureteral stent in place.  2.  Mild left-sided hydroureteronephrosis without obstructing stone.    There is a nonobstructing left kidney upper pole stone.  3.  Left-sided urothelial thickening with periureteral fat stranding,   which may be secondary to urinary tract infection or indwelling stent.  4.  Some hyperdensity is suggested within the left renal calyces, which   may represent blood, purulent material, or early excretion of contrast.    Correlate with urinalysis.      Impression:          Electronically signed by Loyda Bee M.D. on 07-27-24 at 0409            Pertinent Labs     Results from last 7 days   Lab Units 08/06/24  0505 08/05/24  0529 08/04/24 0415 08/03/24  0605   WBC 10*3/mm3 10.16 10.95* 11.90* 12.10*   HEMOGLOBIN g/dL 9.9* 10.1* 10.2* 10.0*   PLATELETS 10*3/mm3 564* 532* 472* 422     Results from last 7 days   Lab Units 08/06/24  1618 08/06/24  0505 08/05/24  0529 08/04/24  0415 08/03/24  0605   SODIUM mmol/L  --  135* 136 137 135*   POTASSIUM mmol/L 4.2 3.6 4.4 4.7 4.3   CHLORIDE mmol/L  --  103 102 103 104   CO2 mmol/L  --  28.0 28.2 25.5 24.0   BUN mg/dL  --  10 9 10 10   CREATININE mg/dL  --  0.71 0.71 0.72 0.70   GLUCOSE mg/dL  --  93 95 100* 101*   Estimated Creatinine Clearance: 77.1 mL/min (by C-G formula based on SCr of 0.71 mg/dL).  Results from last 7 days   Lab Units 08/05/24  0529 08/04/24  0415 08/03/24  0605 08/02/24  0939   ALBUMIN g/dL 2.6* 2.7* 2.6* 2.7*     Results from last 7 days   Lab Units 08/06/24  0505 08/05/24  0529 08/04/24  0415 08/03/24  1747 08/03/24  0605 08/02/24  0939 08/01/24  1456 08/01/24  0614 07/31/24  2120 07/31/24  1524   CALCIUM mg/dL 9.5 9.9 9.9  --  9.3 9.6  --  9.5   < >  --    ALBUMIN g/dL  --  2.6* 2.7*  --  2.6* 2.7*  --    "--   --   --    MAGNESIUM mg/dL  --   --   --   --   --   --   --  2.1  --  1.8   PHOSPHORUS mg/dL  --  2.7 2.8 2.4* 2.2*  2.2* 2.2*   < > 2.2*   < >  --     < > = values in this interval not displayed.       Results from last 7 days   Lab Units 08/02/24  0939   PROBNP pg/mL 5,494.0*           Invalid input(s): \"LDLCALC\"        Test Results Pending at Discharge       Discharge Details        Discharge Medications        New Medications        Instructions Start Date   HYDROcodone-acetaminophen  MG per tablet  Commonly known as: NORCO  Replaces: HYDROcodone-acetaminophen 5-325 MG per tablet   1 tablet, Oral, Every 4 Hours PRN      levoFLOXacin 750 MG tablet  Commonly known as: LEVAQUIN   750 mg, Oral, Every 24 Hours      Lidocaine 4 %   3 patches, Transdermal, Every 24 Hours Scheduled, Remove & Discard patch within 12 hours or as directed by MD      methocarbamol 750 MG tablet  Commonly known as: ROBAXIN   750 mg, Oral, Every 6 Hours PRN             Continue These Medications        Instructions Start Date   atenolol 25 MG tablet  Commonly known as: TENORMIN   25 mg, Oral, Daily      escitalopram 20 MG tablet  Commonly known as: LEXAPRO   20 mg, Oral, Daily      levothyroxine 50 MCG tablet  Commonly known as: SYNTHROID, LEVOTHROID   50 mcg, Oral, Daily      loratadine 10 MG tablet  Commonly known as: CLARITIN   1 tablet, Oral, Daily      METAMUCIL FIBER PO   1 Scoop, Oral, Daily      NIFEdipine XL 30 MG 24 hr tablet  Commonly known as: PROCARDIA XL   30 mg, Oral, Daily      rosuvastatin 10 MG tablet  Commonly known as: CRESTOR   10 mg, Oral, Nightly             Stop These Medications      cephalexin 500 MG capsule  Commonly known as: KEFLEX     HYDROcodone-acetaminophen 5-325 MG per tablet  Commonly known as: NORCO  Replaced by: HYDROcodone-acetaminophen  MG per tablet     sulfamethoxazole-trimethoprim 800-160 MG per tablet  Commonly known as: Bactrim DS              No Known Allergies      Discharge " Disposition:  Home or Self Care    Discharge Diet:  Diet Order   Procedures    Diet: Regular/House; Fluid Consistency: Thin (IDDSI 0)       Discharge Activity:   Activity Instructions       Activity as Tolerated              CODE STATUS:    Code Status and Medical Interventions: CPR (Attempt to Resuscitate); Full Support   Ordered at: 07/27/24 0537     Code Status (Patient has no pulse and is not breathing):    CPR (Attempt to Resuscitate)     Medical Interventions (Patient has pulse or is breathing):    Full Support       Future Appointments   Date Time Provider Department Center   8/30/2024 11:20 AM Laurie Du MD [facility]   5/16/2025  1:00 PM Jacqueline Mayfield APRN [facility] [address]     Additional Instructions for the Follow-ups that You Need to Schedule       Discharge Follow-up with PCP   As directed       Currently Documented PCP:    Mirza Scott Sr., MD    PCP Phone Number:    [phone]     Follow Up Details: post-hospital follow up        Discharge Follow-up with Specified Provider: Infectious disease   As directed      To: Infectious disease        Discharge Follow-up with Specified Provider: [facility] as scheduled   As directed      To: [facility] as scheduled        Discharge Follow-up with Specified Provider: Urology   As directed      To: Urology               Contact information for follow-up providers       Mirza Scott Sr., MD .    Specialties: Family Medicine, Urgent Care  Why: post-hospital follow up  Contact information:  [address]  [address]  [facility] [address] [phone]                       Contact information for after-discharge care       Home Medical Care       [facility] .    Services: Home Health Services, Home Nursing, Home Rehabilitation  Contact information:  [address]  [place] [address] [phone]                                   Additional Instructions for the Follow-ups that You  Need to Schedule       Discharge Follow-up with PCP   As directed       Currently Documented PCP:    Mirza Scott Sr., MD    PCP Phone Number:    [phone]     Follow Up Details: post-hospital follow up        Discharge Follow-up with Specified Provider: Infectious disease   As directed      To: Infectious disease        Discharge Follow-up with Specified Provider: [facility] as scheduled   As directed      To: [facility] as scheduled        Discharge Follow-up with Specified Provider: Urology   As directed      To: Urology            Time Spent on Discharge:  I spent greater than 30 minutes on this discharge activity which included: face-to-face encounter with the patient, reviewing the data in the system, coordination of the care with the nursing staff as well as consultants, documentation, and entering orders.       Debo Vines MD  [facility]  08/07/24  15:21 EDT

## 2024-08-07 NOTE — PROGRESS NOTES
"Nutrition Services    Patient Name:  Anastasiia Faria  YOB: 1950  MRN: [id number]  Admit Date:  7/27/2024    Assessment Date:  08/07/24    NUTRITION SCREENING      Reason for Encounter LOS 10   Diagnosis/Problem Bacterial infection d/t pseudomonas       PO Diet Diet: Regular/House; Fluid Consistency: Thin (IDDSI 0)   Supplements    PO Intake % % (improving)       Medications MAR reviewed by RD   Labs  Listed below, reviewed   Physical Findings Alert, oriented   GI Function Hypoactive, last BM: 8/6   Skin Status WDL       Height  Weight  BMI  Weight Trend     Height: 172.7 cm (67.99\")  Weight: 80 kg (176 lb 5.9 oz) (08/07/24 0519)  Body mass index is 26.82 kg/m².  Stable       Nutrition Problem (PES) Inadequate oral intake related to decreased appetite as evidenced by 25% PO intakes but improving now.       Intervention/Plan - Declined needs for nutrition supplements.   - Encourage PO intakes    RD to follow up per protocol.     Results from last 7 days   Lab Units 08/06/24  1618 08/06/24  0505 08/05/24  0529 08/04/24  0415   SODIUM mmol/L  --  135* 136 137   POTASSIUM mmol/L 4.2 3.6 4.4 4.7   CHLORIDE mmol/L  --  103 102 103   CO2 mmol/L  --  28.0 28.2 25.5   BUN mg/dL  --  10 9 10   CREATININE mg/dL  --  0.71 0.71 0.72   CALCIUM mg/dL  --  9.5 9.9 9.9   GLUCOSE mg/dL  --  93 95 100*     Results from last 7 days   Lab Units 08/06/24  0505 08/05/24  0529 08/01/24  0839 08/01/24  0614   MAGNESIUM mg/dL  --   --   --  2.1   PHOSPHORUS mg/dL  --  2.7   < > 2.2*   HEMOGLOBIN g/dL 9.9* 10.1*   < >  --    HEMATOCRIT % 30.9* 30.8*   < >  --     < > = values in this interval not displayed.     Lab Results   Component Value Date    HGBA1C 5.40 06/14/2024         Electronically signed by:  Trish Lozano RD  08/07/24 16:25 EDT      "

## 2024-08-07 NOTE — PROGRESS NOTES
LOS: 10 days     Chief Complaint: Fever    Interval History: She had some wrist pain overnight which is now resolved.  She has some urinary frequency but no dysuria and thinks it may be related to the stent placement.  Afebrile.  Tolerating oral antibiotics.  Hospitalist note reviewed and hoping for discharge soon.  Discussed with  who thinks her pain is also improved.  Vital Signs  Temp:  [97 °F (36.1 °C)-98.1 °F (36.7 °C)] 97 °F (36.1 °C)  Heart Rate:  [68-78] 74  Resp:  [16] 16  BP: (119-149)/(68-78) 149/78    Physical Exam:  GENERAL: Awake and alert sickly  HEENT: Oropharynx is clear. Hearing is grossly normal.   EYES: . No conjunctival injection. No lid lag.   LUNGS:normal respiratory effort.   SKIN: no cutaneous eruptions in exposed areas  PSYCHIATRIC: Appropriate mood, affect, insight, and judgment.     Potassium 4.2  Microbiology:  7/29 BCx negative x 2  7/29 right hip cultures negative  7/27 UCx >100K Pseudomonas  7/27 BCx Pseudomonas x 2    Assessment and plan  Pseudomonal septicemia   Right SI joint septic arthritis with possible abscess  Iliopsoas bursitis  Myositis  History of kidney stone status post left ureteral stent placement with stent exchange on July 25  Status post right hip arthroplasty in May now with acute pain  Recent history of C. difficile colitis    Pain is slowly improving.  White count back to normal at 10.2 yesterday.  She is afebrile.  Sepsis well-controlled with negative repeat blood cultures.    Neurosurgery here and orthopedics at [facility] both recommended medical management.  Appreciate [facility] assistance an in coordinating care.      She is on oral levofloxacin 750 mg p.o. every 24 hours which she will continue until she sees Dr. Du on August 30.    Discussed with Dr. Vines regarding impression and plan.    Nothing more to add from my perspective and we will sign off

## 2024-08-07 NOTE — CASE MANAGEMENT/SOCIAL WORK
Continued Stay Note  [facility]     Patient Name: Anastasiia Faria  MRN: [id number]  Today's Date: 8/7/2024    Admit Date: 7/27/2024    Plan: Pending   Discharge Plan       Row Name 08/07/24 1331       Plan    Plan Pending    Patient/Family in Agreement with Plan yes    Plan Comments CCP called  Spine and spoke with Moraima who recommended CCP fax referral to another fax number ([phone]). CCP called this afternoon to check on status of referral and was told that it can take a couple of days for the referral to get routed to the right department and then triage will review the referral and follow up. CCP sent a secure chat to Dr. Vines regarding the above information. CCP to follow. CD, KENDRAW.                   Discharge Codes    No documentation.                 Expected Discharge Date and Time       Expected Discharge Date Expected Discharge Time    Aug 7, 2024

## 2024-08-08 ENCOUNTER — TRANSITIONAL CARE MANAGEMENT TELEPHONE ENCOUNTER (OUTPATIENT)
Dept: CALL CENTER | Facility: HOSPITAL | Age: 74
End: 2024-08-08
Payer: MEDICARE

## 2024-08-08 NOTE — OUTREACH NOTE
Call Center TCM Note      Flowsheet Row Responses   [facility] patient discharged from? [place]   Does the patient have one of the following disease processes/diagnoses(primary or secondary)? Other   TCM attempt successful? Yes   Call start time 1037   Call end time 1042   Discharge diagnosis Bacterial infection due to Pseudomonas   Meds reviewed with patient/caregiver? Yes   Is the patient having any side effects they believe may be caused by any medication additions or changes? No   Does the patient have all medications ordered at discharge? Yes   Is the patient taking all medications as directed (includes completed medication regime)? Yes   Comments Hosp dc fu apt on 8/16/24 with PCP   Does the patient have an appointment with their PCP within 7-14 days of discharge? Yes   What is the Home health agency?  [facility]   Psychosocial issues? No   Did the patient receive a copy of their discharge instructions? Yes   Nursing interventions Reviewed instructions with patient   What is the patient's perception of their health status since discharge? Improving   Is the patient/caregiver able to teach back signs and symptoms related to disease process for when to call PCP? Yes   Is the patient/caregiver able to teach back signs and symptoms related to disease process for when to call 911? Yes   Is the patient/caregiver able to teach back the hierarchy of who to call/visit for symptoms/problems? PCP, Specialist, Home health nurse, Urgent Care, ED, 911 Yes   If the patient is a current smoker, are they able to teach back resources for cessation? Not a smoker   TCM call completed? Yes   Call end time 1042            Rebecca Cortez RN    8/8/2024, 10:42 EDT

## 2024-08-08 NOTE — PROGRESS NOTES
Case Management Discharge Note      Final Note: Home with [facility]. Emilie Baez, RN    Provided Post Acute Provider List?: Refused  Refused Provider List Comment: declined need    Selected Continued Care - Discharged on 8/7/2024 Admission date: 7/27/2024 - Discharge disposition: Home or Self Care          Home Medical Care Coordination complete.      Service Provider Selected Services Address Phone Fax Patient Preferred     [facility] ,  Home Nursing ,  Home Rehabilitation [address] [address] [phone] --                 Selected Continued Care - Prior Encounters Includes continued care and service providers with selected services from prior encounters from 4/28/2024 to 8/7/2024      Discharged on 5/14/2024 Admission date: 5/13/2024 - Discharge disposition: Home or Self Care      Home Medical Care       Service Provider Selected Services Address Phone Fax Patient Preferred    [facility] [address] [id number] [phone] --                          Transportation Services  Private: Car    Final Discharge Disposition Code: 06 - home with home health care

## 2024-08-15 ENCOUNTER — TELEPHONE (OUTPATIENT)
Dept: OBSTETRICS AND GYNECOLOGY | Facility: CLINIC | Age: 74
End: 2024-08-15
Payer: MEDICARE

## 2024-08-16 ENCOUNTER — OFFICE VISIT (OUTPATIENT)
Dept: FAMILY MEDICINE CLINIC | Facility: CLINIC | Age: 74
End: 2024-08-16
Payer: MEDICARE

## 2024-08-16 VITALS
HEIGHT: 68 IN | HEART RATE: 90 BPM | RESPIRATION RATE: 18 BRPM | OXYGEN SATURATION: 96 % | BODY MASS INDEX: 26.67 KG/M2 | WEIGHT: 176 LBS | DIASTOLIC BLOOD PRESSURE: 60 MMHG | SYSTOLIC BLOOD PRESSURE: 110 MMHG

## 2024-08-16 DIAGNOSIS — M00.9: ICD-10-CM

## 2024-08-16 PROCEDURE — 3078F DIAST BP <80 MM HG: CPT | Performed by: FAMILY MEDICINE

## 2024-08-16 PROCEDURE — 99495 TRANSJ CARE MGMT MOD F2F 14D: CPT | Performed by: FAMILY MEDICINE

## 2024-08-16 PROCEDURE — 1111F DSCHRG MED/CURRENT MED MERGE: CPT | Performed by: FAMILY MEDICINE

## 2024-08-16 PROCEDURE — 3074F SYST BP LT 130 MM HG: CPT | Performed by: FAMILY MEDICINE

## 2024-08-16 PROCEDURE — 1125F AMNT PAIN NOTED PAIN PRSNT: CPT | Performed by: FAMILY MEDICINE

## 2024-08-16 RX ORDER — HYDROCODONE BITARTRATE AND ACETAMINOPHEN 10; 325 MG/1; MG/1
1 TABLET ORAL EVERY 4 HOURS PRN
Qty: 45 TABLET | Refills: 0 | Status: SHIPPED | OUTPATIENT
Start: 2024-08-16 | End: 2024-09-13

## 2024-08-16 RX ORDER — PHENAZOPYRIDINE HYDROCHLORIDE 200 MG/1
1 TABLET, FILM COATED ORAL 3 TIMES DAILY
COMMUNITY
Start: 2024-08-11

## 2024-08-16 NOTE — PROGRESS NOTES
Chief Complaint  Chief Complaint   Patient presents with    Hospital Follow Up Visit     Pt here for f/u of UTI and infection in joints in lower back        Transitional Care Progress Note        Subjective    History of Present Illness        Anastasiia Faria is a 74 y.o. female who presents for a transitional care management visit.    Within 48 business hours after discharge our office contacted her via telephone to coordinate her care and needs.      I reviewed and discussed the details of that call along with the discharge summary, hospital problems, inpatient lab results, inpatient diagnostic studies, and consultation reports with \[facility]\"".     Current outpatient and discharge medications have been reconciled for the patient.  Reviewed by: Mirza Scott Sr., MD          8/7/2024     6:28 PM   Date of TCM Phone Call   [facility]   Date of Admission 7/27/2024   Date of Discharge 8/7/2024   Discharge Disposition Home or Self Care     Risk for Readmission (LACE) No data recorded      Anastasiia Faria presents to [facility] for   History of Present Illness  The patient is a 74-year-old female who presents for evaluation of multiple medical concerns.    She was hospitalized for 12 days due to severe pain in her thighs, legs, and hips. Despite having a hip replacement, the pain persisted. The diagnosis was an infection in the sacroiliac joint in her back, with the pain radiating down her leg to her knee and into her buttocks. The left leg is more affected than the right. Surgery was considered but ultimately decided against. Instead, she was prescribed long-term antibiotics for 4 to 6 weeks.  She has an appointment with the infectious disease doctor and has enough antibiotics until then.  She believes that once the antibiotics take effect, her pain will improve and she will not need any medication.    She also has a kidney stent from a few days prior, which was inserted due to  "kidney stones and sepsis.  She underwent lithotripsy with a laser to break up the kidney stones. Two days later, she was admitted for the back pain.  She has not been able to follow up with the urologist and has an appointment on Tuesday to get the stent removed.  She believes that once the stent is removed, she will feel better and will not need as much medication.    She experiences significant pain when getting up and down, and when going to the restroom.  Her  assists her in and out of bed, but the pain is so severe that she now sleeps in a recliner.  Her right leg has improved since leaving the hospital, but both legs are swollen and painful.  She has been rationing his hydrocodone, taking it only when necessary, and has about 2 or 3 pills left.  She was prescribed hydrocodone for the infection, as she was unable to move without it.  She was given a 3-day supply of hydrocodone on 08/07/2024.  She was also taking a muscle relaxer prescribed by Dr. Scott, but it seemed to worsen his symptoms, causing increased urination and stomach upset.    She has had fluid in her lungs a couple of times but does not believe it is a problem as long as she remains vertical.     History of Present Illness      Objective   Vital Signs:   Visit Vitals  /60   Pulse 90   Resp 18   Ht 172.7 cm (67.99\")   Wt 79.8 kg (176 lb)   LMP  (LMP Unknown)   SpO2 96%   BMI 26.77 kg/m²             Physical Exam  Vitals reviewed.   Constitutional:       Appearance: She is well-developed.   HENT:      Head: Normocephalic.      Right Ear: External ear normal.      Left Ear: External ear normal.      Nose: Nose normal.   Eyes:      Conjunctiva/sclera: Conjunctivae normal.   Cardiovascular:      Rate and Rhythm: Normal rate and regular rhythm.   Pulmonary:      Effort: Pulmonary effort is normal.      Breath sounds: Normal breath sounds.   Musculoskeletal:         General: Normal range of motion.      Cervical back: Normal range of motion " and neck supple.   Skin:     General: Skin is warm and dry.      Capillary Refill: Capillary refill takes less than 2 seconds.   Neurological:      Mental Status: She is alert and oriented to person, place, and time.        Physical Exam  Lungs sound good.        Result Review :           Results  Laboratory Studies  Kidney function is good.             Assessment and Plan      Diagnoses and all orders for this visit:    1. Pyogenic arthritis of hip, due to unspecified organism, unspecified laterality  Assessment & Plan:  The infection in the sacroiliac joint is being managed with long-term antibiotics for 4 to 6 weeks. He has an upcoming appointment with the infectious disease doctor to monitor progress. Pain management is crucial, and hydrocodone 10 mg tablets were prescribed. He was instructed to halve the tablets and take 5 mg as needed for pain relief, but not more frequently than every 4 hours.    Orders:  -     HYDROcodone-acetaminophen (NORCO)  MG per tablet; Take 1 tablet by mouth Every 4 (Four) Hours As Needed for Moderate Pain for up to 28 days.  Dispense: 45 tablet; Refill: 0       Assessment & Plan            Follow Up   No follow-ups on file.  Patient was given instructions and counseling regarding her condition or for health maintenance advice. Please see specific information pulled into the AVS if appropriate.

## 2024-08-30 ENCOUNTER — OFFICE VISIT (OUTPATIENT)
Dept: INFECTIOUS DISEASES | Facility: CLINIC | Age: 74
End: 2024-08-30
Payer: MEDICARE

## 2024-08-30 ENCOUNTER — LAB (OUTPATIENT)
Dept: LAB | Facility: HOSPITAL | Age: 74
End: 2024-08-30
Payer: MEDICARE

## 2024-08-30 VITALS
RESPIRATION RATE: 20 BRPM | DIASTOLIC BLOOD PRESSURE: 72 MMHG | TEMPERATURE: 97.3 F | HEART RATE: 78 BPM | SYSTOLIC BLOOD PRESSURE: 121 MMHG

## 2024-08-30 DIAGNOSIS — Z79.2 LONG TERM (CURRENT) USE OF ANTIBIOTICS: ICD-10-CM

## 2024-08-30 DIAGNOSIS — M00.80 ARTHRITIS DUE TO OTHER BACTERIA, SEPTIC ARTHRITIS OF UNSPECIFIED LOCATION: Primary | ICD-10-CM

## 2024-08-30 DIAGNOSIS — M00.80 ARTHRITIS DUE TO OTHER BACTERIA, SEPTIC ARTHRITIS OF UNSPECIFIED LOCATION: ICD-10-CM

## 2024-08-30 LAB
ALBUMIN SERPL-MCNC: 3.8 G/DL (ref 3.5–5.2)
ALBUMIN/GLOB SERPL: 1.4 G/DL
ALP SERPL-CCNC: 114 U/L (ref 39–117)
ALT SERPL W P-5'-P-CCNC: 8 U/L (ref 1–33)
ANION GAP SERPL CALCULATED.3IONS-SCNC: 10.9 MMOL/L (ref 5–15)
AST SERPL-CCNC: 14 U/L (ref 1–32)
BASOPHILS # BLD AUTO: 0.06 10*3/MM3 (ref 0–0.2)
BASOPHILS NFR BLD AUTO: 0.7 % (ref 0–1.5)
BILIRUB SERPL-MCNC: 0.3 MG/DL (ref 0–1.2)
BUN SERPL-MCNC: 14 MG/DL (ref 8–23)
BUN/CREAT SERPL: 13 (ref 7–25)
CALCIUM SPEC-SCNC: 10.1 MG/DL (ref 8.6–10.5)
CHLORIDE SERPL-SCNC: 101 MMOL/L (ref 98–107)
CO2 SERPL-SCNC: 27.1 MMOL/L (ref 22–29)
CREAT SERPL-MCNC: 1.08 MG/DL (ref 0.57–1)
CRP SERPL-MCNC: 0.76 MG/DL (ref 0–0.5)
DEPRECATED RDW RBC AUTO: 45.9 FL (ref 37–54)
EGFRCR SERPLBLD CKD-EPI 2021: 54 ML/MIN/1.73
EOSINOPHIL # BLD AUTO: 0.11 10*3/MM3 (ref 0–0.4)
EOSINOPHIL NFR BLD AUTO: 1.3 % (ref 0.3–6.2)
ERYTHROCYTE [DISTWIDTH] IN BLOOD BY AUTOMATED COUNT: 13.6 % (ref 12.3–15.4)
ERYTHROCYTE [SEDIMENTATION RATE] IN BLOOD: 21 MM/HR (ref 0–30)
GLOBULIN UR ELPH-MCNC: 2.8 GM/DL
GLUCOSE SERPL-MCNC: 99 MG/DL (ref 65–99)
HCT VFR BLD AUTO: 37.9 % (ref 34–46.6)
HGB BLD-MCNC: 12.3 G/DL (ref 12–15.9)
IMM GRANULOCYTES # BLD AUTO: 0.02 10*3/MM3 (ref 0–0.05)
IMM GRANULOCYTES NFR BLD AUTO: 0.2 % (ref 0–0.5)
LYMPHOCYTES # BLD AUTO: 2.19 10*3/MM3 (ref 0.7–3.1)
LYMPHOCYTES NFR BLD AUTO: 26.5 % (ref 19.6–45.3)
MCH RBC QN AUTO: 29.7 PG (ref 26.6–33)
MCHC RBC AUTO-ENTMCNC: 32.5 G/DL (ref 31.5–35.7)
MCV RBC AUTO: 91.5 FL (ref 79–97)
MONOCYTES # BLD AUTO: 0.85 10*3/MM3 (ref 0.1–0.9)
MONOCYTES NFR BLD AUTO: 10.3 % (ref 5–12)
NEUTROPHILS NFR BLD AUTO: 5.04 10*3/MM3 (ref 1.7–7)
NEUTROPHILS NFR BLD AUTO: 61 % (ref 42.7–76)
NRBC BLD AUTO-RTO: 0 /100 WBC (ref 0–0.2)
PLATELET # BLD AUTO: 358 10*3/MM3 (ref 140–450)
PMV BLD AUTO: 8.2 FL (ref 6–12)
POTASSIUM SERPL-SCNC: 3.8 MMOL/L (ref 3.5–5.2)
PROT SERPL-MCNC: 6.6 G/DL (ref 6–8.5)
RBC # BLD AUTO: 4.14 10*6/MM3 (ref 3.77–5.28)
SODIUM SERPL-SCNC: 139 MMOL/L (ref 136–145)
WBC NRBC COR # BLD AUTO: 8.27 10*3/MM3 (ref 3.4–10.8)

## 2024-08-30 PROCEDURE — 86140 C-REACTIVE PROTEIN: CPT

## 2024-08-30 PROCEDURE — 85025 COMPLETE CBC W/AUTO DIFF WBC: CPT

## 2024-08-30 PROCEDURE — 99214 OFFICE O/P EST MOD 30 MIN: CPT | Performed by: INTERNAL MEDICINE

## 2024-08-30 PROCEDURE — 80053 COMPREHEN METABOLIC PANEL: CPT

## 2024-08-30 PROCEDURE — 3078F DIAST BP <80 MM HG: CPT | Performed by: INTERNAL MEDICINE

## 2024-08-30 PROCEDURE — 36415 COLL VENOUS BLD VENIPUNCTURE: CPT

## 2024-08-30 PROCEDURE — 85652 RBC SED RATE AUTOMATED: CPT

## 2024-08-30 PROCEDURE — 3074F SYST BP LT 130 MM HG: CPT | Performed by: INTERNAL MEDICINE

## 2024-08-30 RX ORDER — LEVOFLOXACIN 750 MG/1
750 TABLET, FILM COATED ORAL EVERY 24 HOURS
Qty: 30 TABLET | Refills: 0 | Status: SHIPPED | OUTPATIENT
Start: 2024-08-30 | End: 2024-09-29

## 2024-08-30 NOTE — PROGRESS NOTES
Reason for clinic visits: Follow up for right SI joint septic arthritis    HPI: Anastasiia Faria is a 74 y.o. female who was last seen in the hospital on August 7.  Since that time she has completed 4-week course of oral Levaquin.  She continues to have back pain although it has improved a little.  She denies any fevers chills or night sweats.  She denies any abdominal pain nausea or vomiting.  The ureteral stent she had in place was removed 10 days ago and since that time she has been experiencing urinary frequency.  She denies any dysuria and suprapubic tenderness    Past Medical History:   Diagnosis Date    Allergic     Anxiety     Chronic kidney disease     Clostridium difficile infection 06/2024    TREATED AT [facility]    Colon polyps     Depression     Diverticulosis 2011    Encounter for annual health examination 03/07/2014    Annual Health Assessment    Family history of colon cancer     Fibrocystic breast     GERD (gastroesophageal reflux disease)     Hard of hearing     HEARING AIDS BILATERALLY    Heart murmur     Herpes labialis without complication     History of cataract     History of mammogram 12/20/2010    History of recent hospitalization 03/21/2024    KIDNEY STONE/SEPSIS 4 NIGHT AT [facility]    HL (hearing loss) 2014    Hearing Aids    Hydronephrosis     Hyperlipidemia     Hypertension     Hypothyroidism     Insomnia     Kidney stones     Migraines     Osteoarthritis of right hip     Osteopenia     Prolia -last 9/2021,3/2022    PONV (postoperative nausea and vomiting)     Scoliosis     Dr. Stanford 5/2018    Sepsis 03/2024    Slow to wake up after anesthesia     Urinary tract infection     3/2024,  6/202    Visual impairment     Wear Glasses       Past Surgical History:   Procedure Laterality Date    BONE MARROW BIOPSY Left     BREAST CYST ASPIRATION Right     BREAST SURGERY Right     BIOPSY    CATARACT EXTRACTION, BILATERAL      Cataract surgery on right eye June 2021 and left eye July 2021. (  Nanette Mary    COLONOSCOPY N/A 10/27/2016    Procedure: COLONOSCOPY INTO CECUM;  Surgeon: Osvaldo Dorado MD;  Location: [facility] ENDOSCOPY;  Service:     COLONOSCOPY  01/26/2011    COLONOSCOPY  02/04/2005    COLONOSCOPY N/A 12/02/2021    Procedure: COLONOSCOPY INTO CECUM WITH COLD BIOPSY POLYPECTOMY AND HOT SNARE POLYPECTOMY;  Surgeon: Osvaldo Dorado MD;  [facility];  Service: General;  Laterality: N/A;  PRE-FAMILY HISTORY OF COLON CANCER, PERSONAL HISTORY OF COLON CANCER  POST- POLYPS, DIVERTICULOSIS, HEMORRHOIDS    CYSTOSCOPY W/ URETERAL STENT PLACEMENT Left 03/21/2024    Procedure: LEFT CYSTOSCOPY RETROGRADE PYLEOGRAM URETERAL STENT INSERTION;  Surgeon: Jaiden Bolton Jr., MD;  Location: [facility] OR;  Service: Urology;  Laterality: Left;    CYSTOSCOPY W/ URETERAL STENT REMOVAL  04/23/2024    CYSTOSCOPY, RETROGRADE PYELOGRAM AND STENT INSERTION Left 06/14/2024    Procedure: CYSTOSCOPY RETROGRADE PYELOGRAM AND STENT INSERTION;  Surgeon: Yahir Faria MD;  Location: [facility] OR;  Service: Urology;  Laterality: Left;    JOINT REPLACEMENT  5/2024    Right Hip   Dr. Vasu King    KNEE ARTHROSCOPY Left     PAP SMEAR  12/20/2010    TOTAL HIP ARTHROPLASTY Right 05/13/2024    Procedure: TOTAL HIP ARTHROPLASTY ANTERIOR WITH HANA TABLE;  Surgeon: Vasu King MD;  Location: [facility];  Service: Orthopedics;  Laterality: Right;    URETEROSCOPY LASER LITHOTRIPSY WITH STENT INSERTION Left 7/25/2024    Procedure: CYSTOSCOPY, LEFT URETEROSCOPY, LASER LITHOTRIPSY, STENT EXCHANGE;  Surgeon: Jaiden Bolton Jr., MD;  Location: [facility] OR;  Service: Urology;  Laterality: Left;       Social History   reports that she has never smoked. She has never used smokeless tobacco. She reports that she does not drink alcohol and does not use drugs.    Family History  family history includes Breast cancer in her maternal grandfather, maternal grandmother, and mother; Cancer  in her brother, father, maternal aunt, maternal aunt, maternal aunt, maternal grandmother, maternal uncle, and mother; Colon cancer in her maternal aunt, maternal aunt, maternal aunt, mother, and other family members; Diabetes in her brother; Diabetes type II in her brother; Heart attack in her maternal grandfather; Heart disease in her maternal aunt, maternal grandfather, maternal grandmother, maternal uncle, and paternal grandmother; Hyperlipidemia in her mother and sister; Hypertension in her mother, sister, and sister; Stomach cancer in her maternal grandmother; Thyroid disease in her sister; Vision loss in her maternal grandfather.    No Known Allergies    The medication list has been reviewed and updated.     Review of Systems  Pertinent items are noted in HPI, all other systems reviewed and negative    Vital Signs   Temp:  [97.3 °F (36.3 °C)] 97.3 °F (36.3 °C)  Heart Rate:  [78] 78  Resp:  [20] 20  BP: (121)/(72) 121/72    Physical Exam:   General: In no acute distress   Respiratory: Breathing comfortably on room air  Neurological: Alert and oriented, moving all 4 extremities  Psychiatric: Normal mood and affect     Lab Results   Component Value Date    WBC 8.27 08/30/2024    HGB 12.3 08/30/2024    HCT 37.9 08/30/2024    MCV 91.5 08/30/2024     08/30/2024       Lab Results   Component Value Date    GLUCOSE 99 08/30/2024    BUN 14 08/30/2024    CREATININE 1.08 (H) 08/30/2024    EGFRIFNONA 52 (L) 09/22/2021    EGFRIFAFRI 67 11/01/2019    BCR 13.0 08/30/2024    CO2 27.1 08/30/2024    CALCIUM 10.1 08/30/2024    PROTENTOTREF 6.4 11/01/2019    ALBUMIN 3.8 08/30/2024    LABIL2 2.2 11/01/2019    AST 14 08/30/2024    ALT 8 08/30/2024       Lab Results   Component Value Date    SEDRATE 21 08/30/2024       Lab Results   Component Value Date    CRP 0.76 (H) 08/30/2024 7/29 BCx negative x 2  7/29 right hip cultures negative  7/27 UCx >100K Pseudomonas  7/27 BCx Pseudomonas x 2    Assessment:  This is a 74 y.o.  female who presents to clinic today for follow up right SI joint septic arthritis in the setting of pseudomonal bacteremia.  The patient is scheduled to be seen by surgery in October.  Despite 4 weeks of antibiotics she continues to have significant pain although she states is a little bit better.  I would like to give her another 4-week course of Levaquin at which point we will consider repeat imaging.  I would like to obtain baseline labs and inflammatory markers today.    Plan:   Extend Levaquin 750 mg p.o. daily for another 4 weeks  Obtain a CBC with differential and CMP  Check an ESR and CRP  Return to Infectious Disease clinic in 4 weeks

## 2024-09-02 PROBLEM — M00.9: Status: ACTIVE | Noted: 2024-09-02

## 2024-09-02 NOTE — ASSESSMENT & PLAN NOTE
The infection in the sacroiliac joint is being managed with long-term antibiotics for 4 to 6 weeks. He has an upcoming appointment with the infectious disease doctor to monitor progress. Pain management is crucial, and hydrocodone 10 mg tablets were prescribed. He was instructed to halve the tablets and take 5 mg as needed for pain relief, but not more frequently than every 4 hours.

## 2024-09-16 ENCOUNTER — TELEPHONE (OUTPATIENT)
Dept: FAMILY MEDICINE CLINIC | Facility: CLINIC | Age: 74
End: 2024-09-16

## 2024-09-16 ENCOUNTER — READMISSION MANAGEMENT (OUTPATIENT)
Dept: CALL CENTER | Facility: HOSPITAL | Age: 74
End: 2024-09-16
Payer: MEDICARE

## 2024-09-16 NOTE — TELEPHONE ENCOUNTER
Caller: Anastasiia Faria    Relationship to patient: Self    Best call back number: [phone]    New or established patient?  [] New  [x] Established    Date of discharge: *09/13/24    Facility discharged from: [facility]    Diagnosis/Symptoms: SPINE INFECTION    Length of stay (If applicable): 4 DAYS

## 2024-09-17 ENCOUNTER — TRANSITIONAL CARE MANAGEMENT TELEPHONE ENCOUNTER (OUTPATIENT)
Dept: CALL CENTER | Facility: HOSPITAL | Age: 74
End: 2024-09-17
Payer: MEDICARE

## 2024-09-19 ENCOUNTER — OFFICE VISIT (OUTPATIENT)
Dept: FAMILY MEDICINE CLINIC | Facility: CLINIC | Age: 74
End: 2024-09-19
Payer: MEDICARE

## 2024-09-19 VITALS
HEIGHT: 68 IN | RESPIRATION RATE: 18 BRPM | WEIGHT: 176 LBS | SYSTOLIC BLOOD PRESSURE: 110 MMHG | BODY MASS INDEX: 26.67 KG/M2 | OXYGEN SATURATION: 95 % | DIASTOLIC BLOOD PRESSURE: 60 MMHG | HEART RATE: 68 BPM

## 2024-09-19 DIAGNOSIS — M54.50 LOW BACK PAIN, UNSPECIFIED BACK PAIN LATERALITY, UNSPECIFIED CHRONICITY, UNSPECIFIED WHETHER SCIATICA PRESENT: ICD-10-CM

## 2024-09-19 DIAGNOSIS — M46.47 DISCITIS OF LUMBOSACRAL REGION: ICD-10-CM

## 2024-09-19 DIAGNOSIS — R60.0 BILATERAL LOWER EXTREMITY EDEMA: Primary | ICD-10-CM

## 2024-09-19 RX ORDER — FUROSEMIDE 20 MG
20 TABLET ORAL DAILY
Qty: 30 TABLET | Refills: 3 | Status: SHIPPED | OUTPATIENT
Start: 2024-09-19

## 2024-09-19 RX ORDER — HYDROCODONE BITARTRATE AND ACETAMINOPHEN 5; 325 MG/1; MG/1
1 TABLET ORAL EVERY 6 HOURS PRN
COMMUNITY
Start: 2024-09-14 | End: 2024-09-19 | Stop reason: SDUPTHER

## 2024-09-19 RX ORDER — HYDROCODONE BITARTRATE AND ACETAMINOPHEN 5; 325 MG/1; MG/1
1 TABLET ORAL EVERY 6 HOURS PRN
Qty: 45 TABLET | Refills: 0 | Status: SHIPPED | OUTPATIENT
Start: 2024-09-19

## 2024-09-19 NOTE — PROGRESS NOTES
Chief Complaint  Chief Complaint   Patient presents with    Hospital Follow Up Visit     Pt here for f/u of edema in ankles and feet. Moving up to legs        Transitional Care Progress Note        Subjective    History of Present Illness        Anastasiia Faria is a 74 y.o. female who presents for a transitional care management visit.    Within 48 business hours after discharge our office contacted her via telephone to coordinate her care and needs.      I reviewed and discussed the details of that call along with the discharge summary, hospital problems, inpatient lab results, inpatient diagnostic studies, and consultation reports with Anastasiia.     Current outpatient and discharge medications have been reconciled for the patient.  Reviewed by: Mirza Scott Sr., MD          9/25/2024     5:02 PM   Date of TCM Phone Call   [facility]   Date of Admission 9/24/2024   Date of Discharge 9/25/2024   Discharge Disposition Home or Self Care     Risk for Readmission (LACE) Score: 3 (9/25/2024  6:00 AM)      Anastasiia Faria presents to [facility] for   History of Present Illness  Discitis (Discharge Diagnosis) - 9/9/24  Osteomyelitis (Discharge Diagnosis) - 9/9/24  Hydronephrosis, left (Discharge Diagnosis) - 9/9/24  Nephrolithiasis (Discharge Diagnosis) - 9/9/24    The patient is a 74-year-old female who presents for evaluation of multiple medical concerns.    She reports significant swelling in her legs, which has been affecting her mobility due to the associated pain during walking. She also experiences difficulty in getting in and out of bed. Despite using compression socks as previously suggested, she noticed an increase in swelling in her foot and ankle, leading her to discontinue their use.    She was prescribed hydrocodone in August 2023, which she finds effective in managing her pain. She typically takes one pill at night and still has a few pills left from the previous  "prescription. Additionally, she was given a three-day supply of the medication upon her discharge from the hospital.    She had a new patient appointment at [facility] to address spine infection pain. After experiencing a particularly bad weekend, she called that office. Since she had not yet been seen, they advised her to go to the emergency room instead. She will see the infectious disease doctor on 10/02/2024 to assess the infection. She has a new patient appointment in mid-10/2024 with the spine doctors and another appointment in early 11/2024. Additionally, she has three appointments in 10/2024 with a urologist for an ultrasound, a consultation, and a scope.     History of Present Illness      Objective   Vital Signs:   Visit Vitals  /60   Pulse 68   Resp 18   Ht 172.7 cm (67.99\")   Wt 79.8 kg (176 lb)   LMP  (LMP Unknown)   SpO2 95%   BMI 26.77 kg/m²             Physical Exam  Vitals reviewed.   Constitutional:       Appearance: She is well-developed.   HENT:      Head: Normocephalic.      Right Ear: External ear normal.      Left Ear: External ear normal.      Nose: Nose normal.   Eyes:      Conjunctiva/sclera: Conjunctivae normal.   Cardiovascular:      Rate and Rhythm: Normal rate and regular rhythm.   Pulmonary:      Effort: Pulmonary effort is normal.      Breath sounds: Normal breath sounds.   Musculoskeletal:         General: Normal range of motion.      Cervical back: Normal range of motion and neck supple.   Skin:     General: Skin is warm and dry.      Capillary Refill: Capillary refill takes less than 2 seconds.   Neurological:      Mental Status: She is alert and oriented to person, place, and time.        Physical Exam          Result Review :           Results               Assessment and Plan      Diagnoses and all orders for this visit:    1. Bilateral lower extremity edema (Primary)  Assessment & Plan:  Lasix 20 mg once daily in the morning has been prescribed to help reduce the " swelling. She is advised to monitor her weight daily to track changes. If there is insufficient diuretic effect, the dosage of Lasix may be increased.    Orders:  -     furosemide (LASIX) 20 MG tablet; Take 1 tablet by mouth Daily.  Dispense: 30 tablet; Refill: 3    2. Low back pain, unspecified back pain laterality, unspecified chronicity, unspecified whether sciatica present  Assessment & Plan:  Her infection and she was given 4 hydrocodone to control her pain.  Patient is encouraged return to clinic months for follow-up    Orders:  -     HYDROcodone-acetaminophen (NORCO) 5-325 MG per tablet; Take 1 tablet by mouth Every 6 (Six) Hours As Needed for Moderate Pain.  Dispense: 45 tablet; Refill: 0    3. Discitis of lumbosacral region  Assessment & Plan:  Hospital records reviewed.  Her symptoms are improved since being in the hospital.  Patient is encouraged to return to clinic if her symptoms do not improve.         Assessment & Plan            Follow Up   No follow-ups on file.  Patient was given instructions and counseling regarding her condition or for health maintenance advice. Please see specific information pulled into the AVS if appropriate.

## 2024-09-24 ENCOUNTER — HOSPITAL ENCOUNTER (OUTPATIENT)
Facility: HOSPITAL | Age: 74
Setting detail: OBSERVATION
Discharge: HOME | End: 2024-09-25
Attending: EMERGENCY MEDICINE | Admitting: EMERGENCY MEDICINE
Payer: MEDICARE

## 2024-09-24 DIAGNOSIS — R22.43 LOCALIZED SWELLING OF BOTH LOWER LEGS: Primary | ICD-10-CM

## 2024-09-24 PROBLEM — M79.89 SWELLING OF LOWER LEG: Status: ACTIVE | Noted: 2024-09-24

## 2024-09-24 LAB
ALBUMIN SERPL-MCNC: 3.7 G/DL (ref 3.5–5.2)
ALBUMIN/GLOB SERPL: 1.7 G/DL
ALP SERPL-CCNC: 93 U/L (ref 39–117)
ALT SERPL W P-5'-P-CCNC: <5 U/L (ref 1–33)
ANION GAP SERPL CALCULATED.3IONS-SCNC: 8 MMOL/L (ref 5–15)
AST SERPL-CCNC: 10 U/L (ref 1–32)
BASOPHILS # BLD AUTO: 0.04 10*3/MM3 (ref 0–0.2)
BASOPHILS NFR BLD AUTO: 0.7 % (ref 0–1.5)
BILIRUB SERPL-MCNC: 0.4 MG/DL (ref 0–1.2)
BILIRUB UR QL STRIP: NEGATIVE
BUN SERPL-MCNC: 17 MG/DL (ref 8–23)
BUN/CREAT SERPL: 18.1 (ref 7–25)
CALCIUM SPEC-SCNC: 9.9 MG/DL (ref 8.6–10.5)
CHLORIDE SERPL-SCNC: 104 MMOL/L (ref 98–107)
CLARITY UR: CLEAR
CO2 SERPL-SCNC: 29 MMOL/L (ref 22–29)
COLOR UR: YELLOW
CREAT SERPL-MCNC: 0.94 MG/DL (ref 0.57–1)
DEPRECATED RDW RBC AUTO: 47.6 FL (ref 37–54)
EGFRCR SERPLBLD CKD-EPI 2021: 63.8 ML/MIN/1.73
EOSINOPHIL # BLD AUTO: 0.17 10*3/MM3 (ref 0–0.4)
EOSINOPHIL NFR BLD AUTO: 3 % (ref 0.3–6.2)
ERYTHROCYTE [DISTWIDTH] IN BLOOD BY AUTOMATED COUNT: 14.3 % (ref 12.3–15.4)
GLOBULIN UR ELPH-MCNC: 2.2 GM/DL
GLUCOSE SERPL-MCNC: 111 MG/DL (ref 65–99)
GLUCOSE UR STRIP-MCNC: NEGATIVE MG/DL
HCT VFR BLD AUTO: 35.6 % (ref 34–46.6)
HGB BLD-MCNC: 11.5 G/DL (ref 12–15.9)
HGB UR QL STRIP.AUTO: NEGATIVE
IMM GRANULOCYTES # BLD AUTO: 0.01 10*3/MM3 (ref 0–0.05)
IMM GRANULOCYTES NFR BLD AUTO: 0.2 % (ref 0–0.5)
KETONES UR QL STRIP: NEGATIVE
LEUKOCYTE ESTERASE UR QL STRIP.AUTO: NEGATIVE
LYMPHOCYTES # BLD AUTO: 2.01 10*3/MM3 (ref 0.7–3.1)
LYMPHOCYTES NFR BLD AUTO: 35 % (ref 19.6–45.3)
MCH RBC QN AUTO: 29.6 PG (ref 26.6–33)
MCHC RBC AUTO-ENTMCNC: 32.3 G/DL (ref 31.5–35.7)
MCV RBC AUTO: 91.8 FL (ref 79–97)
MONOCYTES # BLD AUTO: 0.79 10*3/MM3 (ref 0.1–0.9)
MONOCYTES NFR BLD AUTO: 13.7 % (ref 5–12)
NEUTROPHILS NFR BLD AUTO: 2.73 10*3/MM3 (ref 1.7–7)
NEUTROPHILS NFR BLD AUTO: 47.4 % (ref 42.7–76)
NITRITE UR QL STRIP: NEGATIVE
NRBC BLD AUTO-RTO: 0 /100 WBC (ref 0–0.2)
NT-PROBNP SERPL-MCNC: 324 PG/ML (ref 0–900)
PH UR STRIP.AUTO: 7 [PH] (ref 5–8)
PLATELET # BLD AUTO: 318 10*3/MM3 (ref 140–450)
PMV BLD AUTO: 9 FL (ref 6–12)
POTASSIUM SERPL-SCNC: 3.6 MMOL/L (ref 3.5–5.2)
PROT SERPL-MCNC: 5.9 G/DL (ref 6–8.5)
PROT UR QL STRIP: NEGATIVE
RBC # BLD AUTO: 3.88 10*6/MM3 (ref 3.77–5.28)
SODIUM SERPL-SCNC: 141 MMOL/L (ref 136–145)
SP GR UR STRIP: 1.01 (ref 1–1.03)
UROBILINOGEN UR QL STRIP: NORMAL
WBC NRBC COR # BLD AUTO: 5.75 10*3/MM3 (ref 3.4–10.8)

## 2024-09-24 PROCEDURE — 25010000002 FUROSEMIDE PER 20 MG: Performed by: PHYSICIAN ASSISTANT

## 2024-09-24 PROCEDURE — 99214 OFFICE O/P EST MOD 30 MIN: CPT | Performed by: INTERNAL MEDICINE

## 2024-09-24 PROCEDURE — 96376 TX/PRO/DX INJ SAME DRUG ADON: CPT

## 2024-09-24 PROCEDURE — 25010000002 FUROSEMIDE PER 20 MG: Performed by: NURSE PRACTITIONER

## 2024-09-24 PROCEDURE — 99285 EMERGENCY DEPT VISIT HI MDM: CPT

## 2024-09-24 PROCEDURE — G0378 HOSPITAL OBSERVATION PER HR: HCPCS

## 2024-09-24 PROCEDURE — 80053 COMPREHEN METABOLIC PANEL: CPT | Performed by: NURSE PRACTITIONER

## 2024-09-24 PROCEDURE — 87086 URINE CULTURE/COLONY COUNT: CPT | Performed by: PHYSICIAN ASSISTANT

## 2024-09-24 PROCEDURE — 85025 COMPLETE CBC W/AUTO DIFF WBC: CPT | Performed by: NURSE PRACTITIONER

## 2024-09-24 PROCEDURE — 87040 BLOOD CULTURE FOR BACTERIA: CPT | Performed by: EMERGENCY MEDICINE

## 2024-09-24 PROCEDURE — 81003 URINALYSIS AUTO W/O SCOPE: CPT | Performed by: EMERGENCY MEDICINE

## 2024-09-24 PROCEDURE — 96374 THER/PROPH/DIAG INJ IV PUSH: CPT

## 2024-09-24 PROCEDURE — 83880 ASSAY OF NATRIURETIC PEPTIDE: CPT | Performed by: NURSE PRACTITIONER

## 2024-09-24 RX ORDER — ROSUVASTATIN CALCIUM 20 MG/1
10 TABLET, COATED ORAL NIGHTLY
Status: DISCONTINUED | OUTPATIENT
Start: 2024-09-24 | End: 2024-09-25 | Stop reason: HOSPADM

## 2024-09-24 RX ORDER — ATENOLOL 25 MG/1
25 TABLET ORAL DAILY
Status: DISCONTINUED | OUTPATIENT
Start: 2024-09-24 | End: 2024-09-25 | Stop reason: HOSPADM

## 2024-09-24 RX ORDER — NIFEDIPINE 30 MG/1
30 TABLET, EXTENDED RELEASE ORAL DAILY
Status: DISCONTINUED | OUTPATIENT
Start: 2024-09-24 | End: 2024-09-25 | Stop reason: HOSPADM

## 2024-09-24 RX ORDER — HYDROCODONE BITARTRATE AND ACETAMINOPHEN 7.5; 325 MG/1; MG/1
1 TABLET ORAL EVERY 4 HOURS PRN
Status: DISCONTINUED | OUTPATIENT
Start: 2024-09-24 | End: 2024-09-25 | Stop reason: HOSPADM

## 2024-09-24 RX ORDER — SODIUM CHLORIDE 0.9 % (FLUSH) 0.9 %
10 SYRINGE (ML) INJECTION AS NEEDED
Status: DISCONTINUED | OUTPATIENT
Start: 2024-09-24 | End: 2024-09-25 | Stop reason: HOSPADM

## 2024-09-24 RX ORDER — TAMSULOSIN HYDROCHLORIDE 0.4 MG/1
0.4 CAPSULE ORAL DAILY
Status: DISCONTINUED | OUTPATIENT
Start: 2024-09-24 | End: 2024-09-25 | Stop reason: HOSPADM

## 2024-09-24 RX ORDER — FUROSEMIDE 10 MG/ML
40 INJECTION INTRAMUSCULAR; INTRAVENOUS DAILY
Status: DISCONTINUED | OUTPATIENT
Start: 2024-09-25 | End: 2024-09-25 | Stop reason: HOSPADM

## 2024-09-24 RX ORDER — ESCITALOPRAM OXALATE 20 MG/1
20 TABLET ORAL DAILY
Status: DISCONTINUED | OUTPATIENT
Start: 2024-09-24 | End: 2024-09-25 | Stop reason: HOSPADM

## 2024-09-24 RX ORDER — FUROSEMIDE 10 MG/ML
40 INJECTION INTRAMUSCULAR; INTRAVENOUS ONCE
Status: COMPLETED | OUTPATIENT
Start: 2024-09-24 | End: 2024-09-24

## 2024-09-24 RX ORDER — TAMSULOSIN HYDROCHLORIDE 0.4 MG/1
1 CAPSULE ORAL DAILY
COMMUNITY

## 2024-09-24 RX ORDER — LEVOFLOXACIN 750 MG/1
750 TABLET, FILM COATED ORAL EVERY 24 HOURS
Status: DISCONTINUED | OUTPATIENT
Start: 2024-09-24 | End: 2024-09-25 | Stop reason: HOSPADM

## 2024-09-24 RX ORDER — LEVOTHYROXINE SODIUM 50 UG/1
50 TABLET ORAL
Status: DISCONTINUED | OUTPATIENT
Start: 2024-09-24 | End: 2024-09-25 | Stop reason: HOSPADM

## 2024-09-24 RX ADMIN — HYDROCODONE BITARTRATE AND ACETAMINOPHEN 1 TABLET: 7.5; 325 TABLET ORAL at 06:16

## 2024-09-24 RX ADMIN — LEVOTHYROXINE SODIUM 50 MCG: 50 TABLET ORAL at 06:13

## 2024-09-24 RX ADMIN — ROSUVASTATIN CALCIUM 10 MG: 20 TABLET, FILM COATED ORAL at 19:28

## 2024-09-24 RX ADMIN — ESCITALOPRAM 20 MG: 20 TABLET, FILM COATED ORAL at 09:56

## 2024-09-24 RX ADMIN — LEVOFLOXACIN 750 MG: 750 TABLET, FILM COATED ORAL at 06:13

## 2024-09-24 RX ADMIN — TAMSULOSIN HYDROCHLORIDE 0.4 MG: 0.4 CAPSULE ORAL at 09:57

## 2024-09-24 RX ADMIN — FUROSEMIDE 40 MG: 10 INJECTION, SOLUTION INTRAMUSCULAR; INTRAVENOUS at 19:28

## 2024-09-24 RX ADMIN — NIFEDIPINE 30 MG: 30 TABLET, FILM COATED, EXTENDED RELEASE ORAL at 09:56

## 2024-09-24 RX ADMIN — FUROSEMIDE 40 MG: 10 INJECTION, SOLUTION INTRAMUSCULAR; INTRAVENOUS at 03:17

## 2024-09-24 NOTE — H&P
[place] [place]    Patient Name: Anastasiia Faria  : 1950  MRN: [id number]  Primary Care Physician:  Mirza Scott Sr., MD  Date of admission: 2024    Subjective   Subjective     Chief Complaint:   Chief Complaint   Patient presents with    Leg Swelling         HPI:    Anastasiia Faria is a 74 y.o. female with a past medical history of kidney stones requiring ureteral stent placement, CKD, hypertension, hyperlipidemia, C-Diff, and bacterial infection due to pseudomonas who presented to the emergency department with a complaint of bilateral lower leg edema and pain. Patient states she has had bilateral lower leg edema for approximately 1 month that has significantly worsened over the past couple of days with some mild redness and worsening pain. Patient was seen by her PCP who started her on Lasix 20 mg PO daily, but the swelling has continued to increase. Patient states she was hospitalized at [facility] on - for a \"spine infection\" and states since the admission at [facility] she has been experiencing swelling to her bilateral lower extremities. Patient is currently on Levaquin 750 mg PO daily for treatment of a spine infection. Denies fever, chest pain, or shortness of breath.     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 She has been prescribed Levaquin 750 mg for treatment and is currently taking this medication and is followed by infectious disease, Dr. Du.     Ms. Faria was admitted to the ED Observation Unit for further evaluation and work-up of bilateral lower extremity edema, pain, and mild redness without erythema.    ED work-up: . CMP grossly unremarkable. CBC grossly unremarkable, Hgb 11.5.     On admission to the ED " Observation Unit, patient is alert & oriented X4. Complains of bilateral lower extremity edema and discomfort. Vital signs stable. Afebrile.     Review of Systems   All systems were reviewed and negative except for: as documented in HPI    Personal History     Past Medical History:   Diagnosis Date    Allergic     Anxiety     Chronic kidney disease     Clostridium difficile infection 06/2024    TREATED AT [facility]    Colon polyps     Depression     Diverticulosis 2011    Encounter for annual health examination 03/07/2014    Annual Health Assessment    Family history of colon cancer     Fibrocystic breast     GERD (gastroesophageal reflux disease)     Hard of hearing     HEARING AIDS BILATERALLY    Heart murmur     Herpes labialis without complication     History of cataract     History of mammogram 12/20/2010    History of recent hospitalization 03/21/2024    KIDNEY STONE/SEPSIS 4 NIGHT AT [facility]    HL (hearing loss) 2014    Hearing Aids    Hydronephrosis     Hyperlipidemia     Hypertension     Hypothyroidism     Insomnia     Kidney stones     Migraines     Osteoarthritis of right hip     Osteopenia     Prolia -last 9/2021,3/2022    Pneumonia 3/2024    While being treated for kidney stones and sepsis.    PONV (postoperative nausea and vomiting)     Scoliosis     Dr. Stanford 5/2018    Sepsis 03/2024    Slow to wake up after anesthesia     Urinary tract infection     3/2024,  6/202    Visual impairment     Wear Glasses       Past Surgical History:   Procedure Laterality Date    BONE MARROW BIOPSY Left     BREAST CYST ASPIRATION Right     BREAST SURGERY Right     BIOPSY    CATARACT EXTRACTION, BILATERAL      Cataract surgery on right eye June 2021 and left eye July 2021. (Dr. Nanette Bateman)    COLONOSCOPY N/A 10/27/2016    Procedure: COLONOSCOPY INTO CECUM;  Surgeon: Osvaldo Dorado MD;  Location: [facility] ENDOSCOPY;  Service:     COLONOSCOPY  01/26/2011    COLONOSCOPY  02/04/2005    COLONOSCOPY N/A  12/02/2021    Procedure: COLONOSCOPY INTO CECUM WITH COLD BIOPSY POLYPECTOMY AND HOT SNARE POLYPECTOMY;  Surgeon: Osvaldo Dorado MD;  Location: [facility] ENDOSCOPY;  Service: General;  Laterality: N/A;  PRE-FAMILY HISTORY OF COLON CANCER, PERSONAL HISTORY OF COLON CANCER  POST- POLYPS, DIVERTICULOSIS, HEMORRHOIDS    CYSTOSCOPY W/ URETERAL STENT PLACEMENT Left 03/21/2024    Procedure: LEFT CYSTOSCOPY RETROGRADE PYLEOGRAM URETERAL STENT INSERTION;  Surgeon: Jaiden Bolton Jr., MD;  Location: [facility] MAIN OR;  Service: Urology;  Laterality: Left;    CYSTOSCOPY W/ URETERAL STENT REMOVAL  04/23/2024    CYSTOSCOPY, RETROGRADE PYELOGRAM AND STENT INSERTION Left 06/14/2024    Procedure: CYSTOSCOPY RETROGRADE PYELOGRAM AND STENT INSERTION;  Surgeon: Yahir Faria MD;  Location: [facility] OR;  Service: Urology;  Laterality: Left;    JOINT REPLACEMENT  5/2024    Right Hip   Dr. Vasu King    KNEE ARTHROSCOPY Left     PAP SMEAR  12/20/2010    TOTAL HIP ARTHROPLASTY Right 05/13/2024    Procedure: TOTAL HIP ARTHROPLASTY ANTERIOR WITH HANA TABLE;  Surgeon: Vasu King MD;  Location: [facility] OR OSC;  Service: Orthopedics;  Laterality: Right;    URETEROSCOPY LASER LITHOTRIPSY WITH STENT INSERTION Left 7/25/2024    Procedure: CYSTOSCOPY, LEFT URETEROSCOPY, LASER LITHOTRIPSY, STENT EXCHANGE;  Surgeon: Jaiden Bolton Jr., MD;  Location: [facility] OR;  Service: Urology;  Laterality: Left;       Family History: family history includes Breast cancer in her maternal grandfather, maternal grandmother, and mother; Cancer in her brother, father, maternal aunt, maternal aunt, maternal aunt, maternal grandmother, maternal uncle, and mother; Colon cancer in her maternal aunt, maternal aunt, maternal aunt, mother, and other family members; Diabetes in her brother; Diabetes type II in her brother; Hearing loss in her father; Heart attack in her maternal grandfather; Heart disease in her maternal aunt, maternal  grandfather, maternal grandmother, maternal uncle, and paternal grandmother; Hyperlipidemia in her mother and sister; Hypertension in her mother, sister, and sister; Stomach cancer in her maternal grandmother; Thyroid disease in her sister; Vision loss in her maternal grandfather. Otherwise pertinent FHx was reviewed and not pertinent to current issue.    Social History:  reports that she has never smoked. She has never used smokeless tobacco. She reports that she does not drink alcohol and does not use drugs.    Home Medications:  Fiber, HYDROcodone-acetaminophen, NIFEdipine XL, atenolol, escitalopram, furosemide, levoFLOXacin, levothyroxine, loratadine, rosuvastatin, and tamsulosin    Allergies:  No Known Allergies    Objective   Objective     Vitals:   Temp:  [98.2 °F (36.8 °C)] 98.2 °F (36.8 °C)  Heart Rate:  [76] 76  Resp:  [18] 18  BP: (145)/(71) 145/71  Physical Exam    Constitutional: Awake, alert   Eyes: PERRLA, sclerae anicteric, no conjunctival injection   HENT: NCAT, mucous membranes moist   Neck: Supple, no thyromegaly, no lymphadenopathy, trachea midline   Respiratory: Clear to auscultation bilaterally, nonlabored respirations    Cardiovascular: RRR, no murmurs, rubs, or gallops, palpable pedal pulses bilaterally   Gastrointestinal: Soft, nontender, nondistended   Musculoskeletal: Bilateral lower leg +3 non-pitting edema, with small, few blisters   Psychiatric: Appropriate affect, cooperative   Neurologic: Oriented x 3, strength symmetric in all extremities, Cranial Nerves grossly intact to confrontation, speech clear   Skin: No rashes     Result Review    Result Review:  I have personally reviewed the results from the time of this admission to 9/24/2024 02:37 EDT and agree with these findings:  [x]  Laboratory list / accordion  []  Microbiology  [x]  Radiology  [x]  EKG/Telemetry   []  Cardiology/Vascular   []  Pathology  [x]  Old records  []  Other:  Most notable findings include: BLE  edema      Assessment & Plan   Assessment / Plan     Brief Patient Summary:  Anastasiia Faria is a 74 y.o. female who was admitted to the ED Observation Unit for further evaluation and work-up of bilateral lower extremity edema, pain, and mild redness without erythema.    Active Hospital Problems:  Active Hospital Problems    Diagnosis     **Swelling of lower leg      Plan:     BLE edema/pain/mild redness without erythema  +3 non-pitting edema with few, small blisters. No weeping  Labs unremarkable  Afebrile    Lasix 40 mg IV given X1 dose  Continue Levaquin as prescribed out-patient  Analgesic medication for pain management  Infectious Disease consult    Hypothyroidism  Continue levothyroxine    Hypertension  Continue nifedipine  Hold atenolol  VS q4h      VTE Prophylaxis:  No VTE prophylaxis order currently exists.      CODE STATUS:       Admission Status:  I believe this patient meets observation status.    Electronically signed by ESTHER Angeles, 09/24/24, 2:37 AM EDT.      75 minutes has been spent by [facility] providers in the care of this patient while under observation status      I have worn appropriate PPE during this patient encounter, sanitized my hands both with entering and exiting patient's room.    I have discussed plan of care with patient including advance care plan and/or surrogate decision maker.  Patient advises that their  will be their primary surrogate decision maker

## 2024-09-24 NOTE — PLAN OF CARE
Goal Outcome Evaluation:              Outcome Evaluation: pt is a 74 year old femlae admitted to the obs unit for swelling of the legs, pt is aox4, vss, pt has bilateral leg swelling and redness, pt has +3 edema bilaterally, pt also has some blisters on the leg that are not draining, pt complains of pain, tightness and pain while walking, ID is consulted, pt has no further questions at this time, pt is resting at this time

## 2024-09-24 NOTE — ED PROVIDER NOTES
"MD ATTESTATION NOTE    The INES and I have discussed this patient's history, physical exam, and treatment plan.  I have reviewed the documentation and personally had a face to face interaction with the patient. I affirm the documentation and agree with the treatment and plan.  The attached note describes my personal findings.      I provided a substantive portion of the care of the patient.  I personally performed the physical exam in its entirety, and below are my findings.        Brief HPI: This patient is a 74-year-old female presenting to the ED today with bilateral lower extremity swelling as well as redness to both lower extremities.  She states that she was recently at [facility] for treatment of a \"spine infection\".  She states that the edema has been present for some time now but the redness was noticed today.  She denies chest pain, shortness of breath, nausea/vomiting, or fever/chills.      PHYSICAL EXAM  ED Triage Vitals   Temp Heart Rate Resp BP SpO2   09/24/24 0051 09/24/24 0051 09/24/24 0051 09/24/24 0057 09/24/24 0051   98.2 °F (36.8 °C) 76 18 145/71 92 %      Temp src Heart Rate Source Patient Position BP Location FiO2 (%)   -- -- -- -- --                GENERAL: Resting comfortably and in no acute distress, nontoxic in appearance  HENT: nares patent  EYES: no scleral icterus  CV: regular rhythm, normal rate, no M/R/G  RESPIRATORY: normal effort, lungs clear bilaterally  ABDOMEN: soft, nontender, no rebound or guarding  MUSCULOSKELETAL: no deformity, bilateral peripheral edema, pulses palpable and compartments soft  NEURO: alert, moves all extremities, follows commands  PSYCH:  calm, cooperative  SKIN: warm, dry, erythema present to both lower extremities    Vital signs and nursing notes reviewed.      Differential diagnosis includes but is not limited to soft tissue cellulitis, compartment syndrome, severe electrolyte disturbance, CHF with acute exacerbation, or nephrotic " syndrome.      Plan: We will obtain labs in the ED today including a CBC, CMP, as well as BNP level.  We will monitor and reassess following.      Lab values independently interpreted by myself with my interpretation showing a normal CBC, CMP, as well as BNP levels.       Crow Stokes MD  09/24/24 0201

## 2024-09-24 NOTE — ED NOTES
".Nursing report ED to floor  Anastasiia Faria  74 y.o.  female    HPI :  HPI (Adult)  Stated Reason for Visit: bilateral leg swelling  History Obtained From: patient    Chief Complaint  Chief Complaint   Patient presents with    Leg Swelling       Admitting doctor:   Genny Marie MD    Admitting diagnosis:   The encounter diagnosis was Localized swelling of both lower legs.    Code status:   Current Code Status       Date Active Code Status Order ID Comments User Context       Prior            Allergies:   Patient has no known allergies.    Isolation:   No active isolations    Intake and Output  No intake or output data in the 24 hours ending 09/24/24 0251    Weight:       09/24/24 0051   Weight: 79.8 kg (176 lb)       Most recent vitals:   Vitals:    09/24/24 0051 09/24/24 0057   BP:  145/71   Pulse: 76    Resp: 18    Temp: 98.2 °F (36.8 °C)    SpO2: 92%    Weight: 79.8 kg (176 lb)    Height: 172.7 cm (68\")        Active LDAs/IV Access:   Lines, Drains & Airways       Active LDAs       Name Placement date Placement time Site Days    Peripheral IV 09/24/24 0119 Right Antecubital 09/24/24 0119  Antecubital  less than 1    Ureteral Drain/Stent 07/25/24  in OR  --  --  61                    Labs (abnormal labs have a star):   Labs Reviewed   COMPREHENSIVE METABOLIC PANEL - Abnormal; Notable for the following components:       Result Value    Glucose 111 (*)     Total Protein 5.9 (*)     All other components within normal limits    Narrative:     GFR Normal >60  Chronic Kidney Disease <60  Kidney Failure <15    The GFR formula is only valid for adults with stable renal function between ages 18 and 70.   CBC WITH AUTO DIFFERENTIAL - Abnormal; Notable for the following components:    Hemoglobin 11.5 (*)     Monocyte % 13.7 (*)     All other components within normal limits   BNP (IN-HOUSE) - Normal    Narrative:     This assay is used as an aid in the diagnosis of individuals suspected of having heart failure. It can be used " as an aid in the diagnosis of acute decompensated heart failure (ADHF) in patients presenting with signs and symptoms of ADHF to the emergency department (ED). In addition, NT-proBNP of <300 pg/mL indicates ADHF is not likely.    Age Range Result Interpretation  NT-proBNP Concentration (pg/mL:      <50             Positive            >450                   Gray                 300-450                    Negative             <300    50-75           Positive            >900                  Gray                300-900                  Negative            <300      >75             Positive            >1800                  Gray                300-1800                  Negative            <300   CBC AND DIFFERENTIAL    Narrative:     The following orders were created for panel order CBC & Differential.  Procedure                               Abnormality         Status                     ---------                               -----------         ------                     CBC Auto Differential[[id number]]        Abnormal            Final result                 Please view results for these tests on the individual orders.       EKG:   No orders to display       Meds given in ED:   Medications   sodium chloride 0.9 % flush 10 mL (has no administration in time range)       Imaging results:  No radiology results for the last day      Social issues:   Social History     Socioeconomic History    Marital status:    Tobacco Use    Smoking status: Never    Smokeless tobacco: Never    Tobacco comments:     daily caffine   Vaping Use    Vaping status: Never Used   Substance and Sexual Activity    Alcohol use: Never    Drug use: Never    Sexual activity: Not Currently     Partners: Male     Birth control/protection: Post-menopausal     Comment: Used birth control pills for about 30 years.       Peripheral Neurovascular  Peripheral Neurovascular (Adult)  Peripheral Neurovascular WDL: WDL    Neuro Cognitive  Neuro Cognitive  (Adult)  Cognitive/Neuro/Behavioral WDL: WDL    Learning  Learning Assessment (Adult)  Learning Readiness and Ability: no barriers identified  Education Provided  Person Taught: patient  Teaching Method: verbal instruction    Respiratory  Respiratory (Adult)  Airway WDL: WDL  Respiratory WDL  Respiratory WDL: WDL    Abdominal Pain       Pain Assessments  Pain (Adult)  (0-10) Pain Rating: Rest: 8    NIH Stroke Scale       Kiley Trent RN  09/24/24 02:51 EDT

## 2024-09-24 NOTE — ED PROVIDER NOTES
" EMERGENCY DEPARTMENT ENCOUNTER  Room Number:  07/07  PCP: Mirza Scott Sr., MD  Independent Historians: Patient      HPI:  Chief Complaint: had concerns including Leg Swelling.     A complete HPI/ROS/PMH/PSH/SH/FH are unobtainable due to: None    Chronic or social conditions impacting patient care (Social Determinants of Health): None      Context: Anastasiia Faria is a 74 y.o. female who presents to the emergency department with complaints of bilateral lower extremity swelling.  Patient also verbalizes complaints of redness to bilateral lower extremities.  Patient advises she was recently admitted at [facility] for a \"spine infection.  She states since the admission at [facility] she has been experiencing swelling to her bilateral lower extremities.  However over the last few days she has noticed redness.  She has been evaluated by her primary care provider who prescribed Lasix 20 mg daily.  Patient denies fever, chills, headache, lightheadedness, dizziness, numbness, tingling, unilateral extremity weakness, syncope, shortness of breath, chest pain, abdominal pain, nausea, vomiting, diarrhea, dysuria, hematuria, or other complaints.      Review of prior external notes (non-ED) -and- Review of prior external test results outside of this encounter:   September 19, 2024: Primary care office visit, patient seen for bilateral lower extremity swelling, prescribed Lasix 20 mg daily.    PAST MEDICAL HISTORY  Active Ambulatory Problems     Diagnosis Date Noted    Colon polyp, hyperplastic 09/21/2016    Allergic rhinitis due to pollen 09/21/2016    Acquired hypothyroidism 09/21/2016    Essential hypertension 09/21/2016    FH: colon cancer in relative <50 years old 09/21/2016    Mixed hyperlipidemia 09/21/2016    Chronic right shoulder pain 03/30/2018    Periscapular pain 03/30/2018    Other idiopathic scoliosis, thoracic region 03/30/2018    Prediabetes 10/15/2018    Age-related osteoporosis without current " pathological fracture 01/24/2019    Mild episode of recurrent major depressive disorder 08/06/2020    Age-related nuclear cataract of both eyes 03/26/2021    Cystoid nevus of conjunctiva 03/26/2021    Anxiety 07/16/2021    Cataract 10/12/2021    Cataract extraction status of left eye 07/08/2021    Cataract extraction status of right eye 06/17/2021    Conjunctival cyst of left eye 07/16/2021    Depressive disorder 07/16/2021    Disorder of refraction 07/16/2021    Dry eye syndrome of bilateral lacrimal glands 07/16/2021    Hearing loss 07/16/2021    Hearing loss 10/12/2021    Hordeolum internum right upper eyelid 07/16/2021    Migraine 07/16/2021    Osteopenia 03/31/2015    Osteoporosis 07/16/2021    Renal stone 07/16/2021    Seasonal allergies 10/12/2021    Diverticulosis 12/02/2021    Internal hemorrhoids 12/02/2021    Posterior vitreous detachment of both eyes 08/17/2022    Right hip pain 10/31/2023    OA (osteoarthritis) of hip 12/12/2023    Sepsis 03/21/2024    Acute pyelonephritis 03/21/2024    Ureteral calculus 03/21/2024    E coli bacteremia 03/22/2024    Acute respiratory failure with hypoxia 03/22/2024    Dysuria 04/01/2024    GI bleed 06/12/2024    Colitis 06/13/2024    UTI (urinary tract infection) 06/13/2024    Leukocytosis 06/13/2024    Acute UTI 07/27/2024    Lumbago 07/28/2024    Bacterial infection due to Pseudomonas 07/28/2024    Pyogenic arthritis of hip 09/02/2024     Resolved Ambulatory Problems     Diagnosis Date Noted    CKD (chronic kidney disease) 09/21/2016    Screen for colon cancer 11/03/2021     Past Medical History:   Diagnosis Date    Allergic     Chronic kidney disease     Clostridium difficile infection 06/2024    Colon polyps     Depression     Encounter for annual health examination 03/07/2014    Family history of colon cancer     Fibrocystic breast     GERD (gastroesophageal reflux disease)     Hard of hearing     Heart murmur     Herpes labialis without complication     History of  cataract     History of mammogram 12/20/2010    History of recent hospitalization 03/21/2024    HL (hearing loss) 2014    Hydronephrosis     Hyperlipidemia     Hypertension     Hypothyroidism     Insomnia     Kidney stones     Migraines     Osteoarthritis of right hip     Pneumonia 3/2024    PONV (postoperative nausea and vomiting)     Scoliosis     Slow to wake up after anesthesia     Urinary tract infection     Visual impairment          PAST SURGICAL HISTORY  Past Surgical History:   Procedure Laterality Date    BONE MARROW BIOPSY Left     BREAST CYST ASPIRATION Right     BREAST SURGERY Right     BIOPSY    CATARACT EXTRACTION, BILATERAL      Cataract surgery on right eye June 2021 and left eye July 2021. (Dr. Nanette Bateman)    COLONOSCOPY N/A 10/27/2016    Procedure: COLONOSCOPY INTO CECUM;  Surgeon: Osvaldo Dorado MD;  Location: [facility] ENDOSCOPY;  Service:     COLONOSCOPY  01/26/2011    COLONOSCOPY  02/04/2005    COLONOSCOPY N/A 12/02/2021    Procedure: COLONOSCOPY INTO CECUM WITH COLD BIOPSY POLYPECTOMY AND HOT SNARE POLYPECTOMY;  Surgeon: Osvaldo Dorado MD;  Location: [facility] ENDOSCOPY;  Service: General;  Laterality: N/A;  PRE-FAMILY HISTORY OF COLON CANCER, PERSONAL HISTORY OF COLON CANCER  POST- POLYPS, DIVERTICULOSIS, HEMORRHOIDS    CYSTOSCOPY W/ URETERAL STENT PLACEMENT Left 03/21/2024    Procedure: LEFT CYSTOSCOPY RETROGRADE PYLEOGRAM URETERAL STENT INSERTION;  Surgeon: Jaiden Bolton Jr., MD;  Location: [facility] OR;  Service: Urology;  Laterality: Left;    CYSTOSCOPY W/ URETERAL STENT REMOVAL  04/23/2024    CYSTOSCOPY, RETROGRADE PYELOGRAM AND STENT INSERTION Left 06/14/2024    Procedure: CYSTOSCOPY RETROGRADE PYELOGRAM AND STENT INSERTION;  Surgeon: Yahir Faria MD;  Location: [facility] OR;  Service: Urology;  Laterality: Left;    JOINT REPLACEMENT  5/2024    Right Hip   Dr. Vasu King    KNEE ARTHROSCOPY Left     PAP SMEAR  12/20/2010    TOTAL HIP ARTHROPLASTY  Right 05/13/2024    Procedure: TOTAL HIP ARTHROPLASTY ANTERIOR WITH HANA TABLE;  Surgeon: Vasu King MD;  Location:  [facility];  Service: Orthopedics;  Laterality: Right;    URETEROSCOPY LASER LITHOTRIPSY WITH STENT INSERTION Left 7/25/2024    Procedure: CYSTOSCOPY, LEFT URETEROSCOPY, LASER LITHOTRIPSY, STENT EXCHANGE;  Surgeon: Jaiden Bolton Jr., MD;  Location:  [facility] OR;  Service: Urology;  Laterality: Left;         FAMILY HISTORY  Family History   Problem Relation Age of Onset    Breast cancer Mother     Colon cancer Mother     Hypertension Mother     Cancer Mother         Breat cancer, skin cancer, colon cancer    Hyperlipidemia Mother     Cancer Father         Skin cancer    Hearing loss Father     Hypertension Sister     Hypertension Sister     Hyperlipidemia Sister     Thyroid disease Sister     Diabetes type II Brother     Cancer Brother         Skin cancer    Diabetes Brother     Colon cancer Maternal Aunt         colon ca 40    Cancer Maternal Aunt         Colon cancer cause of death early 40s    Colon cancer Maternal Aunt         66 yo    Heart disease Maternal Aunt     Colon cancer Maternal Aunt         79 yo    Cancer Maternal Aunt         Colon cancer    Cancer Maternal Aunt         Colon cancer    Cancer Maternal Uncle         Thyroid cancer    Heart disease Maternal Uncle     Stomach cancer Maternal Grandmother         or intestinal    Cancer Maternal Grandmother         Small intestine    Breast cancer Maternal Grandmother     Heart disease Maternal Grandmother     Heart attack Maternal Grandfather     Breast cancer Maternal Grandfather     Heart disease Maternal Grandfather     Vision loss Maternal Grandfather         Glaucoma    Heart disease Paternal Grandmother     Colon cancer Other     Colon cancer Other     Malig Hyperthermia Neg Hx          SOCIAL HISTORY  Social History     Socioeconomic History    Marital status:    Tobacco Use    Smoking status: Never     Smokeless tobacco: Never    Tobacco comments:     daily caffine   Vaping Use    Vaping status: Never Used   Substance and Sexual Activity    Alcohol use: Never    Drug use: Never    Sexual activity: Not Currently     Partners: Male     Birth control/protection: Post-menopausal     Comment: Used birth control pills for about 30 years.         ALLERGIES  Patient has no known allergies.      REVIEW OF SYSTEMS  Included in HPI  All systems reviewed and negative except for those discussed in HPI.      PHYSICAL EXAM    I have reviewed the triage vital signs and nursing notes.    ED Triage Vitals   Temp Heart Rate Resp BP SpO2   09/24/24 0051 09/24/24 0051 09/24/24 0051 09/24/24 0057 09/24/24 0051   98.2 °F (36.8 °C) 76 18 145/71 92 %      Temp src Heart Rate Source Patient Position BP Location FiO2 (%)   -- -- -- -- --              GENERAL: not distressed  HENT: nares patent  Head/neck/ face are symmetric without gross deformity or swelling  EYES: no scleral icterus  CV: regular rhythm, regular rate with intact distal pulses  RESPIRATORY: normal effort and no respiratory distress  ABDOMEN: soft and nontender  MUSCULOSKELETAL: no deformity  Bilateral lower extremities: No tenderness to palpation, compartments are soft, no palpable cords  NEURO: alert and appropriate, moves all extremities, follows commands  SKIN: warm, dry  +3 edema to bilateral lower extremities from mid shin to feet, faint erythema present to bilateral lower extremities from mid shin to feet.    Vital signs and nursing notes reviewed.      LAB RESULTS  Recent Results (from the past 24 hour(s))   Comprehensive Metabolic Panel    Collection Time: 09/24/24  1:20 AM    Specimen: Blood   Result Value Ref Range    Glucose 111 (H) 65 - 99 mg/dL    BUN 17 8 - 23 mg/dL    Creatinine 0.94 0.57 - 1.00 mg/dL    Sodium 141 136 - 145 mmol/L    Potassium 3.6 3.5 - 5.2 mmol/L    Chloride 104 98 - 107 mmol/L    CO2 29.0 22.0 - 29.0 mmol/L    Calcium 9.9 8.6 - 10.5 mg/dL     Total Protein 5.9 (L) 6.0 - 8.5 g/dL    Albumin 3.7 3.5 - 5.2 g/dL    ALT (SGPT) <5 1 - 33 U/L    AST (SGOT) 10 1 - 32 U/L    Alkaline Phosphatase 93 39 - 117 U/L    Total Bilirubin 0.4 0.0 - 1.2 mg/dL    Globulin 2.2 gm/dL    A/G Ratio 1.7 g/dL    BUN/Creatinine Ratio 18.1 7.0 - 25.0    Anion Gap 8.0 5.0 - 15.0 mmol/L    eGFR 63.8 >60.0 mL/min/1.73   BNP    Collection Time: 09/24/24  1:20 AM    Specimen: Blood   Result Value Ref Range    proBNP 324.0 0.0 - 900.0 pg/mL   CBC Auto Differential    Collection Time: 09/24/24  1:20 AM    Specimen: Blood   Result Value Ref Range    WBC 5.75 3.40 - 10.80 10*3/mm3    RBC 3.88 3.77 - 5.28 10*6/mm3    Hemoglobin 11.5 (L) 12.0 - 15.9 g/dL    Hematocrit 35.6 34.0 - 46.6 %    MCV 91.8 79.0 - 97.0 fL    MCH 29.6 26.6 - 33.0 pg    MCHC 32.3 31.5 - 35.7 g/dL    RDW 14.3 12.3 - 15.4 %    RDW-SD 47.6 37.0 - 54.0 fl    MPV 9.0 6.0 - 12.0 fL    Platelets 318 140 - 450 10*3/mm3    Neutrophil % 47.4 42.7 - 76.0 %    Lymphocyte % 35.0 19.6 - 45.3 %    Monocyte % 13.7 (H) 5.0 - 12.0 %    Eosinophil % 3.0 0.3 - 6.2 %    Basophil % 0.7 0.0 - 1.5 %    Immature Grans % 0.2 0.0 - 0.5 %    Neutrophils, Absolute 2.73 1.70 - 7.00 10*3/mm3    Lymphocytes, Absolute 2.01 0.70 - 3.10 10*3/mm3    Monocytes, Absolute 0.79 0.10 - 0.90 10*3/mm3    Eosinophils, Absolute 0.17 0.00 - 0.40 10*3/mm3    Basophils, Absolute 0.04 0.00 - 0.20 10*3/mm3    Immature Grans, Absolute 0.01 0.00 - 0.05 10*3/mm3    nRBC 0.0 0.0 - 0.2 /100 WBC         RADIOLOGY  No Radiology Exams Resulted Within Past 24 Hours      MEDICATIONS GIVEN IN ER  Medications   sodium chloride 0.9 % flush 10 mL (has no administration in time range)           OUTPATIENT MEDICATION MANAGEMENT:  Current Facility-Administered Medications Ordered in Epic   Medication Dose Route Frequency Provider Last Rate Last Admin    sodium chloride 0.9 % flush 10 mL  10 mL Intravenous PRN Beena Cannon APRN         Current Outpatient Medications Ordered in  Epic   Medication Sig Dispense Refill    atenolol (TENORMIN) 25 MG tablet Take 1 tablet by mouth Daily. 90 tablet 2    escitalopram (LEXAPRO) 20 MG tablet Take 1 tablet by mouth Daily. 90 tablet 2    furosemide (LASIX) 20 MG tablet Take 1 tablet by mouth Daily. 30 tablet 3    HYDROcodone-acetaminophen (NORCO) 5-325 MG per tablet Take 1 tablet by mouth Every 6 (Six) Hours As Needed for Moderate Pain. 45 tablet 0    levoFLOXacin (LEVAQUIN) 750 MG tablet Take 1 tablet by mouth Daily for 30 days. Indications: Bacteria in the Blood, Bone and/or Joint Infection 30 tablet 0    levothyroxine (SYNTHROID, LEVOTHROID) 50 MCG tablet Take 1 tablet by mouth Daily. 90 tablet 2    loratadine (CLARITIN) 10 MG tablet Take 1 tablet by mouth Daily. Indications: Hayfever      NIFEdipine XL (PROCARDIA XL) 30 MG 24 hr tablet Take 1 tablet by mouth Daily. 90 tablet 2    rosuvastatin (CRESTOR) 10 MG tablet Take 1 tablet by mouth Every Night. 90 tablet 3    tamsulosin (FLOMAX) 0.4 MG capsule 24 hr capsule Take 1 capsule by mouth Daily.      Psyllium (METAMUCIL FIBER PO) Take 1 Scoop by mouth Daily. Indications: constipation             PROGRESS, DATA ANALYSIS, CONSULTS, AND MEDICAL DECISION MAKING  ORDERS PLACED DURING THIS VISIT:  Orders Placed This Encounter   Procedures    Comprehensive Metabolic Panel    BNP    CBC Auto Differential    Insert Peripheral IV    CBC & Differential       All labs have been independently interpreted by me.  All radiology studies have been reviewed by me. All EKG's have been independently viewed by me.  Discussion below represents my analysis of pertinent findings related to patient's condition, differential diagnosis, treatment plan and final disposition.    Differential diagnosis includes but is not limited to:   Cellulitis, congestive heart failure, electrolyte imbalance, etc.    ED Course/Progress Notes/MDM:  ED Course as of 09/24/24 0237   Tue Sep 24, 2024   0106 I discussed this case with Dr. Stokes [AR]    0207 WBC: 5.75 [AR]   0212 Informed patient labs are reassuring but no known cause of bilateral leg swelling and redness.  Patient is uncomfortable with discharge home without known reason for swelling and redness. [AR]   0225 Phone call with ESTHER Fowler with [facility].  Discussed the patient, relevant history, exam, diagnostics, ED findings/progress, and concerns. They agree to admit the patient to Dr Marie. Care assumed by the admitting physician at this time.     [AR]      ED Course User Index  [AR] Beena Cannon APRN           AS OF 02:26 EDT VITALS:    BP - 145/71  HR - 76  TEMP - 98.2 °F (36.8 °C)  O2 SATS - 92%      COMPLEXITY OF CARE  The patient requires admission.        DIAGNOSIS  Final diagnoses:   Localized swelling of both lower legs         DISPOSITION  ED Disposition       ED Disposition   Decision to Admit    Condition   --    Comment   --                    Please note that portions of this document were completed with a voice recognition program.    Note Disclaimer: At [facility], we believe that sharing information builds trust and better relationships. You are receiving this note because you recently visited [facility]. It is possible you will see health information before a provider has talked with you about it. This kind of information can be easy to misunderstand. To help you fully understand what it means for your health, we urge you to discuss this note with your provider.     Beena Cannon APRN  09/24/24 0237

## 2024-09-24 NOTE — CONSULTS
Referring Provider: Genny Marie MD  [address] [place] [address]  EMERGENCY DEPARTMENT [place] [id number]  Reason for Consultation: Concern for UTI    Subjective   History of present illness: This is a 74-year-old female with a history of kidney stones requiring ureteral stent placement, CKD, hypertension, hyperlipidemia, and right SI joint septic arthritis who was admitted on September 24 with lower extremity edema.  The patient is well-known to me.  In summary she was admitted to the hospital in July with Pseudomonas septicemia in the setting of right SI joint septic arthritis and possible abscess.  Neurosurgery and orthopedic surgery at the [facility] both recommended medical management.  The patient is discharged on a 4-week course of oral Levaquin.  She was seen in the infectious disease clinic on August 30 and was doing well aside from persistent back pain.  I extended her Levaquin for another 4 weeks.  Due to increased pain the patient was seen at the [facility] and admitted there from September 9 through September 13.  Imaging of the abdomen and pelvis was concerning for L5 and S1 discitis/osteomyelitis.  Blood cultures were negative and she was discharged on oral Levaquin.  She was seen by neurosurgery and medical management was recommended.  She states after leaving the hospital she developed significant lower extremity edema.  Admission blood work revealed a normal white blood cell count.      Past Medical History:   Diagnosis Date    Allergic     Anxiety     Chronic kidney disease     Clostridium difficile infection 06/2024    Depression     Diverticulosis 2011    GERD (gastroesophageal reflux disease)     Hyperlipidemia     Hypertension     Hypothyroidism     Kidney stones     Migraines     Osteopenia        Past Surgical History:   Procedure Laterality Date    CYSTOSCOPY W/ URETERAL STENT PLACEMENT Left 03/21/2024    Procedure: LEFT CYSTOSCOPY RETROGRADE PYLEOGRAM URETERAL  STENT INSERTION;  Surgeon: Jaiden Bolton Jr., MD;  Location: [facility] MAIN OR;  Service: Urology;  Laterality: Left;    CYSTOSCOPY W/ URETERAL STENT REMOVAL  04/23/2024    CYSTOSCOPY, RETROGRADE PYELOGRAM AND STENT INSERTION Left 06/14/2024    Procedure: CYSTOSCOPY RETROGRADE PYELOGRAM AND STENT INSERTION;  Surgeon: Yahir Faria MD;  Location: [facility] OR;  Service: Urology;  Laterality: Left;    JOINT REPLACEMENT  5/2024    Right Hip   Dr. Vasu King    KNEE ARTHROSCOPY Left     PAP SMEAR  12/20/2010    TOTAL HIP ARTHROPLASTY Right 05/13/2024    Procedure: TOTAL HIP ARTHROPLASTY ANTERIOR WITH HANA TABLE;  Surgeon: Vasu King MD;  Location: [facility] OR [facility];  Service: Orthopedics;  Laterality: Right;    URETEROSCOPY LASER LITHOTRIPSY WITH STENT INSERTION Left 7/25/2024    Procedure: CYSTOSCOPY, LEFT URETEROSCOPY, LASER LITHOTRIPSY, STENT EXCHANGE;  Surgeon: Jaiden Bolton Jr., MD;  Location: [facility] OR;  Service: Urology;  Laterality: Left;        reports that she has never smoked. She has never used smokeless tobacco. She reports that she does not drink alcohol and does not use drugs.    family history includes Breast cancer in her maternal grandfather, maternal grandmother, and mother; Cancer in her brother, father, maternal aunt, maternal aunt, maternal aunt, maternal grandmother, maternal uncle, and mother; Colon cancer in her maternal aunt, maternal aunt, maternal aunt, mother, and other family members; Diabetes in her brother; Diabetes type II in her brother; Hearing loss in her father; Heart attack in her maternal grandfather; Heart disease in her maternal aunt, maternal grandfather, maternal grandmother, maternal uncle, and paternal grandmother; Hyperlipidemia in her mother and sister; Hypertension in her mother, sister, and sister; Stomach cancer in her maternal grandmother; Thyroid disease in her sister; Vision loss in her maternal grandfather.    No Known  Allergies    Medication:  Antibiotics:  Levaquin 750 mg p.o. daily    Please refer to the medical record for a full medication list    Review of Systems  Pertinent items are noted in HPI, all other systems reviewed and negative    Objective   Vital Signs   Temp:  [98.2 °F (36.8 °C)-98.6 °F (37 °C)] 98.6 °F (37 °C)  Heart Rate:  [62-76] 68  Resp:  [16-18] 16  BP: (131-158)/(62-75) 131/70    Physical Exam:   General: In no acute distress  HEENT: Normocephalic, atraumatic,  no scleral icterus.   Neck: Supple, trachea is midline  Cardiovascular: Normal rate, regular rhythm, normal S1 and S2, no murmurs, rubs, or gallops   Respiratory: Lungs are clear to auscultation bilaterally, no wheezing  GI: Abdomen is soft, nontender, nondistended, positive bowel sounds bilaterally,  Musculoskeletal: Right hip incision well-healed without erythema swelling or drainage  Skin: No rashes  Extremities: Bilateral lower extremity edema with mild discoloring of the right lower extremity  Neurological: Alert and oriented, moving all 4 extremities  Psychiatric: Normal mood and affect     Results Review:   I reviewed the patient's new clinical results.  I reviewed the patient's new imaging results and agree with the interpretation.    Lab Results   Component Value Date    WBC 5.75 09/24/2024    HGB 11.5 (L) 09/24/2024    HCT 35.6 09/24/2024    MCV 91.8 09/24/2024     09/24/2024       Lab Results   Component Value Date    GLUCOSE 111 (H) 09/24/2024    BUN 17 09/24/2024    CREATININE 0.94 09/24/2024    EGFRIFNONA 52 (L) 09/22/2021    EGFRIFAFRI 67 11/01/2019    BCR 18.1 09/24/2024    CO2 29.0 09/24/2024    CALCIUM 9.9 09/24/2024    PROTENTOTREF 6.4 11/01/2019    ALBUMIN 3.7 09/24/2024    LABIL2 2.2 11/01/2019    AST 10 09/24/2024    ALT <5 09/24/2024       Microbiology:  None    Radiology:  9/9 outside hospital CT of abdomen pelvis shows possible bony erosions of L5 and S1 concerning for discitis/osteomyelitis.  Mild left hydronephrosis  and hydroureter ureter.  Mild bladder wall thickening.    Assessment & Plan   Right SI joint septic arthritis currently on a 8-week course of oral Levaquin  Lower extremity edema  History of kidney stone status post left ureteral stent placement with stent exchange on July 25  Status post right hip arthroplasty in May  Recent history of C. difficile colitis    Physical exam of the lower extremities not consistent with infection.  Beginning of stasis dermatitis in the right lower extremity.  No evidence of renegade infection.  Continue Levaquin 750 mg p.o. daily.  She is scheduled for an ID follow-up on October 2.    Workup and management of lower extremity edema per primary team    Thank you for this consult ID will sign off.  Please do not hesitate to call us with further questions or concerns

## 2024-09-24 NOTE — CASE MANAGEMENT/SOCIAL WORK
Discharge Planning Assessment  [facility]     Patient Name: Anastasiia Faria  MRN: [id number]  Today's Date: 9/24/2024    Admit Date: 9/24/2024        Discharge Needs Assessment       Row Name 09/24/24 1707       Living Environment    People in Home spouse    Name(s) of People in Home Garth Faria- spouse    Current Living Arrangements home    Potentially Unsafe Housing Conditions none    In the past 12 months has the electric, gas, oil, or water company threatened to shut off services in your home? No    Primary Care Provided by self;spouse/significant other    Provides Primary Care For no one, unable/limited ability to care for self    Family Caregiver if Needed spouse    Quality of Family Relationships helpful;involved;supportive    Able to Return to Prior Arrangements yes       Resource/Environmental Concerns    Resource/Environmental Concerns none       Transportation Needs    In the past 12 months, has lack of transportation kept you from medical appointments or from getting medications? no    In the past 12 months, has lack of transportation kept you from meetings, work, or from getting things needed for daily living? No       Food Insecurity    Within the past 12 months, you worried that your food would run out before you got the money to buy more. Never true    Within the past 12 months, the food you bought just didn't last and you didn't have money to get more. Never true       Transition Planning    Patient/Family Anticipates Transition to home with family    Patient/Family Anticipated Services at Transition     Transportation Anticipated family or friend will provide       Discharge Needs Assessment    Equipment Currently Used at Home orthotic device;walker, rolling    Concerns to be Addressed decision-making;discharge planning    Anticipated Changes Related to Illness none    Equipment Needed After Discharge none    Provided Post Acute Provider List? Yes    Delivered To Patient    Method of  "Delivery In person    Current Discharge Risk chronically ill;physical impairment;dependent with mobility/activities of daily living                   Discharge Plan       Row Name 09/24/24 165       Plan    Plan Comments Entered room, introduced self and explained role to patient and spouse at bedside; received permission to speak in front of spouse; verified information on facesheet; pt lives in a single level home w/spouse; ambulates w/assist of rolling walker since she has been \"sick\"; pt uses an Aspen back brace when mobile; Verified PCP listed on facesheet; RX are filled at [facility] reports - pt reports difficulty walking and swollen legs for several months; Pt is unsure of d/c needs at this time; CCP to follow for needs- spouse to transport home upon d/c.                  Continued Care and Services - Admitted Since 9/24/2024    No active coordination exists for this encounter.       Selected Continued Care - Prior Encounters Includes continued care and service providers with selected services from prior encounters from 6/26/2024 to 9/24/2024      Discharged on 8/7/2024 Admission date: 7/27/2024 - Discharge disposition: Home-Health Care [facility]      Home Medical Care       Service Provider Selected Services Address Phone Fax Patient Preferred     [facility] ,  Home Nursing ,  Home Rehabilitation [address] [address] [phone] --                             Demographic Summary       Row Name 09/24/24 1704       General Information    Admission Type observation    Arrived From home    Required Notices Provided Observation Status Notice    Reason for Consult decision-making;discharge planning    Preferred Language English       Contact Information    Permission Granted to Share Info With ;family/designee                   Functional Status       Row Name 09/24/24 1702       Functional Status    Usual Activity " Tolerance moderate    Current Activity Tolerance fair       Assessment of Health Literacy    How often do you have someone help you read hospital materials? Never    How often do you have problems learning about your medical condition because of difficulty understanding written information? Occasionally    How often do you have a problem understanding what is told to you about your medical condition? Occasionally    How confident are you filling out medical forms by yourself? Quite a bit    Health Literacy Good       Functional Status, IADL    Medications independent;assistive person    Meal Preparation assistive person    Housekeeping assistive person    Laundry assistive person    Shopping assistive person       Mental Status    General Appearance WDL WDL       Mental Status Summary    Recent Changes in Mental Status/Cognitive Functioning no changes       Employment/    Employment Status retired                   Psychosocial    No documentation.                  Abuse/Neglect    No documentation.                  Legal    No documentation.                  Substance Abuse    No documentation.                  Patient Forms    No documentation.                     Karen Sood RN

## 2024-09-25 ENCOUNTER — APPOINTMENT (OUTPATIENT)
Dept: CARDIOLOGY | Facility: HOSPITAL | Age: 74
End: 2024-09-25
Payer: MEDICARE

## 2024-09-25 ENCOUNTER — READMISSION MANAGEMENT (OUTPATIENT)
Dept: CALL CENTER | Facility: HOSPITAL | Age: 74
End: 2024-09-25
Payer: MEDICARE

## 2024-09-25 VITALS
SYSTOLIC BLOOD PRESSURE: 117 MMHG | RESPIRATION RATE: 18 BRPM | DIASTOLIC BLOOD PRESSURE: 70 MMHG | BODY MASS INDEX: 26.37 KG/M2 | TEMPERATURE: 97.7 F | OXYGEN SATURATION: 97 % | HEIGHT: 68 IN | WEIGHT: 174 LBS | HEART RATE: 95 BPM

## 2024-09-25 PROBLEM — R22.43 LOCALIZED SWELLING OF BOTH LOWER LEGS: Status: ACTIVE | Noted: 2024-09-25

## 2024-09-25 PROBLEM — M79.89 SWELLING OF LOWER LEG: Status: RESOLVED | Noted: 2024-09-24 | Resolved: 2024-09-25

## 2024-09-25 LAB
ANION GAP SERPL CALCULATED.3IONS-SCNC: 10.6 MMOL/L (ref 5–15)
BACTERIA SPEC AEROBE CULT: NORMAL
BASOPHILS # BLD AUTO: 0.04 10*3/MM3 (ref 0–0.2)
BASOPHILS NFR BLD AUTO: 0.7 % (ref 0–1.5)
BH CV LOWER VASCULAR LEFT COMMON FEMORAL AUGMENT: NORMAL
BH CV LOWER VASCULAR LEFT COMMON FEMORAL COMPETENT: NORMAL
BH CV LOWER VASCULAR LEFT COMMON FEMORAL COMPRESS: NORMAL
BH CV LOWER VASCULAR LEFT COMMON FEMORAL PHASIC: NORMAL
BH CV LOWER VASCULAR LEFT COMMON FEMORAL SPONT: NORMAL
BH CV LOWER VASCULAR LEFT DISTAL FEMORAL COMPRESS: NORMAL
BH CV LOWER VASCULAR LEFT GASTRONEMIUS COMPRESS: NORMAL
BH CV LOWER VASCULAR LEFT GREATER SAPH AK COMPRESS: NORMAL
BH CV LOWER VASCULAR LEFT GREATER SAPH BK COMPRESS: NORMAL
BH CV LOWER VASCULAR LEFT LESSER SAPH COMPRESS: NORMAL
BH CV LOWER VASCULAR LEFT MID FEMORAL AUGMENT: NORMAL
BH CV LOWER VASCULAR LEFT MID FEMORAL COMPETENT: NORMAL
BH CV LOWER VASCULAR LEFT MID FEMORAL COMPRESS: NORMAL
BH CV LOWER VASCULAR LEFT MID FEMORAL PHASIC: NORMAL
BH CV LOWER VASCULAR LEFT MID FEMORAL SPONT: NORMAL
BH CV LOWER VASCULAR LEFT PERONEAL COMPRESS: NORMAL
BH CV LOWER VASCULAR LEFT POPLITEAL AUGMENT: NORMAL
BH CV LOWER VASCULAR LEFT POPLITEAL COMPETENT: NORMAL
BH CV LOWER VASCULAR LEFT POPLITEAL COMPRESS: NORMAL
BH CV LOWER VASCULAR LEFT POPLITEAL PHASIC: NORMAL
BH CV LOWER VASCULAR LEFT POPLITEAL SPONT: NORMAL
BH CV LOWER VASCULAR LEFT POSTERIOR TIBIAL COMPRESS: NORMAL
BH CV LOWER VASCULAR LEFT PROFUNDA FEMORAL COMPRESS: NORMAL
BH CV LOWER VASCULAR LEFT PROXIMAL FEMORAL COMPRESS: NORMAL
BH CV LOWER VASCULAR LEFT SAPHENOFEMORAL JUNCTION COMPRESS: NORMAL
BH CV LOWER VASCULAR RIGHT COMMON FEMORAL AUGMENT: NORMAL
BH CV LOWER VASCULAR RIGHT COMMON FEMORAL COMPETENT: NORMAL
BH CV LOWER VASCULAR RIGHT COMMON FEMORAL COMPRESS: NORMAL
BH CV LOWER VASCULAR RIGHT COMMON FEMORAL PHASIC: NORMAL
BH CV LOWER VASCULAR RIGHT COMMON FEMORAL SPONT: NORMAL
BH CV LOWER VASCULAR RIGHT DISTAL FEMORAL COMPRESS: NORMAL
BH CV LOWER VASCULAR RIGHT GASTRONEMIUS COMPRESS: NORMAL
BH CV LOWER VASCULAR RIGHT GREATER SAPH AK COMPRESS: NORMAL
BH CV LOWER VASCULAR RIGHT GREATER SAPH BK COMPRESS: NORMAL
BH CV LOWER VASCULAR RIGHT LESSER SAPH COMPRESS: NORMAL
BH CV LOWER VASCULAR RIGHT MID FEMORAL AUGMENT: NORMAL
BH CV LOWER VASCULAR RIGHT MID FEMORAL COMPETENT: NORMAL
BH CV LOWER VASCULAR RIGHT MID FEMORAL COMPRESS: NORMAL
BH CV LOWER VASCULAR RIGHT MID FEMORAL PHASIC: NORMAL
BH CV LOWER VASCULAR RIGHT MID FEMORAL SPONT: NORMAL
BH CV LOWER VASCULAR RIGHT PERONEAL COMPRESS: NORMAL
BH CV LOWER VASCULAR RIGHT POPLITEAL AUGMENT: NORMAL
BH CV LOWER VASCULAR RIGHT POPLITEAL COMPETENT: NORMAL
BH CV LOWER VASCULAR RIGHT POPLITEAL COMPRESS: NORMAL
BH CV LOWER VASCULAR RIGHT POPLITEAL PHASIC: NORMAL
BH CV LOWER VASCULAR RIGHT POPLITEAL SPONT: NORMAL
BH CV LOWER VASCULAR RIGHT POSTERIOR TIBIAL COMPRESS: NORMAL
BH CV LOWER VASCULAR RIGHT PROFUNDA FEMORAL COMPRESS: NORMAL
BH CV LOWER VASCULAR RIGHT PROXIMAL FEMORAL COMPRESS: NORMAL
BH CV LOWER VASCULAR RIGHT SAPHENOFEMORAL JUNCTION COMPRESS: NORMAL
BUN SERPL-MCNC: 16 MG/DL (ref 8–23)
BUN/CREAT SERPL: 15.1 (ref 7–25)
CALCIUM SPEC-SCNC: 9.3 MG/DL (ref 8.6–10.5)
CHLORIDE SERPL-SCNC: 99 MMOL/L (ref 98–107)
CO2 SERPL-SCNC: 29.4 MMOL/L (ref 22–29)
CREAT SERPL-MCNC: 1.06 MG/DL (ref 0.57–1)
DEPRECATED RDW RBC AUTO: 49.5 FL (ref 37–54)
EGFRCR SERPLBLD CKD-EPI 2021: 55.2 ML/MIN/1.73
EOSINOPHIL # BLD AUTO: 0.17 10*3/MM3 (ref 0–0.4)
EOSINOPHIL NFR BLD AUTO: 3 % (ref 0.3–6.2)
ERYTHROCYTE [DISTWIDTH] IN BLOOD BY AUTOMATED COUNT: 14.5 % (ref 12.3–15.4)
GLUCOSE SERPL-MCNC: 96 MG/DL (ref 65–99)
HCT VFR BLD AUTO: 35.1 % (ref 34–46.6)
HGB BLD-MCNC: 11.4 G/DL (ref 12–15.9)
IMM GRANULOCYTES # BLD AUTO: 0.01 10*3/MM3 (ref 0–0.05)
IMM GRANULOCYTES NFR BLD AUTO: 0.2 % (ref 0–0.5)
LYMPHOCYTES # BLD AUTO: 2.13 10*3/MM3 (ref 0.7–3.1)
LYMPHOCYTES NFR BLD AUTO: 38.2 % (ref 19.6–45.3)
MCH RBC QN AUTO: 29.9 PG (ref 26.6–33)
MCHC RBC AUTO-ENTMCNC: 32.5 G/DL (ref 31.5–35.7)
MCV RBC AUTO: 92.1 FL (ref 79–97)
MONOCYTES # BLD AUTO: 0.72 10*3/MM3 (ref 0.1–0.9)
MONOCYTES NFR BLD AUTO: 12.9 % (ref 5–12)
NEUTROPHILS NFR BLD AUTO: 2.51 10*3/MM3 (ref 1.7–7)
NEUTROPHILS NFR BLD AUTO: 45 % (ref 42.7–76)
NRBC BLD AUTO-RTO: 0 /100 WBC (ref 0–0.2)
PLATELET # BLD AUTO: 288 10*3/MM3 (ref 140–450)
PMV BLD AUTO: 9.1 FL (ref 6–12)
POTASSIUM SERPL-SCNC: 3.3 MMOL/L (ref 3.5–5.2)
RBC # BLD AUTO: 3.81 10*6/MM3 (ref 3.77–5.28)
SODIUM SERPL-SCNC: 139 MMOL/L (ref 136–145)
T4 FREE SERPL-MCNC: 1.41 NG/DL (ref 0.92–1.68)
TSH SERPL DL<=0.05 MIU/L-ACNC: 4.46 UIU/ML (ref 0.27–4.2)
WBC NRBC COR # BLD AUTO: 5.58 10*3/MM3 (ref 3.4–10.8)

## 2024-09-25 PROCEDURE — 25010000002 FUROSEMIDE PER 20 MG: Performed by: PHYSICIAN ASSISTANT

## 2024-09-25 PROCEDURE — 84439 ASSAY OF FREE THYROXINE: CPT | Performed by: PHYSICIAN ASSISTANT

## 2024-09-25 PROCEDURE — 80048 BASIC METABOLIC PNL TOTAL CA: CPT | Performed by: PHYSICIAN ASSISTANT

## 2024-09-25 PROCEDURE — 96376 TX/PRO/DX INJ SAME DRUG ADON: CPT

## 2024-09-25 PROCEDURE — 85025 COMPLETE CBC W/AUTO DIFF WBC: CPT | Performed by: PHYSICIAN ASSISTANT

## 2024-09-25 PROCEDURE — 84443 ASSAY THYROID STIM HORMONE: CPT | Performed by: PHYSICIAN ASSISTANT

## 2024-09-25 PROCEDURE — 93970 EXTREMITY STUDY: CPT | Performed by: STUDENT IN AN ORGANIZED HEALTH CARE EDUCATION/TRAINING PROGRAM

## 2024-09-25 PROCEDURE — 97161 PT EVAL LOW COMPLEX 20 MIN: CPT

## 2024-09-25 PROCEDURE — G0378 HOSPITAL OBSERVATION PER HR: HCPCS

## 2024-09-25 PROCEDURE — 93970 EXTREMITY STUDY: CPT

## 2024-09-25 RX ORDER — METOPROLOL SUCCINATE 25 MG/1
25 TABLET, EXTENDED RELEASE ORAL DAILY
Qty: 90 TABLET | Refills: 0 | Status: SHIPPED | OUTPATIENT
Start: 2024-09-25 | End: 2024-12-24

## 2024-09-25 RX ORDER — POTASSIUM CHLORIDE 750 MG/1
40 TABLET, FILM COATED, EXTENDED RELEASE ORAL ONCE
Status: COMPLETED | OUTPATIENT
Start: 2024-09-25 | End: 2024-09-25

## 2024-09-25 RX ORDER — POTASSIUM CHLORIDE 750 MG/1
10 CAPSULE, EXTENDED RELEASE ORAL DAILY
Qty: 30 CAPSULE | Refills: 0 | Status: SHIPPED | OUTPATIENT
Start: 2024-09-25

## 2024-09-25 RX ADMIN — HYDROCODONE BITARTRATE AND ACETAMINOPHEN 1 TABLET: 7.5; 325 TABLET ORAL at 00:25

## 2024-09-25 RX ADMIN — TAMSULOSIN HYDROCHLORIDE 0.4 MG: 0.4 CAPSULE ORAL at 08:44

## 2024-09-25 RX ADMIN — POTASSIUM CHLORIDE 40 MEQ: 750 TABLET, EXTENDED RELEASE ORAL at 09:45

## 2024-09-25 RX ADMIN — NIFEDIPINE 30 MG: 30 TABLET, FILM COATED, EXTENDED RELEASE ORAL at 08:44

## 2024-09-25 RX ADMIN — ESCITALOPRAM 20 MG: 20 TABLET, FILM COATED ORAL at 08:44

## 2024-09-25 RX ADMIN — LEVOTHYROXINE SODIUM 50 MCG: 50 TABLET ORAL at 05:58

## 2024-09-25 RX ADMIN — LEVOFLOXACIN 750 MG: 750 TABLET, FILM COATED ORAL at 05:58

## 2024-09-25 RX ADMIN — FUROSEMIDE 40 MG: 10 INJECTION, SOLUTION INTRAMUSCULAR; INTRAVENOUS at 08:44

## 2024-09-25 RX ADMIN — Medication 10 ML: at 08:44

## 2024-09-25 NOTE — PLAN OF CARE
Goal Outcome Evaluation:  Plan of Care Reviewed With: patient           Outcome Evaluation: Patient 74-year-old female admitted with lower extremity edema.  Patient reports that she has a spine infection and typically uses an Aspen brace however not bedside.  She ambulates with a cane or rolling walker, lives with spouse.  On PT exam patient ambulated household distances mod I using rolling walker.  Anticipate return home at discharge with spouse assist, patient appears baseline with functional mobility will sign off.  Encouraged generalized mobility as tolerated using assistive device.      Anticipated Discharge Disposition (PT): home with assist

## 2024-09-25 NOTE — DISCHARGE INSTRUCTIONS
Due to the leg swelling we are going to do a trial off nifedipine sometimes this particular type of blood pressure medicine can make your lower extremities swell.  While off the nifedipine we are going to stop the atenolol and start metoprolol XL 25 mg daily.  Keep a blood pressure diary to ensure this is gone to be enough to control your blood pressure.  Recommend follow-up with the family physician in the next 10 days for repeat evaluation.  For the interim, I would recommend wearing compression socks as needed and elevating the feet and legs above the heart to help with swelling as needed.  Your potassium was slightly low and we suspect this is due to the recently prescribed Lasix.  Continue with your Lasix as previously directed by your primary care physician.  When you take the Lasix take 10 mg of potassium orally.  This should be enough to offset your potassium from getting along the future.  Your potassium will need to be rechecked in 10 days when you follow-up with your primary care physician.    Follow-up with infectious disease on October 2, 2024 as previously directed.  Continue with your Levaquin 750 mg daily as previously directed.    Return to the ER with worsening symptoms or should you have any further concerns.

## 2024-09-25 NOTE — PROGRESS NOTES
MD Attestation Note    SHARED VISIT: This visit was performed by BOTH a physician and an APC. The substantive portion of the medical decision making was performed by this attesting physician who made or approved the management plan and takes responsibility for patient management. All studies in the APC note (if performed) were independently interpreted by me.       My personal findings are:        Subjective:  Patient admitted for further evaluation of bilateral lower extremity edema which has been ongoing for a while now but becoming worse with some associated pain.  She states that her swelling is slightly improved today after receiving Lasix.  She denies fever.        Objective:  GENERAL: Awake, alert, in no acute distress.   HENT:  Normocephalic, atraumatic. Nares patent.   EYES: Pupils equal, reactive. Extra-ocular movements normal.   RESPIRATORY: normal effort.   CARDIOVASCULAR: Regular rhythm, normal rate. No chest wall tenderness.   ABDOMEN:  Nontender. Abdomen nondistended.   NEUROLOGIC: Cranial nerves II-XII normal. Motor strength 5/5 in extremities.   EXTREMITIES: 2+ pedal pulses bilaterally. No deformity.  There is trace edema of the ankles and feet bilaterally without erythema or warmth.  There is minimal point tenderness.  SKIN:  no rash, normal to inspection.          Assessment/ Plan:  Bilateral lower extremity swelling  Symptoms likely multifactorial including dependent edema, venous insufficiency, possibly exacerbated by her oral nifedipine which she has been taking for some time now.  She had a recent echo at [facility] which was reportedly normal and a prior echo from March 2024 here that demonstrated normal EF and no significant valvular disease or diastolic dysfunction.  Venous duplex bilateral lower extremities is negative for DVT.  Her renal function appears normal and albumin is normal.  She has responded favorably to diuresis.  Her PCP had recently initiated oral Lasix as well.  Considering her  nifedipine may be contributing to her leg swelling, I recommended stopping her nifedipine, changing her home atenolol to Toprol-XL, resuming Lasix as prescribed by her PCP but also adding daily potassium supplement while taking Lasix.      Follow-up closely with PCP for reevaluation of swelling and also to ensure that new blood pressure medication is controlling her blood pressure adequately.

## 2024-09-25 NOTE — THERAPY DISCHARGE NOTE
Acute Care - Physical Therapy Initial Evaluation/Discharge  [facility]     Patient Name: Anastasiia Faria  : 1950  MRN: [id number]  Today's Date: 2024                Admit Date: 2024    Visit Dx:    ICD-10-CM ICD-9-CM   1. Localized swelling of both lower legs  R22.43 729.81     Patient Active Problem List   Diagnosis    Colon polyp, hyperplastic    Allergic rhinitis due to pollen    Acquired hypothyroidism    Essential hypertension    FH: colon cancer in relative <50 years old    Mixed hyperlipidemia    Chronic right shoulder pain    Periscapular pain    Other idiopathic scoliosis, thoracic region    Prediabetes    Age-related osteoporosis without current pathological fracture    Mild episode of recurrent major depressive disorder    Age-related nuclear cataract of both eyes    Cystoid nevus of conjunctiva    Anxiety    Cataract    Cataract extraction status of left eye    Cataract extraction status of right eye    Conjunctival cyst of left eye    Depressive disorder    Disorder of refraction    Dry eye syndrome of bilateral lacrimal glands    Hearing loss    Hearing loss    Hordeolum internum right upper eyelid    Migraine    Osteopenia    Osteoporosis    Renal stone    Seasonal allergies    Diverticulosis    Internal hemorrhoids    Posterior vitreous detachment of both eyes    Right hip pain    OA (osteoarthritis) of hip    Sepsis    Acute pyelonephritis    Ureteral calculus    E coli bacteremia    Acute respiratory failure with hypoxia    Dysuria    GI bleed    Colitis    UTI (urinary tract infection)    Leukocytosis    Acute UTI    Lumbago    Bacterial infection due to Pseudomonas    Pyogenic arthritis of hip    Localized swelling of both lower legs     Past Medical History:   Diagnosis Date    Allergic     Anxiety     Chronic kidney disease     Clostridium difficile infection 2024    TREATED AT [facility]    Colon polyps     Depression     Diverticulosis     Encounter for annual health  examination 03/07/2014    Annual Health Assessment    Family history of colon cancer     Fibrocystic breast     GERD (gastroesophageal reflux disease)     Hard of hearing     HEARING AIDS BILATERALLY    Heart murmur     Herpes labialis without complication     History of cataract     History of mammogram 12/20/2010    History of recent hospitalization 03/21/2024    KIDNEY STONE/SEPSIS 4 NIGHT AT [facility]    HL (hearing loss) 2014    Hearing Aids    Hydronephrosis     Hyperlipidemia     Hypertension     Hypothyroidism     Insomnia     Kidney stones     Migraines     Osteoarthritis of right hip     Osteopenia     Prolia -last 9/2021,3/2022    Pneumonia 3/2024    While being treated for kidney stones and sepsis.    PONV (postoperative nausea and vomiting)     Scoliosis     Dr. Stanford 5/2018    Sepsis 03/2024    Slow to wake up after anesthesia     Urinary tract infection     3/2024,  6/202    Visual impairment     Wear Glasses     Past Surgical History:   Procedure Laterality Date    BONE MARROW BIOPSY Left     BREAST CYST ASPIRATION Right     BREAST SURGERY Right     BIOPSY    CATARACT EXTRACTION, BILATERAL      Cataract surgery on right eye June 2021 and left eye July 2021. (Dr. Nanette Bateman)    COLONOSCOPY N/A 10/27/2016    Procedure: COLONOSCOPY INTO CECUM;  Surgeon: Osvaldo Dorado MD;  Location: [facility] ENDOSCOPY;  Service:     COLONOSCOPY  01/26/2011    COLONOSCOPY  02/04/2005    COLONOSCOPY N/A 12/02/2021    Procedure: COLONOSCOPY INTO CECUM WITH COLD BIOPSY POLYPECTOMY AND HOT SNARE POLYPECTOMY;  Surgeon: Osvaldo Dorado MD;  Location: [facility];  Service: General;  Laterality: N/A;  PRE-FAMILY HISTORY OF COLON CANCER, PERSONAL HISTORY OF COLON CANCER  POST- POLYPS, DIVERTICULOSIS, HEMORRHOIDS    CYSTOSCOPY W/ URETERAL STENT PLACEMENT Left 03/21/2024    Procedure: LEFT CYSTOSCOPY RETROGRADE PYLEOGRAM URETERAL STENT INSERTION;  Surgeon: Jaiden Bolton Jr., MD;  Location:  [facility] MAIN OR;  Service: Urology;  Laterality: Left;    CYSTOSCOPY W/ URETERAL STENT REMOVAL  04/23/2024    CYSTOSCOPY, RETROGRADE PYELOGRAM AND STENT INSERTION Left 06/14/2024    Procedure: CYSTOSCOPY RETROGRADE PYELOGRAM AND STENT INSERTION;  Surgeon: Yahir Faria MD;  Location: [facility] OR;  Service: Urology;  Laterality: Left;    JOINT REPLACEMENT  5/2024    Right Hip   Dr. aVsu King    KNEE ARTHROSCOPY Left     PAP SMEAR  12/20/2010    TOTAL HIP ARTHROPLASTY Right 05/13/2024    Procedure: TOTAL HIP ARTHROPLASTY ANTERIOR WITH HANA TABLE;  Surgeon: Vasu King MD;  Location: [facility] OSC;  Service: Orthopedics;  Laterality: Right;    URETEROSCOPY LASER LITHOTRIPSY WITH STENT INSERTION Left 7/25/2024    Procedure: CYSTOSCOPY, LEFT URETEROSCOPY, LASER LITHOTRIPSY, STENT EXCHANGE;  Surgeon: Jaiden Bolton Jr., MD;  Location: [facility];  Service: Urology;  Laterality: Left;       PT Assessment (Last 12 Hours)       PT Evaluation and Treatment       Row Name 09/25/24 1300          Physical Therapy Time and Intention    Subjective Information no complaints  -     Document Type discharge evaluation/summary  -     Mode of Treatment individual therapy;physical therapy  -     Patient Effort good  -     Symptoms Noted During/After Treatment none  -     Comment [place]  -       Row Name 09/25/24 1300          General Information    Patient Profile Reviewed yes  -     Patient Observations alert;cooperative;agree to therapy  -     General Observations of Patient pt supine in bed no acute distress, spouse bedside  -     Prior Level of Function independent:  -     Equipment Currently Used at Home walker, rolling  -     Pertinent History of Current Functional Problem LE edema  -     Existing Precautions/Restrictions fall;spinal  Patient reports she has an Aspen back brace at home due to spine infection but does not have at bedside  -     Benefits Reviewed patient:  -       Row  Name 09/25/24 1300          Living Environment    Current Living Arrangements home  -[facility] Name 09/25/24 1300          Pain    Pretreatment Pain Rating 0/10 - no pain  -     Posttreatment Pain Rating 0/10 - no pain  -[facility] Name 09/25/24 1300          Cognition    Affect/Mental Status (Cognition) WFL  -     Orientation Status (Cognition) oriented x 4  -[facility] Name 09/25/24 1300          Range of Motion Comprehensive    General Range of Motion bilateral lower extremity ROM WFL  -[facility] Name 09/25/24 1300          Strength (Manual Muscle Testing)    Strength (Manual Muscle Testing) strength is WFL;bilateral lower extremities  -[facility] Name 09/25/24 1300          Bed Mobility    Bed Mobility supine-sit;sit-supine  -     Supine-Sit [place] (Bed Mobility) modified independence  -     Sit-Supine [place] (Bed Mobility) modified independence  -     Assistive Device (Bed Mobility) bed rails  -[facility] Name 09/25/24 1300          Transfers    Transfers sit-stand transfer;stand-sit transfer  -[facility] Name 09/25/24 1300          Sit-Stand Transfer    Sit-Stand [place] (Transfers) modified independence  -     Assistive Device (Sit-Stand Transfers) walker, front-wheeled  -[facility] Name 09/25/24 1300          Stand-Sit Transfer    Stand-Sit [place] (Transfers) modified [facility]     Assistive Device (Stand-Sit Transfers) walker, front-wheeled  -[facility] Name 09/25/24 1300          Gait/Stairs (Locomotion)    [place] Level (Gait) modified independence  -     Assistive Device (Gait) walker, front-wheeled  -     Distance in Feet (Gait) 100  -     Pattern (Gait) step-through  -     Deviations/Abnormal Patterns (Gait) bilateral deviations;weight shifting decreased;stride length decreased;phillip decreased  -     Bilateral Gait Deviations heel strike decreased  -[facility] Name 09/25/24 1300          Balance    Balance Assessment  sitting static balance;standing static balance  -     Static Sitting Balance independent  -     Position, Sitting Balance unsupported;sitting edge of bed  -     Static Standing Balance modified independence  -     Position/Device Used, Standing Balance unsupported;walker, front-wheeled  -[facility] Name 09/25/24 1300          Motor Skills    Therapeutic Exercise --  APs x 10  -       Row Name 09/25/24 1300          Plan of Care Review    Plan of Care Reviewed With patient  -     Outcome Evaluation Patient 74-year-old female admitted with lower extremity edema.  Patient reports that she has a spine infection and typically uses an Aspen brace however not bedside.  She ambulates with a cane or rolling walker, lives with spouse.  On PT exam patient ambulated household distances mod I using rolling walker.  Anticipate return home at discharge with spouse assist, patient appears baseline with functional mobility will sign off.  Encouraged generalized mobility as tolerated using assistive device.  -[facility] Name 09/25/24 1300          Positioning and Restraints    Pre-Treatment Position in bed  -     Post Treatment Position bed  -     In Bed fowlers;call light within reach;encouraged to call for assist;exit alarm on;with family/caregiver  -[facility] Name 09/25/24 1300          Therapy Assessment/Plan (PT)    Criteria for Skilled Interventions Met (PT) no problems identified which require skilled intervention  -     Therapy Frequency (PT) evaluation only  -[facility] Name 09/25/24 1300          PT Evaluation Complexity    History, PT Evaluation Complexity 1-2 personal factors and/or comorbidities  -     Examination of Body Systems (PT Eval Complexity) 1-2 elements  -     Clinical Presentation (PT Evaluation Complexity) stable  -     Clinical Decision Making (PT Evaluation Complexity) low complexity  -     Overall Complexity (PT Evaluation Complexity) low complexity  -                User Key  (r) = Recorded By, (t) = Taken By, (c) = Cosigned By      Initials Name Provider Type     Joi Alanis, PT Physical Therapist                      Physical Therapy Education       Title: PT OT SLP Therapies (Done)       Topic: Physical Therapy (Done)       Point: Mobility training (Done)       Learning Progress Summary             Patient Acceptance, E, DU,VU by  at 9/25/2024 1320   Family Acceptance, E, DU,VU by  at 9/25/2024 1320                         Point: Home exercise program (Done)       Learning Progress Summary             Patient Acceptance, E, DU,VU by  at 9/25/2024 1320   Family Acceptance, E, DU,VU by  at 9/25/2024 1320                         Point: Body mechanics (Done)       Learning Progress Summary             Patient Acceptance, E, DU,VU by  at 9/25/2024 1320   Family Acceptance, E, DU,VU by  at 9/25/2024 1320                         Point: Precautions (Done)       Learning Progress Summary             Patient Acceptance, E, DU,VU by  at 9/25/2024 1320   Family Acceptance, E, DU,VU by  at 9/25/2024 1320                                         User Key       Initials Effective Dates Name Provider Type [facility] 06/16/21 -  Joi Alanis, PT Physical Therapist PT                    PT Recommendation and Plan  Anticipated Discharge Disposition (PT): home with assist  Therapy Frequency (PT): evaluation only  Plan of Care Reviewed With: patient  Outcome Evaluation: Patient 74-year-old female admitted with lower extremity edema.  Patient reports that she has a spine infection and typically uses an Aspen brace however not bedside.  She ambulates with a cane or rolling walker, lives with spouse.  On PT exam patient ambulated household distances mod I using rolling walker.  Anticipate return home at discharge with spouse assist, patient appears baseline with functional mobility will sign off.  Encouraged generalized mobility as tolerated using assistive device.      Outcome Measures       Row Name 09/25/24 1300             How much help from another person do you currently need...    Turning from your back to your side while in flat bed without using bedrails? 4  -LH      Moving from lying on back to sitting on the side of a flat bed without bedrails? 4  -LH      Moving to and from a bed to a chair (including a wheelchair)? 4  -LH      Standing up from a chair using your arms (e.g., wheelchair, bedside chair)? 4  -LH      Climbing 3-5 steps with a railing? 4  -LH      To walk in hospital room? 4  -      AM-PAC 6 Clicks Score (PT) 24  -      Highest Level of Mobility Goal 8 --> Walked 250 feet or more  -         Functional Assessment    Outcome Measure Options AM-PAC 6 Clicks Basic Mobility (PT)  -                User Key  (r) = Recorded By, (t) = Taken By, (c) = Cosigned By      Initials Name Provider Type     Joi Alanis, PT Physical Therapist                     Time Calculation:    PT Charges       Row Name 09/25/24 1320             Time Calculation    Start Time 0900  -      Stop Time 0911  -      Time Calculation (min) 11 min  -      PT Received On 09/25/24  -                User Key  (r) = Recorded By, (t) = Taken By, (c) = Cosigned By      Initials Name Provider Type     Joi Alanis, PT Physical Therapist                  Therapy Charges for Today       Code Description Service Date Service Provider Modifiers Qty    [id number] HC PT EVAL LOW COMPLEXITY 2 9/25/2024 Joi Alanis, PT GP 1            PT G-Codes  Outcome Measure Options: AM-PAC 6 Clicks Basic Mobility (PT)  AM-PAC 6 Clicks Score (PT): 24    PT Discharge Summary  Anticipated Discharge Disposition (PT): home with assist    oJi Alanis, PT  9/25/2024

## 2024-09-25 NOTE — PLAN OF CARE
Goal Outcome Evaluation:  Plan of Care Reviewed With: patient        Progress: no change  Outcome Evaluation: Pt alert and oriented X4. Swelling to BLE 2-3+ without weeping. Pain treated per MAR. One time dose of lasix 40 given at beginning of shift- good urinary output noted. No acute overnight events.

## 2024-09-25 NOTE — DISCHARGE SUMMARY
"ED OBSERVATION PROGRESS/DISCHARGE SUMMARY    Date of Admission: 9/24/2024   LOS: 0 days   PCP: Mirza Scott Sr., MD    Final Diagnosis bilateral lower extremity swelling, hypokalemia      Subjective     Hospital Outcome: Discharge    Anastasiia Faria is a 74 y.o. female with a past medical history of kidney stones requiring ureteral stent placement, CKD, hypertension, hyperlipidemia, C-Diff, and bacterial infection due to pseudomonas who presented to the emergency department with a complaint of bilateral lower leg edema and pain. Patient states she has had bilateral lower leg edema for approximately 1 month that has significantly worsened over the past couple of days with some mild redness and worsening pain. Patient was seen by her PCP who started her on Lasix 20 mg PO daily, but the swelling has continued to increase. Patient states she was hospitalized at [facility] on 9/9-9/13 for a \"spine infection\" and states since the admission at [facility] she has been experiencing swelling to her bilateral lower extremities. Patient is currently on Levaquin 750 mg PO daily for treatment of a spine infection. Denies fever, chest pain, or shortness of breath.      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 She has been prescribed Levaquin 750 mg for treatment and is currently taking this medication and is followed by infectious disease, Dr. Du.      Ms. Faria was admitted to the ED Observation Unit for further evaluation and work-up of bilateral lower extremity edema, pain, and mild redness without erythema.     ED work-up: . CMP grossly unremarkable. CBC grossly unremarkable, Hgb 11.5.      On admission to the ED Observation Unit, patient is alert & oriented X4. Complains of " bilateral lower extremity edema and discomfort. Vital signs stable. Afebrile.     ROS:  General: no fevers, chills  Respiratory: no cough, dyspnea  Cardiovascular: no chest pain, palpitations  Abdomen: No abdominal pain, nausea, vomiting, or diarrhea  Neurologic: No focal weakness    9/25/2024.    11:28 AM.  Patient has been seen evaluated by infectious disease.  Lower extremities were not felt to be cellulitic.  Patient was to continue with her Levaquin 750 daily for her previous osteomyelitis.  She has an appoint with infectious disease October 2, 2024.  In regards to the pedal edema.  There was no proteinuria.  Albumin normal.  Venous Doppler bilateral lower extremities normal.  proBNP normal.  Echocardiogram last month showed a mild grade 1 diastolic dysfunction but was otherwise unremarkable.  TSH was only slightly elevated at 4.46 and free T4 was 1.41.  Renal function was baseline for patient.  Liver functions normal.  Discussed with my supervising physician.  We suspect this is either venous insufficiency related to the patient's calcium channel blocker.  We are going to do a trial off nifedipine and change the patient's atenolol to metoprolol XL 25 mg daily for a little more blood pressure control.  Will have her to keep a blood pressure diary and follow-up closely with the primary care physician in the next few weeks.  Will have her to wear compression socks as needed.  The issue can be revisited at that time.  It is also noted that the patient's potassium was slightly low upon arrival.  It has been corrected.  She was started on Lasix 20 mg daily on 19 September 2024.  Will place her on 10 mg potassium p.o. to take when she takes her Lasix.  Again, we will have her to follow-up with primary care and have potassium levels rechecked when she follows up for repeat evaluation of the lower extremity swelling and blood pressure.      Objective   Physical Exam:  I have reviewed the vital signs.  Temp:  [97.7 °F  (36.5 °C)-99.1 °F (37.3 °C)] 97.7 °F (36.5 °C)  Heart Rate:  [67-95] 95  Resp:  [16-18] 18  BP: (117-130)/(56-70) 117/70  General Appearance:    Alert, cooperative, no distress  Head:    Normocephalic, atraumatic  Eyes:    Sclerae anicteric  Neck:   Supple, no mass  Lungs: Clear to auscultation bilaterally, respirations unlabored  Heart: Regular rate and rhythm, S1 and S2 normal, no murmur, rub or gallop  Abdomen:  Soft, nontender, bowel sounds active, nondistended  Extremities: No clubbing, cyanosis, or edema to lower extremities  Pulses:  2+ and symmetric in distal lower extremities  Skin: No rashes   Neurologic: Oriented x3, Normal strength to extremities    Results Review:    I have reviewed the labs, radiology results and diagnostic studies.    Results from last 7 days   Lab Units 09/25/24  0332   WBC 10*3/mm3 5.58   HEMOGLOBIN g/dL 11.4*   HEMATOCRIT % 35.1   PLATELETS 10*3/mm3 288     Results from last 7 days   Lab Units 09/25/24  0332 09/24/24  0120   SODIUM mmol/L 139 141   POTASSIUM mmol/L 3.3* 3.6   CHLORIDE mmol/L 99 104   CO2 mmol/L 29.4* 29.0   BUN mg/dL 16 17   CREATININE mg/dL 1.06* 0.94   CALCIUM mg/dL 9.3 9.9   BILIRUBIN mg/dL  --  0.4   ALK PHOS U/L  --  93   ALT (SGPT) U/L  --  <5   AST (SGOT) U/L  --  10   GLUCOSE mg/dL 96 111*     Imaging Results (Last 24 Hours)       ** No results found for the last 24 hours. **            I have reviewed the medications.  ---------------------------------------------------------------------------------------------  Assessment & Plan   Assessment/Problem List    Swelling of lower leg      Plan:    BLE edema/pain/mild redness without erythema  +3 non-pitting edema with few, small blisters. No weeping  Labs unremarkable  Afebrile    Lasix 40 mg IV given X1 dose  Continue Levaquin as prescribed out-patient  Analgesic medication for pain management  Infectious disease has cleared patient.  She is to follow-up on 2 October 2024.  Will have the patient's stop  nifedipine and trial to see if lower extremity swelling improves.  Atenolol will be changed to metoprolol XL 25 mg daily for slightly better blood pressure control while she is in the trial off the nifedipine.  Will add potassium 10 mg p.o. daily to be taken with Lasix as previously directed.  Compression socks will be recommended at discharge as needed.  Patient to follow-up with primary care with blood pressure diary for repeat blood pressure check, reevaluation of the pedal edema, and reevaluation of the hypokalemia.    hypothyroidism  Continue levothyroxine     Hypertension  Continue nifedipine  Hold atenolol  VS q4h        VTE Prophylaxis:  No VTE prophylaxis order currently exists.    Disposition: Discharge    Follow-up after Discharge: Primary care, infectious disease    This note will serve as a discharge summary and progress note    Mati Mesa III, PA 09/25/24 11:57 EDT    I have worn appropriate PPE during this patient encounter, sanitized my hands both with entering and exiting patient's room.      32 minutes has been spent by [facility] providers in the care of this patient while under observation status

## 2024-09-26 ENCOUNTER — TRANSITIONAL CARE MANAGEMENT TELEPHONE ENCOUNTER (OUTPATIENT)
Dept: CALL CENTER | Facility: HOSPITAL | Age: 74
End: 2024-09-26
Payer: MEDICARE

## 2024-09-26 NOTE — CASE MANAGEMENT/SOCIAL WORK
Case Management Discharge Note      Final Note: Home; self-care. Elizabeth ABRAHAM    Provided Post Acute Provider List?: Yes  Delivered To: Patient  Method of Delivery: In person    Selected Continued Care - Discharged on 9/25/2024 Admission date: 9/24/2024 - Discharge disposition: Home or Self Care      Destination    No services have been selected for the patient.                Durable Medical Equipment    No services have been selected for the patient.                Dialysis/Infusion    No services have been selected for the patient.                Home Medical Care    No services have been selected for the patient.                Therapy    No services have been selected for the patient.                Community Resources    No services have been selected for the patient.                Community & DME    No services have been selected for the patient.                    Selected Continued Care - Prior Encounters Includes continued care and service providers with selected services from prior encounters from 6/26/2024 to 9/25/2024      Discharged on 8/7/2024 Admission date: 7/27/2024 - Discharge disposition: Home-Health Care Svc      Home Medical Care       Service Provider Selected Services Address Phone Fax Patient Preferred    [facility] ,  Home Nursing ,  Home Rehabilitation [address] [address] [phone] --                               Final Discharge Disposition Code: 01 - home or self-care

## 2024-09-29 LAB
BACTERIA SPEC AEROBE CULT: NORMAL
BACTERIA SPEC AEROBE CULT: NORMAL

## 2024-10-02 ENCOUNTER — LAB (OUTPATIENT)
Dept: LAB | Facility: HOSPITAL | Age: 74
End: 2024-10-02
Payer: MEDICARE

## 2024-10-02 ENCOUNTER — OFFICE VISIT (OUTPATIENT)
Dept: INFECTIOUS DISEASES | Facility: CLINIC | Age: 74
End: 2024-10-02
Payer: MEDICARE

## 2024-10-02 VITALS
SYSTOLIC BLOOD PRESSURE: 101 MMHG | WEIGHT: 169.4 LBS | RESPIRATION RATE: 20 BRPM | DIASTOLIC BLOOD PRESSURE: 65 MMHG | TEMPERATURE: 97.5 F | HEART RATE: 76 BPM | BODY MASS INDEX: 25.76 KG/M2

## 2024-10-02 DIAGNOSIS — M00.80 ARTHRITIS DUE TO OTHER BACTERIA, SEPTIC ARTHRITIS OF UNSPECIFIED LOCATION: Primary | ICD-10-CM

## 2024-10-02 DIAGNOSIS — Z79.2 LONG TERM (CURRENT) USE OF ANTIBIOTICS: ICD-10-CM

## 2024-10-02 LAB
CRP SERPL-MCNC: <0.3 MG/DL (ref 0–0.5)
ERYTHROCYTE [SEDIMENTATION RATE] IN BLOOD: 1 MM/HR (ref 0–30)

## 2024-10-02 PROCEDURE — 86140 C-REACTIVE PROTEIN: CPT

## 2024-10-02 PROCEDURE — 36415 COLL VENOUS BLD VENIPUNCTURE: CPT

## 2024-10-02 PROCEDURE — 85652 RBC SED RATE AUTOMATED: CPT

## 2024-10-02 PROCEDURE — 99214 OFFICE O/P EST MOD 30 MIN: CPT | Performed by: INTERNAL MEDICINE

## 2024-10-02 PROCEDURE — 3078F DIAST BP <80 MM HG: CPT | Performed by: INTERNAL MEDICINE

## 2024-10-02 PROCEDURE — 3074F SYST BP LT 130 MM HG: CPT | Performed by: INTERNAL MEDICINE

## 2024-10-02 RX ORDER — LEVOFLOXACIN 750 MG/1
750 TABLET, FILM COATED ORAL EVERY MORNING
COMMUNITY
Start: 2024-09-27

## 2024-10-02 NOTE — PROGRESS NOTES
Reason for clinic visits: Follow up for right SI joint septic arthritis    HPI: Anastasiia Faria is a 74 y.o. female who was last seen in the hospital on September 24.  At that time we got consulted power bilateral lower extremity swelling.  There is no evidence of cellulitis.  Currently her swelling has improved tremendously.  There is no erythema.  She is continuing to take her Levaquin without abdominal pain nausea vomiting or diarrhea.  Her back pain persist but overall is improving albeit slowly.  She denies any fevers or chills.     Past Medical History:   Diagnosis Date    Allergic     Anxiety     Chronic kidney disease     Clostridium difficile infection 06/2024    TREATED AT [facility]    Colon polyps     Depression     Diverticulosis 2011    Encounter for annual health examination 03/07/2014    Annual Health Assessment    Family history of colon cancer     Fibrocystic breast     GERD (gastroesophageal reflux disease)     Hard of hearing     HEARING AIDS BILATERALLY    Heart murmur     Herpes labialis without complication     History of cataract     History of mammogram 12/20/2010    History of recent hospitalization 03/21/2024    KIDNEY STONE/SEPSIS 4 NIGHT AT [facility]    HL (hearing loss) 2014    Hearing Aids    Hydronephrosis     Hyperlipidemia     Hypertension     Hypothyroidism     Insomnia     Kidney stones     Migraines     Osteoarthritis of right hip     Osteopenia     Prolia -last 9/2021,3/2022    Pneumonia 3/2024    While being treated for kidney stones and sepsis.    PONV (postoperative nausea and vomiting)     Scoliosis     Dr. Stanford 5/2018    Sepsis 03/2024    Slow to wake up after anesthesia     Urinary tract infection     3/2024,  6/202    Visual impairment     Wear Glasses       Past Surgical History:   Procedure Laterality Date    BONE MARROW BIOPSY Left     BREAST CYST ASPIRATION Right     BREAST SURGERY Right     BIOPSY    CATARACT EXTRACTION, BILATERAL      Cataract surgery on right  eye June 2021 and left eye July 2021. (Dr. Nanette Bateman)    COLONOSCOPY N/A 10/27/2016    Procedure: COLONOSCOPY INTO CECUM;  Surgeon: Osvaldo Dorado MD;  Location: [facility] ENDOSCOPY;  Service:     COLONOSCOPY  01/26/2011    COLONOSCOPY  02/04/2005    COLONOSCOPY N/A 12/02/2021    Procedure: COLONOSCOPY INTO CECUM WITH COLD BIOPSY POLYPECTOMY AND HOT SNARE POLYPECTOMY;  Surgeon: Osvaldo Dorado MD;  Location: [facility] ENDOSCOPY;  Service: General;  Laterality: N/A;  PRE-FAMILY HISTORY OF COLON CANCER, PERSONAL HISTORY OF COLON CANCER  POST- POLYPS, DIVERTICULOSIS, HEMORRHOIDS    CYSTOSCOPY W/ URETERAL STENT PLACEMENT Left 03/21/2024    Procedure: LEFT CYSTOSCOPY RETROGRADE PYLEOGRAM URETERAL STENT INSERTION;  Surgeon: Jaiden Bolton Jr., MD;  Location: [facility] OR;  Service: Urology;  Laterality: Left;    CYSTOSCOPY W/ URETERAL STENT REMOVAL  04/23/2024    CYSTOSCOPY, RETROGRADE PYELOGRAM AND STENT INSERTION Left 06/14/2024    Procedure: CYSTOSCOPY RETROGRADE PYELOGRAM AND STENT INSERTION;  Surgeon: Yahir Faria MD;  Location: [facility] OR;  Service: Urology;  Laterality: Left;    JOINT REPLACEMENT  5/2024    Right Hip   Dr. Vasu King    KNEE ARTHROSCOPY Left     PAP SMEAR  12/20/2010    TOTAL HIP ARTHROPLASTY Right 05/13/2024    Procedure: TOTAL HIP ARTHROPLASTY ANTERIOR WITH HANA TABLE;  Surgeon: Vasu King MD;  Location: [facility] [place];  Service: Orthopedics;  Laterality: Right;    URETEROSCOPY LASER LITHOTRIPSY WITH STENT INSERTION Left 7/25/2024    Procedure: CYSTOSCOPY, LEFT URETEROSCOPY, LASER LITHOTRIPSY, STENT EXCHANGE;  Surgeon: Jaiden Bolton Jr., MD;  Location: [facility] OR;  Service: Urology;  Laterality: Left;       Social History   reports that she has never smoked. She has never used smokeless tobacco. She reports that she does not drink alcohol and does not use drugs.    Family History  family history includes Breast cancer in her maternal  grandfather, maternal grandmother, and mother; Cancer in her brother, father, maternal aunt, maternal aunt, maternal aunt, maternal grandmother, maternal uncle, and mother; Colon cancer in her maternal aunt, maternal aunt, maternal aunt, mother, and other family members; Diabetes in her brother; Diabetes type II in her brother; Hearing loss in her father; Heart attack in her maternal grandfather; Heart disease in her maternal aunt, maternal grandfather, maternal grandmother, maternal uncle, and paternal grandmother; Hyperlipidemia in her mother and sister; Hypertension in her mother, sister, and sister; Stomach cancer in her maternal grandmother; Thyroid disease in her sister; Vision loss in her maternal grandfather.    No Known Allergies    The medication list has been reviewed and updated.     Review of Systems  Pertinent items are noted in HPI, all other systems reviewed and negative    Vital Signs   Temp:  [97.5 °F (36.4 °C)] 97.5 °F (36.4 °C)  Heart Rate:  [76] 76  Resp:  [20] 20  BP: (101)/(65) 101/65    Physical Exam:   General: In no acute distress   Respiratory: Breathing comfortably on room air  LE significantly decreased swelling bilateral  Neurological: Alert and oriented, moving all 4 extremities  Psychiatric: Normal mood and affect     Lab Results   Component Value Date    WBC 5.58 09/25/2024    HGB 11.4 (L) 09/25/2024    HCT 35.1 09/25/2024    MCV 92.1 09/25/2024     09/25/2024       Lab Results   Component Value Date    GLUCOSE 96 09/25/2024    BUN 16 09/25/2024    CREATININE 1.06 (H) 09/25/2024    EGFRIFNONA 52 (L) 09/22/2021    EGFRIFAFRI 67 11/01/2019    BCR 15.1 09/25/2024    CO2 29.4 (H) 09/25/2024    CALCIUM 9.3 09/25/2024    PROTENTOTREF 6.4 11/01/2019    ALBUMIN 3.7 09/24/2024    LABIL2 2.2 11/01/2019    AST 10 09/24/2024    ALT <5 09/24/2024       Lab Results   Component Value Date    SEDRATE 1 10/02/2024       Lab Results   Component Value Date    CRP <0.30 10/02/2024     7/29 BCx  negative x 2  7/29 right hip cultures negative  7/27 UCx >100K Pseudomonas  7/27 BCx Pseudomonas x 2    Assessment:  This is a 74 y.o. female who presents to clinic today for follow up right SI joint septic arthritis in the setting of pseudomonal bacteremia.  The patient is scheduled to be seen by surgery in 1 to 2 weeks.  At this time she has completed an 8-week course of Levaquin with improved albeit still present back pain.  I would like to repeat her inflammatory markers today and if they are back within normal limits I will consider discontinuing her antibiotic therapy.  Follow-up with surgery as planned    Plan:   Extend Levaquin 750 mg p.o. daily for another1 weeks  Check an ESR and CRP  Return to Infectious Disease clinic in 4 weeks

## 2024-10-03 ENCOUNTER — OFFICE VISIT (OUTPATIENT)
Dept: FAMILY MEDICINE CLINIC | Facility: CLINIC | Age: 74
End: 2024-10-03
Payer: MEDICARE

## 2024-10-03 VITALS
WEIGHT: 169 LBS | SYSTOLIC BLOOD PRESSURE: 138 MMHG | BODY MASS INDEX: 25.61 KG/M2 | RESPIRATION RATE: 18 BRPM | HEIGHT: 68 IN | OXYGEN SATURATION: 98 % | DIASTOLIC BLOOD PRESSURE: 62 MMHG | HEART RATE: 78 BPM

## 2024-10-03 DIAGNOSIS — R22.43 LOCALIZED SWELLING OF BOTH LOWER LEGS: Primary | ICD-10-CM

## 2024-10-03 NOTE — PROGRESS NOTES
Chief Complaint  Chief Complaint   Patient presents with    Hospital Follow Up Visit     Pt her for f/u of edema in legs        Transitional Care Progress Note        Subjective    History of Present Illness        Anastasiia Faria is a 74 y.o. female who presents for a transitional care management visit.    Within 48 business hours after discharge our office contacted her via telephone to coordinate her care and needs.      I reviewed and discussed the details of that call along with the discharge summary, hospital problems, inpatient lab results, inpatient diagnostic studies, and consultation reports with Anastasiia.     Current outpatient and discharge medications have been reconciled for the patient.  Reviewed by: Mirza Scott Sr., MD          9/25/2024     5:02 PM   Date of TCM Phone Call   [facility]   Date of Admission 9/24/2024   Date of Discharge 9/25/2024   Discharge Disposition Home or Self Care     Risk for Readmission (LACE) No data recorded      Anastasiia Faria presents to [facility] for   History of Present Illness  The patient is a 74-year-old female who presents for evaluation of leg swelling.    She was prescribed Lasix for her leg swelling during her last visit. After taking it for two days, she noticed an increase in swelling and redness in her legs, which impaired her ability to walk. She also experienced back pain, prompting her to seek emergency care. At the ER, she received high doses of Lasix for a few days, which reduced the swelling but left her legs sore. Ultrasound tests ruled out blood clots and congestive heart failure.     An infectious disease specialist suggested that her blood pressure medication, nifedipine, might be causing the swelling. Consequently, her medication was changed from atenolol and nifedipine to metoprolol, and she was advised to monitor her blood pressure. She lost 10 pounds during her hospital stay, which she attributes to  "fluid loss. Despite her kidney issues and inactivity, her blood pressure has been stable. She continues to take Lasix, which has prevented any further significant swelling. She was also prescribed potassium to counteract the effects of Lasix.    She is here today for a follow-up on her Lasix, potassium, and blood pressure management. She still has some hydrocodone left, which she takes less frequently now, but increases the dosage when her back pain intensifies.     History of Present Illness      Objective   Vital Signs:   Visit Vitals  /62   Pulse 78   Resp 18   Ht 172.7 cm (68\")   Wt 76.7 kg (169 lb)   LMP  (LMP Unknown)   SpO2 98%   BMI 25.70 kg/m²             Physical Exam  Vitals reviewed.   Constitutional:       Appearance: She is well-developed.   HENT:      Head: Normocephalic.      Right Ear: External ear normal.      Left Ear: External ear normal.      Nose: Nose normal.   Eyes:      Conjunctiva/sclera: Conjunctivae normal.   Cardiovascular:      Rate and Rhythm: Normal rate and regular rhythm.   Pulmonary:      Effort: Pulmonary effort is normal.      Breath sounds: Normal breath sounds.   Musculoskeletal:         General: Normal range of motion.      Cervical back: Normal range of motion and neck supple.   Skin:     General: Skin is warm and dry.      Capillary Refill: Capillary refill takes less than 2 seconds.   Neurological:      Mental Status: She is alert and oriented to person, place, and time.        Physical Exam          Result Review :           Results  Imaging  Ultrasound showed no blood clots.             Assessment and Plan      Diagnoses and all orders for this visit:    1. Localized swelling of both lower legs (Primary)  Assessment & Plan:  Hospital records reviewed.  The leg swelling has improved with the use of Lasix and potassium. She was previously on nifedipine, which was suspected to cause the swelling. Nifedipine and atenolol were discontinued, and metoprolol was prescribed. " She is advised to continue taking metoprolol, Lasix, and potassium. If Lasix is not taken, potassium should also be omitted. A new prescription for potassium will be provided.         Assessment & Plan          Follow Up   No follow-ups on file.  Patient was given instructions and counseling regarding her condition or for health maintenance advice. Please see specific information pulled into the AVS if appropriate.

## 2024-10-06 PROBLEM — R60.0 BILATERAL LOWER EXTREMITY EDEMA: Status: ACTIVE | Noted: 2024-10-06

## 2024-10-06 PROBLEM — M46.40 DISCITIS: Status: ACTIVE | Noted: 2024-10-06

## 2024-10-07 NOTE — ASSESSMENT & PLAN NOTE
Her infection and she was given 4 hydrocodone to control her pain.  Patient is encouraged return to clinic months for follow-up

## 2024-10-07 NOTE — ASSESSMENT & PLAN NOTE
Lasix 20 mg once daily in the morning has been prescribed to help reduce the swelling. She is advised to monitor her weight daily to track changes. If there is insufficient diuretic effect, the dosage of Lasix may be increased.

## 2024-10-07 NOTE — ASSESSMENT & PLAN NOTE
Hospital records reviewed.  Her symptoms are improved since being in the hospital.  Patient is encouraged to return to clinic if her symptoms do not improve.

## 2024-10-19 NOTE — ASSESSMENT & PLAN NOTE
Hospital records reviewed.  The leg swelling has improved with the use of Lasix and potassium. She was previously on nifedipine, which was suspected to cause the swelling. Nifedipine and atenolol were discontinued, and metoprolol was prescribed. She is advised to continue taking metoprolol, Lasix, and potassium. If Lasix is not taken, potassium should also be omitted. A new prescription for potassium will be provided.

## 2024-10-23 NOTE — TELEPHONE ENCOUNTER
Rx Refill Note  Requested Prescriptions     Pending Prescriptions Disp Refills    potassium chloride (MICRO-K) 10 MEQ CR capsule 30 capsule 0     Sig: Take 1 capsule by mouth Daily. Take with Lasix.      Last office visit with prescribing clinician: 10/3/2024   Last telemedicine visit with prescribing clinician: Visit date not found   Next office visit with prescribing clinician: 11/6/2024                         Would you like a call back once the refill request has been completed: [] Yes [] No    If the office needs to give you a call back, can they leave a voicemail: [] Yes [] No    Josiane Martinez MA  10/23/24, 14:25 EDT

## 2024-10-23 NOTE — TELEPHONE ENCOUNTER
Rx Refill Note  Requested Prescriptions     Pending Prescriptions Disp Refills    rosuvastatin (CRESTOR) 10 MG tablet 90 tablet 3     Sig: Take 1 tablet by mouth Every Night. Indications: High Amount of Fats in the Blood      Last office visit with prescribing clinician: 10/3/2024   Last telemedicine visit with prescribing clinician: Visit date not found   Next office visit with prescribing clinician: 10/23/2024                         Would you like a call back once the refill request has been completed: [] Yes [] No    If the office needs to give you a call back, can they leave a voicemail: [] Yes [] No    Josiane Martinez MA  10/23/24, 14:25 EDT

## 2024-10-23 NOTE — TELEPHONE ENCOUNTER
Caller: Anastasiia Faria    Relationship: Self    Best call back number: [phone]    Requested Prescriptions:   Requested Prescriptions     Pending Prescriptions Disp Refills    potassium chloride (MICRO-K) 10 MEQ CR capsule 30 capsule 0     Sig: Take 1 capsule by mouth Daily. Take with Lasix.        Pharmacy where request should be sent: [id number] [address]  AT [facility] [phone] [facility] [phone] FX     Last office visit with prescribing clinician: 10/3/2024   Last telemedicine visit with prescribing clinician: Visit date not found   Next office visit with prescribing clinician: 11/6/2024     Additional details provided by patient: PATIENT HAS A 5-6 DAY SUPPLY LEFT OF THIS MEDICATION.     Does the patient have less than a 3 day supply:  [] Yes  [x] No    Would you like a call back once the refill request has been completed: [] Yes [x] No    If the office needs to give you a call back, can they leave a voicemail: [] Yes [x] No    Federico Alcocer Rep   10/23/24 14:18 EDT

## 2024-10-24 RX ORDER — POTASSIUM CHLORIDE 750 MG/1
10 CAPSULE, EXTENDED RELEASE ORAL DAILY
Qty: 30 CAPSULE | Refills: 4 | Status: SHIPPED | OUTPATIENT
Start: 2024-10-24

## 2024-10-24 RX ORDER — ROSUVASTATIN CALCIUM 10 MG/1
10 TABLET, COATED ORAL NIGHTLY
Qty: 90 TABLET | Refills: 3 | Status: SHIPPED | OUTPATIENT
Start: 2024-10-24

## 2024-10-25 ENCOUNTER — TELEPHONE (OUTPATIENT)
Dept: ORTHOPEDIC SURGERY | Facility: CLINIC | Age: 74
End: 2024-10-25
Payer: MEDICARE

## 2024-10-25 DIAGNOSIS — Z79.2 NEED FOR ANTIBIOTIC PROPHYLAXIS FOR DENTAL PROCEDURE: Primary | ICD-10-CM

## 2024-10-25 RX ORDER — CEPHALEXIN 500 MG/1
CAPSULE ORAL
Qty: 4 CAPSULE | Refills: 3 | Status: CANCELLED | OUTPATIENT
Start: 2024-10-25

## 2024-10-25 NOTE — TELEPHONE ENCOUNTER
Caller: Anastasiia Faria    Relationship: Self    Best call back number: [phone]    Requested Prescriptions:   DENTAL ANTIBIOTIC     Pharmacy where request should be sent: [id number] [address]  AT [facility] - [phone]  - [phone] FX     Last office visit with prescribing clinician: 5/28/2024   Last telemedicine visit with prescribing clinician: Visit date not found   Next office visit with prescribing clinician: Visit date not found     Additional details provided by patient: PT IS CURRENTLY TAKING LONG TERM ANTIBIOTICS FOR 12 WEEKS DUE TO RECENT SPINAL INJECTION. PT IS UNSURE IF SHE NEEDS ADDITIONAL ANTIBIOTICS GIVEN SHE IS CURRENTLY TAKING ANTIBIOTICS FOR SOMETHING ELSE. PLEASE CONTACT PT TO DISCUSS.     Would you like a call back once the refill request has been completed: [x] Yes [] No    If the office needs to give you a call back, can they leave a voicemail: [x] Yes [] No    Valdo Morales   10/25/24 13:39 EDT

## 2024-10-25 NOTE — TELEPHONE ENCOUNTER
Patient is currently on antibiotics and she will not require 1 for dental prophylaxis as long as she is taking something else.

## 2024-11-04 ENCOUNTER — OFFICE VISIT (OUTPATIENT)
Dept: INFECTIOUS DISEASES | Facility: CLINIC | Age: 74
End: 2024-11-04
Payer: MEDICARE

## 2024-11-04 VITALS
SYSTOLIC BLOOD PRESSURE: 107 MMHG | RESPIRATION RATE: 16 BRPM | TEMPERATURE: 97.3 F | DIASTOLIC BLOOD PRESSURE: 68 MMHG | BODY MASS INDEX: 25.27 KG/M2 | HEART RATE: 71 BPM | WEIGHT: 166.2 LBS

## 2024-11-04 DIAGNOSIS — Z79.2 LONG TERM (CURRENT) USE OF ANTIBIOTICS: ICD-10-CM

## 2024-11-04 DIAGNOSIS — M00.80 ARTHRITIS DUE TO OTHER BACTERIA, SEPTIC ARTHRITIS OF UNSPECIFIED LOCATION: Primary | ICD-10-CM

## 2024-11-04 PROCEDURE — 99214 OFFICE O/P EST MOD 30 MIN: CPT | Performed by: INTERNAL MEDICINE

## 2024-11-04 PROCEDURE — 3078F DIAST BP <80 MM HG: CPT | Performed by: INTERNAL MEDICINE

## 2024-11-04 PROCEDURE — 3074F SYST BP LT 130 MM HG: CPT | Performed by: INTERNAL MEDICINE

## 2024-11-04 NOTE — PROGRESS NOTES
Reason for clinic visits: Follow up for right SI joint septic arthritis    HPI: Anastasiia Faria is a 74 y.o. female who was last seen in the ID clinic on 10/2.  Since that time she has done well. She continues to have back pain that remains unchanged. Her neurosurgery appointment was rescheduled until this Friday. She denies any F/C/NS. She is tolerating her abx therapy without any abdominal pain, no N/V/D. No rash       Past Medical History:   Diagnosis Date    Allergic     Anxiety     Chronic kidney disease     Clostridium difficile infection 06/2024    TREATED AT [facility]    Colon polyps     Depression     Diverticulosis 2011    Encounter for annual health examination 03/07/2014    Annual Health Assessment    Family history of colon cancer     Fibrocystic breast     GERD (gastroesophageal reflux disease)     Hard of hearing     HEARING AIDS BILATERALLY    Heart murmur     Herpes labialis without complication     History of cataract     History of mammogram 12/20/2010    History of recent hospitalization 03/21/2024    KIDNEY STONE/SEPSIS 4 NIGHT AT [facility]    HL (hearing loss) 2014    Hearing Aids    Hydronephrosis     Hyperlipidemia     Hypertension     Hypothyroidism     Insomnia     Kidney stones     Migraines     Osteoarthritis of right hip     Osteopenia     Prolia -last 9/2021,3/2022    Pneumonia 3/2024    While being treated for kidney stones and sepsis.    PONV (postoperative nausea and vomiting)     Scoliosis     Dr. Stanford 5/2018    Sepsis 03/2024    Slow to wake up after anesthesia     Urinary tract infection     3/2024,  6/202    Visual impairment     Wear Glasses       Past Surgical History:   Procedure Laterality Date    BONE MARROW BIOPSY Left     BREAST CYST ASPIRATION Right     BREAST SURGERY Right     BIOPSY    CATARACT EXTRACTION, BILATERAL      Cataract surgery on right eye June 2021 and left eye July 2021. (Dr. Nanette Bateman)    COLONOSCOPY N/A 10/27/2016    Procedure: COLONOSCOPY INTO  CECUM;  Surgeon: Osvaldo Dorado MD;  Location: [facility] ENDOSCOPY;  Service:     COLONOSCOPY  01/26/2011    COLONOSCOPY  02/04/2005    COLONOSCOPY N/A 12/02/2021    Procedure: COLONOSCOPY INTO CECUM WITH COLD BIOPSY POLYPECTOMY AND HOT SNARE POLYPECTOMY;  Surgeon: Osvaldo Dorado MD;  Location: [facility] ENDOSCOPY;  Service: General;  Laterality: N/A;  PRE-FAMILY HISTORY OF COLON CANCER, PERSONAL HISTORY OF COLON CANCER  POST- POLYPS, DIVERTICULOSIS, HEMORRHOIDS    CYSTOSCOPY W/ URETERAL STENT PLACEMENT Left 03/21/2024    Procedure: LEFT CYSTOSCOPY RETROGRADE PYLEOGRAM URETERAL STENT INSERTION;  Surgeon: Jaiden Bolton Jr., MD;  Location: [facility] MAIN OR;  Service: Urology;  Laterality: Left;    CYSTOSCOPY W/ URETERAL STENT REMOVAL  04/23/2024    CYSTOSCOPY, RETROGRADE PYELOGRAM AND STENT INSERTION Left 06/14/2024    Procedure: CYSTOSCOPY RETROGRADE PYELOGRAM AND STENT INSERTION;  Surgeon: Yahir Faria MD;  Location: [facility] OR;  Service: Urology;  Laterality: Left;    JOINT REPLACEMENT  5/2024    Right Hip   Dr. Vasu King    KNEE ARTHROSCOPY Left     PAP SMEAR  12/20/2010    TOTAL HIP ARTHROPLASTY Right 05/13/2024    Procedure: TOTAL HIP ARTHROPLASTY ANTERIOR WITH HANA TABLE;  Surgeon: Vasu King MD;  Location: [facility];  Service: Orthopedics;  Laterality: Right;    URETEROSCOPY LASER LITHOTRIPSY WITH STENT INSERTION Left 7/25/2024    Procedure: CYSTOSCOPY, LEFT URETEROSCOPY, LASER LITHOTRIPSY, STENT EXCHANGE;  Surgeon: Jaiden Bolton Jr., MD;  Location: [facility] OR;  Service: Urology;  Laterality: Left;       Social History   reports that she has never smoked. She has never used smokeless tobacco. She reports that she does not drink alcohol and does not use drugs.    Family History  family history includes Breast cancer in her maternal grandfather, maternal grandmother, and mother; Cancer in her brother, father, maternal aunt, maternal aunt, maternal aunt,  maternal grandmother, maternal uncle, and mother; Colon cancer in her maternal aunt, maternal aunt, maternal aunt, mother, and other family members; Diabetes in her brother; Diabetes type II in her brother; Hearing loss in her father; Heart attack in her maternal grandfather; Heart disease in her maternal aunt, maternal grandfather, maternal grandmother, maternal uncle, and paternal grandmother; Hyperlipidemia in her mother and sister; Hypertension in her mother, sister, and sister; Stomach cancer in her maternal grandmother; Thyroid disease in her sister; Vision loss in her maternal grandfather.    No Known Allergies    The medication list has been reviewed and updated.     Review of Systems  Pertinent items are noted in HPI, all other systems reviewed and negative    Vital Signs   Temp:  [97.3 °F (36.3 °C)] 97.3 °F (36.3 °C)  Heart Rate:  [71] 71  Resp:  [16] 16  BP: (107)/(68) 107/68    Physical Exam:   General: In no acute distress   Respiratory: Breathing comfortably on room air  Neurological: Alert and oriented, moving all 4 extremities  Psychiatric: Normal mood and affect     Lab Results   Component Value Date    WBC 5.58 09/25/2024    HGB 11.4 (L) 09/25/2024    HCT 35.1 09/25/2024    MCV 92.1 09/25/2024     09/25/2024       Lab Results   Component Value Date    GLUCOSE 96 09/25/2024    BUN 16 09/25/2024    CREATININE 1.06 (H) 09/25/2024    EGFRIFNONA 52 (L) 09/22/2021    EGFRIFAFRI 67 11/01/2019    BCR 15.1 09/25/2024    CO2 29.4 (H) 09/25/2024    CALCIUM 9.3 09/25/2024    PROTENTOTREF 6.4 11/01/2019    ALBUMIN 3.7 09/24/2024    LABIL2 2.2 11/01/2019    AST 10 09/24/2024    ALT <5 09/24/2024       Lab Results   Component Value Date    SEDRATE 1 10/02/2024       Lab Results   Component Value Date    CRP <0.30 10/02/2024     7/29 BCx negative x 2  7/29 right hip cultures negative  7/27 UCx >100K Pseudomonas  7/27 BCx Pseudomonas x 2    Assessment:  This is a 74 y.o. female who presents to clinic today  for follow up right SI joint septic arthritis in the setting of pseudomonal bacteremia.  The patient is scheduled to be seen by surgery on Friday.  At this time she has completed an 12-week course of Levaquin with improved albeit still present back pain. Her inflammatory markers have normalized. At this time further antibiotic are not indicated. I have asked the pt to call the ID clinic with worsening back pain or fever. The pt and her  voiced understanding.     Return to Infectious Disease clinic PRN

## 2024-11-06 ENCOUNTER — OFFICE VISIT (OUTPATIENT)
Dept: FAMILY MEDICINE CLINIC | Facility: CLINIC | Age: 74
End: 2024-11-06
Payer: MEDICARE

## 2024-11-06 VITALS
RESPIRATION RATE: 18 BRPM | BODY MASS INDEX: 24.71 KG/M2 | OXYGEN SATURATION: 98 % | SYSTOLIC BLOOD PRESSURE: 126 MMHG | DIASTOLIC BLOOD PRESSURE: 70 MMHG | HEIGHT: 68 IN | HEART RATE: 62 BPM | WEIGHT: 163 LBS

## 2024-11-06 DIAGNOSIS — R22.43 LOCALIZED SWELLING OF BOTH LOWER LEGS: ICD-10-CM

## 2024-11-06 DIAGNOSIS — Z23 FLU VACCINE NEED: Primary | ICD-10-CM

## 2024-11-06 DIAGNOSIS — I10 ESSENTIAL HYPERTENSION: ICD-10-CM

## 2024-11-06 NOTE — PROGRESS NOTES
"Chief Complaint  Chief Complaint   Patient presents with    Edema     Pt here for 1 mon f/u        Subjective    History of Present Illness        Anastasiia Faria presents to [facility] for   History of Present Illness  The patient is a 74-year-old female who presents for follow-up.    She is here for a follow-up visit after a change in her blood pressure medication. She has been taking Lasix consistently and reports that her blood pressure readings have been satisfactory. She also mentions that she is overdue for her wellness exam and plans to receive her influenza and COVID-19 vaccines. She has been on Levaquin since 07/28/2024 and today marks the end of this course. She has an upcoming appointment with her spine doctor on Friday.     History of Present Illness      Objective   Vital Signs:   Visit Vitals  /70   Pulse 62   Resp 18   Ht 172.7 cm (68\")   Wt 73.9 kg (163 lb)   LMP  (LMP Unknown)   SpO2 98%   BMI 24.78 kg/m²          BMI is within normal parameters. No other follow-up for BMI required.     Physical Exam  Vitals reviewed.   Constitutional:       Appearance: She is well-developed.   HENT:      Head: Normocephalic.      Right Ear: External ear normal.      Left Ear: External ear normal.      Nose: Nose normal.   Eyes:      Conjunctiva/sclera: Conjunctivae normal.   Cardiovascular:      Rate and Rhythm: Normal rate and regular rhythm.   Pulmonary:      Effort: Pulmonary effort is normal.      Breath sounds: Normal breath sounds.   Musculoskeletal:         General: Normal range of motion.      Cervical back: Normal range of motion and neck supple.   Skin:     General: Skin is warm and dry.      Capillary Refill: Capillary refill takes less than 2 seconds.   Neurological:      Mental Status: She is alert and oriented to person, place, and time.        Physical Exam           Result Review :  Results  Imaging  Kidney scan and x-ray show normal results.                      "     Assessment and Plan      Diagnoses and all orders for this visit:    1. Flu vaccine need (Primary)  -     Fluzone High-Dose 65+yrs    2. Essential hypertension  Assessment & Plan:  Hypertension is stable and controlled  Continue current treatment regimen.  Dietary sodium restriction.  Weight loss.  Regular aerobic exercise.  Blood pressure will be reassessed in 3 months.      3. Localized swelling of both lower legs  Assessment & Plan:  Symptoms are improved.  She is on Lasix and potassium daily.  She would like to decrease her Lasix and potassium to every other day.  She was advised to monitor her swelling and weight.  She will continue taking the medication every other day until her swelling worsens.         Assessment & Plan             Follow Up   No follow-ups on file.  Patient was given instructions and counseling regarding her condition or for health maintenance advice. Please see specific information pulled into the AVS if appropriate.     Patient or patient representative verbalized consent for the use of Ambient Listening during the visit with  Mirza Scott Sr, MD for chart documentation. 11/8/2024  00:42 EST

## 2024-11-08 NOTE — ASSESSMENT & PLAN NOTE
Symptoms are improved.  She is on Lasix and potassium daily.  She would like to decrease her Lasix and potassium to every other day.  She was advised to monitor her swelling and weight.  She will continue taking the medication every other day until her swelling worsens.

## 2024-11-15 RX ORDER — CEPHALEXIN 500 MG/1
CAPSULE ORAL
Qty: 4 CAPSULE | Refills: 3 | Status: SHIPPED | OUTPATIENT
Start: 2024-11-15

## 2024-11-15 NOTE — TELEPHONE ENCOUNTER
5/13/24 RTHA  Rx needed for dental appointments    As per MD, stop heparin gtt at 02:00 am and give the 1 unit of plasma that was ordered once consent is obtained.

## 2024-11-15 NOTE — TELEPHONE ENCOUNTER
Caller: Anastasiia Faria    Relationship: Self    Best call back number: [phone]    Requested Prescriptions:   PATIENT NEEDS ANTIBIOTICS FOR UPCOMING DENTAL APPOINTMENT ON 11-22-24       Pharmacy where request should be sent:      Last office visit with prescribing clinician: 5/28/2024   Last telemedicine visit with prescribing clinician: Visit date not found   Next office visit with prescribing clinician: Visit date not found     Additional details provided by patient:       Would you like a call back once the refill request has been completed: [x] Yes [] No    If the office needs to give you a call back, can they leave a voicemail: [x] Yes [] No    Federico Garcia Rep   11/15/24 14:14 EST

## 2024-12-02 ENCOUNTER — TELEPHONE (OUTPATIENT)
Dept: GASTROENTEROLOGY | Facility: CLINIC | Age: 74
End: 2024-12-02
Payer: MEDICARE

## 2024-12-02 ENCOUNTER — OFFICE VISIT (OUTPATIENT)
Dept: GASTROENTEROLOGY | Facility: CLINIC | Age: 74
End: 2024-12-02
Payer: MEDICARE

## 2024-12-02 VITALS
HEIGHT: 68 IN | BODY MASS INDEX: 24.61 KG/M2 | SYSTOLIC BLOOD PRESSURE: 136 MMHG | WEIGHT: 162.4 LBS | HEART RATE: 76 BPM | DIASTOLIC BLOOD PRESSURE: 81 MMHG

## 2024-12-02 DIAGNOSIS — Z86.0100 HISTORY OF COLONIC POLYPS: ICD-10-CM

## 2024-12-02 DIAGNOSIS — A04.72 COLITIS, CLOSTRIDIUM DIFFICILE: Primary | ICD-10-CM

## 2024-12-02 DIAGNOSIS — Z80.0 FAMILY HISTORY OF COLON CANCER IN MOTHER: ICD-10-CM

## 2024-12-02 PROCEDURE — 99214 OFFICE O/P EST MOD 30 MIN: CPT | Performed by: PHYSICIAN ASSISTANT

## 2024-12-02 PROCEDURE — 3079F DIAST BP 80-89 MM HG: CPT | Performed by: PHYSICIAN ASSISTANT

## 2024-12-02 PROCEDURE — 3075F SYST BP GE 130 - 139MM HG: CPT | Performed by: PHYSICIAN ASSISTANT

## 2024-12-02 RX ORDER — B.COAGUL,SUBTILIS/INULIN/VIT C 1B CELL-1G
TABLET,CHEWABLE ORAL
COMMUNITY
Start: 2024-09-12

## 2024-12-02 NOTE — TELEPHONE ENCOUNTER
ANKUSH Guillaume for COLONOSCOPY on  2/11/25 arrive at  8:30 . Gave prep instructions to pt in office ....miralax

## 2024-12-02 NOTE — PROGRESS NOTES
"Chief Complaint  Abdominal Pain (Colitis) and Rectal Bleeding    Subjective          History of Present Illness    Anastasiia Faria is a  74 y.o. female presents for hospital follow-up on colitis with abdominal pain and rectal bleeding diagnosed June 2024.  She will follow with Dr. Leonardo.    She was seen in the hospital for abdominal pain, rectal bleeding, nausea, and vomiting with CT scan showing right-sided colitis, positive C. difficile toxin. Was on IV antibiotics for sepsis prior to this diagnosis.  Started on vancomycin. Symptoms resolved after this. Currently with daily Bms, occasional diarrhea. Was taking metamucil but stopped this recently. On probiotics. Denies melena, hematochezia, abdominal pain. Has lost 20lbs since March due to poor appetite and multiple medical problems. Has stabilized around 160lbs.     9/25/24 CBC with Hgb 11.4.     She had spinal infection and kidney stones after June hospital visit.     Mother and 3 maternal aunts w/ hx of colon cancer.     Colonoscopy 12/2021 w/ Dr. Dorado: Two small polyps resected. Moderate diverticulosis in sigmoid colon and in descending colon. Nonbleeding internal hemorrhoids. Path with tubular adenoma, inflammatory polyp    Objective   Vital Signs:   /81 (BP Location: Right arm, Patient Position: Sitting, Cuff Size: Adult)   Pulse 76   Ht 172.7 cm (68\")   Wt 73.7 kg (162 lb 6.4 oz)   BMI 24.69 kg/m²       Physical Exam  Vitals reviewed.   Constitutional:       General: She is awake. She is not in acute distress.     Appearance: Normal appearance. She is well-developed and well-groomed.   HENT:      Head: Normocephalic.   Pulmonary:      Effort: Pulmonary effort is normal. No respiratory distress.   Skin:     Coloration: Skin is not pale.   Neurological:      Mental Status: She is alert and oriented to person, place, and time.      Gait: Gait is intact.   Psychiatric:         Mood and Affect: Mood and affect normal.         Speech: Speech normal.    "      Behavior: Behavior is cooperative.         Judgment: Judgment normal.          Result Review :             Assessment and Plan    Diagnoses and all orders for this visit:    1. Colitis, Clostridium difficile (Primary)  -     Case Request; Standing  -     Implement Anesthesia orders day of procedure.; Standing  -     Verify bowel prep was successful; Standing  -     Give tap water enema if bowel prep was insufficient; Standing  -     Case Request    2. History of colonic polyps  -     Case Request; Standing  -     Implement Anesthesia orders day of procedure.; Standing  -     Verify bowel prep was successful; Standing  -     Give tap water enema if bowel prep was insufficient; Standing  -     Case Request    3. Family history of colon cancer in mother    Recommend colonoscopy to follow-up on colitis especially with family history of colon cancer and personal history of adenomatous colon polyps.    Call if recurrence in symptoms and we can retest for Cdiff.     Follow Up   Return for Colonoscopy.    Dragon dictation used throughout this note.     Phyllis Isabel PA-C

## 2024-12-05 RX ORDER — METOPROLOL SUCCINATE 25 MG/1
25 TABLET, EXTENDED RELEASE ORAL DAILY
Qty: 90 TABLET | Refills: 0 | Status: SHIPPED | OUTPATIENT
Start: 2024-12-05 | End: 2025-03-05

## 2024-12-05 NOTE — TELEPHONE ENCOUNTER
Caller: Anastasiia Faria    Relationship: Self    Best call back number: [phone]     Requested Prescriptions:   Requested Prescriptions     Pending Prescriptions Disp Refills    metoprolol succinate XL (Toprol XL) 25 MG 24 hr tablet 90 tablet 0     Sig: Take 1 tablet by mouth Daily for 90 days.        Pharmacy where request should be sent: [facility] [address] [phone] [facility] [phone]      Last office visit with prescribing clinician: 11/6/2024   Last telemedicine visit with prescribing clinician: Visit date not found   Next office visit with prescribing clinician: Visit date not found     Additional details provided by patient: PATIENT WOULD LIKE A 90 DAY SUPPLY.    Does the patient have less than a 3 day supply:  [] Yes  [] No    Would you like a call back once the refill request has been completed: [] Yes [] No    If the office needs to give you a call back, can they leave a voicemail: [] Yes [] No    Federico Sesay Rep   12/05/24 15:32 EST

## 2024-12-16 ENCOUNTER — TELEPHONE (OUTPATIENT)
Dept: GASTROENTEROLOGY | Facility: CLINIC | Age: 74
End: 2024-12-16

## 2024-12-16 NOTE — TELEPHONE ENCOUNTER
Provider: FE CAMACHO    Caller: Anastasiia Faria    Relationship to Patient: Self    Phone Number: [phone]    Reason for Call: PATIENT NEEDS TO RESCHEDULE UPCOMING PROCEDURE SCHEDULED FOR 02/11/2025 DUE TO OTHER APPOINTMENT. PLEASE CALL BACK ANYTIME TO RESCHEDULE, OKAY TO LEAVE VM.     no

## 2024-12-16 NOTE — TELEPHONE ENCOUNTER
Hub staff attempted to follow warm transfer process and was unsuccessful     Caller: Anastasiia Faria    Relationship to patient: Self    Best call back number: [phone]     Patient is needing: TO BE SCHEDULED FOR SCOPE WITH DOUG

## 2025-01-27 ENCOUNTER — OFFICE VISIT (OUTPATIENT)
Dept: OBSTETRICS AND GYNECOLOGY | Facility: CLINIC | Age: 75
End: 2025-01-27
Payer: MEDICARE

## 2025-01-27 VITALS
HEIGHT: 68 IN | SYSTOLIC BLOOD PRESSURE: 150 MMHG | BODY MASS INDEX: 25.28 KG/M2 | WEIGHT: 166.8 LBS | DIASTOLIC BLOOD PRESSURE: 67 MMHG

## 2025-01-27 DIAGNOSIS — Z01.419 ENCOUNTER FOR ROUTINE GYNECOLOGIC EXAMINATION IN MEDICARE PATIENT: Primary | ICD-10-CM

## 2025-01-27 NOTE — PROGRESS NOTES
[facility] Annual Exam     CC- Here for annual exam. (Patient here for her annual today. Pap 2024 neg, mammo made for 2/3/25, colon made for 3/5/25, dexa 2024)     Anastasiia Faria is a 75 y.o. female who presents for annual well woman exam. She is menopausal.  She is experiencing and/or reports no bothersome menopause symptoms.  She denies postmenopausal vaginal bleeding.  She denies abdominal pain.  Today, she wishes to specifically address just routine screening exam..  I explained to her that current ACOG guidelines state that screening Pap smears are no longer recommended after the age of 65, nor after hysterectomy for benign reasons.  She did come off of her Prolia and we will plan to do another DEXA study in 1 year.    OB History               Para        Term   0            AB        Living             SAB        IAB        Ectopic        Molar        Multiple        Live Births                    Current contraception: post menopausal status  History of abnormal Pap smear: ASCUS  Family history of uterine, colon or ovarian cancer: no  History of abnormal mammogram: no  Family history of breast cancer: no  Last Pap : 2024 was normal  Last mammogram: Scheduled for next week  Last colonoscopy: Scheduled for 2025  Last DEXA: 2023 with osteopenia      Past Medical History:   Diagnosis Date    Allergic     Anxiety     Chronic kidney disease     Clostridium difficile infection 2024    TREATED AT [facility]    Colon polyps     Depression     Diverticulosis     Encounter for annual health examination 2014    Annual Health Assessment    Family history of colon cancer     Fibrocystic breast     GERD (gastroesophageal reflux disease)     GI (gastrointestinal bleed) 2024    This was a few weeks after I had kidney stones/sepsis and hip replacement.  Assume c-diff was caused by the antibiotics that I had taken.    Hard of hearing     HEARING AIDS BILATERALLY    Heart murmur      Herpes labialis without complication     History of cataract     History of mammogram 12/20/2010    History of recent hospitalization 03/21/2024    KIDNEY STONE/SEPSIS 4 NIGHT AT [facility]    HL (hearing loss) 2014    Hearing Aids    Hydronephrosis     Hyperlipidemia     Hypertension     Hypothyroidism     Insomnia     Kidney stones     Migraines     Osteoarthritis of right hip     Osteopenia     Prolia -last 9/2021,3/2022    Pneumonia 3/2024    While being treated for kidney stones and sepsis.    PONV (postoperative nausea and vomiting)     Scoliosis     Dr. Stanford 5/2018    Sepsis 03/2024    Slow to wake up after anesthesia     Urinary tract infection     3/2024,  6/202    Visual impairment     Wear Glasses       Past Surgical History:   Procedure Laterality Date    BONE MARROW BIOPSY Left     BREAST CYST ASPIRATION Right     BREAST SURGERY Right     BIOPSY    CATARACT EXTRACTION, BILATERAL      Cataract surgery on right eye June 2021 and left eye July 2021. (Dr. Nanette Bateman)    COLONOSCOPY N/A 10/27/2016    Procedure: COLONOSCOPY INTO CECUM;  Surgeon: Osvaldo Dorado MD;  Location: [facility] ENDOSCOPY;  Service:     COLONOSCOPY  01/26/2011    COLONOSCOPY  02/04/2005    COLONOSCOPY N/A 12/02/2021    Procedure: COLONOSCOPY INTO CECUM WITH COLD BIOPSY POLYPECTOMY AND HOT SNARE POLYPECTOMY;  Surgeon: Osvaldo Dorado MD;  Location: [facility] ENDOSCOPY;  Service: General;  Laterality: N/A;  PRE-FAMILY HISTORY OF COLON CANCER, PERSONAL HISTORY OF COLON CANCER  POST- POLYPS, DIVERTICULOSIS, HEMORRHOIDS    CYSTOSCOPY W/ URETERAL STENT PLACEMENT Left 03/21/2024    Procedure: LEFT CYSTOSCOPY RETROGRADE PYLEOGRAM URETERAL STENT INSERTION;  Surgeon: Jaiden Bolton Jr., MD;  Location: [facility] OR;  Service: Urology;  Laterality: Left;    CYSTOSCOPY W/ URETERAL STENT REMOVAL  04/23/2024    CYSTOSCOPY, RETROGRADE PYELOGRAM AND STENT INSERTION Left 06/14/2024    Procedure: CYSTOSCOPY RETROGRADE  PYELOGRAM AND STENT INSERTION;  Surgeon: Yahir Faria MD;  Location: [facility] OR;  Service: Urology;  Laterality: Left;    JOINT REPLACEMENT  5/2024    Right Hip   Dr. Vasu King    KNEE ARTHROSCOPY Left     PAP SMEAR  12/20/2010    TOTAL HIP ARTHROPLASTY Right 05/13/2024    Procedure: TOTAL HIP ARTHROPLASTY ANTERIOR WITH HANA TABLE;  Surgeon: Vasu King MD;  Location: [facility] OR OSC;  Service: Orthopedics;  Laterality: Right;    URETEROSCOPY LASER LITHOTRIPSY WITH STENT INSERTION Left 7/25/2024    Procedure: CYSTOSCOPY, LEFT URETEROSCOPY, LASER LITHOTRIPSY, STENT EXCHANGE;  Surgeon: Jaiden Bolton Jr., MD;  Location: [facility] OR;  Service: Urology;  Laterality: Left;         Current Outpatient Medications:     cephalexin (KEFLEX) 500 MG capsule, TAKE 4 CAPS 1 HOUR PRIOR TO DENTAL APPOINTMENT, Disp: 4 capsule, Rfl: 3    escitalopram (LEXAPRO) 20 MG tablet, Take 1 tablet by mouth Daily., Disp: 90 tablet, Rfl: 2    furosemide (LASIX) 20 MG tablet, Take 1 tablet by mouth Daily., Disp: 30 tablet, Rfl: 3    HYDROcodone-acetaminophen (NORCO) 5-325 MG per tablet, Take 1 tablet by mouth Every 6 (Six) Hours As Needed for Moderate Pain., Disp: 45 tablet, Rfl: 0    levothyroxine (SYNTHROID, LEVOTHROID) 50 MCG tablet, Take 1 tablet by mouth Daily., Disp: 90 tablet, Rfl: 2    loratadine (CLARITIN) 10 MG tablet, Take 1 tablet by mouth Daily. Indications: Hayfever, Disp: , Rfl:     metoprolol succinate XL (Toprol XL) 25 MG 24 hr tablet, Take 1 tablet by mouth Daily for 90 days., Disp: 90 tablet, Rfl: 0    potassium chloride (MICRO-K) 10 MEQ CR capsule, Take 1 capsule by mouth Daily. Take with Lasix., Disp: 30 capsule, Rfl: 4    Probiotic Product (Culturelle Abdominal Support) 4-15 GM-MG pack, , Disp: , Rfl:     Psyllium (METAMUCIL FIBER PO), Take 1 Scoop by mouth Daily. Indications: constipation, Disp: , Rfl:     rosuvastatin (CRESTOR) 10 MG tablet, Take 1 tablet by mouth Every Night. Indications: High  Amount of Fats in the Blood, Disp: 90 tablet, Rfl: 3    No Known Allergies    Social History     Tobacco Use    Smoking status: Never    Smokeless tobacco: Never    Tobacco comments:     daily caffine   Vaping Use    Vaping status: Never Used   Substance Use Topics    Alcohol use: Never    Drug use: Never       Family History   Problem Relation Age of Onset    Breast cancer Mother     Colon cancer Mother     Hypertension Mother     Cancer Mother         Breat cancer, skin cancer, colon cancer    Hyperlipidemia Mother     Rectal cancer Mother         Cause of death    Pancreatitis Mother     Cancer Father         Skin cancer    Hearing loss Father     Hypertension Sister     Hypertension Sister     Hyperlipidemia Sister     Thyroid disease Sister     Diabetes type II Brother     Cancer Brother         Skin cancer    Diabetes Brother     Colon cancer Maternal Aunt         Cause of death in her early 40s.    Cancer Maternal Aunt         Colon cancer cause of death early 40s    Colon cancer Maternal Aunt         64 yo    Heart disease Maternal Aunt     Colon cancer Maternal Aunt         81 yo    Cancer Maternal Aunt         Colon cancer    Cancer Maternal Aunt         Colon cancer    Colon cancer Maternal Aunt     Cancer Maternal Uncle         Thyroid cancer    Heart disease Maternal Uncle     Stomach cancer Maternal Grandmother         or intestinal    Cancer Maternal Grandmother         Small intestine    Breast cancer Maternal Grandmother     Heart disease Maternal Grandmother     Heart attack Maternal Grandfather     Breast cancer Maternal Grandfather     Heart disease Maternal Grandfather     Vision loss Maternal Grandfather         Glaucoma    Heart disease Paternal Grandmother     Colon cancer Other     Colon cancer Other     Malig Hyperthermia Neg Hx        Review of Systems   Constitutional:  Negative for fever.   Genitourinary:  Negative for pelvic pain and vaginal bleeding.       /67   Ht 172.7 cm  "(68\")   Wt 75.7 kg (166 lb 12.8 oz)   LMP  (LMP Unknown)   BMI 25.36 kg/m²     Physical Exam  Constitutional:       Appearance: She is normal weight.   Genitourinary:      Bladder and urethral meatus normal.      No lesions in the vagina.      Right Labia: No lesions.     Left Labia: No lesions.     No vaginal discharge, tenderness or bleeding.      No vaginal prolapse present.     Mild vaginal atrophy present.       Right Adnexa: not tender, not full and no mass present.     Left Adnexa: not tender, not full and no mass present.     Cervical friability present.      No cervical motion tenderness, discharge or lesion.      Uterus is not enlarged, fixed or tender.      No uterine mass detected.     Uterus is midaxial.   Breasts:     Right: No mass, nipple discharge, skin change or tenderness.      Left: No mass, nipple discharge, skin change or tenderness.   Neck:      Thyroid: No thyroid mass or thyromegaly.   Abdominal:      General: Abdomen is flat.      Palpations: Abdomen is soft. There is no mass.      Tenderness: There is no abdominal tenderness.   Neurological:      Mental Status: She is alert.   Vitals reviewed.             Assessment     1) GYN annual well woman exam.   2) history of osteopenia.  Will repeat bone density study in 1 year.  3) history of ASCUS.     Plan     1) Breast Health - Clinical breast exam & mammogram reviewed specifically American Cancer Society recommendations for screening specific to her, and Self breast awareness monthly  2) Pap -done today  3) Smoking status-negative  4) Colon health - screening colonoscopy recommended if not up to date  5) Bone health - Weight bearing exercise, dietary calcium recommendations and vitamin D reviewed.   6) Encouraged to be wary of information obtained via social media and internet based on source and search.   7) Follow up prn and one year      Dhiraj Gallegos MD   1/27/2025  11:56 EST  "

## 2025-01-30 LAB
CYTOLOGIST CVX/VAG CYTO: NORMAL
CYTOLOGY CVX/VAG DOC CYTO: NORMAL
CYTOLOGY CVX/VAG DOC THIN PREP: NORMAL
DX ICD CODE: NORMAL
Lab: NORMAL
OTHER STN SPEC: NORMAL
STAT OF ADQ CVX/VAG CYTO-IMP: NORMAL

## 2025-02-03 ENCOUNTER — PROCEDURE VISIT (OUTPATIENT)
Dept: OBSTETRICS AND GYNECOLOGY | Facility: CLINIC | Age: 75
End: 2025-02-03
Payer: MEDICARE

## 2025-02-03 DIAGNOSIS — Z12.31 VISIT FOR SCREENING MAMMOGRAM: Primary | ICD-10-CM

## 2025-02-20 ENCOUNTER — OFFICE VISIT (OUTPATIENT)
Dept: FAMILY MEDICINE CLINIC | Facility: CLINIC | Age: 75
End: 2025-02-20
Payer: MEDICARE

## 2025-02-20 VITALS
BODY MASS INDEX: 25.46 KG/M2 | SYSTOLIC BLOOD PRESSURE: 148 MMHG | DIASTOLIC BLOOD PRESSURE: 80 MMHG | WEIGHT: 168 LBS | OXYGEN SATURATION: 96 % | HEIGHT: 68 IN | HEART RATE: 57 BPM | RESPIRATION RATE: 18 BRPM

## 2025-02-20 DIAGNOSIS — E03.9 ACQUIRED HYPOTHYROIDISM: ICD-10-CM

## 2025-02-20 DIAGNOSIS — I10 ESSENTIAL HYPERTENSION: Primary | ICD-10-CM

## 2025-02-20 DIAGNOSIS — F32.A DEPRESSIVE DISORDER: ICD-10-CM

## 2025-02-20 PROCEDURE — 3079F DIAST BP 80-89 MM HG: CPT | Performed by: FAMILY MEDICINE

## 2025-02-20 PROCEDURE — G2211 COMPLEX E/M VISIT ADD ON: HCPCS | Performed by: FAMILY MEDICINE

## 2025-02-20 PROCEDURE — 3077F SYST BP >= 140 MM HG: CPT | Performed by: FAMILY MEDICINE

## 2025-02-20 PROCEDURE — 99214 OFFICE O/P EST MOD 30 MIN: CPT | Performed by: FAMILY MEDICINE

## 2025-02-20 RX ORDER — LEVOTHYROXINE SODIUM 50 UG/1
50 TABLET ORAL DAILY
Qty: 90 TABLET | Refills: 3 | Status: SHIPPED | OUTPATIENT
Start: 2025-02-20

## 2025-02-20 RX ORDER — BUTYROSPERMUM PARKII(SHEA BUTTER), SIMMONDSIA CHINENSIS (JOJOBA) SEED OIL, ALOE BARBADENSIS LEAF EXTRACT .01; 1; 3.5 G/100G; G/100G; G/100G
LIQUID TOPICAL
COMMUNITY
Start: 2025-02-10

## 2025-02-20 RX ORDER — ESCITALOPRAM OXALATE 20 MG/1
20 TABLET ORAL DAILY
Qty: 90 TABLET | Refills: 3 | Status: SHIPPED | OUTPATIENT
Start: 2025-02-20

## 2025-02-20 RX ORDER — METOPROLOL SUCCINATE 25 MG/1
25 TABLET, EXTENDED RELEASE ORAL DAILY
Qty: 90 TABLET | Refills: 3 | Status: SHIPPED | OUTPATIENT
Start: 2025-02-20

## 2025-02-20 NOTE — PROGRESS NOTES
"Chief Complaint  Chief Complaint   Patient presents with    Hypertension     Pt here for 3 mon f/u        Subjective    History of Present Illness        Anastasiia Faria presents to [facility] for   Hypertension  This is a chronic problem. The current episode started more than 1 year ago. The problem has been stable since onset. The problem is controlled. Associated symptoms include malaise/fatigue and peripheral edema. Pertinent negatives include no palpitations. Agents associated with hypertension include NSAIDs and thyroid hormones. Risk factors for coronary artery disease include dyslipidemia. Compliance problems include exercise.    Additional comments: Metoprolol seems to be effective.  Taking lasix every other day.  Continuing hip/back pain from spinal infection improving but limits exercise.  Hypothyroidism  Presents for follow-up visit. Patient reports no cold intolerance, diaphoresis, hair loss, heat intolerance, leg swelling, nail problem, palpitations or trouble swallowing. The symptoms have been stable. Past treatments include levothyroxine.      History of Present Illness      Objective   Vital Signs:   Visit Vitals  /80   Pulse 57   Resp 18   Ht 172.7 cm (68\")   Wt 76.2 kg (168 lb)   LMP  (LMP Unknown)   SpO2 96%   BMI 25.54 kg/m²                Physical Exam  Vitals reviewed.   Constitutional:       Appearance: She is well-developed.   HENT:      Head: Normocephalic.      Right Ear: External ear normal.      Left Ear: External ear normal.      Nose: Nose normal.   Eyes:      Conjunctiva/sclera: Conjunctivae normal.   Cardiovascular:      Rate and Rhythm: Normal rate and regular rhythm.   Pulmonary:      Effort: Pulmonary effort is normal.      Breath sounds: Normal breath sounds.   Musculoskeletal:         General: Normal range of motion.      Cervical back: Normal range of motion and neck supple.   Skin:     General: Skin is warm and dry.      Capillary Refill: " Capillary refill takes less than 2 seconds.   Neurological:      Mental Status: She is alert and oriented to person, place, and time.        Physical Exam           Result Review :  Results                            Assessment and Plan      Diagnoses and all orders for this visit:    1. Essential hypertension (Primary)  Assessment & Plan:  Hypertension is stable and controlled  Continue current treatment regimen.  Dietary sodium restriction.  Weight loss.  Regular aerobic exercise.  Blood pressure will be reassessed in 6 months.    Orders:  -     metoprolol succinate XL (Toprol XL) 25 MG 24 hr tablet; Take 1 tablet by mouth Daily.  Dispense: 90 tablet; Refill: 3    2. Acquired hypothyroidism  Assessment & Plan:  Patient's symptoms are stable.  Patient was given a prescription for levothyroxine.  Patient was encouraged return to clinic in 4 to 6 months.    Orders:  -     levothyroxine (SYNTHROID, LEVOTHROID) 50 MCG tablet; Take 1 tablet by mouth Daily. Indications: Underactive Thyroid  Dispense: 90 tablet; Refill: 3    3. Depressive disorder  -     escitalopram (LEXAPRO) 20 MG tablet; Take 1 tablet by mouth Daily. Indications: Generalized Anxiety Disorder  Dispense: 90 tablet; Refill: 3       Assessment & Plan             Follow Up   No follow-ups on file.  Patient was given instructions and counseling regarding her condition or for health maintenance advice. Please see specific information pulled into the AVS if appropriate.

## 2025-03-05 ENCOUNTER — HOSPITAL ENCOUNTER (OUTPATIENT)
Facility: HOSPITAL | Age: 75
Setting detail: HOSPITAL OUTPATIENT SURGERY
Discharge: HOME | End: 2025-03-05
Attending: INTERNAL MEDICINE | Admitting: INTERNAL MEDICINE
Payer: MEDICARE

## 2025-03-05 ENCOUNTER — ANESTHESIA (OUTPATIENT)
Dept: GASTROENTEROLOGY | Facility: HOSPITAL | Age: 75
End: 2025-03-05
Payer: MEDICARE

## 2025-03-05 ENCOUNTER — ANESTHESIA EVENT (OUTPATIENT)
Dept: GASTROENTEROLOGY | Facility: HOSPITAL | Age: 75
End: 2025-03-05
Payer: MEDICARE

## 2025-03-05 VITALS
BODY MASS INDEX: 24.75 KG/M2 | OXYGEN SATURATION: 98 % | RESPIRATION RATE: 15 BRPM | HEART RATE: 64 BPM | DIASTOLIC BLOOD PRESSURE: 79 MMHG | SYSTOLIC BLOOD PRESSURE: 164 MMHG | HEIGHT: 68 IN | WEIGHT: 163.3 LBS

## 2025-03-05 DIAGNOSIS — Z86.0100 HISTORY OF COLONIC POLYPS: ICD-10-CM

## 2025-03-05 DIAGNOSIS — A04.72 COLITIS, CLOSTRIDIUM DIFFICILE: ICD-10-CM

## 2025-03-05 PROCEDURE — 45385 COLONOSCOPY W/LESION REMOVAL: CPT | Performed by: INTERNAL MEDICINE

## 2025-03-05 PROCEDURE — 25810000003 SODIUM CHLORIDE 0.9 % SOLUTION: Performed by: INTERNAL MEDICINE

## 2025-03-05 PROCEDURE — 88305 TISSUE EXAM BY PATHOLOGIST: CPT | Performed by: INTERNAL MEDICINE

## 2025-03-05 PROCEDURE — 25810000003 LACTATED RINGERS PER 1000 ML: Performed by: NURSE ANESTHETIST, CERTIFIED REGISTERED

## 2025-03-05 PROCEDURE — 25010000002 ONDANSETRON PER 1 MG

## 2025-03-05 PROCEDURE — 25010000002 LIDOCAINE 2% SOLUTION: Performed by: NURSE ANESTHETIST, CERTIFIED REGISTERED

## 2025-03-05 PROCEDURE — 25010000002 PROPOFOL 1000 MG/100ML EMULSION: Performed by: NURSE ANESTHETIST, CERTIFIED REGISTERED

## 2025-03-05 PROCEDURE — 25010000002 PROPOFOL 200 MG/20ML EMULSION: Performed by: NURSE ANESTHETIST, CERTIFIED REGISTERED

## 2025-03-05 RX ORDER — PROPOFOL 10 MG/ML
INJECTION, EMULSION INTRAVENOUS CONTINUOUS PRN
Status: DISCONTINUED | OUTPATIENT
Start: 2025-03-05 | End: 2025-03-05 | Stop reason: SURG

## 2025-03-05 RX ORDER — LIDOCAINE HYDROCHLORIDE 20 MG/ML
INJECTION, SOLUTION INFILTRATION; PERINEURAL AS NEEDED
Status: DISCONTINUED | OUTPATIENT
Start: 2025-03-05 | End: 2025-03-05 | Stop reason: SURG

## 2025-03-05 RX ORDER — ONDANSETRON 2 MG/ML
INJECTION INTRAMUSCULAR; INTRAVENOUS
Status: COMPLETED
Start: 2025-03-05 | End: 2025-03-05

## 2025-03-05 RX ORDER — LIDOCAINE HYDROCHLORIDE 10 MG/ML
0.5 INJECTION, SOLUTION INFILTRATION; PERINEURAL ONCE AS NEEDED
Status: DISCONTINUED | OUTPATIENT
Start: 2025-03-05 | End: 2025-03-05 | Stop reason: HOSPADM

## 2025-03-05 RX ORDER — SODIUM CHLORIDE 0.9 % (FLUSH) 0.9 %
10 SYRINGE (ML) INJECTION AS NEEDED
Status: DISCONTINUED | OUTPATIENT
Start: 2025-03-05 | End: 2025-03-05 | Stop reason: HOSPADM

## 2025-03-05 RX ORDER — SODIUM CHLORIDE 9 MG/ML
1000 INJECTION, SOLUTION INTRAVENOUS CONTINUOUS
Status: DISCONTINUED | OUTPATIENT
Start: 2025-03-05 | End: 2025-03-05 | Stop reason: HOSPADM

## 2025-03-05 RX ORDER — SODIUM CHLORIDE, SODIUM LACTATE, POTASSIUM CHLORIDE, CALCIUM CHLORIDE 600; 310; 30; 20 MG/100ML; MG/100ML; MG/100ML; MG/100ML
INJECTION, SOLUTION INTRAVENOUS CONTINUOUS PRN
Status: DISCONTINUED | OUTPATIENT
Start: 2025-03-05 | End: 2025-03-05 | Stop reason: SURG

## 2025-03-05 RX ORDER — PROPOFOL 10 MG/ML
INJECTION, EMULSION INTRAVENOUS AS NEEDED
Status: DISCONTINUED | OUTPATIENT
Start: 2025-03-05 | End: 2025-03-05 | Stop reason: SURG

## 2025-03-05 RX ADMIN — LIDOCAINE HYDROCHLORIDE 60 MG: 20 INJECTION, SOLUTION INFILTRATION; PERINEURAL at 12:13

## 2025-03-05 RX ADMIN — PROPOFOL INJECTABLE EMULSION 100 MG: 10 INJECTION, EMULSION INTRAVENOUS at 12:13

## 2025-03-05 RX ADMIN — SODIUM CHLORIDE 1000 ML: 9 INJECTION, SOLUTION INTRAVENOUS at 11:27

## 2025-03-05 RX ADMIN — ONDANSETRON 4 MG: 2 INJECTION INTRAMUSCULAR; INTRAVENOUS at 11:27

## 2025-03-05 RX ADMIN — PROPOFOL 180 MCG/KG/MIN: 10 INJECTION, EMULSION INTRAVENOUS at 12:13

## 2025-03-05 RX ADMIN — SODIUM CHLORIDE, POTASSIUM CHLORIDE, SODIUM LACTATE AND CALCIUM CHLORIDE: 600; 310; 30; 20 INJECTION, SOLUTION INTRAVENOUS at 11:59

## 2025-03-05 NOTE — ANESTHESIA POSTPROCEDURE EVALUATION
Patient: Anastasiia Faria    Procedure Summary       Date: 03/05/25 Room / Location:  [facility] /  [facility]    Anesthesia Start: 1210 Anesthesia Stop: 1230    Procedure: COLONOSCOPY INTO CECUM WITH POLYPECTOMY (COLD SNARE) Diagnosis:       Colitis, Clostridium difficile      History of colonic polyps      (Colitis, Clostridium difficile [A04.72])      (History of colonic polyps [Z86.0100])    Surgeons: Brian Leonardo MD Provider: Yahir Macdonald MD    Anesthesia Type: MAC ASA Status: 3            Anesthesia Type: MAC    Vitals  Vitals Value Taken Time   /73 03/05/25 1240   Temp     Pulse 64 03/05/25 1249   Resp 14 03/05/25 1231   SpO2 98 % 03/05/25 1249   Vitals shown include unfiled device data.        Post Anesthesia Care and Evaluation    Patient location during evaluation: PACU  Patient participation: complete - patient participated  Level of consciousness: awake and alert  Pain management: adequate    Airway patency: patent  Anesthetic complications: No anesthetic complications    Cardiovascular status: acceptable  Respiratory status: acceptable  Hydration status: acceptable    Comments: --------------------            03/05/25               1231     --------------------   BP:                  Pulse:      54       Resp:       14       SpO2:      99%      --------------------

## 2025-03-05 NOTE — H&P
[facility], [place], operated by [facility]  Pre Procedure History & Physical    Chief Complaint:   Colitis    Subjective     HPI:   75 y.o. female here for colonoscopy for hx of colitis    Past Medical History:   Past Medical History:   Diagnosis Date    Allergic     Anxiety     Cataract     Surgery completed    Chronic kidney disease     Clostridium difficile infection 06/2024    TREATED AT [facility]    Colon polyps     Depression     Diverticulosis 2011    Encounter for annual health examination 03/07/2014    Annual Health Assessment    Family history of colon cancer     Fibrocystic breast     GERD (gastroesophageal reflux disease)     GI (gastrointestinal bleed) June 2024    This was a few weeks after I had kidney stones/sepsis and hip replacement.  Assume c-diff was caused by the antibiotics that I had taken.    Hard of hearing     HEARING AIDS BILATERALLY    Heart murmur     Herpes labialis without complication     History of cataract     History of mammogram 12/20/2010    History of recent hospitalization 03/21/2024    KIDNEY STONE/SEPSIS 4 NIGHT AT [facility]    HL (hearing loss) 2014    Hearing Aids    Hydronephrosis     Hyperlipidemia     Hypertension     Hypothyroidism     Insomnia     Kidney stones     Migraines     Osteoarthritis of right hip     Osteopenia     Prolia -last 9/2021,3/2022    Pneumonia 3/2024    While being treated for kidney stones and sepsis.    PONV (postoperative nausea and vomiting)     Scoliosis     Dr. Stanford 5/2018    Sepsis 03/2024    Slow to wake up after anesthesia     Urinary tract infection     3/2024,  6/202    Visual impairment     Wear Glasses       Past Surgical History:  Past Surgical History:   Procedure Laterality Date    BONE MARROW BIOPSY Left     BREAST CYST ASPIRATION Right     BREAST SURGERY Right     BIOPSY    CATARACT EXTRACTION, BILATERAL      Cataract surgery on right eye June 2021 and left eye July 2021. (Dr. Nanette Bateman)    COLONOSCOPY N/A 10/27/2016     Procedure: COLONOSCOPY INTO CECUM;  Surgeon: Osvaldo Dorado MD;  Location: [facility] ENDOSCOPY;  Service:     COLONOSCOPY  01/26/2011    COLONOSCOPY  02/04/2005    COLONOSCOPY N/A 12/02/2021    Procedure: COLONOSCOPY INTO CECUM WITH COLD BIOPSY POLYPECTOMY AND HOT SNARE POLYPECTOMY;  Surgeon: Osvaldo Dorado MD;  Location: [facility] ENDOSCOPY;  Service: General;  Laterality: N/A;  PRE-FAMILY HISTORY OF COLON CANCER, PERSONAL HISTORY OF COLON CANCER  POST- POLYPS, DIVERTICULOSIS, HEMORRHOIDS    CYSTOSCOPY W/ URETERAL STENT PLACEMENT Left 03/21/2024    Procedure: LEFT CYSTOSCOPY RETROGRADE PYLEOGRAM URETERAL STENT INSERTION;  Surgeon: Jaiden Bolton Jr., MD;  Location: [facility] MAIN OR;  Service: Urology;  Laterality: Left;    CYSTOSCOPY W/ URETERAL STENT REMOVAL  04/23/2024    CYSTOSCOPY, RETROGRADE PYELOGRAM AND STENT INSERTION Left 06/14/2024    Procedure: CYSTOSCOPY RETROGRADE PYELOGRAM AND STENT INSERTION;  Surgeon: Yahir Faria MD;  Location: [facility] MAIN OR;  Service: Urology;  Laterality: Left;    JOINT REPLACEMENT  5/2024    Right Hip   Dr. Vasu King    KNEE ARTHROSCOPY Left     PAP SMEAR  12/20/2010    TOTAL HIP ARTHROPLASTY Right 05/13/2024    Procedure: TOTAL HIP ARTHROPLASTY ANTERIOR WITH HANA TABLE;  Surgeon: Vasu King MD;  Location: [facility];  Service: Orthopedics;  Laterality: Right;    URETEROSCOPY LASER LITHOTRIPSY WITH STENT INSERTION Left 7/25/2024    Procedure: CYSTOSCOPY, LEFT URETEROSCOPY, LASER LITHOTRIPSY, STENT EXCHANGE;  Surgeon: Jaiden Bolton Jr., MD;  Location: [facility] MAIN OR;  Service: Urology;  Laterality: Left;       Family History:  Family History   Problem Relation Age of Onset    Breast cancer Mother     Colon cancer Mother     Hypertension Mother     Cancer Mother         Breat cancer, skin cancer, colon cancer    Hyperlipidemia Mother     Rectal cancer Mother         Cause of death    Pancreatitis Mother     Cancer Father         Skin  cancer    Hearing loss Father     Hypertension Sister     Hypertension Sister     Hyperlipidemia Sister     Thyroid disease Sister     Diabetes type II Brother     Cancer Brother         Skin cancer    Diabetes Brother     Colon cancer Maternal Aunt         Cause of death in her early 40s.    Cancer Maternal Aunt         Colon cancer cause of death early 40s    Colon cancer Maternal Aunt         64 yo    Heart disease Maternal Aunt     Colon cancer Maternal Aunt         81 yo    Cancer Maternal Aunt         Colon cancer    Cancer Maternal Aunt         Colon cancer    Colon cancer Maternal Aunt     Cancer Maternal Uncle         Thyroid cancer    Heart disease Maternal Uncle     Stomach cancer Maternal Grandmother         or intestinal    Cancer Maternal Grandmother         Small intestine    Breast cancer Maternal Grandmother     Heart disease Maternal Grandmother     Heart attack Maternal Grandfather     Breast cancer Maternal Grandfather     Heart disease Maternal Grandfather     Vision loss Maternal Grandfather         Glaucoma    Heart disease Paternal Grandmother     Colon cancer Other     Colon cancer Other     Malig Hyperthermia Neg Hx        Social History:   reports that she has never smoked. She has never used smokeless tobacco. She reports that she does not drink alcohol and does not use drugs.    Medications:   Medications Prior to Admission   Medication Sig Dispense Refill Last Dose/Taking    Calcium-Magnesium-Vitamin D (CALCIUM 1200+D3 PO)    3/3/2025    escitalopram (LEXAPRO) 20 MG tablet Take 1 tablet by mouth Daily. Indications: Generalized Anxiety Disorder 90 tablet 3 3/5/2025    furosemide (LASIX) 20 MG tablet Take 1 tablet by mouth Daily. 30 tablet 3 3/4/2025 Noon    HYDROcodone-acetaminophen (NORCO) 5-325 MG per tablet Take 1 tablet by mouth Every 6 (Six) Hours As Needed for Moderate Pain. 45 tablet 0 Past Month    levothyroxine (SYNTHROID, LEVOTHROID) 50 MCG tablet Take 1 tablet by mouth Daily.  "Indications: Underactive Thyroid 90 tablet 3 3/5/2025 Morning    loratadine (CLARITIN) 10 MG tablet Take 1 tablet by mouth Daily. Indications: Hayfever   3/4/2025    metoprolol succinate XL (Toprol XL) 25 MG 24 hr tablet Take 1 tablet by mouth Daily. 90 tablet 3 3/5/2025 Morning    potassium chloride (MICRO-K) 10 MEQ CR capsule Take 1 capsule by mouth Daily. Take with Lasix. 30 capsule 4 3/4/2025 Noon    Probiotic Product (Culturelle Abdominal Support) 4-15 GM-MG pack    3/4/2025    Psyllium (METAMUCIL FIBER PO) Take 1 Scoop by mouth Daily. Indications: constipation   3/5/2025 Morning    rosuvastatin (CRESTOR) 10 MG tablet Take 1 tablet by mouth Every Night. Indications: High Amount of Fats in the Blood 90 tablet 3 3/4/2025 Bedtime    cephalexin (KEFLEX) 500 MG capsule TAKE 4 CAPS 1 HOUR PRIOR TO DENTAL APPOINTMENT 4 capsule 3 More than a month    Cholecalciferol (D3 2000) 50 MCG (2000 UT) capsule    More than a month       Allergies:  Patient has no known allergies.    ROS:    Pertinent items are noted in HPI     Objective     Blood pressure 128/77, pulse 59, resp. rate 16, height 172.7 cm (68\"), weight 74.1 kg (163 lb 4.8 oz), SpO2 94%, not currently breastfeeding.    Physical Exam   Constitutional: Pt is oriented to person, place, and time and well-developed, well-nourished, and in no distress.   Mouth/Throat: Oropharynx is clear and moist.   Neck: Normal range of motion.   Cardiovascular: Normal rate, regular rhythm and normal heart sounds.    Pulmonary/Chest: Effort normal and breath sounds normal.   Abdominal: Soft. Nontender  Skin: Skin is warm and dry.   Psychiatric: Mood, memory, affect and judgment normal.     Assessment & Plan     Diagnosis:  Colitis    Anticipated Surgical Procedure:  Colonoscopy    The risks, benefits, and alternatives of this procedure have been discussed with the patient or the responsible party- the patient understands and agrees to " proceed.

## 2025-03-05 NOTE — ANESTHESIA PREPROCEDURE EVALUATION
Anesthesia Evaluation     Patient summary reviewed and Nursing notes reviewed   history of anesthetic complications:  PONV               Airway   Mallampati: II  Dental      Pulmonary - negative pulmonary ROS   Cardiovascular     ECG reviewed  Patient on routine beta blocker  Rhythm: regular  Rate: normal    (+) hypertension, valvular problems/murmurs (no significant valve pathos) murmur, hyperlipidemia      Neuro/Psych  (+) headaches, psychiatric history Anxiety and Depression  GI/Hepatic/Renal/Endo    (+) obesity, GERD, GI bleeding , renal disease- stones, thyroid problem hypothyroidism    Musculoskeletal     Abdominal    Substance History - negative use     OB/GYN negative ob/gyn ROS         Other   arthritis,                   Anesthesia Plan    ASA 3     MAC     intravenous induction     Anesthetic plan, risks, benefits, and alternatives have been provided, discussed and informed consent has been obtained with: patient.    CODE STATUS:

## 2025-03-06 LAB
CYTO UR: NORMAL
LAB AP CASE REPORT: NORMAL
PATH REPORT.FINAL DX SPEC: NORMAL
PATH REPORT.GROSS SPEC: NORMAL

## 2025-03-10 NOTE — ASSESSMENT & PLAN NOTE
Patient's symptoms are stable.  Patient was given a prescription for levothyroxine.  Patient was encouraged return to clinic in 4 to 6 months.

## 2025-03-21 DIAGNOSIS — R60.0 BILATERAL LOWER EXTREMITY EDEMA: ICD-10-CM

## 2025-03-21 NOTE — TELEPHONE ENCOUNTER
Rx Refill Note  Requested Prescriptions     Pending Prescriptions Disp Refills    furosemide (LASIX) 20 MG tablet [Pharmacy Med Name: FUROSEMIDE 20MG TABLETS] 30 tablet 3     Sig: TAKE 1 TABLET BY MOUTH DAILY      Last office visit with prescribing clinician: 2/20/2025   Last telemedicine visit with prescribing clinician: Visit date not found   Next office visit with prescribing clinician: 4/16/2025                         Would you like a call back once the refill request has been completed: [] Yes [] No    If the office needs to give you a call back, can they leave a voicemail: [] Yes [] No    Josiane Martinez MA  03/21/25, 16:37 EDT

## 2025-03-22 RX ORDER — FUROSEMIDE 20 MG/1
20 TABLET ORAL DAILY
Qty: 30 TABLET | Refills: 3 | Status: SHIPPED | OUTPATIENT
Start: 2025-03-22

## 2025-04-16 ENCOUNTER — OFFICE VISIT (OUTPATIENT)
Dept: FAMILY MEDICINE CLINIC | Facility: CLINIC | Age: 75
End: 2025-04-16
Payer: MEDICARE

## 2025-04-16 DIAGNOSIS — E03.9 ACQUIRED HYPOTHYROIDISM: ICD-10-CM

## 2025-04-16 DIAGNOSIS — I10 ESSENTIAL HYPERTENSION: ICD-10-CM

## 2025-04-16 DIAGNOSIS — Z00.00 MEDICARE ANNUAL WELLNESS VISIT, SUBSEQUENT: Primary | ICD-10-CM

## 2025-04-16 DIAGNOSIS — Z00.00 ANNUAL PHYSICAL EXAM: ICD-10-CM

## 2025-04-16 PROCEDURE — 3074F SYST BP LT 130 MM HG: CPT | Performed by: FAMILY MEDICINE

## 2025-04-16 PROCEDURE — 1160F RVW MEDS BY RX/DR IN RCRD: CPT | Performed by: FAMILY MEDICINE

## 2025-04-16 PROCEDURE — G0439 PPPS, SUBSEQ VISIT: HCPCS | Performed by: FAMILY MEDICINE

## 2025-04-16 PROCEDURE — 1159F MED LIST DOCD IN RCRD: CPT | Performed by: FAMILY MEDICINE

## 2025-04-16 PROCEDURE — 99397 PER PM REEVAL EST PAT 65+ YR: CPT | Performed by: FAMILY MEDICINE

## 2025-04-16 PROCEDURE — 1126F AMNT PAIN NOTED NONE PRSNT: CPT | Performed by: FAMILY MEDICINE

## 2025-04-16 PROCEDURE — 3079F DIAST BP 80-89 MM HG: CPT | Performed by: FAMILY MEDICINE

## 2025-04-16 NOTE — ASSESSMENT & PLAN NOTE
Orders:    CBC & Differential    Comprehensive Metabolic Panel    Lipid Panel With / Chol / HDL Ratio

## 2025-04-16 NOTE — PROGRESS NOTES
Subjective   The ABCs of the Annual Wellness Visit  Medicare Wellness Visit      Anastasiia Faria is a 75 y.o. patient who presents for a Medicare Wellness Visit.    The following portions of the patient's history were reviewed and   updated as appropriate: allergies, current medications, past family history, past medical history, past social history, past surgical history, and problem list.    Compared to one year ago, the patient's physical   health is better.  Compared to one year ago, the patient's mental   health is the same.    Recent Hospitalizations:  This patient has had a [facility] admission record on file within the last 365 days.  Current Medical Providers:  Patient Care Team:  Mirza Scott Sr., MD as PCP - General (Family Medicine)  Dhiraj Gallegos MD as Consulting Physician (Obstetrics and Gynecology)  Jaiden Bolton Jr., MD as Consulting Physician (Urology)    Outpatient Medications Prior to Visit   Medication Sig Dispense Refill    Calcium-Magnesium-Vitamin D (CALCIUM 1200+D3 PO)       cephalexin (KEFLEX) 500 MG capsule TAKE 4 CAPS 1 HOUR PRIOR TO DENTAL APPOINTMENT 4 capsule 3    Cholecalciferol (D3 2000) 50 MCG (2000 UT) capsule       escitalopram (LEXAPRO) 20 MG tablet Take 1 tablet by mouth Daily. Indications: Generalized Anxiety Disorder 90 tablet 3    furosemide (LASIX) 20 MG tablet TAKE 1 TABLET BY MOUTH DAILY 30 tablet 3    HYDROcodone-acetaminophen (NORCO) 5-325 MG per tablet Take 1 tablet by mouth Every 6 (Six) Hours As Needed for Moderate Pain. 45 tablet 0    levothyroxine (SYNTHROID, LEVOTHROID) 50 MCG tablet Take 1 tablet by mouth Daily. Indications: Underactive Thyroid 90 tablet 3    loratadine (CLARITIN) 10 MG tablet Take 1 tablet by mouth Daily. Indications: Hayfever      metoprolol succinate XL (Toprol XL) 25 MG 24 hr tablet Take 1 tablet by mouth Daily. 90 tablet 3    potassium chloride (MICRO-K) 10 MEQ CR capsule Take 1 capsule by mouth Daily. Take with Lasix. 30  capsule 4    Probiotic Product (Culturelle Abdominal Support) 4-15 GM-MG pack       Psyllium (METAMUCIL FIBER PO) Take 1 Scoop by mouth Daily. Indications: constipation      rosuvastatin (CRESTOR) 10 MG tablet Take 1 tablet by mouth Every Night. Indications: High Amount of Fats in the Blood 90 tablet 3     No facility-administered medications prior to visit.     Opioid medication/s are on active medication list.  and I have evaluated her active treatment plan and pain score trends (see table).  There were no vitals filed for this visit.  I have reviewed the chart for potential of high risk medication and harmful drug interactions in the elderly.        Aspirin is not on active medication list.   none .    Patient Active Problem List   Diagnosis    Colon polyp, hyperplastic    Allergic rhinitis due to pollen    Acquired hypothyroidism    Essential hypertension    FH: colon cancer in relative <50 years old    Mixed hyperlipidemia    Chronic right shoulder pain    Periscapular pain    Other idiopathic scoliosis, thoracic region    Prediabetes    Age-related osteoporosis without current pathological fracture    Mild episode of recurrent major depressive disorder    Age-related nuclear cataract of both eyes    Cystoid nevus of conjunctiva    Anxiety    Cataract    Cataract extraction status of left eye    Cataract extraction status of right eye    Conjunctival cyst of left eye    Depressive disorder    Disorder of refraction    Dry eye syndrome of bilateral lacrimal glands    Hearing loss    Hearing loss    Hordeolum internum right upper eyelid    Migraine    Osteopenia    Osteoporosis    Renal stone    Seasonal allergies    Diverticulosis    Internal hemorrhoids    Posterior vitreous detachment of both eyes    Right hip pain    OA (osteoarthritis) of hip    Sepsis    Acute pyelonephritis    Ureteral calculus    E coli bacteremia    Acute respiratory failure with hypoxia    Dysuria    GI bleed    Colitis    UTI (urinary  "tract infection)    Leukocytosis    Acute UTI    Lumbago    Bacterial infection due to Pseudomonas    Pyogenic arthritis of hip    Localized swelling of both lower legs    Bilateral lower extremity edema    Discitis    Colitis, Clostridium difficile    History of colonic polyps    Medicare annual wellness visit, subsequent    Annual physical exam     Advance Care Planning Advance Directive is on file.  ACP discussion was held with the patient during this visit. Patient has an advance directive in EMR which is still valid.             Objective   Vitals:    04/16/25 1429   BP: 122/80   Pulse: 57   Resp: 18   SpO2: 97%   Weight: 84.4 kg (186 lb)   Height: 172.7 cm (68\")       Estimated body mass index is 28.28 kg/m² as calculated from the following:    Height as of this encounter: 172.7 cm (68\").    Weight as of this encounter: 84.4 kg (186 lb).                Does the patient have evidence of cognitive impairment? No  Lab Results   Component Value Date    CHLPL 162 04/16/2025    TRIG 168 (H) 04/16/2025    HDL 51 04/16/2025    LDL 82 04/16/2025    VLDL 29 04/16/2025          Mini-Mental State Examination (MMSE)        Instructions: Ask the questions in the order listed. Score one point for each correct response within each question or activity.      Maximum Score  Patient’s Score  Questions    5  5  “What is the year?  Season?  Date?  Day of the week?  Month?”    5   5 “Where are we now: State?  County?  Town/city?  Hospital?  Floor?”    3   3 The examiner names three unrelated objects clearly and slowly, then asks the patient to name all three of them. The patient’s response is used for scoring. The examiner repeats them until patient learns all of them, if possible. Number of trials: ___________    5  5  “I would like you to count backward from 100 by sevens.” (93, 86, 79, 72, 65, …) Stop after five answers.   Alternative: “Spell WORLD backwards.” ([id number])    3   3 “Earlier I told you the names of three things. " Can you tell me what those were?”    2  2  Show the patient two simple objects, such as a wristwatch and a pencil, and ask the patient to name them.    1  1  “Repeat the phrase: ‘No ifs, ands, or buts.’”    3  3  “Take the paper in your right hand, fold it in half, and put it on the floor.”   (The examiner gives the patient a piece of blank paper.)    1  1  “Please read this and do what it says.” (Written instruction is “Close your eyes.”)    1  1  “Make up and write a sentence about anything.” (This sentence must contain a noun and a verb.)    1  1  “Please copy this picture.” (The examiner gives the patient a blank piece of paper and asks him/her to draw the symbol below. All 10 angles must be present and two must intersect.)             30  30  TOTAL                                                                                            Health  Risk Assessment    Smoking Status:  Social History     Tobacco Use   Smoking Status Never   Smokeless Tobacco Never   Tobacco Comments    daily caffine     Alcohol Consumption:  Social History     Substance and Sexual Activity   Alcohol Use Never       Fall Risk Screen  STEADI Fall Risk Assessment was completed, and patient is at LOW risk for falls.Assessment completed on:2025    Depression Screening   Little interest or pleasure in doing things? Not at all   Feeling down, depressed, or hopeless? Not at all   PHQ-2 Total Score 0      Health Habits and Functional and Cognitive Screenin/10/2025     1:59 AM   Functional & Cognitive Status   Do you have difficulty preparing food and eating? No   Do you have difficulty bathing yourself, getting dressed or grooming yourself? No   Do you have difficulty using the toilet? No   Do you have difficulty moving around from place to place? No   Do you have trouble with steps or getting out of a bed or a chair? Yes   Current Diet Well Balanced Diet   Dental Exam Up to date   Eye Exam Up to date   Exercise (times per  week) 2 times per week   Current Exercises Include House Cleaning;Walking   Do you need help using the phone?  No   Are you deaf or do you have serious difficulty hearing?  No   Do you need help to go to places out of walking distance? No   Do you need help shopping? No   Do you need help preparing meals?  No   Do you need help with housework?  No   Do you need help with laundry? No   Do you need help taking your medications? No   Do you need help managing money? No   Do you ever drive or ride in a car without wearing a seat belt? No   Have you felt unusual stress, anger or loneliness in the last month? No   Who do you live with? Spouse   If you need help, do you have trouble finding someone available to you? No   Have you been bothered in the last four weeks by sexual problems? No   Do you have difficulty concentrating, remembering or making decisions? No           Age-appropriate Screening Schedule:  Refer to the list below for future screening recommendations based on patient's age, sex and/or medical conditions. Orders for these recommended tests are listed in the plan section. The patient has been provided with a written plan.    Health Maintenance List  Health Maintenance   Topic Date Due    COVID-19 Vaccine (7 - 2024-25 season) 09/01/2024    RSV Vaccine - Adults (1 - 1-dose 75+ series) Never done    INFLUENZA VACCINE  07/01/2025    DXA SCAN  12/07/2025    ANNUAL WELLNESS VISIT  04/16/2026    LIPID PANEL  04/16/2026    TDAP/TD VACCINES (2 - Td or Tdap) 04/03/2027    COLORECTAL CANCER SCREENING  03/05/2030    HEPATITIS C SCREENING  Completed    Pneumococcal Vaccine 50+  Completed    MAMMOGRAM  Discontinued    ZOSTER VACCINE  Discontinued                                                                                                                                                CMS Preventative Services Quick Reference  Risk Factors Identified During Encounter  None Identified    The above risks/problems have  "been discussed with the patient.  Pertinent information has been shared with the patient in the After Visit Summary.  An After Visit Summary and PPPS were made available to the patient.    Follow Up:   Next Medicare Wellness visit to be scheduled in 1 year.         Additional E&M Note during same encounter follows:  Patient has additional, significant, and separately identifiable condition(s)/problem(s) that require work above and beyond the Medicare Wellness Visit     Chief Complaint  Medicare Wellness-subsequent (AWV)    Subjective   HPI  Anastasiia is also being seen today for an annual adult preventative physical exam.     Review of Systems   All other systems reviewed and are negative.         Objective   Vital Signs:  /80   Pulse 57   Resp 18   Ht 172.7 cm (68\")   Wt 84.4 kg (186 lb)   SpO2 97%   BMI 28.28 kg/m²   Physical Exam  Vitals reviewed.   Constitutional:       General: She is not in acute distress.     Appearance: Normal appearance. She is well-developed. She is not ill-appearing.   HENT:      Head: Normocephalic and atraumatic.      Right Ear: Tympanic membrane, ear canal and external ear normal.      Left Ear: Tympanic membrane, ear canal and external ear normal.      Nose: Nose normal.      Mouth/Throat:      Mouth: Mucous membranes are moist.      Pharynx: Oropharynx is clear. No posterior oropharyngeal erythema.   Eyes:      General: Lids are normal.      Extraocular Movements: Extraocular movements intact.      Conjunctiva/sclera: Conjunctivae normal.      Pupils: Pupils are equal, round, and reactive to light.   Cardiovascular:      Rate and Rhythm: Normal rate and regular rhythm.      Pulses: Normal pulses.      Heart sounds: Normal heart sounds.   Pulmonary:      Effort: Pulmonary effort is normal. No respiratory distress.      Breath sounds: Normal breath sounds. No stridor.   Abdominal:      General: Abdomen is flat. Bowel sounds are normal.      Palpations: Abdomen is soft. "   Musculoskeletal:         General: Normal range of motion.      Cervical back: Normal range of motion and neck supple.   Skin:     General: Skin is warm and dry.      Capillary Refill: Capillary refill takes less than 2 seconds.   Neurological:      General: No focal deficit present.      Mental Status: She is alert and oriented to person, place, and time. Mental status is at baseline.      Cranial Nerves: No cranial nerve deficit.      Sensory: No sensory deficit.      Motor: No weakness.      Coordination: Coordination normal.      Gait: Gait normal.      Deep Tendon Reflexes: Reflexes normal.   Psychiatric:         Mood and Affect: Mood normal.         Behavior: Behavior normal.         Thought Content: Thought content normal.         Judgment: Judgment normal.         The following data was reviewed by: Mirza Scott Sr, MD on 04/16/2025:           Assessment and Plan Additional age appropriate preventative wellness advice topics were discussed during today's preventative wellness exam(some topics already addressed during AWV portion of the note above):    Physical Activity: Advised cardiovascular activity 150 minutes per week as tolerated. (example brisk walk for 30 minutes, 5 days a week).   Nutrition: Discussed nutrition plan with patient. Information shared in after visit summary. Goal is for a well balanced diet to enhance overall health.   Healthy Weight: Discussed current and goal BMI with patient. Steps to attain this goal discussed. Information shared in after visit summary.   Motor Vehicle Safety Discussion:  Wearing Seatbelt While in Motor Vehicle recommendation. Adhering to posted speed limit recommendation.     Medicare annual wellness visit, subsequent  Medicare wellness completed.  Discussed advanced directives with patient.  Performed the Mini-Mental exam and patient scored 30/30.         Annual physical exam  Discussed injury prevention, diet and exercise, safe sexual practices, and screening  for common diseases. Encouraged use of sunscreen and seatbelts. Discussed timing of  cervical cancer screening. Encouraged monthly self-breast exams, yearly clinical breast exams, and discussed timing of mammograms. Avoidance of tobacco encouraged. Limitation or avoidance of alcohol encouraged. Recommend yearly dental and eye exams. Also discussed monitoring of blood pressure, lipids.         Essential hypertension      Orders:    T3    T3, Free    T4    T4, Free    TSH    Acquired hypothyroidism    Orders:    CBC & Differential    Comprehensive Metabolic Panel    Lipid Panel With / Chol / HDL Ratio            Follow Up   No follow-ups on file.  Patient was given instructions and counseling regarding her condition or for health maintenance advice. Please see specific information pulled into the AVS if appropriate.

## 2025-04-16 NOTE — ASSESSMENT & PLAN NOTE
Discussed injury prevention, diet and exercise, safe sexual practices, and screening for common diseases. Encouraged use of sunscreen and seatbelts. Discussed timing of  cervical cancer screening. Encouraged monthly self-breast exams, yearly clinical breast exams, and discussed timing of mammograms. Avoidance of tobacco encouraged. Limitation or avoidance of alcohol encouraged. Recommend yearly dental and eye exams. Also discussed monitoring of blood pressure, lipids.

## 2025-04-17 LAB
ALBUMIN SERPL-MCNC: 4 G/DL (ref 3.5–5.2)
ALBUMIN/GLOB SERPL: 1.7 G/DL
ALP SERPL-CCNC: 134 U/L (ref 39–117)
ALT SERPL-CCNC: 9 U/L (ref 1–33)
AST SERPL-CCNC: 16 U/L (ref 1–32)
BASOPHILS # BLD AUTO: 0.09 10*3/MM3 (ref 0–0.2)
BASOPHILS NFR BLD AUTO: 1.3 % (ref 0–1.5)
BILIRUB SERPL-MCNC: 0.7 MG/DL (ref 0–1.2)
BUN SERPL-MCNC: 14 MG/DL (ref 8–23)
BUN/CREAT SERPL: 15.7 (ref 7–25)
CALCIUM SERPL-MCNC: 10.2 MG/DL (ref 8.6–10.5)
CHLORIDE SERPL-SCNC: 106 MMOL/L (ref 98–107)
CHOLEST SERPL-MCNC: 162 MG/DL (ref 0–200)
CHOLEST/HDLC SERPL: 3.18 {RATIO}
CO2 SERPL-SCNC: 26.6 MMOL/L (ref 22–29)
CREAT SERPL-MCNC: 0.89 MG/DL (ref 0.57–1)
EGFRCR SERPLBLD CKD-EPI 2021: 67.7 ML/MIN/1.73
EOSINOPHIL # BLD AUTO: 0.19 10*3/MM3 (ref 0–0.4)
EOSINOPHIL NFR BLD AUTO: 2.7 % (ref 0.3–6.2)
ERYTHROCYTE [DISTWIDTH] IN BLOOD BY AUTOMATED COUNT: 12.7 % (ref 12.3–15.4)
GLOBULIN SER CALC-MCNC: 2.4 GM/DL
GLUCOSE SERPL-MCNC: 96 MG/DL (ref 65–99)
HCT VFR BLD AUTO: 45.5 % (ref 34–46.6)
HDLC SERPL-MCNC: 51 MG/DL (ref 40–60)
HGB BLD-MCNC: 15.3 G/DL (ref 12–15.9)
IMM GRANULOCYTES # BLD AUTO: 0.02 10*3/MM3 (ref 0–0.05)
IMM GRANULOCYTES NFR BLD AUTO: 0.3 % (ref 0–0.5)
LDLC SERPL CALC-MCNC: 82 MG/DL (ref 0–100)
LYMPHOCYTES # BLD AUTO: 2.38 10*3/MM3 (ref 0.7–3.1)
LYMPHOCYTES NFR BLD AUTO: 33.9 % (ref 19.6–45.3)
MCH RBC QN AUTO: 31.4 PG (ref 26.6–33)
MCHC RBC AUTO-ENTMCNC: 33.6 G/DL (ref 31.5–35.7)
MCV RBC AUTO: 93.4 FL (ref 79–97)
MONOCYTES # BLD AUTO: 0.57 10*3/MM3 (ref 0.1–0.9)
MONOCYTES NFR BLD AUTO: 8.1 % (ref 5–12)
NEUTROPHILS # BLD AUTO: 3.77 10*3/MM3 (ref 1.7–7)
NEUTROPHILS NFR BLD AUTO: 53.7 % (ref 42.7–76)
NRBC BLD AUTO-RTO: 0 /100 WBC (ref 0–0.2)
PLATELET # BLD AUTO: 308 10*3/MM3 (ref 140–450)
POTASSIUM SERPL-SCNC: 4.4 MMOL/L (ref 3.5–5.2)
PROT SERPL-MCNC: 6.4 G/DL (ref 6–8.5)
RBC # BLD AUTO: 4.87 10*6/MM3 (ref 3.77–5.28)
SODIUM SERPL-SCNC: 143 MMOL/L (ref 136–145)
T3 SERPL-MCNC: 95.2 NG/DL (ref 80–200)
T3FREE SERPL-MCNC: 2.6 PG/ML (ref 2–4.4)
T4 FREE SERPL-MCNC: 1.38 NG/DL (ref 0.92–1.68)
T4 SERPL-MCNC: 8.47 MCG/DL (ref 4.5–11.7)
TRIGL SERPL-MCNC: 168 MG/DL (ref 0–150)
TSH SERPL DL<=0.005 MIU/L-ACNC: 1.28 UIU/ML (ref 0.27–4.2)
VLDLC SERPL CALC-MCNC: 29 MG/DL (ref 5–40)
WBC # BLD AUTO: 7.02 10*3/MM3 (ref 3.4–10.8)

## 2025-04-21 VITALS
RESPIRATION RATE: 18 BRPM | DIASTOLIC BLOOD PRESSURE: 80 MMHG | OXYGEN SATURATION: 97 % | HEIGHT: 68 IN | HEART RATE: 57 BPM | BODY MASS INDEX: 25.46 KG/M2 | WEIGHT: 168 LBS | SYSTOLIC BLOOD PRESSURE: 122 MMHG

## 2025-05-16 ENCOUNTER — OFFICE VISIT (OUTPATIENT)
Dept: ORTHOPEDIC SURGERY | Facility: CLINIC | Age: 75
End: 2025-05-16
Payer: MEDICARE

## 2025-05-16 ENCOUNTER — LAB (OUTPATIENT)
Dept: LAB | Facility: HOSPITAL | Age: 75
End: 2025-05-16
Payer: MEDICARE

## 2025-05-16 VITALS — WEIGHT: 171 LBS | BODY MASS INDEX: 25.91 KG/M2 | TEMPERATURE: 98 F | HEIGHT: 68 IN

## 2025-05-16 DIAGNOSIS — M25.551 RIGHT HIP PAIN: ICD-10-CM

## 2025-05-16 DIAGNOSIS — Z96.641 STATUS POST TOTAL HIP REPLACEMENT, RIGHT: ICD-10-CM

## 2025-05-16 DIAGNOSIS — R52 PAIN: Primary | ICD-10-CM

## 2025-05-16 LAB
CRP SERPL-MCNC: <0.3 MG/DL (ref 0–0.5)
ERYTHROCYTE [SEDIMENTATION RATE] IN BLOOD: 5 MM/HR (ref 0–30)

## 2025-05-16 PROCEDURE — 36415 COLL VENOUS BLD VENIPUNCTURE: CPT

## 2025-05-16 PROCEDURE — 85652 RBC SED RATE AUTOMATED: CPT

## 2025-05-16 PROCEDURE — 86140 C-REACTIVE PROTEIN: CPT

## 2025-05-16 RX ORDER — ROSUVASTATIN CALCIUM 5 MG/1
TABLET, COATED ORAL
COMMUNITY

## 2025-05-16 RX ORDER — DIPHENOXYLATE HYDROCHLORIDE AND ATROPINE SULFATE 2.5; .025 MG/1; MG/1
600 TABLET ORAL EVERY 24 HOURS
COMMUNITY

## 2025-05-16 NOTE — PROGRESS NOTES
Patient: Anastasiia Faria  YOB: 1950 75 y.o. female  Medical Record Number: [id number]    Chief Complaints:   Chief Complaint   Patient presents with    Right Hip - Follow-up       History of Present Illness:Anastasiia Faria is a 75 y.o. female who presents for 1 year follow-up right total hip replacement patient reports initially following the surgery she did great unfortunately developed a spinal infection was in the hospital for an extended period of time treated by ID at that time Dr. King saw the patient a couple of times did not feel as though the hip was involved, she was on antibiotics until November at that time they felt as though the infection was cleared she no longer is on any antibiotics, she was discharged from ID.  Patient reports because she was inactive for so long started becoming more active over the last few months she then noticed some increased right hip pain she denies any fever or chills.  Denies any injury.    Allergies: No Known Allergies    Medications:   Current Outpatient Medications   Medication Sig Dispense Refill    Calcium-Magnesium-Vitamin D (CALCIUM 1200+D3 PO)       cephalexin (KEFLEX) 500 MG capsule TAKE 4 CAPS 1 HOUR PRIOR TO DENTAL APPOINTMENT 4 capsule 3    Cholecalciferol (D3 2000) 50 MCG (2000 UT) capsule       escitalopram (LEXAPRO) 20 MG tablet Take 1 tablet by mouth Daily. Indications: Generalized Anxiety Disorder 90 tablet 3    furosemide (LASIX) 20 MG tablet TAKE 1 TABLET BY MOUTH DAILY 30 tablet 3    HYDROcodone-acetaminophen (NORCO) 5-325 MG per tablet Take 1 tablet by mouth Every 6 (Six) Hours As Needed for Moderate Pain. 45 tablet 0    levothyroxine (SYNTHROID, LEVOTHROID) 50 MCG tablet Take 1 tablet by mouth Daily. Indications: Underactive Thyroid 90 tablet 3    loratadine (CLARITIN) 10 MG tablet Take 1 tablet by mouth Daily. Indications: Hayfever      metoprolol succinate XL (Toprol XL) 25 MG 24 hr tablet Take 1 tablet by mouth Daily. 90 tablet 3     "multivitamin (THERAGRAN) tablet tablet Take 600 mg by mouth Daily.      potassium chloride (MICRO-K) 10 MEQ CR capsule Take 1 capsule by mouth Daily. Take with Lasix. 30 capsule 4    Probiotic Product (Culturelle Abdominal Support) 4-15 GM-MG pack       Psyllium (METAMUCIL FIBER PO) Take 1 Scoop by mouth Daily. Indications: constipation      rosuvastatin (CRESTOR) 10 MG tablet Take 1 tablet by mouth Every Night. Indications: High Amount of Fats in the Blood 90 tablet 3    rosuvastatin (Crestor) 5 MG tablet Take  by mouth.       No current facility-administered medications for this visit.         The following portions of the patient's history were reviewed and updated as appropriate: allergies, current medications, past family history, past medical history, past social history, past surgical history and problem list.    Review of Systems:   14 point review of systems was performed. All systems negative except pertinent positives/negatives listed in HPI above    Physical Exam:   Vitals:    05/16/25 1304   Temp: 98 °F (36.7 °C)   Weight: 77.6 kg (171 lb)   Height: 172.7 cm (68\")       General: A and O x 3, ASA, NAD    Skin clear no unusual lesions noted  Right hip the patient has a well-healed surgical incision I do not appreciate any significant swelling, she has good range of motion of the right hip with no instability      Radiology:  Xrays 2 views of the right hip ordered and reviewed today secondary to pain show well-placed well-positioned right total hip replacement I do not appreciate any obvious ucencies noted.  Comparative views are unchanged      Assessment/Plan: Status post right GEOVANNA with pain  History of spinal infection    Patient and I discussed options I am going to send her for CRP sed rate to be done now.  If they are normal we will go ahead and have her start some physical therapy and follow-up with Dr. King in a few weeks she may have some hip pain given the fact she was not mobile for so long.  " However if her blood work is elevated I will further consult with Dr. Fernando Mayfield, APRN  5/16/2025

## 2025-05-19 ENCOUNTER — TREATMENT (OUTPATIENT)
Dept: PHYSICAL THERAPY | Facility: CLINIC | Age: 75
End: 2025-05-19
Payer: MEDICARE

## 2025-05-19 DIAGNOSIS — R26.89 DECREASED FUNCTIONAL MOBILITY: ICD-10-CM

## 2025-05-19 DIAGNOSIS — R29.898 WEAKNESS OF RIGHT HIP: ICD-10-CM

## 2025-05-19 DIAGNOSIS — Z96.641 STATUS POST TOTAL HIP REPLACEMENT, RIGHT: Primary | ICD-10-CM

## 2025-05-19 DIAGNOSIS — M25.651 STIFFNESS OF RIGHT HIP JOINT: ICD-10-CM

## 2025-05-19 DIAGNOSIS — M25.551 RIGHT HIP PAIN: ICD-10-CM

## 2025-05-19 NOTE — PROGRESS NOTES
"    Physical Therapy Initial Evaluation and Plan of Care  [facility] Therapy  [place] - [address], [address]     [place] [address]   Phone: [phone]   Fax: [phone]    Patient: Anastasiia Faria   : 1950  Diagnosis/ICD-10 Code:  Status post total hip replacement, right [Z96.641]  Referring practitioner: ESTHER Kim  Date of Initial Visit: 2025  Today's Date: 2025  Patient seen for 1 session         Visit Diagnoses:    ICD-10-CM ICD-9-CM   1. Status post total hip replacement, right  Z96.641 V43.64   2. Right hip pain  M25.551 719.45   3. Decreased functional mobility  R26.89 781.99   4. Weakness of right hip  R29.898 729.89   5. Stiffness of right hip joint  M25.651 719.55         Subjective Questionnaire: LEFS: 36/80      Subjective Evaluation    History of Present Illness  Mechanism of injury: HPI per referral provider note on 25, \"Anastasiia Faria is a 75 y.o. female who presents for 1 year follow-up right total hip replacement patient reports initially following the surgery she did great unfortunately developed a spinal infection was in the hospital for an extended period of time treated by ID at that time Dr. King saw the patient a couple of times did not feel as though the hip was involved, she was on antibiotics until November at that time they felt as though the infection was cleared she no longer is on any antibiotics, she was discharged from ID.  Patient reports because she was inactive for so long started becoming more active over the last few months she then noticed some increased right hip pain she denies any fever or chills.  Denies any injury.\"    She presents today alone and ambulating without an ambulating without a device. She reports over the past year, having multiple medical conditions that lead to long periods of time of inactivity.   She reports aggravating factors quick/sudden motions, attempting to lift her R leg such as " getting into the car or getting into the bed, reaching to the floor, standing longer than 30 minutes such as to cook a meal. She reports alleviating factors include ice, naproxen, Advil, and general rest or movement tend to help pain. She reports having occasional stabbing pains, but generally has an achy/burning pain that is primarily into the groin of the R hip.     She lives in a single level home with her . She has 12 steps to enter her home with a single level home with a single railing.       Patient Occupation: retired - teacher Pain  Current pain ratin  At best pain ratin  At worst pain ratin  Location: retired - teacher  Quality: sharp and dull ache  Relieving factors: ice, medications and rest  Aggravating factors: ambulation, squatting, repetitive movement, standing, stairs and lifting  Progression: no change    Diagnostic Tests  X-ray: normal    Treatments  Previous treatment: physical therapy  Patient Goals  Patient goals for therapy: decreased pain, improved balance, increased motion, increased strength and return to sport/leisure activities  Patient goal: improve ability to ambulate       Pertinent PMH: R hip replacement in  with anterior approach, hypertension, hyperlipoidemia, hypothyroidism, hyperglycemia, kidney stones, UTI's, spine infection.      Objective          Neurological Testing     Sensation     Hip   Left Hip   Intact: light touch    Right Hip   Intact: light touch    Passive Range of Motion   Left Hip   Flexion: 125 degrees   Extension: 8 degrees   Abduction: 50 degrees   External rotation (90/90): 40 degrees   Internal rotation (90/90): 24 degrees     Right Hip   Flexion: 125 degrees   Extension: 5 degrees with pain  Abduction: 38 degrees with pain  External rotation (90/90): 30 degrees   Internal rotation (90/90): 20 degrees with pain    Strength/Myotome Testing     Left Hip   Planes of Motion   Flexion: 4+  Extension: 4+  Abduction: 4+  Adduction: 4+    Right  Hip   Planes of Motion   Flexion: 3+  Extension: 4-  Abduction: 3+  Adduction: 4    Left Knee   Flexion: 4+  Extension: 4+    Right Knee   Flexion: 4+  Extension: 4+    Left Ankle/Foot   Dorsiflexion: 4+  Plantar flexion: 4+    Right Ankle/Foot   Dorsiflexion: 4  Plantar flexion: 4+    Ambulation     Ambulation: Stairs   Ascend stairs: independent  Pattern: non-reciprocal  Railings: two rails  Descend stairs: independent  Pattern: non-reciprocal  Railings: two rails    Comments   Patient ambulates on level surface with mild flexed posture with decreased knee extension bilaterally, and decreased trunk extension. No use of assistive device, and no overt loss of balance with changes in directions, head turns, etc... Antalgic gait pattern present    Functional Assessment     Comments  Unable to perform single sit to stand transfer w/o use of hands. VC for safe mechanics for sit to stand transfer.         Balance:   30 second sit to stand w/ UE support: 8 reps  TU.97 seconds    Assessment & Plan       Assessment  Impairments: abnormal gait, abnormal or restricted ROM, activity intolerance, impaired balance, impaired physical strength, lacks appropriate home exercise program and pain with function   Functional limitations: carrying objects, lifting, sleeping, walking, pulling, pushing, uncomfortable because of pain, moving in bed, sitting and standing   Assessment details: The patient is a 75 y.o. female who presents to physical therapy today for chronic R hip pain with PMH significant for R GEOVANNA performed in May of 2025, with complicated recovery to include UTI, kidney stones, and spinal infection. Upon initial evaluation, the patient demonstrates the following impairments: impaired Rom of the R hip, decreased mobility of the R hip, abnormal posture, decreased muscle power of the R hip and an abnormal gait pattern. Due to these impairments, the patient is unable to perform or has difficulty with the following  functional tasks: safe navigation of stairs, ambulating on uneven terrain, performing sit to stand transfers, decreased bed mobility, which limits her ability to participate in fully in typical ADLs such as cooking/house cleaning, or navigating the community safely. The patient would benefit from skilled PT services to address functional limitations and impairments and to improve patient quality of life.      Prognosis: good    Goals  Plan Goals:  SHORT TERM GOALS: 4 weeks  1.  Patient to be compliant with HEP and demo good efficiency with TE  2.  Report < 2 sleep disturbances 2° hip pain.     3.  Pt to improve LE strength to at least 4-/5, and subjectively report greater ease with car transfers.   4.  Increased hip ER/IR ROM to WFL (IR to 30°) degrees to allow for increased ease with bed mobility and squatting.  5. Pt. Able to ambulate up to 20 min with pain < 2/10 with acceptable pattern.      LONG TERM GOALS: 8 weeks  1.  Pt. to score > 50/80 perceived ability on LEFS  2.  Pain level < 2/10 in hips with all activities including sitting > 1 hr continuously.   3.  Hip  AROM to WNL to allow for return to ADL's/IADLS and functional activities without increased symptoms  4. Hip strength to 4+/5  to allow for pushing, pulling and more strenuous activities to occur without pain.  Walk > 30 min.  no pain  5. No palpable tenderness to the hip, and report being able to transfer into/out of bed without subjective report of difficulty.        Plan  Therapy options: will be seen for skilled therapy services  Planned modality interventions: cryotherapy, electrical stimulation/Russian stimulation, TENS, thermotherapy (hydrocollator packs) and dry needling  Other planned modality interventions: Dry Needling  Planned therapy interventions: abdominal trunk stabilization, ADL retraining, body mechanics training, balance/weight-bearing training, flexibility, functional ROM exercises, gait training, home exercise program, joint  mobilization, manual therapy, neuromuscular re-education, postural training, soft tissue mobilization, spinal/joint mobilization, strengthening, stretching and therapeutic activities  Frequency: 2x week  Duration in weeks: 8  Treatment plan discussed with: patient        History # of Personal Factors and/or Comorbidities: MODERATE (1-2)  Examination of Body System(s): # of elements: MODERATE (3)  Clinical Presentation: EVOLVING  Clinical Decision Making: MODERATE      Timed:         Manual Therapy:         mins  11805;     Therapeutic Exercise:    20     mins  13279;     Neuromuscular Richa:        mins  53065;    Therapeutic Activity:          mins  48273;     Gait Training:           mins  86416;     Ultrasound:          mins  48271;    Ionto                                   mins   62438  Self Care                       10     mins   86623      Un-Timed:  Electrical Stimulation:         mins  30326 ( );  Dry Needling          mins self-pay  Traction          mins 41905  Low Eval          Mins  07783  Mod Eval     25     Mins  26027  High Eval                            Mins  74528  Canalith Repos         mins 59978      Timed Treatment:   30   mins   Total Treatment:     55   mins          PT: Jared Bolivar PT     License Number: [place] [id number]# [id number]. [place] LIC# [id number]  Electronically signed by Jared Bolivar PT, 05/19/25, 9:40 AM EDT    Certification Period: 5/19/2025 thru 8/16/2025  I certify that the therapy services are furnished while this patient is under my care.  The services outlined above are required by this patient, and will be reviewed every 90 days.         Physician Signature:__________________________________________________    PHYSICIAN: Jacqueline Mayfield APRN  NPI: [id number]                                      DATE:      Please sign and return via fax to [phone]. Thank you, [facility].

## 2025-05-21 ENCOUNTER — TREATMENT (OUTPATIENT)
Dept: PHYSICAL THERAPY | Facility: CLINIC | Age: 75
End: 2025-05-21
Payer: MEDICARE

## 2025-05-21 DIAGNOSIS — M25.651 STIFFNESS OF RIGHT HIP JOINT: ICD-10-CM

## 2025-05-21 DIAGNOSIS — Z96.641 STATUS POST TOTAL HIP REPLACEMENT, RIGHT: Primary | ICD-10-CM

## 2025-05-21 DIAGNOSIS — R29.898 WEAKNESS OF RIGHT HIP: ICD-10-CM

## 2025-05-21 DIAGNOSIS — M25.551 RIGHT HIP PAIN: ICD-10-CM

## 2025-05-21 DIAGNOSIS — R26.89 DECREASED FUNCTIONAL MOBILITY: ICD-10-CM

## 2025-05-21 NOTE — PROGRESS NOTES
Physical Therapy Daily Treatment Note    [facility] Physical Therapy [place]  [address], [address]  [place] [id number]    Visit # 2        Patient: Anastasiia Faria   : 1950  Referring practitioner: No ref. provider found  Date of Initial Evaluation:  Type: THERAPY  Noted: 2025  Today's Date: 2025           ICD-10-CM ICD-9-CM   1. Status post total hip replacement, right  Z96.641 V43.64   2. Decreased functional mobility  R26.89 781.99   3. Right hip pain  M25.551 719.45   4. Weakness of right hip  R29.898 729.89   5. Stiffness of right hip joint  M25.651 719.55       Subjective  Anastasiia Faria reports:   Doing well overall after PT initial evaluation.  Has been going to [place], walking regularly.  Last walk was about 3/10 mile, but felt she was pushing herself to do that distance.     Objective   See Exercise, Manual, and Modality Logs for complete treatment       Pt Education:  HEP review - added hip ABD, ADD, bridging, sit to stand to HEP, written instructions issued  Exercise rationale/ pain free exercise performance  Anatomy and structure of affected musculature  Alternate exercise positions      Assessment/Plan  Tolerated continued progression of therapeutic exercise/therapeutic activity/neuromuscular re-ed well today, no increased pain reported during or after session.  Pt appears very motivated and demonstrates good understanding of current limitations and exercise at this time.       Verbal cues were provided throughout for proper techniques and to avoid compensations.     Would continue to benefit from skilled PT progressing with ROM / functional LE strengthening, with progression toward functional WB as tolerated.     Progress per Plan of Care             Timed:         Manual Therapy:         mins  01773     Therapeutic Exercise:     20    mins  95342     Neuromuscular Richa:    10    mins  26009    Therapeutic Activity:      8    mins  06593     Gait Training:            mins  54577     Ultrasound:          mins  70789    Ionto                                   mins  58711  Self Care                            mins  50060    Un-Timed:  Electrical Stimulation:         mins 94463 ( )  Traction          mins 82131    Timed Treatment:   38   mins   Total Treatment:     52   mins       SHIV Ignacio License #[id number]  Physical Therapist Assistant

## 2025-05-27 ENCOUNTER — TREATMENT (OUTPATIENT)
Dept: PHYSICAL THERAPY | Facility: CLINIC | Age: 75
End: 2025-05-27
Payer: MEDICARE

## 2025-05-27 DIAGNOSIS — R29.898 WEAKNESS OF RIGHT HIP: ICD-10-CM

## 2025-05-27 DIAGNOSIS — M25.651 STIFFNESS OF RIGHT HIP JOINT: ICD-10-CM

## 2025-05-27 DIAGNOSIS — M25.551 RIGHT HIP PAIN: ICD-10-CM

## 2025-05-27 DIAGNOSIS — R26.89 DECREASED FUNCTIONAL MOBILITY: ICD-10-CM

## 2025-05-27 DIAGNOSIS — Z96.641 STATUS POST TOTAL HIP REPLACEMENT, RIGHT: Primary | ICD-10-CM

## 2025-05-27 PROCEDURE — 97530 THERAPEUTIC ACTIVITIES: CPT

## 2025-05-27 PROCEDURE — 97110 THERAPEUTIC EXERCISES: CPT

## 2025-05-27 NOTE — PROGRESS NOTES
Physical Therapy Daily Treatment Note    [facility] Therapy [place]  [address], [address]  [place] [address]    Visit # 3        Patient: Anastasiia Faria   : 1950  Referring practitioner: ESTHER Kim  Date of Initial Evaluation:  Type: THERAPY  Noted: 2025  Today's Date: 2025           ICD-10-CM ICD-9-CM   1. Status post total hip replacement, right  Z96.641 V43.64   2. Right hip pain  M25.551 719.45   3. Weakness of right hip  R29.898 729.89   4. Decreased functional mobility  R26.89 781.99   5. Stiffness of right hip joint  M25.651 719.55       Subjective  Anastasiia Faria reports:   Has had some soreness after her last session and after her HEP sessions, but nothing too drastic.      Objective   See Exercise, Manual, and Modality Logs for complete treatment       Pt Education:  HEP review  Exercise rationale/ pain free exercise performance  Anatomy and structure of affected musculature  Alternate exercise positions      Assessment/Plan  Tolerated continued progression of therapeutic exercise/therapeutic activity/neuromuscular re-ed well today, progressing with rocker board and additional exercise from FWB.    Verbal cues were provided throughout for proper techniques and to avoid compensations.     Would continue to benefit from skilled PT progressing with ROM / functional LE strengthening, with progression toward functional WB as tolerated.     Progress per Plan of Care - progress HEP next visit as manish             Timed:         Manual Therapy:         mins  53288     Therapeutic Exercise:     18    mins  98722     Neuromuscular Richa:        mins  39796    Therapeutic Activity:      12    mins  88854     Gait Training:           mins  60664     Ultrasound:          mins  89248    Ionto                                   mins  95147  Self Care                            mins  35984    Un-Timed:  Electrical Stimulation:         mins 81556 (  )  Traction          mins 95354    Timed Treatment:   30   mins   Total Treatment:     43   mins       SHIV Ignacio License #[id number]  Physical Therapist Assistant

## 2025-05-29 ENCOUNTER — TREATMENT (OUTPATIENT)
Dept: PHYSICAL THERAPY | Facility: CLINIC | Age: 75
End: 2025-05-29
Payer: MEDICARE

## 2025-05-29 DIAGNOSIS — Z96.641 STATUS POST TOTAL HIP REPLACEMENT, RIGHT: Primary | ICD-10-CM

## 2025-05-29 DIAGNOSIS — M25.551 RIGHT HIP PAIN: ICD-10-CM

## 2025-05-29 DIAGNOSIS — M25.651 STIFFNESS OF RIGHT HIP JOINT: ICD-10-CM

## 2025-05-29 DIAGNOSIS — R29.898 WEAKNESS OF RIGHT HIP: ICD-10-CM

## 2025-05-29 DIAGNOSIS — R26.89 DECREASED FUNCTIONAL MOBILITY: ICD-10-CM

## 2025-05-29 NOTE — PROGRESS NOTES
Physical Therapy Daily Treatment Note    [facility] Physical Therapy [place]  [address], [address]  [place] [id number]    Visit # 4        Patient: Anastasiia Faria   : 1950  Referring practitioner: ESTHER Kim  Date of Initial Evaluation:  Type: THERAPY  Noted: 2025  Today's Date: 2025           ICD-10-CM ICD-9-CM   1. Status post total hip replacement, right  Z96.641 V43.64   2. Right hip pain  M25.551 719.45   3. Weakness of right hip  R29.898 729.89   4. Decreased functional mobility  R26.89 781.99   5. Stiffness of right hip joint  M25.651 719.55       Subjective  Anastasiia Faria reports:   Mild soreness after last PT session, some aching and discomfort last night which pt able to address with OTC Advil and was able to sleep after taking. Soreness/mild pain still present in anterior (R) hip.      Objective   See Exercise, Manual, and Modality Logs for complete treatment       Pt Education:  HEP review  Exercise rationale/ pain free exercise performance  Anatomy and structure of affected musculature  Alternate exercise positions      Assessment/Plan  Tolerated continued progression of therapeutic exercise/therapeutic activity/neuromuscular re-ed well today, progressing with reduced HHA on rocker board, inc time with tband bridge and clamshell resisted exercise, adding assisted SLR in small range..    Verbal cues were provided throughout for proper techniques and to avoid compensations.     Would continue to benefit from skilled PT progressing with ROM / functional LE strengthening, with progression toward functional WB as tolerated.     Progress per Plan of Care - progress HEP next visit as manish, progress WB activity in clinic.              Timed:         Manual Therapy:         mins  43581     Therapeutic Exercise:     25    mins  83301     Neuromuscular Richa:    10    mins  23970    Therapeutic Activity:      20    mins  32712     Gait Training:           mins   87476     Ultrasound:          mins  72405    Ionto                                   mins  70056  Self Care                            mins  57313    Un-Timed:  Electrical Stimulation:         mins 07114 ( )  Traction          mins 33194    Timed Treatment:   55   mins   Total Treatment:     55   mins       SHIV Ignacio License #[id number]  Physical Therapist Assistant

## 2025-06-02 ENCOUNTER — TREATMENT (OUTPATIENT)
Dept: PHYSICAL THERAPY | Facility: CLINIC | Age: 75
End: 2025-06-02
Payer: MEDICARE

## 2025-06-02 DIAGNOSIS — M25.651 STIFFNESS OF RIGHT HIP JOINT: ICD-10-CM

## 2025-06-02 DIAGNOSIS — R29.898 WEAKNESS OF RIGHT HIP: ICD-10-CM

## 2025-06-02 DIAGNOSIS — R26.89 DECREASED FUNCTIONAL MOBILITY: ICD-10-CM

## 2025-06-02 DIAGNOSIS — Z96.641 STATUS POST TOTAL HIP REPLACEMENT, RIGHT: Primary | ICD-10-CM

## 2025-06-02 DIAGNOSIS — M25.551 RIGHT HIP PAIN: ICD-10-CM

## 2025-06-02 PROCEDURE — 97530 THERAPEUTIC ACTIVITIES: CPT

## 2025-06-02 PROCEDURE — 97110 THERAPEUTIC EXERCISES: CPT

## 2025-06-02 NOTE — PROGRESS NOTES
Physical Therapy Daily Treatment Note  [facility] Physical Therapy  [place] - [address], [address]     [place] [address]   Phone: [phone]   Fax: [phone]      Patient: Anastasiia Faria   : 1950  Referring practitioner: ESTHER Kim  Date of Initial Visit: Type: THERAPY  Noted: 2025  Today's Date: 2025  Patient seen for 5 sessions       Visit Diagnoses:    ICD-10-CM ICD-9-CM   1. Status post total hip replacement, right  Z96.641 V43.64   2. Right hip pain  M25.551 719.45   3. Weakness of right hip  R29.898 729.89   4. Decreased functional mobility  R26.89 781.99   5. Stiffness of right hip joint  M25.651 719.55         Subjective:  Anastasiia Faria reports: overall doing okay. No increase soreness from the exercises last treatment session. Continues to have some difficulty with sit to stand tranfers, getting into/out of car, and with R hip flexion. Overall is feeling some improvements in her strength.       Objective   See Exercise, Manual, and Modality Logs for complete treatment.       Assessment:  Patient tolerates today's movement interventions well without adverse effects. Demonstrates improved ability to complete SLR with lessened assistance. Able to complete sit to stand without Ue support x10. Progressed to leg press with 50# of resistance without increase in R hip pain. Would continue to benefit from skilled PT to progress R hip/lower extremity strength, improve standing balance, and promote improved functional mobility.       Plan:   Progress her HEP, continue to increase weight bearing/functional strengthening activities.       Timed:         Manual Therapy:         mins  79677;     Therapeutic Exercise:    18     mins  69544;     Neuromuscular Richa:        mins  29022;    Therapeutic Activity:     12     mins  12860;     Gait Training:           mins  97299;     Ultrasound:          mins  39576;    Ionto                                   mins   29772  Self Care                            mins  55319  Traction          mins 55875      Un-Timed:  Canalith Repos         mins 52308  Electrical Stimulation:         mins  47616 ( );  Dry Needling          mins self-pay  Traction          mins 16772        Timed Treatment:   30   mins   Total Treatment:     40   mins    Jared Bolivar PT  License Number: [place] [id number]# [id number]. [place] LIC# [id number]    Physical Therapist

## 2025-06-04 ENCOUNTER — TREATMENT (OUTPATIENT)
Dept: PHYSICAL THERAPY | Facility: CLINIC | Age: 75
End: 2025-06-04
Payer: MEDICARE

## 2025-06-04 DIAGNOSIS — R29.898 WEAKNESS OF RIGHT HIP: ICD-10-CM

## 2025-06-04 DIAGNOSIS — M25.551 RIGHT HIP PAIN: ICD-10-CM

## 2025-06-04 DIAGNOSIS — Z96.641 STATUS POST TOTAL HIP REPLACEMENT, RIGHT: Primary | ICD-10-CM

## 2025-06-04 DIAGNOSIS — M25.651 STIFFNESS OF RIGHT HIP JOINT: ICD-10-CM

## 2025-06-04 DIAGNOSIS — R26.89 DECREASED FUNCTIONAL MOBILITY: ICD-10-CM

## 2025-06-04 NOTE — PROGRESS NOTES
Physical Therapy Daily Treatment Note  [facility] Physical Therapy  [place] - [address], [address]     [place] [address]   Phone: [phone]   Fax: [phone]      Patient: Anastasiia Faria   : 1950  Referring practitioner: ESTHER Kim  Date of Initial Visit: Type: THERAPY  Noted: 2025  Today's Date: 2025  Patient seen for 6 sessions       Visit Diagnoses:    ICD-10-CM ICD-9-CM   1. Status post total hip replacement, right  Z96.641 V43.64   2. Right hip pain  M25.551 719.45   3. Weakness of right hip  R29.898 729.89   4. Decreased functional mobility  R26.89 781.99   5. Stiffness of right hip joint  M25.651 719.55         Subjective:  Anastasiia Faria reports: mild amounts of R hip soreness following treatment session that lasted roughly 12-24 hours, and feeling improved today. Subjectively reports growing R hip strength.       Objective   See Exercise, Manual, and Modality Logs for complete treatment.       Assessment:  Patient tolerates today's movement interventions well without adverse effects. She demonstrates growing muscle power of the R lower extremity, with greater ease completing supine SLR with lessened amounts of assistance. Having some muscle soreness following treatment sessions that is not long lasting. She is responding well to physical therapy thus far, and has been compliant with HEP.       Plan:   Progress her HEP, continue to increase weight bearing/functional strengthening activities.         Timed:         Manual Therapy:         mins  07244;     Therapeutic Exercise:    20     mins  19689;     Neuromuscular Richa:        mins  93283;    Therapeutic Activity:     10     mins  54784;     Gait Training:           mins  31534;     Ultrasound:          mins  41571;    Ionto                                   mins  75590  Self Care                            mins  67228  Traction          mins 49974      Un-Timed:  Canalith Repos         mins  48807  Electrical Stimulation:         mins  65033 ( );  Dry Needling          mins self-pay  Traction          mins 88437        Timed Treatment:   30   mins   Total Treatment:     48   mins    Jared Bolivar PT  License Number: [place] [id number]# [id number]. [place] LIC# [id number]    Physical Therapist

## 2025-06-10 ENCOUNTER — TREATMENT (OUTPATIENT)
Dept: PHYSICAL THERAPY | Facility: CLINIC | Age: 75
End: 2025-06-10
Payer: MEDICARE

## 2025-06-10 DIAGNOSIS — M25.551 RIGHT HIP PAIN: ICD-10-CM

## 2025-06-10 DIAGNOSIS — M25.651 STIFFNESS OF RIGHT HIP JOINT: ICD-10-CM

## 2025-06-10 DIAGNOSIS — R26.89 DECREASED FUNCTIONAL MOBILITY: ICD-10-CM

## 2025-06-10 DIAGNOSIS — R29.898 WEAKNESS OF RIGHT HIP: ICD-10-CM

## 2025-06-10 DIAGNOSIS — Z96.641 STATUS POST TOTAL HIP REPLACEMENT, RIGHT: Primary | ICD-10-CM

## 2025-06-10 PROCEDURE — 97530 THERAPEUTIC ACTIVITIES: CPT

## 2025-06-10 PROCEDURE — 97110 THERAPEUTIC EXERCISES: CPT

## 2025-06-10 NOTE — PROGRESS NOTES
Physical Therapy Daily Treatment Note  [facility] Physical Therapy  [place] - [address], [address]     [place] [address]   Phone: [phone]   Fax: [phone]      Patient: Anastasiia Faria   : 1950  Referring practitioner: ESTHER Kim  Date of Initial Visit: Type: THERAPY  Noted: 2025  Today's Date: 6/10/2025  Patient seen for 7 sessions       Visit Diagnoses:    ICD-10-CM ICD-9-CM   1. Status post total hip replacement, right  Z96.641 V43.64   2. Right hip pain  M25.551 719.45   3. Weakness of right hip  R29.898 729.89   4. Decreased functional mobility  R26.89 781.99   5. Stiffness of right hip joint  M25.651 719.55         Subjective:  Anastasiia Faria reports: no new c/o today, (R) hip remains sore in the upper part of the hip but not constant.      Objective   See Exercise, Manual, and Modality Logs for complete treatment.       Assessment:  Tolerated continued progression of therapeutic exercise/therapeutic activity/neuromuscular re-ed well today, no increased pain reported during or after session.          Plan:   Cont per PT POC, review/progress HEP, continue to increase weight bearing/functional strengthening activities.         Timed:         Manual Therapy:         mins  39628;     Therapeutic Exercise:    15     mins  69841;     Neuromuscular Richa:     5   mins  34646;    Therapeutic Activity:     10     mins  50086;     Gait Training:           mins  38768;     Ultrasound:          mins  78242;    Ionto                                   mins  87341  Self Care                            mins  95525  Traction          mins 21821      Un-Timed:  Canalith Repos         mins 22522  Electrical Stimulation:         mins  19226 ( );  Dry Needling          mins self-pay  Traction          mins 54644        Timed Treatment:   30   mins   Total Treatment:     45   mins    SHIV Ignacio License #[id number]  Physical Therapist Assistant

## 2025-06-13 ENCOUNTER — TREATMENT (OUTPATIENT)
Dept: PHYSICAL THERAPY | Facility: CLINIC | Age: 75
End: 2025-06-13
Payer: MEDICARE

## 2025-06-13 DIAGNOSIS — M25.651 STIFFNESS OF RIGHT HIP JOINT: ICD-10-CM

## 2025-06-13 DIAGNOSIS — M25.551 RIGHT HIP PAIN: ICD-10-CM

## 2025-06-13 DIAGNOSIS — R26.89 DECREASED FUNCTIONAL MOBILITY: ICD-10-CM

## 2025-06-13 DIAGNOSIS — Z96.641 STATUS POST TOTAL HIP REPLACEMENT, RIGHT: Primary | ICD-10-CM

## 2025-06-13 DIAGNOSIS — R29.898 WEAKNESS OF RIGHT HIP: ICD-10-CM

## 2025-06-13 NOTE — PROGRESS NOTES
Physical Therapy Daily Treatment Note  [facility] Physical Therapy  [place] - [address], [address]     [place] [address]   Phone: [phone]   Fax: [phone]      Patient: Anastasiia Faria   : 1950  Referring practitioner: ESTHER Kim  Date of Initial Visit: Type: THERAPY  Noted: 2025  Today's Date: 2025  Patient seen for 8 sessions       Visit Diagnoses:    ICD-10-CM ICD-9-CM   1. Status post total hip replacement, right  Z96.641 V43.64   2. Right hip pain  M25.551 719.45   3. Weakness of right hip  R29.898 729.89   4. Stiffness of right hip joint  M25.651 719.55   5. Decreased functional mobility  R26.89 781.99         Subjective:  Anastasiia Faria reports: no new c/o today, (R) hip remains sore in the upper part of the hip but not constant.      Objective   See Exercise, Manual, and Modality Logs for complete treatment.     Pt Education:  HEP review - revised and condensed current HEP, added sidestep vs tband, monster walks, 3-way hip with band to HEP.  Exercise rationale/ pain free exercise performance  Anatomy and structure of affected musculature  Posture/Postural awareness  Alternate exercise positions    Assessment:  Tolerated continued progression of therapeutic exercise/therapeutic activity/neuromuscular re-ed well today, emphasis on HEP progression and extended CKC/FWB activity.  No increased pain reported during or after session.      Plan:   Cont per PT POC, continue to increase weight bearing/functional strengthening activities as manish.         Timed:         Manual Therapy:         mins  23437;     Therapeutic Exercise:    10     mins  27713;     Neuromuscular Richa:     5   mins  11128;    Therapeutic Activity:     15     mins  33751;     Gait Training:           mins  86000;     Ultrasound:          mins  70992;    Ionto                                   mins  85163  Self Care                            mins  00910  Traction          mins  46565      Un-Timed:  Canalith Repos         mins 10893  Electrical Stimulation:         mins  63102 ( );  Dry Needling          mins self-pay  Traction          mins 93126        Timed Treatment:   30   mins   Total Treatment:     43   mins    SHIV Ignacio License #[id number]  Physical Therapist Assistant

## 2025-06-17 ENCOUNTER — TREATMENT (OUTPATIENT)
Dept: PHYSICAL THERAPY | Facility: CLINIC | Age: 75
End: 2025-06-17
Payer: MEDICARE

## 2025-06-17 DIAGNOSIS — R26.89 DECREASED FUNCTIONAL MOBILITY: ICD-10-CM

## 2025-06-17 DIAGNOSIS — M25.651 STIFFNESS OF RIGHT HIP JOINT: ICD-10-CM

## 2025-06-17 DIAGNOSIS — R29.898 WEAKNESS OF RIGHT HIP: ICD-10-CM

## 2025-06-17 DIAGNOSIS — Z96.641 STATUS POST TOTAL HIP REPLACEMENT, RIGHT: Primary | ICD-10-CM

## 2025-06-17 DIAGNOSIS — M25.551 RIGHT HIP PAIN: ICD-10-CM

## 2025-06-17 PROCEDURE — 97530 THERAPEUTIC ACTIVITIES: CPT

## 2025-06-17 PROCEDURE — 97110 THERAPEUTIC EXERCISES: CPT

## 2025-06-17 NOTE — PROGRESS NOTES
Physical Therapy Daily Treatment Note  [facility] Physical Therapy  [place] - [address], [address]     [place] [address]   Phone: [phone]   Fax: [phone]      Patient: Anastasiia Faria   : 1950  Referring practitioner: ESTHER Kim  Date of Initial Visit: Type: THERAPY  Noted: 2025  Today's Date: 2025  Patient seen for 9 sessions       Visit Diagnoses:    ICD-10-CM ICD-9-CM   1. Status post total hip replacement, right  Z96.641 V43.64   2. Right hip pain  M25.551 719.45   3. Weakness of right hip  R29.898 729.89   4. Stiffness of right hip joint  M25.651 719.55   5. Decreased functional mobility  R26.89 781.99         Subjective:  Anastasiia Faria reports: feeling improved, with greater ease getting into/out of bed, and some greater ease with stairs. Does have some concern about having to navigate greater than 1 flight of steps. Mild amounts of R hip pain, but not severe.       Objective   See Exercise, Manual, and Modality Logs for complete treatment.       Assessment:  Patient tolerates today's movement interventions well without adverse effects. Movement interventions today focused at improving lumbo pelvic and bilateral hip mobility, core strengthening, lower extremity strengthening, and standing balance. Able to complete SLR with increased repetitions and subjective reports of greater ease. She continues to progress well with physical therapy thus far.       Plan:   continue to increase weight bearing/functional strengthening activities as manish.       Timed:         Manual Therapy:         mins  42407;     Therapeutic Exercise:    18     mins  66418;     Neuromuscular Richa:    3    mins  18840;    Therapeutic Activity:     13     mins  97755;     Gait Training:           mins  63811;     Ultrasound:          mins  66900;    Ionto                                   mins  16595  Self Care                            mins  62229  Traction          mins  88106      Un-Timed:  Canalith Repos         mins 79252  Electrical Stimulation:         mins  30440 ( );  Dry Needling          mins self-pay  Traction          mins 94290        Timed Treatment:   34   mins   Total Treatment:     50   mins    Jared Bolivar PT  License Number: [place] [id number]# [id number]. [place] LIC# [id number]    Physical Therapist

## 2025-06-19 ENCOUNTER — TREATMENT (OUTPATIENT)
Dept: PHYSICAL THERAPY | Facility: CLINIC | Age: 75
End: 2025-06-19
Payer: MEDICARE

## 2025-06-19 DIAGNOSIS — Z96.641 STATUS POST TOTAL HIP REPLACEMENT, RIGHT: Primary | ICD-10-CM

## 2025-06-19 DIAGNOSIS — R29.898 WEAKNESS OF RIGHT HIP: ICD-10-CM

## 2025-06-19 DIAGNOSIS — M25.551 RIGHT HIP PAIN: ICD-10-CM

## 2025-06-19 DIAGNOSIS — M25.651 STIFFNESS OF RIGHT HIP JOINT: ICD-10-CM

## 2025-06-19 DIAGNOSIS — R26.89 DECREASED FUNCTIONAL MOBILITY: ICD-10-CM

## 2025-06-19 NOTE — PROGRESS NOTES
"Physical Therapy Progress Note  [facility] Physical Therapy  [place] - [address], [address]     [place] [address]   Phone: [phone]   Fax: [phone]      Patient: Anastasiia Faria   : 1950  Referring practitioner: ESTHER Kim  Date of Initial Visit: Type: THERAPY  Noted: 2025  Today's Date: 2025  Patient seen for 10 sessions       Visit Diagnoses:    ICD-10-CM ICD-9-CM   1. Status post total hip replacement, right  Z96.641 V43.64   2. Right hip pain  M25.551 719.45   3. Weakness of right hip  R29.898 729.89   4. Stiffness of right hip joint  M25.651 719.55   5. Decreased functional mobility  R26.89 781.99         Subjective:  Anastasiia Faria reports: \"my strength feels like it's been getting better. I dont have to pick my leg up to get into bed. Getting into and out of my car is easier too.\" Patient reports good response to physical therapy thus far. Having some mild soreness/pain into the anterior R hip.      Objective   See Exercise, Manual, and Modality Logs for complete treatment.     Passive Range of Motion   Right Hip   Flexion: 132 degrees   Extension: 5 degrees with pain  Abduction: 38 degrees with pain  External rotation (90/90): 48 degrees   Internal rotation (90/90): 32 degrees with pain     Strength/Myotome Testing      Left Hip   Planes of Motion   Flexion: 4+  Extension: 4+  Abduction: 4+  Adduction: 4+     Right Hip   Planes of Motion   Flexion: 4-  Extension: 4  Abduction: 4  Adduction: 4+     Left Knee   Flexion: 5  Extension: 5     Right Knee   Flexion: 5  Extension: 5     Left Ankle/Foot   Dorsiflexion: 5  Plantar flexion: 5     Right Ankle/Foot   Dorsiflexion: 5  Plantar flexion: 5     Comments   Patient ambulates without the an antalgic gait pattern. Currently not using assistive device. Mild decreased in trunk extension. Good phillip, and no overt loss of balance.      Functional Assessment      Comments  Able to sit to stand transfer with " airex in seat x10 times without use of hands    Balance:   30 second sit to stand w/ UE support: 11 reps  TUG: 10.53 seconds without assistive device    Assessment:  The patient is a 75 y.o. female who presents to her 10th physical therapy today for chronic R hip pain with PMH significant for R GEOVANNA performed in May of 2025, with complicated recovery to include UTI, kidney stones, and spinal infection. She demonstrates improvements in R hip ROM and strength, and demonstrates improved ability to transfer from sitting to standing, and improved 30 second sit to stand and TUG scores. She has responded well to physical therapy thus far, and subjectively reports greater ease with transfers into bed and into/out of her vehicle. She has met all short term goals, and is progressing well towards long term goals.     Plan Goals:  SHORT TERM GOALS: 4 weeks  1.  Patient to be compliant with HEP and demo good efficiency with TE - MET  2.  Report < 2 sleep disturbances 2° hip pain.   - MET  3.  Pt to improve LE strength to at least 4-/5, and subjectively report greater ease with car transfers. - MET  4.  Increased hip ER/IR ROM to WFL (IR to 30°) degrees to allow for increased ease with bed mobility and squatting. - MET  5. Pt. Able to ambulate up to 20 min with pain < 2/10 with acceptable pattern.  - MET     LONG TERM GOALS: 8 weeks  1.  Pt. to score > 50/80 perceived ability on LEFS  2.  Pain level < 2/10 in hips with all activities including sitting > 1 hr continuously.   3.  Hip  AROM to WNL to allow for return to ADL's/IADLS and functional activities without increased symptoms  4. Hip strength to 4+/5  to allow for pushing, pulling and more strenuous activities to occur without pain.  Walk > 30 min.  no pain  5. No palpable tenderness to the hip, and report being able to transfer into/out of bed without subjective report of difficulty.    Plan:   continue to increase weight bearing/functional strengthening activities as manish.        Timed:         Manual Therapy:         mins  42943;     Therapeutic Exercise:    16     mins  95330;     Neuromuscular Richa:    4    mins  14252;    Therapeutic Activity:     10     mins  96429;     Gait Training:           mins  31985;     Ultrasound:          mins  90619;    Ionto                                   mins  45603  Self Care                            mins  76722  Traction          mins 94597      Un-Timed:  Canalith Repos         mins 03458  Electrical Stimulation:         mins  13890 ( );  Dry Needling          mins self-pay  Traction          mins 73810        Timed Treatment:   30   mins   Total Treatment:     50   mins    Jared Bolivar PT  License Number: [place] LIC# [id number]. [place] LIC# [id number]    Physical Therapist

## 2025-06-24 ENCOUNTER — TREATMENT (OUTPATIENT)
Dept: PHYSICAL THERAPY | Facility: CLINIC | Age: 75
End: 2025-06-24
Payer: MEDICARE

## 2025-06-24 DIAGNOSIS — M25.551 RIGHT HIP PAIN: ICD-10-CM

## 2025-06-24 DIAGNOSIS — Z96.641 STATUS POST TOTAL HIP REPLACEMENT, RIGHT: Primary | ICD-10-CM

## 2025-06-24 DIAGNOSIS — R29.898 WEAKNESS OF RIGHT HIP: ICD-10-CM

## 2025-06-24 DIAGNOSIS — M25.651 STIFFNESS OF RIGHT HIP JOINT: ICD-10-CM

## 2025-06-24 DIAGNOSIS — R26.89 DECREASED FUNCTIONAL MOBILITY: ICD-10-CM

## 2025-06-24 NOTE — PROGRESS NOTES
Physical Therapy Daily Treatment Note    [facility] Physical Therapy [place]  [address], [address]  [place] [address]    Visit # 11        Patient: Anastasiia Faria   : 1950  Referring practitioner: ESTHER Kim  Date of Initial Evaluation:  Type: THERAPY  Noted: 2025  Today's Date: 2025           ICD-10-CM ICD-9-CM   1. Status post total hip replacement, right  Z96.641 V43.64   2. Right hip pain  M25.551 719.45   3. Stiffness of right hip joint  M25.651 719.55   4. Weakness of right hip  R29.898 729.89   5. Decreased functional mobility  R26.89 781.99       Subjective  Anastasiia Faria reports:   Still having some difficulty with getting dressed, lifting LE and hip to get pants on from sitting position.  Also some difficulty getting in/out of vehicle.      Objective   See Exercise, Manual, and Modality Logs for complete treatment       Assessment/Plan  Tolerated continued progression of therapeutic exercise/therapeutic activity/neuromuscular re-ed well today, with continued emphasis on strength training and resistance progression, along with stability and balance.  Introduced seated hip IR/ER AROM to assist with dressing activities and to add to HEP.     Verbal cues were provided throughout for proper techniques and to avoid compensations.     Would continue to benefit from skilled PT progressing with ROM / functional UE strengthening, with progression toward functional WB as tolerated.     Progress per Plan of Care and Progress strengthening /stabilization /functional activity             Timed:         Manual Therapy:         mins  11632     Therapeutic Exercise:     15    mins  40151     Neuromuscular Richa:    16    mins  06572    Therapeutic Activity:      16    mins  41577     Gait Training:           mins  20223     Ultrasound:          mins  20478    Ionto                                   mins  84205  Self Care                            mins   24240    Un-Timed:  Electrical Stimulation:         mins 65999 ( )  Traction          mins 59018    Timed Treatment:   47   mins   Total Treatment:     47   mins       SHIV Ignacio License #[id number]  Physical Therapist Assistant

## 2025-06-26 ENCOUNTER — TREATMENT (OUTPATIENT)
Dept: PHYSICAL THERAPY | Facility: CLINIC | Age: 75
End: 2025-06-26
Payer: MEDICARE

## 2025-06-26 DIAGNOSIS — M25.651 STIFFNESS OF RIGHT HIP JOINT: ICD-10-CM

## 2025-06-26 DIAGNOSIS — M25.551 RIGHT HIP PAIN: ICD-10-CM

## 2025-06-26 DIAGNOSIS — R29.898 WEAKNESS OF RIGHT HIP: ICD-10-CM

## 2025-06-26 DIAGNOSIS — R26.89 DECREASED FUNCTIONAL MOBILITY: ICD-10-CM

## 2025-06-26 DIAGNOSIS — Z96.641 STATUS POST TOTAL HIP REPLACEMENT, RIGHT: Primary | ICD-10-CM

## 2025-06-26 NOTE — PROGRESS NOTES
Physical Therapy Daily Treatment Note  [facility] Physical Therapy  [place] - [address], [address]     [place] [address]   Phone: [phone]   Fax: [phone]      Patient: Anastasiia Faria   : 1950  Referring practitioner: ESTHER Kim  Date of Initial Visit: Type: THERAPY  Noted: 2025  Today's Date: 2025  Patient seen for 12 sessions       Visit Diagnoses:    ICD-10-CM ICD-9-CM   1. Status post total hip replacement, right  Z96.641 V43.64   2. Right hip pain  M25.551 719.45   3. Stiffness of right hip joint  M25.651 719.55   4. Weakness of right hip  R29.898 729.89   5. Decreased functional mobility  R26.89 781.99         Subjective:  Anastasiia Faria reports: mild amounts of muscle soreness following the last treatment session that lasted about a day, but is feeling okay today. Continues to feel gradual improvements in R hip strength.       Objective   See Exercise, Manual, and Modality Logs for complete treatment.       Assessment:  Patient tolerates today's movement interventions well without adverse effects. Demonstrates growing muscle power of the R lower extremity, with greater ease completing today's exercises. Due to complaints of soreness following last treatment session, did not progress the resistance today, however, included step ups fwd/lateral to progress functional strengthening. She continues to gradually progress well towards established therapy goals.       Plan:   Continue to increase weight bearing/functional strengthening activities as manish.      Timed:         Manual Therapy:         mins  59553;     Therapeutic Exercise:    18     mins  58537;     Neuromuscular Richa:    2    mins  82408;    Therapeutic Activity:     10     mins  81217;     Gait Training:           mins  42990;     Ultrasound:          mins  82999;    Ionto                                   mins  94540  Self Care                            mins  70027  Traction          mins  02085      Un-Timed:  Canalith Repos         mins 30377  Electrical Stimulation:         mins  89362 ( );  Dry Needling          mins self-pay  Traction          mins 10542        Timed Treatment:   30   mins   Total Treatment:     50   mins    Jared Bolivar PT  License Number: [place] [id number]# [id number]. [place] LIC# [id number]    Physical Therapist

## 2025-07-01 ENCOUNTER — TREATMENT (OUTPATIENT)
Dept: PHYSICAL THERAPY | Facility: CLINIC | Age: 75
End: 2025-07-01
Payer: MEDICARE

## 2025-07-01 DIAGNOSIS — Z96.641 STATUS POST TOTAL HIP REPLACEMENT, RIGHT: Primary | ICD-10-CM

## 2025-07-01 DIAGNOSIS — R29.898 WEAKNESS OF RIGHT HIP: ICD-10-CM

## 2025-07-01 DIAGNOSIS — M25.651 STIFFNESS OF RIGHT HIP JOINT: ICD-10-CM

## 2025-07-01 DIAGNOSIS — M25.551 RIGHT HIP PAIN: ICD-10-CM

## 2025-07-01 DIAGNOSIS — R26.89 DECREASED FUNCTIONAL MOBILITY: ICD-10-CM

## 2025-07-01 NOTE — PROGRESS NOTES
Physical Therapy Daily Treatment Note  [facility] Physical Therapy  [place] - [address], [address]     [place] [address]   Phone: [phone]   Fax: [phone]      Patient: Anastasiia Faria   : 1950  Referring practitioner: ESTHER Kim  Date of Initial Visit: Type: THERAPY  Noted: 2025  Today's Date: 2025  Patient seen for 13 sessions       Visit Diagnoses:    ICD-10-CM ICD-9-CM   1. Status post total hip replacement, right  Z96.641 V43.64   2. Right hip pain  M25.551 719.45   3. Stiffness of right hip joint  M25.651 719.55   4. Weakness of right hip  R29.898 729.89   5. Decreased functional mobility  R26.89 781.99         Subjective:  Anastasiia Faria reports: no dramatic changes in symptoms from the last treatment session. Mild amounts of anterior hip soreness. Reports some inconsistency with completion of HEP at home.       Objective   See Exercise, Manual, and Modality Logs for complete treatment.       Assessment:  Patient tolerates today's movement interventions well without adverse effects. She demonstrates improvements in her sit to stand transfers, being able to complete from standard chair without the need of upper extremity support, and was even able to accomplish the task with 5# KB x10 repetitions, and excellent improvement from previous treatment sessions. She continues to demonstrate muscle power deficits of the R lower extremity, and continues to benefit from skilled PT to address remaining deficits.        Plan:   Continue to increase weight bearing/functional strengthening activities as manish.      Timed:         Manual Therapy:         mins  25300;     Therapeutic Exercise:    28     mins  12106;     Neuromuscular Richa:    12    mins  69345;    Therapeutic Activity:     18     mins  38121;     Gait Training:           mins  96692;     Ultrasound:          mins  63359;    Ionto                                   mins  22795  Self Care                             mins  90568  Traction          mins 87603      Un-Timed:  Canalith Repos         mins 94088  Electrical Stimulation:         mins  05460 ( );  Dry Needling          mins self-pay  Traction          mins 59089        Timed Treatment:   58   mins   Total Treatment:     58   mins    Jared Bolivar PT  License Number: [place] [id number]# [id number]. [place] LIC# [id number]    Physical Therapist

## 2025-07-03 ENCOUNTER — TREATMENT (OUTPATIENT)
Dept: PHYSICAL THERAPY | Facility: CLINIC | Age: 75
End: 2025-07-03
Payer: MEDICARE

## 2025-07-03 DIAGNOSIS — M25.651 STIFFNESS OF RIGHT HIP JOINT: ICD-10-CM

## 2025-07-03 DIAGNOSIS — M25.551 RIGHT HIP PAIN: ICD-10-CM

## 2025-07-03 DIAGNOSIS — Z96.641 STATUS POST TOTAL HIP REPLACEMENT, RIGHT: Primary | ICD-10-CM

## 2025-07-03 DIAGNOSIS — R29.898 WEAKNESS OF RIGHT HIP: ICD-10-CM

## 2025-07-03 DIAGNOSIS — R26.89 DECREASED FUNCTIONAL MOBILITY: ICD-10-CM

## 2025-07-03 NOTE — PROGRESS NOTES
"Physical Therapy Daily Treatment Note  [facility] Physical Therapy  [place] - [address], [address]     [place] [address]   Phone: [phone]   Fax: [phone]      Patient: Anastasiia Faria   : 1950  Referring practitioner: ESTHER Kim  Date of Initial Visit: Type: THERAPY  Noted: 2025  Today's Date: 7/3/2025  Patient seen for 14 sessions       Visit Diagnoses:    ICD-10-CM ICD-9-CM   1. Status post total hip replacement, right  Z96.641 V43.64   2. Right hip pain  M25.551 719.45   3. Stiffness of right hip joint  M25.651 719.55   4. Weakness of right hip  R29.898 729.89   5. Decreased functional mobility  R26.89 781.99       Subjective:  Anastasiia Faria reports: \"I feel like I'm gradually getting stronger, still having some difficulty getting up from the chair, but it's feeling a bit easier.\" She reports consistent completion of HEP at home.       Objective   See Exercise, Manual, and Modality Logs for complete treatment.       Assessment:  Patient tolerates today's movement interventions well without adverse effects. She continues to demonstrate progress in R lower extremity strength. Gait mechanics are improving with decreased antalgic gait pattern. Improved ability to complete sit to stand transfer with ability to complete x20 reps total from low mat table with 5# KT. Would continue to benefit from physical therapy for progressions in R hip functional strength.      Plan:   Continue to increase weight bearing/functional strengthening activities as manish.      Timed:         Manual Therapy:         mins  80296;     Therapeutic Exercise:    18     mins  04081;     Neuromuscular Richa:    2    mins  45759;    Therapeutic Activity:     10     mins  67305;     Gait Training:           mins  43156;     Ultrasound:          mins  90374;    Ionto                                   mins  92873  Self Care                            mins  99325  Traction          mins " 03281      Un-Timed:  Canalith Repos         mins 90273  Electrical Stimulation:         mins  17305 ( );  Dry Needling          mins self-pay  Traction          mins 60569        Timed Treatment:   30   mins   Total Treatment:     52   mins    Jared Bolivar PT  License Number: [place] [id number]# [id number]. [place] LIC# [id number]    Physical Therapist

## 2025-07-08 ENCOUNTER — TREATMENT (OUTPATIENT)
Dept: PHYSICAL THERAPY | Facility: CLINIC | Age: 75
End: 2025-07-08
Payer: MEDICARE

## 2025-07-08 DIAGNOSIS — Z96.641 STATUS POST TOTAL HIP REPLACEMENT, RIGHT: Primary | ICD-10-CM

## 2025-07-08 DIAGNOSIS — R26.89 DECREASED FUNCTIONAL MOBILITY: ICD-10-CM

## 2025-07-08 DIAGNOSIS — M25.651 STIFFNESS OF RIGHT HIP JOINT: ICD-10-CM

## 2025-07-08 DIAGNOSIS — R29.898 WEAKNESS OF RIGHT HIP: ICD-10-CM

## 2025-07-08 DIAGNOSIS — M25.551 RIGHT HIP PAIN: ICD-10-CM

## 2025-07-08 NOTE — PROGRESS NOTES
"Physical Therapy Daily Treatment Note  [facility] Physical Therapy  [place] - [address], [address]     [place] [address]   Phone: [phone]   Fax: [phone]      Patient: Anastasiia Faria   : 1950  Referring practitioner: ESTHER Kim  Date of Initial Visit: Type: THERAPY  Noted: 2025  Today's Date: 2025  Patient seen for 15 sessions       Visit Diagnoses:    ICD-10-CM ICD-9-CM   1. Status post total hip replacement, right  Z96.641 V43.64   2. Right hip pain  M25.551 719.45   3. Stiffness of right hip joint  M25.651 719.55   4. Weakness of right hip  R29.898 729.89   5. Decreased functional mobility  R26.89 781.99         Subjective:  Anastasiia Faria reports: having a \"very active weekend\" with ample amounts of house chores, walking, and shopping and tolerated it okay. Had some mild increase in anterior R hip pain yesterday, and used some ice which helped some of the discomfort. Pain is doing okay today.       Objective   See Exercise, Manual, and Modality Logs for complete treatment.       Assessment:  Patient tolerates today's movement interventions well without adverse effects. Demonstrates improved muscle power through the R hip with greater ease with sit to stand transfer, and is able to complete x5 reps from green chair, which she was unable to do at previous progress note. Patient continues to demonstrate improvements in strength, but continues to struggle with higher leve functional mobility, such as navigating greater than 10 steps, ambulating on uneven terrain, and walking greater than 15-20 minutes. She would continue to benefit from skilled physical therapy to address remaining deficits.       Plan:   Re-eval next visit      Timed:         Manual Therapy:         mins  84590;     Therapeutic Exercise:    18     mins  37061;     Neuromuscular Richa:    2    mins  76202;    Therapeutic Activity:     10     mins  34947;     Gait Training:           mins  " 85732;     Ultrasound:          mins  99122;    Ionto                                   mins  03423  Self Care                            mins  03864  Traction          mins 90779      Un-Timed:  Canalith Repos         mins 83634  Electrical Stimulation:         mins  39164 ( );  Dry Needling          mins self-pay  Traction          mins 23416        Timed Treatment:   30   mins   Total Treatment:     48   mins    Jared Bolivar PT  License Number: [place] [id number]# [id number]. [place] LIC# [id number]    Physical Therapist

## 2025-07-10 ENCOUNTER — TREATMENT (OUTPATIENT)
Dept: PHYSICAL THERAPY | Facility: CLINIC | Age: 75
End: 2025-07-10
Payer: MEDICARE

## 2025-07-10 DIAGNOSIS — R29.898 WEAKNESS OF RIGHT HIP: ICD-10-CM

## 2025-07-10 DIAGNOSIS — R26.89 DECREASED FUNCTIONAL MOBILITY: ICD-10-CM

## 2025-07-10 DIAGNOSIS — Z96.641 STATUS POST TOTAL HIP REPLACEMENT, RIGHT: Primary | ICD-10-CM

## 2025-07-10 DIAGNOSIS — M25.551 RIGHT HIP PAIN: ICD-10-CM

## 2025-07-10 DIAGNOSIS — M25.651 STIFFNESS OF RIGHT HIP JOINT: ICD-10-CM

## 2025-07-10 NOTE — PROGRESS NOTES
"  Physical Therapy Re Certification Of Plan of Care  [facility] [address] [facility], [address] [place] [id number]   Phone: [phone] Fax: [phone]   Patient: Anastasiia Faria   : 1950  Diagnosis/ICD-10 Code:  Status post total hip replacement, right [Z96.641]  Referring practitioner: ESTHER Kim  Date of Initial Visit: Type: THERAPY  Noted: 2025  Today's Date: 7/10/2025  Patient seen for 16 sessions         Visit Diagnoses:    ICD-10-CM ICD-9-CM   1. Status post total hip replacement, right  Z96.641 V43.64   2. Right hip pain  M25.551 719.45   3. Stiffness of right hip joint  M25.651 719.55   4. Weakness of right hip  R29.898 729.89   5. Decreased functional mobility  R26.89 781.99         Anastasiia Faria reports: \"I'm feeling stronger through my right hip, easier to stand up from chairs, and I'm feeling more confident walking.\" Has been completing HEP regularly, and has been going to the gym as well to complete NuStep, leg press, and walk on the track. Wants to try her HEP on her own at this point.     Subjective Questionnaire: LEFS: 61/80 (improved from 36/80 at IE).   Clinical Progress: improved  Home Program Compliance: Yes  Treatment has included: therapeutic exercise, neuromuscular re-education, manual therapy, therapeutic activity, and gait training      Objective   Passive Range of Motion   Right Hip   Flexion: 132 degrees   Extension: 8 degrees  Abduction: 50 degrees with   External rotation (90/90): 48 degrees   Internal rotation (90/90): 38 degrees     Strength/Myotome Testing      Left Hip   Planes of Motion   Flexion: 4+  Extension: 4+  Abduction: 4+  Adduction: 4+     Right Hip   Planes of Motion   Flexion: 4+  Extension: 4+  Abduction: 4+  Adduction: 4+     Left Knee   Flexion: 5  Extension: 5     Right Knee   Flexion: 5  Extension: 5     Left Ankle/Foot   Dorsiflexion: 5  Plantar flexion: 5     Right Ankle/Foot   Dorsiflexion: 5  Plantar flexion: 5     Comments "   Patient ambulates with good phillip, no AD, equal stance time, with genu valgum present. Mild decreased in trunk extension. Good phillip, and no overt loss of balance.      Balance:   30 second sit to stand w/ UE support: 14 reps  30 second sit to stand w/o UE support: 10 reps from mat table  TU.01 seconds without assistive device    Assessment/Plan  Patient is a 74 y/o female who presents to her 16th physical therapy visit s/p R GEOVANNA performed in May of 2024, with recovery complicated with several UTI, sepsis, and c-diff, and did not receive formal physical therapy at that time. Given this history, she has responded very well with physical therapy, and demonstrates 4+/5 R hip strength and improvements in R lower extremity mobility, and improvements in 30 second sit to stand and TUG. She demonstrates growing understanding of HEP, and time spent today reviewing her home exercises to ensure full understanding. She will complete her HEP independently for 3-4 weeks and will follow up with physical therapy as needed to ensure she is progressing well.    Plan Goals:  SHORT TERM GOALS: 4 weeks  1.  Patient to be compliant with HEP and demo good efficiency with TE - MET  2.  Report < 2 sleep disturbances 2° hip pain.   - MET  3.  Pt to improve LE strength to at least 4-/5, and subjectively report greater ease with car transfers. - MET  4.  Increased hip ER/IR ROM to WFL (IR to 30°) degrees to allow for increased ease with bed mobility and squatting. - MET  5. Pt. Able to ambulate up to 20 min with pain < 2/10 with acceptable pattern.  - MET     LONG TERM GOALS: 8 weeks  1.  Pt. to score > 50/80 perceived ability on LEFS - MET  2.  Pain level < 2/10 in hips with all activities including sitting > 1 hr continuously. - MET  3.  Hip  AROM to WNL to allow for return to ADL's/IADLS and functional activities without increased symptoms - MET  4. Hip strength to 4+/5  to allow for pushing, pulling and more strenuous activities  to occur without pain.  Walk > 30 min.  no pain - Progressing  5. No palpable tenderness to the hip, and report being able to transfer into/out of bed without subjective report of difficulty. - MET     Recommendations: Continue with recommendations 1-2 visits in 3-4 weeks  Prognosis to achieve goals: good      Timed:         Manual Therapy:         mins  94345;     Therapeutic Exercise:    30     mins  24166;     Neuromuscular Richa:        mins  57139;    Therapeutic Activity:     10     mins  38669;     Gait Training:           mins  41610;     Ultrasound:          mins  86241;    Ionto                                   mins   72698  Self Care                            mins   86559    Un-Timed:  Electrical Stimulation:         mins  67960 ( );  Dry Needling          mins self-pay  Traction          mins 72874  Re-Eval                          15     mins  71789  Canalith Repos         mins 37358    Timed Treatment:   40   mins   Total Treatment:     55   mins          PT: Jared Bolivar PT     License Number: [place] [id number]# [id number] [place] LIC# [id number]  Electronically signed by Jared Bolivar PT, 07/10/25, 10:56 AM EDT    Certification Period: 7/10/2025 thru 10/7/2025  I certify that the therapy services are furnished while this patient is under my care.  The services outlined above are required by this patient, and will be reviewed every 90 days.         Physician Signature:__________________________________________________    PHYSICIAN: Jacqueline Mayfield APRN  NPI: [id number]                                      DATE:  :     Please sign and return via fax to [phone] . Thank you, [facility]

## 2025-07-17 ENCOUNTER — OFFICE VISIT (OUTPATIENT)
Dept: ORTHOPEDIC SURGERY | Facility: CLINIC | Age: 75
End: 2025-07-17
Payer: MEDICARE

## 2025-07-17 VITALS — BODY MASS INDEX: 26.52 KG/M2 | TEMPERATURE: 98.7 F | HEIGHT: 68 IN | WEIGHT: 175 LBS

## 2025-07-17 DIAGNOSIS — Z96.641 STATUS POST RIGHT HIP REPLACEMENT: Primary | ICD-10-CM

## 2025-07-17 NOTE — PROGRESS NOTES
Patient: Anastasiia Faria  YOB: 1950 75 y.o. female  Medical Record Number: [id number]    Chief Complaints:   Chief Complaint   Patient presents with    Right Hip - Pain       History of Present Illness:Anastasiia Faria is a 75 y.o. female who presents with complaints of right hip region pain is actually improved quite a bit over the last month.  She was fighting a right SI joint infection a number of months back and became quite sedentary she saw Yoselin Wyatt sent her for some blood work to make sure there is no sign of infection and she is here for follow-up.  Overall she does feel she is improving with physical therapy.    Allergies: No Known Allergies    Medications:   Current Outpatient Medications   Medication Sig Dispense Refill    Calcium-Magnesium-Vitamin D (CALCIUM 1200+D3 PO)       Cholecalciferol (D3 2000) 50 MCG (2000 UT) capsule       escitalopram (LEXAPRO) 20 MG tablet Take 1 tablet by mouth Daily. Indications: Generalized Anxiety Disorder 90 tablet 3    furosemide (LASIX) 20 MG tablet TAKE 1 TABLET BY MOUTH DAILY 30 tablet 3    levothyroxine (SYNTHROID, LEVOTHROID) 50 MCG tablet Take 1 tablet by mouth Daily. Indications: Underactive Thyroid 90 tablet 3    loratadine (CLARITIN) 10 MG tablet Take 1 tablet by mouth Daily. Indications: Hayfever      metoprolol succinate XL (Toprol XL) 25 MG 24 hr tablet Take 1 tablet by mouth Daily. 90 tablet 3    potassium chloride (MICRO-K) 10 MEQ CR capsule Take 1 capsule by mouth Daily. Take with Lasix. 30 capsule 4    Probiotic Product (Culturelle Abdominal Support) 4-15 GM-MG pack       Psyllium (METAMUCIL FIBER PO) Take 1 Scoop by mouth Daily. Indications: constipation      rosuvastatin (CRESTOR) 10 MG tablet Take 1 tablet by mouth Every Night. Indications: High Amount of Fats in the Blood 90 tablet 3    cephalexin (KEFLEX) 500 MG capsule TAKE 4 CAPS 1 HOUR PRIOR TO DENTAL APPOINTMENT (Patient not taking: Reported on 7/17/2025) 4 capsule 3     "HYDROcodone-acetaminophen (NORCO) 5-325 MG per tablet Take 1 tablet by mouth Every 6 (Six) Hours As Needed for Moderate Pain. 45 tablet 0    multivitamin (THERAGRAN) tablet tablet Take 600 mg by mouth Daily.      rosuvastatin (Crestor) 5 MG tablet Take  by mouth. (Patient not taking: Reported on 7/17/2025)       No current facility-administered medications for this visit.         The following portions of the patient's history were reviewed and updated as appropriate: allergies, current medications, past family history, past medical history, past social history, past surgical history and problem list.    Review of Systems:   Pertinent positives/negative listed in HPI above    Physical Exam:   Vitals:    07/17/25 0924   Temp: 98.7 °F (37.1 °C)   Weight: 79.4 kg (175 lb)   Height: 172.7 cm (68\")       General: A and O x 3, ASA, NAD    Skin incision on the right hip looks fine there is no fluctuance or erythema she has no irritability with hip range of motion negative logroll negative [place] she walks with a minimally antalgic gait       Reviewed her sed rate and C-reactive protein labs both are normal      Radiology:  Xrays 2views right hip (AP bilateral hips, and lateral of the hip) taken previously reviewed demonstrating  a well positioned total hip without evidence of wear, loosening, or osteoarthritis    Assessment/Plan: Right total hip with soreness improving with therapy I think it would be extremely low risk of infection especially given her normal labs and her improvement with therapy.  I have recommended she continue her therapy exercises at this demonstrated some stretching exercises for the hip fascia that she should perform and I will see her back as needed.      There are no diagnoses linked to this encounter.    Vasu King MD  7/17/2025  "

## (undated) DEVICE — SPNG LAP 18X18IN LF STRL PK/5

## (undated) DEVICE — SYS CLS SKIN PREMIERPRO EXOFINFUSION 22CM

## (undated) DEVICE — SENSR O2 OXIMAX FNGR A/ 18IN NONSTR

## (undated) DEVICE — 450 ML BOTTLE OF 0.05% CHLORHEXIDINE GLUCONATE IN 99.95% STERILE WATER FOR IRRIGATION, USP AND APPLICATOR.: Brand: IRRISEPT ANTIMICROBIAL WOUND LAVAGE

## (undated) DEVICE — TIDISHIELD UROLOGY DRAIN BAGS FROSTY VINYL STERILE FITS SIEMENS UROSKOP ACCESS 20 PER CASE: Brand: TIDISHIELD

## (undated) DEVICE — PK URETSCP 40

## (undated) DEVICE — ADAPT CLN BIOGUARD AIR/H2O DISP

## (undated) DEVICE — CANN O2 ETCO2 FITS ALL CONN CO2 SMPL A/ 7IN DISP LF

## (undated) DEVICE — ANTIBACTERIAL UNDYED BRAIDED (POLYGLACTIN 910), SYNTHETIC ABSORBABLE SUTURE: Brand: COATED VICRYL

## (undated) DEVICE — NITINOL WIRE WITH HYDROPHILIC TIP: Brand: SENSOR

## (undated) DEVICE — PREP SOL POVIDONE/IODINE BT 4OZ

## (undated) DEVICE — CATH URETRL 2 LUM 10FR

## (undated) DEVICE — KT ORCA ORCAPOD DISP STRL

## (undated) DEVICE — LOU CYSTO: Brand: MEDLINE INDUSTRIES, INC.

## (undated) DEVICE — DUAL CUT SAGITTAL BLADE

## (undated) DEVICE — SYR LUERLOK 20CC BX/50

## (undated) DEVICE — TBG PENCL TELESCP MEGADYNE SMOKE EVAC 10FT

## (undated) DEVICE — GLV SURG SENSICARE W/ALOE PF LF 8 STRL

## (undated) DEVICE — GLV SURG BIOGEL LTX PF 7

## (undated) DEVICE — THE STERILE LIGHT HANDLE COVER IS USED WITH STERIS SURGICAL LIGHTING AND VISUALIZATION SYSTEMS.

## (undated) DEVICE — QUINCKE POINT SPINAL NEEDLE, BLACK: Brand: RELI

## (undated) DEVICE — TUBING, SUCTION, 1/4" X 10', STRAIGHT: Brand: MEDLINE

## (undated) DEVICE — TRAP FLD MINIVAC MEGADYNE 100ML

## (undated) DEVICE — WEREWOLF FASTSEAL 6.0 HEMOSTASIS WAND: Brand: FASTSEAL 6.0 HEMOSTASIS WAND

## (undated) DEVICE — TOWEL,OR,DSP,ST,BLUE,STD,4/PK,20PK/CS: Brand: MEDLINE

## (undated) DEVICE — APPL DURAPREP IODOPHOR APL 26ML

## (undated) DEVICE — LN SMPL CO2 SHTRM SD STREAM W/M LUER

## (undated) DEVICE — DECANTER BAG 9": Brand: MEDLINE INDUSTRIES, INC.

## (undated) DEVICE — GLV SURG BIOGEL M LTX PF 7 1/2

## (undated) DEVICE — REFLECTION FLEXIBLE DRILL 25MM: Brand: REFLECTION

## (undated) DEVICE — GLV SURG SENSICARE PI MIC PF SZ7 LF STRL

## (undated) DEVICE — PATIENT RETURN ELECTRODE, SINGLE-USE, CONTACT QUALITY MONITORING, ADULT, WITH 9FT CORD, FOR PATIENTS WEIGING OVER 33LBS. (15KG): Brand: MEGADYNE

## (undated) DEVICE — SUT VICRYL 1 CT1 27IN  JJ40G

## (undated) DEVICE — CATH URETRL FLXITP POLLACK STD 5F 70CM

## (undated) DEVICE — 3M™ IOBAN™ 2 ANTIMICROBIAL INCISE DRAPE 6640EZ: Brand: IOBAN™ 2

## (undated) DEVICE — UNDERGLV SURG BIOGEL INDICATOR LF PF 7.5

## (undated) DEVICE — EXTRCT STN NCIRCLE TIPLSS 2.2F1X115CM

## (undated) DEVICE — PREMIUM WET SKIN PREP TRAY: Brand: MEDLINE INDUSTRIES, INC.

## (undated) DEVICE — FIBR LASR FLEXIVAPULSE242 HOLMIUM FLT/TP 1P/U

## (undated) DEVICE — STPLR SKIN VISISTAT WD 35CT

## (undated) DEVICE — SNAR POLYP CAPTIVATOR RND STFF 2.4 240CM 10MM 1P/U

## (undated) DEVICE — PRT BIOP SEALS

## (undated) DEVICE — PK ANT HIP 40

## (undated) DEVICE — GLV SURG SENSICARE W/ALOE PF LF 7.5 STRL

## (undated) DEVICE — SINGLE-USE BIOPSY FORCEPS: Brand: RADIAL JAW 4

## (undated) DEVICE — THE SINGLE USE ETRAP – POLYP TRAP IS USED FOR SUCTION RETRIEVAL OF ENDOSCOPICALLY REMOVED POLYPS.: Brand: ETRAP

## (undated) DEVICE — SUT PDS 1 CT1 36IN Z347H

## (undated) DEVICE — GLV SURG SENSICARE PI PF LF 7 GRN STRL